# Patient Record
Sex: MALE | Race: WHITE | Employment: OTHER | URBAN - METROPOLITAN AREA
[De-identification: names, ages, dates, MRNs, and addresses within clinical notes are randomized per-mention and may not be internally consistent; named-entity substitution may affect disease eponyms.]

---

## 2017-01-13 ENCOUNTER — TELEPHONE (OUTPATIENT)
Dept: FAMILY MEDICINE | Facility: CLINIC | Age: 72
End: 2017-01-13

## 2017-01-13 ENCOUNTER — CARE COORDINATION (OUTPATIENT)
Dept: CARE COORDINATION | Facility: CLINIC | Age: 72
End: 2017-01-13

## 2017-01-13 DIAGNOSIS — F33.1 MODERATE RECURRENT MAJOR DEPRESSION (H): Primary | ICD-10-CM

## 2017-01-13 RX ORDER — VENLAFAXINE HYDROCHLORIDE 75 MG/1
75 CAPSULE, EXTENDED RELEASE ORAL 2 TIMES DAILY
Qty: 30 CAPSULE | Refills: 3 | Status: SHIPPED | OUTPATIENT
Start: 2017-01-13 | End: 2017-07-28

## 2017-01-13 NOTE — TELEPHONE ENCOUNTER
Telephone call received from Caren, wife of patient.     venlafaxine (EFFEXOR-XR) 75 MG 24 hr capsule  Take 1 capsule (75 mg) by mouth 2 times daily 1 in the am and 1 at bedtime    Insurance will no longer cover medication BID at a 90 day supply.     bartolo Quintero to start a PA for patient to take medication BID with a 90 day supply?      Thank you,  Sandra Guadalupe RN  [facility]

## 2017-01-13 NOTE — TELEPHONE ENCOUNTER
Please start a prior authorization for the following medication:     venlafaxine (EFFEXOR-XR) 75 MG 24 hr capsule   Take 1 capsule (75 mg) by mouth 2 times daily 1 in the am and 1 at bedtime.  180 capsules/ Per MONTH 1 Refill       Thank you!  Sandra Guadalupe RN  [facility]

## 2017-01-30 ENCOUNTER — CARE COORDINATION (OUTPATIENT)
Dept: CARE COORDINATION | Facility: CLINIC | Age: 72
End: 2017-01-30

## 2017-01-31 ENCOUNTER — TRANSFERRED RECORDS (OUTPATIENT)
Dept: HEALTH INFORMATION MANAGEMENT | Facility: CLINIC | Age: 72
End: 2017-01-31

## 2017-02-02 ENCOUNTER — TELEPHONE (OUTPATIENT)
Dept: FAMILY MEDICINE | Facility: CLINIC | Age: 72
End: 2017-02-02

## 2017-02-03 ENCOUNTER — TRANSFERRED RECORDS (OUTPATIENT)
Dept: HEALTH INFORMATION MANAGEMENT | Facility: CLINIC | Age: 72
End: 2017-02-03

## 2017-02-03 ENCOUNTER — HOSPITAL ENCOUNTER (OUTPATIENT)
Dept: NEUROLOGY | Facility: CLINIC | Age: 72
Setting detail: THERAPIES SERIES
Discharge: STILL ADMITTED OR ADMINISTRATIVE | End: 2017-02-03
Attending: PSYCHIATRY & NEUROLOGY

## 2017-02-03 DIAGNOSIS — G20.A1 PARKINSON'S DISEASE (H): ICD-10-CM

## 2017-02-13 ENCOUNTER — COMMUNICATION - HEALTHEAST (OUTPATIENT)
Dept: NEUROLOGY | Facility: CLINIC | Age: 72
End: 2017-02-13

## 2017-02-15 ENCOUNTER — COMMUNICATION - HEALTHEAST (OUTPATIENT)
Dept: NEUROLOGY | Facility: CLINIC | Age: 72
End: 2017-02-15

## 2017-02-15 DIAGNOSIS — F51.01 PRIMARY INSOMNIA: ICD-10-CM

## 2017-02-15 DIAGNOSIS — F03.90 DEMENTIA WITHOUT BEHAVIORAL DISTURBANCE (H): ICD-10-CM

## 2017-02-15 RX ORDER — CLONAZEPAM 0.5 MG/1
0.25 TABLET ORAL
Qty: 15 TABLET | Refills: 0 | Status: SHIPPED | OUTPATIENT
Start: 2017-02-15 | End: 2017-04-17

## 2017-02-15 NOTE — TELEPHONE ENCOUNTER
Routing to Dr. Buchanan -- please review and sign/advise. Thank you    SEJAL SimeonN, RN  [facility]

## 2017-02-15 NOTE — TELEPHONE ENCOUNTER
Clonazepam 0.5mg tabs      Last Written Prescription Date:  7/7/16  Last Fill Quantity: 15,   # refills: 0  Last Office Visit with [facility] or  Health prescribing provider: 12/5/16  Future Office visit:       Routing refill request to provider for review/approval because:  Drug not on the [facility] or  Health refill protocol or controlled substance

## 2017-02-16 ENCOUNTER — CARE COORDINATION (OUTPATIENT)
Dept: CARE COORDINATION | Facility: CLINIC | Age: 72
End: 2017-02-16

## 2017-02-21 ENCOUNTER — OFFICE VISIT (OUTPATIENT)
Dept: UROLOGY | Facility: CLINIC | Age: 72
End: 2017-02-21

## 2017-02-21 ENCOUNTER — RECORDS - HEALTHEAST (OUTPATIENT)
Dept: ADMINISTRATIVE | Facility: OTHER | Age: 72
End: 2017-02-21

## 2017-02-21 VITALS
SYSTOLIC BLOOD PRESSURE: 147 MMHG | WEIGHT: 193 LBS | HEIGHT: 74 IN | HEART RATE: 69 BPM | BODY MASS INDEX: 24.77 KG/M2 | DIASTOLIC BLOOD PRESSURE: 80 MMHG

## 2017-02-21 DIAGNOSIS — N39.41 URGE INCONTINENCE: ICD-10-CM

## 2017-02-21 DIAGNOSIS — N39.41 URGE INCONTINENCE OF URINE: Primary | ICD-10-CM

## 2017-02-21 LAB
ALBUMIN UR-MCNC: 30 MG/DL
APPEARANCE UR: CLEAR
BILIRUB UR QL STRIP: NEGATIVE
COLOR UR AUTO: YELLOW
GLUCOSE UR STRIP-MCNC: NEGATIVE MG/DL
HGB UR QL STRIP: ABNORMAL
KETONES UR STRIP-MCNC: 5 MG/DL
LEUKOCYTE ESTERASE UR QL STRIP: NEGATIVE
MUCOUS THREADS #/AREA URNS LPF: PRESENT /LPF
NITRATE UR QL: NEGATIVE
PH UR STRIP: 5 PH (ref 5–7)
RBC #/AREA URNS AUTO: 120 /HPF (ref 0–2)
SP GR UR STRIP: 1.02 (ref 1–1.03)
SQUAMOUS #/AREA URNS AUTO: <1 /HPF (ref 0–1)
URN SPEC COLLECT METH UR: ABNORMAL
UROBILINOGEN UR STRIP-MCNC: 0 MG/DL (ref 0–2)
WBC #/AREA URNS AUTO: 2 /HPF (ref 0–2)

## 2017-02-21 RX ORDER — AMLODIPINE BESYLATE 2.5 MG/1
TABLET ORAL
COMMUNITY
Start: 2016-03-31 | End: 2017-08-01

## 2017-02-21 RX ORDER — ERYTHROMYCIN 5 MG/G
OINTMENT OPHTHALMIC
Refills: 11 | COMMUNITY
Start: 2017-02-09 | End: 2017-09-13

## 2017-02-21 RX ORDER — DONEPEZIL HYDROCHLORIDE 10 MG/1
TABLET, FILM COATED ORAL
Refills: 3 | COMMUNITY
Start: 2017-02-15 | End: 2018-11-12

## 2017-02-21 RX ORDER — NEOMYCIN SULFATE, POLYMYXIN B SULFATE AND DEXAMETHASONE 3.5; 10000; 1 MG/ML; [USP'U]/ML; MG/ML
SUSPENSION/ DROPS OPHTHALMIC
Refills: 1 | COMMUNITY
Start: 2017-02-09 | End: 2017-09-13

## 2017-02-21 ASSESSMENT — PAIN SCALES - GENERAL: PAINLEVEL: NO PAIN (0)

## 2017-02-21 NOTE — PATIENT INSTRUCTIONS
- Continue Citrocel as you are  - Continue probiotic as you are  - Discuss with the neurologist whether something can be done about excess saliva  - Regarding fluids --> drink plenty of fluids in the daytime, but would limit fluids before bed.  Observe urine color.  Dark colored urine is suggestive of dehydration.   - Recommend returning to Physical Therapy.  Once you lose strength it is very hard to regain it.   - Urine specimen collected today - send for testing to rule out infection  - Return to Clinic 4 months or sooner if problems     Jackie Hunt PA-C

## 2017-02-21 NOTE — MR AVS SNAPSHOT
After Visit Summary   2/21/2017    Sanjay Cano    MRN: [id number]           Patient Information     Date Of Birth          1945        Visit Information        Provider Department      2/21/2017 1:30 PM Zenaida Hunt PA [facility] Urology and [facility] for Prostate and Urologic Cancers        Today's Diagnoses     Urinary incontinence    -  1      Care Instructions    - Continue Citrocel as you are  - Continue probiotic as you are  - Discuss with the neurologist whether something can be done about excess saliva  - Regarding fluids --> drink plenty of fluids in the daytime, but would limit fluids before bed.  Observe urine color.  Dark colored urine is suggestive of dehydration.   - Recommend returning to Physical Therapy.  Once you lose strength it is very hard to regain it.   - Urine specimen collected today - send for testing to rule out infection  - Return to Clinic 4 months or sooner if problems     Jackie Hunt PA-C        Follow-ups after your visit        Your next 10 appointments already scheduled     Feb 21, 2017  1:30 PM CST   (Arrive by 1:15 PM)   Return Visit with ESTEE Avendaño [facility] Urology and [facility] for Prostate and Urologic Cancers ([facility])    [address] [place] [id number] [phone]              Who to contact     Please call your clinic at [phone] to:    Ask questions about your health    Make or cancel appointments    Discuss your medicines    Learn about your test results    Speak to your doctor   If you have compliments or concerns about an experience at your clinic, or if you wish to file a complaint, please contact [facility] Patient Relations at [phone] or email us at [email or web address]         Additional Information About Your Visit        MyChart Information     [employer] is an electronic gateway that provides easy, online access to your medical  "records. With [employer], you can request a clinic appointment, read your test results, renew a prescription or communicate with your care team.     To sign up for [employer] visit the website at [email or web address]   You will be asked to enter the access code listed below, as well as some personal information. Please follow the directions to create your username and password.     Your access code is: [email or web address]  Expires: 2017  6:30 AM     Your access code will  in 90 days. If you need help or a new code, please contact your [facility] or call [phone] for assistance.        Care EveryWhere ID     This is your Care EveryWhere ID. This could be used by other organizations to access your [place] medical records  [id number]        Your Vitals Were     Pulse Height BMI (Body Mass Index)             69 1.88 m (6' 2\") 24.78 kg/m2          Blood Pressure from Last 3 Encounters:   17 147/80   16 100/67   11/15/16 112/63    Weight from Last 3 Encounters:   17 87.5 kg (193 lb)   11/15/16 85.3 kg (188 lb)   10/26/16 85.1 kg (187 lb 11.2 oz)              We Performed the Following     POST-VOID RESIDUAL BLADDER SCAN     Routine UA with microscopic - No culture     Urine Culture Aerobic Bacterial        Primary Care Provider Office Phone # Fax #    Bharath Buchanan [phone]       [facility] [address] [place] [phone]        Thank you!     Thank you for choosing [facility] UROLOGY AND [facility] FOR PROSTATE AND UROLOGIC CANCERS  for your care. Our goal is always to provide you with excellent care. Hearing back from our patients is one way we can continue to improve our services. Please take a few minutes to complete the written survey that you may receive in the mail after your visit with us. Thank you!             Your Updated Medication List - Protect others around you: Learn how to safely use, store and throw " away your medicines at www.disposemymeds.org.          This list is accurate as of: 2/21/17  1:18 PM.  Always use your most recent med list.                   Brand Name Dispense Instructions for use    * amLODIPine 2.5 MG tablet    NORVASC         * amLODIPine 5 MG tablet    NORVASC    90 tablet    Take 1 tablet (5 mg) by mouth daily       * ARICEPT 5 MG tablet   Generic drug:  donepezil     180 tablet    Take 5-7.5 mg by mouth every morning Take 1 to 1.5 tablets by mouth every every morning. Only take 1 tablet every morning when have diarrhea .       * donepezil 5 MG tablet    ARIcept    30 tablet    Takes 1.5 tabs by mouth every night       * donepezil 10 MG tablet    ARICEPT     TK 3/4 T PO BID       * carbidopa-levodopa  MG per tablet    SINEMET    90 tablet    Take 1-2 tablets by mouth 5 times daily Takes 2 tablets at 6 AM, 1.5 tablets at 9 AM, 2 tablets at noon, 1.5 tablets at 3 PM, 1.5 tablets at 6 PM. Can take additional 0.5 tablet in the middle of the night if needed.       * carbidopa-levodopa  MG per CR tablet    SINEMET CR    1 tablet    Take 1 tablet by mouth At Bedtime       cholecalciferol 1000 UNIT tablet    vitamin D    90 tablet    Take 1 tablet (1,000 Units) by mouth daily       CITRUCEL PO      Take by mouth 2 times daily       clonazePAM 0.5 MG tablet    klonoPIN    15 tablet    Take 0.5 tablets (0.25 mg) by mouth nightly as needed At bedtime       erythromycin ophthalmic ointment    ROMYCIN     APPLY 1 APPLICATION IN BOTH EYES NIGHTLY       FLORAJEN BIFIDOBLEND PO      Take 450 mg by mouth daily as needed (Only take when on antibiotics)       fluticasone 110 MCG/ACT Inhaler    FLOVENT HFA    3 Inhaler    Inhale 2 puffs into the lungs 2 times daily 2 puffs am, 2 puffs pm       Ipratropium-Albuterol  MCG/ACT inhaler    COMBIVENT RESPIMAT    1 Inhaler    Inhale 1 puff into the lungs 4 times daily as needed (Patient usually only takes 2-3 times daily (AM, noon, PM), but always at  least BID.) Not to exceed 6 doses per day.       lactobacillus rhamnosus (GG) capsule     60 capsule    Take 1 capsule by mouth 2 times daily       mirtazapine 15 MG tablet    REMERON    90 tablet    Take 1 tablet (15 mg) by mouth At Bedtime       neomycin-polymyxin-dexamethasone [id number] Susp ophthalmic susp    MAXITROL     PLACE 1 DROP INTO THE RIGHT EYE TID       NONFORMULARY      Apply Ponds Cold Cream topically to face daily for dry, pealing skin.       order for DME     1 Device    1 Device. Transport chair       order for DME     1 Device    Equipment being ordered: Blood pressure monitor with cuff       order for DME     2 each    Equipment being ordered: TEDS stockings       QUEtiapine 50 MG tablet    SEROQUEL    90 tablet    Take 1 tablet (50 mg) by mouth At Bedtime       venlafaxine 75 MG 24 hr capsule    EFFEXOR-XR    30 capsule    Take 1 capsule (75 mg) by mouth 2 times daily 1 in the am and 1 at bedtime       ZANTAC 150 MG tablet   Generic drug:  ranitidine     1 tablet    Take 1 tablet (150 mg) by mouth At Bedtime Then decrease to 75 mg after two weeks.       * Notice:  This list has 7 medication(s) that are the same as other medications prescribed for you. Read the directions carefully, and ask your doctor or other care provider to review them with you.

## 2017-02-21 NOTE — LETTER
Sanjay Cano   [address] [facility] [address]        Dear Mr. Cano:    I am writing you concerning your recent test results:    Results for orders placed or performed in visit on 02/21/17   Routine UA with microscopic - No culture   Result Value Ref Range    Color Urine Yellow     Appearance Urine Clear     Glucose Urine Negative NEG mg/dL    Bilirubin Urine Negative NEG    Ketones Urine 5 (A) NEG mg/dL    Specific Gravity Urine 1.018 1.003 - 1.035    Blood Urine Moderate (A) NEG    pH Urine 5.0 5.0 - 7.0 pH    Protein Albumin Urine 30 (A) NEG mg/dL    Urobilinogen mg/dL 0.0 0.0 - 2.0 mg/dL    Nitrite Urine Negative NEG    Leukocyte Esterase Urine Negative NEG    Source Midstream Urine     WBC Urine 2 0 - 2 /HPF    RBC Urine 120 (H) 0 - 2 /HPF    Squamous Epithelial /HPF Urine <1 0 - 1 /HPF    Mucous Urine Present (A) NEG /LPF   Urine Culture Aerobic Bacterial   Result Value Ref Range    Specimen Description Unspecified Urine     Special Requests Specimen received in preservative     Culture Micro       <10,000 colonies/mL mixed urogenital joey Susceptibility testing not routinely   done      Micro Report Status FINAL 02/22/2017      Resulted Orders   Urine Culture Aerobic Bacterial   Result Value Ref Range    Specimen Description Unspecified Urine     Special Requests Specimen received in preservative     Culture Micro       <10,000 colonies/mL mixed urogenital joey Susceptibility testing not routinely   done      Micro Report Status FINAL 02/22/2017    Routine UA with microscopic - No culture   Result Value Ref Range    Color Urine Yellow     Appearance Urine Clear     Glucose Urine Negative NEG mg/dL    Bilirubin Urine Negative NEG    Ketones Urine 5 (A) NEG mg/dL    Specific Gravity Urine 1.018 1.003 - 1.035    Blood Urine Moderate (A) NEG    pH Urine 5.0 5.0 - 7.0 pH    Protein Albumin Urine 30 (A) NEG mg/dL    Urobilinogen mg/dL 0.0 0.0 - 2.0 mg/dL    Nitrite Urine Negative NEG     Leukocyte Esterase Urine Negative NEG    Source Midstream Urine     WBC Urine 2 0 - 2 /HPF    RBC Urine 120 (H) 0 - 2 /HPF    Squamous Epithelial /HPF Urine <1 0 - 1 /HPF    Mucous Urine Present (A) NEG /LPF       Your urine shows some blood but is otherwise normal.  For instance, the urine culture is negative so there is no urinary tract infection.  We should recheck your urine next time we see you to assure that the blood has resolved.  Your previous urine specimens have not shown blood, so we will need to keep an eye on this.      Please let me know if you have any questions.      Sincerely,      Zenaida Hunt PA-C  Physician Assistant Urology        [facility] UROLOGY AND [facility] FOR PROSTATE AND UROLOGIC CANCERS  [address] [address]  Phone: [phone]  Fax: [phone]

## 2017-02-21 NOTE — PROGRESS NOTES
"HPI: Mr. Sanjay Cano is a 71 year old year old male presenting today for evaluation of chief complaint(s): No chief complaint on file.  - history of UTIs    Mr. Sanjay Cano has history of HTN, COPD, Parkinson's x 22 years and major depressive disorder. He was hospitalized from 6/28-7/6/16 for E Coli urosepsis/bacteremia.  He completed a 7 day course of Cipro (after resolution of fever) then discharged to TCU where he developed a recurrent E Coli UTI on 7/27.  This was initially treated with a 3 day course of Bactrim, then a 3 day course of Macrobid once the culture returned showing E Coli resistant to Bactrim.        He was seen by me on 8/9/16 with a UCx growing E Coli.  Patient was given a 10 day course of Cipro then a 30 day course of prophylactic Macrobid which he finished on 9/18.  A CT was also coordinated that showed diffuse bladder wall thickening, bilateral nonobstructing nephrolithiases as well as LLL pulmonary nodule and subpleural opacity in the R lung base.  The pulmonary findings were discussed with his wife and f/u with PCP was recommended.      Since then he has been seen in Urology Clinic on 9/20/16 and 11/15/16 with further concerns of latent UTI based on symptoms of lethargy, dark urine and incontinence.  Most recently in November a straight catheterized urine specimen was sent - UA was WNL and culture showed no growth. Antibiotics were not recommended. It was discussed that patient should get plenty of fluids.  Recommendations were also made for chronic loose stools and symptoms of GERD.  Today he represents with his wife in followup.      ROS:  - Voids to a diaper.  Has \"10 second\" lead time.  Nocturnal enuresis.  Often sleeps through the night.  No dysuria, hematuria.    - Bowels - Using citrocel BID.  Stools are more formed. Fewer accidents.  Also using probiotic once daily  - More hunched over --> all the doctors are recommending return to PT  - Stye in right eye - using drops and " ointment x 2 weeks, almost resolved.   - Poor energy around 8-9pm.  His wife is worried that he might have a UTI  - Patient concerned about excess saline.  Wants a medication to help with this.      REVIEW OF DIAGNOSTICS:  11/15/16 - UA negative.  UCx no growth  09/20/16 - 10-50K mixed UG culture  8/09/16 - E Coli >100K resistant to amp/ampsul, but otherwise susceptible  7/27/16 - ([facility]) UC growing >100,000cfu/mL E Coli susceptible to cephalosporins and quinolones as well as nitrofurantoin.  Resistant to amp/sul and Bactrim.    6/28/16 - ([facility]) WBC 16/hpf, RBC 6/hpf --> UC growing >100,000cfu/mL E Coli susceptible to cephalosporins and quinolones as well as nitrofurantoin.  Resistant to amp/sul and Bactrim. BC grew the same.    7/31/15 - WBC 2-5/hpf, RBC 0-2/hpf --> UC not obtained    Current Outpatient Prescriptions   Medication Sig Dispense Refill     clonazePAM (KLONOPIN) 0.5 MG tablet Take 0.5 tablets (0.25 mg) by mouth nightly as needed At bedtime 15 tablet 0     venlafaxine (EFFEXOR-XR) 75 MG 24 hr capsule Take 1 capsule (75 mg) by mouth 2 times daily 1 in the am and 1 at bedtime 30 capsule 3     donepezil (ARICEPT) 5 MG tablet Takes 1.5 tabs by mouth every night 30 tablet 0     ranitidine (ZANTAC) 150 MG tablet Take 1 tablet (150 mg) by mouth At Bedtime Then decrease to 75 mg after two weeks. 1 tablet 0     carbidopa-levodopa (SINEMET CR)  MG per CR tablet Take 1 tablet by mouth At Bedtime 1 tablet 0     Methylcellulose, Laxative, (CITRUCEL PO) Take by mouth 2 times daily       lactobacillus rhamnosus, GG, (CULTURELL) capsule Take 1 capsule by mouth 2 times daily 60 capsule 11     amLODIPine (NORVASC) 5 MG tablet Take 1 tablet (5 mg) by mouth daily 90 tablet 3     Ipratropium-Albuterol (COMBIVENT RESPIMAT)  MCG/ACT inhaler Inhale 1 puff into the lungs 4 times daily as needed (Patient usually only takes 2-3 times daily (AM, noon, PM), but always at least BID.) Not to exceed 6 doses per day. 1  Inhaler 4     NONFORMULARY Apply Ponds Cold Cream topically to face daily for dry, pealing skin.       order for DME Equipment being ordered: TEDS stockings 2 each 0     fluticasone (FLOVENT HFA) 110 MCG/ACT inhaler Inhale 2 puffs into the lungs 2 times daily 2 puffs am, 2 puffs pm 3 Inhaler 3     QUEtiapine (SEROQUEL) 50 MG tablet Take 1 tablet (50 mg) by mouth At Bedtime 90 tablet 3     mirtazapine (REMERON) 15 MG tablet Take 1 tablet (15 mg) by mouth At Bedtime 90 tablet 3     Probiotic Product (FLORAJEN BIFIDOBLEND PO) Take 450 mg by mouth daily as needed (Only take when on antibiotics)        carbidopa-levodopa (SINEMET)  MG per tablet Take 1-2 tablets by mouth 5 times daily Takes 2 tablets at 6 AM, 1.5 tablets at 9 AM, 2 tablets at noon, 1.5 tablets at 3 PM, 1.5 tablets at 6 PM. Can take additional 0.5 tablet in the middle of the night if needed. 90 tablet      donepezil (ARICEPT) 5 MG tablet Take 5-7.5 mg by mouth every morning Take 1 to 1.5 tablets by mouth every every morning. Only take 1 tablet every morning when have diarrhea . 180 tablet 3     order for DME Equipment being ordered: Blood pressure monitor with cuff 1 Device 0     cholecalciferol (VITAMIN D) 1000 UNIT tablet Take 1 tablet (1,000 Units) by mouth daily 90 tablet 1     ORDER FOR DME 1 Device. Transport chair 1 Device 0       ALLERGIES: Seasonal allergies      REVIEW OF SYSTEMS:  As above in HPI    GENERAL PHYSICAL EXAM:   Vitals: There were no vitals taken for this visit.  There is no height or weight on file to calculate BMI.    GENERAL: Well groomed, well developed, well nourished male in NAD.  Sitting in a wheelchair.  Head hunched over.  Difficult to hold up head.   NEURO: Alert and oriented x 3.  PSYCH: Normal mood and affect, pleasant and agreeable during interview and exam.    PVR Bladderscan 37mL today    RADIOLOGY: The following tests were reviewed:   8/11/16 CT AP with/without contrast:  IMPRESSION:    1.  Diffuse thickening  of the bladder wall, which can be seen in  cystitis.  In addition, the bladder wall is trabeculated, which could  be related to chronic outlet obstruction from prostatic hypertrophy.  2.  Bilateral nonobstructing nephrolithiasis.  3.  Multiple bilateral simple appearing renal cysts of various sizes,  most of which appear benign but some of which are too small to further  characterize on this exam.  4. 8 mm left lower lobe pulmonary nodule, not significantly changed  compared to 6/20/2016 exam, recommend followup in 3 months per  Fleischner Society criteria.  5.  Rounded subpleural opacity in the right lung base is unchanged to  slightly more prominent than on 6/28/2016.  Attention on followup  imaging is recommended.  Differential considerations include scarring,  atelectasis, less likely a pulmonary nodule.  Given changes in  technique, this is similar to chest x-rays from 2/3/2015 and 2/4/2016.     LABS: The last test results for Mr. Sanjay Cano were reviewed:  PSA -   Lab Results   Component Value Date    PSA 1.2 02/25/2010     BMP -   Recent Labs   Lab Test  07/06/16   0722  07/05/16   1926  07/04/16   0726   07/01/16   0755  06/30/16   0816   12/26/12   1203   10/28/11   2023   10/13/11   0642   NA   --    --   139   --   142  139   < >  144   < >  143   < >  139   POTASSIUM  3.7  3.7  3.5   < >  3.3*  3.2*   < >  4.5   < >  3.9   < >  3.8   CHLORIDE   --    --   108   --   109  107   < >  102   < >  108   < >  107   CO2   --    --   25   --   27  24   < >  23   < >  31   < >  26   BUN   --    --   15   --   15  17   < >  31*   < >  21   < >  13   CR   --    --   0.74   --   0.90  0.91   < >  1.20   < >  0.84   < >  0.83   GLC   --    --   123*   --   120*  166*   < >  83   < >  105*   < >  112*   JENNIE   --    --   7.8*   --   7.7*  7.7*   < >  9.8   < >  9.1   < >  8.5   MAG   --    --    --    --    --    --    --   2.2   --   2.1   --   1.9   PHOS   --    --    --    --    --    --    --   3.8   --   3.4    --   2.4*    < > = values in this interval not displayed.       CBC -   Recent Labs   Lab Test  07/04/16   0726  07/01/16   0755  06/30/16   0816   WBC  7.5  4.1  4.6   HGB  14.0  12.3*  13.5   PLT  187  148*  131*       ASSESSMENT:   1) Parkinson  2) urge incontinence  3) incontinence without awareness  4) nocturia enuresis  5) history of hospitalization for UTI    PLAN:   - Continue Citrocel as you are  - Continue probiotic as you are  - Discuss with the neurologist whether something can be done about excess saliva  - Discussed fluids.  Drink plenty of fluids in the daytime, but would limit fluids before bed.  Observe urine color.  Dark colored urine is suggestive of dehydration.   - Recommend returning to PT  - Urine specimen collected today - send for UA/UC to rule out infection  - PVR checked 37mL  - Return to Clinic 4 months or sooner if problems     Jackie Hunt PA-C  Department of Urologic Surgery

## 2017-02-21 NOTE — LETTER
2/21/2017       RE: Sanjay Cano  [address]  [facility] [id number]     Dear Colleague,    Thank you for referring your patient, Sanjay Cano, to the [facility] UROLOGY AND [facility] FOR PROSTATE AND UROLOGIC CANCERS at [facility]. Please see a copy of my visit note below.    HPI: Mr. Sanjay Cano is a 71 year old year old male presenting today for evaluation of chief complaint(s): No chief complaint on file.  - history of UTIs    Mr. Sanjay Cano has history of HTN, COPD, Parkinson's x 22 years and major depressive disorder. He was hospitalized from 6/28-7/6/16 for E Coli urosepsis/bacteremia.  He completed a 7 day course of Cipro (after resolution of fever) then discharged to TCU where he developed a recurrent E Coli UTI on 7/27.  This was initially treated with a 3 day course of Bactrim, then a 3 day course of Macrobid once the culture returned showing E Coli resistant to Bactrim.        He was seen by me on 8/9/16 with a UCx growing E Coli.  Patient was given a 10 day course of Cipro then a 30 day course of prophylactic Macrobid which he finished on 9/18.  A CT was also coordinated that showed diffuse bladder wall thickening, bilateral nonobstructing nephrolithiases as well as LLL pulmonary nodule and subpleural opacity in the R lung base.  The pulmonary findings were discussed with his wife and f/u with PCP was recommended.      Since then he has been seen in Urology Clinic on 9/20/16 and 11/15/16 with further concerns of latent UTI based on symptoms of lethargy, dark urine and incontinence.  Most recently in November a straight catheterized urine specimen was sent - UA was WNL and culture showed no growth. Antibiotics were not recommended. It was discussed that patient should get plenty of fluids.  Recommendations were also made for chronic loose stools and symptoms of GERD.  Today he represents with his wife in followup.      ROS:  - Voids to a diaper.  Has  "\"10 second\" lead time.  Nocturnal enuresis.  Often sleeps through the night.  No dysuria, hematuria.    - Bowels - Using citrocel BID.  Stools are more formed. Fewer accidents.  Also using probiotic once daily  - More hunched over --> all the doctors are recommending return to PT  - Stye in right eye - using drops and ointment x 2 weeks, almost resolved.   - Poor energy around 8-9pm.  His wife is worried that he might have a UTI  - Patient concerned about excess saline.  Wants a medication to help with this.      REVIEW OF DIAGNOSTICS:  11/15/16 - UA negative.  UCx no growth  09/20/16 - 10-50K mixed UG culture  8/09/16 - E Coli >100K resistant to amp/ampsul, but otherwise susceptible  7/27/16 - ([facility]) UC growing >100,000cfu/mL E Coli susceptible to cephalosporins and quinolones as well as nitrofurantoin.  Resistant to amp/sul and Bactrim.    [facility], RBC 6/hpf --> UC growing >100,000cfu/mL E Coli susceptible to cephalosporins and quinolones as well as nitrofurantoin.  Resistant to amp/sul and Bactrim. BC grew the same.    7/31/15 - WBC 2-5/hpf, RBC 0-2/hpf --> UC not obtained    Current Outpatient Prescriptions   Medication Sig Dispense Refill     clonazePAM (KLONOPIN) 0.5 MG tablet Take 0.5 tablets (0.25 mg) by mouth nightly as needed At bedtime 15 tablet 0     venlafaxine (EFFEXOR-XR) 75 MG 24 hr capsule Take 1 capsule (75 mg) by mouth 2 times daily 1 in the am and 1 at bedtime 30 capsule 3     donepezil (ARICEPT) 5 MG tablet Takes 1.5 tabs by mouth every night 30 tablet 0     ranitidine (ZANTAC) 150 MG tablet Take 1 tablet (150 mg) by mouth At Bedtime Then decrease to 75 mg after two weeks. 1 tablet 0     carbidopa-levodopa (SINEMET CR)  MG per CR tablet Take 1 tablet by mouth At Bedtime 1 tablet 0     Methylcellulose, Laxative, (CITRUCEL PO) Take by mouth 2 times daily       lactobacillus rhamnosus, GG, (CULTURELL) capsule Take 1 capsule by mouth 2 times daily 60 capsule 11     " amLODIPine (NORVASC) 5 MG tablet Take 1 tablet (5 mg) by mouth daily 90 tablet 3     Ipratropium-Albuterol (COMBIVENT RESPIMAT)  MCG/ACT inhaler Inhale 1 puff into the lungs 4 times daily as needed (Patient usually only takes 2-3 times daily (AM, noon, PM), but always at least BID.) Not to exceed 6 doses per day. 1 Inhaler 4     NONFORMULARY Apply Ponds Cold Cream topically to face daily for dry, pealing skin.       order for DME Equipment being ordered: TEDS stockings 2 each 0     fluticasone (FLOVENT HFA) 110 MCG/ACT inhaler Inhale 2 puffs into the lungs 2 times daily 2 puffs am, 2 puffs pm 3 Inhaler 3     QUEtiapine (SEROQUEL) 50 MG tablet Take 1 tablet (50 mg) by mouth At Bedtime 90 tablet 3     mirtazapine (REMERON) 15 MG tablet Take 1 tablet (15 mg) by mouth At Bedtime 90 tablet 3     Probiotic Product (FLORAJEN BIFIDOBLEND PO) Take 450 mg by mouth daily as needed (Only take when on antibiotics)        carbidopa-levodopa (SINEMET)  MG per tablet Take 1-2 tablets by mouth 5 times daily Takes 2 tablets at 6 AM, 1.5 tablets at 9 AM, 2 tablets at noon, 1.5 tablets at 3 PM, 1.5 tablets at 6 PM. Can take additional 0.5 tablet in the middle of the night if needed. 90 tablet      donepezil (ARICEPT) 5 MG tablet Take 5-7.5 mg by mouth every morning Take 1 to 1.5 tablets by mouth every every morning. Only take 1 tablet every morning when have diarrhea . 180 tablet 3     order for DME Equipment being ordered: Blood pressure monitor with cuff 1 Device 0     cholecalciferol (VITAMIN D) 1000 UNIT tablet Take 1 tablet (1,000 Units) by mouth daily 90 tablet 1     ORDER FOR DME 1 Device. Transport chair 1 Device 0       ALLERGIES: Seasonal allergies      REVIEW OF SYSTEMS:  As above in HPI    GENERAL PHYSICAL EXAM:   Vitals: There were no vitals taken for this visit.  There is no height or weight on file to calculate BMI.    GENERAL: Well groomed, well developed, well nourished male in NAD.  Sitting in a  wheelchair.  Head hunched over.  Difficult to hold up head.   NEURO: Alert and oriented x 3.  PSYCH: Normal mood and affect, pleasant and agreeable during interview and exam.    PVR Bladderscan 37mL today    RADIOLOGY: The following tests were reviewed:   8/11/16 CT AP with/without contrast:  IMPRESSION:    1.  Diffuse thickening of the bladder wall, which can be seen in  cystitis.  In addition, the bladder wall is trabeculated, which could  be related to chronic outlet obstruction from prostatic hypertrophy.  2.  Bilateral nonobstructing nephrolithiasis.  3.  Multiple bilateral simple appearing renal cysts of various sizes,  most of which appear benign but some of which are too small to further  characterize on this exam.  4. 8 mm left lower lobe pulmonary nodule, not significantly changed  compared to 6/20/2016 exam, recommend followup in 3 months per  Fleischner Society criteria.  5.  Rounded subpleural opacity in the right lung base is unchanged to  slightly more prominent than on 6/28/2016.  Attention on followup  imaging is recommended.  Differential considerations include scarring,  atelectasis, less likely a pulmonary nodule.  Given changes in  technique, this is similar to chest x-rays from 2/3/2015 and 2/4/2016.     LABS: The last test results for Mr. Sanjay Cano were reviewed:  PSA -   Lab Results   Component Value Date    PSA 1.2 02/25/2010     BMP -   Recent Labs   Lab Test  07/06/16   0722  07/05/16   1926  07/04/16   0726   07/01/16   0755  06/30/16   0816   12/26/12   1203   10/28/11   2023   10/13/11   0642   NA   --    --   139   --   142  139   < >  144   < >  143   < >  139   POTASSIUM  3.7  3.7  3.5   < >  3.3*  3.2*   < >  4.5   < >  3.9   < >  3.8   CHLORIDE   --    --   108   --   109  107   < >  102   < >  108   < >  107   CO2   --    --   25   --   27  24   < >  23   < >  31   < >  26   BUN   --    --   15   --   15  17   < >  31*   < >  21   < >  13   CR   --    --   0.74   --   0.90   0.91   < >  1.20   < >  0.84   < >  0.83   GLC   --    --   123*   --   120*  166*   < >  83   < >  105*   < >  112*   JENNIE   --    --   7.8*   --   7.7*  7.7*   < >  9.8   < >  9.1   < >  8.5   MAG   --    --    --    --    --    --    --   2.2   --   2.1   --   1.9   PHOS   --    --    --    --    --    --    --   3.8   --   3.4   --   2.4*    < > = values in this interval not displayed.       CBC -   Recent Labs   Lab Test  07/04/16   0726  07/01/16   0755  06/30/16   0816   WBC  7.5  4.1  4.6   HGB  14.0  12.3*  13.5   PLT  187  148*  131*       ASSESSMENT:   1) Parkinson  2) urge incontinence  3) incontinence without awareness  4) nocturia enuresis  5) history of hospitalization for UTI    PLAN:   - Continue Citrocel as you are  - Continue probiotic as you are  - Discuss with the neurologist whether something can be done about excess saliva  - Discussed fluids.  Drink plenty of fluids in the daytime, but would limit fluids before bed.  Observe urine color.  Dark colored urine is suggestive of dehydration.   - Recommend returning to PT  - Urine specimen collected today - send for UA/UC to rule out infection  - PVR checked 37mL  - Return to Clinic 4 months or sooner if problems     Jackie Hunt PA-C  Department of Urologic Surgery

## 2017-02-21 NOTE — NURSING NOTE
"Chief Complaint   Patient presents with     RECHECK     Follow up- neurogenic bladder       Initial Ht 1.88 m (6' 2\")  Wt 87.5 kg (193 lb)  BMI 24.78 kg/m2 Estimated body mass index is 24.78 kg/(m^2) as calculated from the following:    Height as of this encounter: 1.88 m (6' 2\").    Weight as of this encounter: 87.5 kg (193 lb).  Medication Reconciliation: complete     DELVIS Hawk    "

## 2017-02-22 LAB
BACTERIA SPEC CULT: NORMAL
Lab: NORMAL
MICRO REPORT STATUS: NORMAL
SPECIMEN SOURCE: NORMAL

## 2017-02-23 DIAGNOSIS — R31.29 MICROHEMATURIA: Primary | ICD-10-CM

## 2017-02-28 ENCOUNTER — COMMUNICATION - HEALTHEAST (OUTPATIENT)
Dept: NEUROLOGY | Facility: CLINIC | Age: 72
End: 2017-02-28

## 2017-02-28 DIAGNOSIS — G20.A1 PARKINSON'S DISEASE (H): ICD-10-CM

## 2017-03-01 ENCOUNTER — CARE COORDINATION (OUTPATIENT)
Dept: CARE COORDINATION | Facility: CLINIC | Age: 72
End: 2017-03-01

## 2017-03-01 NOTE — PROGRESS NOTES
No current needs identified. Will close to care coordination at this time.    Yamel Martinez R.N.  Clinic Care Coordinator  [facility]  [phone]

## 2017-03-07 ENCOUNTER — CARE COORDINATION (OUTPATIENT)
Dept: CARE COORDINATION | Facility: CLINIC | Age: 72
End: 2017-03-07

## 2017-03-21 NOTE — TELEPHONE ENCOUNTER
Spoke with wife, pt has c/o being tired and no energy since Friday, no temp. Has ok appetite and per wife not drinking as much fluids as he should, assist to make appt for pt to be seen in clinic, wife has appt on 3/22/17 and she wished appt for  the same day and close to same time as she has    Joana Gandhi RN

## 2017-03-22 ENCOUNTER — OFFICE VISIT (OUTPATIENT)
Dept: FAMILY MEDICINE | Facility: CLINIC | Age: 72
End: 2017-03-22
Payer: COMMERCIAL

## 2017-03-22 VITALS
DIASTOLIC BLOOD PRESSURE: 80 MMHG | WEIGHT: 193 LBS | SYSTOLIC BLOOD PRESSURE: 118 MMHG | BODY MASS INDEX: 24.78 KG/M2 | HEART RATE: 70 BPM

## 2017-03-22 DIAGNOSIS — R35.0 URINARY FREQUENCY: ICD-10-CM

## 2017-03-22 DIAGNOSIS — R53.83 FATIGUE, UNSPECIFIED TYPE: Primary | ICD-10-CM

## 2017-03-22 LAB
ALBUMIN UR-MCNC: NEGATIVE MG/DL
APPEARANCE UR: CLEAR
BASOPHILS # BLD AUTO: 0 10E9/L (ref 0–0.2)
BASOPHILS NFR BLD AUTO: 0.5 %
BILIRUB UR QL STRIP: NEGATIVE
COLOR UR AUTO: YELLOW
DIFFERENTIAL METHOD BLD: NORMAL
EOSINOPHIL # BLD AUTO: 0 10E9/L (ref 0–0.7)
EOSINOPHIL NFR BLD AUTO: 0 %
ERYTHROCYTE [DISTWIDTH] IN BLOOD BY AUTOMATED COUNT: 12.1 % (ref 10–15)
GLUCOSE UR STRIP-MCNC: NEGATIVE MG/DL
HCT VFR BLD AUTO: 43.6 % (ref 40–53)
HGB BLD-MCNC: 15 G/DL (ref 13.3–17.7)
HGB UR QL STRIP: NEGATIVE
KETONES UR STRIP-MCNC: ABNORMAL MG/DL
LEUKOCYTE ESTERASE UR QL STRIP: NEGATIVE
LYMPHOCYTES # BLD AUTO: 0.9 10E9/L (ref 0.8–5.3)
LYMPHOCYTES NFR BLD AUTO: 15.4 %
MCH RBC QN AUTO: 32.2 PG (ref 26.5–33)
MCHC RBC AUTO-ENTMCNC: 34.4 G/DL (ref 31.5–36.5)
MCV RBC AUTO: 94 FL (ref 78–100)
MONOCYTES # BLD AUTO: 0.6 10E9/L (ref 0–1.3)
MONOCYTES NFR BLD AUTO: 10 %
NEUTROPHILS # BLD AUTO: 4.5 10E9/L (ref 1.6–8.3)
NEUTROPHILS NFR BLD AUTO: 74.1 %
NITRATE UR QL: NEGATIVE
PH UR STRIP: 6 PH (ref 5–7)
PLATELET # BLD AUTO: 211 10E9/L (ref 150–450)
RBC # BLD AUTO: 4.66 10E12/L (ref 4.4–5.9)
SP GR UR STRIP: 1.02 (ref 1–1.03)
URN SPEC COLLECT METH UR: ABNORMAL
UROBILINOGEN UR STRIP-ACNC: 0.2 EU/DL (ref 0.2–1)
WBC # BLD AUTO: 6.1 10E9/L (ref 4–11)

## 2017-03-22 PROCEDURE — 36415 COLL VENOUS BLD VENIPUNCTURE: CPT | Performed by: NURSE PRACTITIONER

## 2017-03-22 PROCEDURE — 85025 COMPLETE CBC W/AUTO DIFF WBC: CPT | Performed by: NURSE PRACTITIONER

## 2017-03-22 PROCEDURE — 99214 OFFICE O/P EST MOD 30 MIN: CPT | Performed by: NURSE PRACTITIONER

## 2017-03-22 PROCEDURE — 81003 URINALYSIS AUTO W/O SCOPE: CPT | Performed by: NURSE PRACTITIONER

## 2017-03-22 NOTE — NURSING NOTE
"Chief Complaint   Patient presents with     Fatigue     Tired     Headache       Initial /80 (BP Location: Right arm, Patient Position: Chair, Cuff Size: Adult Regular)  Pulse 70  Wt 193 lb (87.5 kg)  BMI 24.78 kg/m2 Estimated body mass index is 24.78 kg/(m^2) as calculated from the following:    Height as of 2/21/17: 6' 2\" (1.88 m).    Weight as of this encounter: 193 lb (87.5 kg).  Medication Reconciliation: complete       Reuben Sanabria MA      "

## 2017-03-22 NOTE — PROGRESS NOTES
SUBJECTIVE:                                                    Sanjay Cano is a 71 year old male who presents to clinic today for the following health issues:    Concern - Fatigue     Onset: Last week    Description:   Reports exhaustion and wanting to sleep more than usual   Would sleep until 11 in the afternoon    Intensity: moderate    Progression of Symptoms:  worsening    Accompanying Signs & Symptoms:  Just really tired and sleeping early  Appetite has been ok  Headache  Has a congested cough  More forgetful than typical  Urinating more often than usual  Difficult to tell if urine is darker (as it had been previously with UTI) due to medication  No stomach pain  Scalp rash - tried tea tree oil, melaluca  No skin breakdown on buttocks     Previous history of similar problem:   None but has been dehydrated 2 other times and been having the same symptoms and urinate a lot.    Precipitating factors:   Worsened by: None  [facility] reporting Sanjay is drinking less water while there    Alleviating factors:  Improved by: None       Therapies Tried and outcome: None    [facility] day program three times a week    March 29th - evaluation at [place] to determine level of need for therapy for memory    Has never had colonoscopy or FIT test    Vitamin D 40 on 7/2015    Questions/Concerns: Want to have urine and blood test done for dehydration. Has had pneumonia twice in the past.       Problem list and histories reviewed & adjusted, as indicated.  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)     Primary insomnia     Diarrhea     Need for vaccination      Other emphysema (H)     Altered mental status     Pulmonary nodules     Thyroid nodule     Family history of thyroid cancer     H/O recurrent urinary tract infection     Essential hypertension with goal blood pressure less than 140/90     Senile purpura (H)     Nocturnal cough     Past Surgical History:   Procedure Laterality Date     EYE SURGERY       ORTHOPEDIC SURGERY       PHACOEMULSIFICATION CLEAR CORNEA WITH STANDARD INTRAOCULAR LENS IMPLANT  5/21/2014    Procedure: PHACOEMULSIFICATION CLEAR CORNEA WITH STANDARD INTRAOCULAR LENS IMPLANT;  Surgeon: Tomy Hunter MD;  Location: [facility]     PHACOEMULSIFICATION CLEAR CORNEA WITH TORIC INTRAOCULAR LENS IMPLANT  4/30/2014    Procedure: PHACOEMULSIFICATION CLEAR CORNEA WITH TORIC INTRAOCULAR LENS IMPLANT;  Surgeon: Tomy Hunter MD;  Location: [facility]       Social History   Substance Use Topics     Smoking status: Former Smoker     Packs/day: 0.01     Years: 40.00     Types: Cigarettes     Quit date: 7/31/2008     Smokeless tobacco: Never Used      Comment: very passive cigarettes in 6 months     Alcohol use No     Family History   Problem Relation Age of Onset     CEREBROVASCULAR DISEASE Mother      DIABETES Mother      GASTROINTESTINAL DISEASE Mother      Hypertension Mother      Neurologic Disorder Father      CEREBROVASCULAR DISEASE Father      CEREBROVASCULAR DISEASE Maternal Grandfather      CANCER Paternal Grandmother      Other - See Comments Sister      gi - unknown         BP Readings from Last 3 Encounters:   03/22/17 118/80   02/21/17 147/80   12/05/16 100/67    Wt Readings from Last 3 Encounters:   03/22/17 193 lb (87.5 kg)   02/21/17 193 lb (87.5 kg)   11/15/16 188 lb (85.3 kg)           Reviewed and updated as needed this visit by clinical staff       Reviewed and updated as needed this visit by Provider         ROS:  Constitutional, HEENT, cardiovascular, pulmonary, gi and gu systems are negative, except as otherwise noted.    OBJECTIVE:                                                     /80 (BP Location: Right arm, Patient Position: Chair, Cuff Size: Adult Regular)  Pulse 70  Wt 193 lb (87.5 kg)  BMI 24.78 kg/m2  Body mass index is 24.78 kg/(m^2).  GENERAL: healthy, alert and no distress  NECK: no adenopathy, no asymmetry, masses, or scars and thyroid normal to palpation  RESP: lungs clear to auscultation - no rales, rhonchi or wheezes  CV: regular rate and rhythm, normal S1 S2, no S3 or S4, no murmur, click or rub, no peripheral edema and peripheral pulses strong  ABDOMEN: soft, nontender, no hepatosplenomegaly, no masses and bowel sounds normal  MS: wheelchair bound, slight left hand tremor, kyphosis  SKIN: no suspicious lesions or rashes  NEURO: mentation intact, speech slightly slurred and word finding  PSYCH: mentation appears normal, affect normal    Diagnostic Test Results:  Results for orders placed or performed in visit on 03/22/17 (from the past 24 hour(s))   *UA reflex to Microscopic and Culture ([facility] (except [place] and [place])   Result Value Ref Range    Color Urine Yellow     Appearance Urine Clear     Glucose Urine Negative NEG mg/dL    Bilirubin Urine Negative NEG    Ketones Urine Trace (A) NEG mg/dL    Specific Gravity Urine 1.025 1.003 - 1.035    Blood Urine Negative NEG    pH Urine 6.0 5.0 - 7.0 pH    Protein Albumin Urine Negative NEG mg/dL    Urobilinogen Urine 0.2 0.2 - 1.0 EU/dL    Nitrite Urine Negative NEG    Leukocyte Esterase Urine Negative NEG    Source Midstream Urine    CBC with platelets differential   Result Value Ref Range    WBC 6.1 4.0 - 11.0 10e9/L    RBC Count 4.66 4.4 - 5.9 10e12/L    Hemoglobin 15.0 13.3 - 17.7 g/dL    Hematocrit 43.6 40.0 - 53.0 %    MCV 94 78 - 100 fl    MCH 32.2 26.5 - 33.0 pg    MCHC 34.4 31.5 - 36.5 g/dL    RDW 12.1 10.0 - 15.0 %    Platelet Count 211 150 - 450 10e9/L    Diff Method Automated Method     % Neutrophils 74.1 %    % Lymphocytes 15.4 %    %  Monocytes 10.0 %    % Eosinophils 0.0 %    % Basophils 0.5 %    Absolute Neutrophil 4.5 1.6 - 8.3 10e9/L    Absolute Lymphocytes 0.9 0.8 - 5.3 10e9/L    Absolute Monocytes 0.6 0.0 - 1.3 10e9/L    Absolute Eosinophils 0.0 0.0 - 0.7 10e9/L    Absolute Basophils 0.0 0.0 - 0.2 10e9/L      Please note greater than 50% of this 25 minute appointment were spent in counseling with the patient of the issues described above in the history of present illness and in the plan, including possible causes for fatigue, diagnostics and exam for concerns of pneumonia and UTI, colon cancer screening tests and appropriateness.  ASSESSMENT/PLAN:                                                    Hypertension; controlled   Associated with the following complications:    None   Plan:  No changes in the patient's current treatment plan      (R53.83) Fatigue, unspecified type  (primary encounter diagnosis)  Comment:   Plan: CBC with platelets differential        Concern from family and patient about pneumonia and UTI.  No rales, normal VS and no leukocytosis.  UA also normal.    Wife also wondering about need for FIT test.  Will discuss with PCP.    (R35.0) Urinary frequency  Comment:   Plan: *UA reflex to Microscopic and Culture         ([facility] (except         [place] and [place])   Trace ketones, otherwise normal.          LUISA [facility]

## 2017-03-22 NOTE — MR AVS SNAPSHOT
"              After Visit Summary   3/22/2017    Sanjay Cano    MRN: [id number]           Patient Information     Date Of Birth          1945        Visit Information        Provider Department      3/22/2017 10:30 AM Marimar Fox APRN [place]NP [facility]        Today's Diagnoses     Fatigue, unspecified type    -  1    Urinary frequency           Follow-ups after your visit        Who to contact     If you have questions or need follow up information about today's clinic visit or your schedule please contact [facility] [place] at [phone].  Normal or non-critical lab and imaging results will be communicated to you by [employer], letter or phone within 4 business days after the clinic has received the results. If you do not hear from us within 7 days, please contact the clinic through [employer] or phone. If you have a critical or abnormal lab result, we will notify you by phone as soon as possible.  Submit refill requests through [employer] or call your pharmacy and they will forward the refill request to us. Please allow 3 business days for your refill to be completed.          Additional Information About Your Visit        MyChart Information     [employer] lets you send messages to your doctor, view your test results, renew your prescriptions, schedule appointments and more. To sign up, go to [email or web address] . Click on \"Log in\" on the left side of the screen, which will take you to the Welcome page. Then click on \"Sign up Now\" on the right side of the page.     You will be asked to enter the access code listed below, as well as some personal information. Please follow the directions to create your username and password.     Your access code is: [email or web address]  Expires: 2017  7:30 AM     Your access code will  in 90 days. If you need help or a new code, please call your [facility] or [phone].        Care EveryWhere ID     This is your Care EveryWhere ID. " This could be used by other organizations to access your [place] medical records  [id number]        Your Vitals Were     Pulse BMI (Body Mass Index)                70 24.78 kg/m2           Blood Pressure from Last 3 Encounters:   03/22/17 118/80   02/21/17 147/80   12/05/16 100/67    Weight from Last 3 Encounters:   03/22/17 193 lb (87.5 kg)   02/21/17 193 lb (87.5 kg)   11/15/16 188 lb (85.3 kg)              We Performed the Following     *UA reflex to Microscopic and Culture ([facility], [place] and [facility] (except [place] and [place])     CBC with platelets differential        Primary Care Provider Office Phone # Fax #    Bharath Buchanan [phone]       [facility] [address] [facility] [address]  [facility] [address]        Thank you!     Thank you for choosing [facility]  for your care. Our goal is always to provide you with excellent care. Hearing back from our patients is one way we can continue to improve our services. Please take a few minutes to complete the written survey that you may receive in the mail after your visit with us. Thank you!             Your Updated Medication List - Protect others around you: Learn how to safely use, store and throw away your medicines at www.disposemymeds.org.          This list is accurate as of: 3/22/17  1:36 PM.  Always use your most recent med list.                   Brand Name Dispense Instructions for use    * amLODIPine 2.5 MG tablet    NORVASC         * amLODIPine 5 MG tablet    NORVASC    90 tablet    Take 1 tablet (5 mg) by mouth daily       * ARICEPT 5 MG tablet   Generic drug:  donepezil     180 tablet    Take 5-7.5 mg by mouth every morning Take 1 to 1.5 tablets by mouth every every morning. Only take 1 tablet every morning when have diarrhea .       * donepezil 5 MG tablet    ARIcept    30 tablet    Takes 1.5 tabs by mouth every night       * donepezil 10 MG tablet    ARICEPT     TK 3/4 T PO BID        * carbidopa-levodopa  MG per tablet    SINEMET    90 tablet    Take 1-2 tablets by mouth 5 times daily Takes 2 tablets at 6 AM, 1.5 tablets at 9 AM, 2 tablets at noon, 1.5 tablets at 3 PM, 1.5 tablets at 6 PM. Can take additional 0.5 tablet in the middle of the night if needed.       * carbidopa-levodopa  MG per CR tablet    SINEMET CR    1 tablet    Take 1 tablet by mouth At Bedtime       cholecalciferol 1000 UNIT tablet    vitamin D    90 tablet    Take 1 tablet (1,000 Units) by mouth daily       CITRUCEL PO      Take by mouth 2 times daily       clonazePAM 0.5 MG tablet    klonoPIN    15 tablet    Take 0.5 tablets (0.25 mg) by mouth nightly as needed At bedtime       erythromycin ophthalmic ointment    ROMYCIN     APPLY 1 APPLICATION IN BOTH EYES NIGHTLY       FLORAJEN BIFIDOBLEND PO      Take 450 mg by mouth daily as needed (Only take when on antibiotics)       fluticasone 110 MCG/ACT Inhaler    FLOVENT HFA    3 Inhaler    Inhale 2 puffs into the lungs 2 times daily 2 puffs am, 2 puffs pm       Ipratropium-Albuterol  MCG/ACT inhaler    COMBIVENT RESPIMAT    1 Inhaler    Inhale 1 puff into the lungs 4 times daily as needed (Patient usually only takes 2-3 times daily (AM, noon, PM), but always at least BID.) Not to exceed 6 doses per day.       lactobacillus rhamnosus (GG) capsule     60 capsule    Take 1 capsule by mouth 2 times daily       mirtazapine 15 MG tablet    REMERON    90 tablet    Take 1 tablet (15 mg) by mouth At Bedtime       neomycin-polymyxin-dexamethasone 3.5-65629-5.1 Susp ophthalmic susp    MAXITROL     PLACE 1 DROP INTO THE RIGHT EYE TID       NONFORMULARY      Apply Ponds Cold Cream topically to face daily for dry, pealing skin.       order for DME     1 Device    1 Device. Transport chair       order for DME     1 Device    Equipment being ordered: Blood pressure monitor with cuff       order for DME     2 each    Equipment being ordered: TEDS stockings       QUEtiapine  50 MG tablet    SEROQUEL    90 tablet    Take 1 tablet (50 mg) by mouth At Bedtime       venlafaxine 75 MG 24 hr capsule    EFFEXOR-XR    30 capsule    Take 1 capsule (75 mg) by mouth 2 times daily 1 in the am and 1 at bedtime       ZANTAC 150 MG tablet   Generic drug:  ranitidine     1 tablet    Take 1 tablet (150 mg) by mouth At Bedtime Then decrease to 75 mg after two weeks.       * Notice:  This list has 7 medication(s) that are the same as other medications prescribed for you. Read the directions carefully, and ask your doctor or other care provider to review them with you.

## 2017-03-28 ENCOUNTER — COMMUNICATION - HEALTHEAST (OUTPATIENT)
Dept: NEUROLOGY | Facility: CLINIC | Age: 72
End: 2017-03-28

## 2017-03-28 DIAGNOSIS — F03.90 DEMENTIA WITHOUT BEHAVIORAL DISTURBANCE (H): ICD-10-CM

## 2017-04-10 ENCOUNTER — OFFICE VISIT (OUTPATIENT)
Dept: FAMILY MEDICINE | Facility: CLINIC | Age: 72
End: 2017-04-10
Payer: COMMERCIAL

## 2017-04-10 DIAGNOSIS — E04.1 THYROID NODULE: ICD-10-CM

## 2017-04-10 DIAGNOSIS — R91.8 PULMONARY NODULES: Primary | ICD-10-CM

## 2017-04-10 PROCEDURE — 99214 OFFICE O/P EST MOD 30 MIN: CPT | Performed by: FAMILY MEDICINE

## 2017-04-10 NOTE — PROGRESS NOTES
"  SUBJECTIVE:                                                    Sanjay Cano is a 71 year old male whose spouse Danuta, his POA,  presents to clinic today without him to discuss the following health issues:    Follow Up CT Scan:  Patient is not in clinic today, but wife presents to discuss patient. She is following up from a CT scan completed on 8/11/16 showing a pulmonary nodule on his left lower lobe, and he was supposed to follow up in 3 months for a recheck. She does not think that her  was having problems with chest pain or shortness of breath when he had the CT scan, but reports that he was having problems with a UTI which led to the imaging being completed. Wife says that patient has been having a \"throttling\" cough, which he will often have to lean forward to clear his throat. History of pneumonia multiple times. His wife also reports that imaging has shown diverticulitis, and he has been sitting with his stomach extending out, and she is wondering if that might be related to his diverticulitis. He also has a history of a thyroid nodule, and he has been meaning to follow up with a recheck via Thyroid US.    Patient has recently stopped taking his zantac, and his wife says that she hears rumbling in his stomach. No heartburn or reflux problems. She is wondering if she should put him back on the medication or continue off of it.    Patient has stopped taking aspirin as recommended by me to try to prevent internal bleeding. His wife is wondering if he should continue off of aspirin.    Problem list and histories reviewed & adjusted, as indicated.  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)     Primary insomnia     Diarrhea     Other emphysema (H)     Pulmonary nodules     Thyroid nodule     Family history of thyroid cancer     H/O recurrent urinary tract infection     Essential hypertension with goal blood pressure less than 140/90     Senile purpura (H)     Nocturnal cough     Past Surgical History:   Procedure Laterality Date     EYE SURGERY       ORTHOPEDIC SURGERY       PHACOEMULSIFICATION CLEAR CORNEA WITH STANDARD INTRAOCULAR LENS IMPLANT  5/21/2014    Procedure: PHACOEMULSIFICATION CLEAR CORNEA WITH STANDARD INTRAOCULAR LENS IMPLANT;  Surgeon: Tomy Hunter MD;  Location: [facility]     PHACOEMULSIFICATION CLEAR CORNEA WITH TORIC INTRAOCULAR LENS IMPLANT  4/30/2014    Procedure: PHACOEMULSIFICATION CLEAR CORNEA WITH TORIC INTRAOCULAR LENS IMPLANT;  Surgeon: Tomy Hunter MD;  Location: [facility]       Social History   Substance Use Topics     Smoking status: Former Smoker     Packs/day: 0.01     Years: 40.00     Types: Cigarettes     Quit date: 7/31/2008     Smokeless tobacco: Never Used      Comment: very passive cigarettes in 6 months     Alcohol use No     Family History   Problem Relation Age of Onset     CEREBROVASCULAR DISEASE Mother      DIABETES Mother      GASTROINTESTINAL DISEASE Mother      Hypertension Mother      Neurologic Disorder Father      CEREBROVASCULAR DISEASE Father      CEREBROVASCULAR DISEASE Maternal Grandfather      CANCER Paternal Grandmother      Other - See Comments Sister      gi - unknown         Current Outpatient Prescriptions   Medication Sig Dispense Refill     amLODIPine (NORVASC) 2.5 MG tablet        donepezil (ARICEPT) 10 MG tablet TK 3/4 T PO BID  3     erythromycin (ROMYCIN) ophthalmic ointment APPLY 1 APPLICATION IN BOTH EYES NIGHTLY  11     neomycin-polymyxin-dexamethasone (MAXITROL) 3.5-49201-5.1 SUSP ophthalmic susp PLACE 1 DROP INTO THE RIGHT EYE TID  1     clonazePAM (KLONOPIN) 0.5 MG tablet Take  0.5 tablets (0.25 mg) by mouth nightly as needed At bedtime 15 tablet 0     venlafaxine (EFFEXOR-XR) 75 MG 24 hr capsule Take 1 capsule (75 mg) by mouth 2 times daily 1 in the am and 1 at bedtime 30 capsule 3     donepezil (ARICEPT) 5 MG tablet Takes 1.5 tabs by mouth every night 30 tablet 0     ranitidine (ZANTAC) 150 MG tablet Take 1 tablet (150 mg) by mouth At Bedtime Then decrease to 75 mg after two weeks. 1 tablet 0     carbidopa-levodopa (SINEMET CR)  MG per CR tablet Take 1 tablet by mouth At Bedtime 1 tablet 0     Methylcellulose, Laxative, (CITRUCEL PO) Take by mouth 2 times daily       lactobacillus rhamnosus, GG, (CULTURELL) capsule Take 1 capsule by mouth 2 times daily 60 capsule 11     amLODIPine (NORVASC) 5 MG tablet Take 1 tablet (5 mg) by mouth daily 90 tablet 3     Ipratropium-Albuterol (COMBIVENT RESPIMAT)  MCG/ACT inhaler Inhale 1 puff into the lungs 4 times daily as needed (Patient usually only takes 2-3 times daily (AM, noon, PM), but always at least BID.) Not to exceed 6 doses per day. 1 Inhaler 4     NONFORMULARY Apply Ponds Cold Cream topically to face daily for dry, pealing skin.       order for DME Equipment being ordered: TEDS stockings 2 each 0     fluticasone (FLOVENT HFA) 110 MCG/ACT inhaler Inhale 2 puffs into the lungs 2 times daily 2 puffs am, 2 puffs pm 3 Inhaler 3     QUEtiapine (SEROQUEL) 50 MG tablet Take 1 tablet (50 mg) by mouth At Bedtime 90 tablet 3     mirtazapine (REMERON) 15 MG tablet Take 1 tablet (15 mg) by mouth At Bedtime 90 tablet 3     Probiotic Product (FLORAJEN BIFIDOBLEND PO) Take 450 mg by mouth daily as needed (Only take when on antibiotics)        carbidopa-levodopa (SINEMET)  MG per tablet Take 1-2 tablets by mouth 5 times daily Takes 2 tablets at 6 AM, 1.5 tablets at 9 AM, 2 tablets at noon, 1.5 tablets at 3 PM, 1.5 tablets at 6 PM. Can take additional 0.5 tablet in the middle of the night if needed. 90 tablet      donepezil (ARICEPT) 5 MG  tablet Take 5-7.5 mg by mouth every morning Take 1 to 1.5 tablets by mouth every every morning. Only take 1 tablet every morning when have diarrhea . 180 tablet 3     order for DME Equipment being ordered: Blood pressure monitor with cuff 1 Device 0     cholecalciferol (VITAMIN D) 1000 UNIT tablet Take 1 tablet (1,000 Units) by mouth daily 90 tablet 1     ORDER FOR DME 1 Device. Transport chair 1 Device 0     Allergies   Allergen Reactions     Seasonal Allergies      BP Readings from Last 3 Encounters:   03/22/17 118/80   02/21/17 147/80   12/05/16 100/67    Wt Readings from Last 3 Encounters:   03/22/17 193 lb (87.5 kg)   02/21/17 193 lb (87.5 kg)   11/15/16 188 lb (85.3 kg)        Reviewed and updated as needed this visit by clinical staff  Problems  Med Hx       Reviewed and updated as needed this visit by Provider  Problems  Med Hx         ROS:  Positive for coughing, parkinsons, and diverticulitis.    Denies headache, insomnia, chest pain, shortness of breath, heartburn, bladder issues, neck pain, back pain, hip pain, knee pain, ankle pain, or foot pain. Remainder of ROS is negative unless otherwise noted above or in HPI.    This document serves as a record of the services and decisions personally performed and made by Bharath Buchanan MD. It was created on his behalf by Domenic Lentz, a trained medical scribe. The creation of this document is based the provider's statements to the medical scribe.  Domenic Lentz 11:15 AM April 10, 2017    OBJECTIVE:                                                    There were no vitals taken for this visit.  There is no height or weight on file to calculate BMI.    No physical exam, as wife presents to clinic for patient for care management visit.       ASSESSMENT/PLAN:                                                      (R91.8) Pulmonary nodules  (primary encounter diagnosis)  Comment: Order placed for chest CT.  Plan: CT Chest w/o Contrast        Follow  through with chest CT.    (E04.1) Thyroid nodule  Comment: Order placed for thyroid US.  Plan: Follow through with thyroid US.    Patient Instructions   -You may reach radiology at [phone] to schedule [facility] Chest CT and Thyroid US.    The information in this document, created by the medical scribe for me, accurately reflects the services I personally performed and the decisions made by me. I have reviewed and approved this document for accuracy prior to leaving the patient care area.   Bharath Buchanan MD 11:15 AM April 10, 2017  [facility] [place]

## 2017-04-10 NOTE — MR AVS SNAPSHOT
"              After Visit Summary   4/10/2017    Sanjay Cano    MRN: [id number]           Patient Information     Date Of Birth          1945        Visit Information        Provider Department      4/10/2017 11:00 AM Bharath Buchanan MD [facility] [place]        Today's Diagnoses     Pulmonary nodules    -  1    Thyroid nodule          Care Instructions    -You may reach radiology at [phone] to schedule Sanjay's Chest CT and Thyroid US.        Follow-ups after your visit        Future tests that were ordered for you today     Open Future Orders        Priority Expected Expires Ordered    CT Chest w/o Contrast Routine  4/10/2018 4/10/2017            Who to contact     If you have questions or need follow up information about today's clinic visit or your schedule please contact [facility] [place] at [phone].  Normal or non-critical lab and imaging results will be communicated to you by MyChart, letter or phone within 4 business days after the clinic has received the results. If you do not hear from us within 7 days, please contact the clinic through MyChart or phone. If you have a critical or abnormal lab result, we will notify you by phone as soon as possible.  Submit refill requests through [employer] or call your pharmacy and they will forward the refill request to us. Please allow 3 business days for your refill to be completed.          Additional Information About Your Visit        MyChart Information     [employer] lets you send messages to your doctor, view your test results, renew your prescriptions, schedule appointments and more. To sign up, go to [email or web address] . Click on \"Log in\" on the left side of the screen, which will take you to the Welcome page. Then click on \"Sign up Now\" on the right side of the page.     You will be asked to enter the access code listed below, as well as some personal information. Please follow the directions to create your " username and password.     Your access code is: [email or web address]  Expires: 2017  7:30 AM     Your access code will  in 90 days. If you need help or a new code, please call your [facility] or [phone].        Care EveryWhere ID     This is your Care EveryWhere ID. This could be used by other organizations to access your [place] medical records  [id number]         Blood Pressure from Last 3 Encounters:   17 118/80   17 147/80   16 100/67    Weight from Last 3 Encounters:   17 193 lb (87.5 kg)   17 193 lb (87.5 kg)   11/15/16 188 lb (85.3 kg)               Primary Care Provider Office Phone # Fax #    Bharath Buchanan [phone]       [facility] [address] [address]        Thank you!     Thank you for choosing [facility]  for your care. Our goal is always to provide you with excellent care. Hearing back from our patients is one way we can continue to improve our services. Please take a few minutes to complete the written survey that you may receive in the mail after your visit with us. Thank you!             Your Updated Medication List - Protect others around you: Learn how to safely use, store and throw away your medicines at www.disposemymeds.org.          This list is accurate as of: 4/10/17 11:35 AM.  Always use your most recent med list.                   Brand Name Dispense Instructions for use    * amLODIPine 2.5 MG tablet    NORVASC         * amLODIPine 5 MG tablet    NORVASC    90 tablet    Take 1 tablet (5 mg) by mouth daily       * ARICEPT 5 MG tablet   Generic drug:  donepezil     180 tablet    Take 5-7.5 mg by mouth every morning Take 1 to 1.5 tablets by mouth every every morning. Only take 1 tablet every morning when have diarrhea .       * donepezil 5 MG tablet    ARIcept    30 tablet    Takes 1.5 tabs by mouth every night       * donepezil 10 MG tablet    ARICEPT     TK 3/4 T PO  BID       * carbidopa-levodopa  MG per tablet    SINEMET    90 tablet    Take 1-2 tablets by mouth 5 times daily Takes 2 tablets at 6 AM, 1.5 tablets at 9 AM, 2 tablets at noon, 1.5 tablets at 3 PM, 1.5 tablets at 6 PM. Can take additional 0.5 tablet in the middle of the night if needed.       * carbidopa-levodopa  MG per CR tablet    SINEMET CR    1 tablet    Take 1 tablet by mouth At Bedtime       cholecalciferol 1000 UNIT tablet    vitamin D    90 tablet    Take 1 tablet (1,000 Units) by mouth daily       CITRUCEL PO      Take by mouth 2 times daily       clonazePAM 0.5 MG tablet    klonoPIN    15 tablet    Take 0.5 tablets (0.25 mg) by mouth nightly as needed At bedtime       erythromycin ophthalmic ointment    ROMYCIN     APPLY 1 APPLICATION IN BOTH EYES NIGHTLY       FLORAJEN BIFIDOBLEND PO      Take 450 mg by mouth daily as needed (Only take when on antibiotics)       fluticasone 110 MCG/ACT Inhaler    FLOVENT HFA    3 Inhaler    Inhale 2 puffs into the lungs 2 times daily 2 puffs am, 2 puffs pm       Ipratropium-Albuterol  MCG/ACT inhaler    COMBIVENT RESPIMAT    1 Inhaler    Inhale 1 puff into the lungs 4 times daily as needed (Patient usually only takes 2-3 times daily (AM, noon, PM), but always at least BID.) Not to exceed 6 doses per day.       lactobacillus rhamnosus (GG) capsule     60 capsule    Take 1 capsule by mouth 2 times daily       mirtazapine 15 MG tablet    REMERON    90 tablet    Take 1 tablet (15 mg) by mouth At Bedtime       neomycin-polymyxin-dexamethasone 3.5-91367-1.1 Susp ophthalmic susp    MAXITROL     PLACE 1 DROP INTO THE RIGHT EYE TID       NONFORMULARY      Apply Ponds Cold Cream topically to face daily for dry, pealing skin.       order for DME     1 Device    1 Device. Transport chair       order for DME     1 Device    Equipment being ordered: Blood pressure monitor with cuff       order for DME     2 each    Equipment being ordered: TEDS stockings        QUEtiapine 50 MG tablet    SEROQUEL    90 tablet    Take 1 tablet (50 mg) by mouth At Bedtime       venlafaxine 75 MG 24 hr capsule    EFFEXOR-XR    30 capsule    Take 1 capsule (75 mg) by mouth 2 times daily 1 in the am and 1 at bedtime       ZANTAC 150 MG tablet   Generic drug:  ranitidine     1 tablet    Take 1 tablet (150 mg) by mouth At Bedtime Then decrease to 75 mg after two weeks.       * Notice:  This list has 7 medication(s) that are the same as other medications prescribed for you. Read the directions carefully, and ask your doctor or other care provider to review them with you.

## 2017-05-01 ENCOUNTER — MEDICAL CORRESPONDENCE (OUTPATIENT)
Dept: HEALTH INFORMATION MANAGEMENT | Facility: CLINIC | Age: 72
End: 2017-05-01

## 2017-05-27 DIAGNOSIS — F51.01 PRIMARY INSOMNIA: ICD-10-CM

## 2017-05-31 RX ORDER — CLONAZEPAM 0.5 MG/1
TABLET ORAL
Qty: 15 TABLET | Refills: 0 | Status: SHIPPED | OUTPATIENT
Start: 2017-05-31 | End: 2017-07-20

## 2017-06-01 DIAGNOSIS — J43.8 OTHER EMPHYSEMA (H): ICD-10-CM

## 2017-06-02 ENCOUNTER — TRANSFERRED RECORDS (OUTPATIENT)
Dept: HEALTH INFORMATION MANAGEMENT | Facility: CLINIC | Age: 72
End: 2017-06-02

## 2017-06-02 ENCOUNTER — HOSPITAL ENCOUNTER (OUTPATIENT)
Dept: NEUROLOGY | Facility: CLINIC | Age: 72
Setting detail: THERAPIES SERIES
Discharge: STILL ADMITTED OR ADMINISTRATIVE | End: 2017-06-02
Attending: PSYCHIATRY & NEUROLOGY

## 2017-06-02 DIAGNOSIS — G20.A1 PARKINSON DISEASE (H): ICD-10-CM

## 2017-06-02 RX ORDER — IPRATROPIUM BROMIDE AND ALBUTEROL 20; 100 UG/1; UG/1
SPRAY, METERED RESPIRATORY (INHALATION)
Qty: 4 G | Refills: 1 | Status: SHIPPED | OUTPATIENT
Start: 2017-06-02 | End: 2017-09-11

## 2017-06-02 NOTE — TELEPHONE ENCOUNTER
Refill request for Combivent    Prescription approved per [facility] [place] Refill Protocol.      Last Written Prescription Date: 7/5/16  Last Fill Quantity: 1, # refills: 4    Last Office Visit with [facility] [place], [facility] or [facility] prescribing provider:  4/10/17  Future Office Visit:       emphezema  Date of Last Spirometry Test:   No results found for this or any previous visit.     Joana Gandhi RN

## 2017-06-07 ENCOUNTER — OFFICE VISIT (OUTPATIENT)
Dept: FAMILY MEDICINE | Facility: CLINIC | Age: 72
End: 2017-06-07
Payer: COMMERCIAL

## 2017-06-07 VITALS
OXYGEN SATURATION: 96 % | BODY MASS INDEX: 24.78 KG/M2 | SYSTOLIC BLOOD PRESSURE: 118 MMHG | WEIGHT: 193 LBS | HEART RATE: 74 BPM | TEMPERATURE: 98.5 F | DIASTOLIC BLOOD PRESSURE: 72 MMHG

## 2017-06-07 DIAGNOSIS — R82.998 DARK URINE: ICD-10-CM

## 2017-06-07 DIAGNOSIS — G20.A1 PARALYSIS AGITANS (H): ICD-10-CM

## 2017-06-07 DIAGNOSIS — L82.1 SEBORRHEIC KERATOSES: ICD-10-CM

## 2017-06-07 DIAGNOSIS — R05.3 COUGH, PERSISTENT: Primary | ICD-10-CM

## 2017-06-07 LAB
ALBUMIN UR-MCNC: 30 MG/DL
APPEARANCE UR: CLEAR
BILIRUB UR QL STRIP: ABNORMAL
CAOX CRY #/AREA URNS HPF: ABNORMAL /HPF
COLOR UR AUTO: YELLOW
GLUCOSE UR STRIP-MCNC: NEGATIVE MG/DL
HGB UR QL STRIP: NEGATIVE
KETONES UR STRIP-MCNC: ABNORMAL MG/DL
LEUKOCYTE ESTERASE UR QL STRIP: NEGATIVE
NITRATE UR QL: NEGATIVE
PH UR STRIP: 6 PH (ref 5–7)
RBC #/AREA URNS AUTO: ABNORMAL /HPF (ref 0–2)
SP GR UR STRIP: 1.02 (ref 1–1.03)
URN SPEC COLLECT METH UR: ABNORMAL
UROBILINOGEN UR STRIP-ACNC: 1 EU/DL (ref 0.2–1)
WBC #/AREA URNS AUTO: ABNORMAL /HPF (ref 0–2)

## 2017-06-07 PROCEDURE — 99214 OFFICE O/P EST MOD 30 MIN: CPT | Mod: 25 | Performed by: FAMILY MEDICINE

## 2017-06-07 PROCEDURE — 17110 DESTRUCTION B9 LES UP TO 14: CPT | Performed by: FAMILY MEDICINE

## 2017-06-07 PROCEDURE — 81001 URINALYSIS AUTO W/SCOPE: CPT | Performed by: FAMILY MEDICINE

## 2017-06-07 NOTE — MR AVS SNAPSHOT
"              After Visit Summary   6/7/2017    Sanjay Cano    MRN: [id number]           Patient Information     Date Of Birth          1945        Visit Information        Provider Department      6/7/2017 11:00 AM Bharath Buchanan MD [facility]        Today's Diagnoses     Cough, persistent    -  1    Paralysis agitans (H)        Dark urine          Care Instructions    -Try using over the counter robitussin DM to see if that helps with Sanjay's cough.  -I will let you know when I get the results back from lab.  -If Sanjay develops a fever or if his cough worsens or changes, please let me know.          Follow-ups after your visit        Who to contact     If you have questions or need follow up information about today's clinic visit or your schedule please contact [facility] directly at [phone].  Normal or non-critical lab and imaging results will be communicated to you by Blochart, letter or phone within 4 business days after the clinic has received the results. If you do not hear from us within 7 days, please contact the clinic through MyChart or phone. If you have a critical or abnormal lab result, we will notify you by phone as soon as possible.  Submit refill requests through Lendsquare or call your pharmacy and they will forward the refill request to us. Please allow 3 business days for your refill to be completed.          Additional Information About Your Visit        MyChart Information     Lendsquare lets you send messages to your doctor, view your test results, renew your prescriptions, schedule appointments and more. To sign up, go to [email or web address] . Click on \"Log in\" on the left side of the screen, which will take you to the Welcome page. Then click on \"Sign up Now\" on the right side of the page.     You will be asked to enter the access code listed below, as well as some personal information. Please follow the directions to create your username and " password.     Your access code is: [email or web address]  Expires: 2017 11:52 AM     Your access code will  in 90 days. If you need help or a new code, please call your [facility] or [phone].        Care EveryWhere ID     This is your Care EveryWhere ID. This could be used by other organizations to access your [facility] medical records  [id number]        Your Vitals Were     Pulse Temperature Pulse Oximetry BMI (Body Mass Index)          74 98.5  F (36.9  C) (Oral) 96% 24.78 kg/m2         Blood Pressure from Last 3 Encounters:   17 118/72   17 118/80   17 147/80    Weight from Last 3 Encounters:   17 193 lb (87.5 kg)   17 193 lb (87.5 kg)   17 193 lb (87.5 kg)              We Performed the Following     UA reflex to Microscopic        Primary Care Provider Office Phone # Fax #    Bharath Buchanan [phone]       [facility] [address] [address]  [facility] [address]        Thank you!     Thank you for choosing [facility]  for your care. Our goal is always to provide you with excellent care. Hearing back from our patients is one way we can continue to improve our services. Please take a few minutes to complete the written survey that you may receive in the mail after your visit with us. Thank you!             Your Updated Medication List - Protect others around you: Learn how to safely use, store and throw away your medicines at www.disposemymeds.org.          This list is accurate as of: 17 11:52 AM.  Always use your most recent med list.                   Brand Name Dispense Instructions for use    * amLODIPine 2.5 MG tablet    NORVASC         * amLODIPine 5 MG tablet    NORVASC    90 tablet    Take 1 tablet (5 mg) by mouth daily       * ARICEPT 5 MG tablet   Generic drug:  donepezil     180 tablet    Take 5-7.5 mg by mouth every morning Take 1 to 1.5 tablets by mouth every every morning. Only take 1 tablet  every morning when have diarrhea .       * donepezil 5 MG tablet    ARIcept    30 tablet    Takes 1.5 tabs by mouth every night       * donepezil 10 MG tablet    ARICEPT     TK 3/4 T PO BID       * carbidopa-levodopa  MG per tablet    SINEMET    90 tablet    Take 1-2 tablets by mouth 5 times daily Takes 2 tablets at 6 AM, 1.5 tablets at 9 AM, 2 tablets at noon, 1.5 tablets at 3 PM, 1.5 tablets at 6 PM. Can take additional 0.5 tablet in the middle of the night if needed.       * carbidopa-levodopa  MG per CR tablet    SINEMET CR    1 tablet    Take 1 tablet by mouth At Bedtime       cholecalciferol 1000 UNIT tablet    vitamin D    90 tablet    Take 1 tablet (1,000 Units) by mouth daily       CITRUCEL PO      Take by mouth 2 times daily       clonazePAM 0.5 MG tablet    klonoPIN    15 tablet    TAKE 1/2 TABLET BY MOUTH EVERY NIGHT AT BEDTIME AS NEEDED       erythromycin ophthalmic ointment    ROMYCIN     APPLY 1 APPLICATION IN BOTH EYES NIGHTLY       FLORAJEN BIFIDOBLEND PO      Take 450 mg by mouth daily as needed (Only take when on antibiotics)       fluticasone 110 MCG/ACT Inhaler    FLOVENT HFA    3 Inhaler    Inhale 2 puffs into the lungs 2 times daily 2 puffs am, 2 puffs pm       * Ipratropium-Albuterol  MCG/ACT inhaler    COMBIVENT RESPIMAT    1 Inhaler    Inhale 1 puff into the lungs 4 times daily as needed (Patient usually only takes 2-3 times daily (AM, noon, PM), but always at least BID.) Not to exceed 6 doses per day.       * COMBIVENT RESPIMAT  MCG/ACT inhaler   Generic drug:  Ipratropium-Albuterol     4 g    INHALE 1 PUFF BY MOUTH FOUR TIMES DAILY. NOT TO EXCEED 6 DOSES PER DAY       lactobacillus rhamnosus (GG) capsule     60 capsule    Take 1 capsule by mouth 2 times daily       mirtazapine 15 MG tablet    REMERON    90 tablet    TAKE 1 TABLET(15 MG) BY MOUTH AT BEDTIME       neomycin-polymyxin-dexamethasone 3.5-40982-1.1 Susp ophthalmic susp    MAXITROL     PLACE 1 DROP INTO  THE RIGHT EYE TID       NONFORMULARY      Apply Ponds Cold Cream topically to face daily for dry, pealing skin.       order for DME     1 Device    1 Device. Transport chair       order for DME     1 Device    Equipment being ordered: Blood pressure monitor with cuff       order for DME     2 each    Equipment being ordered: TEDS stockings       QUEtiapine 50 MG tablet    SEROQUEL    90 tablet    Take 1 tablet (50 mg) by mouth At Bedtime       venlafaxine 75 MG 24 hr capsule    EFFEXOR-XR    30 capsule    Take 1 capsule (75 mg) by mouth 2 times daily 1 in the am and 1 at bedtime       ZANTAC 150 MG tablet   Generic drug:  ranitidine     1 tablet    Take 1 tablet (150 mg) by mouth At Bedtime Then decrease to 75 mg after two weeks.       * Notice:  This list has 9 medication(s) that are the same as other medications prescribed for you. Read the directions carefully, and ask your doctor or other care provider to review them with you.

## 2017-06-07 NOTE — PROGRESS NOTES
SUBJECTIVE:                                                    Sanjay Cano is a 71 year old male who presents to clinic today for the following health issues:    RESPIRATORY SYMPTOMS      Duration:ongoing     Description  cough, wheezing and chest congestion, happen only at night     Severity: moderate    Accompanying signs and symptoms: fatigue,weak     History (predisposing factors):  none    Precipitating or alleviating factors: None    Therapies tried and outcome:  rest and fluids     Patient has been having problems with a cough, wheezing and chest congestion when he is laid down for bed. His wife reports that he does not have any problems during the day, but as soon as he lays down he develops a very bad cough and will have to prop him up and give him water with some lemon, which seems to help. His wife says that after helping him sit up and giving him something to drink, he will often have no problems for the rest of the night, but some nights it does return. History of emphysema and pulmonary nodules, and his wife reports that he has had borderline pneumonia in the past.    Parkinson's Disease:  Patient has been having problems with balance lately, and has been frequently slumping over in his wheelchair towards his left side. He continues to see neurology for his parkinson's, and his wife would like to discuss restarting the rytary for his parkinson's. His wife says that he has been more tired lately than normal, and doing activities has been difficult for him.    Patient has a mole on his left arm that his wife says will frequently catch on his shirt, and she would like it checked today.     Patient has been having problems with dark urine, and his wife would like to have a urinalysis completed today.    Problem list and histories reviewed & adjusted, as indicated.  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)     Primary insomnia     Diarrhea     Other emphysema (H)     Pulmonary nodules     Thyroid nodule     Family history of thyroid cancer     H/O recurrent urinary tract infection     Essential hypertension with goal blood pressure less than 140/90     Senile purpura (H)     Nocturnal cough     Past Surgical History:   Procedure Laterality Date     EYE SURGERY       ORTHOPEDIC SURGERY       PHACOEMULSIFICATION CLEAR CORNEA WITH STANDARD INTRAOCULAR LENS IMPLANT  5/21/2014    Procedure: PHACOEMULSIFICATION CLEAR CORNEA WITH STANDARD INTRAOCULAR LENS IMPLANT;  Surgeon: Tomy Hunter MD;  Location: [facility]     PHACOEMULSIFICATION CLEAR CORNEA WITH TORIC INTRAOCULAR LENS IMPLANT  4/30/2014    Procedure: PHACOEMULSIFICATION CLEAR CORNEA WITH TORIC INTRAOCULAR LENS IMPLANT;  Surgeon: Tomy Hunter MD;  Location: [facility]       Social History   Substance Use Topics     Smoking status: Former Smoker     Packs/day: 0.01     Years: 40.00     Types: Cigarettes     Quit date: 7/31/2008     Smokeless tobacco: Never Used      Comment: very passive cigarettes in 6 months     Alcohol use No     Family History   Problem Relation Age of Onset     CEREBROVASCULAR DISEASE Mother      DIABETES Mother      GASTROINTESTINAL DISEASE Mother      Hypertension Mother      Neurologic Disorder Father      CEREBROVASCULAR DISEASE Father      CEREBROVASCULAR DISEASE Maternal Grandfather      CANCER Paternal Grandmother      Other - See Comments Sister      gi - unknown         Current Outpatient Prescriptions   Medication Sig Dispense Refill     COMBIVENT RESPIMAT  MCG/ACT inhaler INHALE 1 PUFF BY MOUTH FOUR TIMES DAILY. NOT TO EXCEED 6 DOSES PER DAY 4 g 1      clonazePAM (KLONOPIN) 0.5 MG tablet TAKE 1/2 TABLET BY MOUTH EVERY NIGHT AT BEDTIME AS NEEDED 15 tablet 0     mirtazapine (REMERON) 15 MG tablet TAKE 1 TABLET(15 MG) BY MOUTH AT BEDTIME 90 tablet 1     amLODIPine (NORVASC) 2.5 MG tablet        donepezil (ARICEPT) 10 MG tablet TK 3/4 T PO BID  3     erythromycin (ROMYCIN) ophthalmic ointment APPLY 1 APPLICATION IN BOTH EYES NIGHTLY  11     neomycin-polymyxin-dexamethasone (MAXITROL) 3.5-59584-5.1 SUSP ophthalmic susp PLACE 1 DROP INTO THE RIGHT EYE TID  1     venlafaxine (EFFEXOR-XR) 75 MG 24 hr capsule Take 1 capsule (75 mg) by mouth 2 times daily 1 in the am and 1 at bedtime 30 capsule 3     donepezil (ARICEPT) 5 MG tablet Takes 1.5 tabs by mouth every night 30 tablet 0     ranitidine (ZANTAC) 150 MG tablet Take 1 tablet (150 mg) by mouth At Bedtime Then decrease to 75 mg after two weeks. 1 tablet 0     carbidopa-levodopa (SINEMET CR)  MG per CR tablet Take 1 tablet by mouth At Bedtime 1 tablet 0     Methylcellulose, Laxative, (CITRUCEL PO) Take by mouth 2 times daily       lactobacillus rhamnosus, GG, (CULTURELL) capsule Take 1 capsule by mouth 2 times daily 60 capsule 11     amLODIPine (NORVASC) 5 MG tablet Take 1 tablet (5 mg) by mouth daily 90 tablet 3     Ipratropium-Albuterol (COMBIVENT RESPIMAT)  MCG/ACT inhaler Inhale 1 puff into the lungs 4 times daily as needed (Patient usually only takes 2-3 times daily (AM, noon, PM), but always at least BID.) Not to exceed 6 doses per day. 1 Inhaler 4     NONFORMULARY Apply Ponds Cold Cream topically to face daily for dry, pealing skin.       order for DME Equipment being ordered: TEDS stockings 2 each 0     fluticasone (FLOVENT HFA) 110 MCG/ACT inhaler Inhale 2 puffs into the lungs 2 times daily 2 puffs am, 2 puffs pm 3 Inhaler 3     QUEtiapine (SEROQUEL) 50 MG tablet Take 1 tablet (50 mg) by mouth At Bedtime 90 tablet 3     Probiotic Product (FLORAJEN BIFIDOBLEND PO) Take 450 mg by mouth daily as needed  (Only take when on antibiotics)        carbidopa-levodopa (SINEMET)  MG per tablet Take 1-2 tablets by mouth 5 times daily Takes 2 tablets at 6 AM, 1.5 tablets at 9 AM, 2 tablets at noon, 1.5 tablets at 3 PM, 1.5 tablets at 6 PM. Can take additional 0.5 tablet in the middle of the night if needed. 90 tablet      donepezil (ARICEPT) 5 MG tablet Take 5-7.5 mg by mouth every morning Take 1 to 1.5 tablets by mouth every every morning. Only take 1 tablet every morning when have diarrhea . 180 tablet 3     order for DME Equipment being ordered: Blood pressure monitor with cuff 1 Device 0     cholecalciferol (VITAMIN D) 1000 UNIT tablet Take 1 tablet (1,000 Units) by mouth daily 90 tablet 1     ORDER FOR DME 1 Device. Transport chair 1 Device 0     Allergies   Allergen Reactions     Seasonal Allergies      BP Readings from Last 3 Encounters:   06/07/17 118/72   03/22/17 118/80   02/21/17 147/80    Wt Readings from Last 3 Encounters:   06/07/17 87.5 kg (193 lb)   03/22/17 87.5 kg (193 lb)   02/21/17 87.5 kg (193 lb)        Reviewed and updated as needed this visit by clinical staff       Reviewed and updated as needed this visit by Provider         ROS:  Positive for cough. Positive for dark urine. Positive for parkinson's.    Denies headache, insomnia, chest pain, shortness of breath, heartburn, bowel issues, bladder issues, neck pain, back pain, hip pain, knee pain, ankle pain, or foot pain. Remainder of ROS is negative unless otherwise noted above or in HPI.    This document serves as a record of the services and decisions personally performed and made by Bharath uBchanan MD. It was created on his behalf by Domenic Lentz, a trained medical scribe. The creation of this document is based the provider's statements to the medical scribe.  Domenic Lentz 11:33 AM June 7, 2017    OBJECTIVE:                                                    /72  Pulse 74  Temp 98.5  F (36.9  C) (Oral)  Wt 87.5 kg (193  lb)  SpO2 96%  BMI 24.78 kg/m2  Body mass index is 24.78 kg/(m^2).  GENERAL: diminished and slumped over in wheelchair  RESP: lungs clear to auscultation - no rales, rhonchi or wheezes  CV: regular rate and rhythm, normal S1 S2, no S3 or S4, no murmur, click or rub, no peripheral edema and peripheral pulses strong  SKIN: 8 mm brown gray raised skin lesion on the back of the upper left arm without any sign of redness or infection  NEURO: weakness due to Parkinson's, mentation intact and speech slurred and hard to understand  PSYCH: mentation appears normal, affect normal/bright    Procedure: Cryotherapy    The affected area was frozen with liquid nitrogen for 10 seconds x3 without any complications.  Patient was instructed to wash daily with soap and water, cover with bandaid if blistered or weeping, and contact if any increase in temperature, bowel drainage, or redness.  If wart persists longer than 3 weeks, return for re-treatment.     Diagnostic Test Results:  No results found for this or any previous visit (from the past 24 hour(s)).     ASSESSMENT/PLAN:                                                      (R05) Cough, persistent  (primary encounter diagnosis)  Comment: Advised patient to try using robitussin DM.  Plan: Try robitussin DM as needed for cough. Follow up as needed.    (G20) Paralysis agitans (H)  Comment: Worsened since last visit. Patient continues to see neurology for management.  Plan: Follow up with neurology to discuss medications. Follow up with me as needed.    (R82.99) Dark urine  Comment: Labs completed today.  Plan: UA reflex to Microscopic        Follow up with results from lab.    (L82.1) Seborrheic keratoses  Comment: Cryotherapy completed today.  Plan: Follow up as needed.    Patient Instructions   -Try using over the counter robitussin DM to see if that helps with Sanjay's cough.  -I will let you know when I get the results back from lab.  -If Sanjay develops a fever or if his cough  worsens or changes, please let me know.    The information in this document, created by the medical scribe for me, accurately reflects the services I personally performed and the decisions made by me. I have reviewed and approved this document for accuracy prior to leaving the patient care area.   Bharath Buchanan MD 11:33 AM June 7, 2017  [facility]

## 2017-06-07 NOTE — PATIENT INSTRUCTIONS
-Try using over the counter robitussin DM to see if that helps with Sanjay's cough.  -I will let you know when I get the results back from lab.  -If Sanjay develops a fever or if his cough worsens or changes, please let me know.

## 2017-06-08 ASSESSMENT — PATIENT HEALTH QUESTIONNAIRE - PHQ9: SUM OF ALL RESPONSES TO PHQ QUESTIONS 1-9: 4

## 2017-06-14 ENCOUNTER — TELEPHONE (OUTPATIENT)
Dept: FAMILY MEDICINE | Facility: CLINIC | Age: 72
End: 2017-06-14

## 2017-06-14 NOTE — TELEPHONE ENCOUNTER
Pt's wife returning phone call regarding lab results    Wife can be reached @ [phone] soheila

## 2017-06-14 NOTE — NURSING NOTE
Writer communicated results of UA to patient wife. Patient  Wife instructed on increasing water when urine dark. Patient/wife in agreement with plan and verbalizes understanding.   Thanks!   Natalia Boss RN

## 2017-06-19 DIAGNOSIS — F33.1 MODERATE RECURRENT MAJOR DEPRESSION (H): ICD-10-CM

## 2017-06-19 NOTE — TELEPHONE ENCOUNTER
QUETIAPINE 50MG TABLETS       Last Written Prescription Date: 5/4/16  Last Fill Quantity: 90, # refills: 3  Last Office Visit with G, P or [facility] prescribing provider: 6/7/17       BP Readings from Last 3 Encounters:   06/07/17 118/72   03/22/17 118/80   02/21/17 147/80     Pulse Readings from Last 2 Encounters:   06/07/17 74   03/22/17 70     Lab Results   Component Value Date     07/04/2016     Lab Results   Component Value Date    WBC 6.1 03/22/2017     Lab Results   Component Value Date    RBC 4.66 03/22/2017     Lab Results   Component Value Date    HGB 15.0 03/22/2017     Lab Results   Component Value Date    HCT 43.6 03/22/2017     No components found for: MCT  Lab Results   Component Value Date    MCV 94 03/22/2017     Lab Results   Component Value Date    MCH 32.2 03/22/2017     Lab Results   Component Value Date    MCHC 34.4 03/22/2017     Lab Results   Component Value Date    RDW 12.1 03/22/2017     Lab Results   Component Value Date     03/22/2017     Lab Results   Component Value Date    CHOL 137 04/02/2014     Lab Results   Component Value Date    HDL 43 04/02/2014     Lab Results   Component Value Date    LDL 78 04/02/2014     Lab Results   Component Value Date    TRIG 83 04/02/2014     Lab Results   Component Value Date    CHOLHDLRATIO 3.2 04/02/2014

## 2017-06-20 RX ORDER — QUETIAPINE FUMARATE 50 MG/1
TABLET, FILM COATED ORAL
Qty: 90 TABLET | Refills: 0 | Status: SHIPPED | OUTPATIENT
Start: 2017-06-20 | End: 2017-09-11

## 2017-06-20 NOTE — TELEPHONE ENCOUNTER
Prescription approved per [facility] Refill Protocol.      BP Readings from Last 3 Encounters:   06/07/17 118/72   03/22/17 118/80   02/21/17 147/80     Pulse Readings from Last 2 Encounters:   06/07/17 74   03/22/17 70     Lab Results   Component Value Date     07/04/2016     Lab Results   Component Value Date    WBC 6.1 03/22/2017     Lab Results   Component Value Date    RBC 4.66 03/22/2017     Lab Results   Component Value Date    HGB 15.0 03/22/2017     Lab Results   Component Value Date    HCT 43.6 03/22/2017     No components found for: MCT  Lab Results   Component Value Date    MCV 94 03/22/2017     Lab Results   Component Value Date    MCH 32.2 03/22/2017     Lab Results   Component Value Date    MCHC 34.4 03/22/2017     Lab Results   Component Value Date    RDW 12.1 03/22/2017     Lab Results   Component Value Date     03/22/2017     Lab Results   Component Value Date    CHOL 137 04/02/2014     Lab Results   Component Value Date    HDL 43 04/02/2014     Lab Results   Component Value Date    LDL 78 04/02/2014     Lab Results   Component Value Date    TRIG 83 04/02/2014     Lab Results   Component Value Date    CHOLHDLRATIO 3.2 04/02/2014     Sandra Guadalupe RN  [facility]

## 2017-07-06 ENCOUNTER — TELEPHONE (OUTPATIENT)
Dept: FAMILY MEDICINE | Facility: CLINIC | Age: 72
End: 2017-07-06

## 2017-07-06 NOTE — TELEPHONE ENCOUNTER
Returned call to Caren. Left message for her to call back and speak with a nurse.    SEJAL SimeonN, RN  [facility]

## 2017-07-06 NOTE — TELEPHONE ENCOUNTER
Caren called to asked some questions as Sanjay is having some problems with his ankles swelling at night and she also said that she is giving Sanjay  Robitussin just at night every other day and wondering if that is ok. She can be reached at [phone].

## 2017-07-07 NOTE — TELEPHONE ENCOUNTER
Spouse returned call. Reports intermittent ankle swelling in the evening after patient has been at eldercare program. No pain in extremities, no redness and skin is not warm to touch, per spouse report.     Informed that patient can elevate legs and drink at least 6-8 glasses of water per day. Spouse denies further questions or concerns at this time, closing encounter.       Sandra Guadalupe RN  [facility]

## 2017-07-14 ENCOUNTER — OFFICE VISIT (OUTPATIENT)
Dept: FAMILY MEDICINE | Facility: CLINIC | Age: 72
End: 2017-07-14
Payer: COMMERCIAL

## 2017-07-14 VITALS
DIASTOLIC BLOOD PRESSURE: 88 MMHG | HEART RATE: 69 BPM | TEMPERATURE: 97.8 F | SYSTOLIC BLOOD PRESSURE: 139 MMHG | OXYGEN SATURATION: 95 %

## 2017-07-14 DIAGNOSIS — J18.9 PNEUMONIA OF RIGHT LOWER LOBE DUE TO INFECTIOUS ORGANISM: Primary | ICD-10-CM

## 2017-07-14 DIAGNOSIS — J43.8 OTHER EMPHYSEMA (H): ICD-10-CM

## 2017-07-14 PROCEDURE — 99213 OFFICE O/P EST LOW 20 MIN: CPT | Performed by: FAMILY MEDICINE

## 2017-07-14 RX ORDER — DOXYCYCLINE 100 MG/1
100 CAPSULE ORAL 2 TIMES DAILY
Qty: 28 CAPSULE | Refills: 0 | Status: SHIPPED | OUTPATIENT
Start: 2017-07-14 | End: 2017-08-01

## 2017-07-14 NOTE — MR AVS SNAPSHOT
After Visit Summary   7/14/2017    Sanjay Cano    MRN: [id number]           Patient Information     Date Of Birth          1945        Visit Information        Provider Department      7/14/2017 11:45 AM Bharath Buchanan [place] [facility] [place]'s Diagnoses     Pneumonia of right lower lobe due to infectious organism (H)    -  1    Other emphysema (H)          Care Instructions    -Please start on doxycycline twice daily for 14 days. If Sanjay is not noticing any improvement by Monday, please let me know and we will change antibiotics.  -Let me know if the diarrhea becomes a problem on doxycycline, and we may discuss decreasing the donepezil while on doxycycline.          Follow-ups after your visit        Your next 10 appointments already scheduled     Aug 01, 2017  1:30 PM CDT   (Arrive by 1:15 PM)   Return Visit with ESTEE Avendaño   [facility] ([facility])    [address] [place] [id number] [phone]              Who to contact     If you have questions or need follow up information about today's clinic visit or your schedule please contact [facility] directly at [phone].  Normal or non-critical lab and imaging results will be communicated to you by MyChart, letter or phone within 4 business days after the clinic has received the results. If you do not hear from us within 7 days, please contact the clinic through MyChart or phone. If you have a critical or abnormal lab result, we will notify you by phone as soon as possible.  Submit refill requests through [employer]. or call your pharmacy and they will forward the refill request to us. Please allow 3 business days for your refill to be completed.          Additional Information About Your Visit        [employer] Information     [employer]. lets you send messages to your doctor, view your test  "results, renew your prescriptions, schedule appointments and more. To sign up, go to [email or web address] . Click on \"Log in\" on the left side of the screen, which will take you to the Welcome page. Then click on \"Sign up Now\" on the right side of the page.     You will be asked to enter the access code listed below, as well as some personal information. Please follow the directions to create your username and password.     Your access code is: [email or web address]  Expires: 2017 11:52 AM     Your access code will  in 90 days. If you need help or a new code, please call your [facility] clinic or [phone].        Care EveryWhere ID     This is your Care EveryWhere ID. This could be used by other organizations to access your [facility] medical records  [id number]        Your Vitals Were     Pulse Temperature Pulse Oximetry             69 97.8  F (36.6  C) (Oral) 95%          Blood Pressure from Last 3 Encounters:   17 139/88   17 118/72   17 118/80    Weight from Last 3 Encounters:   17 193 lb (87.5 kg)   17 193 lb (87.5 kg)   17 193 lb (87.5 kg)              Today, you had the following     No orders found for display         Today's Medication Changes          These changes are accurate as of: 17 12:09 PM.  If you have any questions, ask your nurse or doctor.               Start taking these medicines.        Dose/Directions    doxycycline 100 MG capsule   Commonly known as:  VIBRAMYCIN   Used for:  Other emphysema (H)   Started by:  Bharath Buchanan MD        Dose:  100 mg   Take 1 capsule (100 mg) by mouth 2 times daily   Quantity:  28 capsule   Refills:  0            Where to get your medicines      These medications were sent to [facility] [address] [address] AT [facility] [address], [facility] [address]     Phone:  [phone]     doxycycline 100 MG capsule                Primary Care Provider Office " Phone # Fax #    Bharath Buchanan [phone]       [facility] [address]  [facility] [address]        Equal Access to Services     JERARDO LONGORIA : Hadii aad ku hadmarbino Sodaiali, waaxda luqadaha, qaybta kaalmada adeegyada, nataliya hillmatthew bermudezdimitri perezrishi capps. So [facility] [phone].    ATENCIÓN: Si habla español, tiene a herring disposición servicios gratuitos de asistencia lingüística. Freddieame al [phone].    We comply with applicable federal civil rights laws and [place] laws. We do not discriminate on the basis of race, color, national origin, age, disability sex, sexual orientation or gender identity.            Thank you!     Thank you for choosing [facility]  for your care. Our goal is always to provide you with excellent care. Hearing back from our patients is one way we can continue to improve our services. Please take a few minutes to complete the written survey that you may receive in the mail after your visit with us. Thank you!             Your Updated Medication List - Protect others around you: Learn how to safely use, store and throw away your medicines at www.disposemymeds.org.          This list is accurate as of: 7/14/17 12:09 PM.  Always use your most recent med list.                   Brand Name Dispense Instructions for use Diagnosis    * amLODIPine 2.5 MG tablet    NORVASC          * amLODIPine 5 MG tablet    NORVASC    90 tablet    Take 1 tablet (5 mg) by mouth daily    Essential hypertension, benign       * ARICEPT 5 MG tablet   Generic drug:  donepezil     180 tablet    Take 5-7.5 mg by mouth every morning Take 1 to 1.5 tablets by mouth every every morning. Only take 1 tablet every morning when have diarrhea .    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 Can take additional 0.5 tablet in the middle of the night if needed.        * carbidopa-levodopa  MG per CR tablet    SINEMET CR    1 tablet    Take 1 tablet by mouth At Bedtime        cholecalciferol 1000 UNIT tablet    vitamin D    90 tablet    Take 1 tablet (1,000 Units) by mouth daily    At risk for falling       CITRUCEL PO      Take by mouth 2 times daily        clonazePAM 0.5 MG tablet    klonoPIN    15 tablet    TAKE 1/2 TABLET BY MOUTH EVERY NIGHT AT BEDTIME AS NEEDED    Primary insomnia       doxycycline 100 MG capsule    VIBRAMYCIN    28 capsule    Take 1 capsule (100 mg) by mouth 2 times daily    Other emphysema (H)       erythromycin ophthalmic ointment    ROMYCIN     APPLY 1 APPLICATION IN BOTH EYES NIGHTLY        FLORAJEN BIFIDOBLEND PO      Take 450 mg by mouth daily as needed (Only take when on antibiotics)        fluticasone 110 MCG/ACT Inhaler    FLOVENT HFA    3 Inhaler    Inhale 2 puffs into the lungs 2 times daily 2 puffs am, 2 puffs pm    Other emphysema (H)       * Ipratropium-Albuterol  MCG/ACT inhaler    COMBIVENT RESPIMAT    1 Inhaler    Inhale 1 puff into the lungs 4 times daily as needed (Patient usually only takes 2-3 times daily (AM, noon, PM), but always at least BID.) Not to exceed 6 doses per day.    Other emphysema (H)       * COMBIVENT RESPIMAT  MCG/ACT inhaler   Generic drug:  Ipratropium-Albuterol     4 g    INHALE 1 PUFF BY MOUTH FOUR TIMES DAILY. NOT TO EXCEED 6 DOSES PER DAY    Other emphysema (H)       lactobacillus rhamnosus (GG) capsule     60 capsule    Take 1 capsule by mouth 2 times daily    Diarrhea, unspecified type       mirtazapine 15 MG tablet    REMERON    90  tablet    TAKE 1 TABLET(15 MG) BY MOUTH AT BEDTIME    Depression, major, recurrent, moderate (H)       neomycin-polymyxin-dexamethasone 3.5-53369-0.1 Susp ophthalmic susp    MAXITROL     PLACE 1 DROP INTO THE RIGHT EYE TID        NONFORMULARY      Apply Ponds Cold Cream topically to face daily for dry, pealing skin.        order for DME     1 Device    1 Device. Transport chair    Paralysis agitans (H)       order for DME     1 Device    Equipment being ordered: Blood pressure monitor with cuff    Hypertension goal BP (blood pressure) < 140/90       order for DME     2 each    Equipment being ordered: TEDS stockings    Pedal edema       QUEtiapine 50 MG tablet    SEROquel    90 tablet    TAKE 1 TABLET(50 MG) BY MOUTH AT BEDTIME    Moderate recurrent major depression (H)       venlafaxine 75 MG 24 hr capsule    EFFEXOR-XR    30 capsule    Take 1 capsule (75 mg) by mouth 2 times daily 1 in the am and 1 at bedtime    Moderate recurrent major depression (H)       ZANTAC 150 MG tablet   Generic drug:  ranitidine     1 tablet    Take 1 tablet (150 mg) by mouth At Bedtime Then decrease to 75 mg after two weeks.        * Notice:  This list has 9 medication(s) that are the same as other medications prescribed for you. Read the directions carefully, and ask your doctor or other care provider to review them with you.

## 2017-07-14 NOTE — PATIENT INSTRUCTIONS
-Please start on doxycycline twice daily for 14 days. If Sanjay is not noticing any improvement by Monday, please let me know and we will change antibiotics.  -Let me know if the diarrhea becomes a problem on doxycycline, and we may discuss decreasing the donepezil while on doxycycline.

## 2017-07-14 NOTE — PROGRESS NOTES
"  SUBJECTIVE:                                                    Sanjay Cano is a 71 year old male who presents to clinic today for the following health issues:    Acute Illness   Acute illness concerns: cough   Onset: ongoing     Fever: no    Chills/Sweats: maybe     Headache (location?): no    Sinus Pressure:YES unsure     Conjunctivitis:  no    Ear Pain: no    Rhinorrhea: YES    Congestion: YES    Sore Throat: no     Cough: YES-barking    Wheeze: no    Decreased Appetite: YES    Nausea: no    Vomiting: no    Diarrhea:  YES off and on     Dysuria/Freq.: no    Fatigue/Achiness: YES       Therapies Tried and outcome:otc cough meds     Patient has been having problems with a cough for the last several weeks. His wife says that when he goes to bed he has a very severe \"barking\" cough, which can be productive and can be dry. They have tried using over the counter robitussin DM, but wife is worried that the medication is causing his cough to be productive. History of several pneumonias, and patient's wife would like to check that he does not have pneumonia. Patient has a history of parkinson's disease and is bent over and has difficulties communicating his symptoms.    Problem list and histories reviewed & adjusted, as indicated.  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)     Primary insomnia     Diarrhea     Other emphysema (H)     Pulmonary nodules     Thyroid nodule     Family history of thyroid cancer     H/O recurrent urinary tract infection     Essential hypertension with goal blood pressure less than 140/90     Senile purpura (H)     " Nocturnal cough     Past Surgical History:   Procedure Laterality Date     EYE SURGERY       ORTHOPEDIC SURGERY       PHACOEMULSIFICATION CLEAR CORNEA WITH STANDARD INTRAOCULAR LENS IMPLANT  5/21/2014    Procedure: PHACOEMULSIFICATION CLEAR CORNEA WITH STANDARD INTRAOCULAR LENS IMPLANT;  Surgeon: Tomy Hunter MD;  Location: [facility]     PHACOEMULSIFICATION CLEAR CORNEA WITH TORIC INTRAOCULAR LENS IMPLANT  4/30/2014    Procedure: PHACOEMULSIFICATION CLEAR CORNEA WITH TORIC INTRAOCULAR LENS IMPLANT;  Surgeon: Tomy Hunter MD;  Location: [facility]       Social History   Substance Use Topics     Smoking status: Former Smoker     Packs/day: 0.01     Years: 40.00     Types: Cigarettes     Quit date: 7/31/2008     Smokeless tobacco: Never Used      Comment: very passive cigarettes in 6 months     Alcohol use No     Family History   Problem Relation Age of Onset     CEREBROVASCULAR DISEASE Mother      DIABETES Mother      GASTROINTESTINAL DISEASE Mother      Hypertension Mother      Neurologic Disorder Father      CEREBROVASCULAR DISEASE Father      CEREBROVASCULAR DISEASE Maternal Grandfather      CANCER Paternal Grandmother      Other - See Comments Sister      gi - unknown         Current Outpatient Prescriptions   Medication Sig Dispense Refill     aspirin 81 MG tablet Take by mouth twice a week       QUEtiapine (SEROQUEL) 50 MG tablet TAKE 1 TABLET(50 MG) BY MOUTH AT BEDTIME 90 tablet 0     COMBIVENT RESPIMAT  MCG/ACT inhaler INHALE 1 PUFF BY MOUTH FOUR TIMES DAILY. NOT TO EXCEED 6 DOSES PER DAY 4 g 1     clonazePAM (KLONOPIN) 0.5 MG tablet TAKE 1/2 TABLET BY MOUTH EVERY NIGHT AT BEDTIME AS NEEDED 15 tablet 0     mirtazapine (REMERON) 15 MG tablet TAKE 1 TABLET(15 MG) BY MOUTH AT BEDTIME 90 tablet 1     amLODIPine (NORVASC) 2.5 MG tablet        donepezil (ARICEPT) 10 MG tablet TK 3/4 T PO BID  3     erythromycin (ROMYCIN) ophthalmic ointment APPLY 1 APPLICATION IN BOTH EYES NIGHTLY  11      neomycin-polymyxin-dexamethasone (MAXITROL) 3.5-26900-8.1 SUSP ophthalmic susp PLACE 1 DROP INTO THE RIGHT EYE TID  1     venlafaxine (EFFEXOR-XR) 75 MG 24 hr capsule Take 1 capsule (75 mg) by mouth 2 times daily 1 in the am and 1 at bedtime 30 capsule 3     donepezil (ARICEPT) 5 MG tablet Takes 1.5 tabs by mouth every night 30 tablet 0     ranitidine (ZANTAC) 150 MG tablet Take 1 tablet (150 mg) by mouth At Bedtime Then decrease to 75 mg after two weeks. 1 tablet 0     carbidopa-levodopa (SINEMET CR)  MG per CR tablet Take 1 tablet by mouth At Bedtime 1 tablet 0     Methylcellulose, Laxative, (CITRUCEL PO) Take by mouth 2 times daily       lactobacillus rhamnosus, GG, (CULTURELL) capsule Take 1 capsule by mouth 2 times daily 60 capsule 11     amLODIPine (NORVASC) 5 MG tablet Take 1 tablet (5 mg) by mouth daily 90 tablet 3     Ipratropium-Albuterol (COMBIVENT RESPIMAT)  MCG/ACT inhaler Inhale 1 puff into the lungs 4 times daily as needed (Patient usually only takes 2-3 times daily (AM, noon, PM), but always at least BID.) Not to exceed 6 doses per day. 1 Inhaler 4     NONFORMULARY Apply Ponds Cold Cream topically to face daily for dry, pealing skin.       order for DME Equipment being ordered: TEDS stockings 2 each 0     fluticasone (FLOVENT HFA) 110 MCG/ACT inhaler Inhale 2 puffs into the lungs 2 times daily 2 puffs am, 2 puffs pm 3 Inhaler 3     Probiotic Product (FLORAJEN BIFIDOBLEND PO) Take 450 mg by mouth daily as needed (Only take when on antibiotics)        carbidopa-levodopa (SINEMET)  MG per tablet Take 1-2 tablets by mouth 5 times daily Takes 2 tablets at 6 AM, 1.5 tablets at 9 AM, 2 tablets at noon, 1.5 tablets at 3 PM, 1.5 tablets at 6 PM. Can take additional 0.5 tablet in the middle of the night if needed. 90 tablet      donepezil (ARICEPT) 5 MG tablet Take 5-7.5 mg by mouth every morning Take 1 to 1.5 tablets by mouth every every morning. Only take 1 tablet every morning when have  diarrhea . 180 tablet 3     order for DME Equipment being ordered: Blood pressure monitor with cuff 1 Device 0     cholecalciferol (VITAMIN D) 1000 UNIT tablet Take 1 tablet (1,000 Units) by mouth daily 90 tablet 1     ORDER FOR DME 1 Device. Transport chair 1 Device 0     Allergies   Allergen Reactions     Seasonal Allergies      BP Readings from Last 3 Encounters:   07/14/17 139/88   06/07/17 118/72   03/22/17 118/80    Wt Readings from Last 3 Encounters:   06/07/17 87.5 kg (193 lb)   03/22/17 87.5 kg (193 lb)   02/21/17 87.5 kg (193 lb)        Reviewed and updated as needed this visit by clinical staff       Reviewed and updated as needed this visit by Provider         ROS:  Positive for cough.    Denies headache, insomnia, chest pain, shortness of breath, heartburn, bowel issues, bladder issues, neck pain, back pain, hip pain, knee pain, ankle pain, or foot pain. Remainder of ROS is negative unless otherwise noted above or in HPI.    This document serves as a record of the services and decisions personally performed and made by Bharath Buchanan MD. It was created on his behalf by Domenic Lentz, a trained medical scribe. The creation of this document is based the provider's statements to the medical scribe.  Domenic Lentz 11:46 AM July 14, 2017    OBJECTIVE:     /88  Pulse 69  Temp 97.8  F (36.6  C) (Oral)  SpO2 95%  There is no height or weight on file to calculate BMI.  GENERAL: healthy, alert and no distress  RESP: dullness over right base, left lung clear  CV: regular rate and rhythm, normal S1 S2, no S3 or S4, no murmur, click or rub, no peripheral edema and peripheral pulses strong  NEURO: decreased strength and tone, patient is slouched over in wheelchair, mumbled speech  PSYCH: mentation appears normal, affect normal/bright    Diagnostic Test Results:  No results found for this or any previous visit (from the past 24 hour(s)).    ASSESSMENT/PLAN:     (J18.1) Pneumonia of right lower  lobe due to infectious organism (H)  (primary encounter diagnosis)  Comment: Prescription given for doxycycline.  Plan: doxycycline (VIBRAMYCIN) 100 MG capsule         Start on doxycycline twice daily for 14 days. Follow up as needed.    Patient Instructions   -Please start on doxycycline twice daily for 14 days. If Sanjay is not noticing any improvement by Monday, please let me know and we will change antibiotics.  -Let me know if the diarrhea becomes a problem on doxycycline, and we may discuss decreasing the donepezil while on doxycycline.    The information in this document, created by the medical scribe for me, accurately reflects the services I personally performed and the decisions made by me. I have reviewed and approved this document for accuracy prior to leaving the patient care area.   Bharath Buchanan MD 11:46 AM July 14, 2017  [facility]

## 2017-07-17 ENCOUNTER — TELEPHONE (OUTPATIENT)
Dept: FAMILY MEDICINE | Facility: CLINIC | Age: 72
End: 2017-07-17

## 2017-07-17 NOTE — TELEPHONE ENCOUNTER
Spoke with wife pt is doing much better, infrequent cough, reviewed medications she understands, instructed if pt does start to cough more or he has SOB to call clinic or seek medical care    She had an incident this past Saturday, she was wondering if he could go back to  so she could make an appt to be seen, informed her that as long as he wasn't having a temperature and was back to normal that that should be fine, she is to let the  know what is going on with him, that they would need to assure he is getting adequate fluids and that he would be able to rest. She will do so    Lashonda Gandhi RN

## 2017-07-18 ENCOUNTER — PRE VISIT (OUTPATIENT)
Dept: UROLOGY | Facility: CLINIC | Age: 72
End: 2017-07-18

## 2017-07-20 DIAGNOSIS — F51.01 PRIMARY INSOMNIA: ICD-10-CM

## 2017-07-20 DIAGNOSIS — J43.8 OTHER EMPHYSEMA (H): ICD-10-CM

## 2017-07-21 ENCOUNTER — TELEPHONE (OUTPATIENT)
Dept: FAMILY MEDICINE | Facility: CLINIC | Age: 72
End: 2017-07-21

## 2017-07-21 RX ORDER — DEXAMETHASONE 4 MG/1
TABLET ORAL
Qty: 36 G | Refills: 1 | Status: SHIPPED | OUTPATIENT
Start: 2017-07-21 | End: 2019-01-01

## 2017-07-21 RX ORDER — CLONAZEPAM 0.5 MG/1
TABLET ORAL
Qty: 15 TABLET | Refills: 0 | Status: SHIPPED | OUTPATIENT
Start: 2017-07-21 | End: 2017-09-11

## 2017-07-21 NOTE — TELEPHONE ENCOUNTER
Spoke with wife, she gave update, temp last night wnl, has had some soft stools, he has had occasional cough, she was given reassurance. Pt is improving  She was wondering at what point she would need to take him to ED, gave her s/sx SOB, symptoms worsen, lips blue, decreased level of cons.  Pt starts PT/OT next week, we discussed benefit of this, she agreed    Joana Gandhi RN

## 2017-07-28 DIAGNOSIS — F33.1 MODERATE RECURRENT MAJOR DEPRESSION (H): ICD-10-CM

## 2017-07-28 RX ORDER — VENLAFAXINE HYDROCHLORIDE 75 MG/1
CAPSULE, EXTENDED RELEASE ORAL
Qty: 30 CAPSULE | Refills: 0 | Status: SHIPPED | OUTPATIENT
Start: 2017-07-28 | End: 2017-09-13

## 2017-07-28 NOTE — TELEPHONE ENCOUNTER
Venlafaxine 75mg tablets   Last Written Prescription Date: 1/13/17  Last Fill Quantity: 30, # refills: 3  Last Office Visit with [facility], [facility] or [facility] prescribing provider: 7/14/17       BP Readings from Last 3 Encounters:   07/14/17 139/88   06/07/17 118/72   03/22/17 118/80     Pulse: (for Fetzima)  Creatinine   Date Value Ref Range Status   07/04/2016 0.74 0.66 - 1.25 mg/dL Final   ]    Last PHQ-9 score on record=   PHQ-9 SCORE 6/7/2017   Total Score -   Total Score -   Total Score 4     Prescription approved per [facility] Refill Protocol.    Sandra Guadalupe RN  [facility]

## 2017-07-28 NOTE — TELEPHONE ENCOUNTER
Will finish antibiotic today, pt told wife he has a H/A today, he has not had breakfast yet. Per wife he is a bit more coughy today with some phlegm. Instructed to give more fluids, watch how much dairy he has today, hold off on the ensure as these can increase phlegm. Offer more food is small amounts. She doesn't feel he has a temp.   She will call for further advise if needed    Joana Gandhi RN

## 2017-07-28 NOTE — TELEPHONE ENCOUNTER
Caren called again today and asked to speak to someone in regard to Sanjay. She can be reached at [phone].

## 2017-07-30 DIAGNOSIS — F33.1 MODERATE RECURRENT MAJOR DEPRESSION (H): ICD-10-CM

## 2017-07-31 RX ORDER — VENLAFAXINE HYDROCHLORIDE 75 MG/1
CAPSULE, EXTENDED RELEASE ORAL
Qty: 180 CAPSULE | Refills: 0 | Status: SHIPPED | OUTPATIENT
Start: 2017-07-31 | End: 2018-08-07 | Stop reason: ALTCHOICE

## 2017-07-31 NOTE — TELEPHONE ENCOUNTER
Prescription approved per [facility] Refill Protocol.    Sandra Guadalupe RN  [facility]

## 2017-08-01 ENCOUNTER — OFFICE VISIT (OUTPATIENT)
Dept: UROLOGY | Facility: CLINIC | Age: 72
End: 2017-08-01

## 2017-08-01 VITALS
HEIGHT: 74 IN | WEIGHT: 188 LBS | SYSTOLIC BLOOD PRESSURE: 120 MMHG | BODY MASS INDEX: 24.13 KG/M2 | DIASTOLIC BLOOD PRESSURE: 78 MMHG | HEART RATE: 68 BPM

## 2017-08-01 DIAGNOSIS — R35.0 URINARY FREQUENCY: Primary | ICD-10-CM

## 2017-08-01 LAB
ALBUMIN UR-MCNC: NEGATIVE MG/DL
APPEARANCE UR: ABNORMAL
BILIRUB UR QL STRIP: NEGATIVE
COLOR UR AUTO: ABNORMAL
GLUCOSE UR STRIP-MCNC: NEGATIVE MG/DL
HGB UR QL STRIP: NEGATIVE
HYALINE CASTS #/AREA URNS LPF: 2 /LPF (ref 0–2)
KETONES UR STRIP-MCNC: 5 MG/DL
LEUKOCYTE ESTERASE UR QL STRIP: NEGATIVE
MUCOUS THREADS #/AREA URNS LPF: PRESENT /LPF
NITRATE UR QL: NEGATIVE
PH UR STRIP: 5 PH (ref 5–7)
RBC #/AREA URNS AUTO: 4 /HPF (ref 0–2)
SP GR UR STRIP: 1.02 (ref 1–1.03)
SQUAMOUS #/AREA URNS AUTO: 2 /HPF (ref 0–1)
URN SPEC COLLECT METH UR: ABNORMAL
UROBILINOGEN UR STRIP-MCNC: 2 MG/DL (ref 0–2)
WBC #/AREA URNS AUTO: 2 /HPF (ref 0–2)

## 2017-08-01 RX ORDER — MIRABEGRON 25 MG/1
25 TABLET, FILM COATED, EXTENDED RELEASE ORAL DAILY
Status: DISCONTINUED | OUTPATIENT
Start: 2017-08-01 | End: 2017-08-01 | Stop reason: HOSPADM

## 2017-08-01 RX ORDER — DOCUSATE SODIUM 100 MG/1
100 CAPSULE, LIQUID FILLED ORAL
COMMUNITY
Start: 2016-07-07 | End: 2017-09-13

## 2017-08-01 RX ORDER — KETOCONAZOLE 20 MG/ML
SHAMPOO TOPICAL
COMMUNITY
Start: 2016-07-07 | End: 2017-09-13

## 2017-08-01 RX ORDER — POLYETHYLENE GLYCOL 3350 17 G/17G
17 POWDER, FOR SOLUTION ORAL
COMMUNITY
Start: 2016-07-07 | End: 2017-09-13

## 2017-08-01 ASSESSMENT — PAIN SCALES - GENERAL: PAINLEVEL: NO PAIN (0)

## 2017-08-01 NOTE — MR AVS SNAPSHOT
After Visit Summary   8/1/2017    Sanjay Cano    MRN: [id number]           Patient Information     Date Of Birth          1945        Visit Information        Provider Department      8/1/2017 1:30 PM Zenaida Hunt PA [facility] and [facility]        Today's Diagnoses     Urinary frequency    -  1      Care Instructions    - Continue Citrocel but may increase or decrease dose depending on the consistency of your stool  - Probiotics  - Continue nighttime Zantac 75mg if it helps with coughing  - Try Myrbetriq 25mg daily for overactive bladder symptoms.  This should have no side effects and shouldn't interact with the Parkinson medications.  Continue the medication for at least 3 weeks (to give it time to work), but in reality most patients notice improvement within a few days  - Your urine was sent for testing  - Return in 3 months    Jackie Hunt PA-C          Follow-ups after your visit        Who to contact     Please call your clinic at [phone] to:    Ask questions about your health    Make or cancel appointments    Discuss your medicines    Learn about your test results    Speak to your doctor   If you have compliments or concerns about an experience at your clinic, or if you wish to file a complaint, please contact [facility] Patient Relations at [phone] or email us at [email or web address]         Additional Information About Your Visit        MyChart Information     [employer] is an electronic gateway that provides easy, online access to your medical records. With [employer], you can request a clinic appointment, read your test results, renew a prescription or communicate with your care team.     To sign up for [employer] visit the website at [email or web address]   You will be asked to enter the access code listed below, as well as some personal information. Please follow the directions to create your  "username and password.     Your access code is: [email or web address]  Expires: 2017 11:52 AM     Your access code will  in 90 days. If you need help or a new code, please contact your [facility] or call [phone] for assistance.        Care EveryWhere ID     This is your Care EveryWhere ID. This could be used by other organizations to access your [facility] medical records  [id number]        Your Vitals Were     Pulse Height BMI (Body Mass Index)             68 1.88 m (6' 2\") 24.14 kg/m2          Blood Pressure from Last 3 Encounters:   17 120/78   17 139/88   17 118/72    Weight from Last 3 Encounters:   17 85.3 kg (188 lb)   17 87.5 kg (193 lb)   17 87.5 kg (193 lb)              We Performed the Following     Routine UA with microscopic - No culture     Urine Culture Aerobic Bacterial          Today's Medication Changes          These changes are accurate as of: 17  2:00 PM.  If you have any questions, ask your nurse or doctor.               These medicines have changed or have updated prescriptions.        Dose/Directions    amLODIPine 5 MG tablet   Commonly known as:  NORVASC   This may have changed:  Another medication with the same name was removed. Continue taking this medication, and follow the directions you see here.   Used for:  Essential hypertension, benign   Changed by:  Bharath Buchanan MD        Dose:  5 mg   Take 1 tablet (5 mg) by mouth daily   Quantity:  90 tablet   Refills:  3         Stop taking these medicines if you haven't already. Please contact your care team if you have questions.     doxycycline 100 MG capsule   Commonly known as:  VIBRAMYCIN   Stopped by:  Zenaida Hunt PA                    Primary Care Provider Office Phone # Fax #    Bharath Buchanan [phone]       [facility] [address] [facility] [address]  [facility] [id number]        Equal Access to Services     [facility] " GAAR : Hadii aad ku breann Bae, waaxda luqadaha, qaybta kaalmada adeleticia, nataliya silvana lizmatthew crowe esha emil . So [facility] [phone].    ATENCIÓN: Si ysabella jennifer, tiene a herring disposición servicios gratuitos de asistencia lingüística. Llame al [phone].    We comply with applicable federal civil rights laws and [place] laws. We do not discriminate on the basis of race, color, national origin, age, disability sex, sexual orientation or gender identity.            Thank you!     Thank you for choosing [facility] UROLOGY AND [facility] PROSTATE [facility] UROLOGIC CANCERS  for your care. Our goal is always to provide you with excellent care. Hearing back from our patients is one way we can continue to improve our services. Please take a few minutes to complete the written survey that you may receive in the mail after your visit with us. Thank you!             Your Updated Medication List - Protect others around you: Learn how to safely use, store and throw away your medicines at www.disposemymeds.org.          This list is accurate as of: 8/1/17  2:00 PM.  Always use your most recent med list.                   Brand Name Dispense Instructions for use Diagnosis    amLODIPine 5 MG tablet    NORVASC    90 tablet    Take 1 tablet (5 mg) by mouth daily    Essential hypertension, benign       * ARICEPT 5 MG tablet   Generic drug:  donepezil     180 tablet    Take 5-7.5 mg by mouth every morning Take 1 to 1.5 tablets by mouth every every morning. Only take 1 tablet every morning when have diarrhea .    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 Can take additional 0.5 tablet in the middle of the night if needed.        * carbidopa-levodopa  MG per CR tablet    SINEMET CR    1 tablet    Take 1 tablet by mouth At Bedtime        cholecalciferol 1000 UNIT tablet    vitamin D    90 tablet    Take 1 tablet (1,000 Units) by mouth daily    At risk for falling       CITRUCEL PO      Take by mouth 2 times daily        clonazePAM 0.5 MG tablet    klonoPIN    15 tablet    TAKE 1/2 TABLET BY MOUTH EVERY NIGHT AT BEDTIME AS NEEDED    Primary insomnia       docusate sodium 100 MG capsule    COLACE     Take 100 mg by mouth        erythromycin ophthalmic ointment    ROMYCIN     APPLY 1 APPLICATION IN BOTH EYES NIGHTLY        FLORAJEN BIFIDOBLEND PO      Take 450 mg by mouth daily as needed (Only take when on antibiotics)        FLOVENT  MCG/ACT Inhaler   Generic drug:  fluticasone     36 g    INHALE 2 PUFFS INTO THE LUNGS TWICE DAILY    Other emphysema (H)       * Ipratropium-Albuterol  MCG/ACT inhaler    COMBIVENT RESPIMAT    1 Inhaler    Inhale 1 puff into the lungs 4 times daily as needed (Patient usually only takes 2-3 times daily (AM, noon, PM), but always at least BID.) Not to exceed 6 doses per day.    Other emphysema (H)       * COMBIVENT RESPIMAT  MCG/ACT inhaler   Generic drug:  Ipratropium-Albuterol     4 g    INHALE 1 PUFF BY MOUTH FOUR TIMES DAILY. NOT TO EXCEED 6 DOSES PER DAY    Other emphysema (H)       ketoconazole 2 % shampoo    NIZORAL          lactobacillus rhamnosus (GG) capsule     60 capsule    Take 1 capsule by mouth 2 times daily    Diarrhea, unspecified type       mirtazapine 15 MG tablet    REMERON    90 tablet    TAKE 1 TABLET(15 MG) BY MOUTH AT BEDTIME    Depression, major, recurrent, moderate (H)       neomycin-polymyxin-dexamethasone 3.5-55307-7.1 Susp ophthalmic susp    MAXITROL     PLACE 1 DROP INTO THE RIGHT EYE TID         NONFORMULARY      Apply Ponds Cold Cream topically to face daily for dry, pealing skin.        order for DME     1 Device    1 Device. Transport chair    Paralysis agitans (H)       order for DME     1 Device    Equipment being ordered: Blood pressure monitor with cuff    Hypertension goal BP (blood pressure) < 140/90       order for DME     2 each    Equipment being ordered: TEDS stockings    Pedal edema       polyethylene glycol powder    MIRALAX/GLYCOLAX     Take 17 g by mouth        QUEtiapine 50 MG tablet    SEROquel    90 tablet    TAKE 1 TABLET(50 MG) BY MOUTH AT BEDTIME    Moderate recurrent major depression (H)       * venlafaxine 75 MG 24 hr capsule    EFFEXOR-XR    30 capsule    TAKE 1 CAPSULE BY MOUTH EVERY MORNING AND 1 CAPSULE EVERY NIGHT AT BEDTIME    Moderate recurrent major depression (H)       * venlafaxine 75 MG 24 hr capsule    EFFEXOR-XR    180 capsule    TAKE 1 CAPSULE BY MOUTH EVERY MORNING AND 1 CAPSULE EVERY NIGHT AT BEDTIME    Moderate recurrent major depression (H)       ZANTAC 150 MG tablet   Generic drug:  ranitidine     1 tablet    Take 1 tablet (150 mg) by mouth At Bedtime Then decrease to 75 mg after two weeks.        * Notice:  This list has 9 medication(s) that are the same as other medications prescribed for you. Read the directions carefully, and ask your doctor or other care provider to review them with you.

## 2017-08-01 NOTE — PATIENT INSTRUCTIONS
- Continue Citrocel but may increase or decrease dose depending on the consistency of your stool  - Probiotics  - Continue nighttime Zantac 75mg if it helps with coughing  - Try Myrbetriq 25mg daily for overactive bladder symptoms.  This should have no side effects and shouldn't interact with the Parkinson medications.  Continue the medication for at least 3 weeks (to give it time to work), but in reality most patients notice improvement within a few days  - Your urine was sent for testing  - Return in 3 months    Jackie Hunt PA-C

## 2017-08-01 NOTE — PROGRESS NOTES
"HPI: Mr. Sanjay Cano is a 71 year old year old male presenting today for evaluation of chief complaint(s): RECHECK (Incontinence follow up)    Mr. Cano has PMH significant for HTN, COPD, Parkinsons x 23 years and major depressive disorder.  He has history of hospitalization from 6/28/16 - 7/6/16 for E Coli urosepsis/bacteremia with discharge to TCU.  Over the next two months he had two additional UTIs.  His last positive culture was 8/9/16. He was last seen in clinic on 2/21/17.  At that time his chief complaint was urgency, urge incontinence and nocturnal enuresis.  He was voiding to a diaper.  He had low energy and was physically growing weaker.  Citrocel and probiotics were recommended for the bowel.  It was also recommended that he return to PT.  His PVR was 37mL and his UA was negative.     Since then he has had no UTIs although just was hospitalized for pneumonia 7/14 and treated with a 10 day course of doxycycline.  Now back at home.  Feeling better but weakness persists.  No significant coughing.  Does continue with flovent and combivent inhalers for h/o COPD.  No nebulizers.  Physical disability has worsened - ayla over in chair with neck hanging down, and voice is getting softer as a result.  Will be restarting PT/OT. Still tries to void in the bathroom in the daytime - walks on his own with his wife supporting him.  Tells me he has a 15 second lead time and typically cannot get to the toilet in time.  Most of the time voids to a brief.  NO dysuria or hematuria.  Barely any coffee - drinks a little because it helps the function of his Parkinson medications.      In the past had failed oxybutynin and \"lots of other\" medications, per his wife.  Ultimately, they were told to stop trying because the anticholinergic type medications can worsen memory issues and interact with the Parkinson medications.     REVIEW OF DIAGNOSTICS:  06/07/17- UA WBCs 0-2, RBCs 0-2, neg nitrites, protein 30  03/22/17 - UA " negative  02/21/16 - UCx <10K mixed UG   11/15/16 - UA negative.  UCx no growth  09/20/16 - 10-50K mixed UG culture  8/09/16 - E Coli >100K resistant to amp/ampsul, but otherwise susceptible  7/27/16 - ([facility]) UC growing >100,000cfu/mL E Coli susceptible to cephalosporins and quinolones as well as nitrofurantoin.  Resistant to amp/sul and Bactrim.    [facility], RBC 6/hpf --> UC growing >100,000cfu/mL E Coli susceptible to cephalosporins and quinolones as well as nitrofurantoin.  Resistant to amp/sul and Bactrim. BC grew the same.    7/31/15 - WBC 2-5/hpf, RBC 0-2/hpf --> UC not obtained    Current Outpatient Prescriptions   Medication Sig Dispense Refill     docusate sodium (COLACE) 100 MG capsule Take 100 mg by mouth       ketoconazole (NIZORAL) 2 % shampoo        polyethylene glycol (MIRALAX/GLYCOLAX) powder Take 17 g by mouth       venlafaxine (EFFEXOR-XR) 75 MG 24 hr capsule TAKE 1 CAPSULE BY MOUTH EVERY MORNING AND 1 CAPSULE EVERY NIGHT AT BEDTIME 180 capsule 0     venlafaxine (EFFEXOR-XR) 75 MG 24 hr capsule TAKE 1 CAPSULE BY MOUTH EVERY MORNING AND 1 CAPSULE EVERY NIGHT AT BEDTIME 30 capsule 0     FLOVENT  MCG/ACT Inhaler INHALE 2 PUFFS INTO THE LUNGS TWICE DAILY 36 g 1     clonazePAM (KLONOPIN) 0.5 MG tablet TAKE 1/2 TABLET BY MOUTH EVERY NIGHT AT BEDTIME AS NEEDED 15 tablet 0     aspirin 81 MG tablet Take by mouth twice a week       doxycycline (VIBRAMYCIN) 100 MG capsule Take 1 capsule (100 mg) by mouth 2 times daily 28 capsule 0     QUEtiapine (SEROQUEL) 50 MG tablet TAKE 1 TABLET(50 MG) BY MOUTH AT BEDTIME 90 tablet 0     COMBIVENT RESPIMAT  MCG/ACT inhaler INHALE 1 PUFF BY MOUTH FOUR TIMES DAILY. NOT TO EXCEED 6 DOSES PER DAY 4 g 1     mirtazapine (REMERON) 15 MG tablet TAKE 1 TABLET(15 MG) BY MOUTH AT BEDTIME 90 tablet 1     amLODIPine (NORVASC) 2.5 MG tablet        donepezil (ARICEPT) 10 MG tablet TK 3/4 T PO BID  3     erythromycin (ROMYCIN) ophthalmic ointment  APPLY 1 APPLICATION IN BOTH EYES NIGHTLY  11     neomycin-polymyxin-dexamethasone (MAXITROL) 3.5-40208-5.1 SUSP ophthalmic susp PLACE 1 DROP INTO THE RIGHT EYE TID  1     donepezil (ARICEPT) 5 MG tablet Takes 1.5 tabs by mouth every night 30 tablet 0     ranitidine (ZANTAC) 150 MG tablet Take 1 tablet (150 mg) by mouth At Bedtime Then decrease to 75 mg after two weeks. 1 tablet 0     carbidopa-levodopa (SINEMET CR)  MG per CR tablet Take 1 tablet by mouth At Bedtime 1 tablet 0     Methylcellulose, Laxative, (CITRUCEL PO) Take by mouth 2 times daily       lactobacillus rhamnosus, GG, (CULTURELL) capsule Take 1 capsule by mouth 2 times daily 60 capsule 11     amLODIPine (NORVASC) 5 MG tablet Take 1 tablet (5 mg) by mouth daily 90 tablet 3     Ipratropium-Albuterol (COMBIVENT RESPIMAT)  MCG/ACT inhaler Inhale 1 puff into the lungs 4 times daily as needed (Patient usually only takes 2-3 times daily (AM, noon, PM), but always at least BID.) Not to exceed 6 doses per day. 1 Inhaler 4     NONFORMULARY Apply Ponds Cold Cream topically to face daily for dry, pealing skin.       order for DME Equipment being ordered: TEDS stockings 2 each 0     Probiotic Product (FLORAJEN BIFIDOBLEND PO) Take 450 mg by mouth daily as needed (Only take when on antibiotics)        carbidopa-levodopa (SINEMET)  MG per tablet Take 1-2 tablets by mouth 5 times daily Takes 2 tablets at 6 AM, 1.5 tablets at 9 AM, 2 tablets at noon, 1.5 tablets at 3 PM, 1.5 tablets at 6 PM. Can take additional 0.5 tablet in the middle of the night if needed. 90 tablet      donepezil (ARICEPT) 5 MG tablet Take 5-7.5 mg by mouth every morning Take 1 to 1.5 tablets by mouth every every morning. Only take 1 tablet every morning when have diarrhea . 180 tablet 3     order for DME Equipment being ordered: Blood pressure monitor with cuff 1 Device 0     cholecalciferol (VITAMIN D) 1000 UNIT tablet Take 1 tablet (1,000 Units) by mouth daily 90 tablet 1      "ORDER FOR DME 1 Device. Transport chair 1 Device 0       ALLERGIES: Seasonal allergies      REVIEW OF SYSTEMS:  As above in HPI    GENERAL PHYSICAL EXAM:   Vitals: /78  Pulse 68  Ht 1.88 m (6' 2\")  Wt 85.3 kg (188 lb)  BMI 24.14 kg/m2  Body mass index is 24.14 kg/(m^2).    GENERAL: Well groomed, well developed, well nourished male in NAD, hunched in his wheelchair   GI: Soft, NT, ND,   No CVAT bilaterally.  NEURO: Alert and oriented x 3.  PSYCH: Normal mood and affect, pleasant and making jokes, contributing periodically to the conversation. Agreeable during interview and exam.     :      Circumcised   penis, no penile plaques or lesions. Orthotopic location of the urethral meatus.       No  rashes or skin breakdown      Wearing a wet brief.     LABS: The last test results for Mr. Sanjay Cano were reviewed:  PSA -   Lab Results   Component Value Date    PSA 1.2 02/25/2010     BMP -   Recent Labs   Lab Test  07/06/16   0722  07/05/16   1926  07/04/16   0726   07/01/16   0755  06/30/16   0816   12/26/12   1203   10/28/11   2023   10/13/11   0642   NA   --    --   139   --   142  139   < >  144   < >  143   < >  139   POTASSIUM  3.7  3.7  3.5   < >  3.3*  3.2*   < >  4.5   < >  3.9   < >  3.8   CHLORIDE   --    --   108   --   109  107   < >  102   < >  108   < >  107   CO2   --    --   25   --   27  24   < >  23   < >  31   < >  26   BUN   --    --   15   --   15  17   < >  31*   < >  21   < >  13   CR   --    --   0.74   --   0.90  0.91   < >  1.20   < >  0.84   < >  0.83   GLC   --    --   123*   --   120*  166*   < >  83   < >  105*   < >  112*   JENNIE   --    --   7.8*   --   7.7*  7.7*   < >  9.8   < >  9.1   < >  8.5   MAG   --    --    --    --    --    --    --   2.2   --   2.1   --   1.9   PHOS   --    --    --    --    --    --    --   3.8   --   3.4   --   2.4*    < > = values in this interval not displayed.       CBC -   Recent Labs   Lab Test  03/22/17   1115  07/04/16   0726  07/01/16   " 0755   WBC  6.1  7.5  4.1   HGB  15.0  14.0  12.3*   PLT  211  187  148*       ASSESSMENT:   1) Parkinson  2) Urge incontinence/ functional incontinence    PLAN:   - Continue Citrocel but may increase or decrease dose depending on the consistency of your stool  - Probiotics  - Continue nighttime Zantac 75mg if it helps with coughing  - PVR in the past was 37mL and denies obstructive symptoms  - Will completely avoid anticholinergic-type OAB medications.  These would be detrimental  - The urge incontinence really bothers Sanjay.  He would like to try a medication for this. Will try myrbetriq 25mg daily.  Patient is not taking a beta blocker.  Stop the medication if he becomes unable to empty his bladder. Warn patient and his wife that I am not sure how much this will cost.  It is a relatively newish medication although it is the only in the Beta3 agonist class, so tends to be on many formularies.  If he fails this medication or the cost is too high I wouldn't recommend another.  He could consider a chronic Contreras/ SPT, but again, this has risks too including infection and trauma, therefore at this point I would recommend further use of briefs.   - Return in 3 months    Jackie Hunt PA-C  Department of Urologic Surgery

## 2017-08-01 NOTE — NURSING NOTE
"Chief Complaint   Patient presents with     RECHECK     Incontinence follow up       Initial Wt 85.3 kg (188 lb)  BMI 24.14 kg/m2 Estimated body mass index is 24.14 kg/(m^2) as calculated from the following:    Height as of 2/21/17: 1.88 m (6' 2\").    Weight as of this encounter: 85.3 kg (188 lb).  Medication Reconciliation: complete     DELVIS Hawk    "

## 2017-08-01 NOTE — LETTER
Sanjay Cano   [address] [facility] [address]        Dear Mr. Cano:    I am writing you concerning your recent test results:    Results for orders placed or performed in visit on 08/01/17   Routine UA with microscopic - No culture   Result Value Ref Range    Color Urine Shanta     Appearance Urine Slightly Cloudy     Glucose Urine Negative NEG mg/dL    Bilirubin Urine Negative NEG    Ketones Urine 5 (A) NEG mg/dL    Specific Gravity Urine 1.020 1.003 - 1.035    Blood Urine Negative NEG    pH Urine 5.0 5.0 - 7.0 pH    Protein Albumin Urine Negative NEG mg/dL    Urobilinogen mg/dL 2.0 0.0 - 2.0 mg/dL    Nitrite Urine Negative NEG    Leukocyte Esterase Urine Negative NEG    Source Midstream Urine     WBC Urine 2 0 - 2 /HPF    RBC Urine 4 (H) 0 - 2 /HPF    Squamous Epithelial /HPF Urine 2 (H) 0 - 1 /HPF    Mucous Urine Present (A) NEG /LPF    Hyaline Casts 2 0 - 2 /LPF   Urine Culture Aerobic Bacterial   Result Value Ref Range    Specimen Description Midstream Urine     Culture Micro (A)      50,000 to 100,000 colonies/mL Escherichia coli  10,000 to 50,000 colonies/mL Strain 2 Escherichia coli  10,000 to 50,000 colonies/mL mixed urogenital joey Susceptibility testing not   routinely done      Micro Report Status FINAL 08/03/2017     Organism: 50,000 to 100,000 colonies/mL Escherichia coli     Organism:       10,000 to 50,000 colonies/mL Strain 2 Escherichia coli       Susceptibility    10,000 to 50,000 colonies/ml strain 2 escherichia coli (dalton) -  (no method available)     AMPICILLIN >=32 Resistant  ug/mL     CEFAZOLIN Value in next row  ug/mL      <=4 SusceptibleCefazolin DALTON breakpoints are for the treatment of uncomplicated urinary tract infections.  For the treatment of systemic infections, please contact the laboratory for additional testing.     CEFOXITIN Value in next row  ug/mL      <=4 SusceptibleCefazolin DALTON breakpoints are for the treatment of uncomplicated urinary tract infections.   For the treatment of systemic infections, please contact the laboratory for additional testing.     CEFTAZIDIME Value in next row  ug/mL      <=4 SusceptibleCefazolin MORENA breakpoints are for the treatment of uncomplicated urinary tract infections.  For the treatment of systemic infections, please contact the laboratory for additional testing.     CEFTRIAXONE Value in next row  ug/mL      <=4 SusceptibleCefazolin MORENA breakpoints are for the treatment of uncomplicated urinary tract infections.  For the treatment of systemic infections, please contact the laboratory for additional testing.     CIPROFLOXACIN Value in next row  ug/mL      <=4 SusceptibleCefazolin MORENA breakpoints are for the treatment of uncomplicated urinary tract infections.  For the treatment of systemic infections, please contact the laboratory for additional testing.     GENTAMICIN Value in next row  ug/mL      <=4 SusceptibleCefazolin MORENA breakpoints are for the treatment of uncomplicated urinary tract infections.  For the treatment of systemic infections, please contact the laboratory for additional testing.     LEVOFLOXACIN Value in next row  ug/mL      <=4 SusceptibleCefazolin MORENA breakpoints are for the treatment of uncomplicated urinary tract infections.  For the treatment of systemic infections, please contact the laboratory for additional testing.     NITROFURANTOIN Value in next row  ug/mL      <=4 SusceptibleCefazolin MORENA breakpoints are for the treatment of uncomplicated urinary tract infections.  For the treatment of systemic infections, please contact the laboratory for additional testing.     TOBRAMYCIN Value in next row  ug/mL      <=4 SusceptibleCefazolin MORENA breakpoints are for the treatment of uncomplicated urinary tract infections.  For the treatment of systemic infections, please contact the laboratory for additional testing.     Trimethoprim/Sulfa Value in next row  ug/mL      <=4 SusceptibleCefazolin MORENA breakpoints are for the  treatment of uncomplicated urinary tract infections.  For the treatment of systemic infections, please contact the laboratory for additional testing.     AMPICILLIN/SULBACTAM Value in next row  ug/mL      <=4 SusceptibleCefazolin DALTON breakpoints are for the treatment of uncomplicated urinary tract infections.  For the treatment of systemic infections, please contact the laboratory for additional testing.     Piperacillin/Tazo Value in next row  ug/mL      <=4 SusceptibleCefazolin DALTON breakpoints are for the treatment of uncomplicated urinary tract infections.  For the treatment of systemic infections, please contact the laboratory for additional testing.     CEFEPIME Value in next row  ug/mL      <=4 SusceptibleCefazolin DALTON breakpoints are for the treatment of uncomplicated urinary tract infections.  For the treatment of systemic infections, please contact the laboratory for additional testing.    50,000 to 100,000 colonies/ml escherichia coli (dalton) -  (no method available)     AMPICILLIN Value in next row  ug/mL      <=4 SusceptibleCefazolin DALTON breakpoints are for the treatment of uncomplicated urinary tract infections.  For the treatment of systemic infections, please contact the laboratory for additional testing.     CEFAZOLIN Value in next row  ug/mL      <=4 SusceptibleCefazolin DALTON breakpoints are for the treatment of uncomplicated urinary tract infections.  For the treatment of systemic infections, please contact the laboratory for additional testing.     CEFOXITIN Value in next row  ug/mL      <=4 SusceptibleCefazolin DALTON breakpoints are for the treatment of uncomplicated urinary tract infections.  For the treatment of systemic infections, please contact the laboratory for additional testing.     CEFTAZIDIME Value in next row  ug/mL      <=4 SusceptibleCefazolin DALTON breakpoints are for the treatment of uncomplicated urinary tract infections.  For the treatment of systemic infections, please contact the  laboratory for additional testing.     CEFTRIAXONE Value in next row  ug/mL      <=4 SusceptibleCefazolin MORENA breakpoints are for the treatment of uncomplicated urinary tract infections.  For the treatment of systemic infections, please contact the laboratory for additional testing.     CIPROFLOXACIN Value in next row  ug/mL      <=4 SusceptibleCefazolin MORENA breakpoints are for the treatment of uncomplicated urinary tract infections.  For the treatment of systemic infections, please contact the laboratory for additional testing.     GENTAMICIN Value in next row  ug/mL      <=4 SusceptibleCefazolin MORENA breakpoints are for the treatment of uncomplicated urinary tract infections.  For the treatment of systemic infections, please contact the laboratory for additional testing.     LEVOFLOXACIN Value in next row  ug/mL      <=4 SusceptibleCefazolin MORENA breakpoints are for the treatment of uncomplicated urinary tract infections.  For the treatment of systemic infections, please contact the laboratory for additional testing.     NITROFURANTOIN Value in next row  ug/mL      <=4 SusceptibleCefazolin OMRENA breakpoints are for the treatment of uncomplicated urinary tract infections.  For the treatment of systemic infections, please contact the laboratory for additional testing.     TOBRAMYCIN Value in next row  ug/mL      <=4 SusceptibleCefazolin MORENA breakpoints are for the treatment of uncomplicated urinary tract infections.  For the treatment of systemic infections, please contact the laboratory for additional testing.     Trimethoprim/Sulfa Value in next row  ug/mL      <=4 SusceptibleCefazolin MORENA breakpoints are for the treatment of uncomplicated urinary tract infections.  For the treatment of systemic infections, please contact the laboratory for additional testing.     AMPICILLIN/SULBACTAM Value in next row  ug/mL      <=4 SusceptibleCefazolin MORENA breakpoints are for the treatment of uncomplicated urinary tract infections.   For the treatment of systemic infections, please contact the laboratory for additional testing.     Piperacillin/Tazo Value in next row  ug/mL      <=4 SusceptibleCefazolin MORENA breakpoints are for the treatment of uncomplicated urinary tract infections.  For the treatment of systemic infections, please contact the laboratory for additional testing.     CEFEPIME Value in next row  ug/mL      <=4 SusceptibleCefazolin MORENA breakpoints are for the treatment of uncomplicated urinary tract infections.  For the treatment of systemic infections, please contact the laboratory for additional testing.         I wanted you to have this urine culture result for your records.  We will treat this with Macrobid (nitrofurantoin) to see if Mr. Cano's energy level improves.      Please let me know if you have any questions.      Sincerely,      Zenaida Hunt PA-C  Physician Assistant Urology        [facility] AND [facility]  [address] [place] [id number]  Phone: [phone]  Fax: [phone]

## 2017-08-03 LAB
BACTERIA SPEC CULT: ABNORMAL
MICRO REPORT STATUS: ABNORMAL
MICROORGANISM SPEC CULT: ABNORMAL
MICROORGANISM SPEC CULT: ABNORMAL
SPECIMEN SOURCE: ABNORMAL

## 2017-08-04 ENCOUNTER — CARE COORDINATION (OUTPATIENT)
Dept: UROLOGY | Facility: CLINIC | Age: 72
End: 2017-08-04

## 2017-08-04 DIAGNOSIS — N39.0 URINARY TRACT INFECTION: Primary | ICD-10-CM

## 2017-08-04 RX ORDER — NITROFURANTOIN 25; 75 MG/1; MG/1
100 CAPSULE ORAL 2 TIMES DAILY
Qty: 14 CAPSULE | Refills: 0 | Status: SHIPPED | OUTPATIENT
Start: 2017-08-04 | End: 2017-08-11

## 2017-08-04 NOTE — PROGRESS NOTES
Kalen Mejia:  His creatinine has been normal (1.2mg/dL with GFR 60).   Please give him macrobid 100mg twice daily x 7 days for his urine culture.     Called and spoke to patient's wife. Explained everything to her. She states understanding.  Script will be called into pharmacy. We will recheck urine one week after finishing antibiotic.

## 2017-08-07 ENCOUNTER — TELEPHONE (OUTPATIENT)
Dept: FAMILY MEDICINE | Facility: CLINIC | Age: 72
End: 2017-08-07

## 2017-08-09 ENCOUNTER — TELEPHONE (OUTPATIENT)
Dept: NURSING | Facility: CLINIC | Age: 72
End: 2017-08-09

## 2017-08-09 ENCOUNTER — NURSE TRIAGE (OUTPATIENT)
Dept: NURSING | Facility: CLINIC | Age: 72
End: 2017-08-09

## 2017-08-09 ENCOUNTER — APPOINTMENT (OUTPATIENT)
Dept: GENERAL RADIOLOGY | Facility: CLINIC | Age: 72
End: 2017-08-09
Attending: FAMILY MEDICINE
Payer: MEDICARE

## 2017-08-09 ENCOUNTER — HOSPITAL ENCOUNTER (EMERGENCY)
Facility: CLINIC | Age: 72
Discharge: HOME | End: 2017-08-09
Attending: FAMILY MEDICINE | Admitting: FAMILY MEDICINE
Payer: MEDICARE

## 2017-08-09 VITALS
RESPIRATION RATE: 16 BRPM | TEMPERATURE: 97.2 F | SYSTOLIC BLOOD PRESSURE: 180 MMHG | OXYGEN SATURATION: 97 % | DIASTOLIC BLOOD PRESSURE: 112 MMHG | HEART RATE: 77 BPM

## 2017-08-09 DIAGNOSIS — M62.81 GENERALIZED MUSCLE WEAKNESS: ICD-10-CM

## 2017-08-09 DIAGNOSIS — J18.9 PNEUMONIA OF LEFT LOWER LOBE DUE TO INFECTIOUS ORGANISM: ICD-10-CM

## 2017-08-09 DIAGNOSIS — I44.0 FIRST DEGREE ATRIOVENTRICULAR BLOCK: ICD-10-CM

## 2017-08-09 DIAGNOSIS — G20.A1 PARALYSIS AGITANS (H): Primary | ICD-10-CM

## 2017-08-09 DIAGNOSIS — G20.A1 PARKINSON'S DISEASE (H): ICD-10-CM

## 2017-08-09 LAB
ALBUMIN SERPL-MCNC: 3.5 G/DL (ref 3.4–5)
ALBUMIN UR-MCNC: 10 MG/DL
ALP SERPL-CCNC: 123 U/L (ref 40–150)
ALT SERPL W P-5'-P-CCNC: 8 U/L (ref 0–70)
ANION GAP SERPL CALCULATED.3IONS-SCNC: 9 MMOL/L (ref 3–14)
APPEARANCE UR: CLEAR
AST SERPL W P-5'-P-CCNC: 12 U/L (ref 0–45)
BASOPHILS # BLD AUTO: 0 10E9/L (ref 0–0.2)
BASOPHILS NFR BLD AUTO: 0.5 %
BILIRUB SERPL-MCNC: 0.4 MG/DL (ref 0.2–1.3)
BILIRUB UR QL STRIP: NEGATIVE
BUN SERPL-MCNC: 19 MG/DL (ref 7–30)
CALCIUM SERPL-MCNC: 8.5 MG/DL (ref 8.5–10.1)
CHLORIDE SERPL-SCNC: 108 MMOL/L (ref 94–109)
CO2 SERPL-SCNC: 26 MMOL/L (ref 20–32)
COLOR UR AUTO: ABNORMAL
CREAT SERPL-MCNC: 0.9 MG/DL (ref 0.66–1.25)
DIFFERENTIAL METHOD BLD: NORMAL
EOSINOPHIL # BLD AUTO: 0 10E9/L (ref 0–0.7)
EOSINOPHIL NFR BLD AUTO: 0 %
ERYTHROCYTE [DISTWIDTH] IN BLOOD BY AUTOMATED COUNT: 11.8 % (ref 10–15)
GFR SERPL CREATININE-BSD FRML MDRD: 83 ML/MIN/1.7M2
GLUCOSE SERPL-MCNC: 91 MG/DL (ref 70–99)
GLUCOSE UR STRIP-MCNC: NEGATIVE MG/DL
HCT VFR BLD AUTO: 41.8 % (ref 40–53)
HGB BLD-MCNC: 14.4 G/DL (ref 13.3–17.7)
HGB UR QL STRIP: NEGATIVE
IMM GRANULOCYTES # BLD: 0 10E9/L (ref 0–0.4)
IMM GRANULOCYTES NFR BLD: 0.2 %
KETONES UR STRIP-MCNC: 10 MG/DL
LACTATE BLD-SCNC: 1.3 MMOL/L (ref 0.7–2.1)
LEUKOCYTE ESTERASE UR QL STRIP: NEGATIVE
LYMPHOCYTES # BLD AUTO: 0.8 10E9/L (ref 0.8–5.3)
LYMPHOCYTES NFR BLD AUTO: 13.7 %
MCH RBC QN AUTO: 32.2 PG (ref 26.5–33)
MCHC RBC AUTO-ENTMCNC: 34.4 G/DL (ref 31.5–36.5)
MCV RBC AUTO: 94 FL (ref 78–100)
MONOCYTES # BLD AUTO: 0.4 10E9/L (ref 0–1.3)
MONOCYTES NFR BLD AUTO: 7.7 %
MUCOUS THREADS #/AREA URNS LPF: PRESENT /LPF
NEUTROPHILS # BLD AUTO: 4.4 10E9/L (ref 1.6–8.3)
NEUTROPHILS NFR BLD AUTO: 77.9 %
NITRATE UR QL: NEGATIVE
NRBC # BLD AUTO: 0 10*3/UL
NRBC BLD AUTO-RTO: 0 /100
PH UR STRIP: 5.5 PH (ref 5–7)
PLATELET # BLD AUTO: 195 10E9/L (ref 150–450)
POTASSIUM SERPL-SCNC: 3.9 MMOL/L (ref 3.4–5.3)
PROT SERPL-MCNC: 6.5 G/DL (ref 6.8–8.8)
RBC # BLD AUTO: 4.47 10E12/L (ref 4.4–5.9)
RBC #/AREA URNS AUTO: 0 /HPF (ref 0–2)
SODIUM SERPL-SCNC: 143 MMOL/L (ref 133–144)
SP GR UR STRIP: 1.02 (ref 1–1.03)
TROPONIN I SERPL-MCNC: NORMAL UG/L (ref 0–0.04)
URN SPEC COLLECT METH UR: ABNORMAL
UROBILINOGEN UR STRIP-MCNC: NORMAL MG/DL (ref 0–2)
WBC # BLD AUTO: 5.7 10E9/L (ref 4–11)
WBC #/AREA URNS AUTO: 3 /HPF (ref 0–2)

## 2017-08-09 PROCEDURE — 84484 ASSAY OF TROPONIN QUANT: CPT | Performed by: FAMILY MEDICINE

## 2017-08-09 PROCEDURE — 93005 ELECTROCARDIOGRAM TRACING: CPT | Performed by: FAMILY MEDICINE

## 2017-08-09 PROCEDURE — 25000128 H RX IP 250 OP 636: Performed by: FAMILY MEDICINE

## 2017-08-09 PROCEDURE — 83605 ASSAY OF LACTIC ACID: CPT | Performed by: FAMILY MEDICINE

## 2017-08-09 PROCEDURE — 93010 ELECTROCARDIOGRAM REPORT: CPT | Mod: Z6 | Performed by: FAMILY MEDICINE

## 2017-08-09 PROCEDURE — 87040 BLOOD CULTURE FOR BACTERIA: CPT | Performed by: FAMILY MEDICINE

## 2017-08-09 PROCEDURE — 80053 COMPREHEN METABOLIC PANEL: CPT | Performed by: FAMILY MEDICINE

## 2017-08-09 PROCEDURE — 85025 COMPLETE CBC W/AUTO DIFF WBC: CPT | Performed by: FAMILY MEDICINE

## 2017-08-09 PROCEDURE — 71010 XR CHEST PORT 1 VW: CPT

## 2017-08-09 PROCEDURE — 96361 HYDRATE IV INFUSION ADD-ON: CPT | Performed by: FAMILY MEDICINE

## 2017-08-09 PROCEDURE — 99285 EMERGENCY DEPT VISIT HI MDM: CPT | Mod: 25 | Performed by: FAMILY MEDICINE

## 2017-08-09 PROCEDURE — 96360 HYDRATION IV INFUSION INIT: CPT | Performed by: FAMILY MEDICINE

## 2017-08-09 PROCEDURE — 99284 EMERGENCY DEPT VISIT MOD MDM: CPT | Mod: 25 | Performed by: FAMILY MEDICINE

## 2017-08-09 PROCEDURE — 81001 URINALYSIS AUTO W/SCOPE: CPT | Performed by: FAMILY MEDICINE

## 2017-08-09 PROCEDURE — 25000132 ZZH RX MED GY IP 250 OP 250 PS 637: Performed by: FAMILY MEDICINE

## 2017-08-09 RX ORDER — LEVOFLOXACIN 500 MG/1
500 TABLET, FILM COATED ORAL DAILY
Qty: 7 TABLET | Refills: 0 | Status: SHIPPED | OUTPATIENT
Start: 2017-08-09 | End: 2017-08-16

## 2017-08-09 RX ORDER — SODIUM CHLORIDE 9 MG/ML
1000 INJECTION, SOLUTION INTRAVENOUS CONTINUOUS
Status: DISCONTINUED | OUTPATIENT
Start: 2017-08-09 | End: 2017-08-09 | Stop reason: HOSPADM

## 2017-08-09 RX ORDER — CARBIDOPA AND LEVODOPA 25; 100 MG/1; MG/1
1.5 TABLET ORAL ONCE
Status: COMPLETED | OUTPATIENT
Start: 2017-08-09 | End: 2017-08-09

## 2017-08-09 RX ADMIN — SODIUM CHLORIDE 1000 ML: 9 INJECTION, SOLUTION INTRAVENOUS at 15:20

## 2017-08-09 RX ADMIN — CARBIDOPA AND LEVODOPA 1.5 TABLET: 25; 100 TABLET ORAL at 14:47

## 2017-08-09 RX ADMIN — SODIUM CHLORIDE 1000 ML: 9 INJECTION, SOLUTION INTRAVENOUS at 13:59

## 2017-08-09 ASSESSMENT — ENCOUNTER SYMPTOMS
ABDOMINAL PAIN: 0
NAUSEA: 1
FEVER: 0
LIGHT-HEADEDNESS: 1
APPETITE CHANGE: 1
FATIGUE: 1
PALPITATIONS: 0
WEAKNESS: 1
COUGH: 1

## 2017-08-09 NOTE — TELEPHONE ENCOUNTER
"Dr. Buchanan    Spoke with wife, pt c/o upset stomach, dizzy. He has a cough and has had for a few days, today has some wheezing, last night she stated he sounded like a \"bubbling pot\", denies temp. He is lethargic, weaker, he wasn't wanting to get up today, eat or take fluids, he is currently being treated for a UTI, started on macrobid 2xday,   advised she get him to the ED for evaluation,   also advised he may benefit from Skilled Home care, when he is at the ED she will ask for a referral for SN, also recommended PT/OT/ST and HHA as well as MSW for long term planning    Advised her to have him take his medications, with some food if able    Joana Gandhi RN   [facility]        "

## 2017-08-09 NOTE — TELEPHONE ENCOUNTER
Clinic Action Needed:  Yes, callback  [facility]NA Triage Call  Presenting Problem:    Sanjay is starting to wheeze today and advised to be seen within 4 hours and Danuta  Is requesting to speak with MD Buchanan.  No fever.  Slight nausea.  Please phone Danuta  At [phone].      Guideline Used:Cough  Patient Recommendations/Teaching: Seen within 4 hours  Routed to:  RN Pool  Please be sure to close this encounter once this patient's issue/question has been addressed.    Yolanda Lynn RN/[facility]

## 2017-08-09 NOTE — DISCHARGE INSTRUCTIONS
Thank you for choosing [facility].     Please closely monitor for further symptoms. Return to the Emergency Department if you develop any new or worsening signs or symptoms.    If you received any opiate pain medications or sedatives during your visit, please do not drive for at least 8 hours.     Labs, cultures or final xray interpretations may still need to be reviewed.  We will call you if your plan of care needs to be changed.    Please follow up with your primary care physician or clinic.  Home care nursing and physical therapy referrals are made.  Start your Levaquin.  Follow-up with your primary care physician.      Pneumonia (Adult)  Pneumonia is an infection deep within the lungs. It is in the small air sacs (alveoli). Pneumonia may be caused by a virus or bacteria. Pneumonia caused by bacteria is usually treated with an antibiotic. Severe cases may need to be treated in the hospital. Milder cases can be treated at home. Symptoms usually start to get better during the first 2 days of treatment.    Home care  Follow these guidelines when caring for yourself at home:    Rest at home for the first 2 to 3 days, or until you feel stronger. Don t let yourself get overly tired when you go back to your activities.    Stay away from cigarette smoke - yours or other people s.    You may use acetaminophen or ibuprofen to control fever or pain, unless another medicine was prescribed. If you have chronic liver or kidney disease, talk with your healthcare provider before using these medicines. Also talk with your provider if you ve had a stomach ulcer or gastrointestinal bleeding. Don t give aspirin to anyone younger than 18 years of age who is ill with a fever. It may cause severe liver damage.    Your appetite may be poor, so a light diet is fine.    Drink 6 to 8 glasses of fluids every day to make sure you are getting enough fluids. Beverages can include water, sport drinks, sodas without  caffeine, juices, tea, or soup. Fluids will help loosen secretions in the lung. This will make it easier for you to cough up the phlegm (sputum). If you also have heart or kidney disease, check with your healthcare provider before you drink extra fluids.    Take antibiotic medicine prescribed until it is all gone, even if you are feeling better after a few days.  Follow-up care  Follow up with your healthcare provider in the next 2 to 3 days, or as advised. This is to be sure the medicine is helping you get better.  If you are 65 or older, you should get a pneumococcal vaccine and a yearly flu (influenza) shot. You should also get these vaccines if you have chronic lung disease like asthma, emphysema, or COPD. Recently, a second type of pneumonia vaccine has become available for everyone over 65 years old. This is in addition to the previous vaccine. Ask your provider about this.  When to seek medical advice  Call your healthcare provider right away if any of these occur:    You don t get better within the first 48 hours of treatment    Shortness of breath gets worse    Rapid breathing (more than 25 breaths per minute)    Coughing up blood    Chest pain gets worse with breathing    Fever of 100.4 F (38 C) or higher that doesn t get better with fever medicine    Weakness, dizziness, or fainting that gets worse    Thirst or dry mouth that gets worse    Sinus pain, headache, or a stiff neck    Chest pain not caused by coughing  Date Last Reviewed: 1/1/2017 2000-2017 [employer]. [address] [place] [address], [place] [address]. All rights reserved. This information is not intended as a substitute for professional medical care. Always follow your healthcare professional's instructions.

## 2017-08-09 NOTE — ED PROVIDER NOTES
"  History     Chief Complaint   Patient presents with     Fatigue     weakness the last few days     Dizziness     the last 2 weeks     HPI  Sanjay Cano is a 71 year old male who has a history of Parkinsonism for over 20 years, and recurrent UTI, including UTIs with sepsis, presenting with diffuse weakness, cough, nausea, fatigue and lightheadedness. Patient's wife states in mid-July he developed a cough. He was subsequently diagnosed with \"bacterial pneumonia.\" He was treated with 2 weeks of doxycycline. Cough and symptoms improved, and he appeared back to baseline at the end of July. He went to a routine urologist appointment at the start of August, and was noted at that appointment to have a recurrence of his UTI. He was started on Macrobid on 8/4/2017. Since that time, he continues to complain of the aforementioned symptoms as well as a nonproductive cough. She states his cough is much worse at night, and \"sounds like a teapot about to boil over.\"  Last night and the night before were quite difficult as he appeared to be having some trouble breathing.  He is not short of breath at this time.  Mr. Cano himself denies pain. He has not had a fever. He does feel extremely weak. He has been taking his Parkinson's medications regularly on schedule. He is very nauseated and his appetite is markedly decreased. He has no abdominal pain, chest pain or palpitations. He has no other focal complaints.  Upon review of the chart, urine culture from several days ago did show 2 different strains of E. coli, both susceptible to Macrobid.    This part of the medical record was transcribed by Jaye Caldwell Medical Scribe, from a dictation done by Luis Clark MD.      Past Medical History:   Diagnosis Date     At risk for falling 9/3/2012     COPD (chronic obstructive pulmonary disease) (H)      Family history of thyroid cancer 7/13/2016     Malignant neoplasm (H)     skin - nose     Moderate recurrent major depression " (H) 5/17/2012     Paralysis agitans (H)     Parkinson's Disease     Parkinson disease (H)      Rosacea        Past Surgical History:   Procedure Laterality Date     EYE SURGERY       ORTHOPEDIC SURGERY       PHACOEMULSIFICATION CLEAR CORNEA WITH STANDARD INTRAOCULAR LENS IMPLANT  5/21/2014    Procedure: PHACOEMULSIFICATION CLEAR CORNEA WITH STANDARD INTRAOCULAR LENS IMPLANT;  Surgeon: Tomy Hunter MD;  Location: [facility]     PHACOEMULSIFICATION CLEAR CORNEA WITH TORIC INTRAOCULAR LENS IMPLANT  4/30/2014    Procedure: PHACOEMULSIFICATION CLEAR CORNEA WITH TORIC INTRAOCULAR LENS IMPLANT;  Surgeon: Tomy Hunter MD;  Location: [facility]       Family History   Problem Relation Age of Onset     CEREBROVASCULAR DISEASE Mother      DIABETES Mother      GASTROINTESTINAL DISEASE Mother      Hypertension Mother      Neurologic Disorder Father      CEREBROVASCULAR DISEASE Father      CEREBROVASCULAR DISEASE Maternal Grandfather      CANCER Paternal Grandmother      Other - See Comments Sister      gi - unknown       Social History   Substance Use Topics     Smoking status: Former Smoker     Packs/day: 0.01     Years: 40.00     Types: Cigarettes     Quit date: 7/31/2008     Smokeless tobacco: Never Used      Comment: very passive cigarettes in 6 months     Alcohol use No     Current Facility-Administered Medications   Medication     0.9% sodium chloride infusion     Current Outpatient Prescriptions   Medication     levofloxacin (LEVAQUIN) 500 MG tablet     nitroFURantoin, macrocrystal-monohydrate, (MACROBID) 100 MG capsule     docusate sodium (COLACE) 100 MG capsule     ketoconazole (NIZORAL) 2 % shampoo     polyethylene glycol (MIRALAX/GLYCOLAX) powder     venlafaxine (EFFEXOR-XR) 75 MG 24 hr capsule     venlafaxine (EFFEXOR-XR) 75 MG 24 hr capsule     FLOVENT  MCG/ACT Inhaler     clonazePAM (KLONOPIN) 0.5 MG tablet     aspirin 81 MG tablet     QUEtiapine (SEROQUEL) 50 MG tablet     COMBIVENT RESPIMAT  MCG/ACT  inhaler     mirtazapine (REMERON) 15 MG tablet     donepezil (ARICEPT) 10 MG tablet     erythromycin (ROMYCIN) ophthalmic ointment     neomycin-polymyxin-dexamethasone (MAXITROL) 3.5-48821-3.1 SUSP ophthalmic susp     donepezil (ARICEPT) 5 MG tablet     ranitidine (ZANTAC) 150 MG tablet     carbidopa-levodopa (SINEMET CR)  MG per CR tablet     Methylcellulose, Laxative, (CITRUCEL PO)     lactobacillus rhamnosus, GG, (CULTURELL) capsule     amLODIPine (NORVASC) 5 MG tablet     Ipratropium-Albuterol (COMBIVENT RESPIMAT)  MCG/ACT inhaler     NONFORMULARY     order for DME     Probiotic Product (FLORAJEN BIFIDOBLEND PO)     carbidopa-levodopa (SINEMET)  MG per tablet     donepezil (ARICEPT) 5 MG tablet     order for DME     cholecalciferol (VITAMIN D) 1000 UNIT tablet     ORDER FOR DME        Allergies   Allergen Reactions     Seasonal Allergies       I have reviewed the Medications, Allergies, Past Medical and Surgical History, and Social History in the Epic system.    Review of Systems   Constitutional: Positive for appetite change (decreased) and fatigue. Negative for fever.   Respiratory: Positive for cough (nonproductive).    Cardiovascular: Negative for chest pain and palpitations.   Gastrointestinal: Positive for nausea. Negative for abdominal pain.   Neurological: Positive for weakness (generalized) and light-headedness.   All other systems reviewed and are negative.      Physical Exam   BP: 118/79  Pulse: 69  Temp: 97.2  F (36.2  C)  Resp: 15  SpO2: 95 %  Physical Exam   Constitutional: He is oriented to person, place, and time. He appears well-developed and well-nourished.   HENT:   Head: Normocephalic and atraumatic.   Mouth/Throat: Oropharynx is clear and moist.   Eyes: EOM are normal. Pupils are equal, round, and reactive to light.   Neck: Normal range of motion. Neck supple. No JVD present. No tracheal deviation present. No thyromegaly present.   Cardiovascular: Normal rate, regular  rhythm, normal heart sounds and intact distal pulses.  Exam reveals no gallop and no friction rub.    No murmur heard.  Pulmonary/Chest: Effort normal. He has rales (rhonchi at lung base on the left). He exhibits no tenderness.   Abdominal: Soft. Bowel sounds are normal. He exhibits no distension and no mass. There is no tenderness.   Musculoskeletal: He exhibits no edema or tenderness.   Neurological: He is alert and oriented to person, place, and time. No cranial nerve deficit or sensory deficit. He exhibits abnormal muscle tone (rigidity and cogwheeling are noted throughout consistent with long-standing severe parkinsonism). Gait abnormal. Coordination normal. He displays Babinski's sign on the right side. He displays Babinski's sign on the left side.   Skin: Skin is warm and dry. No rash noted.   Psychiatric: He has a normal mood and affect. His behavior is normal.   Nursing note and vitals reviewed.      ED Course     ED Course     Procedures              EKG Interpretation:      Interpreted by Luis Clark  Time reviewed: 1348  Symptoms at time of EKG: generalized weakness   Rhythm: normal sinus   Rate: Normal  Axis: Normal  Ectopy: none  Conduction: nonspecific interventricular conduction block and 1st degree AV block  ST Segments/ T Waves: No ST-T wave changes and No acute ischemic changes  Q Waves: none  Comparison to prior: Unchanged from 6/2016    Clinical Impression: Normal sinus rhythm with first-degree AV block and nonspecific interventricular conduction delay, otherwise unremarkable EKG                  Critical Care time:  none             Labs Ordered and Resulted from Time of ED Arrival Up to the Time of Departure from the ED   COMPREHENSIVE METABOLIC PANEL - Abnormal; Notable for the following:        Result Value    Protein Total 6.5 (*)     All other components within normal limits   ROUTINE UA WITH MICROSCOPIC REFLEX TO CULTURE - Abnormal; Notable for the following:     Ketones Urine 10 (*)      Protein Albumin Urine 10 (*)     WBC Urine 3 (*)     Mucous Urine Present (*)     All other components within normal limits   CBC WITH PLATELETS DIFFERENTIAL   LACTIC ACID WHOLE BLOOD   TROPONIN I   BLOOD CULTURE            Assessments & Plan (with Medical Decision Making)   A with history of severe parkinsonism, recent pneumonia, currently being treated for recurrent E. coli UTI.  He is presenting with several days of worsening generalized weakness, nausea, persistent cough causing particular difficulty at night.  Differential diagnosis includes sepsis, pneumonia, pneumothorax, atypical presentation of coronary syndrome, pyelonephritis or other complex drug resistant UTI, dehydration.  In the emergency department his vital signs are normal, he is alert and oriented ×3.  His mental status is at baseline, his neck is supple.  He has rhonchi heard at the base of the left lung but is in no respiratory distress with normal oxygen saturation on room air.  There are no signs of volume overload on exam, in fact he may appear slightly dehydrated.  His neurologic findings consistent with chronic severe parkinsonism.  His labs include a normal CBC, negative troponin, normal electrolytes, normal lactate.  His urinalysis is unremarkable.  His chest x-ray shows some persistent atelectasis or consolidation at the left base.  This could be consistent with persistent pneumonia given his ongoing cough and difficulty sleeping.  He is not febrile however or hypoxic.  He was offered admission to the observation unit for initiation of treatment for possible persistent pneumonia and dehydration.  He discussed with his wife and they respectfully decline.  They would like a referral for home nursing and for a home physical therapy evaluation.  Referral was made and  stated that should not be difficult.  I will start the patient on Levaquin given his comorbidities including parkinsonism, COPD, and possible aspiration risk,  and he will continue his Combivent inhaler.  They will have a low threshold to return if they believe his condition is deteriorating in anyway.    I have reviewed the nursing notes.    I have reviewed the findings, diagnosis, plan and need for follow up with the patient.    New Prescriptions    LEVOFLOXACIN (LEVAQUIN) 500 MG TABLET    Take 1 tablet (500 mg) by mouth daily for 7 days       Final diagnoses:   Pneumonia of left lower lobe due to infectious organism (H)   Generalized muscle weakness   Parkinson's disease (H)       8/9/2017   [facility], [place], EMERGENCY DEPARTMENT     Luis Clark MD  08/09/17 [id number]

## 2017-08-09 NOTE — TELEPHONE ENCOUNTER
Sanjay is being treated for an UTI and was dignosed with pneumonia by MD Buchanan.  Sanjay is taking Macrobid for UTI and today cough is getting worse.   

## 2017-08-09 NOTE — ED AVS SNAPSHOT
[facility], Emergency Department    [address]    [place] [address]    Phone:  [phone]    Fax:  [phone]                                       Sanjay Cano   MRN: [id number]    Department:  [facility], Emergency Department   Date of Visit:  8/9/2017           Patient Information     Date Of Birth          1945        Your diagnoses for this visit were:     Pneumonia of left lower lobe due to infectious organism (H)     Generalized muscle weakness     Parkinson's disease (H)     Paralysis agitans (H)        You were seen by Luis Clark MD.        Discharge Instructions       Thank you for choosing [facility].     Please closely monitor for further symptoms. Return to the Emergency Department if you develop any new or worsening signs or symptoms.    If you received any opiate pain medications or sedatives during your visit, please do not drive for at least 8 hours.     Labs, cultures or final xray interpretations may still need to be reviewed.  We will call you if your plan of care needs to be changed.    Please follow up with your primary care physician or clinic.  Home care nursing and physical therapy referrals are made.  Start your Levaquin.  Follow-up with your primary care physician.      Pneumonia (Adult)  Pneumonia is an infection deep within the lungs. It is in the small air sacs (alveoli). Pneumonia may be caused by a virus or bacteria. Pneumonia caused by bacteria is usually treated with an antibiotic. Severe cases may need to be treated in the hospital. Milder cases can be treated at home. Symptoms usually start to get better during the first 2 days of treatment.    Home care  Follow these guidelines when caring for yourself at home:    Rest at home for the first 2 to 3 days, or until you feel stronger. Don t let yourself get overly tired when you go back to your activities.    Stay away from cigarette smoke - yours or other  people s.    You may use acetaminophen or ibuprofen to control fever or pain, unless another medicine was prescribed. If you have chronic liver or kidney disease, talk with your healthcare provider before using these medicines. Also talk with your provider if you ve had a stomach ulcer or gastrointestinal bleeding. Don t give aspirin to anyone younger than 18 years of age who is ill with a fever. It may cause severe liver damage.    Your appetite may be poor, so a light diet is fine.    Drink 6 to 8 glasses of fluids every day to make sure you are getting enough fluids. Beverages can include water, sport drinks, sodas without caffeine, juices, tea, or soup. Fluids will help loosen secretions in the lung. This will make it easier for you to cough up the phlegm (sputum). If you also have heart or kidney disease, check with your healthcare provider before you drink extra fluids.    Take antibiotic medicine prescribed until it is all gone, even if you are feeling better after a few days.  Follow-up care  Follow up with your healthcare provider in the next 2 to 3 days, or as advised. This is to be sure the medicine is helping you get better.  If you are 65 or older, you should get a pneumococcal vaccine and a yearly flu (influenza) shot. You should also get these vaccines if you have chronic lung disease like asthma, emphysema, or COPD. Recently, a second type of pneumonia vaccine has become available for everyone over 65 years old. This is in addition to the previous vaccine. Ask your provider about this.  When to seek medical advice  Call your healthcare provider right away if any of these occur:    You don t get better within the first 48 hours of treatment    Shortness of breath gets worse    Rapid breathing (more than 25 breaths per minute)    Coughing up blood    Chest pain gets worse with breathing    Fever of 100.4 F (38 C) or higher that doesn t get better with fever medicine    Weakness, dizziness, or fainting  that gets worse    Thirst or dry mouth that gets worse    Sinus pain, headache, or a stiff neck    Chest pain not caused by coughing  Date Last Reviewed: 1/1/2017 2000-2017 [employer]. [address] [place] [address], [place] [address]. All rights reserved. This information is not intended as a substitute for professional medical care. Always follow your healthcare professional's instructions.          24 Hour Appointment Hotline       To make an appointment at any [facility], call [phone] ([phone]). If you don't have a family doctor or clinic, we will help you find one. [place] clinics are conveniently located to serve the needs of you and your family.          ED Discharge Orders     Home Care PT Referral for Hospital Discharge       [facility]  [phone]  Fax [phone]    OT to eval and treat  PT to eval and treat    Your provider has ordered home care - physical therapy. If you have not been contacted within 2 days of your discharge please call the department phone number listed on the top of this document.            Home care nursing referral       [facility]  [phone]  Fax [phone]    RN skilled nursing visit. RN to assess vital signs and weight, respiratory and cardiac status, pain level and activity tolerance, hydration, nutrition and bowel status and home safety.  RN to teach medication management.    Your provider has ordered home care nursing services. If you have not been contacted within 2 days of your discharge please call the inpatient department phone number at [phone] .            MD face to face encounter       Documentation of Face to Face and Certification for Home Health Services    I certify that patient: Sanjay Cano is under my care and that I, or a nurse practitioner or physician's assistant working with me, had a face-to-face encounter that meets the physician face-to-face encounter requirements with this patient on:  8/9/2017.    This encounter with the patient was in whole, or in part, for the following medical condition, which is the primary reason for home health care: parkinsons.    I certify that, based on my findings, the following services are medically necessary home health services: Nursing and Physical Therapy.    My clinical findings support the need for the above services because: Nurse is needed: To provide caregiver training to assist with medical cares and Physical Therapy Services are needed to assess and treat the following functional impairments immobility.    Further, I certify that my clinical findings support that this patient is homebound (i.e. absences from home require considerable and taxing effort and are for medical reasons or Yazidi services or infrequently or of short duration when for other reasons) because: Requires assistance of another person or specialized equipment to access medical services because patient: Requires supervision of another for safe transfer...    Based on the above findings. I certify that this patient is confined to the home and needs intermittent skilled nursing care, physical therapy and/or speech therapy.  The patient is under my care, and I have initiated the establishment of the plan of care.  This patient will be followed by a physician who will periodically review the plan of care.  Physician/Provider to provide follow up care: Bharath Buchanan    Attending hospital physician (the Medicare certified [place] provider): Luis Clark MD  Physician Signature: See electronic signature associated with these discharge orders.  Date: 8/9/2017                     Review of your medicines      START taking        Dose / Directions Last dose taken    levofloxacin 500 MG tablet   Commonly known as:  LEVAQUIN   Dose:  500 mg   Quantity:  7 tablet        Take 1 tablet (500 mg) by mouth daily for 7 days   Refills:  0          Our records show that you are taking the medicines  listed below. If these are incorrect, please call your family doctor or clinic.        Dose / Directions Last dose taken    amLODIPine 5 MG tablet   Commonly known as:  NORVASC   Dose:  5 mg   Quantity:  90 tablet        Take 1 tablet (5 mg) by mouth daily   Refills:  3        * ARICEPT 5 MG tablet   Dose:  5-7.5 mg   Quantity:  180 tablet   Generic drug:  donepezil        Take 5-7.5 mg by mouth every morning Take 1 to 1.5 tablets by mouth every every morning. Only take 1 tablet every morning when have diarrhea .   Refills:  3        * donepezil 5 MG tablet   Commonly known as:  ARIcept   Quantity:  30 tablet        Takes 1.5 tabs by mouth every night   Refills:  0        * donepezil 10 MG tablet   Commonly known as:  ARICEPT        TK 3/4 T PO BID   Refills:  3        aspirin 81 MG tablet        Take by mouth twice a week   Refills:  0        * carbidopa-levodopa  MG per tablet   Commonly known as:  SINEMET   Dose:  1-2 tablet   Quantity:  90 tablet        Take 1-2 tablets by mouth 5 times daily Takes 2 tablets at 6 AM, 1.5 tablets at 9 AM, 2 tablets at noon, 1.5 tablets at 3 PM, 1.5 tablets at 6 PM. Can take additional 0.5 tablet in the middle of the night if needed.   Refills:  0        * carbidopa-levodopa  MG per CR tablet   Commonly known as:  SINEMET CR   Dose:  1 tablet   Quantity:  1 tablet        Take 1 tablet by mouth At Bedtime   Refills:  0        cholecalciferol 1000 UNIT tablet   Commonly known as:  vitamin D   Dose:  1000 Units   Quantity:  90 tablet        Take 1 tablet (1,000 Units) by mouth daily   Refills:  1        CITRUCEL PO        Take by mouth 2 times daily   Refills:  0        clonazePAM 0.5 MG tablet   Commonly known as:  klonoPIN   Quantity:  15 tablet        TAKE 1/2 TABLET BY MOUTH EVERY NIGHT AT BEDTIME AS NEEDED   Refills:  0        docusate sodium 100 MG capsule   Commonly known as:  COLACE   Dose:  100 mg        Take 100 mg by mouth   Refills:  0        erythromycin  ophthalmic ointment   Commonly known as:  ROMYCIN        APPLY 1 APPLICATION IN BOTH EYES NIGHTLY   Refills:  11        FLORAJEN BIFIDOBLEND PO   Dose:  450 mg        Take 450 mg by mouth daily as needed (Only take when on antibiotics)   Refills:  0        FLOVENT  MCG/ACT Inhaler   Quantity:  36 g   Generic drug:  fluticasone        INHALE 2 PUFFS INTO THE LUNGS TWICE DAILY   Refills:  1        * Ipratropium-Albuterol  MCG/ACT inhaler   Commonly known as:  COMBIVENT RESPIMAT   Dose:  1 puff   Quantity:  1 Inhaler        Inhale 1 puff into the lungs 4 times daily as needed (Patient usually only takes 2-3 times daily (AM, noon, PM), but always at least BID.) Not to exceed 6 doses per day.   Refills:  4        * COMBIVENT RESPIMAT  MCG/ACT inhaler   Quantity:  4 g   Generic drug:  Ipratropium-Albuterol        INHALE 1 PUFF BY MOUTH FOUR TIMES DAILY. NOT TO EXCEED 6 DOSES PER DAY   Refills:  1        ketoconazole 2 % shampoo   Commonly known as:  NIZORAL        Refills:  0        lactobacillus rhamnosus (GG) capsule   Dose:  1 capsule   Quantity:  60 capsule        Take 1 capsule by mouth 2 times daily   Refills:  11        mirtazapine 15 MG tablet   Commonly known as:  REMERON   Quantity:  90 tablet        TAKE 1 TABLET(15 MG) BY MOUTH AT BEDTIME   Refills:  1        neomycin-polymyxin-dexamethasone 3.5-74529-6.1 Susp ophthalmic susp   Commonly known as:  MAXITROL        PLACE 1 DROP INTO THE RIGHT EYE TID   Refills:  1        nitroFURantoin (macrocrystal-monohydrate) 100 MG capsule   Commonly known as:  MACROBID   Dose:  100 mg   Quantity:  14 capsule        Take 1 capsule (100 mg) by mouth 2 times daily for 7 days   Refills:  0        NONFORMULARY        Apply Ponds Cold Cream topically to face daily for dry, pealing skin.   Refills:  0        order for DME   Dose:  1 Device   Quantity:  1 Device        1 Device. Transport chair   Refills:  0        order for DME   Quantity:  1 Device         Equipment being ordered: Blood pressure monitor with cuff   Refills:  0        order for DME   Quantity:  2 each        Equipment being ordered: TEDS stockings   Refills:  0        polyethylene glycol powder   Commonly known as:  MIRALAX/GLYCOLAX   Dose:  17 g        Take 17 g by mouth   Refills:  0        QUEtiapine 50 MG tablet   Commonly known as:  SEROquel   Quantity:  90 tablet        TAKE 1 TABLET(50 MG) BY MOUTH AT BEDTIME   Refills:  0        * venlafaxine 75 MG 24 hr capsule   Commonly known as:  EFFEXOR-XR   Quantity:  30 capsule        TAKE 1 CAPSULE BY MOUTH EVERY MORNING AND 1 CAPSULE EVERY NIGHT AT BEDTIME   Refills:  0        * venlafaxine 75 MG 24 hr capsule   Commonly known as:  EFFEXOR-XR   Quantity:  180 capsule        TAKE 1 CAPSULE BY MOUTH EVERY MORNING AND 1 CAPSULE EVERY NIGHT AT BEDTIME   Refills:  0        ZANTAC 150 MG tablet   Dose:  150 mg   Quantity:  1 tablet   Generic drug:  ranitidine        Take 1 tablet (150 mg) by mouth At Bedtime Then decrease to 75 mg after two weeks.   Refills:  0        * Notice:  This list has 9 medication(s) that are the same as other medications prescribed for you. Read the directions carefully, and ask your doctor or other care provider to review them with you.            Prescriptions were sent or printed at these locations (1 Prescription)                   Other Prescriptions                Printed at Department/Unit printer (1 of 1)         levofloxacin (LEVAQUIN) 500 MG tablet                Procedures and tests performed during your visit     Blood culture ONE site    CBC with platelets differential    Chest  XR, 1 view portable    Comprehensive metabolic panel    EKG 12 lead    Lactic acid    Troponin I    UA with Microscopic reflex to Culture      Orders Needing Specimen Collection     None      Pending Results     Date and Time Order Name Status Description    8/9/2017 1345 Blood culture ONE site In process             Pending Culture Results      "Date and Time Order Name Status Description    2017 1345 Blood culture ONE site In process             Pending Results Instructions     If you had any lab results that were not finalized at the time of your Discharge, you can call the ED Lab Result RN at [phone]. You will be contacted by this team for any positive Lab results or changes in treatment. The nurses are available 7 days a week from 10A to 6:30P.  You can leave a message 24 hours per day and they will return your call.        Thank you for choosing [facility]       Thank you for choosing [facility] for your care. Our goal is always to provide you with excellent care. Hearing back from our patients is one way we can continue to improve our services. Please take a few minutes to complete the written survey that you may receive in the mail after you visit with us. Thank you!        [employer] Information     [employer] lets you send messages to your doctor, view your test results, renew your prescriptions, schedule appointments and more. To sign up, go to [email or web address] . Click on \"Log in\" on the left side of the screen, which will take you to the Welcome page. Then click on \"Sign up Now\" on the right side of the page.     You will be asked to enter the access code listed below, as well as some personal information. Please follow the directions to create your username and password.     Your access code is: [email or web address]  Expires: 2017 11:52 AM     Your access code will  in 90 days. If you need help or a new code, please call your [facility] clinic or [phone].        Care EveryWhere ID     This is your Care EveryWhere ID. This could be used by other organizations to access your [facility] medical records  [id number]        Equal Access to Services     JERARDO LONGORIA : Dariusz Bae, jennifer pino, nataliya alexander. So [facility] [phone].    ATENCIÓN: Si richard rossi " a herring disposición servicios gratuitos de asistencia lingüística. Llkaren al [phone].    We comply with applicable federal civil rights laws and [place] laws. We do not discriminate on the basis of race, color, national origin, age, disability sex, sexual orientation or gender identity.            After Visit Summary       This is your record. Keep this with you and show to your community pharmacist(s) and doctor(s) at your next visit.

## 2017-08-09 NOTE — TELEPHONE ENCOUNTER
Reason for Disposition    Wheezing is present    Additional Information    Negative: Chest pain  (Exception: MILD central chest pain, present only when coughing)    Negative: Difficulty breathing    Negative: Patient sounds very sick or weak to the triager    Negative: [1] Coughed up blood AND [2] > 1 tablespoon (15 ml) (Exception: blood-tinged sputum)    Negative: Fever > 103 F (39.4 C)    Negative: [1] Fever > 101 F (38.3 C) AND [2] age > 60    Negative: [1] Fever > 101 F (38.3 C) AND [2] bedridden (e.g., nursing home patient, CVA, chronic illness, recovering from surgery)    Negative: [1] Fever > 100.5 F (38.1 C) AND [2] diabetes mellitus or weak immune system (e.g., HIV positive, cancer chemo, splenectomy, organ transplant, chronic steroids)    Negative: [1] Ankle swelling AND [2] swelling is increasing    Protocols used: COUGH - ACUTE NON-PRODUCTIVE-ADULT-  Sanjay is starting to wheeze today and advised to be seen within 4 hours and Danuta  Is requesting to speak with MD Buchanan.  No fever.  Slight nausea.  Please phone Danuta  At [phone].

## 2017-08-09 NOTE — PROGRESS NOTES
Care Coordinator- Discharge Planning Note     Admission Date/Time:  8/9/2017  Attending MD:  Luis Clark MD     Data  Chart reviewed, discussed with interdisciplinary team.   Patient was admitted for:   1. Paralysis agitans (H)    2. Pneumonia of left lower lobe due to infectious organism (H)    3. Generalized muscle weakness    4. Parkinson's disease (H)         RNCC Intervention: patient in need of home care, sent referral to [facility] for RN, PT, OT     Plan  Anticipated Discharge Date:  08/09/17    Anticipated Discharge Plan:  Home with home care    CTS Handoff completed: yes  Carol Arias, RN, BSN, PHN, RNCCPH: [phone]  Pager: [phone]  Covering for : Chanel Arboleda, Medicine RNCC

## 2017-08-09 NOTE — ED AVS SNAPSHOT
[facility], [place], Emergency Department    [address]    [place] [address]    Phone:  [phone]    Fax:  [phone]                                       Sanjay Cano   MRN: [id number]    Department:  [facility], Emergency Department   Date of Visit:  8/9/2017           After Visit Summary Signature Page     I have received my discharge instructions, and my questions have been answered. I have discussed any challenges I see with this plan with the nurse or doctor.    ..........................................................................................................................................  Patient/Patient Representative Signature      ..........................................................................................................................................  Patient Representative Print Name and Relationship to Patient    ..................................................               ................................................  Date                                            Time    ..........................................................................................................................................  Reviewed by Signature/Title    ...................................................              ..............................................  Date                                                            Time

## 2017-08-09 NOTE — TELEPHONE ENCOUNTER
Wife calling to give updates on him as she had talked to the clinic two days ago. He continues to have a percolator like cough occasionally at night. No fever. He has baeen eating and taking fluids. She feels that he is weaker and wants to rest more than usual. Home care advice discussed.    Nadege Thomson RN     [facility] Nurse Advisor

## 2017-08-10 ENCOUNTER — TELEPHONE (OUTPATIENT)
Dept: FAMILY MEDICINE | Facility: CLINIC | Age: 72
End: 2017-08-10

## 2017-08-10 NOTE — TELEPHONE ENCOUNTER
Reason for Call:  Other call back    Detailed comments: Caren is calling for Sanjay and said that he ended up in the ER yesterday and she had some questions. First of all there was a medication list that had been sent to the ER and there were some changes that needed to made on the list. Also had some questions regarding the medication that had been prescribed by the ER as currently Sanjay is still taking the macrobid 100mg and he has 3 more of those to finish up and then he was prescribed another antibiotic yesterday at the ER, so wondering if he was to finish up the one before he starts the other or what they should do. He also finished his doxycycline last Friday.    Phone Number Patient can be reached at: Home number on file [phone] (home)    Best Time: anytime    Can we leave a detailed message on this number? Not Applicable    Call taken on 8/10/2017 at 10:18 AM by Evelyn Olguin

## 2017-08-10 NOTE — TELEPHONE ENCOUNTER
Return call to number listed below. Telephone continued to ring, unable to leave a message.    Sandra Guadalupe RN  [facility]

## 2017-08-10 NOTE — TELEPHONE ENCOUNTER
Left a message with request for return call    Sandra Guadalupe RN  [facility]

## 2017-08-10 NOTE — TELEPHONE ENCOUNTER
Reason for Call: Request for an order or referral:    Order or referral being requested: Yamel from home care is asking for skilled nursing visits to eval and treat. Sanjay was seen at the ER and they forgot to sign for this order.    Date needed: as soon as possible    Has the patient been seen by the PCP for this problem? Not Applicable    Additional comments:     Phone number Patient can be reached at:  Yamel can be reached at [phone]    Best Time:  anytime    Can we leave a detailed message on this number?  YES    Call taken on 8/10/2017 at 4:04 PM by Evelyn Olguin

## 2017-08-11 NOTE — TELEPHONE ENCOUNTER
Sorry to hear Sanjay was having trouble again- glad that he got help through ER. Encourage fluids. Agree with taking doxy instead of levaquin; OK to start right away. Ok to take final dose of macrobid as well. Please notify, thanks Bharath

## 2017-08-11 NOTE — TELEPHONE ENCOUNTER
Dr. Buchanan    Spoke with wife, pt is wondering about changing the antibiotic from the levaquin as she was told before he shouldn't be taking this med,   the pharmacist yesterday called the ED and the med was changed to doxycycline 100mg 2xday for 2 weeks, so he will be on this med for a total a month with a break in the med 7/28/17 til today . Caren was concerned  Caren was wondering if it is ok to start with the doxycycline again  Or would you change this medication,   he has 1 dose of macrobid dose left for his UTI.  He will be getting home care eval and TX this week end    Advise    Joana Gandhi, GERARD   [facility] [place]

## 2017-08-11 NOTE — TELEPHONE ENCOUNTER
Inocencia called back regarding home care orders. She can be reached at [phone], which she said is a secure line and a message should be able to be left on it if she is on the phone or away from her desk.

## 2017-08-11 NOTE — TELEPHONE ENCOUNTER
Wife called back and states pt was given doxycycline capsules last time he had doxycycline and this time was given tablets. Drug information states not to switch forms without consulting a provider. Drug is the same. Suggested she speak with pharmacist about this and call back with any further questions.    Chichi Tilley RN  [facility]

## 2017-08-11 NOTE — TELEPHONE ENCOUNTER
Wife was given this message.      Chichi Tilley RN  [facility] [place] [place]

## 2017-08-11 NOTE — TELEPHONE ENCOUNTER
Verbal ok for skilled home care, SN, PT to eval and tx, also mentione it would benefit pt for OT/ST/HHA and MSW for long tern planning  Per RN protocol    Joana Gandhi RN   [facility]

## 2017-08-14 ENCOUNTER — TELEPHONE (OUTPATIENT)
Dept: FAMILY MEDICINE | Facility: CLINIC | Age: 72
End: 2017-08-14

## 2017-08-14 ENCOUNTER — TELEPHONE (OUTPATIENT)
Dept: UROLOGY | Facility: CLINIC | Age: 72
End: 2017-08-14

## 2017-08-14 NOTE — TELEPHONE ENCOUNTER
Verbal ok for skilled homecare given per RN protocol    Joana Gandhi, RN   [facility]

## 2017-08-14 NOTE — TELEPHONE ENCOUNTER
Reason for call:  Order   Order or referral being requested: Orders  Reason for request: After assessment from home visit  Date needed: as soon as possible  Has the patient been seen by the PCP for this problem? YES    Additional comments: Nurse called requesting orders for PT/OT/ST and skilled home care visits for 1 time a week for 1 week, 2 times a week for 2 weeks, 1 time a week for 3 weeks and 3 times as needed.      Phone number to reach patient:  Other phone number:  Beatrice (Home Health Nurse) [phone]    Best Time:  n/a    Can we leave a detailed message on this number?  YES

## 2017-08-14 NOTE — TELEPHONE ENCOUNTER
Patient called again regarding making an appointment for louis per ED and recurrent UTI's  Appointment made for tomorrow there was an opening. Haritha Issa LPN Staff Nurse

## 2017-08-15 DIAGNOSIS — N39.0 URINARY TRACT INFECTION: Primary | ICD-10-CM

## 2017-08-15 LAB
BACTERIA SPEC CULT: NO GROWTH
Lab: NORMAL
MICRO REPORT STATUS: NORMAL
SPECIMEN SOURCE: NORMAL

## 2017-08-16 ENCOUNTER — TELEPHONE (OUTPATIENT)
Dept: UROLOGY | Facility: CLINIC | Age: 72
End: 2017-08-16

## 2017-08-16 ENCOUNTER — PRE VISIT (OUTPATIENT)
Dept: UROLOGY | Facility: CLINIC | Age: 72
End: 2017-08-16

## 2017-08-16 DIAGNOSIS — R53.83 OTHER FATIGUE: Primary | ICD-10-CM

## 2017-08-16 LAB
ALBUMIN UR-MCNC: NEGATIVE MG/DL
APPEARANCE UR: CLEAR
BILIRUB UR QL STRIP: NEGATIVE
COLOR UR AUTO: YELLOW
GLUCOSE UR STRIP-MCNC: NEGATIVE MG/DL
HGB UR QL STRIP: NEGATIVE
INTERPRETATION ECG - MUSE: NORMAL
KETONES UR STRIP-MCNC: 5 MG/DL
LEUKOCYTE ESTERASE UR QL STRIP: NEGATIVE
MUCOUS THREADS #/AREA URNS LPF: PRESENT /LPF
NITRATE UR QL: NEGATIVE
PH UR STRIP: 5 PH (ref 5–7)
RBC #/AREA URNS AUTO: 1 /HPF (ref 0–2)
SOURCE: ABNORMAL
SP GR UR STRIP: 1.02 (ref 1–1.03)
SQUAMOUS #/AREA URNS AUTO: 2 /HPF (ref 0–1)
UROBILINOGEN UR STRIP-MCNC: 2 MG/DL (ref 0–2)
WBC #/AREA URNS AUTO: 2 /HPF (ref 0–2)

## 2017-08-16 NOTE — TELEPHONE ENCOUNTER
He will have uauc on Friday if positive on Monday should come in to his clinic visit otherwise cancel. Haritha Issa LPN Staff Nurse

## 2017-08-16 NOTE — TELEPHONE ENCOUNTER
----- Message from ESTEE Avendaño sent at 8/16/2017  7:26 AM CDT -----  Thanks, Michelle.   He is certainly welcome to make an appointment.  I just saw him in Clinic 2 weeks ago (as you probably know).  Jackie  ----- Message -----     From: Michelle Issa LPN     Sent: 8/14/2017  11:30 AM       To: ESTEE Avendaño    Patient called again about coming in to see you soon. michelle

## 2017-08-17 ENCOUNTER — DOCUMENTATION ONLY (OUTPATIENT)
Dept: CARE COORDINATION | Facility: CLINIC | Age: 72
End: 2017-08-17

## 2017-08-17 LAB
BACTERIA SPEC CULT: NORMAL
Lab: NORMAL
SPECIMEN SOURCE: NORMAL

## 2017-08-17 NOTE — PROGRESS NOTES
[facility] now requests orders and shares plan of care/discharge summaries for some patients through [facility].  Please REPLY TO THIS MESSAGE in order to give authorization for orders when needed.  This is considered a verbal order, you will still receive a faxed copy of orders for signature.  Thank you for your assistance in improving collaboration for our patients.    ORDER PT 2w2, 1w1 for LE stretching and strengthening, transfer training, gait training, and fall prevention.     Nery Marino, PT  [email or web address]  [phone]

## 2017-08-17 NOTE — PROGRESS NOTES
[facility] now requests orders and shares plan of care/discharge summaries for some patients through [employer].  Please REPLY TO THIS MESSAGE in order to give authorization for orders when needed.  This is considered a verbal order, you will still receive a faxed copy of orders for signature.  Thank you for your assistance in improving collaboration for our patients.    ORDER ok for OT to cont for positioning tech, DME needs, feeding and cg ed 1wk3.    Dalila HAMILTON/L  [phone]  [email or web address]

## 2017-08-18 ENCOUNTER — CARE COORDINATION (OUTPATIENT)
Dept: UROLOGY | Facility: CLINIC | Age: 72
End: 2017-08-18

## 2017-08-18 NOTE — PROGRESS NOTES
Can you let him know that his urine culture was negative.  Therefore any symptoms he is having at this point cannot be attributed to a urinary tract infection.     Patient called and informed.

## 2017-08-18 NOTE — PROGRESS NOTES
Per Dr. Buchanan    Verbal ok for home care orders OT given    Thank you    Joana Gandhi RN   [facility]

## 2017-08-18 NOTE — PROGRESS NOTES
Per Dr. Buchanan,  Verbal order for home care PT given    Thank christos Gandhi RN   [facility]

## 2017-08-28 ENCOUNTER — DOCUMENTATION ONLY (OUTPATIENT)
Dept: CARE COORDINATION | Facility: CLINIC | Age: 72
End: 2017-08-28

## 2017-08-28 NOTE — PROGRESS NOTES
[facility] now requests orders and shares plan of care/discharge summaries for some patients through [employer].  Please REPLY TO THIS MESSAGE in order to give authorization for orders when needed.  This is considered a verbal order, you will still receive a faxed copy of orders for signature.  Thank you for your assistance in improving collaboration for our patients.    ORDER  Requesting to extend end date of original order to 9/16/17 due to difficulty reaching spouse/patient to set up eval, many calls/messages left but no response.  ST to eval and treat for swallowing issues and communication.    Thank you,    Cristal Fuentes MA, CCC-SLP  Speech-Language Pathologist  [facility]  [email or web address]  [phone]

## 2017-08-29 ENCOUNTER — TELEPHONE (OUTPATIENT)
Dept: FAMILY MEDICINE | Facility: CLINIC | Age: 72
End: 2017-08-29

## 2017-08-29 NOTE — TELEPHONE ENCOUNTER
Spoke with Dasia with [facility] Fax [phone]  Needs order to state short term care or respite. Wants him to start today.    Pended a letter, please review and sign if ok.    Chichi Tilley RN  [facility]

## 2017-08-29 NOTE — LETTER
[address] [address]  [phone]          2017    Sanjay Cano                                                                                                                     [address] [facility] [address]          To whom this may concern:    Please admit Sanjay Cano  [birth year] into short term care or respite. Please contact our office with any questions        Sincerely,         Long Stanley MD

## 2017-08-29 NOTE — TELEPHONE ENCOUNTER
Received call from Cristal stating she needs a form completed for admission. Form completed and sent via email per her request at [email or web address] and '[email or web address]'.    Chichi Tilley RN  [facility] [place] [facility]

## 2017-08-29 NOTE — TELEPHONE ENCOUNTER
Reason for Call: Request for an order or referral:    Order or referral being requested: Since Caren has not weight bearing due to her knee they would like to get an order for Sanjay to be admitted to respite short term care and would like to get him in today due to the circumstances. I will send some medical records to them that they need.    Date needed: as soon as possible    Has the patient been seen by the PCP for this problem? Not Applicable    Additional comments: Dasia is the person to contact    Phone number Patient can be reached at:  [phone]    Best Time:  ASAP-they do need this today    Can we leave a detailed message on this number?  Not Applicable    Call taken on 8/29/2017 at 1:11 PM by Evelyn Olguin

## 2017-09-08 ENCOUNTER — COMMUNICATION - HEALTHEAST (OUTPATIENT)
Dept: NEUROLOGY | Facility: CLINIC | Age: 72
End: 2017-09-08

## 2017-09-08 DIAGNOSIS — Z53.9 DIAGNOSIS NOT YET DEFINED: Primary | ICD-10-CM

## 2017-09-08 PROCEDURE — G0180 MD CERTIFICATION HHA PATIENT: HCPCS | Performed by: FAMILY MEDICINE

## 2017-09-11 DIAGNOSIS — J43.8 OTHER EMPHYSEMA (H): ICD-10-CM

## 2017-09-11 DIAGNOSIS — F33.1 MODERATE RECURRENT MAJOR DEPRESSION (H): ICD-10-CM

## 2017-09-11 DIAGNOSIS — F51.01 PRIMARY INSOMNIA: ICD-10-CM

## 2017-09-11 NOTE — TELEPHONE ENCOUNTER
clonazepam      Last Written Prescription Date:  7/21/17  Last Fill Quantity: 15,   # refills: 0  Last Office Visit with [facility] or [facility] prescribing provider: 7/14/17  Future Office visit:       Routing refill request to provider for review/approval because:  Drug not on the [facility], [facility] or [facility] refill protocol or controlled substance      cobivent       Last Written Prescription Date: 6/2/17  Last Fill Quantity: 4g, # refills: 1    Last Office Visit with [facility] or [facility] prescribing provider:  7/14/17   Future Office Visit:       Date of Last Asthma Action Plan Letter:   There are no preventive care reminders to display for this patient.   Asthma Control Test: No flowsheet data found.    Date of Last Spirometry Test:   No results found for this or any previous visit.          Quetiapine     Last Written Prescription Date: 6/20/17  Last Fill Quantity: 90, # refills: 0  Last Office Visit with [facility] or [facility] prescribing provider: 7/14/17       BP Readings from Last 3 Encounters:   08/09/17 (!) 180/112   08/01/17 120/78   07/14/17 139/88     Pulse Readings from Last 2 Encounters:   08/09/17 77   08/01/17 68     Lab Results   Component Value Date    GLC 91 08/09/2017     Lab Results   Component Value Date    WBC 5.7 08/09/2017     Lab Results   Component Value Date    RBC 4.47 08/09/2017     Lab Results   Component Value Date    HGB 14.4 08/09/2017     Lab Results   Component Value Date    HCT 41.8 08/09/2017     No components found for: MCT  Lab Results   Component Value Date    MCV 94 08/09/2017     Lab Results   Component Value Date    MCH 32.2 08/09/2017     Lab Results   Component Value Date    MCHC 34.4 08/09/2017     Lab Results   Component Value Date    RDW 11.8 08/09/2017     Lab Results   Component Value Date     08/09/2017     Lab Results   Component Value Date    CHOL 137 04/02/2014     Lab Results   Component Value Date    HDL 43 04/02/2014     Lab Results   Component Value Date    LDL  78 04/02/2014     Lab Results   Component Value Date    TRIG 83 04/02/2014     Lab Results   Component Value Date    CHOLHDLRATIO 3.2 04/02/2014

## 2017-09-12 RX ORDER — IPRATROPIUM BROMIDE AND ALBUTEROL 20; 100 UG/1; UG/1
SPRAY, METERED RESPIRATORY (INHALATION)
Qty: 4 G | Refills: 4 | Status: SHIPPED | OUTPATIENT
Start: 2017-09-12 | End: 2019-01-01

## 2017-09-12 NOTE — TELEPHONE ENCOUNTER
Routing refill request to provider for review/approval because:  Drug not on the [facility] refill protocol   BP not in range     check last refill of clonazepam 7/21/17 for 15 tabs    Joana Gandhi RN   [facility]

## 2017-09-13 ENCOUNTER — CARE COORDINATION (OUTPATIENT)
Dept: CARE COORDINATION | Facility: CLINIC | Age: 72
End: 2017-09-13

## 2017-09-13 DIAGNOSIS — F51.01 PRIMARY INSOMNIA: ICD-10-CM

## 2017-09-13 DIAGNOSIS — I10 ESSENTIAL HYPERTENSION, BENIGN: ICD-10-CM

## 2017-09-13 DIAGNOSIS — Z91.81 AT RISK FOR FALLING: ICD-10-CM

## 2017-09-13 DIAGNOSIS — R19.7 DIARRHEA, UNSPECIFIED TYPE: ICD-10-CM

## 2017-09-13 DIAGNOSIS — J43.8 OTHER EMPHYSEMA (H): ICD-10-CM

## 2017-09-13 RX ORDER — CLONAZEPAM 0.5 MG/1
TABLET ORAL
Qty: 15 TABLET | Refills: 2 | Status: SHIPPED | OUTPATIENT
Start: 2017-09-13 | End: 2017-09-14 | Stop reason: DRUGHIGH

## 2017-09-13 RX ORDER — QUETIAPINE FUMARATE 50 MG/1
TABLET, FILM COATED ORAL
Qty: 90 TABLET | Refills: 2 | Status: SHIPPED | OUTPATIENT
Start: 2017-09-13 | End: 2018-08-07

## 2017-09-14 RX ORDER — AMLODIPINE BESYLATE 5 MG/1
5 TABLET ORAL EVERY MORNING
Qty: 90 TABLET | Refills: 3 | Status: ON HOLD | COMMUNITY
Start: 2017-09-14 | End: 2018-01-27

## 2017-09-14 RX ORDER — DONEPEZIL HYDROCHLORIDE 10 MG/1
5 TABLET, FILM COATED ORAL AT BEDTIME
Qty: 30 TABLET | Refills: 1 | COMMUNITY
Start: 2017-09-13 | End: 2018-01-26

## 2017-09-14 RX ORDER — LACTOBACILLUS RHAMNOSUS GG 10B CELL
1 CAPSULE ORAL EVERY MORNING
Qty: 60 CAPSULE | Refills: 11 | Status: ON HOLD | COMMUNITY
Start: 2017-09-14 | End: 2019-01-01

## 2017-09-14 RX ORDER — LISINOPRIL 5 MG/1
5 TABLET ORAL DAILY
Qty: 30 TABLET | Refills: 1 | COMMUNITY
Start: 2017-09-14 | End: 2019-01-01 | Stop reason: DRUGHIGH

## 2017-09-14 RX ORDER — DONEPEZIL HYDROCHLORIDE 5 MG/1
2.5 TABLET, FILM COATED ORAL AT BEDTIME
Qty: 30 TABLET | Refills: 1 | COMMUNITY
Start: 2017-09-13 | End: 2018-01-26

## 2017-09-14 RX ORDER — CLONAZEPAM 0.5 MG/1
TABLET, ORALLY DISINTEGRATING ORAL
Qty: 180 TABLET | Refills: 0 | COMMUNITY
Start: 2017-09-14 | End: 2018-08-15

## 2017-09-14 NOTE — PROGRESS NOTES
Clinic Care Coordination Contact  Care Team Conversations    Spoke with wife. Med list reconciled per her previous home schedule that she was giving to patient. Did find email cont act information for TCU staff Cristal. Sent email to her asking for her to have charge nurse call or email me. Wife with concerns about medication management and worried that there will be errors, and also she had asked house MD to call her but she has never connected with that person. Will help sort things out when I have more information.  Notified wife that I have a call out to charge nurse.     Yamel Martinez R.N.  Clinic Care Coordinator  [facility]  [phone]

## 2017-09-15 NOTE — PROGRESS NOTES
Clinic Care Coordination Contact  Care Team Conversations    Attempted to reach Fanny but phone is busy. Sent email back to Claire asking for fax number for nursing station so I can fax reconciled med list

## 2017-09-15 NOTE — PROGRESS NOTES
Zachariah Montesinos,  Do you have any documentation so that we can add you to Mr Cano s face sheet?   I have included on this email the  Gabriele Newman who is working with Mr Cano, the transitions coordinator Guillermina who assists with discharge planning and Fanny the nurse manager who oversees the unit that Sanjay is on.  We had a preliminary conference the other day but I asked Gabriele to set one up so that his wife can be involved by phone to help and determine the plan for him going forward.   Gabriele s number is [phone]  Guillermina is [phone]  Fanny is [phone].      Claire Huffman  Phone: [phone]  Fax: [phone]    From: Rekha Martinez [[email or web address]]   Sent: Thursday, September 14, 2017 1:33 PM  To: Claire Huffman <[email or web address]>  Subject: PHI (protected health information)    Zachariah Bee:    I am trying to reach the SW or RN working with Sanjay Cano at your TCU. He was very recently admitted for a respite stay. I have only limited contact information for you in his chart and I m not sure if you are the appropriate person to speak with. I spoke with wife for over an hour yesterday. We updated his med list per her request to reflect what he was receiving from her previous to admission to your respite care. Can you either email me or call me with contact information for staff? Ideally I would like to start with a charge nurse for the floor.     Thanks for the help!    Yamel Martinez, RN  Clinic Care Coordinator  [facility]  [phone]

## 2017-09-15 NOTE — PROGRESS NOTES
Called and spoke with HOWIE Suazo. Faxed reconciled med list to [phone]. Reviewed wife's concerns for nursing staff with HOWIE. Will follow up with wife next week    Yamel Martinez R.N.  Clinic Care Coordinator  [facility]  [phone]

## 2017-09-21 ENCOUNTER — CARE COORDINATION (OUTPATIENT)
Dept: CARE COORDINATION | Facility: CLINIC | Age: 72
End: 2017-09-21

## 2017-09-21 ENCOUNTER — COMMUNICATION - HEALTHEAST (OUTPATIENT)
Dept: NEUROLOGY | Facility: CLINIC | Age: 72
End: 2017-09-21

## 2017-09-21 DIAGNOSIS — F03.90 DEMENTIA WITHOUT BEHAVIORAL DISTURBANCE (H): ICD-10-CM

## 2017-09-21 DIAGNOSIS — G20.A1 PARKINSON'S DISEASE (H): ICD-10-CM

## 2017-09-21 NOTE — PROGRESS NOTES
Clinic Care Coordination Contact  Care Team Conversations    Spoke with TCU nursing supervisor, Fanny. They are having care conference this afternoon. Wife will attend by phone. Advised that I will contact wife to let her know I have been in touch with TCU staff and will invite patient to call me after the conference to discuss it since I am unable to attend due to patient needs.

## 2017-09-22 NOTE — PROGRESS NOTES
Clinic Care Coordination Contact  Care Team Conversations    Spoke with patient's wife. Invited her to call me after the care conference to discuss it and any questions she might have. She agrees to call when the conference is over.     Yamel Martinez R.N.  Clinic Care Coordinator  [facility]  [phone]

## 2017-09-22 NOTE — PROGRESS NOTES
Clinic Care Coordination Contact  Care Team Conversations    Received voice mail from patient. She thought care conference went well. Verbalizes reassurance that  is being well taken care of. Pleased that Fanny, RN case manager, agrees to call her once per week with updates for . She will continue to keep me posted with any concerns that she has. Will discuss with PCP    Yamel Martinez R.N.  Clinic Care Coordinator  [facility]  [phone]

## 2017-10-02 ENCOUNTER — TELEPHONE (OUTPATIENT)
Dept: FAMILY MEDICINE | Facility: CLINIC | Age: 72
End: 2017-10-02

## 2017-10-02 NOTE — TELEPHONE ENCOUNTER
Reason for Call: Request for an order or referral:    Order or referral being requested: Caren called to ask if Sanjay would still be able to get PT and OT when [facility] and how would they go about getting an ok for that. He had that when he was at home thru home care.    Date needed: as soon as possible    Has the patient been seen by the PCP for this problem? YES    Additional comments:     Phone number Patient can be reached at:  Caren asked if she could get a call at [phone]    Best Time:  anytime    Can we leave a detailed message on this number?  YES    Call taken on 10/2/2017 at 2:02 PM by Evelyn Olguin

## 2017-10-02 NOTE — TELEPHONE ENCOUNTER
Spoke with patient's wife, she reports that she would like her spouse to have PT/OT while he is in elder care ([facility]) they have a PT/OT department at the facility. Patient's wife was provided the telephone number for physical therapy department at [facility] [phone] to inform them that she would like patient to continue PT/OT. Asked patient's wife to call back with any further questions/concern.     Sandra Guadalupe RN  [facility]

## 2017-10-13 ENCOUNTER — TRANSFERRED RECORDS (OUTPATIENT)
Dept: HEALTH INFORMATION MANAGEMENT | Facility: CLINIC | Age: 72
End: 2017-10-13

## 2017-10-18 ENCOUNTER — NURSE TRIAGE (OUTPATIENT)
Dept: NURSING | Facility: CLINIC | Age: 72
End: 2017-10-18

## 2017-10-18 NOTE — TELEPHONE ENCOUNTER
Wife is returning the clinic's call from yesterday. Nothing found in patient's chart to clarify why the clinic called. She will call back at 8 a.m. When they turn their phones on.  Kacey Evans RN-[facility]

## 2017-10-18 NOTE — TELEPHONE ENCOUNTER
Spoke with Caren, wife. She has a couple of concerns. States pt has not started PT/OT at [facility]. She has a call out to the chief at [facility] about this. Called [facility] and spoke with the therapy department. Pt has been receiving PT and OT for the past week. They will contact Caren to discuss this. Also asked about ST orders and they will look into this as well.    Caren also has a concern about a possible immunization pt has received. [facility] pt was in to see Dr. Buchanan in July or August and remembers him receiving a vaccine of some sort. She remembers signing a consent for this and him having a bandaid on his left arm. Reviewed Epic thoroughly and do not see mention of a vaccine in Dr. Buchanan's note and no visit with Dr. Buchanan in August. She is concerned he received a flu shot and then received another dose in October and wants to know how this would affect him. Advised that there is no documentation of this, so should not be concerned at this time. Closing encounter. No further action needed.    Chichi Tilley RN  [facility]

## 2017-10-26 ENCOUNTER — TELEPHONE (OUTPATIENT)
Dept: FAMILY MEDICINE | Facility: CLINIC | Age: 72
End: 2017-10-26

## 2017-10-26 NOTE — TELEPHONE ENCOUNTER
Yes, that is correct that they do not cover all the strains. Pneumonia can be caused by many different viruses. Please have them come in if he is starting to get sick.

## 2017-10-26 NOTE — TELEPHONE ENCOUNTER
How should I advise patient on why he may have had two bouts of pneumonia this year?    Would it be reasonable to assume it is because 23 and 13 do not cover all strains? Patient wife is very concerned.  Please advise.    Thanks! Natalia Boss RN

## 2017-10-26 NOTE — TELEPHONE ENCOUNTER
Reason for call:  Other   Patient called regarding (reason for call): call back  Additional comments: Pt's wife stated that she has been working with Joana to determine when/if the patient needs another pneumonia vaccine. She has not heard yet whether he does or does, but she stated that her  has had 2 bouts of bacterial pneumonia this year and she would like to know what she is supposed to be doing to help.      Phone number to reach patient:  Home number on file [phone] (home)    Best Time:  Any    Can we leave a detailed message on this number?  YES

## 2017-10-26 NOTE — TELEPHONE ENCOUNTER
"Dr. Buchanan,   Patient received Pneumococcal vaccination on 1/6/2016. Wife states patient has had two bouts of bacterial pneumonia this past year and would like to know if patient should be getting another pneumonia vaccine.     Per CDC: \"If the patient already received one or more doses of PPSV23, the dose of PCV13 should be given at least 1 year after they received the most recent dose of PPSV23.\"    Please advise.    Thanks! Natalia Boss RN      "

## 2017-10-26 NOTE — TELEPHONE ENCOUNTER
Writer called Caren and updated her to all information regarding Pneumo 13 and 23. Patient wife instructed that patient is up to date and does no need any other vaccinations. Patient wife also instructed that the vaccinations only cover about 90 strains of pneumonia so it is possible patient  could become sick. Patient's wife instructed to come into clinic if patient is becoming ill. Patient in agreement with plan and verbalizes understanding.   Thanks!   Natalia Boss RN

## 2017-11-27 ENCOUNTER — TELEPHONE (OUTPATIENT)
Dept: FAMILY MEDICINE | Facility: CLINIC | Age: 72
End: 2017-11-27

## 2017-11-27 NOTE — TELEPHONE ENCOUNTER
Dr. Buchanan,     Spoke with patient's wife, she reports there is a resident living on the same floor as the patient in the long term care facility who was diagnosed with influenza. Wife states the LTC facility staff is requesting all patient's living on the same floor as the resident with influenza to take Tamiflu BID for 10 days.     The patient is not having any flu-like symptoms and received high dose influenza vaccine on 10/6/17.      Thank you,   SEJAL SmithN RN  [facility]

## 2017-11-27 NOTE — TELEPHONE ENCOUNTER
Caren states pt's home care wants to give the pt [place] flu because a patient in his residence has the flu. Caren wants to know if it's okay for the pt to take.    She can be reached @ [phone] soheila

## 2017-11-28 NOTE — TELEPHONE ENCOUNTER
Caren called this morning to see if Dr. Buchanan had ok'd the Tamiflu for the patient to take, as the nursing home has already called her this morning to tell her they wanted to start giving it to him now. She said she thinks she can hold them off for a little bit, but they do want to start the course today.

## 2017-11-28 NOTE — TELEPHONE ENCOUNTER
Route to provider pool    See messages regarding flu and tamiflu request from facility    Advise    Joana Gandhi RN   [facility]

## 2017-11-28 NOTE — TELEPHONE ENCOUNTER
Please call his nursing home, the preventive dose is 75 mg once daily not BID so find out what is going on

## 2017-12-14 ENCOUNTER — COMMUNICATION - HEALTHEAST (OUTPATIENT)
Dept: NEUROLOGY | Facility: CLINIC | Age: 72
End: 2017-12-14

## 2017-12-14 DIAGNOSIS — F32.A DEPRESSION: ICD-10-CM

## 2017-12-18 ENCOUNTER — COMMUNICATION - HEALTHEAST (OUTPATIENT)
Dept: NEUROLOGY | Facility: CLINIC | Age: 72
End: 2017-12-18

## 2017-12-18 DIAGNOSIS — F32.4 MAJOR DEPRESSIVE DISORDER WITH SINGLE EPISODE, IN PARTIAL REMISSION (H): ICD-10-CM

## 2018-01-01 ENCOUNTER — TELEPHONE (OUTPATIENT)
Dept: FAMILY MEDICINE | Facility: CLINIC | Age: 73
End: 2018-01-01

## 2018-01-01 ENCOUNTER — ANCILLARY PROCEDURE (OUTPATIENT)
Dept: ULTRASOUND IMAGING | Facility: CLINIC | Age: 73
End: 2018-01-01
Attending: FAMILY MEDICINE
Payer: COMMERCIAL

## 2018-01-01 ENCOUNTER — MEDICAL CORRESPONDENCE (OUTPATIENT)
Dept: HEALTH INFORMATION MANAGEMENT | Facility: CLINIC | Age: 73
End: 2018-01-01

## 2018-01-01 DIAGNOSIS — K59.00 CONSTIPATION, UNSPECIFIED CONSTIPATION TYPE: Primary | ICD-10-CM

## 2018-01-01 DIAGNOSIS — R45.86 LABILE MOOD: ICD-10-CM

## 2018-01-01 DIAGNOSIS — H53.121 TRANSIENT VISUAL LOSS OF RIGHT EYE: Primary | ICD-10-CM

## 2018-01-01 DIAGNOSIS — L21.9 SEBORRHEIC DERMATITIS: ICD-10-CM

## 2018-01-01 DIAGNOSIS — G45.3 AMAUROSIS FUGAX, BOTH EYES: ICD-10-CM

## 2018-01-01 DIAGNOSIS — Z87.440 H/O RECURRENT URINARY TRACT INFECTION: ICD-10-CM

## 2018-01-01 DIAGNOSIS — H53.121 TRANSIENT VISUAL LOSS OF RIGHT EYE: ICD-10-CM

## 2018-01-01 LAB
ALBUMIN UR-MCNC: NEGATIVE MG/DL
AMORPH CRY #/AREA URNS HPF: ABNORMAL /HPF
APPEARANCE UR: CLEAR
BACTERIA #/AREA URNS HPF: ABNORMAL /HPF
BILIRUB UR QL STRIP: NEGATIVE
COLOR UR AUTO: YELLOW
CRP SERPL-MCNC: <2.9 MG/L (ref 0–8)
ERYTHROCYTE [SEDIMENTATION RATE] IN BLOOD BY WESTERGREN METHOD: 6 MM/H (ref 0–20)
GLUCOSE UR STRIP-MCNC: NEGATIVE MG/DL
HGB UR QL STRIP: NEGATIVE
KETONES UR STRIP-MCNC: NEGATIVE MG/DL
LEUKOCYTE ESTERASE UR QL STRIP: NEGATIVE
NITRATE UR QL: NEGATIVE
NON-SQ EPI CELLS #/AREA URNS LPF: ABNORMAL /LPF
PH UR STRIP: 7.5 PH (ref 5–7)
RBC #/AREA URNS AUTO: ABNORMAL /HPF
SOURCE: ABNORMAL
SP GR UR STRIP: 1.01 (ref 1–1.03)
UROBILINOGEN UR STRIP-ACNC: 0.2 EU/DL (ref 0.2–1)
WBC #/AREA URNS AUTO: ABNORMAL /HPF

## 2018-01-01 PROCEDURE — 81001 URINALYSIS AUTO W/SCOPE: CPT | Performed by: INTERNAL MEDICINE

## 2018-01-01 RX ORDER — HYDROCORTISONE VALERATE CREAM 2 MG/G
CREAM TOPICAL
Qty: 45 G | Refills: 0
Start: 2018-01-01 | End: 2019-01-01

## 2018-01-01 RX ORDER — AMOXICILLIN 250 MG
1 CAPSULE ORAL 2 TIMES DAILY
Qty: 100 TABLET | Refills: 11 | Status: SHIPPED | OUTPATIENT
Start: 2018-01-01 | End: 2019-01-01 | Stop reason: ALTCHOICE

## 2018-01-01 RX ORDER — HYDROCORTISONE VALERATE CREAM 2 MG/G
CREAM TOPICAL
Qty: 45 G | Refills: 0
Start: 2018-01-01 | End: 2018-01-01

## 2018-01-03 ENCOUNTER — TELEPHONE (OUTPATIENT)
Dept: FAMILY MEDICINE | Facility: CLINIC | Age: 73
End: 2018-01-03

## 2018-01-03 NOTE — TELEPHONE ENCOUNTER
Dr Buchanan,    Per wife, pt goes to day program 5 days a week with the [facility] where he resides.  . He gets much more stimulation at the day care program run by director Shannan and has been going there 5 days a week for 5 years. The other activities offered by the [facility] he doesn't participate in but just lays his head on the table. They are just doing reading and tv watching there.     I have drafted a letter. Dr Buchanan , please sign if appropriate or edit as you see fit.    Wife just wants the letter sent to her home. She does not need a call back unless Dr Buchanan cannot write the letter or has other questions for her    Bernadette Sanchez, RN, BSN

## 2018-01-03 NOTE — TELEPHONE ENCOUNTER
Pt's wife Caren is requesting a letter that states Dr. Buchanan recommends pt go to a day program for his parkinson's for socialization and interaction.      Caren asks that this letter be ssent to their homer address.    Caren can be reached @ [phone] soheila

## 2018-01-03 NOTE — LETTER
[address] [address]  Phone: [phone]  Fax: [phone]    January 3, 2018        Re:Sanjay Cano  [address] [facility] [address]          To whom it may concern:    RE: Sanjay Cano      As Mr Cano's personal physician I  recommend that Mr Cano participate in the day program run by director Campbell at the [facility] where he resides. This program offers him important mental stimulation and is crucial for his optimal level of functioning.             Bharath Buchanan MD

## 2018-01-04 ENCOUNTER — COMMUNICATION - HEALTHEAST (OUTPATIENT)
Dept: NEUROLOGY | Facility: CLINIC | Age: 73
End: 2018-01-04

## 2018-01-04 DIAGNOSIS — F32.4 MAJOR DEPRESSIVE DISORDER WITH SINGLE EPISODE, IN PARTIAL REMISSION (H): ICD-10-CM

## 2018-01-04 NOTE — TELEPHONE ENCOUNTER
Dr. Buchanan approved letter. Letter mailed to patient home address as requested by patient 's wife on 1/4/2018. Per note below wife does not want to be notified unless questions or letter NOT approved.     Thanks! Natalia Boss RN

## 2018-01-26 ENCOUNTER — APPOINTMENT (OUTPATIENT)
Dept: CT IMAGING | Facility: CLINIC | Age: 73
End: 2018-01-26
Attending: FAMILY MEDICINE
Payer: MEDICARE

## 2018-01-26 ENCOUNTER — APPOINTMENT (OUTPATIENT)
Dept: GENERAL RADIOLOGY | Facility: CLINIC | Age: 73
End: 2018-01-26
Attending: FAMILY MEDICINE
Payer: MEDICARE

## 2018-01-26 ENCOUNTER — HOSPITAL ENCOUNTER (OUTPATIENT)
Facility: CLINIC | Age: 73
Setting detail: OBSERVATION
Discharge: NURSING, REHAB OR LONG-TERM CARE | End: 2018-01-27
Attending: FAMILY MEDICINE | Admitting: FAMILY MEDICINE
Payer: MEDICARE

## 2018-01-26 DIAGNOSIS — F32.A DEPRESSION, UNSPECIFIED DEPRESSION TYPE: ICD-10-CM

## 2018-01-26 DIAGNOSIS — R55 SYNCOPE AND COLLAPSE: ICD-10-CM

## 2018-01-26 DIAGNOSIS — I10 ESSENTIAL HYPERTENSION, BENIGN: ICD-10-CM

## 2018-01-26 DIAGNOSIS — N39.0 URINARY TRACT INFECTION WITHOUT HEMATURIA, SITE UNSPECIFIED: ICD-10-CM

## 2018-01-26 DIAGNOSIS — R39.9 UTI SYMPTOMS: ICD-10-CM

## 2018-01-26 DIAGNOSIS — Z87.440 HISTORY OF URINARY TRACT INFECTION: ICD-10-CM

## 2018-01-26 DIAGNOSIS — G20.A1 PARALYSIS AGITANS (H): ICD-10-CM

## 2018-01-26 DIAGNOSIS — I10 ESSENTIAL HYPERTENSION, MALIGNANT: ICD-10-CM

## 2018-01-26 DIAGNOSIS — J44.9 CHRONIC OBSTRUCTIVE PULMONARY DISEASE, UNSPECIFIED COPD TYPE (H): ICD-10-CM

## 2018-01-26 DIAGNOSIS — F33.1 DEPRESSION, MAJOR, RECURRENT, MODERATE (H): ICD-10-CM

## 2018-01-26 DIAGNOSIS — R82.90 NONSPECIFIC FINDING ON EXAMINATION OF URINE: ICD-10-CM

## 2018-01-26 LAB
ALBUMIN SERPL-MCNC: 3.3 G/DL (ref 3.4–5)
ALBUMIN UR-MCNC: 10 MG/DL
ALP SERPL-CCNC: 96 U/L (ref 40–150)
ALT SERPL W P-5'-P-CCNC: 8 U/L (ref 0–70)
ANION GAP SERPL CALCULATED.3IONS-SCNC: 6 MMOL/L (ref 3–14)
APPEARANCE UR: CLEAR
APTT PPP: 33 SEC (ref 22–37)
AST SERPL W P-5'-P-CCNC: 8 U/L (ref 0–45)
BACTERIA #/AREA URNS HPF: ABNORMAL /HPF
BASOPHILS # BLD AUTO: 0 10E9/L (ref 0–0.2)
BASOPHILS NFR BLD AUTO: 0.1 %
BILIRUB SERPL-MCNC: 0.5 MG/DL (ref 0.2–1.3)
BILIRUB UR QL STRIP: NEGATIVE
BUN SERPL-MCNC: 27 MG/DL (ref 7–30)
CALCIUM SERPL-MCNC: 8.3 MG/DL (ref 8.5–10.1)
CHLORIDE SERPL-SCNC: 108 MMOL/L (ref 94–109)
CO2 SERPL-SCNC: 29 MMOL/L (ref 20–32)
COLOR UR AUTO: YELLOW
CREAT SERPL-MCNC: 0.98 MG/DL (ref 0.66–1.25)
DIFFERENTIAL METHOD BLD: ABNORMAL
EOSINOPHIL # BLD AUTO: 0.1 10E9/L (ref 0–0.7)
EOSINOPHIL NFR BLD AUTO: 1.4 %
ERYTHROCYTE [DISTWIDTH] IN BLOOD BY AUTOMATED COUNT: 12.4 % (ref 10–15)
ERYTHROCYTE [DISTWIDTH] IN BLOOD BY AUTOMATED COUNT: 12.6 % (ref 10–15)
GFR SERPL CREATININE-BSD FRML MDRD: 75 ML/MIN/1.7M2
GLUCOSE SERPL-MCNC: 80 MG/DL (ref 70–99)
GLUCOSE UR STRIP-MCNC: NEGATIVE MG/DL
HCT VFR BLD AUTO: 36.3 % (ref 40–53)
HCT VFR BLD AUTO: 37.2 % (ref 40–53)
HGB BLD-MCNC: 11.6 G/DL (ref 13.3–17.7)
HGB BLD-MCNC: 11.9 G/DL (ref 13.3–17.7)
HGB UR QL STRIP: NEGATIVE
IMM GRANULOCYTES # BLD: 0 10E9/L (ref 0–0.4)
IMM GRANULOCYTES NFR BLD: 0.1 %
INR PPP: 1.16 (ref 0.86–1.14)
KETONES UR STRIP-MCNC: 5 MG/DL
LEUKOCYTE ESTERASE UR QL STRIP: ABNORMAL
LYMPHOCYTES # BLD AUTO: 0.7 10E9/L (ref 0.8–5.3)
LYMPHOCYTES NFR BLD AUTO: 9.6 %
MAGNESIUM SERPL-MCNC: 2 MG/DL (ref 1.6–2.3)
MCH RBC QN AUTO: 31.2 PG (ref 26.5–33)
MCH RBC QN AUTO: 31.3 PG (ref 26.5–33)
MCHC RBC AUTO-ENTMCNC: 32 G/DL (ref 31.5–36.5)
MCHC RBC AUTO-ENTMCNC: 32 G/DL (ref 31.5–36.5)
MCV RBC AUTO: 98 FL (ref 78–100)
MCV RBC AUTO: 98 FL (ref 78–100)
MONOCYTES # BLD AUTO: 0.6 10E9/L (ref 0–1.3)
MONOCYTES NFR BLD AUTO: 7.4 %
MUCOUS THREADS #/AREA URNS LPF: PRESENT /LPF
NEUTROPHILS # BLD AUTO: 6.3 10E9/L (ref 1.6–8.3)
NEUTROPHILS NFR BLD AUTO: 81.4 %
NITRATE UR QL: NEGATIVE
NRBC # BLD AUTO: 0 10*3/UL
NRBC BLD AUTO-RTO: 0 /100
PH UR STRIP: 6.5 PH (ref 5–7)
PLATELET # BLD AUTO: 222 10E9/L (ref 150–450)
PLATELET # BLD AUTO: 222 10E9/L (ref 150–450)
POTASSIUM SERPL-SCNC: 4 MMOL/L (ref 3.4–5.3)
PROT SERPL-MCNC: 6 G/DL (ref 6.8–8.8)
RBC # BLD AUTO: 3.72 10E12/L (ref 4.4–5.9)
RBC # BLD AUTO: 3.8 10E12/L (ref 4.4–5.9)
RBC #/AREA URNS AUTO: 2 /HPF (ref 0–2)
SODIUM SERPL-SCNC: 142 MMOL/L (ref 133–144)
SOURCE: ABNORMAL
SP GR UR STRIP: 1.02 (ref 1–1.03)
SQUAMOUS #/AREA URNS AUTO: 1 /HPF (ref 0–1)
TROPONIN I SERPL-MCNC: <0.015 UG/L (ref 0–0.04)
UROBILINOGEN UR STRIP-MCNC: 2 MG/DL (ref 0–2)
WBC # BLD AUTO: 6.5 10E9/L (ref 4–11)
WBC # BLD AUTO: 7.7 10E9/L (ref 4–11)
WBC #/AREA URNS AUTO: 13 /HPF (ref 0–2)

## 2018-01-26 PROCEDURE — 99220 ZZC INITIAL OBSERVATION CARE,LEVL III: CPT | Mod: 25 | Performed by: PHYSICIAN ASSISTANT

## 2018-01-26 PROCEDURE — 87186 SC STD MICRODIL/AGAR DIL: CPT | Performed by: FAMILY MEDICINE

## 2018-01-26 PROCEDURE — 25000132 ZZH RX MED GY IP 250 OP 250 PS 637: Mod: GY | Performed by: PHYSICIAN ASSISTANT

## 2018-01-26 PROCEDURE — 87086 URINE CULTURE/COLONY COUNT: CPT | Performed by: FAMILY MEDICINE

## 2018-01-26 PROCEDURE — 93308 TTE F-UP OR LMTD: CPT | Performed by: FAMILY MEDICINE

## 2018-01-26 PROCEDURE — 81001 URINALYSIS AUTO W/SCOPE: CPT | Performed by: FAMILY MEDICINE

## 2018-01-26 PROCEDURE — 83735 ASSAY OF MAGNESIUM: CPT | Performed by: FAMILY MEDICINE

## 2018-01-26 PROCEDURE — 99285 EMERGENCY DEPT VISIT HI MDM: CPT | Mod: 25 | Performed by: FAMILY MEDICINE

## 2018-01-26 PROCEDURE — 93010 ELECTROCARDIOGRAM REPORT: CPT | Mod: Z6 | Performed by: PHYSICIAN ASSISTANT

## 2018-01-26 PROCEDURE — 84484 ASSAY OF TROPONIN QUANT: CPT | Performed by: FAMILY MEDICINE

## 2018-01-26 PROCEDURE — 93308 TTE F-UP OR LMTD: CPT | Mod: 26 | Performed by: FAMILY MEDICINE

## 2018-01-26 PROCEDURE — 84484 ASSAY OF TROPONIN QUANT: CPT | Performed by: PHYSICIAN ASSISTANT

## 2018-01-26 PROCEDURE — 96361 HYDRATE IV INFUSION ADD-ON: CPT

## 2018-01-26 PROCEDURE — 96374 THER/PROPH/DIAG INJ IV PUSH: CPT

## 2018-01-26 PROCEDURE — 36415 COLL VENOUS BLD VENIPUNCTURE: CPT | Performed by: PHYSICIAN ASSISTANT

## 2018-01-26 PROCEDURE — 85027 COMPLETE CBC AUTOMATED: CPT | Mod: 91 | Performed by: PHYSICIAN ASSISTANT

## 2018-01-26 PROCEDURE — A9270 NON-COVERED ITEM OR SERVICE: HCPCS | Mod: GY | Performed by: PHYSICIAN ASSISTANT

## 2018-01-26 PROCEDURE — 87088 URINE BACTERIA CULTURE: CPT | Performed by: FAMILY MEDICINE

## 2018-01-26 PROCEDURE — 25000132 ZZH RX MED GY IP 250 OP 250 PS 637: Performed by: FAMILY MEDICINE

## 2018-01-26 PROCEDURE — 85730 THROMBOPLASTIN TIME PARTIAL: CPT | Performed by: FAMILY MEDICINE

## 2018-01-26 PROCEDURE — 71045 X-RAY EXAM CHEST 1 VIEW: CPT

## 2018-01-26 PROCEDURE — 85610 PROTHROMBIN TIME: CPT | Performed by: FAMILY MEDICINE

## 2018-01-26 PROCEDURE — 85025 COMPLETE CBC W/AUTO DIFF WBC: CPT | Performed by: FAMILY MEDICINE

## 2018-01-26 PROCEDURE — 96361 HYDRATE IV INFUSION ADD-ON: CPT | Performed by: FAMILY MEDICINE

## 2018-01-26 PROCEDURE — 80053 COMPREHEN METABOLIC PANEL: CPT | Performed by: FAMILY MEDICINE

## 2018-01-26 PROCEDURE — 25000128 H RX IP 250 OP 636: Performed by: PHYSICIAN ASSISTANT

## 2018-01-26 PROCEDURE — 70450 CT HEAD/BRAIN W/O DYE: CPT

## 2018-01-26 PROCEDURE — 25000128 H RX IP 250 OP 636: Performed by: FAMILY MEDICINE

## 2018-01-26 PROCEDURE — G0378 HOSPITAL OBSERVATION PER HR: HCPCS

## 2018-01-26 PROCEDURE — 96360 HYDRATION IV INFUSION INIT: CPT | Performed by: FAMILY MEDICINE

## 2018-01-26 PROCEDURE — A9270 NON-COVERED ITEM OR SERVICE: HCPCS | Performed by: FAMILY MEDICINE

## 2018-01-26 PROCEDURE — 27210995 ZZH RX 272: Performed by: PHYSICIAN ASSISTANT

## 2018-01-26 PROCEDURE — 93005 ELECTROCARDIOGRAM TRACING: CPT | Performed by: FAMILY MEDICINE

## 2018-01-26 RX ORDER — AMLODIPINE BESYLATE 2.5 MG/1
5 TABLET ORAL EVERY MORNING
Status: DISCONTINUED | OUTPATIENT
Start: 2018-01-27 | End: 2018-01-27 | Stop reason: HOSPADM

## 2018-01-26 RX ORDER — CARBIDOPA AND LEVODOPA 50; 200 MG/1; MG/1
1 TABLET, EXTENDED RELEASE ORAL AT BEDTIME
Status: DISCONTINUED | OUTPATIENT
Start: 2018-01-26 | End: 2018-01-27 | Stop reason: HOSPADM

## 2018-01-26 RX ORDER — LIDOCAINE 40 MG/G
CREAM TOPICAL
Status: DISCONTINUED | OUTPATIENT
Start: 2018-01-26 | End: 2018-01-27 | Stop reason: HOSPADM

## 2018-01-26 RX ORDER — LISINOPRIL 5 MG/1
5 TABLET ORAL DAILY
Status: DISCONTINUED | OUTPATIENT
Start: 2018-01-27 | End: 2018-01-27 | Stop reason: HOSPADM

## 2018-01-26 RX ORDER — ONDANSETRON 4 MG/1
4 TABLET, ORALLY DISINTEGRATING ORAL EVERY 6 HOURS PRN
Status: DISCONTINUED | OUTPATIENT
Start: 2018-01-26 | End: 2018-01-27 | Stop reason: HOSPADM

## 2018-01-26 RX ORDER — MIRTAZAPINE 30 MG/1
30 TABLET, FILM COATED ORAL AT BEDTIME
Status: DISCONTINUED | OUTPATIENT
Start: 2018-01-26 | End: 2018-01-27 | Stop reason: HOSPADM

## 2018-01-26 RX ORDER — LACTOBACILLUS RHAMNOSUS GG 10B CELL
1 CAPSULE ORAL EVERY MORNING
Status: DISCONTINUED | OUTPATIENT
Start: 2018-01-27 | End: 2018-01-27 | Stop reason: HOSPADM

## 2018-01-26 RX ORDER — CARBIDOPA AND LEVODOPA 25; 100 MG/1; MG/1
2 TABLET ORAL 2 TIMES DAILY
Status: DISCONTINUED | OUTPATIENT
Start: 2018-01-27 | End: 2018-01-27 | Stop reason: HOSPADM

## 2018-01-26 RX ORDER — ONDANSETRON 2 MG/ML
4 INJECTION INTRAMUSCULAR; INTRAVENOUS EVERY 6 HOURS PRN
Status: DISCONTINUED | OUTPATIENT
Start: 2018-01-26 | End: 2018-01-27 | Stop reason: HOSPADM

## 2018-01-26 RX ORDER — NITROGLYCERIN 0.4 MG/1
0.4 TABLET SUBLINGUAL EVERY 5 MIN PRN
Status: DISCONTINUED | OUTPATIENT
Start: 2018-01-26 | End: 2018-01-27 | Stop reason: HOSPADM

## 2018-01-26 RX ORDER — ACETAMINOPHEN 650 MG/1
650 SUPPOSITORY RECTAL EVERY 4 HOURS PRN
Status: DISCONTINUED | OUTPATIENT
Start: 2018-01-26 | End: 2018-01-27 | Stop reason: HOSPADM

## 2018-01-26 RX ORDER — CLONAZEPAM 0.25 MG/1
0.25 TABLET, ORALLY DISINTEGRATING ORAL ONCE
Status: DISCONTINUED | OUTPATIENT
Start: 2018-01-26 | End: 2018-01-27 | Stop reason: HOSPADM

## 2018-01-26 RX ORDER — CARBIDOPA AND LEVODOPA 25; 100 MG/1; MG/1
1-2 TABLET ORAL
Status: DISCONTINUED | OUTPATIENT
Start: 2018-01-26 | End: 2018-01-26

## 2018-01-26 RX ORDER — ACETAMINOPHEN 325 MG/1
650 TABLET ORAL EVERY 4 HOURS PRN
Status: DISCONTINUED | OUTPATIENT
Start: 2018-01-26 | End: 2018-01-27 | Stop reason: HOSPADM

## 2018-01-26 RX ORDER — CARBIDOPA AND LEVODOPA 25; 100 MG/1; MG/1
2 TABLET ORAL ONCE
Status: COMPLETED | OUTPATIENT
Start: 2018-01-26 | End: 2018-01-26

## 2018-01-26 RX ORDER — QUETIAPINE FUMARATE 25 MG/1
50 TABLET, FILM COATED ORAL AT BEDTIME
Status: DISCONTINUED | OUTPATIENT
Start: 2018-01-26 | End: 2018-01-27 | Stop reason: HOSPADM

## 2018-01-26 RX ORDER — SODIUM CHLORIDE 9 MG/ML
1000 INJECTION, SOLUTION INTRAVENOUS CONTINUOUS
Status: DISCONTINUED | OUTPATIENT
Start: 2018-01-26 | End: 2018-01-27 | Stop reason: HOSPADM

## 2018-01-26 RX ORDER — CARBIDOPA AND LEVODOPA 25; 100 MG/1; MG/1
1.5 TABLET ORAL ONCE
Status: DISCONTINUED | OUTPATIENT
Start: 2018-01-26 | End: 2018-01-26

## 2018-01-26 RX ORDER — VENLAFAXINE HYDROCHLORIDE 75 MG/1
75 CAPSULE, EXTENDED RELEASE ORAL 2 TIMES DAILY
Status: DISCONTINUED | OUTPATIENT
Start: 2018-01-26 | End: 2018-01-27 | Stop reason: HOSPADM

## 2018-01-26 RX ORDER — VENLAFAXINE HYDROCHLORIDE 75 MG/1
75 TABLET, EXTENDED RELEASE ORAL 2 TIMES DAILY
Status: DISCONTINUED | OUTPATIENT
Start: 2018-01-26 | End: 2018-01-26

## 2018-01-26 RX ORDER — IPRATROPIUM BROMIDE AND ALBUTEROL SULFATE 2.5; .5 MG/3ML; MG/3ML
3 SOLUTION RESPIRATORY (INHALATION)
Status: DISCONTINUED | OUTPATIENT
Start: 2018-01-26 | End: 2018-01-27

## 2018-01-26 RX ORDER — CARBIDOPA AND LEVODOPA 25; 100 MG/1; MG/1
1 TABLET ORAL 3 TIMES DAILY
Status: DISCONTINUED | OUTPATIENT
Start: 2018-01-26 | End: 2018-01-27 | Stop reason: HOSPADM

## 2018-01-26 RX ADMIN — DONEPEZIL HYDROCHLORIDE 7.5 MG: 5 TABLET, FILM COATED ORAL at 20:59

## 2018-01-26 RX ADMIN — VENLAFAXINE HYDROCHLORIDE 75 MG: 75 CAPSULE, EXTENDED RELEASE ORAL at 20:59

## 2018-01-26 RX ADMIN — CEFTRIAXONE SODIUM 1 G: 10 INJECTION, POWDER, FOR SOLUTION INTRAVENOUS at 21:00

## 2018-01-26 RX ADMIN — CARBIDOPA AND LEVODOPA 2 TABLET: 25; 100 TABLET ORAL at 12:36

## 2018-01-26 RX ADMIN — RANITIDINE 75 MG: 75 TABLET, FILM COATED ORAL at 22:09

## 2018-01-26 RX ADMIN — CARBIDOPA AND LEVODOPA 3 HALF-TAB: 25; 100 TABLET ORAL at 15:50

## 2018-01-26 RX ADMIN — CARBIDOPA AND LEVODOPA 1 TABLET: 25; 100 TABLET ORAL at 20:55

## 2018-01-26 RX ADMIN — MIRTAZAPINE 30 MG: 30 TABLET, FILM COATED ORAL at 22:09

## 2018-01-26 RX ADMIN — QUETIAPINE FUMARATE 50 MG: 25 TABLET ORAL at 22:09

## 2018-01-26 RX ADMIN — CARBIDOPA AND LEVODOPA 1 TABLET: 50; 200 TABLET, EXTENDED RELEASE ORAL at 22:09

## 2018-01-26 RX ADMIN — SODIUM CHLORIDE 1000 ML: 9 INJECTION, SOLUTION INTRAVENOUS at 13:22

## 2018-01-26 RX ADMIN — CARBIDOPA AND LEVODOPA 1 HALF-TAB: 25; 100 TABLET ORAL at 20:56

## 2018-01-26 ASSESSMENT — ENCOUNTER SYMPTOMS
APPETITE CHANGE: 1
DECREASED CONCENTRATION: 1
ABDOMINAL PAIN: 0
ARTHRALGIAS: 0
EYE REDNESS: 0
FATIGUE: 1
COLOR CHANGE: 0
CONFUSION: 1
TROUBLE SWALLOWING: 0
FEVER: 0
DYSPHORIC MOOD: 1
SHORTNESS OF BREATH: 0
ACTIVITY CHANGE: 1
NECK STIFFNESS: 0
NUMBNESS: 0
DIFFICULTY URINATING: 0
WEAKNESS: 1
COUGH: 0
SEIZURES: 0
SPEECH DIFFICULTY: 0
NAUSEA: 0
HEADACHES: 0
LIGHT-HEADEDNESS: 1
VOMITING: 0
CHOKING: 0
BRUISES/BLEEDS EASILY: 0

## 2018-01-26 NOTE — ED NOTES
Pt arrives to ED from nursing home with complaints of a syncope episode. Pt was standing with nursing home staff before going to the toilet and lost consciousness for about 5 seconds. Pt reports feeling dizzy when episode occurred. Pt did not fall. Pt reports no appetite the last few days. Pt has hx parkinson's.

## 2018-01-26 NOTE — IP AVS SNAPSHOT
MRN:[id number]                      After Visit Summary   1/26/2018    Sanjay Cano    MRN: [id number]           Thank you!     Thank you for choosing [facility] for your care. Our goal is always to provide you with excellent care. Hearing back from our patients is one way we can continue to improve our services. Please take a few minutes to complete the written survey that you may receive in the mail after you visit with us. Thank you!        Patient Information     Date Of Birth          1945        Designated Caregiver       Most Recent Value    Caregiver    Will someone help with your care after discharge? yes    Name of designated caregiver Danuta    Phone number of caregiver [phone]    Caregiver address  [address] [address]      About your hospital stay     You were admitted on:  January 26, 2018 You last received care in the:  Unit [facility]D Observation [facility]    You were discharged on:  January 27, 2018        Reason for your hospital stay       You were admitted with after a syncopal episode. We think this is due to your Parkinsons Disease. You also have a possible UTI and we will discharge you on antibiotics.    We will discharge you with a heart monitor as well.    You were found to have a UTI, we will discharge with an antibiotic. Please follow-up with your primary care doctor for your final urine culture results.                  Who to Call     For medical emergencies, please call 911.  For non-urgent questions about your medical care, please call your primary care provider or clinic, [phone]          Attending Provider     Provider Specialty    Bharath Mullins MD Emergency Medicine       Primary Care Provider Office Phone # Fax #    Bharath Buchanan [phone]       When to contact your care team       Return to the ED with fever, uncontrolled nausea, vomiting, unrelieved pain, bleeding not relieved with pressure, dizziness,  chest pain, shortness of breath, loss of consciousness, and any new or concerning symptoms.                  After Care Instructions     Activity       Your activity upon discharge: activity as tolerated, resume prior to admission activity            Daily weights       Call Provider for weight gain of more than 2 pounds per day or 5 pounds per week.            Diet       Follow this diet upon discharge:  Regular Diet Adult  Be sure to drink plenty of non-caffeinated beverages.  Notify your primary provider if you have a poor appetite, are not eating well, and/or have been losing weight            Fall precautions           General info for SNF       Length of Stay Estimate: Long Term Care  Condition at Discharge: Stable  Level of care:skilled   Rehabilitation Potential: Poor  Admission H&P remains valid and up-to-date: Yes  Recent Chemotherapy: N/A  Use Nursing Home Standing Orders: Yes            Glucose monitor nursing POCT       Before meals and at bedtime            Intake and output       Every shift                  Follow-up Appointments     Adult [facility] Follow-up and recommended labs and tests       Follow up with primary care provider, Bharath Buchanan, within 2 days for hospital follow- up.  The following labs/tests are recommended: CBC. CMP.      Appointments on [place] and/or [facility] (with [facility] or [facility] provider or service). Call [phone] if you haven't heard regarding these appointments within 7 days of discharge.                  Additional Services     Nutrition Services Adult IP Consult       Reason:  Malnutrition            Physical Therapy Adult Consult       Evaluate and treat as clinically indicated.    Reason:  PD                  Pending Results     Date and Time Order Name Status Description    1/27/2018 1457 Zio Patch Holter In process     1/27/2018 0501 EKG 12-lead, tracing only Preliminary     1/26/2018 1545 Urine Culture Aerobic Bacterial Preliminary     1/26/2018 1207  "EKG 12-lead, tracing only Preliminary             Statement of Approval     Ordered          18 1556  I have reviewed and agree with all the recommendations and orders detailed in this document.  EFFECTIVE NOW     Approved and electronically signed by:  Adilia Bonilla APRN CNP             Admission Information     Date & Time Provider Department Dept. Phone    2018 Bharath Mullins MD Unit 6D [facility] [place] [phone]      Your Vitals Were     Blood Pressure Pulse Temperature Respirations Height Weight    157/90 (BP Location: Left arm) 67 98  F (36.7  C) (Oral) 18 1.88 m (6' 2\") 53.1 kg (117 lb)    Pulse Oximetry BMI (Body Mass Index)                97% 15.02 kg/m2          [facility] Information     [employer]ite lets you send messages to your doctor, view your test results, renew your prescriptions, schedule appointments and more. To sign up, go to [email or web address] . Click on \"Log in\" on the left side of the screen, which will take you to the Welcome page. Then click on \"Sign up Now\" on the right side of the page.     You will be asked to enter the access code listed below, as well as some personal information. Please follow the directions to create your username and password.     Your access code is: [email or web address]  Expires: 2018  3:55 PM     Your access code will  in 90 days. If you need help or a new code, please call your [facility] clinic or [phone].        Care EveryWhere ID     This is your Care EveryWhere ID. This could be used by other organizations to access your [facility] medical records  [id number]        Equal Access to Services     [facility] AH: Hadii hanh Bae, waaxda lulenoreadaha, qaybta kaalmanataliya umaña. So [facility] [phone].    ATENCIÓN: Si habla español, tiene a herring disposición servicios gratuitos de asistencia lingüística. Llame al [phone].    We comply with applicable federal civil rights " laws and [place] laws. We do not discriminate on the basis of race, color, national origin, age, disability, sex, sexual orientation, or gender identity.               Review of your medicines      START taking        Dose / Directions    cephALEXin 500 MG capsule   Commonly known as:  KEFLEX   Indication:  Urinary Tract Infection        Dose:  500 mg   Take 1 capsule (500 mg) by mouth every 12 hours for 7 days   Refills:  0         CONTINUE these medicines which may have CHANGED, or have new prescriptions. If we are uncertain of the size of tablets/capsules you have at home, strength may be listed as something that might have changed.        Dose / Directions    amLODIPine 5 MG tablet   Commonly known as:  NORVASC   This may have changed:  when to take this   Used for:  Essential hypertension, benign        Dose:  5 mg   Take 1 tablet (5 mg) by mouth At Bedtime   Quantity:  90 tablet   Refills:  3       mirtazapine 15 MG tablet   Commonly known as:  REMERON   This may have changed:  See the new instructions.   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)   Generic drug:  donepezil        Take 2.5 mg twice a day (take with the 5 mg dose for a total dose of 7.5mg twice daily)   Quantity:  180 tablet   Refills:  3       * donepezil 10 MG tablet   Commonly known as:  ARICEPT        Take 5mg  by mouth twice a day (take with the 2.5 mg dose for a total dose of 7.5mg twice daily)   Refills:  3       aspirin 81 MG tablet        Take by mouth twice a week   Refills:  0       * carbidopa-levodopa  MG per tablet   Commonly known as:  SINEMET        Dose:  1-2 tablet   Take 1-2 tablets by mouth 5 times daily Takes 2 tablets at 6 AM, 1.5  tablets at 9 AM, 2 tablets at noon, 1.5 tablets at 3 PM, 1.5 tablets at 6 PM. Can take additional 0.5 tablet in the middle of the night if needed.   Quantity:  90 tablet   Refills:  0       * carbidopa-levodopa  MG per CR tablet   Commonly known as:  SINEMET CR        Dose:  1 tablet   Take 1 tablet by mouth At Bedtime   Quantity:  1 tablet   Refills:  0       cholecalciferol 1000 UNIT tablet   Commonly known as:  vitamin D3   Used for:  At risk for falling        Dose:  1000 Units   Take 1 tablet (1,000 Units) by mouth every morning   Quantity:  90 tablet   Refills:  1       clonazePAM 0.5 MG ODT tab   Commonly known as:  klonoPIN        Take 0.25 mg by mouth every other night   Quantity:  180 tablet   Refills:  0       * COMBIVENT RESPIMAT  MCG/ACT inhaler   Used for:  Other emphysema (H)   Generic drug:  Ipratropium-Albuterol        INHALE 1 PUFF BY MOUTH FOUR TIMES DAILY. NOT TO EXCEED 6 DOSES PER DAY   Quantity:  4 g   Refills:  4       * COMBIVENT RESPIMAT  MCG/ACT inhaler   Used for:  Other emphysema (H)   Generic drug:  Ipratropium-Albuterol        Dose:  1 puff   Inhale 1 puff into the lungs 4 times daily May take 1 to 2 additional doses as needed during the day   Quantity:  1 Inhaler   Refills:  4       FLOVENT  MCG/ACT Inhaler   Used for:  Other emphysema (H)   Generic drug:  fluticasone        INHALE 2 PUFFS INTO THE LUNGS TWICE DAILY   Quantity:  36 g   Refills:  1       lactobacillus rhamnosus (GG) capsule   Used for:  Diarrhea, unspecified type        Dose:  1 capsule   Take 1 capsule by mouth every morning   Quantity:  60 capsule   Refills:  11       lisinopril 5 MG tablet   Commonly known as:  PRINIVIL/ZESTRIL        Dose:  5 mg   Take 1 tablet (5 mg) by mouth daily   Quantity:  30 tablet   Refills:  1       methylcellulose 500 MG Tabs tablet   Commonly known as:  CITRUCEL        Dose:  500 mg   Take 1 tablet (500 mg) by mouth daily as needed   Quantity:  14 each   Refills:   0       QUEtiapine 50 MG tablet   Commonly known as:  SEROquel   Used for:  Moderate recurrent major depression (H)        TAKE 1 TABLET(50 MG) BY MOUTH AT BEDTIME   Quantity:  90 tablet   Refills:  2       ranitidine 75 MG tablet   Commonly known as:  ZANTAC        Dose:  75 mg   Take 1 tablet (75 mg) by mouth At Bedtime   Quantity:  30 tablet   Refills:  0       venlafaxine 75 MG 24 hr capsule   Commonly known as:  EFFEXOR-XR   Used for:  Moderate recurrent major depression (H)        TAKE 1 CAPSULE BY MOUTH EVERY MORNING AND 1 CAPSULE EVERY NIGHT AT BEDTIME   Quantity:  180 capsule   Refills:  0       * Notice:  This list has 6 medication(s) that are the same as other medications prescribed for you. Read the directions carefully, and ask your doctor or other care provider to review them with you.         Where to get your medicines      Some of these will need a paper prescription and others can be bought over the counter. Ask your nurse if you have questions.     You don't need a prescription for these medications     amLODIPine 5 MG tablet    cephALEXin 500 MG capsule                Protect others around you: Learn how to safely use, store and throw away your medicines at www.disposemymeds.org.        ANTIBIOTIC INSTRUCTION     You've Been Prescribed an Antibiotic - Now What?  Your healthcare team thinks that you or your loved one might have an infection. Some infections can be treated with antibiotics, which are powerful, life-saving drugs. Like all medications, antibiotics have side effects and should only be used when necessary. There are some important things you should know about your antibiotic treatment.      Your healthcare team may run tests before you start taking an antibiotic.    Your team may take samples (e.g., from your blood, urine or other areas) to run tests to look for bacteria. These test can be important to determine if you need an antibiotic at all and, if you do, which antibiotic will  work best.      Within a few days, your healthcare team might change or even stop your antibiotic.    Your team may start you on an antibiotic while they are working to find out what is making you sick.    Your team might change your antibiotic because test results show that a different antibiotic would be better to treat your infection.    In some cases, once your team has more information, they learn that you do not need an antibiotic at all. They may find out that you don't have an infection, or that the antibiotic you're taking won't work against your infection. For example, an infection caused by a virus can't be treated with antibiotics. Staying on an antibiotic when you don't need it is more likely to be harmful than helpful.      You may experience side effects from your antibiotic.    Like all medications, antibiotics have side effects. Some of these can be serious.    Let you healthcare team know if you have any known allergies when you are admitted to the hospital.    One significant side effect of nearly all antibiotics is the risk of severe and sometimes deadly diarrhea caused by Clostridium difficile (C. Difficile). This occurs when a person takes antibiotics because some good germs are destroyed. Antibiotic use allows C. diificile to take over, putting patients at high risk for this serious infection.    As a patient or caregiver, it is important to understand your or your loved one's antibiotic treatment. It is especially important for caregivers to speak up when patients can't speak for themselves. Here are some important questions to ask your healthcare team.    What infection is this antibiotic treating and how do you know I have that infection?    What side effects might occur from this antibiotic?    How long will I need to take this antibiotic?    Is it safe to take this antibiotic with other medications or supplements (e.g., vitamins) that I am taking?     Are there any special directions I  need to know about taking this antibiotic? For example, should I take it with food?    How will I be monitored to know whether my infection is responding to the antibiotic?    What tests may help to make sure the right antibiotic is prescribed for me?      Information provided by:  www.cdc.gov/getsmart  U.S. Department of Health and Human Services  Centers for disease Control and Prevention  National Center for Emerging and Zoonotic Infectious Diseases  Division of Healthcare Quality Promotion             Medication List: This is a list of all your medications and when to take them. Check marks below indicate your daily home schedule. Keep this list as a reference.      Medications           Morning Afternoon Evening Bedtime As Needed    amLODIPine 5 MG tablet   Commonly known as:  NORVASC   Take 1 tablet (5 mg) by mouth At Bedtime   Last time this was given:  5 mg on 1/27/2018  8:43 AM                                * ARICEPT 5 MG tablet   Take 2.5 mg twice a day (take with the 5 mg dose for a total dose of 7.5mg twice daily)   Last time this was given:  1/27/2018  9:20 AM   Generic drug:  donepezil                                * donepezil 10 MG tablet   Commonly known as:  ARICEPT   Take 5mg  by mouth twice a day (take with the 2.5 mg dose for a total dose of 7.5mg twice daily)   Last time this was given:  1/27/2018  9:20 AM                                aspirin 81 MG tablet   Take by mouth twice a week                                * carbidopa-levodopa  MG per tablet   Commonly known as:  SINEMET   Take 1-2 tablets by mouth 5 times daily Takes 2 tablets at 6 AM, 1.5 tablets at 9 AM, 2 tablets at noon, 1.5 tablets at 3 PM, 1.5 tablets at 6 PM. Can take additional 0.5 tablet in the middle of the night if needed.   Last time this was given:  1 half-tab, 1 tablet on 1/27/2018  6:09 PM                                * carbidopa-levodopa  MG per CR tablet   Commonly known as:  SINEMET CR   Take 1 tablet  by mouth At Bedtime   Last time this was given:  1 tablet on 1/26/2018 10:09 PM                                cephALEXin 500 MG capsule   Commonly known as:  KEFLEX   Take 1 capsule (500 mg) by mouth every 12 hours for 7 days                                cholecalciferol 1000 UNIT tablet   Commonly known as:  vitamin D3   Take 1 tablet (1,000 Units) by mouth every morning                                clonazePAM 0.5 MG ODT tab   Commonly known as:  klonoPIN   Take 0.25 mg by mouth every other night                                * COMBIVENT RESPIMAT  MCG/ACT inhaler   INHALE 1 PUFF BY MOUTH FOUR TIMES DAILY. NOT TO EXCEED 6 DOSES PER DAY   Generic drug:  Ipratropium-Albuterol                                * COMBIVENT RESPIMAT  MCG/ACT inhaler   Inhale 1 puff into the lungs 4 times daily May take 1 to 2 additional doses as needed during the day   Generic drug:  Ipratropium-Albuterol                                FLOVENT  MCG/ACT Inhaler   INHALE 2 PUFFS INTO THE LUNGS TWICE DAILY   Generic drug:  fluticasone                                lactobacillus rhamnosus (GG) capsule   Take 1 capsule by mouth every morning   Last time this was given:  1 capsule on 1/27/2018  8:45 AM                                lisinopril 5 MG tablet   Commonly known as:  PRINIVIL/ZESTRIL   Take 1 tablet (5 mg) by mouth daily   Last time this was given:  5 mg on 1/27/2018  8:46 AM                                methylcellulose 500 MG Tabs tablet   Commonly known as:  CITRUCEL   Take 1 tablet (500 mg) by mouth daily as needed                                mirtazapine 15 MG tablet   Commonly known as:  REMERON   TAKE 2 TABLETs (30 MG) BY MOUTH AT BEDTIME   Last time this was given:  30 mg on 1/26/2018 10:09 PM                                QUEtiapine 50 MG tablet   Commonly known as:  SEROquel   TAKE 1 TABLET(50 MG) BY MOUTH AT BEDTIME   Last time this was given:  50 mg on 1/26/2018 10:09 PM                                 ranitidine 75 MG tablet   Commonly known as:  ZANTAC   Take 1 tablet (75 mg) by mouth At Bedtime   Last time this was given:  75 mg on 1/26/2018 10:09 PM                                venlafaxine 75 MG 24 hr capsule   Commonly known as:  EFFEXOR-XR   TAKE 1 CAPSULE BY MOUTH EVERY MORNING AND 1 CAPSULE EVERY NIGHT AT BEDTIME   Last time this was given:  75 mg on 1/27/2018  8:47 AM                                * Notice:  This list has 6 medication(s) that are the same as other medications prescribed for you. Read the directions carefully, and ask your doctor or other care provider to review them with you.

## 2018-01-26 NOTE — PHARMACY-ADMISSION MEDICATION HISTORY
Admission medication history interview status for the 2018 admission is complete. See Epic admission navigator for allergy information, pharmacy, prior to admission medications and immunization status.     Medication history interview sources:  Wife/medication list     Changes made to PTA medication list (reason)  Added: None  Deleted: None  Changed:   1. Donepezil 5-7.5 mg   2.Donepezil 10 m.5 tablet at bedtime   3. Donepezil 10 mg : 3/4 tablets by mouth twice   4. Updated the dosing of mirtazapine from 15 mg to 30 mg   5. Clarified the dosing for Combivent     Additional medication history information (including reliability of information, actions taken by pharmacist): Wife provided the medication history information and she was a good historian.   Patient had his flu shot for this year.   Patient had his last dose of Sinemet CR @ 1600 and might need his next dose @ 1830 per the conversation with the wife.   Wife reported that the patient lost about 31 pounds in the last 30 days, and his appetite decreased.     Prior to Admission medications    Medication Sig Last Dose Taking? Auth Provider   clonazePAM (KLONOPIN) 0.5 MG ODT tab Take 0.25 mg by mouth every other night 2018 at PM Yes Bharath Buchanan MD   ranitidine (ZANTAC) 75 MG tablet Take 1 tablet (75 mg) by mouth At Bedtime 2018 at PM Yes Bharath Buchanan MD   methylcellulose (CITRUCEL) 500 MG TABS tablet Take 1 tablet (500 mg) by mouth daily as needed 2018 at AM Yes Bharath Buchanan MD   lactobacillus rhamnosus, GG, (CULTURELL) capsule Take 1 capsule by mouth every morning 2018 at AM Yes Bharath Buchanan MD   amLODIPine (NORVASC) 5 MG tablet Take 1 tablet (5 mg) by mouth every morning 2018 at AM Yes Bharath Buchanan MD   Ipratropium-Albuterol (COMBIVENT RESPIMAT)  MCG/ACT inhaler Inhale 1 puff into the lungs 4 times daily May take 1 to 2 additional doses as needed during the day 2018 at PM  Yes Bharath Buchanan MD   lisinopril (PRINIVIL/ZESTRIL) 5 MG tablet Take 1 tablet (5 mg) by mouth daily 1/26/2018 at AM Yes Bharath Buchanan MD   cholecalciferol (VITAMIN D) 1000 UNIT tablet Take 1 tablet (1,000 Units) by mouth every morning 1/26/2018 at AM Yes Bharath Buchanan MD   QUEtiapine (SEROQUEL) 50 MG tablet TAKE 1 TABLET(50 MG) BY MOUTH AT BEDTIME 1/26/2018 at PM Yes Bharath Buchanan MD   COMBIVENT RESPIMAT  MCG/ACT inhaler INHALE 1 PUFF BY MOUTH FOUR TIMES DAILY. NOT TO EXCEED 6 DOSES PER DAY 1/25/2018 at Unknown time Yes Bharath Buchanan MD   venlafaxine (EFFEXOR-XR) 75 MG 24 hr capsule TAKE 1 CAPSULE BY MOUTH EVERY MORNING AND 1 CAPSULE EVERY NIGHT AT BEDTIME 1/26/2018 at AM Yes Bharath Buchanan MD   FLOVENT  MCG/ACT Inhaler INHALE 2 PUFFS INTO THE LUNGS TWICE DAILY 1/26/2018 at AM Yes Bharath Buchanan MD   aspirin 81 MG tablet Take by mouth twice a week Past Week at Unknown time Yes Reported, Patient   mirtazapine (REMERON) 15 MG tablet TAKE 1 TABLET(15 MG) BY MOUTH AT BEDTIME  Patient taking differently: TAKE 2 TABLETs (30 MG) BY MOUTH AT BEDTIME 1/25/2018 at PM Yes Bharath Buchanan MD   donepezil (ARICEPT) 10 MG tablet Take 5mg  by mouth twice a day (take with the 2.5 mg dose for a total dose of 7.5mg twice daily) 1/26/2018 at AM Yes Reported, Patient   carbidopa-levodopa (SINEMET CR)  MG per CR tablet Take 1 tablet by mouth At Bedtime 1/25/2018 at PM Yes Bharath Buchanan MD   carbidopa-levodopa (SINEMET)  MG per tablet Take 1-2 tablets by mouth 5 times daily Takes 2 tablets at 6 AM, 1.5 tablets at 9 AM, 2 tablets at noon, 1.5 tablets at 3 PM, 1.5 tablets at 6 PM. Can take additional 0.5 tablet in the middle of the night if needed. 1/26/2018 at 1600 Yes Bharath Buchanan MD   donepezil (ARICEPT) 5 MG tablet Take 2.5 mg twice a day (take with the 5 mg dose for a total dose of 7.5mg twice daily) 1/26/2018 at AM Yes Bharath Buchanan  MD [place]       Medication history completed by: ASHER Miles4

## 2018-01-26 NOTE — ED NOTES
Bed: ED11  Expected date: 1/26/18  Expected time:   Means of arrival: Ambulance  [place]  [facility] [place] [facility]  72 [place] M  Syncope  [place]ellow

## 2018-01-26 NOTE — ED PROVIDER NOTES
History     Chief Complaint   Patient presents with     Loss of Consciousness     HPI  Sanjay Cano is a 72 year old male with a history of COPD, Parkinson's disease, and falls who presents to the Emergency Department today from his nursing home for evaluation following a syncopal episode. History was somewhat difficult to obtain from the patient as he was somewhat somnolent upon evaluation. It is reported by EMS that the patient was being helped to the bathroom when he started feeling lightheaded and had a syncopal episode that lasted about 5 seconds. The nurses at the nursing home state that he did not fall to the ground as they were able to grab him so he did not hit his head. The patient was able to report that he has also been having a decreased appetite lately. He reports that he has never had anything like this before. He denies diarrhea, cough.   No reports of seizure activity.  Patient normally is incontinent of urine.  Wife is here now gives some history concerned of possible early bladder infection.  He has had one before in the past.  Wife is also concerned worries that that he has been losing weight as it is well.    I have reviewed the Medications, Allergies, Past Medical and Surgical History, and Social History in the [employer] system.    Past Medical History:   Diagnosis Date     At risk for falling 9/3/2012     COPD (chronic obstructive pulmonary disease) (H)      Family history of thyroid cancer 7/13/2016     Malignant neoplasm (H)     skin - nose     Moderate recurrent major depression (H) 5/17/2012     Paralysis agitans (H)     Parkinson's Disease     Parkinson disease (H)      Rosacea        Past Surgical History:   Procedure Laterality Date     EYE SURGERY       ORTHOPEDIC SURGERY       PHACOEMULSIFICATION CLEAR CORNEA WITH STANDARD INTRAOCULAR LENS IMPLANT  5/21/2014    Procedure: PHACOEMULSIFICATION CLEAR CORNEA WITH STANDARD INTRAOCULAR LENS IMPLANT;  Surgeon: Tomy Hunter MD;  Location:   [facility]     PHACOEMULSIFICATION CLEAR CORNEA WITH TORIC INTRAOCULAR LENS IMPLANT  4/30/2014    Procedure: PHACOEMULSIFICATION CLEAR CORNEA WITH TORIC INTRAOCULAR LENS IMPLANT;  Surgeon: Tomy Hunter MD;  Location: [facility]       Family History   Problem Relation Age of Onset     CEREBROVASCULAR DISEASE Mother      DIABETES Mother      GASTROINTESTINAL DISEASE Mother      Hypertension Mother      Neurologic Disorder Father      CEREBROVASCULAR DISEASE Father      CEREBROVASCULAR DISEASE Maternal Grandfather      CANCER Paternal Grandmother      Other - See Comments Sister      gi - unknown       Social History   Substance Use Topics     Smoking status: Former Smoker     Packs/day: 0.01     Years: 40.00     Types: Cigarettes     Quit date: 7/31/2008     Smokeless tobacco: Never Used      Comment: very passive cigarettes in 6 months     Alcohol use No       Current Facility-Administered Medications   Medication     lidocaine 1 % 1 mL     lidocaine (LMX4) kit     sodium chloride (PF) 0.9% PF flush 3 mL     sodium chloride (PF) 0.9% PF flush 3 mL     0.9% sodium chloride infusion     [START ON 1/27/2018] amLODIPine (NORVASC) tablet 5 mg     carbidopa-levodopa (SINEMET CR)  MG per CR tablet 1 tablet     donepezil (ARIcept) 7.5 mg     [START ON 1/27/2018] lactobacillus rhamnosus (GG) (CULTURELL) capsule 1 capsule     [START ON 1/27/2018] lisinopril (PRINIVIL/ZESTRIL) tablet 5 mg     mirtazapine (REMERON) tablet 30 mg     QUEtiapine (SEROquel) tablet 50 mg     ranitidine (ZANTAC) tablet 75 mg     lidocaine 1 % 1 mL     lidocaine (LMX4) kit     sodium chloride (PF) 0.9% PF flush 3 mL     sodium chloride (PF) 0.9% PF flush 3 mL     nitroGLYcerin (NITROSTAT) sublingual tablet 0.4 mg     ondansetron (ZOFRAN-ODT) ODT tab 4 mg    Or     ondansetron (ZOFRAN) injection 4 mg     acetaminophen (TYLENOL) tablet 650 mg     acetaminophen (TYLENOL) Suppository 650 mg     cefTRIAXone (ROCEPHIN) 1 g in 10 mL SWFI Premix Syringe      "venlafaxine (EFFEXOR-XR) 24 hr capsule 75 mg     [START ON 1/27/2018] carbidopa-levodopa (SINEMET)  MG per tablet 2 tablet     carbidopa-levodopa half-tab 12.5-50 mg    And     carbidopa-levodopa (SINEMET)  MG per tablet 1 tablet        Allergies   Allergen Reactions     Seasonal Allergies       Review of Systems   Constitutional: Positive for activity change, appetite change and fatigue. Negative for fever.   HENT: Negative for congestion and trouble swallowing.    Eyes: Negative for redness and visual disturbance.   Respiratory: Negative for cough, choking and shortness of breath.    Cardiovascular: Negative for chest pain and leg swelling.   Gastrointestinal: Negative for abdominal pain, nausea and vomiting.   Genitourinary: Negative for difficulty urinating.        He normally is incontinent of urine.   Musculoskeletal: Negative for arthralgias and neck stiffness.   Skin: Negative for color change.   Neurological: Positive for syncope, weakness and light-headedness. Negative for seizures, speech difficulty, numbness and headaches.   Hematological: Does not bruise/bleed easily.   Psychiatric/Behavioral: Positive for confusion, decreased concentration and dysphoric mood.        Patient some mild confusion with some mild dementia per wife   All other systems reviewed and are negative.      Physical Exam   BP: 142/77  Pulse: 56  Heart Rate: 53  Temp: 97.3  F (36.3  C)  Resp: 18  Height: 188 cm (6' 2\")  Weight: 53.1 kg (117 lb)  SpO2: 96 %      Physical Exam   Constitutional: He appears well-developed and well-nourished. He appears distressed.   Patient sleeping easily arousable flat affect noted speech somewhat soft and flat consistent with his Parkinson's.   HENT:   Head: Normocephalic and atraumatic.   Eyes: EOM are normal. Pupils are equal, round, and reactive to light. No scleral icterus.   Neck: Normal range of motion. Neck supple. No JVD present.   Cardiovascular: Normal rate and regular rhythm.  "   Pulmonary/Chest: No stridor. No respiratory distress. He has no wheezes.   Abdominal: He exhibits no distension. There is no tenderness. There is no rebound.   Musculoskeletal: He exhibits no edema, tenderness or deformity.   Neurological: He is alert. He has normal reflexes. No cranial nerve deficit. Coordination normal.   And oriented to person and place at this point.  Patient displays no focal findings of an acute neurological event with Parkinson features noted.   Skin: Skin is warm and dry. No rash noted. He is not diaphoretic. No erythema. No pallor.   Psychiatric:   Flat affect noted on examination   Nursing note and vitals reviewed.      ED Course   12:00 PM  The patient was seen and examined by Dr. Mullins in Room 11.    ED Course   Patient evaluated in the ER.  IV established.  EKG was done.  First-degree block noted.  Portable chest x-ray reveals no acute airspace disease.  Bedside ultrasound reveals no sign of dehydration.  CT was done also without any acute intracranial pathology.    Laboratory evaluation ER.  Troponin was negative.  Chemistries reveal glucose of 80.  Other labs stable.  White count 7.7 hemoglobin 11.6.    Discussed situation with wife was present also.  Will admit to ED observation overnight for syncopal workup.  Continue IV fluids patient also urinalysis we did do a straight cath revealing 13 white cells.  Patient had a UTI in the past.  Would recommend starting anabolic therapy also.  Will reassess patient in the morning and also consider formal echo.  Wife agrees with plan  did talk to him also and agrees.    Procedures  Results for orders placed during the hospital encounter of 01/26/18   POC US ECHO LIMITED    Impression Limited Bedside Cardiac Ultrasound, performed and interpreted by me.   Indication: syncope.  Parasternal long axis, parasternal short axis and apical 4 chamber views were acquired.   Image quality was satisfactory.    Findings:    Global left  ventricular function appears intact.  Chambers do not appear dilated.  There is no evidence of free fluid within the pericardium.    IMPRESSION: Grossly normal left ventricular function and chamber size.  No pericardial effusion. IVC without collapse..               EKG Interpretation:      Interpreted by Bharath Mullins  Time reviewed: 1213  Symptoms at time of EKG: syncope   Rhythm: normal sinus   Rate: normal  Axis: normal  Ectopy: none  Conduction: First-degree heart block  ST Segments/ T Waves: No ST-T wave changes  Q Waves: none  Comparison to prior: No old EKG available    Clinical Impression: normal EKG          Critical Care time:  none             Labs Ordered and Resulted from Time of ED Arrival Up to the Time of Departure from the ED   CBC WITH PLATELETS DIFFERENTIAL - Abnormal; Notable for the following:        Result Value    RBC Count 3.72 (*)     Hemoglobin 11.6 (*)     Hematocrit 36.3 (*)     Absolute Lymphocytes 0.7 (*)     All other components within normal limits   INR - Abnormal; Notable for the following:     INR 1.16 (*)     All other components within normal limits   COMPREHENSIVE METABOLIC PANEL - Abnormal; Notable for the following:     Calcium 8.3 (*)     Albumin 3.3 (*)     Protein Total 6.0 (*)     All other components within normal limits   ROUTINE UA WITH MICROSCOPIC REFLEX TO CULTURE - Abnormal; Notable for the following:     Ketones Urine 5 (*)     Protein Albumin Urine 10 (*)     Leukocyte Esterase Urine Moderate (*)     WBC Urine 13 (*)     Bacteria Urine Few (*)     Mucous Urine Present (*)     All other components within normal limits   PARTIAL THROMBOPLASTIN TIME   MAGNESIUM   TROPONIN I   PULSE OXIMETRY NURSING   CARDIAC CONTINUOUS MONITORING   PERIPHERAL IV CATHETER   STRAIGHT CATH FOR URINE   URINE CULTURE AEROBIC BACTERIAL     Results for orders placed or performed during the hospital encounter of 01/26/18   XR Chest Port 1 View    Narrative    XR CHEST PORT 1 VW 1/26/2018  12:31 PM    History: syncope;     Comparison: 8/9/2017, CT from 4/18/2017    Findings: AP view of the chest. A repeat view of the apices was also  obtained. The cardiomediastinal silhouette is within normal limits.  Pulmonary vasculature is normal. No focal airspace opacities. No  significant pleural effusion or pneumothorax.      Impression    Impression: No acute airspace disease.    I have personally reviewed the examination and initial interpretation  and I agree with the findings.    ANT FIELDS MD   CT Head w/o Contrast    Narrative    TEMPORARY 1/26/2018 1:53 PM    Provided History: Syncope in Parkinson's    Comparison: Head CT 6/20/2016, brain MR 10/19/2011, head CT10/12/2011    Technique: Using multidetector thin collimation helical acquisition  technique, axial, coronal and sagittal CT images from the skull base  to the vertex were obtained without intravenous contrast.     Findings:    No intracranial hemorrhage, mass effect, or midline shift. The  ventricles are proportionate to the cerebral sulci. The gray to white  matter differentiation of the cerebral hemispheres is preserved. The  basal cisterns are patent. Mild generalized cerebral volume loss.    The visualized paranasal sinuses are clear. The mastoid air cells are  clear. Bilateral pseudophakia. Right scleral buckle.         Impression    Impression:  No acute intracranial pathology.    I have personally reviewed the examination and initial interpretation  and I agree with the findings.    CHAD GODDARD MD   POC US ECHO LIMITED    Impression    Limited Bedside Cardiac Ultrasound, performed and interpreted by me.   Indication: syncope.  Parasternal long axis, parasternal short axis and apical 4 chamber views were acquired.   Image quality was satisfactory.    Findings:    Global left ventricular function appears intact.  Chambers do not appear dilated.  There is no evidence of free fluid within the pericardium.    IMPRESSION: Grossly normal  left ventricular function and chamber size.  No pericardial effusion. IVC without collapse..     CBC with platelets differential   Result Value Ref Range    WBC 7.7 4.0 - 11.0 10e9/L    RBC Count 3.72 (L) 4.4 - 5.9 10e12/L    Hemoglobin 11.6 (L) 13.3 - 17.7 g/dL    Hematocrit 36.3 (L) 40.0 - 53.0 %    MCV 98 78 - 100 fl    MCH 31.2 26.5 - 33.0 pg    MCHC 32.0 31.5 - 36.5 g/dL    RDW 12.4 10.0 - 15.0 %    Platelet Count 222 150 - 450 10e9/L    Diff Method Automated Method     % Neutrophils 81.4 %    % Lymphocytes 9.6 %    % Monocytes 7.4 %    % Eosinophils 1.4 %    % Basophils 0.1 %    % Immature Granulocytes 0.1 %    Nucleated RBCs 0 0 /100    Absolute Neutrophil 6.3 1.6 - 8.3 10e9/L    Absolute Lymphocytes 0.7 (L) 0.8 - 5.3 10e9/L    Absolute Monocytes 0.6 0.0 - 1.3 10e9/L    Absolute Eosinophils 0.1 0.0 - 0.7 10e9/L    Absolute Basophils 0.0 0.0 - 0.2 10e9/L    Abs Immature Granulocytes 0.0 0 - 0.4 10e9/L    Absolute Nucleated RBC 0.0    Partial thromboplastin time   Result Value Ref Range    PTT 33 22 - 37 sec   INR   Result Value Ref Range    INR 1.16 (H) 0.86 - 1.14   Comprehensive metabolic panel   Result Value Ref Range    Sodium 142 133 - 144 mmol/L    Potassium 4.0 3.4 - 5.3 mmol/L    Chloride 108 94 - 109 mmol/L    Carbon Dioxide 29 20 - 32 mmol/L    Anion Gap 6 3 - 14 mmol/L    Glucose 80 70 - 99 mg/dL    Urea Nitrogen 27 7 - 30 mg/dL    Creatinine 0.98 0.66 - 1.25 mg/dL    GFR Estimate 75 >60 mL/min/1.7m2    GFR Estimate If Black >90 >60 mL/min/1.7m2    Calcium 8.3 (L) 8.5 - 10.1 mg/dL    Bilirubin Total 0.5 0.2 - 1.3 mg/dL    Albumin 3.3 (L) 3.4 - 5.0 g/dL    Protein Total 6.0 (L) 6.8 - 8.8 g/dL    Alkaline Phosphatase 96 40 - 150 U/L    ALT 8 0 - 70 U/L    AST 8 0 - 45 U/L   Magnesium   Result Value Ref Range    Magnesium 2.0 1.6 - 2.3 mg/dL   Troponin I   Result Value Ref Range    Troponin I ES <0.015 0.000 - 0.045 ug/L   UA with Microscopic reflex to Culture   Result Value Ref Range    Color Urine  Yellow     Appearance Urine Clear     Glucose Urine Negative NEG^Negative mg/dL    Bilirubin Urine Negative NEG^Negative    Ketones Urine 5 (A) NEG^Negative mg/dL    Specific Gravity Urine 1.017 1.003 - 1.035    Blood Urine Negative NEG^Negative    pH Urine 6.5 5.0 - 7.0 pH    Protein Albumin Urine 10 (A) NEG^Negative mg/dL    Urobilinogen mg/dL 2.0 0.0 - 2.0 mg/dL    Nitrite Urine Negative NEG^Negative    Leukocyte Esterase Urine Moderate (A) NEG^Negative    Source Catheterized Urine     WBC Urine 13 (H) 0 - 2 /HPF    RBC Urine 2 0 - 2 /HPF    Bacteria Urine Few (A) NEG^Negative /HPF    Squamous Epithelial /HPF Urine 1 0 - 1 /HPF    Mucous Urine Present (A) NEG^Negative /LPF   EKG 12-lead, tracing only   Result Value Ref Range    Interpretation ECG Click View Image link to view waveform and result             Assessments & Plan (with Medical Decision Making)  72-year-old male with Parkinson's for over 20 years presents with syncopal episode at nursing home.  Patient was up attempting to urinate and then passed out without trauma without seizure activity.  Loss of consciousness 5 seconds.  No postictal state.  Patient evaluated here in the ER.  Vital signs stable IV fluids given.  EKG and chest x-ray no significant findings.  Labs otherwise stable troponin negative.  INR 1.16.  Head CT was negative.  Bedside echo without any wall motion abnormalities.  Patient did not appear to show signs of dehydration by ultrasound also.  Patient straight cath done for urine as he is normally incontinent.  White count was 13.  Patient in the ER will be admitted to ED observation overnight for syncopal workup.  Patient's wife agrees with plan.  Will initiate antibiotics also.           I have reviewed the nursing notes.    I have reviewed the findings, diagnosis, plan and need for follow up with the patient.    Current Discharge Medication List          Final diagnoses:   Syncope and collapse   Paralysis agitans (H)   UTI symptoms    I, Anson Pinzon, am serving as a trained medical scribe to document services personally performed by Bharath Mullins MD, based on the provider's statements to me.   I, Bharath Mullins MD, was physically present and have reviewed and verified the accuracy of this note documented by Anson Pinzon.     1/26/2018   [facility] EMERGENCY DEPARTMENT      This note was created at least in part by the use of dragon voice dictation system. Inadvertent typographical errors may still exist.  Bharath Mullins MD.         Bharath Mullins MD  01/26/18 1957

## 2018-01-26 NOTE — IP AVS SNAPSHOT
Unit 6D Observation [address] [address]    Phone:  [phone]    Fax:  [phone]                                       After Visit Summary   1/26/2018    Sanjay Cano    MRN: [id number]           After Visit Summary Signature Page     I have received my discharge instructions, and my questions have been answered. I have discussed any challenges I see with this plan with the nurse or doctor.    ..........................................................................................................................................  Patient/Patient Representative Signature      ..........................................................................................................................................  Patient Representative Print Name and Relationship to Patient    ..................................................               ................................................  Date                                            Time    ..........................................................................................................................................  Reviewed by Signature/Title    ...................................................              ..............................................  Date                                                            Time

## 2018-01-26 NOTE — PROGRESS NOTES
Emergency Social Work Services Note    Date of  Intervention: 01/26/18  Last Emergency Department Visit:  Today; 8/9/17  Care Plan:  No restrictions  Collaborated with:  Dr. Mullins; pt's wife Caren; Caren's friend; pt (briefly)    Data:  Dr. Mullins asks SW to see pt's wife Caren.  She has questions re: observation status and receiving a bill as well as wife reports care concerns at pt's nursing facility.    Intervention:  Chart reviewed.  SW met with pt's wife Caren and Caren's friend.  Pt getting bladder scanned at the moment.  Caren states that she is concerned about pt being transferred to the floor being under 'Observation' status.  She states she has talked with friends from their [employer] and they have warned her that people can get up to a $10,000 bill when under Obs status.  SW explained observation status and that we are uncertain as to how much of a bill pt would be charged, as wife was hoping to know exact coverage.  Caren states that they already have over $60,000 in bills (charges from the SNF as he is there 'private pay' in long-term care) so pt applied for Medical Assistance.  She states this was turned in on 12/15/17 so pt is MA pending.  Caren voices that she feels more comfortable having pt go upstairs under Obs status knowing that if she recieves bills from this stay, she can hold them and refer them to MA once pt has been approved.    Caren states that pt had been at home under her care going to the [facility] day program for many years.  Things were going well until Caren broke her leg and had to be non-weightbearing for 8 weeks.  She states she just started bearing weight on her leg a week and a half ago.  During this time, Caren was unable to care for pt so she placed him in [facility] long-term care.  Because she was completely housebound while non-wt bearing, she was unable to visit pt so spoke with SNF staff over the phone every day to check in.  Just  lately she has began to visit him and found that he has been sitting slumped over in his w/c for the majority of the day.  Pt requires complete hands-on care for any and all activity, including taking a drink of water.  She feels he has not been getting good care because the SNF staff just cannot provide that level of near 1 to 1 care.  Caren states she is aware that all SNF's would struggle providing this care so she has decided for him to remain at [facility].  She states she has voiced her concerns multiple times to the  and DON and they have reassured her they are working on establishing a better care plan for pt.  She states pt is very depressed and has lost a lot of weight.  Caren reports much sadness at not being able to care for pt the way that he needs at home.  Supportive counseling provided.  Caren reports that pt sees Dr. Zach Valverde at [facility].  HOWIE suggests that Caren call the  at the [facility] to discuss Parkinson's specific resources and group homes as that may be a better alternative to SNF care.  HOWIE gave her the contact info: [phone].  Caren plans on pt returning to [facility] once he is medically stable.    HOWIE called and spoke with [place] in Admissions at [facility].  She confirms that pt is from the long-term care, 2nd floor.  If pt returns on the weekend, please call the 2nd floor nursing staff at [phone] and fax number is [phone].  Dr. Mullins anticipates pt will be ready to d/c on Saturday.    Assessment:  Support; community resources; d/c planning back to SNF once medically stable.    Plan:    Anticipated Disposition:  Facility:  [facility]    Barriers to d/c plan:  Medical stability.    Follow Up:  HOWIE will continue to follow.    JATIN Buckner  Social Work Services  Emergency Department   [phone] phone  [phone] pager  On-call pager, [phone], [phone] to  midnight

## 2018-01-26 NOTE — IP AVS SNAPSHOT
Sanjay Cano #[id number] (CSN: [id number])  (72 year old M)  (Adm: 18)     [id number]               UNIT 6D OBSERVATION [facility] BANK: [phone]            Patient Demographics     Patient Name Sex          Age SSN Address Phone    Sanjay Cano Male 1945 (72 year old) [id number]  [facility] [address] [phone] (Home)      Emergency Contact(s)     Name Relation Home Work Mobile    Caren Cano Spouse [phone]      Fredy Cano Son [phone]        Admission Information     Attending Provider Admitting Provider Admission Type Admission Date/Time    Bharath Mullins MD Meier, Kevin Scott, MD Emergency 18  1154    Discharge Date Hospital Service Auth/Cert Status Service Area     Emergency Medicine [facility]    Unit Room/Bed Admission Status       UU U6D OBSERVATION [id number]- Admission (Confirmed)       Admission     Complaint    Syncope, Syncope      Hospital Account     Name Acct ID Class Status Primary Coverage    Sanjay Cano [id number] Observation Open MEDICARE - MEDICARE FOR HB SUPPLEMENT            Guarantor Account (for Hospital Account #[id number])     Name Relation to Pt Service Area Active? Acct Type    Sanjay Cano Self FCS Yes Personal/Family    Address Phone          [address] [place] [address] [phone](H)              Coverage Information (for Hospital Account #[id number])     1. MEDICARE/MEDICARE FOR HB SUPPLEMENT     F/O Payor/Plan Precert #    MEDICARE/MEDICARE FOR HB SUPPLEMENT     Subscriber Subscriber #    Sanjay Cano [id number]    Address Phone    ATTN CLAIMS  [address]  [place] [address] [phone]          2. MEDICA/MEDICA PRIME SOLUTION     F/O Payor/Plan Precert #    MEDICA/MEDICA PRIME SOLUTION     Subscriber Subscriber #    Sanjay Cano [id number]    Address Phone    [address] [address]  [place] [address] [phone]           "                                            INTERAGENCY TRANSFER FORM - PHYSICIAN ORDERS   1/26/2018                       UNIT 6D OBSERVATION [facility]: [phone]            Attending Provider: Bharath Mullisn MD     Allergies:  Seasonal Allergies    Infection:  None   Service:  EMERGENCY ME    Ht:  1.88 m (6' 2\")   Wt:  53.1 kg (117 lb)   Admission Wt:  53.1 kg (117 lb)    BMI:  15.02 kg/m 2   BSA:  1.67 m 2            ED Clinical Impression     Diagnosis Description Comment Added By Time Added    Syncope and collapse [R55] Syncope and collapse [R55]  Bharath Mullins MD 1/26/2018  4:12 PM    Paralysis agitans (H) [G20] Paralysis agitans (H) [G20]  Bharath Mullins MD 1/26/2018  7:56 PM    UTI symptoms [R39.9] UTI symptoms [R39.9]  Bharath Mullins MD 1/26/2018  7:57 PM      Hospital Problems as of 1/27/2018              Priority Class Noted POA    Syncope Medium  1/26/2018 Yes      Non-Hospital Problems as of 1/27/2018              Priority Class Noted    Paralysis agitans (H) Medium  Unknown    Rosacea Medium  Unknown    Moderate recurrent major depression (H) Medium  5/17/2012    Health Care Home Medium  6/12/2012    Advanced directives, counseling/discussion Medium  8/29/2012    At risk for falling Medium  9/3/2012    CARDIOVASCULAR SCREENING; LDL GOAL LESS THAN 130 Medium  12/21/2012    Urinary frequency Medium  5/28/2014    Constipation Medium  5/29/2014    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) Medium  11/4/2014    Primary insomnia Medium  11/20/2015    Diarrhea Medium  12/4/2015    Other emphysema (H) Medium  3/2/2016    Pulmonary nodules Medium  7/12/2016    Thyroid nodule Medium  7/12/2016    Family history of thyroid cancer Medium  7/13/2016    H/O recurrent urinary tract infection Medium  8/26/2016    Essential hypertension with goal blood pressure less than 140/90 Medium  8/31/2016    Senile purpura (H) Medium  10/26/2016    Nocturnal cough Medium  " 12/5/2016      Code Status History     Date Active Date Inactive Code Status Order ID Comments User Context    7/5/2016 10:39 PM 1/26/2018  6:37 PM Full Code [id number]  Eder Solares MD Outpatient    6/28/2016  4:54 PM 7/5/2016 10:39 PM Full Code [id number]  Wanda Arredondo MD Inpatient    6/10/2012  5:59 PM 6/11/2012  5:06 PM Full Code [id number]  Rekha Stanley NP Inpatient    11/7/2011 10:17 AM 6/10/2012  5:59 PM Full Code [id number]  Taina Ojeda Outpatient    10/28/2011 12:37 PM 11/7/2011 10:17 AM Full Code [id number]  Nicholas Emily Outpatient      Current Code Status     Date Active Code Status Order ID Comments User Context       1/26/2018  6:37 PM Full Code [id number]  Myranda Chambers PA-C Inpatient       Summary of Visit     Reason for your hospital stay       You were admitted with after a syncopal episode. We think this is due to your Parkinsons Disease. You also have a possible UTI and we will discharge you on antibiotics.    We will discharge you with a heart monitor as well.    You were found to have a UTI, we will discharge with an antibiotic. Please follow-up with your primary care doctor for your final urine culture results.                Medication Review      START taking        Dose / Directions Comments    cephALEXin 500 MG capsule   Commonly known as:  KEFLEX   Indication:  Urinary Tract Infection        Dose:  500 mg   Take 1 capsule (500 mg) by mouth every 12 hours for 7 days   Refills:  0          CONTINUE these medications which may have CHANGED, or have new prescriptions. If we are uncertain of the size of tablets/capsules you have at home, strength may be listed as something that might have changed.        Dose / Directions Comments    amLODIPine 5 MG tablet   Commonly known as:  NORVASC   This may have changed:  when to take this   Used for:  Essential hypertension, benign        Dose:  5 mg   Take 1 tablet (5 mg) by mouth At Bedtime   Quantity:  90  tablet   Refills:  3        mirtazapine 15 MG tablet   Commonly known as:  REMERON   This may have changed:  See the new instructions.   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)   Generic drug:  donepezil        Take 2.5 mg twice a day (take with the 5 mg dose for a total dose of 7.5mg twice daily)   Quantity:  180 tablet   Refills:  3        * donepezil 10 MG tablet   Commonly known as:  ARICEPT        Take 5mg  by mouth twice a day (take with the 2.5 mg dose for a total dose of 7.5mg twice daily)   Refills:  3        aspirin 81 MG tablet        Take by mouth twice a week   Refills:  0        * carbidopa-levodopa  MG per tablet   Commonly known as:  SINEMET        Dose:  1-2 tablet   Take 1-2 tablets by mouth 5 times daily Takes 2 tablets at 6 AM, 1.5 tablets at 9 AM, 2 tablets at noon, 1.5 tablets at 3 PM, 1.5 tablets at 6 PM. Can take additional 0.5 tablet in the middle of the night if needed.   Quantity:  90 tablet   Refills:  0        * carbidopa-levodopa  MG per CR tablet   Commonly known as:  SINEMET CR        Dose:  1 tablet   Take 1 tablet by mouth At Bedtime   Quantity:  1 tablet   Refills:  0    Patient reported medication       cholecalciferol 1000 UNIT tablet   Commonly known as:  vitamin D3   Used for:  At risk for falling        Dose:  1000 Units   Take 1 tablet (1,000 Units) by mouth every morning   Quantity:  90 tablet   Refills:  1        clonazePAM 0.5 MG ODT tab   Commonly known as:  klonoPIN        Take 0.25 mg by mouth every other night   Quantity:  180 tablet   Refills:  0        * COMBIVENT RESPIMAT  MCG/ACT inhaler   Used for:  Other emphysema (H)   Generic drug:   Ipratropium-Albuterol        INHALE 1 PUFF BY MOUTH FOUR TIMES DAILY. NOT TO EXCEED 6 DOSES PER DAY   Quantity:  4 g   Refills:  4        * COMBIVENT RESPIMAT  MCG/ACT inhaler   Used for:  Other emphysema (H)   Generic drug:  Ipratropium-Albuterol        Dose:  1 puff   Inhale 1 puff into the lungs 4 times daily May take 1 to 2 additional doses as needed during the day   Quantity:  1 Inhaler   Refills:  4        FLOVENT  MCG/ACT Inhaler   Used for:  Other emphysema (H)   Generic drug:  fluticasone        INHALE 2 PUFFS INTO THE LUNGS TWICE DAILY   Quantity:  36 g   Refills:  1        lactobacillus rhamnosus (GG) capsule   Used for:  Diarrhea, unspecified type        Dose:  1 capsule   Take 1 capsule by mouth every morning   Quantity:  60 capsule   Refills:  11        lisinopril 5 MG tablet   Commonly known as:  PRINIVIL/ZESTRIL        Dose:  5 mg   Take 1 tablet (5 mg) by mouth daily   Quantity:  30 tablet   Refills:  1        methylcellulose 500 MG Tabs tablet   Commonly known as:  CITRUCEL        Dose:  500 mg   Take 1 tablet (500 mg) by mouth daily as needed   Quantity:  14 each   Refills:  0        QUEtiapine 50 MG tablet   Commonly known as:  SEROquel   Used for:  Moderate recurrent major depression (H)        TAKE 1 TABLET(50 MG) BY MOUTH AT BEDTIME   Quantity:  90 tablet   Refills:  2        ranitidine 75 MG tablet   Commonly known as:  ZANTAC        Dose:  75 mg   Take 1 tablet (75 mg) by mouth At Bedtime   Quantity:  30 tablet   Refills:  0        venlafaxine 75 MG 24 hr capsule   Commonly known as:  EFFEXOR-XR   Used for:  Moderate recurrent major depression (H)        TAKE 1 CAPSULE BY MOUTH EVERY MORNING AND 1 CAPSULE EVERY NIGHT AT BEDTIME   Quantity:  180 capsule   Refills:  0        * Notice:  This list has 6 medication(s) that are the same as other medications prescribed for you. Read the directions carefully, and ask your doctor or other care provider to review them with you.             After Care     Activity       Your activity upon discharge: activity as tolerated, resume prior to admission activity       Daily weights       Call Provider for weight gain of more than 2 pounds per day or 5 pounds per week.       Diet       Follow this diet upon discharge:  Regular Diet Adult  Be sure to drink plenty of non-caffeinated beverages.  Notify your primary provider if you have a poor appetite, are not eating well, and/or have been losing weight       Fall precautions           General info for SNF       Length of Stay Estimate: Long Term Care  Condition at Discharge: Stable  Level of care:skilled   Rehabilitation Potential: Poor  Admission H&P remains valid and up-to-date: Yes  Recent Chemotherapy: N/A  Use Nursing Home Standing Orders: Yes       Glucose monitor nursing POCT       Before meals and at bedtime       Intake and output       Every shift             Referrals     Nutrition Services Adult IP Consult       Reason:  Malnutrition       Physical Therapy Adult Consult       Evaluate and treat as clinically indicated.    Reason:  PD             Follow-Up Appointment Instructions     Adult [facility] Follow-up and recommended labs and tests       Follow up with primary care provider, Bharath Buchanan, within 2 days for hospital follow- up.  The following labs/tests are recommended: CBC. CMP.      Appointments on [facility] and/or [facility] (with [facility] or [facility] provider or service). Call [phone] if you haven't heard regarding these appointments within 7 days of discharge.             Statement of Approval     Ordered          01/27/18 7216  I have reviewed and agree with all the recommendations and orders detailed in this document.  EFFECTIVE NOW     Approved and electronically signed by:  Adilia Bonilla APRN CNP                                                 INTERAGENCY TRANSFER FORM - NURSING   1/26/2018                       UNIT 6D OBSERVATION [facility] BANK: [phone]        "     Attending Provider: Bharath Mullins MD     Allergies:  Seasonal Allergies    Infection:  None   Service:  EMERGENCY ME    Ht:  1.88 m (6' 2\")   Wt:  53.1 kg (117 lb)   Admission Wt:  53.1 kg (117 lb)    BMI:  15.02 kg/m 2   BSA:  1.67 m 2            Advance Directives        Scanned docmt in ACP Activity?           No scanned doc        Immunizations     Name Date      Influenza (High Dose) 3 valent vaccine 10/06/17     Influenza (High Dose) 3 valent vaccine 12/05/16     Influenza (High Dose) 3 valent vaccine 09/28/15     Influenza (High Dose) 3 valent vaccine 12/13/13     Influenza (IIV3) PF 08/29/12     Influenza (IIV3) PF 10/28/11     Influenza (IIV3) PF 10/13/10     Influenza (IIV3) PF 10/22/09     Influenza (IIV3) PF 11/24/08     Influenza (IIV3) PF 11/15/07     Mantoux Tuberculin Skin Test 02/06/15     Pneumo Conj 13-V (2010&after) 01/06/16     Pneumococcal 23 valent 08/31/11     Pneumococcal 23 valent 11/15/07     TDAP Vaccine (Adacel) 01/31/13       ASSESSMENT     Discharge Profile Flowsheet     DISCHARGE NEEDS ASSESSMENT     PAS Number  -- (na) 09/22/17 1318    Concerns To Be Addressed  -- (na) 09/14/17 1323   Senior Linkage Line Referral Placed  -- (na) 09/22/17 1318    Concerns Comments  -- (na) 09/14/17 1323   F/U Appointment Brochure Provided  -- (na) 09/22/17 1318    Equipment Currently Used at Home  bath bench (transport chair as w/c does not fit in home) 09/14/17 0940   Referrals Placed  -- (na) 09/22/17 1318    Equipment Used at Home  bath bench;wheelchair 03/22/16 0807   SKIN      GASTROINTESTINAL (ADULT,PEDIATRIC,OB)     Inspection of bony prominences  Full 01/27/18 0424    GI WDL  WD 01/27/18 0424   Full except areas not inspected   Coccyx;Sacrum 01/26/18 2132    Last Bowel Movement  01/26/18 01/26/18 2132   Inspection under devices  Full 01/27/18 0424    COMMUNICATION ASSESSMENT     Skin WDL  WDL 01/27/18 0424    Patient's communication style  spoken language (English or Bilingual) " "01/26/18 1800   SAFETY      FINAL RESOURCES     Safety WDL  WDL 01/27/18 0424    Resources List  Transitional Care 09/22/17 1318   All Alarms  alarm(s) activated and audible 01/27/18 0424                 Assessment WDL (Within Defined Limits) Definitions           Safety WDL     Effective: 09/28/15    Row Information: <b>WDL Definition:</b> Bed in low position, wheels locked; call light in reach; upper side rails up x 2; ID band on<br> <font color=\"gray\"><i>Item=AS safety wdl>>List=AS safety wdl>>Version=F14</i></font>      Skin WDL     Effective: 09/28/15    Row Information: <b>WDL Definition:</b> Warm; dry; intact; elastic; without discoloration; pressure points without redness<br> <font color=\"gray\"><i>Item=AS skin wdl>>List=AS skin wdl>>Version=F14</i></font>      Vitals     Vital Signs Flowsheet     VITAL SIGNS     CLINICALLY ALIGNED PAIN ASSESSMENT (CAPA) ([facility], [facility], [place] AND [facility] ADULTS ONLY)      Temp  98  F (36.7  C) 01/27/18 1502   Comfort  negligible pain 01/26/18 1656    Temp src  Oral 01/27/18 1502   HEIGHT AND WEIGHT      Resp  18 01/27/18 1502   Height  1.88 m (6' 2\") 01/26/18 1154    Pulse  67 01/26/18 1656   Height Method  Stated 01/26/18 1154    Heart Rate  65 01/27/18 1502   Weight  53.1 kg (117 lb) 01/26/18 1154    Pulse/Heart Rate Source  Monitor 01/27/18 1502   BSA (Calculated - sq m)  1.66 01/26/18 1154    BP  157/90 01/27/18 1502   BMI (Calculated)  15.05 01/26/18 1154    BP Location  Left arm 01/27/18 1502   EKG MONITORING      LYING ORTHOSTATIC BP     Cardiac Regularity  Regular 01/26/18 1253    Lying Orthostatic BP  163/88 01/27/18 1155   Cardiac Rhythm  SB 01/26/18 1253    Lying Orthostatic Pulse  57 bpm 01/27/18 1155   GENI COMA SCALE      SITTING ORTHOSTATIC BP     Best Eye Response  4-->(E4) spontaneous 01/27/18 0327    Sitting Orthostatic BP  168/100 01/27/18 1155   Best Motor Response  6-->(M6) obeys commands 01/27/18 0327    Sitting Orthostatic Pulse  65 bpm 01/27/18 1155   Best " Verbal Response  4-->(V4) confused 01/27/18 0327    OXYGEN THERAPY     [place] Coma Scale Score  14 01/27/18 0327    SpO2  97 % 01/27/18 1502   POSITIONING      O2 Device  None (Room air) 01/27/18 1502   Body Position  independently positioning 01/27/18 1532    PAIN/COMFORT     DAILY CARE      Patient Currently in Pain  denies 01/27/18 0718   Activity Management  activity adjusted per tolerance 01/27/18 0424    Preferred Pain Scale  CAPA (Clinically Aligned Pain Assessment) ([facility], [facility] and [facility] Adults Only) 01/27/18 0326   Activity Assistance Provided  assistance, 1 person 01/27/18 1532            Patient Lines/Drains/Airways Status    Active LINES/DRAINS/AIRWAYS     None            Patient Lines/Drains/Airways Status    Active PICC/CVC     None            Intake/Output Detail Report     Date Intake   Output Net    Shift I.V. IV Piggyback Total Urine Total       [place] 01/26/18 0700 - 01/26/18 1459 -- -- -- -- -- 0    Noc 01/26/18 1500 - 01/26/18 2359 -- -- -- -- -- 0    Day 01/27/18 0000 - 01/27/18 0659 -- -- -- -- -- 0    [place] 01/27/18 0700 - 01/27/18 1459 -- -- -- -- -- 0    Noc 01/27/18 1500 - 01/27/18 2359 2902 -- 2902       Last Void/BM       Most Recent Value    Urine Occurrence 1 at 01/27/2018 1130    Stool Occurrence       Case Management/Discharge Planning     Case Management/Discharge Planning Flowsheet     REFERRAL INFORMATION     Equipment Currently Used at Home  bath bench (transport chair as w/c does not fit in home) 09/14/17 0940    Arrived From  assisted living facility 10/28/11 2224   Resources List  Transitional Care 09/22/17 1318    LIVING ENVIRONMENT     PAS Number  -- (na) 09/22/17 1318    Lives With  other (see comments) (nursing home) 01/26/18 1807   Senior Linkage Line Referral Placed  -- (na) 09/22/17 1318    Living Arrangements  house 07/02/16 1527   F/U Appointment Brochure Provided  -- (na) 09/22/17 1318    COPING/STRESS     Referrals Placed  -- (na) 09/22/17 1318    Major  Change/Loss/Stressor  family/significant other illness 01/26/18 1808   ABUSE RISK SCREEN      ASSESSMENT/CONCERNS TO BE ADDRESSED     QUESTION TO PATIENT:  Has a member of your family or a partner(now or in the past) intimidated, hurt, manipulated, or controlled you in any way?  no 01/26/18 1800    Concerns To Be Addressed  -- (na) 09/14/17 1323   QUESTION TO PATIENT: Do you feel safe going back to the place where you are living?  yes 01/26/18 1800    Concerns Comments  -- (na) 09/14/17 1323   OBSERVATION: Is there reason to believe there has been maltreatment of a vulnerable adult (ie. Physical/Sexual/Emotional abuse, self neglect, lack of adequate food, shelter, medical care, or financial exploitation)?  no 01/26/18 1800    DISCHARGE PLANNING     OTHER      Skilled Nursing Facility  -- ([facility] 11/07/11 1046   Are you depressed or being treated for depression?  Yes 01/26/18 1800    Equipment Used at Home  bath bench;wheelchair 03/22/16 0807   HOMICIDE RISK      FINAL RESOURCES     Feels Like Hurting Others  no 01/26/18 1157                  UNIT 6D OBSERVATION [facility] BANK: [phone]            Medication Administration Report for JeromeSanjay as of 01/27/18 1904   Legend:    Given Hold Not Given Due Canceled Entry Other Actions    Time Time (Time) Time  Time-Action       Inactive    Active    Linked        Medications 01/21/18 01/22/18 01/23/18 01/24/18 01/25/18 01/26/18 01/27/18    0.9% sodium chloride infusion  Rate: 125 mL/hr Dose: 1000 mL  Freq: CONTINUOUS Route: IV  Last Dose: Stopped (01/27/18 1815)  Start: 01/26/18 1208   Admin Instructions: Administer after the bolus.          2100 (1,000 mL)-Rate/Dose Verify       2112 (1,000 mL)-Rate/Dose Verify       2200 (1,000 mL)-Rate/Dose Verify        0303 (1,000 mL)-New Bag       0624 (1,000 mL)-Rate/Dose Verify       0831 (1,000 mL)-Rate/Dose Verify       1300 (1,000 mL)-Rate/Dose Verify       1400 (1,000 mL)-Rate/Dose Verify       1507 (1,000  mL)-Rate/Dose Verify       1600 (1,000 mL)-Rate/Dose Verify       1700 (1,000 mL)-Rate/Dose Verify       1808 (1,000 mL)-Rate/Dose Verify       1815-Stopped           acetaminophen (TYLENOL) Suppository 650 mg  Dose: 650 mg  Freq: EVERY 4 HOURS PRN Route: RE  PRN Reason: mild pain  Start: 01/26/18 1833   Admin Instructions: Alternate ibuprofen (if ordered) with acetaminophen.  Maximum acetaminophen dose from all sources = 75 mg/kg/day not to exceed 4 grams/day.               acetaminophen (TYLENOL) tablet 650 mg  Dose: 650 mg  Freq: EVERY 4 HOURS PRN Route: PO  PRN Reason: mild pain  Start: 01/26/18 1833   Admin Instructions: Alternate ibuprofen (if ordered) with acetaminophen.  Maximum acetaminophen dose from all sources = 75 mg/kg/day not to exceed 4 grams/day.               amLODIPine (NORVASC) tablet 5 mg  Dose: 5 mg  Freq: EVERY MORNING Route: PO  Start: 01/27/18 0800          0843 (5 mg)-Given           carbidopa-levodopa (SINEMET CR)  MG per CR tablet 1 tablet  Dose: 1 tablet  Freq: AT BEDTIME Route: PO  Start: 01/26/18 2200   Admin Instructions: DO NOT CRUSH.          2209 (1 tablet)-Given        [ ] 2200           carbidopa-levodopa (SINEMET)  MG per tablet 2 tablet  Dose: 2 tablet  Freq: 2 TIMES DAILY Route: PO  Start: 01/27/18 0600          0619 (2 tablet)-Given       1208 (2 tablet)-Given           carbidopa-levodopa half-tab 12.5-50 mg  Dose: 1 half-tab  Freq: 3 TIMES DAILY Route: PO  Start: 01/26/18 1900 2056 (1 half-tab)-Given        0913 (1 half-tab)-Given       1509 (1 half-tab)-Given       1809 (1 half-tab)-Given          And  carbidopa-levodopa (SINEMET)  MG per tablet 1 tablet  Dose: 1 tablet  Freq: 3 TIMES DAILY Route: PO  Start: 01/26/18 1900         2055 (1 tablet)-Given        0902 (1 tablet)-Given       1508 (1 tablet)-Given       1809 (1 tablet)-Given           cephalexin (KEFLEX) capsule 500 mg  Dose: 500 mg  Freq: EVERY 12 HOURS SCHEDULED Route: PO  Indications  "of Use: URINARY TRACT INFECTION  Start: 01/27/18 2000          [ ] 2000           clonazePAM (klonoPIN) ODT tab 0.25 mg  Dose: 0.25 mg  Freq: ONCE Route: PO  Start: 01/26/18 2230          (0000)-Not Given           donepezil (ARIcept) 7.5 mg  Dose: 7.5 mg  Freq: 2 TIMES DAILY Route: PO  Last Dose: Stopped (01/27/18 0922)  Start: 01/26/18 2000 2059 (7.5 mg)-New Bag       2208-Stopped        0920 (7.5 mg)-New Bag [C]              0922-Stopped       [ ] 2000           ipratropium - albuterol 0.5 mg/2.5 mg/3 mL (DUONEB) neb solution 3 mL  Dose: 3 mL  Freq: EVERY 4 HOURS PRN Route: NEBULIZATION  PRN Reason: wheezing  Start: 01/27/18 1000              lactobacillus rhamnosus (GG) (CULTURELL) capsule 1 capsule  Dose: 1 capsule  Freq: EVERY MORNING Route: PO  Start: 01/27/18 0800   Admin Instructions: Administer at least 2 hours before or after oral antibiotics. Capsules may be opened.           0845 (1 capsule)-Given           lidocaine (LMX4) kit  Freq: EVERY 1 HOUR PRN Route: Top  PRN Reason: pain  PRN Comment: with VAD insertion or accessing implanted port.  Start: 01/26/18 1833   Admin Instructions: Do NOT give if patient has a history of allergy to any local anesthetic or any \"nora\" product.  Apply 30 minutes prior to VAD insertion or port access. MAX Dose: 2.5 g (  of 5 g tube).               lidocaine (LMX4) kit  Freq: EVERY 1 HOUR PRN Route: Top  PRN Reason: pain  PRN Comment: with VAD insertion or accessing implanted port.  Start: 01/26/18 1206   Admin Instructions: Do NOT give if patient has a history of allergy to any local anesthetic or any \"nora\" product.   Apply 30 minutes prior to VAD insertion or port access.  MAX Dose:  2.5 g (  of 5 g tube)               lidocaine 1 % 1 mL  Dose: 1 mL  Freq: EVERY 1 HOUR PRN Route: OTHER  PRN Comment: mild pain with VAD insertion or accessing implanted port  Start: 01/26/18 1833   Admin Instructions: Do NOT give if patient has a history of allergy to any local " "anesthetic or any \"nora\" product. MAX dose 1 mL subcutaneous OR intradermal in divided doses.               lidocaine 1 % 1 mL  Dose: 1 mL  Freq: EVERY 1 HOUR PRN Route: OTHER  PRN Comment: mild pain with VAD insertion or accessing implanted port  Start: 01/26/18 1206   Admin Instructions: Do NOT give if patient has a history of allergy to any local anesthetic or any \"nora\" product. MAX dose 1 mL subcutaneous OR intradermal in divided doses.               lisinopril (PRINIVIL/ZESTRIL) tablet 5 mg  Dose: 5 mg  Freq: DAILY Route: PO  Start: 01/27/18 0800          0846 (5 mg)-Given           mirtazapine (REMERON) tablet 30 mg  Dose: 30 mg  Freq: AT BEDTIME Route: PO  Start: 01/26/18 2200         2209 (30 mg)-Given        [ ] 2200           nitroGLYcerin (NITROSTAT) sublingual tablet 0.4 mg  Dose: 0.4 mg  Freq: EVERY 5 MIN PRN Route: SL  PRN Reason: chest pain  Start: 01/26/18 1833   Admin Instructions: Maximum 3 doses in 15 minutes.  Notify provider if no relief after 3 doses.    Do NOT give nitroglycerin SL IF the patient has taken avanafil (STENDRA), sildenafil (VIAGRA) or (REVATIO) within the last 8 hours, vardenafil (LEVITRA) or (STAXYN) within the last 18 hours, tadalafil (CIALIS) or (ADCIRCA) within the last 36 hours. Inform provider if patient has taken one of these medications.  If patient is still having acute angina requiring treatment, an alternative treatment option may be used such as: IV beta-blocker [2.5 mg - 5 mg metoprolol (LOPRESSOR)] if ordered by a provider.               ondansetron (ZOFRAN-ODT) ODT tab 4 mg  Dose: 4 mg  Freq: EVERY 6 HOURS PRN Route: PO  PRN Reasons: nausea,vomiting  Start: 01/26/18 1833   Admin Instructions: This is Step 1 of nausea and vomiting management.  If nausea not resolved in 15 minutes, go to Step 2 prochlorperazine (COMPAZINE). Do not push through foil backing. Peel back foil and gently remove. Place on tongue immediately. Administration with liquid unnecessary  With " dry hands, peel back foil backing and gently remove tablet; do not push oral disintegrating tablet through foil backing; administer immediately on tongue and oral disintegrating tablet dissolves in seconds; then swallow with saliva; liquid not required.              Or  ondansetron (ZOFRAN) injection 4 mg  Dose: 4 mg  Freq: EVERY 6 HOURS PRN Route: IV  PRN Reasons: nausea,vomiting  Start: 01/26/18 1833   Admin Instructions: This is Step 1 of nausea and vomiting management.  If nausea not resolved in 15 minutes, go to Step 2 prochlorperazine (COMPAZINE).  Irritant. For ordered doses up to 4 mg, give IV Push undiluted over 2-5 minutes.               QUEtiapine (SEROquel) tablet 50 mg  Dose: 50 mg  Freq: AT BEDTIME Route: PO  Start: 01/26/18 2200         2209 (50 mg)-Given        [ ] 2200           ranitidine (ZANTAC) tablet 75 mg  Dose: 75 mg  Freq: AT BEDTIME Route: PO  Start: 01/26/18 2200         2209 (75 mg)-Given        [ ] 2200           sodium chloride (PF) 0.9% PF flush 3 mL  Dose: 3 mL  Freq: EVERY 8 HOURS Route: IK  Start: 01/26/18 1845   Admin Instructions: And Q1H PRN, to lock peripheral IV dormant line.          (2112)-Not Given        (0245)-Not Given       (1045)-Not Given       (1809)-Not Given           sodium chloride (PF) 0.9% PF flush 3 mL  Dose: 3 mL  Freq: EVERY 1 HOUR PRN Route: IK  PRN Reason: line flush  Start: 01/26/18 1833   Admin Instructions: for peripheral IV flush post IV meds               sodium chloride (PF) 0.9% PF flush 3 mL  Dose: 3 mL  Freq: EVERY 8 HOURS Route: IK  Start: 01/26/18 1208   Admin Instructions: And Q1H PRN, to lock peripheral IV dormant line.          1323 (3 mL)-Given       (2112)-Not Given        (0408)-Not Given       (1212)-Not Given       [ ] 2008           sodium chloride (PF) 0.9% PF flush 3 mL  Dose: 3 mL  Freq: EVERY 1 HOUR PRN Route: IK  PRN Reason: line flush  PRN Comment: for peripheral IV flush post IV meds  Start: 01/26/18 1206              venlafaxine  (EFFEXOR-XR) 24 hr capsule 75 mg  Dose: 75 mg  Freq: 2 TIMES DAILY Route: PO  Start: 01/26/18 2000   Admin Instructions: DO NOT CRUSH.          2059 (75 mg)-Given        0847 (75 mg)-Given       [ ] 2000          Completed Medications  Medications 01/21/18 01/22/18 01/23/18 01/24/18 01/25/18 01/26/18 01/27/18         Dose: 1,000 mL  Freq: ONCE Route: IV  Last Dose: Stopped (01/26/18 1450)  Start: 01/26/18 1208   End: 01/26/18 1450         1322 (1,000 mL)-New Bag       1450-Stopped              Dose: 2 tablet  Freq: ONCE Route: PO  Start: 01/26/18 1215   End: 01/26/18 1236         1236 (2 tablet)-Given              Dose: 3 half-tab  Freq: ONCE Route: PO  Start: 01/26/18 1522   End: 01/26/18 1550         1550 (3 half-tab)-Given           Discontinued Medications  Medications 01/21/18 01/22/18 01/23/18 01/24/18 01/25/18 01/26/18 01/27/18         Dose: 1-2 tablet  Freq: 5 TIMES DAILY Route: PO  Start: 01/26/18 1845   End: 01/26/18 1858                1858-Med Discontinued          Dose: 2 tablet  Freq: ONCE Route: PO  Start: 01/26/18 1521   End: 01/26/18 1521                1521-Med Discontinued          Dose: 1 g  Freq: EVERY 24 HOURS Route: IV  Indications of Use: URINARY TRACT INFECTION  Start: 01/26/18 1900   End: 01/27/18 1502   Admin Instructions: Give IVP over 3 minutes          2100 (1 g)-Given        1502-Med Discontinued         Dose: 2.5 mg  Freq: 2 TIMES DAILY Route: PO  Start: 01/26/18 2000   End: 01/26/18 1847         1847-Med Discontinued          Dose: 3 mL  Freq: 4 TIMES DAILY Route: NEBULIZATION  Start: 01/26/18 2115   End: 01/27/18 0955                 (0759)-Not Given       0955-Med Discontinued         Dose: 75 mg  Freq: 2 TIMES DAILY Route: PO  Start: 01/26/18 2000   End: 01/26/18 1854   Admin Instructions: DO NOT CRUSH.          1854-Med Discontinued     Medications 01/21/18 01/22/18 01/23/18 01/24/18 01/25/18 01/26/18 01/27/18               INTERAGENCY TRANSFER FORM - NOTES (H&P, Discharge  Summary, Consults, Procedures, Therapies)   1/26/2018                       UNIT 6D OBSERVATION [facility] BANK: [phone]               History & Physicals      H&P by Jared Alvarado PA at 1/26/2018  9:08 PM     Author:  Jared Alvarado PA Service:  Emergency Medicine Author Type:  Physician Assistant - C    Filed:  1/27/2018  5:07 AM Date of Service:  1/26/2018  9:08 PM Creation Time:  1/26/2018  9:07 PM    Status:  Cosign Needed :  Jared Alvarado PA (Physician Assistant - C)    Cosign Required:  Yes             [facility] ED Observation Admission Note    Chief Complaint   Patient presents with     Loss of Consciousness       Assessment/Plan:  1.[SB1.1] Syncope: At patients NH was being helped to the bathroom when he started feeling lightheaded and had a syncopal episode that lasted about 5 seconds. The nurses at the nursing home state that he did not fall to the ground as they were able to grab him so he did not hit his head. Patients wife denies being told that the patient has had any fever, URI symptoms, GI symptoms, seizure activity. Patients wife is concerned about patients steady weight loss since Sept 2017 of about 30 lbs and thinks this is secondary to change of environment from home to NH and poor appetite. She reports that his current mental status is his baseline. In ED, HR 50's-60's, 's-150's/60's-100's, RR 10-18, SaO2 96-99% on RA, Temp 97.3. Labs show normal CMP, troponin I. CBC with H/H 11.6/36.3 otherwise normal. INR 1.16. CXR negative. CT Head negative. Bedside Echo normal. In the ED the patient was given 1L NS bolus and 2 of his Sinemet doses. He is being admitted to the Observation Unit for syncope work up.    - Serial troponins q4h x 2 more  - Check TSH in AM  - Continuous telemetry  - Resting Echo in the morning  - Keep Mag > 2, K > 4  - Orthostatic vitals now and qshift  - Ambulate with assistance  - IVF with NS at 125ml/hr  - Consider discharge with  "Awais[SB1.2]    2.[SB1.1] Acute UTI: UA with 5 ketones, 10 protein, moderate LE, 13 WBC, 2 RBC, few bacteria. UCx was sent and pending.   - Continue with Rocephin  - Follow UCx    Chronic Medical Problems:  ## PD: - Continue PTA sinemet and donepezil      ## Depression: - Continue venlafaxine, seroquel, remeron    ##  COPD: - Duoneb q4h while awake as he did not bring inhalers    ## HTN: - Continue with Lisinopril and Amlodipine[SB1.2]      HPI:[SB1.1]  (unable to get reliable history from patient. Called patients wife to get history)    Sanjay Cano is a 72 year old male with a history of COPD, Parkinson's disease, and falls who presented to the ED today from his nursing home for evaluation following a syncopal episode. Per ED report: \"It is reported by EMS that the patient was being helped to the bathroom when he started feeling lightheaded and had a syncopal episode that lasted about 5 seconds. The nurses at the nursing home state that he did not fall to the ground as they were able to grab him so he did not hit his head. The patient was able to report that he has also been having a decreased appetite lately. He reports that he has never had anything like this before.\" The patient reports that has has been feeling lightheaded with standing recently. The patient's wife echoes the same account of EMS as she was not present. She denies being told that the patient has had any fever, URI symptoms, GI symptoms, seizure activity. Patients wife is concerned about patients steady weight loss since Sept 2017 of about 30 lbs and thinks this is secondary to change of environment from home to NH and poor appetite. She reports that his current mental status is his baseline. He especially does not do well with new environmental changes and also late at night. He take Klonipin 0.25mg every other night but she is not sure if he took it yesterday however that may help him settle.     In the ED, HR 50's-60's, BP " 110's-150's/60's-100's, RR 10-18, SaO2 96-99% on RA, Temp 97.3. Labs show normal CMP, troponin I. CBC with H/H 11.6/36.3 otherwise normal. INR 1.16. UA with 5 ketones, 10 protein, moderate LE, 13 WBC, 2 RBC, few bacteria. UCx was sent and pending. CXR negative. CT Head negative. Bedside Echo normal. In the ED the patient was given 1L NS bolus and 2 of his Sinemet doses. He is being admitted to the Observation Unit for syncope work up.[SB1.2]      On admission to the observation unit the patient was stable[SB1.1].[SB1.2]     History:    Past Medical History:   Diagnosis Date     At risk for falling 9/3/2012     COPD (chronic obstructive pulmonary disease) (H)      Family history of thyroid cancer 7/13/2016     Malignant neoplasm (H)     skin - nose     Moderate recurrent major depression (H) 5/17/2012     Paralysis agitans (H)     Parkinson's Disease     Parkinson disease (H)      Rosacea        Past Surgical History:   Procedure Laterality Date     EYE SURGERY       ORTHOPEDIC SURGERY       PHACOEMULSIFICATION CLEAR CORNEA WITH STANDARD INTRAOCULAR LENS IMPLANT  5/21/2014    Procedure: PHACOEMULSIFICATION CLEAR CORNEA WITH STANDARD INTRAOCULAR LENS IMPLANT;  Surgeon: Tomy Hunter MD;  Location: [facility]     PHACOEMULSIFICATION CLEAR CORNEA WITH TORIC INTRAOCULAR LENS IMPLANT  4/30/2014    Procedure: PHACOEMULSIFICATION CLEAR CORNEA WITH TORIC INTRAOCULAR LENS IMPLANT;  Surgeon: Tomy Hunter MD;  Location: [facility]       Family History   Problem Relation Age of Onset     CEREBROVASCULAR DISEASE Mother      DIABETES Mother      GASTROINTESTINAL DISEASE Mother      Hypertension Mother      Neurologic Disorder Father      CEREBROVASCULAR DISEASE Father      CEREBROVASCULAR DISEASE Maternal Grandfather      CANCER Paternal Grandmother      Other - See Comments Sister      gi - unknown       Social History     Social History     Marital status:      Spouse name: N/A     Number of children: N/A     Years of  education: N/A     Occupational History     Not on file.     Social History Main Topics     Smoking status: Former Smoker     Packs/day: 0.01     Years: 40.00     Types: Cigarettes     Quit date: 7/31/2008     Smokeless tobacco: Never Used      Comment: very passive cigarettes in 6 months     Alcohol use No     Drug use: No     Sexual activity: Yes     Partners: Female     Other Topics Concern     Parent/Sibling W/ Cabg, Mi Or Angioplasty Before 65f 55m? No     Social History Narrative         No current facility-administered medications on file prior to encounter.   Current Outpatient Prescriptions on File Prior to Encounter:  clonazePAM (KLONOPIN) 0.5 MG ODT tab Take 0.25 mg by mouth every other night   ranitidine (ZANTAC) 75 MG tablet Take 1 tablet (75 mg) by mouth At Bedtime   methylcellulose (CITRUCEL) 500 MG TABS tablet Take 1 tablet (500 mg) by mouth daily as needed   lactobacillus rhamnosus, GG, (CULTURELL) capsule Take 1 capsule by mouth every morning   amLODIPine (NORVASC) 5 MG tablet Take 1 tablet (5 mg) by mouth every morning   Ipratropium-Albuterol (COMBIVENT RESPIMAT)  MCG/ACT inhaler Inhale 1 puff into the lungs 4 times daily May take 1 to 2 additional doses as needed during the day   lisinopril (PRINIVIL/ZESTRIL) 5 MG tablet Take 1 tablet (5 mg) by mouth daily   cholecalciferol (VITAMIN D) 1000 UNIT tablet Take 1 tablet (1,000 Units) by mouth every morning   QUEtiapine (SEROQUEL) 50 MG tablet TAKE 1 TABLET(50 MG) BY MOUTH AT BEDTIME   COMBIVENT RESPIMAT  MCG/ACT inhaler INHALE 1 PUFF BY MOUTH FOUR TIMES DAILY. NOT TO EXCEED 6 DOSES PER DAY   venlafaxine (EFFEXOR-XR) 75 MG 24 hr capsule TAKE 1 CAPSULE BY MOUTH EVERY MORNING AND 1 CAPSULE EVERY NIGHT AT BEDTIME   FLOVENT  MCG/ACT Inhaler INHALE 2 PUFFS INTO THE LUNGS TWICE DAILY   aspirin 81 MG tablet Take by mouth twice a week   mirtazapine (REMERON) 15 MG tablet TAKE 1 TABLET(15 MG) BY MOUTH AT BEDTIME (Patient taking differently:  TAKE 2 TABLETs (30 MG) BY MOUTH AT BEDTIME)   donepezil (ARICEPT) 10 MG tablet Take 5mg  by mouth twice a day (take with the 2.5 mg dose for a total dose of 7.5mg twice daily)   carbidopa-levodopa (SINEMET CR)  MG per CR tablet Take 1 tablet by mouth At Bedtime   carbidopa-levodopa (SINEMET)  MG per tablet Take 1-2 tablets by mouth 5 times daily Takes 2 tablets at 6 AM, 1.5 tablets at 9 AM, 2 tablets at noon, 1.5 tablets at 3 PM, 1.5 tablets at 6 PM. Can take additional 0.5 tablet in the middle of the night if needed.   donepezil (ARICEPT) 5 MG tablet Take 2.5 mg twice a day (take with the 5 mg dose for a total dose of 7.5mg twice daily)       Data:    Results for orders placed or performed during the hospital encounter of 01/26/18   XR Chest Port 1 View    Narrative    XR CHEST PORT 1 VW 1/26/2018 12:31 PM    History: syncope;     Comparison: 8/9/2017, CT from 4/18/2017    Findings: AP view of the chest. A repeat view of the apices was also  obtained. The cardiomediastinal silhouette is within normal limits.  Pulmonary vasculature is normal. No focal airspace opacities. No  significant pleural effusion or pneumothorax.      Impression    Impression: No acute airspace disease.    I have personally reviewed the examination and initial interpretation  and I agree with the findings.    ANT FIELDS MD   CT Head w/o Contrast    Narrative    TEMPORARY 1/26/2018 1:53 PM    Provided History: Syncope in Parkinson's    Comparison: Head CT 6/20/2016, brain MR 10/19/2011, head CT10/12/2011    Technique: Using multidetector thin collimation helical acquisition  technique, axial, coronal and sagittal CT images from the skull base  to the vertex were obtained without intravenous contrast.     Findings:    No intracranial hemorrhage, mass effect, or midline shift. The  ventricles are proportionate to the cerebral sulci. The gray to white  matter differentiation of the cerebral hemispheres is preserved. The  basal  cisterns are patent. Mild generalized cerebral volume loss.    The visualized paranasal sinuses are clear. The mastoid air cells are  clear. Bilateral pseudophakia. Right scleral buckle.         Impression    Impression:  No acute intracranial pathology.    I have personally reviewed the examination and initial interpretation  and I agree with the findings.    CHAD GODDARD MD   POC US ECHO LIMITED    Impression    Limited Bedside Cardiac Ultrasound, performed and interpreted by me.   Indication: syncope.  Parasternal long axis, parasternal short axis and apical 4 chamber views were acquired.   Image quality was satisfactory.    Findings:    Global left ventricular function appears intact.  Chambers do not appear dilated.  There is no evidence of free fluid within the pericardium.    IMPRESSION: Grossly normal left ventricular function and chamber size.  No pericardial effusion. IVC without collapse..     CBC with platelets differential   Result Value Ref Range    WBC 7.7 4.0 - 11.0 10e9/L    RBC Count 3.72 (L) 4.4 - 5.9 10e12/L    Hemoglobin 11.6 (L) 13.3 - 17.7 g/dL    Hematocrit 36.3 (L) 40.0 - 53.0 %    MCV 98 78 - 100 fl    MCH 31.2 26.5 - 33.0 pg    MCHC 32.0 31.5 - 36.5 g/dL    RDW 12.4 10.0 - 15.0 %    Platelet Count 222 150 - 450 10e9/L    Diff Method Automated Method     % Neutrophils 81.4 %    % Lymphocytes 9.6 %    % Monocytes 7.4 %    % Eosinophils 1.4 %    % Basophils 0.1 %    % Immature Granulocytes 0.1 %    Nucleated RBCs 0 0 /100    Absolute Neutrophil 6.3 1.6 - 8.3 10e9/L    Absolute Lymphocytes 0.7 (L) 0.8 - 5.3 10e9/L    Absolute Monocytes 0.6 0.0 - 1.3 10e9/L    Absolute Eosinophils 0.1 0.0 - 0.7 10e9/L    Absolute Basophils 0.0 0.0 - 0.2 10e9/L    Abs Immature Granulocytes 0.0 0 - 0.4 10e9/L    Absolute Nucleated RBC 0.0    Partial thromboplastin time   Result Value Ref Range    PTT 33 22 - 37 sec   INR   Result Value Ref Range    INR 1.16 (H) 0.86 - 1.14   Comprehensive metabolic panel    Result Value Ref Range    Sodium 142 133 - 144 mmol/L    Potassium 4.0 3.4 - 5.3 mmol/L    Chloride 108 94 - 109 mmol/L    Carbon Dioxide 29 20 - 32 mmol/L    Anion Gap 6 3 - 14 mmol/L    Glucose 80 70 - 99 mg/dL    Urea Nitrogen 27 7 - 30 mg/dL    Creatinine 0.98 0.66 - 1.25 mg/dL    GFR Estimate 75 >60 mL/min/1.7m2    GFR Estimate If Black >90 >60 mL/min/1.7m2    Calcium 8.3 (L) 8.5 - 10.1 mg/dL    Bilirubin Total 0.5 0.2 - 1.3 mg/dL    Albumin 3.3 (L) 3.4 - 5.0 g/dL    Protein Total 6.0 (L) 6.8 - 8.8 g/dL    Alkaline Phosphatase 96 40 - 150 U/L    ALT 8 0 - 70 U/L    AST 8 0 - 45 U/L   Magnesium   Result Value Ref Range    Magnesium 2.0 1.6 - 2.3 mg/dL   Troponin I   Result Value Ref Range    Troponin I ES <0.015 0.000 - 0.045 ug/L   UA with Microscopic reflex to Culture   Result Value Ref Range    Color Urine Yellow     Appearance Urine Clear     Glucose Urine Negative NEG^Negative mg/dL    Bilirubin Urine Negative NEG^Negative    Ketones Urine 5 (A) NEG^Negative mg/dL    Specific Gravity Urine 1.017 1.003 - 1.035    Blood Urine Negative NEG^Negative    pH Urine 6.5 5.0 - 7.0 pH    Protein Albumin Urine 10 (A) NEG^Negative mg/dL    Urobilinogen mg/dL 2.0 0.0 - 2.0 mg/dL    Nitrite Urine Negative NEG^Negative    Leukocyte Esterase Urine Moderate (A) NEG^Negative    Source Catheterized Urine     WBC Urine 13 (H) 0 - 2 /HPF    RBC Urine 2 0 - 2 /HPF    Bacteria Urine Few (A) NEG^Negative /HPF    Squamous Epithelial /HPF Urine 1 0 - 1 /HPF    Mucous Urine Present (A) NEG^Negative /LPF   CBC with platelets   Result Value Ref Range    WBC 6.5 4.0 - 11.0 10e9/L    RBC Count 3.80 (L) 4.4 - 5.9 10e12/L    Hemoglobin 11.9 (L) 13.3 - 17.7 g/dL    Hematocrit 37.2 (L) 40.0 - 53.0 %    MCV 98 78 - 100 fl    MCH 31.3 26.5 - 33.0 pg    MCHC 32.0 31.5 - 36.5 g/dL    RDW 12.6 10.0 - 15.0 %    Platelet Count 222 150 - 450 10e9/L   Troponin I - Now then in 4 hours x 2   Result Value Ref Range    Troponin I ES <0.015 0.000 - 0.045  ug/L   EKG 12-lead, tracing only   Result Value Ref Range    Interpretation ECG Click View Image link to view waveform and result              EKG Interpretation:      EKG Number:[SB1.1] 1[SB1.2]  Interpreted by Jared Alvarado PA-C   Symptoms at time of EKG:[SB1.1] syncope   Rhythm: normal sinus   Rate: normal  Axis: normal  Ectopy: none  Conduction: First-degree heart block  ST Segments/ T Waves: No ST-T wave changes  Q Waves: none  Comparison to prior: No old EKG available     Clinical Impression: normal EKG[SB1.2]    ROS:    Review Of Systems  Skin:[SB1.1] negative[SB1.2]  Eyes:[SB1.1] negative[SB1.2]  Ears/Nose/Throat:[SB1.1] negative[SB1.2]  Respiratory:[SB1.1] No shortness of breath, dyspnea on exertion, cough, or hemoptysis[SB1.2]  Cardiovascular:[SB1.1] positive for syncope or near-syncope, negative for, palpitations, tachycardia, chest pain and lower extremity edema[SB1.2]  Gastrointestinal:[SB1.1] negative for, poor appetite, dysphagia and nausea[SB1.2]  Genitourinary:[SB1.1] negative[SB1.2]  Musculoskeletal:[SB1.1] negative[SB1.2]  Neurologic:[SB1.1] negative[SB1.2]  Psychiatric:[SB1.1] negative[SB1.2]  Hematologic/Lymphatic/Immunologic:[SB1.1] negative[SB1.2]  Endocrine:[SB1.1] negative[SB1.2]  PCP:[SB1.1] Dr. Buchanan[SB1.2]    10 point ROS negative other than the symptoms noted above.      Exam:  Vitals:  B/P: 142/79, T: 97.3, P: 67, R: 16    Constitutional:[SB1.1] healthy, alert, no distress and uncooperative[SB1.2]  Head:[SB1.1] Normocephalic. No masses, lesions, tenderness or abnormalities[SB1.2]  Neck:[SB1.1] Neck supple. No adenopathy. Thyroid symmetric, normal size,, Carotids without bruits.[SB1.2]  ENT:[SB1.1] ENT exam normal, no neck nodes or sinus tenderness[SB1.2]  Cardiovascular:[SB1.1] negative, PMI normal. No lifts, heaves, or thrills. RRR. No murmurs, clicks gallops or rub[SB1.2]  Respiratory:[SB1.1] negative, Percussion normal. Good diaphragmatic excursion. Lungs  clear[SB1.2]  Gastrointestinal:[SB1.1] Abdomen soft, non-tender. BS normal. No masses, organomegaly[SB1.2]  :[SB1.1] Deferred[SB1.2]  Musculoskeletal:[SB1.1] extremities normal- no gross deformities noted, gait normal and normal muscle tone[SB1.2]  Skin:[SB1.1] no suspicious lesions or rashes[SB1.2]  Neurologic:[SB1.1] Gait normal. Reflexes normal and symmetric. Sensation grossly WNL.[SB1.2]  Psychiatric:[SB1.1] mentation appears normal and affect normal/bright[SB1.2]  Hematologic/Lymphatic/Immunologic:[SB1.1] normal ant/post cervical, axillary, supraclavicular and inguinal nodes[SB1.2]    Consults:[SB1.1] SW, PT[SB1.2]  FEN:[SB1.1] regular diet[SB1.2]  DVT prophylaxis:[SB1.1] SCD[SB1.2]  GI prophylaxis:[SB1.1] zantac[SB1.2]  BM prophylaxis:[SB1.1] pericolace[SB1.2]  Code Status:[SB1.1] full[SB1.2]  Disposition:[SB1.1] NH after syncope work up completed, stable labs and vitals[SB1.2]    Signed:  Jared Alvarado PA-C   January 26, 2018 at 9:07 PM[SB1.1]       Revision History        User Key Date/Time User Provider Type Action    > SB1.2 1/27/2018  5:07 AM Jared Alvarado PA Physician Assistant - C Sign     SB1.1 1/26/2018  9:07 PM Jared Alvarado PA Physician Assistant - C                      Discharge Summaries      Discharge Summaries by Adilia Bonilla APRN CNP at 1/27/2018  3:56 PM     Author:  Adilia Bonilla APRN CNP Service:  Emergency Medicine Author Type:  Nurse Practitioner    Filed:  1/27/2018  6:54 PM Date of Service:  1/27/2018  3:56 PM Creation Time:  1/27/2018  3:56 PM    Status:  Cosign Needed :  Adilia Bonilla APRN CNP (Nurse Practitioner)    Cosign Required:  Yes             ED Observation Discharge Summary    Sanjay Cano   MRN# [id number]  Age: 72 year old   YOB: 1945            Date of Admission:[BH1.1] 0[BH1.2]1/26/2018    Date of Discharge:[BH1.1] 0[BH1.2]1/27/2018[BH1.3]  Admitting Physician:[BH1.1] [BH1.2] Bharath Mullins,  "MD  Discharge Physician:[BH1.1] Dr. Eduardo MD[BH1.3]  NP/PA: Adilia Bonilla CNP      DISCHARGE DIAGNOSIS:[BH1.1]     Syncope    * No resolved hospital problems. *[BH1.4]      INTERVAL HISTORY:[BH1.1] VSS, afebrile.[BH1.3] Back to baseline. No further syncope. Discharge recommendations reviewed with the patient and his wife. Ready to discharge back to facility.[BH1.2]     PHYSICAL EXAM:[BH1.1]   Blood pressure 157/90, pulse 67, temperature 98  F (36.7  C), temperature source Oral, resp. rate 18, height 1.88 m (6' 2\"), weight 53.1 kg (117 lb), SpO2 97 %.[BH1.5]     GENERAL APPEARENCE: PA/O x4. NAD.  SKIN: Clean, dry, and intact   HEENT/NECK: NCAT w/out masses, lesions, or abnormalities. Sclera anicteric, PERRLA, EOMI.  Oral mucosa pink and moist without erythema, exudate, lesions, ulcerations, or thrush. Teeth and gums normal. Neck supple, no masses. No jugular venous distention.   CARDIOVASCULAR: S1, S2 RRR. No murmurs, rubs, or gallops.   RESPIRATORY: Respiratory effort WNL. CTA  bilaterally without crackles/rales/wheeze   GI: Active BS in all 4 quadrants. Abdomen soft and non-tender. No masses or hepatosplenomegaly.  : Deferred  MUSCULOSKELETAL:[BH1.1] Moves all extremities.[BH1.2]   PV: 2+ bilateral radial and pedal pulses. No edema noted.   NEURO: CN II-XII grossly intact. Speech normal. Appropriate throughout interview.   HEME/LYMPH: No visible bleeding.   PSYCHIATRIC:[BH1.1] Affect is flat[BH1.2]  VASCULAR ACCESS: CDI without erythema or discharge. Non-tender.    PROCEDURES AND IMAGING:[BH1.1]   Results for orders placed or performed during the hospital encounter of 01/26/18 (from the past 24 hour(s))   Cardiology General Adult IP Consult: Patient to be seen: Routine within 24 hrs; Call back #: [id number]; Syncope; Consultant may enter orders: Yes    Narrative    Joyce Bhakta MD     1/27/2018  2:49 PM       Cardiology Inpatient Consultation  January 27, 2018    Reason for Consult:  A cardiology consult " was requested by ED observation service to   provide clinical guidance regarding syncope.    HPI:   Sanjay Cano is a 72 year old male without prior cardiac or   seizure history, admitted overnight to ED observation service   with syncope.  Pertinent past history of COPD, parkinsons   disease, and resides in a nursing home.    Patient was being helped to the bathroom by nursing staff, felt   lightheaded as if he was going to fall, though nursing staff held   on to him.  Never hit head or had any trauma.  Lost consciousness   reportedly for 5 seconds.  This has not happened before.    Reportedly has been feeling lightheaded recently.      His wife has been quite upset with the care at the nursing home,   and she believes it is the reason for his decline and failure to   thrive over the last few months.  Not eating, not walking around,   stays slumped in a wheelchair with no physical activity.  He does   have an appointment with his neurologist in a few weeks but he   was seen last fall and they reportedly were pleased with how   patient was doing.    At the time of interview, the patient denies chest pain, dyspnea   at rest or with exertion, orthopnea, PND, palpitations,   lightheadedness, or syncope. The patient did state he was   concerned about the parkinson's progressing however.    Vitals notable for HR relative bradycardia (EKG with 1st degree   AV block).  BP hypertensive.  Normal labs, negative troponin.  UA   positive for UTI. Negative imaging (CXR, CT head, bedside echo).        Review of Systems:    Only notable for weight loss, since fall 2017, thought to be due   to poor PO intake from nursing home food   No convulsions, no post ictal confusion.    PMH:  Past Medical History:   Diagnosis Date     At risk for falling 9/3/2012     COPD (chronic obstructive pulmonary disease) (H)      Family history of thyroid cancer 7/13/2016     Malignant neoplasm (H)     skin - nose     Moderate recurrent major  depression (H) 5/17/2012     Paralysis agitans (H)     Parkinson's Disease     Parkinson disease (H)      Rosacea      Active Problems:  Patient Active Problem List    Diagnosis Date Noted     Syncope 01/26/2018     Priority: Medium     Nocturnal cough 12/05/2016     Priority: Medium     Senile purpura (H) 10/26/2016     Priority: Medium     Essential hypertension with goal blood pressure less than   140/90 08/31/2016     Priority: Medium     H/O recurrent urinary tract infection 08/26/2016     Priority: Medium     Family history of thyroid cancer 07/13/2016     Priority: Medium     Medullary ca grandparent, 1st cousin different thyroid ca       Pulmonary nodules 07/12/2016     Priority: Medium     Incidental, CT (ruled out pulmonary emboli)  NO change 4/18/17,   followup 12 months.    2 8 mm left lung nodules upper and lower    >6-8             Initial FU CT at 6-12 mo, then                       Initial FU CT at 3-6 mo.                        18-24 mo if no   change                                 Then at 9-12 & 24 mo.                                                                                              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    Baylee Matta  8/29/2012        Health Care Home 06/12/2012     Priority: Medium     Please Call PCP with any Admission or Emergency ROOM Visit. Dr Bharath Dean [phone]    EMERGENCY CARE PLAN  Presenting Problem Signs and Symptoms Treatment Plan    Questions or conerns during clinic hours    I will call the   clinic directly [phone]     Questions or conerns outside clinic hours    I will call the 24   hour nurse line at [phone]    Patient needs to schedule an appointment    I will call the 24   hour scheduling team at [phone] or clinic directly    Same day treatment     I will call the clinic first, nurse line   if after hours, urgent care and express care if needed                          DX V65.8 REPLACED WITH [id number] [facility] (04/08/2013)       Moderate recurrent major depression (H) 05/17/2012     Priority: Medium     Rosacea      Priority: Medium     Paralysis agitans (H)      Priority: Medium     Parkinson's Disease       Social History:  Social History   Substance Use Topics     Smoking status: Former Smoker     Packs/day: 0.01     Years: 40.00     Types: Cigarettes     Quit date: 7/31/2008     Smokeless tobacco: Never Used      Comment: very passive cigarettes in 6 months     Alcohol use No     Family History:  Family History   Problem Relation Age of Onset     CEREBROVASCULAR DISEASE Mother      DIABETES Mother      GASTROINTESTINAL DISEASE Mother      Hypertension Mother      Neurologic Disorder Father      CEREBROVASCULAR DISEASE Father      CEREBROVASCULAR DISEASE Maternal Grandfather      CANCER Paternal Grandmother      Other - See Comments Sister      gi - unknown       Medications:    sodium chloride (PF)  3 mL Intracatheter Q8H     amLODIPine  5 mg Oral QAM     carbidopa-levodopa  1 tablet Oral At Bedtime     zz extemporaneous template  7.5 mg Oral BID     lactobacillus rhamnosus (GG)  1 capsule Oral QAM     lisinopril  5 mg Oral Daily     mirtazapine  30 mg Oral At Bedtime     QUEtiapine   50 mg Oral At Bedtime     ranitidine  75 mg Oral At Bedtime     sodium chloride (PF)  3 mL Intracatheter Q8H     cefTRIAXone  1 g Intravenous Q24H     venlafaxine  75 mg Oral BID     carbidopa-levodopa  2 tablet Oral BID     carbidopa-levodopa  1 half-tab Oral TID    And     carbidopa-levodopa  1 tablet Oral TID     ipratropium - albuterol 0.5 mg/2.5 mg/3 mL  3 mL Nebulization   4x daily     clonazePAM  0.25 mg Oral Once         NaCl 1,000 mL (01/27/18 0624)       Physical Exam:  Temp:  [97.3  F (36.3  C)-98.4  F (36.9  C)] 98.4  F (36.9  C)  Pulse:  [56-67] 67  Heart Rate:  [53-68] 57  Resp:  [10-18] 18  BP: (117-169)/() 169/93  SpO2:  [95 %-99 %] 96 %     GEN: pleasant, no acute distress  HEENT: no icterus  CV: RRR, normal s1/s2, no murmurs/rubs/s3/s4, no heave. JVP   normal.   CHEST: clear to ausculation bilaterally, no rales or wheezing  ABD: soft, non-tender, normal active bowel sounds  EXTR: pulses 2+. No clubbing, cyanosis or edema.   NEURO: alert oriented, speech fluent/appropriate    Diagnostics:  All labs and imaging were reviewed    BMP  Recent Labs  Lab 01/26/18  1250      POTASSIUM 4.0   CHLORIDE 108   JENNIE 8.3*   CO2 29   BUN 27   CR 0.98   GLC 80     LFTs  Recent Labs  Lab 01/26/18  1250   ALKPHOS 96   AST 8   ALT 8   BILITOTAL 0.5   PROTTOTAL 6.0*   ALBUMIN 3.3*      CBC  Recent Labs  Lab 01/27/18  0655 01/26/18  1941 01/26/18  1250   WBC 5.5 6.5 7.7   RBC 3.70* 3.80* 3.72*   HGB 11.4* 11.9* 11.6*   HCT 35.9* 37.2* 36.3*   MCV 97 98 98   MCH 30.8 31.3 31.2   MCHC 31.8 32.0 32.0   RDW 12.4 12.6 12.4    222 222     INR  Recent Labs  Lab 01/26/18  1250   INR 1.16*      EKG results:  bradycardia with 1st degree AV block  Echo:   Global and regional left ventricular function is normal with an   EF of 60-65%.  Global right ventricular function is normal.  No significant valvular dysfunction present.  No pericardial effusion is present.    Assessment and Recommendation:  Sanjay Cano is  a 72 year old male without prior cardiac or   seizure history, admitted overnight to ED observation service   with syncope.  Pertinent past history of COPD, parkinsons   disease, and resides in a nursing home.  Cardiology consulted for   guidance.    Most likely orthostatic based on history (getting up to use   bathroom).  Unsure if was about to urinate or not, as bearing   down while standing can worsen venous preload return and cause   transient low output state.  He does not meet any risk factors   for serious outcomes (normal EF, hgb 11.4, no dyspnea, BP greater   than 90 mmHg, EKG ok).  EKG did show bradycardia with 1st degree   AV block, which is interesting.  It could be possible patient has   chronotropic incompetence and inability to get heart rate up with   activity, which potentially could be an indication for a   pacemaker.  Patient and family were indifferent on doing that or   not, which I cannot fault them for due to his other medical   issues.    Recommend  Can do ziopatch at discharge if family and patient agreeable.  Can try switching amlodipine to bedtime.    I have discussed the above with Dr. Soler.    Thank you for consulting the cardiovascular services at the   [facility]. Please do not hesitate to   call us with any questions.     Joyce Bhakta MD PGY4  Cardiology Fellow  [phone]     CBC with platelets   Result Value Ref Range    WBC 6.5 4.0 - 11.0 10e9/L    RBC Count 3.80 (L) 4.4 - 5.9 10e12/L    Hemoglobin 11.9 (L) 13.3 - 17.7 g/dL    Hematocrit 37.2 (L) 40.0 - 53.0 %    MCV 98 78 - 100 fl    MCH 31.3 26.5 - 33.0 pg    MCHC 32.0 31.5 - 36.5 g/dL    RDW 12.6 10.0 - 15.0 %    Platelet Count 222 150 - 450 10e9/L   Troponin I - Now then in 4 hours x 2   Result Value Ref Range    Troponin I ES <0.015 0.000 - 0.045 ug/L   Troponin I - Now then in 4 hours x 2   Result Value Ref Range    Troponin I ES <0.015 0.000 - 0.045 ug/L   CBC with platelets differential    Result Value Ref Range    WBC 5.5 4.0 - 11.0 10e9/L    RBC Count 3.70 (L) 4.4 - 5.9 10e12/L    Hemoglobin 11.4 (L) 13.3 - 17.7 g/dL    Hematocrit 35.9 (L) 40.0 - 53.0 %    MCV 97 78 - 100 fl    MCH 30.8 26.5 - 33.0 pg    MCHC 31.8 31.5 - 36.5 g/dL    RDW 12.4 10.0 - 15.0 %    Platelet Count 197 150 - 450 10e9/L    Diff Method Automated Method     % Neutrophils 65.7 %    % Lymphocytes 22.1 %    % Monocytes 7.6 %    % Eosinophils 4.2 %    % Basophils 0.2 %    % Immature Granulocytes 0.2 %    Nucleated RBCs 0 0 /100    Absolute Neutrophil 3.6 1.6 - 8.3 10e9/L    Absolute Lymphocytes 1.2 0.8 - 5.3 10e9/L    Absolute Monocytes 0.4 0.0 - 1.3 10e9/L    Absolute Eosinophils 0.2 0.0 - 0.7 10e9/L    Absolute Basophils 0.0 0.0 - 0.2 10e9/L    Abs Immature Granulocytes 0.0 0 - 0.4 10e9/L    Absolute Nucleated RBC 0.0    EKG 12-lead, tracing only   Result Value Ref Range    Interpretation ECG Click View Image link to view waveform and result    Basic metabolic panel   Result Value Ref Range    Sodium 143 133 - 144 mmol/L    Potassium 3.7 3.4 - 5.3 mmol/L    Chloride 109 94 - 109 mmol/L    Carbon Dioxide 26 20 - 32 mmol/L    Anion Gap 7 3 - 14 mmol/L    Glucose 85 70 - 99 mg/dL    Urea Nitrogen 18 7 - 30 mg/dL    Creatinine 0.83 0.66 - 1.25 mg/dL    GFR Estimate >90 >60 mL/min/1.7m2    GFR Estimate If Black >90 >60 mL/min/1.7m2    Calcium 8.0 (L) 8.5 - 10.1 mg/dL   Magnesium   Result Value Ref Range    Magnesium 1.9 1.6 - 2.3 mg/dL   TSH with free T4 reflex   Result Value Ref Range    TSH 1.17 0.40 - 4.00 mU/L   Echocardiogram Complete    Narrative    [id number]  ECH19  [id number]  [id number]^KVNG^RAHEEM^ROMERO           [facility],[place]  Echocardiography Laboratory  [address] [place] [address]     Name: YARI TORRES  MRN: [id number]  : 1945  Study Date: 2018 12:26 PM  Age: 72 yrs  Gender: Male  Patient Location: [facility]  Reason For Study: Syncope  Ordering Physician: KVNG  RAHEEM  Performed By: Burke Acuña RDCS     BSA: 1.7 m2  Height: 74 in  Weight: 117 lb  HR: 55  BP: 168/100 mmHg  _____________________________________________________________________________  __        Procedure  Complete Portable Echo Adult. Echocardiogram with two-dimensional, color and  spectral Doppler performed.  _____________________________________________________________________________  __        Interpretation Summary  Global and regional left ventricular function is normal with an EF of 60-65%.  Global right ventricular function is normal.  No significant valvular dysfunction present.  No pericardial effusion is present.     _____________________________________________________________________________  __        Left Ventricle  Left ventricular size is normal. Left ventricular wall thickness is normal.  Global and regional left ventricular function is normal with an EF of 60-65%.  Normal left ventricular filling for age. No regional wall motion abnormalities  are seen.     Right Ventricle  The right ventricle is normal size. Global right ventricular function is  normal.     Atria  Both atria appear normal.     Mitral Valve  Mitral leaflet thickness is increased .        Aortic Valve  Mild aortic valve sclerosis is present.     Tricuspid Valve  The tricuspid valve is normal. The peak velocity of the tricuspid regurgitant  jet is not obtainable. Pulmonary artery systolic pressure cannot be assessed.     Pulmonic Valve  The pulmonic valve is normal.     Vessels  The aorta root is normal. The inferior vena cava cannot be assessed.     Pericardium  No pericardial effusion is present.        Compared to Previous Study  Previous study not available for comparison.  _____________________________________________________________________________  __  MMode/2D Measurements & Calculations     IVSd: 1.1 cm  LVIDd: 3.7 cm  LVIDs: 2.6 cm  LVPWd: 1.2 cm  FS: 29.6 %  EDV(Teich): 58.5 ml  ESV(Teich): 24.8 ml  LV mass(C)d:  133.4 grams  LV mass(C)dI: 77.1 grams/m2  LVOT diam: 2.3 cm  LVOT area: 4.2 cm2  LA Volume (BP): 55.2 ml  LA Volume Index (BP): 31.9 ml/m2  RWT: 0.64           Doppler Measurements & Calculations  MV E max autumn: 57.0 cm/sec  MV A max autumn: 66.0 cm/sec  MV E/A: 0.86  MV dec time: 0.30 sec  Ao V2 max: 111.0 cm/sec  Ao max P.0 mmHg  BULMARO(V,D): 3.7 cm2  LV V1 max P.0 mmHg  LV V1 max: 99.3 cm/sec  E/E' av.4  Lateral E/e': 6.4  Medial E/e': 10.4     _____________________________________________________________________________  __           Report approved by: Adrian Concepcion 2018 02:24 PM[BH1.5]        DISCHARGE MEDICATIONS:[BH1.1]   Current Discharge Medication List      START taking these medications    Details   cephalexin (KEFLEX) 500 MG capsule Take 1 capsule (500 mg) by mouth every 12 hours for 7 days  Refills: 0    Associated Diagnoses: UTI symptoms         CONTINUE these medications which have CHANGED    Details   amLODIPine (NORVASC) 5 MG tablet Take 1 tablet (5 mg) by mouth At Bedtime  Qty: 90 tablet, Refills: 3    Associated Diagnoses: Essential hypertension, benign         CONTINUE these medications which have NOT CHANGED    Details   clonazePAM (KLONOPIN) 0.5 MG ODT tab Take 0.25 mg by mouth every other night  Qty: 180 tablet, Refills: 0      ranitidine (ZANTAC) 75 MG tablet Take 1 tablet (75 mg) by mouth At Bedtime  Qty: 30 tablet      methylcellulose (CITRUCEL) 500 MG TABS tablet Take 1 tablet (500 mg) by mouth daily as needed  Qty: 14 each, Refills: 0      lactobacillus rhamnosus, GG, (CULTURELL) capsule Take 1 capsule by mouth every morning  Qty: 60 capsule, Refills: 11    Associated Diagnoses: Diarrhea, unspecified type      !! Ipratropium-Albuterol (COMBIVENT RESPIMAT)  MCG/ACT inhaler Inhale 1 puff into the lungs 4 times daily May take 1 to 2 additional doses as needed during the day  Qty: 1 Inhaler, Refills: 4    Associated Diagnoses: Other emphysema (H)      lisinopril  (PRINIVIL/ZESTRIL) 5 MG tablet Take 1 tablet (5 mg) by mouth daily  Qty: 30 tablet, Refills: 1      cholecalciferol (VITAMIN D) 1000 UNIT tablet Take 1 tablet (1,000 Units) by mouth every morning  Qty: 90 tablet, Refills: 1    Associated Diagnoses: At risk for falling      QUEtiapine (SEROQUEL) 50 MG tablet TAKE 1 TABLET(50 MG) BY MOUTH AT BEDTIME  Qty: 90 tablet, Refills: 2    Associated Diagnoses: Moderate recurrent major depression (H)      !! COMBIVENT RESPIMAT  MCG/ACT inhaler INHALE 1 PUFF BY MOUTH FOUR TIMES DAILY. NOT TO EXCEED 6 DOSES PER DAY  Qty: 4 g, Refills: 4    Associated Diagnoses: Other emphysema (H)      venlafaxine (EFFEXOR-XR) 75 MG 24 hr capsule TAKE 1 CAPSULE BY MOUTH EVERY MORNING AND 1 CAPSULE EVERY NIGHT AT BEDTIME  Qty: 180 capsule, Refills: 0    Associated Diagnoses: Moderate recurrent major depression (H)      FLOVENT  MCG/ACT Inhaler INHALE 2 PUFFS INTO THE LUNGS TWICE DAILY  Qty: 36 g, Refills: 1    Associated Diagnoses: Other emphysema (H)      aspirin 81 MG tablet Take by mouth twice a week      mirtazapine (REMERON) 15 MG tablet TAKE 1 TABLET(15 MG) BY MOUTH AT BEDTIME  Qty: 90 tablet, Refills: 1    Associated Diagnoses: Depression, major, recurrent, moderate (H)      !! donepezil (ARICEPT) 10 MG tablet Take 5mg  by mouth twice a day (take with the 2.5 mg dose for a total dose of 7.5mg twice daily)  Refills: 3      carbidopa-levodopa (SINEMET CR)  MG per CR tablet Take 1 tablet by mouth At Bedtime  Qty: 1 tablet, Refills: 0    Comments: Patient reported medication      carbidopa-levodopa (SINEMET)  MG per tablet Take 1-2 tablets by mouth 5 times daily Takes 2 tablets at 6 AM, 1.5 tablets at 9 AM, 2 tablets at noon, 1.5 tablets at 3 PM, 1.5 tablets at 6 PM. Can take additional 0.5 tablet in the middle of the night if needed.  Qty: 90 tablet      !! donepezil (ARICEPT) 5 MG tablet Take 2.5 mg twice a day (take with the 5 mg dose for a total dose of 7.5mg twice  "daily)  Qty: 180 tablet, Refills: 3    Associated Diagnoses: 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)       !! - Potential duplicate medications found. Please discuss with provider.[BH1.5]            CONSULTATIONS:   Consultation during this admission received from:[BH1.1]  Cardiology  SW[BH1.2]   BRIEF HISTORY OF PRESENT ILLNESS:   (Adopted from admission H&P).[BH1.1]    \"Sanjay Cano is a 72 year old male with a history of COPD, Parkinson's disease, and falls who presented to the ED today from his nursing home for evaluation following a syncopal episode. Per ED report: \"It is reported by EMS that the patient was being helped to the bathroom when he started feeling lightheaded and had a syncopal episode that lasted about 5 seconds. The nurses at the nursing home state that he did not fall to the ground as they were able to grab him so he did not hit his head. The patient was able to report that he has also been having a decreased appetite lately. He reports that he has never had anything like this before.\" The patient reports that has has been feeling lightheaded with standing recently. The patient's wife echoes the same account of EMS as she was not present. She denies being told that the patient has had any fever, URI symptoms, GI symptoms, seizure activity. Patients wife is concerned about patients steady weight loss since Sept 2017 of about 30 lbs and thinks this is secondary to change of environment from home to NH and poor appetite. She reports that his current mental status is his baseline. He especially does not do well with new environmental changes and also late at night. He take Klonipin 0.25mg every other night but she is not sure if he took it yesterday however that may help him settle.      In the ED, HR 50's-60's, 's-150's/60's-100's, RR 10-18, SaO2 96-99% on RA, Temp 97.3. Labs show normal CMP, troponin I. CBC with H/H 11.6/36.3 otherwise normal. INR 1.16. UA " "with 5 ketones, 10 protein, moderate LE, 13 WBC, 2 RBC, few bacteria. UCx was sent and pending. CXR negative. CT Head negative. Bedside Echo normal. In the ED the patient was given 1L NS bolus and 2 of his Sinemet doses. He is being admitted to the Observation Unit for syncope work up.       On admission to the observation unit the patient was stable.\"[BH1.2]    ED OBSERVATION COURSE:[BH1.1]  Sanjay Cano is a 72 year old male with a history of COPD, Parkinson's disease, and falls who presented to the ED today from his nursing home for evaluation following a syncopal episode.     1. Syncope: At [facility] was being helped to the bathroom when he started feeling lightheaded and had a syncopal episode that lasted about 5 seconds. The nurses at the nursing home state that he did not fall to the ground as they were able to grab him so he did not hit his head. Patients wife denies being told that the patient has had any fever, URI symptoms, GI symptoms, seizure activity. Patients wife is concerned about patients steady weight loss since Sept 2017 of about 30 lbs and thinks this is secondary to change of environment from home to NH and poor appetite. She reports that his current mental status is his baseline. In ED, HR 50's-60's, 's-150's/60's-100's, RR 10-18, SaO2 96-99% on RA, Temp 97.3. Labs show normal CMP, troponin I. CBC with H/H 11.6/36.3 otherwise normal. INR 1.16. CXR negative. CT Head negative. Bedside Echo normal. In the ED the patient was given 1L NS bolus and 2 of his Sinemet doses. He was admitted to the Observation Unit for syncope work up.  Serial troponins negative x 3.  TSH, Resting Echo w/o WMA, Cardiology consulted and cleared for discharge with a  Ziopatch. Also recommended trying Norvasc at bed-time.       2. UTI: UA with 5 ketones, 10 protein, moderate LE, 13 WBC, 2 RBC, few bacteria. UCx shows >100,000 colonies/mL Gram positive cocci. Will discharge with Keflex 500 mg's BID x 7 days. " Instructed to follow-up with PCP for final UCx results.         3. Depression: Per wife, patient with debilitating depression since transition to NH from home. Followed closely by Neurology out-patient. Recently increased venlafaxine. Discussed Psychiatry consult while here. Discussed with Psychiatrist on call and unlikely to add much given recent increased in venlafaxine and is established with close out-patient follow-up.   -Continue venlafaxine, seroquel, remeron  -Recommend close out-patient follow-up     Chronic Medical Problems:  ## PD:  Continue PTA sinemet and donepezil          ##  COPD:  Duoneb q4h while awake as he did not bring inhalers      ## HTN: Continue with Lisinopril and Amlodipine[BH1.2]      DISCHARGE DISPOSITION:[BH1.1]   Discharged to NH[BH1.3]. The patient was discharged in a stable condition.     DISCHARGE INSTRUCTIONS AND FOLLOW-UP:[BH1.1]    Discharge Procedure Orders  Adult [facility] Follow-up and recommended labs and tests   Order Comments: Follow up with primary care provider, Bharath Buchanan, within 2 days for hospital follow- up.  The following labs/tests are recommended: CBC. CMP.      Appointments on [facility] and/or [facility] (with [facility] or [facility] provider or service). Call [phone] if you haven't heard regarding these appointments within 7 days of discharge.     Activity   Order Comments: Your activity upon discharge: activity as tolerated, resume prior to admission activity   Order Specific Question Answer Comments   Is discharge order? Yes      When to contact your care team   Order Comments: Return to the ED with fever, uncontrolled nausea, vomiting, unrelieved pain, bleeding not relieved with pressure, dizziness, chest pain, shortness of breath, loss of consciousness, and any new or concerning symptoms.     General info for SNF   Order Comments: Length of Stay Estimate: Long Term Care  Condition at Discharge: Stable  Level of care:skilled   Rehabilitation Potential:  Poor  Admission H&P remains valid and up-to-date: Yes  Recent Chemotherapy: N/A  Use Nursing Home Standing Orders: Yes     Glucose monitor nursing POCT   Order Comments: Before meals and at bedtime     Intake and output   Order Comments: Every shift     Daily weights   Order Comments: Call Provider for weight gain of more than 2 pounds per day or 5 pounds per week.     Reason for your hospital stay   Order Comments: You were admitted with after a syncopal episode. We think this is due to your Parkinsons Disease. You also have a possible UTI and we will discharge you on antibiotics.    We will discharge you with a heart monitor as well.    You were found to have a UTI, we will discharge with an antibiotic. Please follow-up with your primary care doctor for your final urine culture results.     Nutrition Services Adult IP Consult   Order Comments: Reason:  Malnutrition     Physical Therapy Adult Consult   Order Comments: Evaluate and treat as clinically indicated.    Reason:  PD     Fall precautions     Diet   Order Comments: Follow this diet upon discharge:  Regular Diet Adult  Be sure to drink plenty of non-caffeinated beverages.  Notify your primary provider if you have a poor appetite, are not eating well, and/or have been losing weight   Order Specific Question Answer Comments   Is discharge order? Yes[BH1.6]         Attestation:   I have reviewed today's vital signs, notes, medications, labs and imaging.      LUISA Martínez, CNP  Emergency Department Observation Unit[BH1.1]         Revision History        User Key Date/Time User Provider Type Action    > BH1.6 1/27/2018  6:54 PM Adilia Bonilla APRN CNP Nurse Practitioner Sign     BH1.5 1/27/2018  5:10 PM Adilia Bonilla APRN CNP Nurse Practitioner Sign     BH1.4 1/27/2018  5:07 PM Adilia Bonilla APRN CNP Nurse Practitioner      BH1.2 1/27/2018  4:59 PM Adilia Bonilla APRN CNP Nurse Practitioner      BH1.3 1/27/2018  4:57 PM Chad  Adilia Rusty, APRN CNP Nurse Practitioner      BH1.1 1/27/2018  3:56 PM Adilia Bonilla APRN CNP Nurse Practitioner                      Consult Notes      Consults by Joyce Bhakta MD at 1/27/2018  8:02 AM     Author:  Joyce Bhakta MD Service:  Cardiology Author Type:  Resident    Filed:  1/27/2018  2:49 PM Date of Service:  1/27/2018  8:02 AM Creation Time:  1/27/2018  8:01 AM    Status:  Cosign Needed :  Joyce Bhakta MD (Resident)    Cosign Required:  Yes         Consult Orders:    1. Cardiology General Adult IP Consult: Patient to be seen: Routine within 24 hrs; Call back #: [id number]; Syncope; Consultant may enter orders: Yes [[id number]] ordered by Myranda Chambers PA-C at 01/26/18 1847                     Cardiology Inpatient Consultation  January 27, 2018    Reason for Consult:  A cardiology consult was requested by ED observation service to provide clinical guidance regarding syncope.    HPI:   Sanjay Cano is a 72 year old male without prior cardiac or seizure history, admitted overnight to ED observation service with syncope.  Pertinent past history of COPD, parkinsons disease, and resides in a nursing home.    Patient was being helped to the bathroom by nursing staff, felt lightheaded as if he was going to fall, though nursing staff held on to him.  Never hit head or had any trauma.  Lost consciousness reportedly for 5 seconds.  This has not happened before.  Reportedly has been feeling lightheaded recently.[VM1.1]      His wife has been quite upset with the care at the nursing home, and she believes it is the reason for his decline and failure to thrive over the last few months.  Not eating, not walking around, stays slumped in a wheelchair with no physical activity.  He does have an appointment with his neurologist in a few weeks but he was seen last fall and they reportedly were pleased with how patient was doing.[VM1.2]    At the time of interview, the patient  denies chest pain, dyspnea at rest or with exertion, orthopnea, PND, palpitations, lightheadedness, or syncope.[VM1.1] The patient did state he was concerned about the parkinson's progressing however.[VM1.2]    Vitals notable for HR relative bradycardia (EKG with 1st degree AV block).  BP hypertensive.  Normal labs, negative troponin.  UA positive for UTI. Negative imaging (CXR, CT head, bedside echo).      Review of Systems:    Only notable for weight loss, since fall 2017, thought to be due to poor PO intake from nursing home food   No convulsions, no post ictal confusion.    PMH:[VM1.1]  Past Medical History:   Diagnosis Date     At risk for falling 9/3/2012     COPD (chronic obstructive pulmonary disease) (H)      Family history of thyroid cancer 7/13/2016     Malignant neoplasm (H)     skin - nose     Moderate recurrent major depression (H) 5/17/2012     Paralysis agitans (H)     Parkinson's Disease     Parkinson disease (H)      Rosacea[VM1.3]      Active Problems:[VM1.1]  Patient Active Problem List    Diagnosis Date Noted     Syncope 01/26/2018     Priority: Medium     Nocturnal cough 12/05/2016     Priority: Medium     Senile purpura (H) 10/26/2016     Priority: Medium     Essential hypertension with goal blood pressure less than 140/90 08/31/2016     Priority: Medium     H/O recurrent urinary tract infection 08/26/2016     Priority: Medium     Family history of thyroid cancer 07/13/2016     Priority: Medium     Medullary ca grandparent, 1st cousin different thyroid ca       Pulmonary nodules 07/12/2016     Priority: Medium     Incidental, CT (ruled out pulmonary emboli)  NO change 4/18/17, followup 12 months.    2 8 mm left lung nodules upper and lower    >6-8             Initial FU CT at 6-12 mo, then                     Initial FU CT at 3-6 mo.                        18-24 mo if no change                                 Then at 9-12 & 24  mo.                                                                                            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    Baylee Matta 8/29/2012        Health Care Home 06/12/2012     Priority: Medium     Please Call PCP with any Admission or Emergency ROOM Visit. Dr Bharath Dean [phone]    EMERGENCY CARE PLAN  Presenting Problem Signs and Symptoms Treatment Plan    Questions or conerns during clinic hours    I will call the clinic directly [phone]     Questions or conerns outside clinic hours    I will call the 24 hour nurse line at [phone]    Patient needs to schedule an appointment    I will call the 24 hour scheduling team at [phone] or clinic directly    Same day treatment     I will call the clinic first, nurse line if after hours, urgent care and express care if needed                          DX V65.8 REPLACED WITH [phone] (04/08/2013)       Moderate recurrent major depression (H) 05/17/2012     Priority: Medium     Rosacea      Priority: Medium     Paralysis agitans (H)      Priority: Medium     Parkinson's Disease[VM1.3]       Social History:[VM1.1]  Social History   Substance Use Topics     Smoking status: Former  Smoker     Packs/day: 0.01     Years: 40.00     Types: Cigarettes     Quit date: 7/31/2008     Smokeless tobacco: Never Used      Comment: very passive cigarettes in 6 months     Alcohol use No[VM1.3]     Family History:[VM1.1]  Family History   Problem Relation Age of Onset     CEREBROVASCULAR DISEASE Mother      DIABETES Mother      GASTROINTESTINAL DISEASE Mother      Hypertension Mother      Neurologic Disorder Father      CEREBROVASCULAR DISEASE Father      CEREBROVASCULAR DISEASE Maternal Grandfather      CANCER Paternal Grandmother      Other - See Comments Sister      gi - unknown[VM1.3]       Medications:[VM1.1]    sodium chloride (PF)  3 mL Intracatheter Q8H     amLODIPine  5 mg Oral QAM     carbidopa-levodopa  1 tablet Oral At Bedtime     zz extemporaneous template  7.5 mg Oral BID     lactobacillus rhamnosus (GG)  1 capsule Oral QAM     lisinopril  5 mg Oral Daily     mirtazapine  30 mg Oral At Bedtime     QUEtiapine  50 mg Oral At Bedtime     ranitidine  75 mg Oral At Bedtime     sodium chloride (PF)  3 mL Intracatheter Q8H     cefTRIAXone  1 g Intravenous Q24H     venlafaxine  75 mg Oral BID     carbidopa-levodopa  2 tablet Oral BID     carbidopa-levodopa  1 half-tab Oral TID    And     carbidopa-levodopa  1 tablet Oral TID     ipratropium - albuterol 0.5 mg/2.5 mg/3 mL  3 mL Nebulization 4x daily     clonazePAM  0.25 mg Oral Once         NaCl 1,000 mL (01/27/18 0624)[VM1.3]       Physical Exam:[VM1.1]  Temp:  [97.3  F (36.3  C)-98.4  F (36.9  C)] 98.4  F (36.9  C)  Pulse:  [56-67] 67  Heart Rate:  [53-68] 57  Resp:  [10-18] 18  BP: (117-169)/() 169/93  SpO2:  [95 %-99 %] 96 %[VM1.3]     GEN: pleasant, no acute distress  HEENT: no icterus  CV: RRR, normal s1/s2, no murmurs/rubs/s3/s4, no heave. JVP[VM1.1] normal[VM1.2].   CHEST: clear to ausculation bilaterally, no rales or wheezing  ABD: soft, non-tender, normal active bowel sounds  EXTR: pulses[VM1.1] 2+[VM1.2]. No clubbing, cyanosis or edema.    NEURO: alert oriented, speech fluent/appropriate    Diagnostics:  All labs and imaging were reviewed    BMP[VM1.1]  Recent Labs  Lab 01/26/18  1250      POTASSIUM 4.0   CHLORIDE 108   JENNIE 8.3*   CO2 29   BUN 27   CR 0.98   GLC 80[VM1.3]     LFTs[VM1.1]  Recent Labs  Lab 01/26/18  1250   ALKPHOS 96   AST 8   ALT 8   BILITOTAL 0.5   PROTTOTAL 6.0*   ALBUMIN 3.3*[VM1.3]      CBC[VM1.1]  Recent Labs  Lab 01/27/18  0655 01/26/18  1941 01/26/18  1250   WBC 5.5 6.5 7.7   RBC 3.70* 3.80* 3.72*   HGB 11.4* 11.9* 11.6*   HCT 35.9* 37.2* 36.3*   MCV 97 98 98   MCH 30.8 31.3 31.2   MCHC 31.8 32.0 32.0   RDW 12.4 12.6 12.4    222 222[VM1.3]     INR[VM1.1]  Recent Labs  Lab 01/26/18  1250   INR 1.16*[VM1.3]      EKG results:  bradycardia with 1st degree AV block  Echo:[VM1.1]   Global and regional left ventricular function is normal with an EF of 60-65%.  Global right ventricular function is normal.  No significant valvular dysfunction present.  No pericardial effusion is present.[VM1.4]    Assessment and Recommendation:  Sanjay Cano is a 72 year old male without prior cardiac or seizure history, admitted overnight to ED observation service with syncope.  Pertinent past history of COPD, parkinsons disease, and resides in a nursing home.  Cardiology consulted for guidance.    Most likely orthostatic based on history (getting up to use bathroom).  Unsure if was about to urinate or not, as bearing down while standing can worsen venous preload return and cause transient low output state.  He does not meet any risk factors for serious outcomes (normal EF, hgb 11.4, no dyspnea, BP greater than 90 mmHg, EKG ok).  EKG did show bradycardia with 1st degree AV block, which is interesting.  It could be possible patient has chronotropic incompetence[VM1.1] and inability to get heart rate up with activity, which potentially could be an indication for a pacemaker.  Patient and family were indifferent on doing that or not,  which I cannot fault them for due to his other medical issues.[VM1.2]    Recommend  Can do ziopatch at discharge[VM1.1] if family and patient agreeable.[VM1.2]  Can try switching amlodipine to bedtime.[VM1.4]    I have discussed the above with [VM1.1] Ryder[VM1.2].    Thank you for consulting the cardiovascular services at the [facility]. Please do not hesitate to call us with any questions.     Joyce Bhakta MD PGY4  Cardiology Fellow  [phone][VM1.1]       Revision History        User Key Date/Time User Provider Type Action    > VM1.4 1/27/2018  2:49 PM Joyce Bhakta MD Resident Sign     VM1.2 1/27/2018  1:17 PM Joyce Bhakta MD Resident      VM1.3 1/27/2018  8:02 AM Joyce Bhakta MD Resident      VM1.1 1/27/2018  8:01 AM Joyce Bhakta MD Resident                      Progress Notes - Physician (Notes for yesterday and today)      ED Provider Notes by Bharath Mullins MD at 1/26/2018 11:42 AM     Author:  Bharath Mullins MD Service:  Emergency Medicine Author Type:  Physician    Filed:  1/26/2018  7:57 PM Date of Service:  1/26/2018 11:42 AM Creation Time:  1/26/2018 12:02 PM    Status:  Signed :  Bharath Mullins MD (Physician)           History[CK1.1]     Chief Complaint   Patient presents with     Loss of Consciousness[KM1.1]     HPI  Sanjay Cano is a 72 year old male[CK1.1] with a history of COPD, Parkinson's disease, and falls who presents to the Emergency Department today from his nursing home for evaluation following a syncopal episode. History was somewhat difficult to obtain from the patient as he was somewhat somnolent upon evaluation. It is reported by EMS that the patient was being helped to the bathroom when he started feeling lightheaded and had a syncopal episode that lasted about 5 seconds. The nurses at the nursing home state that he did not fall to the ground as they were able to grab him so he did not hit his head.  The patient was able to report that he has also been having a decreased appetite lately. He reports that he has never had anything like this before. He denies diarrhea, cough.[CK1.2]   No reports of seizure activity.  Patient normally is incontinent of urine.  Wife is here now gives some history concerned of possible early bladder infection.  He has had one before in the past.  Wife is also concerned worries that that he has been losing weight as it is well.[KM1.2]    I have reviewed the Medications, Allergies, Past Medical and Surgical History, and Social History in the Epic system.[CK1.1]    Past Medical History:   Diagnosis Date     At risk for falling 9/3/2012     COPD (chronic obstructive pulmonary disease) (H)      Family history of thyroid cancer 7/13/2016     Malignant neoplasm (H)     skin - nose     Moderate recurrent major depression (H) 5/17/2012     Paralysis agitans (H)     Parkinson's Disease     Parkinson disease (H)      Rosacea        Past Surgical History:   Procedure Laterality Date     EYE SURGERY       ORTHOPEDIC SURGERY       PHACOEMULSIFICATION CLEAR CORNEA WITH STANDARD INTRAOCULAR LENS IMPLANT  5/21/2014    Procedure: PHACOEMULSIFICATION CLEAR CORNEA WITH STANDARD INTRAOCULAR LENS IMPLANT;  Surgeon: Tomy Hunter MD;  Location: [facility]     PHACOEMULSIFICATION CLEAR CORNEA WITH TORIC INTRAOCULAR LENS IMPLANT  4/30/2014    Procedure: PHACOEMULSIFICATION CLEAR CORNEA WITH TORIC INTRAOCULAR LENS IMPLANT;  Surgeon: Tomy Hunter MD;  Location: [facility]       Family History   Problem Relation Age of Onset     CEREBROVASCULAR DISEASE Mother      DIABETES Mother      GASTROINTESTINAL DISEASE Mother      Hypertension Mother      Neurologic Disorder Father      CEREBROVASCULAR DISEASE Father      CEREBROVASCULAR DISEASE Maternal Grandfather      CANCER Paternal Grandmother      Other - See Comments Sister      gi - unknown       Social History   Substance Use Topics     Smoking status: Former Smoker      Packs/day: 0.01     Years: 40.00     Types: Cigarettes     Quit date: 7/31/2008     Smokeless tobacco: Never Used      Comment: very passive cigarettes in 6 months     Alcohol use No       Current Facility-Administered Medications   Medication     lidocaine 1 % 1 mL     lidocaine (LMX4) kit     sodium chloride (PF) 0.9% PF flush 3 mL     sodium chloride (PF) 0.9% PF flush 3 mL     0.9% sodium chloride infusion     [START ON 1/27/2018] amLODIPine (NORVASC) tablet 5 mg     carbidopa-levodopa (SINEMET CR)  MG per CR tablet 1 tablet     donepezil (ARIcept) 7.5 mg     [START ON 1/27/2018] lactobacillus rhamnosus (GG) (CULTURELL) capsule 1 capsule     [START ON 1/27/2018] lisinopril (PRINIVIL/ZESTRIL) tablet 5 mg     mirtazapine (REMERON) tablet 30 mg     QUEtiapine (SEROquel) tablet 50 mg     ranitidine (ZANTAC) tablet 75 mg     lidocaine 1 % 1 mL     lidocaine (LMX4) kit     sodium chloride (PF) 0.9% PF flush 3 mL     sodium chloride (PF) 0.9% PF flush 3 mL     nitroGLYcerin (NITROSTAT) sublingual tablet 0.4 mg     ondansetron (ZOFRAN-ODT) ODT tab 4 mg    Or     ondansetron (ZOFRAN) injection 4 mg     acetaminophen (TYLENOL) tablet 650 mg     acetaminophen (TYLENOL) Suppository 650 mg     cefTRIAXone (ROCEPHIN) 1 g in 10 mL SWFI Premix Syringe     venlafaxine (EFFEXOR-XR) 24 hr capsule 75 mg     [START ON 1/27/2018] carbidopa-levodopa (SINEMET)  MG per tablet 2 tablet     carbidopa-levodopa half-tab 12.5-50 mg    And     carbidopa-levodopa (SINEMET)  MG per tablet 1 tablet        Allergies   Allergen Reactions     Seasonal Allergies[KM1.1]       Review of Systems   Constitutional: Positive for[CK1.2] activity change[KM1.2],[CK1.2] appetite change[KM1.2] and[CK1.2] fatigue[KM1.2]. Negative for fever.   HENT: Negative for congestion and[CK1.2] trouble swallowing[KM1.2].    Eyes: Negative for redness and[CK1.2] visual disturbance[KM1.2].   Respiratory: Negative for[CK1.2] cough[KM1.2],[CK1.2]  "choking[KM1.2] and shortness of breath.    Cardiovascular: Negative for chest pain and[CK1.2] leg swelling[KM1.2].   Gastrointestinal: Negative for abdominal pain,[CK1.2] nausea[KM1.2] and[CK1.2] vomiting[KM1.2].   Genitourinary: Negative for difficulty urinating.[CK1.2]        He normally is incontinent of urine.[KM1.2]   Musculoskeletal: Negative for arthralgias and neck stiffness.   Skin: Negative for color change.   Neurological: Positive for syncope,[CK1.2] weakness[KM1.2] and light-headedness. Negative for[CK1.2] seizures[KM1.2],[CK1.2] speech difficulty[KM1.2],[CK1.2] numbness[KM1.2] and headaches.   Hematological:[CK1.2] Does not bruise/bleed easily[KM1.2].   Psychiatric/Behavioral: Positive for[CK1.2] confusion[KM1.2],[CK1.2] decreased concentration[KM1.2] and[CK1.2] dysphoric mood[KM1.2].[CK1.2]        Patient some mild confusion with some mild dementia per wife   All other systems reviewed and are negative[KM1.2].[CK1.2]      Physical Exam[CK1.1]   BP: 142/77  Pulse: 56  Heart Rate: 53  Temp: 97.3  F (36.3  C)  Resp: 18  Height: 188 cm (6' 2\")  Weight: 53.1 kg (117 lb)  SpO2: 96 %[KM1.1]      Physical Exam   Constitutional: He appears[CK1.2] well-developed[KM1.2] and[CK1.2] well-nourished[KM1.2]. He appears[CK1.2] distressed[KM1.2].[CK1.2]   Patient sleeping easily arousable flat affect noted speech somewhat soft and flat consistent with his Parkinson's.[KM1.2]   HENT:   Head:[CK1.2] Normocephalic[KM1.2] and[CK1.2] atraumatic[KM1.2].   Eyes:[CK1.2] EOM[KM1.2] are normal.[CK1.2] Pupils are equal, round, and reactive to light[KM1.2].[CK1.2] No scleral icterus[KM1.2].   Neck:[CK1.2] Normal range of motion[KM1.2].[CK1.2] Neck supple[KM1.2].[CK1.2] No JVD[KM1.2] present.   Cardiovascular:[CK1.2] Normal rate[KM1.2] and[CK1.2] regular rhythm[KM1.2].    Pulmonary/Chest: No[CK1.2] stridor[KM1.2]. No[CK1.2] respiratory distress[KM1.2]. He has[CK1.2] no wheezes[KM1.2].   Abdominal: He exhibits[CK1.2] no " distension[KM1.2]. There is[CK1.2] no tenderness[KM1.2]. There is[CK1.2] no rebound[KM1.2].   Musculoskeletal: He exhibits no[CK1.2] edema[KM1.2],[CK1.2] tenderness[KM1.2] or[CK1.2] deformity[KM1.2].   Neurological: He is[CK1.2] alert[KM1.2]. He has[CK1.2] normal reflexes[KM1.2]. No[CK1.2] cranial nerve deficit[KM1.2].[CK1.2] Coordination[KM1.2] normal.[CK1.2]   And oriented to person and place at this point.  Patient displays no focal findings of an acute neurological event with Parkinson features noted.[KM1.2]   Skin: Skin is[CK1.2] warm[KM1.2] and[CK1.2] dry[KM1.2].[CK1.2] No rash[KM1.2] noted. He is[CK1.2] not diaphoretic[KM1.2]. No[CK1.2] erythema[KM1.2]. No[CK1.2] pallor[KM1.2].   Psychiatric:[CK1.2]   Flat affect noted on examination   Nursing note[KM1.2] and[CK1.2] vitals[KM1.2] reviewed.[CK1.2]      ED Course[CK1.1]   12:00 PM  The patient was seen and examined by Dr. Mullins in Room 11.[CK1.2]    ED Course[KM1.1]   Patient evaluated in the ER.  IV established.  EKG was done.  First-degree block noted.  Portable chest x-ray reveals no acute airspace disease.  Bedside ultrasound reveals no sign of dehydration.  CT was done also without any acute intracranial pathology.    Laboratory evaluation ER.  Troponin was negative.  Chemistries reveal glucose of 80.  Other labs stable.  White count 7.7 hemoglobin 11.6.    Discussed situation with wife was present also.  Will admit to ED observation overnight for syncopal workup.  Continue IV fluids patient also urinalysis we did do a straight cath revealing 13 white cells.  Patient had a UTI in the past.  Would recommend starting anabolic therapy also.  Will reassess patient in the morning and also consider formal echo.  Wife agrees with plan  did talk to him also and agrees.[KM1.2]    Procedures[CK1.1]  Results for orders placed during the hospital encounter of 01/26/18   POC US ECHO LIMITED    Impression Limited Bedside Cardiac Ultrasound, performed and  interpreted by me.   Indication: syncope.  Parasternal long axis, parasternal short axis and apical 4 chamber views were acquired.   Image quality was satisfactory.    Findings:    Global left ventricular function appears intact.  Chambers do not appear dilated.  There is no evidence of free fluid within the pericardium.    IMPRESSION: Grossly normal left ventricular function and chamber size.  No pericardial effusion. IVC without collapse..[KM1.1]               EKG Interpretation:      Interpreted by Bharath Mullins  Time reviewed: 1213  Symptoms at time of EKG: syncope   Rhythm: normal sinus   Rate: normal  Axis: normal  Ectopy: none  Conduction: First-degree heart block  ST Segments/ T Waves: No ST-T wave changes  Q Waves: none  Comparison to prior: No old EKG available    Clinical Impression: normal EKG[KM1.2]          Critical Care time:[CK1.1]  none[KM1.2]             Labs Ordered and Resulted from Time of ED Arrival Up to the Time of Departure from the ED   CBC WITH PLATELETS DIFFERENTIAL - Abnormal; Notable for the following:        Result Value    RBC Count 3.72 (*)     Hemoglobin 11.6 (*)     Hematocrit 36.3 (*)     Absolute Lymphocytes 0.7 (*)     All other components within normal limits   INR - Abnormal; Notable for the following:     INR 1.16 (*)     All other components within normal limits   COMPREHENSIVE METABOLIC PANEL - Abnormal; Notable for the following:     Calcium 8.3 (*)     Albumin 3.3 (*)     Protein Total 6.0 (*)     All other components within normal limits   ROUTINE UA WITH MICROSCOPIC REFLEX TO CULTURE - Abnormal; Notable for the following:     Ketones Urine 5 (*)     Protein Albumin Urine 10 (*)     Leukocyte Esterase Urine Moderate (*)     WBC Urine 13 (*)     Bacteria Urine Few (*)     Mucous Urine Present (*)     All other components within normal limits   PARTIAL THROMBOPLASTIN TIME   MAGNESIUM   TROPONIN I   PULSE OXIMETRY NURSING   CARDIAC CONTINUOUS MONITORING   PERIPHERAL IV  CATHETER   STRAIGHT CATH FOR URINE   URINE CULTURE AEROBIC BACTERIAL     Results for orders placed or performed during the hospital encounter of 01/26/18   XR Chest Port 1 View    Narrative    XR CHEST PORT 1 VW 1/26/2018 12:31 PM    History: syncope;     Comparison: 8/9/2017, CT from 4/18/2017    Findings: AP view of the chest. A repeat view of the apices was also  obtained. The cardiomediastinal silhouette is within normal limits.  Pulmonary vasculature is normal. No focal airspace opacities. No  significant pleural effusion or pneumothorax.      Impression    Impression: No acute airspace disease.    I have personally reviewed the examination and initial interpretation  and I agree with the findings.    ANT FIELDS MD   CT Head w/o Contrast    Narrative    TEMPORARY 1/26/2018 1:53 PM    Provided History: Syncope in Parkinson's    Comparison: Head CT 6/20/2016, brain MR 10/19/2011, head CT10/12/2011    Technique: Using multidetector thin collimation helical acquisition  technique, axial, coronal and sagittal CT images from the skull base  to the vertex were obtained without intravenous contrast.     Findings:    No intracranial hemorrhage, mass effect, or midline shift. The  ventricles are proportionate to the cerebral sulci. The gray to white  matter differentiation of the cerebral hemispheres is preserved. The  basal cisterns are patent. Mild generalized cerebral volume loss.    The visualized paranasal sinuses are clear. The mastoid air cells are  clear. Bilateral pseudophakia. Right scleral buckle.         Impression    Impression:  No acute intracranial pathology.    I have personally reviewed the examination and initial interpretation  and I agree with the findings.    CHDA GODDARD MD   POC US ECHO LIMITED    Impression    Limited Bedside Cardiac Ultrasound, performed and interpreted by me.   Indication: syncope.  Parasternal long axis, parasternal short axis and apical 4 chamber views were acquired.    Image quality was satisfactory.    Findings:    Global left ventricular function appears intact.  Chambers do not appear dilated.  There is no evidence of free fluid within the pericardium.    IMPRESSION: Grossly normal left ventricular function and chamber size.  No pericardial effusion. IVC without collapse..     CBC with platelets differential   Result Value Ref Range    WBC 7.7 4.0 - 11.0 10e9/L    RBC Count 3.72 (L) 4.4 - 5.9 10e12/L    Hemoglobin 11.6 (L) 13.3 - 17.7 g/dL    Hematocrit 36.3 (L) 40.0 - 53.0 %    MCV 98 78 - 100 fl    MCH 31.2 26.5 - 33.0 pg    MCHC 32.0 31.5 - 36.5 g/dL    RDW 12.4 10.0 - 15.0 %    Platelet Count 222 150 - 450 10e9/L    Diff Method Automated Method     % Neutrophils 81.4 %    % Lymphocytes 9.6 %    % Monocytes 7.4 %    % Eosinophils 1.4 %    % Basophils 0.1 %    % Immature Granulocytes 0.1 %    Nucleated RBCs 0 0 /100    Absolute Neutrophil 6.3 1.6 - 8.3 10e9/L    Absolute Lymphocytes 0.7 (L) 0.8 - 5.3 10e9/L    Absolute Monocytes 0.6 0.0 - 1.3 10e9/L    Absolute Eosinophils 0.1 0.0 - 0.7 10e9/L    Absolute Basophils 0.0 0.0 - 0.2 10e9/L    Abs Immature Granulocytes 0.0 0 - 0.4 10e9/L    Absolute Nucleated RBC 0.0    Partial thromboplastin time   Result Value Ref Range    PTT 33 22 - 37 sec   INR   Result Value Ref Range    INR 1.16 (H) 0.86 - 1.14   Comprehensive metabolic panel   Result Value Ref Range    Sodium 142 133 - 144 mmol/L    Potassium 4.0 3.4 - 5.3 mmol/L    Chloride 108 94 - 109 mmol/L    Carbon Dioxide 29 20 - 32 mmol/L    Anion Gap 6 3 - 14 mmol/L    Glucose 80 70 - 99 mg/dL    Urea Nitrogen 27 7 - 30 mg/dL    Creatinine 0.98 0.66 - 1.25 mg/dL    GFR Estimate 75 >60 mL/min/1.7m2    GFR Estimate If Black >90 >60 mL/min/1.7m2    Calcium 8.3 (L) 8.5 - 10.1 mg/dL    Bilirubin Total 0.5 0.2 - 1.3 mg/dL    Albumin 3.3 (L) 3.4 - 5.0 g/dL    Protein Total 6.0 (L) 6.8 - 8.8 g/dL    Alkaline Phosphatase 96 40 - 150 U/L    ALT 8 0 - 70 U/L    AST 8 0 - 45 U/L   Magnesium    Result Value Ref Range    Magnesium 2.0 1.6 - 2.3 mg/dL   Troponin I   Result Value Ref Range    Troponin I ES <0.015 0.000 - 0.045 ug/L   UA with Microscopic reflex to Culture   Result Value Ref Range    Color Urine Yellow     Appearance Urine Clear     Glucose Urine Negative NEG^Negative mg/dL    Bilirubin Urine Negative NEG^Negative    Ketones Urine 5 (A) NEG^Negative mg/dL    Specific Gravity Urine 1.017 1.003 - 1.035    Blood Urine Negative NEG^Negative    pH Urine 6.5 5.0 - 7.0 pH    Protein Albumin Urine 10 (A) NEG^Negative mg/dL    Urobilinogen mg/dL 2.0 0.0 - 2.0 mg/dL    Nitrite Urine Negative NEG^Negative    Leukocyte Esterase Urine Moderate (A) NEG^Negative    Source Catheterized Urine     WBC Urine 13 (H) 0 - 2 /HPF    RBC Urine 2 0 - 2 /HPF    Bacteria Urine Few (A) NEG^Negative /HPF    Squamous Epithelial /HPF Urine 1 0 - 1 /HPF    Mucous Urine Present (A) NEG^Negative /LPF   EKG 12-lead, tracing only   Result Value Ref Range    Interpretation ECG Click View Image link to view waveform and result[KM1.1]             Assessments & Plan (with Medical Decision Making)[CK1.1]  72-year-old male with Parkinson's for over 20 years presents with syncopal episode at nursing home.  Patient was up attempting to urinate and then passed out without trauma without seizure activity.  Loss of consciousness 5 seconds.  No postictal state.  Patient evaluated here in the ER.  Vital signs stable IV fluids given.  EKG and chest x-ray no significant findings.  Labs otherwise stable troponin negative.  INR 1.16.  Head CT was negative.  Bedside echo without any wall motion abnormalities.  Patient did not appear to show signs of dehydration by ultrasound also.  Patient straight cath done for urine as he is normally incontinent.  White count was 13.  Patient in the ER will be admitted to ED observation overnight for syncopal workup.  Patient's wife agrees with plan.  Will initiate antibiotics also.[KM1.2]           I have  reviewed the nursing notes.    I have reviewed the findings, diagnosis, plan and need for follow up with the patient.[CK1.1]    Current Discharge Medication List          Final diagnoses:   Syncope and collapse   Paralysis agitans (H)   UTI symptoms[KM1.1]   I, Anson Pinzon, am serving as a trained medical scribe to document services personally performed by Bharath Mullins MD, based on the provider's statements to me.   IBharath MD, was physically present and have reviewed and verified the accuracy of this note documented by Anson Pinzon.[CK1.2]     1/26/2018   [facility], [place], EMERGENCY DEPARTMENT[CK1.1]      This note was created at least in part by the use of dragon voice dictation system. Inadvertent typographical errors may still exist.  Bharath Mullins MD.[KM1.2]         Bharath Mullins MD  01/26/18 1957  [KM1.1]     Revision History        User Key Date/Time User Provider Type Action    > KM1.1 1/26/2018  7:57 PM Bharath Mullins MD Physician Sign     KM1.2 1/26/2018  7:47 PM Bharath Mullins MD Physician      CK1.2 1/26/2018 12:23 PM Anson Pinzon Scribe Share     CK1.1 1/26/2018 12:09 PM Anson Pinzonibrené Morrison                  Procedure Notes     No notes of this type exist for this encounter.      Progress Notes - Therapies (Notes from 01/24/18 through 01/27/18)     No notes of this type exist for this encounter.                                          INTERAGENCY TRANSFER FORM - LAB / IMAGING / EKG / EMG RESULTS   1/26/2018                       UNIT 6D OBSERVATION [facility]: [phone]            Unresulted Labs     None         Lab Results - 3 Days      Urine Culture Aerobic Bacterial [[id number]] (Abnormal)  Resulted: 01/27/18 1455, Result status: Preliminary result    Ordering provider: Bharath Mullins MD  01/26/18 1545 Resulting lab: [facility]    Specimen Information    Type Source Collected On   Catheterized  Urine  01/26/18 1545          Components       Value Reference Range Flag Lab   Specimen Description Catheterized Urine      Culture Micro --  A 225   Result:         >100,000 colonies/mL  Gram positive cocci     Culture Micro Culture in progress   225   Result:              TSH with free T4 reflex [[id number]]  Resulted: 01/27/18 1426, Result status: Final result    Ordering provider: Jared Alvarado PA  01/27/18 0758 Resulting lab: [facility]    Specimen Information    Type Source Collected On     01/27/18 0758          Components       Value Reference Range Flag Lab   TSH 1.17 0.40 - 4.00 mU/L  51            Basic metabolic panel [[id number]] (Abnormal)  Resulted: 01/27/18 0851, Result status: Final result    Ordering provider: Jared Alvarado PA  01/27/18 0509 Resulting lab: [facility]    Specimen Information    Type Source Collected On   Blood  01/27/18 0758          Components       Value Reference Range Flag Lab   Sodium 143 133 - 144 mmol/L  51   Potassium 3.7 3.4 - 5.3 mmol/L  51   Chloride 109 94 - 109 mmol/L  51   Carbon Dioxide 26 20 - 32 mmol/L  51   Anion Gap 7 3 - 14 mmol/L  51   Glucose 85 70 - 99 mg/dL  51   Urea Nitrogen 18 7 - 30 mg/dL  51   Creatinine 0.83 0.66 - 1.25 mg/dL  51   GFR Estimate >90 >60 mL/min/1.7m2  51   Comment:  Non  GFR Calc   GFR Estimate If Black >90 >60 mL/min/1.7m2  51   Comment:  African American GFR Calc   Calcium 8.0 8.5 - 10.1 mg/dL L 51            Magnesium [[id number]]  Resulted: 01/27/18 0851, Result status: Final result    Ordering provider: Jared Alvarado PA  01/27/18 3468 Resulting lab: [facility]    Specimen Information    Type Source Collected On   Blood  01/27/18 0758          Components       Value Reference Range Flag Lab   Magnesium 1.9 1.6 - 2.3 mg/dL  51            CBC with platelets differential [[id number]] (Abnormal)   Resulted: 01/27/18 0721, Result status: Final result    Ordering provider: Myranda Chambers PA-C  01/27/18 0001 Resulting lab: [facility]    Specimen Information    Type Source Collected On   Blood  01/27/18 0655          Components       Value Reference Range Flag Lab   WBC 5.5 4.0 - 11.0 10e9/L  51   RBC Count 3.70 4.4 - 5.9 10e12/L L 51   Hemoglobin 11.4 13.3 - 17.7 g/dL L 51   Hematocrit 35.9 40.0 - 53.0 % L 51   MCV 97 78 - 100 fl  51   MCH 30.8 26.5 - 33.0 pg  51   MCHC 31.8 31.5 - 36.5 g/dL  51   RDW 12.4 10.0 - 15.0 %  51   Platelet Count 197 150 - 450 10e9/L  51   Diff Method Automated Method   51   % Neutrophils 65.7 %  51   % Lymphocytes 22.1 %  51   % Monocytes 7.6 %  51   % Eosinophils 4.2 %  51   % Basophils 0.2 %  51   % Immature Granulocytes 0.2 %  51   Nucleated RBCs 0 0 /100  51   Absolute Neutrophil 3.6 1.6 - 8.3 10e9/L  51   Absolute Lymphocytes 1.2 0.8 - 5.3 10e9/L  51   Absolute Monocytes 0.4 0.0 - 1.3 10e9/L  51   Absolute Eosinophils 0.2 0.0 - 0.7 10e9/L  51   Absolute Basophils 0.0 0.0 - 0.2 10e9/L  51   Abs Immature Granulocytes 0.0 0 - 0.4 10e9/L  51   Absolute Nucleated RBC 0.0   51            Troponin I - Now then in 4 hours x 2 [[id number]]  Resulted: 01/26/18 2318, Result status: Final result    Ordering provider: Myranda Chambers PA-C  01/26/18 1837 Resulting lab: [facility]    Specimen Information    Type Source Collected On   Blood  01/26/18 4261          Components       Value Reference Range Flag Lab   Troponin I ES <0.015 0.000 - 0.045 ug/L  51   Comment:         The 99th percentile for upper reference range is 0.045 ug/L.  Troponin values   in the range of 0.045 - 0.120 ug/L may be associated with risks of adverse   clinical events.              Troponin I - Now then in 4 hours x 2 [[id number]]  Resulted: 01/26/18 2053, Result status: Final result    Ordering provider: Myranda Chambers,  LATHA  01/26/18 1837 Resulting lab: [facility]    Specimen Information    Type Source Collected On   Blood  01/26/18 1941          Components       Value Reference Range Flag Lab   Troponin I ES <0.015 0.000 - 0.045 ug/L  51   Comment:         The 99th percentile for upper reference range is 0.045 ug/L.  Troponin values   in the range of 0.045 - 0.120 ug/L may be associated with risks of adverse   clinical events.              CBC with platelets [[id number]] (Abnormal)  Resulted: 01/26/18 2028, Result status: Final result    Ordering provider: Myranda Chambers PA-C  01/26/18 1837 Resulting lab: [facility]    Specimen Information    Type Source Collected On   Blood  01/26/18 1941          Components       Value Reference Range Flag Lab   WBC 6.5 4.0 - 11.0 10e9/L  51   RBC Count 3.80 4.4 - 5.9 10e12/L L 51   Hemoglobin 11.9 13.3 - 17.7 g/dL L 51   Hematocrit 37.2 40.0 - 53.0 % L 51   MCV 98 78 - 100 fl  51   MCH 31.3 26.5 - 33.0 pg  51   MCHC 32.0 31.5 - 36.5 g/dL  51   RDW 12.6 10.0 - 15.0 %  51   Platelet Count 222 150 - 450 10e9/L  51            UA with Microscopic reflex to Culture [[id number]] (Abnormal)  Resulted: 01/26/18 1620, Result status: Final result    Ordering provider: Bharath Mullins MD  01/26/18 1207 Resulting lab: [facility]    Specimen Information    Type Source Collected On   Catheterized Urine Urine catheter 01/26/18 1545          Components       Value Reference Range Flag Lab   Color Urine Yellow   51   Appearance Urine Clear   51   Glucose Urine Negative NEG^Negative mg/dL  51   Bilirubin Urine Negative NEG^Negative  51   Ketones Urine 5 NEG^Negative mg/dL A 51   Specific Gravity Urine 1.017 1.003 - 1.035  51   Blood Urine Negative NEG^Negative  51   pH Urine 6.5 5.0 - 7.0 pH  51   Protein Albumin Urine 10 NEG^Negative mg/dL A 51   Urobilinogen mg/dL 2.0 0.0 - 2.0 mg/dL  51   Nitrite  Urine Negative NEG^Negative  51   Leukocyte Esterase Urine Moderate NEG^Negative A 51   Source Catheterized Urine   51   WBC Urine 13 0 - 2 /HPF H 51   RBC Urine 2 0 - 2 /HPF  51   Bacteria Urine Few NEG^Negative /HPF A 51   Squamous Epithelial /HPF Urine 1 0 - 1 /HPF  51   Mucous Urine Present NEG^Negative /LPF A 51            Partial thromboplastin time [[id number]]  Resulted: 01/26/18 1337, Result status: Final result    Ordering provider: Bharath Mullins MD  01/26/18 1207 Resulting lab: [facility]    Specimen Information    Type Source Collected On   Blood  01/26/18 1250          Components       Value Reference Range Flag Lab   PTT 33 22 - 37 sec  51            INR [[id number]] (Abnormal)  Resulted: 01/26/18 1337, Result status: Final result    Ordering provider: Bharath Mullins MD  01/26/18 1207 Resulting lab: [facility]    Specimen Information    Type Source Collected On   Blood  01/26/18 1250          Components       Value Reference Range Flag Lab   INR 1.16 0.86 - 1.14 H 51            Comprehensive metabolic panel [[id number]] (Abnormal)  Resulted: 01/26/18 1330, Result status: Final result    Ordering provider: Bharath Mullins MD  01/26/18 1207 Resulting lab: [facility]    Specimen Information    Type Source Collected On   Blood  01/26/18 1250          Components       Value Reference Range Flag Lab   Sodium 142 133 - 144 mmol/L  51   Potassium 4.0 3.4 - 5.3 mmol/L  51   Chloride 108 94 - 109 mmol/L  51   Carbon Dioxide 29 20 - 32 mmol/L  51   Anion Gap 6 3 - 14 mmol/L  51   Glucose 80 70 - 99 mg/dL  51   Urea Nitrogen 27 7 - 30 mg/dL  51   Creatinine 0.98 0.66 - 1.25 mg/dL  51   GFR Estimate 75 >60 mL/min/1.7m2  51   Comment:  Non  GFR Calc   GFR Estimate If Black >90 >60 mL/min/1.7m2  51   Comment:  African American GFR Calc   Calcium 8.3 8.5 - 10.1 mg/dL L 51   Bilirubin Total 0.5  0.2 - 1.3 mg/dL  51   Albumin 3.3 3.4 - 5.0 g/dL L 51   Protein Total 6.0 6.8 - 8.8 g/dL L 51   Alkaline Phosphatase 96 40 - 150 U/L  51   ALT 8 0 - 70 U/L  51   AST 8 0 - 45 U/L  51            Magnesium [[id number]]  Resulted: 01/26/18 1330, Result status: Final result    Ordering provider: Bharath Mullins MD  01/26/18 1205 Resulting lab: [facility]    Specimen Information    Type Source Collected On   Blood  01/26/18 1250          Components       Value Reference Range Flag Lab   Magnesium 2.0 1.6 - 2.3 mg/dL  51            Troponin I [[id number]]  Resulted: 01/26/18 1330, Result status: Final result    Ordering provider: Bharath Mullins MD  01/26/18 1205 Resulting lab: [facility]    Specimen Information    Type Source Collected On   Blood  01/26/18 1250          Components       Value Reference Range Flag Lab   Troponin I ES <0.015 0.000 - 0.045 ug/L  51   Comment:         The 99th percentile for upper reference range is 0.045 ug/L.  Troponin values   in the range of 0.045 - 0.120 ug/L may be associated with risks of adverse   clinical events.              CBC with platelets differential [[id number]] (Abnormal)  Resulted: 01/26/18 1305, Result status: Final result    Ordering provider: Bharath Mullins MD  01/26/18 1209 Resulting lab: [facility]    Specimen Information    Type Source Collected On   Blood  01/26/18 1250          Components       Value Reference Range Flag Lab   WBC 7.7 4.0 - 11.0 10e9/L  51   RBC Count 3.72 4.4 - 5.9 10e12/L L 51   Hemoglobin 11.6 13.3 - 17.7 g/dL L 51   Hematocrit 36.3 40.0 - 53.0 % L 51   MCV 98 78 - 100 fl  51   MCH 31.2 26.5 - 33.0 pg  51   MCHC 32.0 31.5 - 36.5 g/dL  51   RDW 12.4 10.0 - 15.0 %  51   Platelet Count 222 150 - 450 10e9/L  51   Diff Method Automated Method   51   % Neutrophils 81.4 %  51   % Lymphocytes 9.6 %  51   % Monocytes 7.4 %  51   % Eosinophils 1.4  %  51   % Basophils 0.1 %  51   % Immature Granulocytes 0.1 %  51   Nucleated RBCs 0 0 /100  51   Absolute Neutrophil 6.3 1.6 - 8.3 10e9/L  51   Absolute Lymphocytes 0.7 0.8 - 5.3 10e9/L L 51   Absolute Monocytes 0.6 0.0 - 1.3 10e9/L  51   Absolute Eosinophils 0.1 0.0 - 0.7 10e9/L  51   Absolute Basophils 0.0 0.0 - 0.2 10e9/L  51   Abs Immature Granulocytes 0.0 0 - 0.4 10e9/L  51   Absolute Nucleated RBC 0.0   51            Testing Performed By     Lab - Abbreviation Name Director Address Valid Date Range    51 - Unknown [facility] [place] Unknown [address] [facility] [address] 12/31/14 1010 - Present    225 - Unknown [facility] Unknown [address] [facility] [address] 12/19/14 0954 - Present               Imaging Results - 3 Days      CT Head w/o Contrast [[id number]]  Resulted: 01/26/18 1433, Result status: Final result    Ordering provider: Bharath Mullins MD  01/26/18 1207 Resulted by: Luis Aguillon MD Ames, Jeffrey Charles, MD    Performed: 01/26/18 1343 - 01/26/18 1413 Resulting lab: RADIOLOGY RESULTS    Narrative:       TEMPORARY 1/26/2018 1:53 PM    Provided History: Syncope in Parkinson's    Comparison: Head CT 6/20/2016, brain MR 10/19/2011, head CT10/12/2011    Technique: Using multidetector thin collimation helical acquisition  technique, axial, coronal and sagittal CT images from the skull base  to the vertex were obtained without intravenous contrast.     Findings:    No intracranial hemorrhage, mass effect, or midline shift. The  ventricles are proportionate to the cerebral sulci. The gray to white  matter differentiation of the cerebral hemispheres is preserved. The  basal cisterns are patent. Mild generalized cerebral volume loss.    The visualized paranasal sinuses are clear. The mastoid air cells are  clear. Bilateral pseudophakia. Right scleral buckle.         Impression:       Impression:  No acute intracranial  pathology.    I have personally reviewed the examination and initial interpretation  and I agree with the findings.    CHAD GODDARD MD      XR Chest Port 1 View [[id number]]  Resulted: 01/26/18 1331, Result status: Final result    Ordering provider: Bharath Mullins MD  01/26/18 1207 Resulted by: Ant Fields MD Wickre, Mark, MD    Performed: 01/26/18 1216 - 01/26/18 1231 Resulting lab: RADIOLOGY RESULTS    Narrative:       XR CHEST PORT 1 VW 1/26/2018 12:31 PM    History: syncope;     Comparison: 8/9/2017, CT from 4/18/2017    Findings: AP view of the chest. A repeat view of the apices was also  obtained. The cardiomediastinal silhouette is within normal limits.  Pulmonary vasculature is normal. No focal airspace opacities. No  significant pleural effusion or pneumothorax.      Impression:       Impression: No acute airspace disease.    I have personally reviewed the examination and initial interpretation  and I agree with the findings.    ANT FIELDS MD      POC US ECHO LIMITED [[id number]]  Resulted: 01/26/18 1238, Result status: Final result    Ordering provider: Bharath Mullins MD  01/26/18 1238 Performed: 01/26/18 1238 - 01/26/18 1238    Resulting lab: RADIANT POCUS      Impression:       Limited Bedside Cardiac Ultrasound, performed and interpreted by me.   Indication: syncope.  Parasternal long axis, parasternal short axis and apical 4 chamber views were acquired.   Image quality was satisfactory.    Findings:    Global left ventricular function appears intact.  Chambers do not appear dilated.  There is no evidence of free fluid within the pericardium.    IMPRESSION: Grossly normal left ventricular function and chamber size.  No pericardial effusion. IVC without collapse..        Testing Performed By     Lab - Abbreviation Name Director Address Valid Date Range    104 - Rad Rslts RADIOLOGY RESULTS Unknown Unknown 02/16/05 1553 - Present    1735 - RADIANT POCUS RADIANT POCUS Unknown  Unknown 05/12/15 0832 - Present               ECG/EMG Results      Echocardiogram Complete [[id number]]  Resulted: 18 1226, Result status: Edited Result - FINAL    Ordering provider: Raheem Calderon APRN CNP  18 1155 Resulted by: Jacque Grande MD    Performed: 18 1249 - 18 1250 Resulting lab: RADIOLOGY RESULTS    Narrative:       [id number]  [id number]  [id number]  [id number]^[id number]^RAHEEM^ROMERO           [facility],[place]  Echocardiography Laboratory  [address] [place] [id number]     Name: YARI TORRES  MRN: [id number]  : 1945  Study Date: 2018 12:26 PM  Age: 72 yrs  Gender: Male  Patient Location: [facility]  Reason For Study: Syncope  Ordering Physician: RAHEEM CALDERON  Performed By: Burke Acuña RDCS     BSA: 1.7 m2  Height: 74 in  Weight: 117 lb  HR: 55  BP: 168/100 mmHg  _____________________________________________________________________________  __        Procedure  Complete Portable Echo Adult. Echocardiogram with two-dimensional, color and  spectral Doppler performed.  _____________________________________________________________________________  __        Interpretation Summary  Global and regional left ventricular function is normal with an EF of 60-65%.  Global right ventricular function is normal.  No significant valvular dysfunction present.  No pericardial effusion is present.     _____________________________________________________________________________  __        Left Ventricle  Left ventricular size is normal. Left ventricular wall thickness is normal.  Global and regional left ventricular function is normal with an EF of 60-65%.  Normal left ventricular filling for age. No regional wall motion abnormalities  are seen.     Right Ventricle  The right ventricle is normal size. Global right ventricular function is  normal.     Atria  Both atria appear normal.     Mitral Valve  Mitral leaflet thickness is increased .         Aortic Valve  Mild aortic valve sclerosis is present.     Tricuspid Valve  The tricuspid valve is normal. The peak velocity of the tricuspid regurgitant  jet is not obtainable. Pulmonary artery systolic pressure cannot be assessed.     Pulmonic Valve  The pulmonic valve is normal.     Vessels  The aorta root is normal. The inferior vena cava cannot be assessed.     Pericardium  No pericardial effusion is present.        Compared to Previous Study  Previous study not available for comparison.  _____________________________________________________________________________  __  MMode/2D Measurements & Calculations     IVSd: 1.1 cm  LVIDd: 3.7 cm  LVIDs: 2.6 cm  LVPWd: 1.2 cm  FS: 29.6 %  EDV(Teich): 58.5 ml  ESV(Teich): 24.8 ml  LV mass(C)d: 133.4 grams  LV mass(C)dI: 77.1 grams/m2  LVOT diam: 2.3 cm  LVOT area: 4.2 cm2  LA Volume (BP): 55.2 ml  LA Volume Index (BP): 31.9 ml/m2  RWT: 0.64           Doppler Measurements & Calculations  MV E max autumn: 57.0 cm/sec  MV A max autumn: 66.0 cm/sec  MV E/A: 0.86  MV dec time: 0.30 sec  Ao V2 max: 111.0 cm/sec  Ao max P.0 mmHg  BULMARO(V,D): 3.7 cm2  LV V1 max P.0 mmHg  LV V1 max: 99.3 cm/sec  E/E' av.4  Lateral E/e': 6.4  Medial E/e': 10.4     _____________________________________________________________________________  __           Report approved by: Adrian Concepcion 2018 02:24 PM       1    Type Source Collected On     18 1226          View Image (below)              Encounter-Level Documents:     There are no encounter-level documents.      Order-Level Documents:     There are no order-level documents.

## 2018-01-27 ENCOUNTER — APPOINTMENT (OUTPATIENT)
Dept: CARDIOLOGY | Facility: CLINIC | Age: 73
End: 2018-01-27
Attending: NURSE PRACTITIONER
Payer: MEDICARE

## 2018-01-27 VITALS
BODY MASS INDEX: 15.02 KG/M2 | DIASTOLIC BLOOD PRESSURE: 90 MMHG | RESPIRATION RATE: 18 BRPM | HEART RATE: 67 BPM | WEIGHT: 117 LBS | TEMPERATURE: 98 F | SYSTOLIC BLOOD PRESSURE: 157 MMHG | HEIGHT: 74 IN | OXYGEN SATURATION: 97 %

## 2018-01-27 LAB
ANION GAP SERPL CALCULATED.3IONS-SCNC: 7 MMOL/L (ref 3–14)
BASOPHILS # BLD AUTO: 0 10E9/L (ref 0–0.2)
BASOPHILS NFR BLD AUTO: 0.2 %
BUN SERPL-MCNC: 18 MG/DL (ref 7–30)
CALCIUM SERPL-MCNC: 8 MG/DL (ref 8.5–10.1)
CHLORIDE SERPL-SCNC: 109 MMOL/L (ref 94–109)
CO2 SERPL-SCNC: 26 MMOL/L (ref 20–32)
CREAT SERPL-MCNC: 0.83 MG/DL (ref 0.66–1.25)
DIFFERENTIAL METHOD BLD: ABNORMAL
EOSINOPHIL # BLD AUTO: 0.2 10E9/L (ref 0–0.7)
EOSINOPHIL NFR BLD AUTO: 4.2 %
ERYTHROCYTE [DISTWIDTH] IN BLOOD BY AUTOMATED COUNT: 12.4 % (ref 10–15)
GFR SERPL CREATININE-BSD FRML MDRD: >90 ML/MIN/1.7M2
GLUCOSE SERPL-MCNC: 85 MG/DL (ref 70–99)
HCT VFR BLD AUTO: 35.9 % (ref 40–53)
HGB BLD-MCNC: 11.4 G/DL (ref 13.3–17.7)
IMM GRANULOCYTES # BLD: 0 10E9/L (ref 0–0.4)
IMM GRANULOCYTES NFR BLD: 0.2 %
INTERPRETATION ECG - MUSE: NORMAL
LYMPHOCYTES # BLD AUTO: 1.2 10E9/L (ref 0.8–5.3)
LYMPHOCYTES NFR BLD AUTO: 22.1 %
MAGNESIUM SERPL-MCNC: 1.9 MG/DL (ref 1.6–2.3)
MCH RBC QN AUTO: 30.8 PG (ref 26.5–33)
MCHC RBC AUTO-ENTMCNC: 31.8 G/DL (ref 31.5–36.5)
MCV RBC AUTO: 97 FL (ref 78–100)
MONOCYTES # BLD AUTO: 0.4 10E9/L (ref 0–1.3)
MONOCYTES NFR BLD AUTO: 7.6 %
NEUTROPHILS # BLD AUTO: 3.6 10E9/L (ref 1.6–8.3)
NEUTROPHILS NFR BLD AUTO: 65.7 %
NRBC # BLD AUTO: 0 10*3/UL
NRBC BLD AUTO-RTO: 0 /100
PLATELET # BLD AUTO: 197 10E9/L (ref 150–450)
POTASSIUM SERPL-SCNC: 3.7 MMOL/L (ref 3.4–5.3)
RBC # BLD AUTO: 3.7 10E12/L (ref 4.4–5.9)
SODIUM SERPL-SCNC: 143 MMOL/L (ref 133–144)
TSH SERPL DL<=0.005 MIU/L-ACNC: 1.17 MU/L (ref 0.4–4)
WBC # BLD AUTO: 5.5 10E9/L (ref 4–11)

## 2018-01-27 PROCEDURE — 0296T ZIO PATCH HOLTER: CPT | Performed by: NURSE PRACTITIONER

## 2018-01-27 PROCEDURE — 40000893 ZZH STATISTIC PT IP EVAL DEFER: Performed by: PHYSICAL THERAPIST

## 2018-01-27 PROCEDURE — 99217 ZZC OBSERVATION CARE DISCHARGE: CPT | Mod: Z6 | Performed by: FAMILY MEDICINE

## 2018-01-27 PROCEDURE — 93306 TTE W/DOPPLER COMPLETE: CPT | Mod: 26 | Performed by: INTERNAL MEDICINE

## 2018-01-27 PROCEDURE — 25000132 ZZH RX MED GY IP 250 OP 250 PS 637: Mod: GY | Performed by: PHYSICIAN ASSISTANT

## 2018-01-27 PROCEDURE — A9270 NON-COVERED ITEM OR SERVICE: HCPCS | Mod: GY | Performed by: PHYSICIAN ASSISTANT

## 2018-01-27 PROCEDURE — 40000274 ZZH STATISTIC RCP CONSULT EA 30 MIN

## 2018-01-27 PROCEDURE — 85025 COMPLETE CBC W/AUTO DIFF WBC: CPT | Performed by: PHYSICIAN ASSISTANT

## 2018-01-27 PROCEDURE — 93306 TTE W/DOPPLER COMPLETE: CPT

## 2018-01-27 PROCEDURE — 99203 OFFICE O/P NEW LOW 30 MIN: CPT | Mod: 25 | Performed by: INTERNAL MEDICINE

## 2018-01-27 PROCEDURE — 25000128 H RX IP 250 OP 636: Performed by: FAMILY MEDICINE

## 2018-01-27 PROCEDURE — 36415 COLL VENOUS BLD VENIPUNCTURE: CPT | Performed by: PHYSICIAN ASSISTANT

## 2018-01-27 PROCEDURE — 80048 BASIC METABOLIC PNL TOTAL CA: CPT | Performed by: PHYSICIAN ASSISTANT

## 2018-01-27 PROCEDURE — 84443 ASSAY THYROID STIM HORMONE: CPT | Performed by: PHYSICIAN ASSISTANT

## 2018-01-27 PROCEDURE — 96361 HYDRATE IV INFUSION ADD-ON: CPT

## 2018-01-27 PROCEDURE — G0378 HOSPITAL OBSERVATION PER HR: HCPCS

## 2018-01-27 PROCEDURE — 93010 ELECTROCARDIOGRAM REPORT: CPT | Performed by: INTERNAL MEDICINE

## 2018-01-27 PROCEDURE — 93005 ELECTROCARDIOGRAM TRACING: CPT

## 2018-01-27 PROCEDURE — 0298T ZZC EXT ECG > 48HR TO 21 DAY REVIEW AND INTERPRETATN: CPT | Performed by: INTERNAL MEDICINE

## 2018-01-27 PROCEDURE — 83735 ASSAY OF MAGNESIUM: CPT | Performed by: PHYSICIAN ASSISTANT

## 2018-01-27 RX ORDER — AMLODIPINE BESYLATE 5 MG/1
5 TABLET ORAL EVERY EVENING
Qty: 90 TABLET | Refills: 3 | COMMUNITY
Start: 2018-01-27 | End: 2018-01-27

## 2018-01-27 RX ORDER — MIRTAZAPINE 15 MG/1
TABLET, FILM COATED ORAL
Qty: 90 TABLET | Refills: 1 | COMMUNITY
Start: 2018-01-27 | End: 2018-06-06

## 2018-01-27 RX ORDER — AMLODIPINE BESYLATE 5 MG/1
5 TABLET ORAL AT BEDTIME
Qty: 90 TABLET | Refills: 3 | Status: ON HOLD | DISCHARGE
Start: 2018-01-27 | End: 2019-01-01

## 2018-01-27 RX ORDER — IPRATROPIUM BROMIDE AND ALBUTEROL SULFATE 2.5; .5 MG/3ML; MG/3ML
3 SOLUTION RESPIRATORY (INHALATION) EVERY 4 HOURS PRN
Status: DISCONTINUED | OUTPATIENT
Start: 2018-01-27 | End: 2018-01-27 | Stop reason: HOSPADM

## 2018-01-27 RX ORDER — CEPHALEXIN 500 MG/1
500 CAPSULE ORAL EVERY 12 HOURS
Refills: 0 | DISCHARGE
Start: 2018-01-27 | End: 2018-02-03

## 2018-01-27 RX ADMIN — CARBIDOPA AND LEVODOPA 1 HALF-TAB: 25; 100 TABLET ORAL at 09:13

## 2018-01-27 RX ADMIN — CARBIDOPA AND LEVODOPA 1 HALF-TAB: 25; 100 TABLET ORAL at 15:09

## 2018-01-27 RX ADMIN — CARBIDOPA AND LEVODOPA 2 TABLET: 25; 100 TABLET ORAL at 12:08

## 2018-01-27 RX ADMIN — CARBIDOPA AND LEVODOPA 1 TABLET: 25; 100 TABLET ORAL at 15:08

## 2018-01-27 RX ADMIN — Medication 1 CAPSULE: at 08:45

## 2018-01-27 RX ADMIN — SODIUM CHLORIDE 1000 ML: 9 INJECTION, SOLUTION INTRAVENOUS at 03:03

## 2018-01-27 RX ADMIN — CARBIDOPA AND LEVODOPA 1 TABLET: 25; 100 TABLET ORAL at 18:09

## 2018-01-27 RX ADMIN — CARBIDOPA AND LEVODOPA 1 TABLET: 25; 100 TABLET ORAL at 09:02

## 2018-01-27 RX ADMIN — AMLODIPINE BESYLATE 5 MG: 2.5 TABLET ORAL at 08:43

## 2018-01-27 RX ADMIN — CARBIDOPA AND LEVODOPA 1 HALF-TAB: 25; 100 TABLET ORAL at 18:09

## 2018-01-27 RX ADMIN — LISINOPRIL 5 MG: 5 TABLET ORAL at 08:46

## 2018-01-27 RX ADMIN — CARBIDOPA AND LEVODOPA 2 TABLET: 25; 100 TABLET ORAL at 06:19

## 2018-01-27 RX ADMIN — VENLAFAXINE HYDROCHLORIDE 75 MG: 75 CAPSULE, EXTENDED RELEASE ORAL at 08:47

## 2018-01-27 RX ADMIN — DONEPEZIL HYDROCHLORIDE 7.5 MG: 5 TABLET, FILM COATED ORAL at 09:20

## 2018-01-27 ASSESSMENT — ACTIVITIES OF DAILY LIVING (ADL)
RETIRED_EATING: 0-->INDEPENDENT
TRANSFERRING: 0-->INDEPENDENT
FALL_HISTORY_WITHIN_LAST_SIX_MONTHS: NO
COGNITION: 0 - NO COGNITION ISSUES REPORTED
AMBULATION: 0-->INDEPENDENT
SWALLOWING: 0-->SWALLOWS FOODS/LIQUIDS WITHOUT DIFFICULTY
RETIRED_COMMUNICATION: 0-->UNDERSTANDS/COMMUNICATES WITHOUT DIFFICULTY
DRESS: 0-->INDEPENDENT
BATHING: 0-->INDEPENDENT
TOILETING: 0-->INDEPENDENT

## 2018-01-27 NOTE — PROGRESS NOTES
"SPIRITUAL HEALTH SERVICES  SPIRITUAL ASSESSMENT Progress Note  [facility] 6D   ON-CALL VISIT    REFERRAL SOURCE: Hospital  Request     Sanjay was wanting to rest and so I visited with his wife Caren. Caren was Sanjay's primary caregiver until August when she broke her leg trying to move him. Sanjay went to live at [facility] after this because she was not longer efrain to car for him.  She describes the \"hellishness\" of the last several months as she has tried to recover while being worried about her . She says she never imagined they would end up living apart from each other.  She has a son and DIL who live nearby but they both work are unable to help care for Sanjay. Offered active listening and grief support around the losses in her life.    PLAN: No follow up needed at this time as Sanjay is anticipated to discharge back to [facility] this evening.    Chichi Younger  Oncology   Pager [phone]    "

## 2018-01-27 NOTE — PLAN OF CARE
Problem: Patient Care Overview  Goal: Plan of Care/Patient Progress Review  Outpatient/Observation goals to be met before discharge home:    Observation goals PRIOR TO DISCHARGE     Comments: List all  goals to be met before discharge:   - Diagnostic tests and consults completed and resulted tests pending  - No further episodes of syncope and any new arrhythmia addressed with controlled heart rates no syncope, no ectopy, hr 60s  - Vital signs normal or at patient baseline and orthostatic vitals are normal and patient not lightheaded with standing VSS  - Tolerating oral intake to maintain hydration tolerating small amount of po  - Safe disposition plan has been identified no  - Nurse to notify provider when observation goals have been met and patient is ready for discharge.

## 2018-01-27 NOTE — PLAN OF CARE
Problem: Patient Care Overview  Goal: Plan of Care/Patient Progress Review  PT 6D: Physical therapy orders received. Per chart review and discussion with SW, pt currently residing in long term care with plans to return to facility once medically stable. Pt has safe discharge plan in place with access to continued therapies at his facility if needed. No acute PT needs are identified as pt is observation status, PT will sign off and orders will be completed.

## 2018-01-27 NOTE — PLAN OF CARE
Problem: Patient Care Overview  Goal: Plan of Care/Patient Progress Review  Observation goals PRIOR TO DISCHARGE     Comments: List all  goals to be met before discharge:   - Diagnostic tests and consults completed and resulted tests pending  - No further episodes of syncope and any new arrhythmia addressed with controlled heart rates: Pt lying down and sleeping all shift but denies pain and dizziness when asked. However monitor tech noted first degree AV block. EKG was to be ordered per provider.  - Vital signs normal or at patient baseline and orthostatic vitals are normal and patient not lightheaded with standing: VSS  - Tolerating oral intake to maintain hydration :Drank water this morning and tolerated it.  - Safe disposition plan has been identified : No  - Nurse to notify provider when observation goals have been met and patient is ready for discharge.      Incontinent of bladder x 2. Able to roll in bed with 1 assist.Perineal care done. No BM.

## 2018-01-27 NOTE — H&P
[facility] ED Observation Admission Note    Chief Complaint   Patient presents with     Loss of Consciousness       Assessment/Plan:  1. Syncope: At patients NH was being helped to the bathroom when he started feeling lightheaded and had a syncopal episode that lasted about 5 seconds. The nurses at the nursing home state that he did not fall to the ground as they were able to grab him so he did not hit his head. Patients wife denies being told that the patient has had any fever, URI symptoms, GI symptoms, seizure activity. Patients wife is concerned about patients steady weight loss since Sept 2017 of about 30 lbs and thinks this is secondary to change of environment from home to NH and poor appetite. She reports that his current mental status is his baseline. In ED, HR 50's-60's, 's-150's/60's-100's, RR 10-18, SaO2 96-99% on RA, Temp 97.3. Labs show normal CMP, troponin I. CBC with H/H 11.6/36.3 otherwise normal. INR 1.16. CXR negative. CT Head negative. Bedside Echo normal. In the ED the patient was given 1L NS bolus and 2 of his Sinemet doses. He is being admitted to the Observation Unit for syncope work up.    - Serial troponins q4h x 2 more  - Check TSH in AM  - Continuous telemetry  - Resting Echo in the morning  - Keep Mag > 2, K > 4  - Orthostatic vitals now and qshift  - Ambulate with assistance  - IVF with NS at 125ml/hr  - Consider discharge with Ziopatch    2. Acute UTI: UA with 5 ketones, 10 protein, moderate LE, 13 WBC, 2 RBC, few bacteria. UCx was sent and pending.   - Continue with Rocephin  - Follow UCx    Chronic Medical Problems:  ## PD: - Continue PTA sinemet and donepezil      ## Depression: - Continue venlafaxine, seroquel, remeron    ##  COPD: - Duoneb q4h while awake as he did not bring inhalers    ## HTN: - Continue with Lisinopril and Amlodipine      HPI:  (unable to get reliable history from patient. Called patients wife to get history)    Sanjay Cano is a 72 year old male with a  "history of COPD, Parkinson's disease, and falls who presented to the ED today from his nursing home for evaluation following a syncopal episode. Per ED report: \"It is reported by EMS that the patient was being helped to the bathroom when he started feeling lightheaded and had a syncopal episode that lasted about 5 seconds. The nurses at the nursing home state that he did not fall to the ground as they were able to grab him so he did not hit his head. The patient was able to report that he has also been having a decreased appetite lately. He reports that he has never had anything like this before.\" The patient reports that has has been feeling lightheaded with standing recently. The patient's wife echoes the same account of EMS as she was not present. She denies being told that the patient has had any fever, URI symptoms, GI symptoms, seizure activity. Patients wife is concerned about patients steady weight loss since Sept 2017 of about 30 lbs and thinks this is secondary to change of environment from home to NH and poor appetite. She reports that his current mental status is his baseline. He especially does not do well with new environmental changes and also late at night. He take Klonipin 0.25mg every other night but she is not sure if he took it yesterday however that may help him settle.     In the ED, HR 50's-60's, 's-150's/60's-100's, RR 10-18, SaO2 96-99% on RA, Temp 97.3. Labs show normal CMP, troponin I. CBC with H/H 11.6/36.3 otherwise normal. INR 1.16. UA with 5 ketones, 10 protein, moderate LE, 13 WBC, 2 RBC, few bacteria. UCx was sent and pending. CXR negative. CT Head negative. Bedside Echo normal. In the ED the patient was given 1L NS bolus and 2 of his Sinemet doses. He is being admitted to the Observation Unit for syncope work up.      On admission to the observation unit the patient was stable.     History:    Past Medical History:   Diagnosis Date     At risk for falling 9/3/2012     COPD " (chronic obstructive pulmonary disease) (H)      Family history of thyroid cancer 7/13/2016     Malignant neoplasm (H)     skin - nose     Moderate recurrent major depression (H) 5/17/2012     Paralysis agitans (H)     Parkinson's Disease     Parkinson disease (H)      Rosacea        Past Surgical History:   Procedure Laterality Date     EYE SURGERY       ORTHOPEDIC SURGERY       PHACOEMULSIFICATION CLEAR CORNEA WITH STANDARD INTRAOCULAR LENS IMPLANT  5/21/2014    Procedure: PHACOEMULSIFICATION CLEAR CORNEA WITH STANDARD INTRAOCULAR LENS IMPLANT;  Surgeon: Tomy Hunter MD;  Location:  [facility]     PHACOEMULSIFICATION CLEAR CORNEA WITH TORIC INTRAOCULAR LENS IMPLANT  4/30/2014    Procedure: PHACOEMULSIFICATION CLEAR CORNEA WITH TORIC INTRAOCULAR LENS IMPLANT;  Surgeon: Tomy Hunter MD;  Location: [facility]       Family History   Problem Relation Age of Onset     CEREBROVASCULAR DISEASE Mother      DIABETES Mother      GASTROINTESTINAL DISEASE Mother      Hypertension Mother      Neurologic Disorder Father      CEREBROVASCULAR DISEASE Father      CEREBROVASCULAR DISEASE Maternal Grandfather      CANCER Paternal Grandmother      Other - See Comments Sister      gi - unknown       Social History     Social History     Marital status:      Spouse name: N/A     Number of children: N/A     Years of education: N/A     Occupational History     Not on file.     Social History Main Topics     Smoking status: Former Smoker     Packs/day: 0.01     Years: 40.00     Types: Cigarettes     Quit date: 7/31/2008     Smokeless tobacco: Never Used      Comment: very passive cigarettes in 6 months     Alcohol use No     Drug use: No     Sexual activity: Yes     Partners: Female     Other Topics Concern     Parent/Sibling W/ Cabg, Mi Or Angioplasty Before 65f 55m? No     Social History Narrative         No current facility-administered medications on file prior to encounter.   Current Outpatient Prescriptions on File Prior to  Encounter:  clonazePAM (KLONOPIN) 0.5 MG ODT tab Take 0.25 mg by mouth every other night   ranitidine (ZANTAC) 75 MG tablet Take 1 tablet (75 mg) by mouth At Bedtime   methylcellulose (CITRUCEL) 500 MG TABS tablet Take 1 tablet (500 mg) by mouth daily as needed   lactobacillus rhamnosus, GG, (CULTURELL) capsule Take 1 capsule by mouth every morning   amLODIPine (NORVASC) 5 MG tablet Take 1 tablet (5 mg) by mouth every morning   Ipratropium-Albuterol (COMBIVENT RESPIMAT)  MCG/ACT inhaler Inhale 1 puff into the lungs 4 times daily May take 1 to 2 additional doses as needed during the day   lisinopril (PRINIVIL/ZESTRIL) 5 MG tablet Take 1 tablet (5 mg) by mouth daily   cholecalciferol (VITAMIN D) 1000 UNIT tablet Take 1 tablet (1,000 Units) by mouth every morning   QUEtiapine (SEROQUEL) 50 MG tablet TAKE 1 TABLET(50 MG) BY MOUTH AT BEDTIME   COMBIVENT RESPIMAT  MCG/ACT inhaler INHALE 1 PUFF BY MOUTH FOUR TIMES DAILY. NOT TO EXCEED 6 DOSES PER DAY   venlafaxine (EFFEXOR-XR) 75 MG 24 hr capsule TAKE 1 CAPSULE BY MOUTH EVERY MORNING AND 1 CAPSULE EVERY NIGHT AT BEDTIME   FLOVENT  MCG/ACT Inhaler INHALE 2 PUFFS INTO THE LUNGS TWICE DAILY   aspirin 81 MG tablet Take by mouth twice a week   mirtazapine (REMERON) 15 MG tablet TAKE 1 TABLET(15 MG) BY MOUTH AT BEDTIME (Patient taking differently: TAKE 2 TABLETs (30 MG) BY MOUTH AT BEDTIME)   donepezil (ARICEPT) 10 MG tablet Take 5mg  by mouth twice a day (take with the 2.5 mg dose for a total dose of 7.5mg twice daily)   carbidopa-levodopa (SINEMET CR)  MG per CR tablet Take 1 tablet by mouth At Bedtime   carbidopa-levodopa (SINEMET)  MG per tablet Take 1-2 tablets by mouth 5 times daily Takes 2 tablets at 6 AM, 1.5 tablets at 9 AM, 2 tablets at noon, 1.5 tablets at 3 PM, 1.5 tablets at 6 PM. Can take additional 0.5 tablet in the middle of the night if needed.   donepezil (ARICEPT) 5 MG tablet Take 2.5 mg twice a day (take with the 5 mg dose for a  total dose of 7.5mg twice daily)       Data:    Results for orders placed or performed during the hospital encounter of 01/26/18   XR Chest Port 1 View    Narrative    XR CHEST PORT 1 VW 1/26/2018 12:31 PM    History: syncope;     Comparison: 8/9/2017, CT from 4/18/2017    Findings: AP view of the chest. A repeat view of the apices was also  obtained. The cardiomediastinal silhouette is within normal limits.  Pulmonary vasculature is normal. No focal airspace opacities. No  significant pleural effusion or pneumothorax.      Impression    Impression: No acute airspace disease.    I have personally reviewed the examination and initial interpretation  and I agree with the findings.    ANT FIELDS MD   CT Head w/o Contrast    Narrative    TEMPORARY 1/26/2018 1:53 PM    Provided History: Syncope in Parkinson's    Comparison: Head CT 6/20/2016, brain MR 10/19/2011, head CT10/12/2011    Technique: Using multidetector thin collimation helical acquisition  technique, axial, coronal and sagittal CT images from the skull base  to the vertex were obtained without intravenous contrast.     Findings:    No intracranial hemorrhage, mass effect, or midline shift. The  ventricles are proportionate to the cerebral sulci. The gray to white  matter differentiation of the cerebral hemispheres is preserved. The  basal cisterns are patent. Mild generalized cerebral volume loss.    The visualized paranasal sinuses are clear. The mastoid air cells are  clear. Bilateral pseudophakia. Right scleral buckle.         Impression    Impression:  No acute intracranial pathology.    I have personally reviewed the examination and initial interpretation  and I agree with the findings.    CHAD GODDARD MD   POC US ECHO LIMITED    Impression    Limited Bedside Cardiac Ultrasound, performed and interpreted by me.   Indication: syncope.  Parasternal long axis, parasternal short axis and apical 4 chamber views were acquired.   Image quality was  satisfactory.    Findings:    Global left ventricular function appears intact.  Chambers do not appear dilated.  There is no evidence of free fluid within the pericardium.    IMPRESSION: Grossly normal left ventricular function and chamber size.  No pericardial effusion. IVC without collapse..     CBC with platelets differential   Result Value Ref Range    WBC 7.7 4.0 - 11.0 10e9/L    RBC Count 3.72 (L) 4.4 - 5.9 10e12/L    Hemoglobin 11.6 (L) 13.3 - 17.7 g/dL    Hematocrit 36.3 (L) 40.0 - 53.0 %    MCV 98 78 - 100 fl    MCH 31.2 26.5 - 33.0 pg    MCHC 32.0 31.5 - 36.5 g/dL    RDW 12.4 10.0 - 15.0 %    Platelet Count 222 150 - 450 10e9/L    Diff Method Automated Method     % Neutrophils 81.4 %    % Lymphocytes 9.6 %    % Monocytes 7.4 %    % Eosinophils 1.4 %    % Basophils 0.1 %    % Immature Granulocytes 0.1 %    Nucleated RBCs 0 0 /100    Absolute Neutrophil 6.3 1.6 - 8.3 10e9/L    Absolute Lymphocytes 0.7 (L) 0.8 - 5.3 10e9/L    Absolute Monocytes 0.6 0.0 - 1.3 10e9/L    Absolute Eosinophils 0.1 0.0 - 0.7 10e9/L    Absolute Basophils 0.0 0.0 - 0.2 10e9/L    Abs Immature Granulocytes 0.0 0 - 0.4 10e9/L    Absolute Nucleated RBC 0.0    Partial thromboplastin time   Result Value Ref Range    PTT 33 22 - 37 sec   INR   Result Value Ref Range    INR 1.16 (H) 0.86 - 1.14   Comprehensive metabolic panel   Result Value Ref Range    Sodium 142 133 - 144 mmol/L    Potassium 4.0 3.4 - 5.3 mmol/L    Chloride 108 94 - 109 mmol/L    Carbon Dioxide 29 20 - 32 mmol/L    Anion Gap 6 3 - 14 mmol/L    Glucose 80 70 - 99 mg/dL    Urea Nitrogen 27 7 - 30 mg/dL    Creatinine 0.98 0.66 - 1.25 mg/dL    GFR Estimate 75 >60 mL/min/1.7m2    GFR Estimate If Black >90 >60 mL/min/1.7m2    Calcium 8.3 (L) 8.5 - 10.1 mg/dL    Bilirubin Total 0.5 0.2 - 1.3 mg/dL    Albumin 3.3 (L) 3.4 - 5.0 g/dL    Protein Total 6.0 (L) 6.8 - 8.8 g/dL    Alkaline Phosphatase 96 40 - 150 U/L    ALT 8 0 - 70 U/L    AST 8 0 - 45 U/L   Magnesium   Result Value Ref  Range    Magnesium 2.0 1.6 - 2.3 mg/dL   Troponin I   Result Value Ref Range    Troponin I ES <0.015 0.000 - 0.045 ug/L   UA with Microscopic reflex to Culture   Result Value Ref Range    Color Urine Yellow     Appearance Urine Clear     Glucose Urine Negative NEG^Negative mg/dL    Bilirubin Urine Negative NEG^Negative    Ketones Urine 5 (A) NEG^Negative mg/dL    Specific Gravity Urine 1.017 1.003 - 1.035    Blood Urine Negative NEG^Negative    pH Urine 6.5 5.0 - 7.0 pH    Protein Albumin Urine 10 (A) NEG^Negative mg/dL    Urobilinogen mg/dL 2.0 0.0 - 2.0 mg/dL    Nitrite Urine Negative NEG^Negative    Leukocyte Esterase Urine Moderate (A) NEG^Negative    Source Catheterized Urine     WBC Urine 13 (H) 0 - 2 /HPF    RBC Urine 2 0 - 2 /HPF    Bacteria Urine Few (A) NEG^Negative /HPF    Squamous Epithelial /HPF Urine 1 0 - 1 /HPF    Mucous Urine Present (A) NEG^Negative /LPF   CBC with platelets   Result Value Ref Range    WBC 6.5 4.0 - 11.0 10e9/L    RBC Count 3.80 (L) 4.4 - 5.9 10e12/L    Hemoglobin 11.9 (L) 13.3 - 17.7 g/dL    Hematocrit 37.2 (L) 40.0 - 53.0 %    MCV 98 78 - 100 fl    MCH 31.3 26.5 - 33.0 pg    MCHC 32.0 31.5 - 36.5 g/dL    RDW 12.6 10.0 - 15.0 %    Platelet Count 222 150 - 450 10e9/L   Troponin I - Now then in 4 hours x 2   Result Value Ref Range    Troponin I ES <0.015 0.000 - 0.045 ug/L   EKG 12-lead, tracing only   Result Value Ref Range    Interpretation ECG Click View Image link to view waveform and result              EKG Interpretation:      EKG Number: 1  Interpreted by Jared Alvarado PA-C   Symptoms at time of EKG: syncope   Rhythm: normal sinus   Rate: normal  Axis: normal  Ectopy: none  Conduction: First-degree heart block  ST Segments/ T Waves: No ST-T wave changes  Q Waves: none  Comparison to prior: No old EKG available     Clinical Impression: normal EKG    ROS:    Review Of Systems  Skin: negative  Eyes: negative  Ears/Nose/Throat: negative  Respiratory: No shortness of  breath, dyspnea on exertion, cough, or hemoptysis  Cardiovascular: positive for syncope or near-syncope, negative for, palpitations, tachycardia, chest pain and lower extremity edema  Gastrointestinal: negative for, poor appetite, dysphagia and nausea  Genitourinary: negative  Musculoskeletal: negative  Neurologic: negative  Psychiatric: negative  Hematologic/Lymphatic/Immunologic: negative  Endocrine: negative  PCP: Dr. Buchanan    10 point ROS negative other than the symptoms noted above.      Exam:  Vitals:  B/P: 142/79, T: 97.3, P: 67, R: 16    Constitutional: healthy, alert, no distress and uncooperative  Head: Normocephalic. No masses, lesions, tenderness or abnormalities  Neck: Neck supple. No adenopathy. Thyroid symmetric, normal size,, Carotids without bruits.  ENT: ENT exam normal, no neck nodes or sinus tenderness  Cardiovascular: negative, PMI normal. No lifts, heaves, or thrills. RRR. No murmurs, clicks gallops or rub  Respiratory: negative, Percussion normal. Good diaphragmatic excursion. Lungs clear  Gastrointestinal: Abdomen soft, non-tender. BS normal. No masses, organomegaly  : Deferred  Musculoskeletal: extremities normal- no gross deformities noted, gait normal and normal muscle tone  Skin: no suspicious lesions or rashes  Neurologic: Gait normal. Reflexes normal and symmetric. Sensation grossly WNL.  Psychiatric: mentation appears normal and affect normal/bright  Hematologic/Lymphatic/Immunologic: normal ant/post cervical, axillary, supraclavicular and inguinal nodes    Consults: SW, PT  FEN: regular diet  DVT prophylaxis: SCD  GI prophylaxis: zantac  BM prophylaxis: pericolace  Code Status: full  Disposition: NH after syncope work up completed, stable labs and vitals    Signed:  Jared Alvarado PA-C   January 26, 2018 at 9:07 PM

## 2018-01-27 NOTE — PROGRESS NOTES
General acute hospital  Daily Progress Note          Assessment & Plan:   Sanjay Cano is a 72 year old male with a history of COPD, Parkinson's disease, and falls who presented to the ED today from his nursing home for evaluation following a syncopal episode.    1. Syncope: At patients NH was being helped to the bathroom when he started feeling lightheaded and had a syncopal episode that lasted about 5 seconds. The nurses at the nursing home state that he did not fall to the ground as they were able to grab him so he did not hit his head. Patients wife denies being told that the patient has had any fever, URI symptoms, GI symptoms, seizure activity. Patients wife is concerned about patients steady weight loss since Sept 2017 of about 30 lbs and thinks this is secondary to change of environment from home to NH and poor appetite. She reports that his current mental status is his baseline. In ED, HR 50's-60's, 's-150's/60's-100's, RR 10-18, SaO2 96-99% on RA, Temp 97.3. Labs show normal CMP, troponin I. CBC with H/H 11.6/36.3 otherwise normal. INR 1.16. CXR negative. CT Head negative. Bedside Echo normal. In the ED the patient was given 1L NS bolus and 2 of his Sinemet doses. He is being admitted to the Observation Unit for syncope work up.    - Serial troponins negative x 3.   - Check TSH   - Continuous telemetry  - Resting Echo this am  - Keep Mag > 2, K > 4  - Ambulate with assistance  - IVF with NS at 125ml/hr  -Cardiology consult   - Consider discharge with Ziopatch at discharge      2. UTI: UA with 5 ketones, 10 protein, moderate LE, 13 WBC, 2 RBC, few bacteria. UCx was sent and pending.   - Continue with Rocephin  - Follow UCx      3. Depression: Per wife, patient with debilitating depression since transition to NH from home. Followed closely by Neurology out-patient. Recently increased venlafaxine. Discussed Psychiatry consult while here. Discussed with Psychiatrist on call  "and unlikely to add much given recent increased in venlafaxine and is established with close out-patient follow-up.   -Continue venlafaxine, seroquel, remeron    Chronic Medical Problems:  ## PD:  Continue PTA sinemet and donepezil        ##  COPD:  Duoneb q4h while awake as he did not bring inhalers     ## HTN: Continue with Lisinopril and Amlodipine    FEN: Regular diet  Lines: PIV  Prophylaxis: Ambulation           Consults:   Cardiology         Discharge Planning:   Back to NH pending ECHO results         Interval History:   Denies chest pain, lightheadedness.     ROS:   Constitutional: No fevers/chills. Tolerating diet.   Cardiovascular: No chest pain or palpitations.   Respiratory: No cough or SOB.   GI: No abdominal pain. No N/V.      : No urinary complaints.            Physical Exam:   BP (!) 169/93 (BP Location: Left arm)  Pulse 67  Temp 98.4  F (36.9  C) (Oral)  Resp 18  Ht 1.88 m (6' 2\")  Wt 53.1 kg (117 lb)  SpO2 96%  BMI 15.02 kg/m2     GENERAL: Drowsy, but oriented. NAD.   HEENT: Anicteric sclera. Mucous membranes moist.   CV: RRR. S1, S2. No murmurs appreciated.   RESPIRATORY: Effort normal. Lungs CTAB with no wheezing, rales, rhonchi.   GI: Abdomen soft and non distended with normoactive bowel sounds present in all quadrants. No tenderness, rebound, guarding.   NEUROLOGICAL: No focal deficits. Moves all extremities.    EXTREMITIES: No peripheral edema. Intact bilateral pedal pulses.   SKIN: No jaundice. No rashes.     Medication list reviewed.   Today's labs and imaging were reviewed.     LUISA Martínez, CNP  Emergency Department Observation Unit      "

## 2018-01-27 NOTE — CONSULTS
Cardiology Inpatient Consultation  January 27, 2018    Reason for Consult:  A cardiology consult was requested by ED observation service to provide clinical guidance regarding syncope.    HPI:   Sanjay Cano is a 72 year old male without prior cardiac or seizure history, admitted overnight to ED observation service with syncope.  Pertinent past history of COPD, parkinsons disease, and resides in a nursing home.    Patient was being helped to the bathroom by nursing staff, felt lightheaded as if he was going to fall, though nursing staff held on to him.  Never hit head or had any trauma.  Lost consciousness reportedly for 5 seconds.  This has not happened before.  Reportedly has been feeling lightheaded recently.      His wife has been quite upset with the care at the nursing home, and she believes it is the reason for his decline and failure to thrive over the last few months.  Not eating, not walking around, stays slumped in a wheelchair with no physical activity.  He does have an appointment with his neurologist in a few weeks but he was seen last fall and they reportedly were pleased with how patient was doing.    At the time of interview, the patient denies chest pain, dyspnea at rest or with exertion, orthopnea, PND, palpitations, lightheadedness, or syncope. The patient did state he was concerned about the parkinson's progressing however.    Vitals notable for HR relative bradycardia (EKG with 1st degree AV block).  BP hypertensive.  Normal labs, negative troponin.  UA positive for UTI. Negative imaging (CXR, CT head, bedside echo).      Review of Systems:    Only notable for weight loss, since fall 2017, thought to be due to poor PO intake from nursing home food   No convulsions, no post ictal confusion.    PMH:  Past Medical History:   Diagnosis Date     At risk for falling 9/3/2012     COPD (chronic obstructive pulmonary disease) (H)      Family history of thyroid cancer 7/13/2016     Malignant  neoplasm (H)     skin - nose     Moderate recurrent major depression (H) 5/17/2012     Paralysis agitans (H)     Parkinson's Disease     Parkinson disease (H)      Rosacea      Active Problems:  Patient Active Problem List    Diagnosis Date Noted     Syncope 01/26/2018     Priority: Medium     Nocturnal cough 12/05/2016     Priority: Medium     Senile purpura (H) 10/26/2016     Priority: Medium     Essential hypertension with goal blood pressure less than 140/90 08/31/2016     Priority: Medium     H/O recurrent urinary tract infection 08/26/2016     Priority: Medium     Family history of thyroid cancer 07/13/2016     Priority: Medium     Medullary ca grandparent, 1st cousin different thyroid ca       Pulmonary nodules 07/12/2016     Priority: Medium     Incidental, CT (ruled out pulmonary emboli)  NO change 4/18/17, followup 12 months.    2 8 mm left lung nodules upper and lower    >6-8             Initial FU CT at 6-12 mo, then                     Initial FU CT at 3-6 mo.                        18-24 mo if no change                                 Then at 9-12 & 24 mo.                                                                                            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    Bayleeliang Matta 8/29/2012        Health Care Home 06/12/2012     Priority: Medium     Please Call PCP with any Admission or Emergency ROOM Visit. Dr Bharath Dean [phone]    EMERGENCY CARE PLAN  Presenting Problem Signs and Symptoms Treatment Plan    Questions or conerns during clinic hours    I will call the clinic directly [phone]     Questions or conerns outside clinic hours    I will call the 24 hour nurse line at [phone]    Patient needs to schedule an appointment    I will call the 24 hour scheduling team at [phone] or clinic directly    Same day treatment     I will call the clinic first, nurse line if after hours, urgent care and express care if needed                          DX V65.8 REPLACED WITH [phone] [facility] (04/08/2013)       Moderate recurrent major depression (H) 05/17/2012     Priority: Medium     Rosacea      Priority: Medium     Paralysis agitans (H)      Priority: Medium     Parkinson's Disease       Social History:  Social History   Substance Use Topics     Smoking status: Former Smoker     Packs/day: 0.01     Years: 40.00     Types: Cigarettes     Quit date: 7/31/2008     Smokeless tobacco: Never Used      Comment: very passive cigarettes in 6 months     Alcohol use No     Family History:  Family History   Problem Relation Age of Onset     CEREBROVASCULAR DISEASE Mother      DIABETES Mother      GASTROINTESTINAL DISEASE Mother      Hypertension Mother      Neurologic Disorder Father      CEREBROVASCULAR DISEASE Father      CEREBROVASCULAR DISEASE Maternal Grandfather      CANCER Paternal Grandmother      Other - See Comments Sister      gi - unknown       Medications:    sodium chloride (PF)  3 mL Intracatheter Q8H     amLODIPine  5 mg Oral QAM     carbidopa-levodopa  1 tablet Oral At Bedtime     zz extemporaneous template  7.5 mg Oral BID     lactobacillus rhamnosus (GG)  1 capsule Oral QAM     lisinopril  5 mg Oral Daily     mirtazapine  30 mg  Oral At Bedtime     QUEtiapine  50 mg Oral At Bedtime     ranitidine  75 mg Oral At Bedtime     sodium chloride (PF)  3 mL Intracatheter Q8H     cefTRIAXone  1 g Intravenous Q24H     venlafaxine  75 mg Oral BID     carbidopa-levodopa  2 tablet Oral BID     carbidopa-levodopa  1 half-tab Oral TID    And     carbidopa-levodopa  1 tablet Oral TID     ipratropium - albuterol 0.5 mg/2.5 mg/3 mL  3 mL Nebulization 4x daily     clonazePAM  0.25 mg Oral Once         NaCl 1,000 mL (01/27/18 0624)       Physical Exam:  Temp:  [97.3  F (36.3  C)-98.4  F (36.9  C)] 98.4  F (36.9  C)  Pulse:  [56-67] 67  Heart Rate:  [53-68] 57  Resp:  [10-18] 18  BP: (117-169)/() 169/93  SpO2:  [95 %-99 %] 96 %     GEN: pleasant, no acute distress  HEENT: no icterus  CV: RRR, normal s1/s2, no murmurs/rubs/s3/s4, no heave. JVP normal.   CHEST: clear to ausculation bilaterally, no rales or wheezing  ABD: soft, non-tender, normal active bowel sounds  EXTR: pulses 2+. No clubbing, cyanosis or edema.   NEURO: alert oriented, speech fluent/appropriate    Diagnostics:  All labs and imaging were reviewed    BMP  Recent Labs  Lab 01/26/18  1250      POTASSIUM 4.0   CHLORIDE 108   JENNIE 8.3*   CO2 29   BUN 27   CR 0.98   GLC 80     LFTs  Recent Labs  Lab 01/26/18  1250   ALKPHOS 96   AST 8   ALT 8   BILITOTAL 0.5   PROTTOTAL 6.0*   ALBUMIN 3.3*      CBC  Recent Labs  Lab 01/27/18  0655 01/26/18  1941 01/26/18  1250   WBC 5.5 6.5 7.7   RBC 3.70* 3.80* 3.72*   HGB 11.4* 11.9* 11.6*   HCT 35.9* 37.2* 36.3*   MCV 97 98 98   MCH 30.8 31.3 31.2   MCHC 31.8 32.0 32.0   RDW 12.4 12.6 12.4    222 222     INR  Recent Labs  Lab 01/26/18  1250   INR 1.16*      EKG results:  bradycardia with 1st degree AV block  Echo:   Global and regional left ventricular function is normal with an EF of 60-65%.  Global right ventricular function is normal.  No significant valvular dysfunction present.  No pericardial effusion is present.    Assessment and  Recommendation:  Sanjay Cano is a 72 year old male without prior cardiac or seizure history, admitted overnight to ED observation service with syncope.  Pertinent past history of COPD, parkinsons disease, and resides in a nursing home.  Cardiology consulted for guidance.    Most likely orthostatic based on history (getting up to use bathroom).  Unsure if was about to urinate or not, as bearing down while standing can worsen venous preload return and cause transient low output state.  He does not meet any risk factors for serious outcomes (normal EF, hgb 11.4, no dyspnea, BP greater than 90 mmHg, EKG ok).  EKG did show bradycardia with 1st degree AV block, which is interesting.  It could be possible patient has chronotropic incompetence and inability to get heart rate up with activity, which potentially could be an indication for a pacemaker.  Patient and family were indifferent on doing that or not, which I cannot fault them for due to his other medical issues.    Recommend  Can do ziopatch at discharge if family and patient agreeable.  Can try switching amlodipine to bedtime.    I have discussed the above with Dr. Soler.    Thank you for consulting the cardiovascular services at the [facility]. Please do not hesitate to call us with any questions.     Joyce Bhakta MD PGY4  Cardiology Fellow  [phone]    I interviewed and examined the patient with the house staff.  I agree with the assessment and plan as documented.      Lokesh Soler MD, PhD  Professor of Medicine  Division of Cardiology

## 2018-01-27 NOTE — DISCHARGE SUMMARY
"ED Observation Discharge Summary    Sanjay Cano   MRN# [id number]  Age: 72 year old   YOB: 1945            Date of Admission: 01/26/2018    Date of Discharge: 01/27/2018  Admitting Physician: Dr. Bharath Mullins MD  Discharge Physician: Dr. Eduardo MD  NP/PA: Adilia Bonilla CNP      DISCHARGE DIAGNOSIS:     Syncope    * No resolved hospital problems. *      INTERVAL HISTORY: VSS, afebrile. Back to baseline. No further syncope. Discharge recommendations reviewed with the patient and his wife. Ready to discharge back to facility.     PHYSICAL EXAM:   Blood pressure 157/90, pulse 67, temperature 98  F (36.7  C), temperature source Oral, resp. rate 18, height 1.88 m (6' 2\"), weight 53.1 kg (117 lb), SpO2 97 %.     GENERAL APPEARENCE: PA/O x4. NAD.  SKIN: Clean, dry, and intact   HEENT/NECK: NCAT w/out masses, lesions, or abnormalities. Sclera anicteric, PERRLA, EOMI.  Oral mucosa pink and moist without erythema, exudate, lesions, ulcerations, or thrush. Teeth and gums normal. Neck supple, no masses. No jugular venous distention.   CARDIOVASCULAR: S1, S2 RRR. No murmurs, rubs, or gallops.   RESPIRATORY: Respiratory effort WNL. CTA  bilaterally without crackles/rales/wheeze   GI: Active BS in all 4 quadrants. Abdomen soft and non-tender. No masses or hepatosplenomegaly.  : Deferred  MUSCULOSKELETAL: Moves all extremities.   PV: 2+ bilateral radial and pedal pulses. No edema noted.   NEURO: CN II-XII grossly intact. Speech normal. Appropriate throughout interview.   HEME/LYMPH: No visible bleeding.   PSYCHIATRIC: Affect is flat  VASCULAR ACCESS: CDI without erythema or discharge. Non-tender.    PROCEDURES AND IMAGING:   Results for orders placed or performed during the hospital encounter of 01/26/18 (from the past 24 hour(s))   Cardiology General Adult IP Consult: Patient to be seen: Routine within 24 hrs; Call back #: [id number]; Syncope; Consultant may enter orders: Yes    Narrative    Yony, " Joyce ANDRADE MD     1/27/2018  2:49 PM       Cardiology Inpatient Consultation  January 27, 2018    Reason for Consult:  A cardiology consult was requested by ED observation service to   provide clinical guidance regarding syncope.    HPI:   Sanjay Cano is a 72 year old male without prior cardiac or   seizure history, admitted overnight to ED observation service   with syncope.  Pertinent past history of COPD, parkinsons   disease, and resides in a nursing home.    Patient was being helped to the bathroom by nursing staff, felt   lightheaded as if he was going to fall, though nursing staff held   on to him.  Never hit head or had any trauma.  Lost consciousness   reportedly for 5 seconds.  This has not happened before.    Reportedly has been feeling lightheaded recently.      His wife has been quite upset with the care at the nursing home,   and she believes it is the reason for his decline and failure to   thrive over the last few months.  Not eating, not walking around,   stays slumped in a wheelchair with no physical activity.  He does   have an appointment with his neurologist in a few weeks but he   was seen last fall and they reportedly were pleased with how   patient was doing.    At the time of interview, the patient denies chest pain, dyspnea   at rest or with exertion, orthopnea, PND, palpitations,   lightheadedness, or syncope. The patient did state he was   concerned about the parkinson's progressing however.    Vitals notable for HR relative bradycardia (EKG with 1st degree   AV block).  BP hypertensive.  Normal labs, negative troponin.  UA   positive for UTI. Negative imaging (CXR, CT head, bedside echo).        Review of Systems:    Only notable for weight loss, since fall 2017, thought to be due   to poor PO intake from nursing home food   No convulsions, no post ictal confusion.    PMH:  Past Medical History:   Diagnosis Date     At risk for falling 9/3/2012     COPD (chronic obstructive pulmonary  disease) (H)      Family history of thyroid cancer 7/13/2016     Malignant neoplasm (H)     skin - nose     Moderate recurrent major depression (H) 5/17/2012     Paralysis agitans (H)     Parkinson's Disease     Parkinson disease (H)      Rosacea      Active Problems:  Patient Active Problem List    Diagnosis Date Noted     Syncope 01/26/2018     Priority: Medium     Nocturnal cough 12/05/2016     Priority: Medium     Senile purpura (H) 10/26/2016     Priority: Medium     Essential hypertension with goal blood pressure less than   140/90 08/31/2016     Priority: Medium     H/O recurrent urinary tract infection 08/26/2016     Priority: Medium     Family history of thyroid cancer 07/13/2016     Priority: Medium     Medullary ca grandparent, 1st cousin different thyroid ca       Pulmonary nodules 07/12/2016     Priority: Medium     Incidental, CT (ruled out pulmonary emboli)  NO change 4/18/17,   followup 12 months.    2 8 mm left lung nodules upper and lower    >6-8             Initial FU CT at 6-12 mo, then                       Initial FU CT at 3-6 mo.                        18-24 mo if no   change                                 Then at 9-12 & 24 mo.                                                                                              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    Baylee Sigrid 8/29/2012        Health Care Home 06/12/2012     Priority: Medium     Please Call PCP with any Admission or Emergency ROOM Visit. Dr Bharath Dean [phone]    EMERGENCY CARE PLAN  Presenting Problem Signs and Symptoms Treatment Plan    Questions or conerns during clinic hours    I will call the   clinic directly [phone]     Questions or conerns outside clinic hours    I will call the 24   hour nurse line at [phone]    Patient needs to schedule an appointment    I will call the 24   hour scheduling team at [phone] or clinic directly    Same day treatment     I will call the clinic first, nurse line   if after hours, urgent care and express care if needed                          DX V65.8 REPLACED WITH [id number] [facility] HOME (04/08/2013)       Moderate recurrent major depression (H) 05/17/2012     Priority: Medium     Rosacea      Priority: Medium     Paralysis agitans (H)      Priority: Medium     Parkinson's Disease       Social History:  Social History   Substance Use Topics     Smoking status: Former Smoker     Packs/day: 0.01     Years: 40.00     Types: Cigarettes     Quit date: 7/31/2008     Smokeless tobacco: Never Used      Comment: very passive cigarettes in 6 months     Alcohol use No     Family History:  Family History   Problem Relation Age of Onset     CEREBROVASCULAR DISEASE Mother      DIABETES Mother      GASTROINTESTINAL DISEASE Mother      Hypertension Mother      Neurologic Disorder Father      CEREBROVASCULAR DISEASE Father      CEREBROVASCULAR DISEASE Maternal Grandfather      CANCER Paternal Grandmother      Other - See Comments Sister      gi - unknown       Medications:    sodium chloride (PF)  3 mL Intracatheter Q8H     amLODIPine  5 mg Oral QAM     carbidopa-levodopa  1 tablet Oral At Bedtime     zz extemporaneous template  7.5 mg Oral BID      lactobacillus rhamnosus (GG)  1 capsule Oral QAM     lisinopril  5 mg Oral Daily     mirtazapine  30 mg Oral At Bedtime     QUEtiapine  50 mg Oral At Bedtime     ranitidine  75 mg Oral At Bedtime     sodium chloride (PF)  3 mL Intracatheter Q8H     cefTRIAXone  1 g Intravenous Q24H     venlafaxine  75 mg Oral BID     carbidopa-levodopa  2 tablet Oral BID     carbidopa-levodopa  1 half-tab Oral TID    And     carbidopa-levodopa  1 tablet Oral TID     ipratropium - albuterol 0.5 mg/2.5 mg/3 mL  3 mL Nebulization   4x daily     clonazePAM  0.25 mg Oral Once         NaCl 1,000 mL (01/27/18 0624)       Physical Exam:  Temp:  [97.3  F (36.3  C)-98.4  F (36.9  C)] 98.4  F (36.9  C)  Pulse:  [56-67] 67  Heart Rate:  [53-68] 57  Resp:  [10-18] 18  BP: (117-169)/() 169/93  SpO2:  [95 %-99 %] 96 %     GEN: pleasant, no acute distress  HEENT: no icterus  CV: RRR, normal s1/s2, no murmurs/rubs/s3/s4, no heave. JVP   normal.   CHEST: clear to ausculation bilaterally, no rales or wheezing  ABD: soft, non-tender, normal active bowel sounds  EXTR: pulses 2+. No clubbing, cyanosis or edema.   NEURO: alert oriented, speech fluent/appropriate    Diagnostics:  All labs and imaging were reviewed    BMP  Recent Labs  Lab 01/26/18  1250      POTASSIUM 4.0   CHLORIDE 108   JENNIE 8.3*   CO2 29   BUN 27   CR 0.98   GLC 80     LFTs  Recent Labs  Lab 01/26/18  1250   ALKPHOS 96   AST 8   ALT 8   BILITOTAL 0.5   PROTTOTAL 6.0*   ALBUMIN 3.3*      CBC  Recent Labs  Lab 01/27/18  0655 01/26/18  1941 01/26/18  1250   WBC 5.5 6.5 7.7   RBC 3.70* 3.80* 3.72*   HGB 11.4* 11.9* 11.6*   HCT 35.9* 37.2* 36.3*   MCV 97 98 98   MCH 30.8 31.3 31.2   MCHC 31.8 32.0 32.0   RDW 12.4 12.6 12.4    222 222     INR  Recent Labs  Lab 01/26/18  1250   INR 1.16*      EKG results:  bradycardia with 1st degree AV block  Echo:   Global and regional left ventricular function is normal with an   EF of 60-65%.  Global right ventricular function is  normal.  No significant valvular dysfunction present.  No pericardial effusion is present.    Assessment and Recommendation:  Sanjay Cano is a 72 year old male without prior cardiac or   seizure history, admitted overnight to ED observation service   with syncope.  Pertinent past history of COPD, parkinsons   disease, and resides in a nursing home.  Cardiology consulted for   guidance.    Most likely orthostatic based on history (getting up to use   bathroom).  Unsure if was about to urinate or not, as bearing   down while standing can worsen venous preload return and cause   transient low output state.  He does not meet any risk factors   for serious outcomes (normal EF, hgb 11.4, no dyspnea, BP greater   than 90 mmHg, EKG ok).  EKG did show bradycardia with 1st degree   AV block, which is interesting.  It could be possible patient has   chronotropic incompetence and inability to get heart rate up with   activity, which potentially could be an indication for a   pacemaker.  Patient and family were indifferent on doing that or   not, which I cannot fault them for due to his other medical   issues.    Recommend  Can do ziopatch at discharge if family and patient agreeable.  Can try switching amlodipine to bedtime.    I have discussed the above with Dr. Soler.    Thank you for consulting the cardiovascular services at the   [facility]. Please do not hesitate to   call us with any questions.     Joyce Bhakta MD PGY4  Cardiology Fellow  [phone]     CBC with platelets   Result Value Ref Range    WBC 6.5 4.0 - 11.0 10e9/L    RBC Count 3.80 (L) 4.4 - 5.9 10e12/L    Hemoglobin 11.9 (L) 13.3 - 17.7 g/dL    Hematocrit 37.2 (L) 40.0 - 53.0 %    MCV 98 78 - 100 fl    MCH 31.3 26.5 - 33.0 pg    MCHC 32.0 31.5 - 36.5 g/dL    RDW 12.6 10.0 - 15.0 %    Platelet Count 222 150 - 450 10e9/L   Troponin I - Now then in 4 hours x 2   Result Value Ref Range    Troponin I ES <0.015 0.000 - 0.045  ug/L   Troponin I - Now then in 4 hours x 2   Result Value Ref Range    Troponin I ES <0.015 0.000 - 0.045 ug/L   CBC with platelets differential   Result Value Ref Range    WBC 5.5 4.0 - 11.0 10e9/L    RBC Count 3.70 (L) 4.4 - 5.9 10e12/L    Hemoglobin 11.4 (L) 13.3 - 17.7 g/dL    Hematocrit 35.9 (L) 40.0 - 53.0 %    MCV 97 78 - 100 fl    MCH 30.8 26.5 - 33.0 pg    MCHC 31.8 31.5 - 36.5 g/dL    RDW 12.4 10.0 - 15.0 %    Platelet Count 197 150 - 450 10e9/L    Diff Method Automated Method     % Neutrophils 65.7 %    % Lymphocytes 22.1 %    % Monocytes 7.6 %    % Eosinophils 4.2 %    % Basophils 0.2 %    % Immature Granulocytes 0.2 %    Nucleated RBCs 0 0 /100    Absolute Neutrophil 3.6 1.6 - 8.3 10e9/L    Absolute Lymphocytes 1.2 0.8 - 5.3 10e9/L    Absolute Monocytes 0.4 0.0 - 1.3 10e9/L    Absolute Eosinophils 0.2 0.0 - 0.7 10e9/L    Absolute Basophils 0.0 0.0 - 0.2 10e9/L    Abs Immature Granulocytes 0.0 0 - 0.4 10e9/L    Absolute Nucleated RBC 0.0    EKG 12-lead, tracing only   Result Value Ref Range    Interpretation ECG Click View Image link to view waveform and result    Basic metabolic panel   Result Value Ref Range    Sodium 143 133 - 144 mmol/L    Potassium 3.7 3.4 - 5.3 mmol/L    Chloride 109 94 - 109 mmol/L    Carbon Dioxide 26 20 - 32 mmol/L    Anion Gap 7 3 - 14 mmol/L    Glucose 85 70 - 99 mg/dL    Urea Nitrogen 18 7 - 30 mg/dL    Creatinine 0.83 0.66 - 1.25 mg/dL    GFR Estimate >90 >60 mL/min/1.7m2    GFR Estimate If Black >90 >60 mL/min/1.7m2    Calcium 8.0 (L) 8.5 - 10.1 mg/dL   Magnesium   Result Value Ref Range    Magnesium 1.9 1.6 - 2.3 mg/dL   TSH with free T4 reflex   Result Value Ref Range    TSH 1.17 0.40 - 4.00 mU/L   Echocardiogram Complete    Narrative    [id number]  [id number]  [id number]  [id number]^[id number]^RAHEEM^ROMERO           [facility]  Echocardiography Laboratory  [address] [place] [id number]     Name: YARI TORRES  MRN: [id number]  :  1945  Study Date: 01/27/2018 12:26 PM  Age: 72 yrs  Gender: Male  Patient Location: [facility]  Reason For Study: Syncope  Ordering Physician: RAHEEM CALDERON  Performed By: Burke Acuña RDCS     BSA: 1.7 m2  Height: 74 in  Weight: 117 lb  HR: 55  BP: 168/100 mmHg  _____________________________________________________________________________  __        Procedure  Complete Portable Echo Adult. Echocardiogram with two-dimensional, color and  spectral Doppler performed.  _____________________________________________________________________________  __        Interpretation Summary  Global and regional left ventricular function is normal with an EF of 60-65%.  Global right ventricular function is normal.  No significant valvular dysfunction present.  No pericardial effusion is present.     _____________________________________________________________________________  __        Left Ventricle  Left ventricular size is normal. Left ventricular wall thickness is normal.  Global and regional left ventricular function is normal with an EF of 60-65%.  Normal left ventricular filling for age. No regional wall motion abnormalities  are seen.     Right Ventricle  The right ventricle is normal size. Global right ventricular function is  normal.     Atria  Both atria appear normal.     Mitral Valve  Mitral leaflet thickness is increased .        Aortic Valve  Mild aortic valve sclerosis is present.     Tricuspid Valve  The tricuspid valve is normal. The peak velocity of the tricuspid regurgitant  jet is not obtainable. Pulmonary artery systolic pressure cannot be assessed.     Pulmonic Valve  The pulmonic valve is normal.     Vessels  The aorta root is normal. The inferior vena cava cannot be assessed.     Pericardium  No pericardial effusion is present.        Compared to Previous Study  Previous study not available for comparison.  _____________________________________________________________________________  __  MMode/2D  Measurements & Calculations     IVSd: 1.1 cm  LVIDd: 3.7 cm  LVIDs: 2.6 cm  LVPWd: 1.2 cm  FS: 29.6 %  EDV(Teich): 58.5 ml  ESV(Teich): 24.8 ml  LV mass(C)d: 133.4 grams  LV mass(C)dI: 77.1 grams/m2  LVOT diam: 2.3 cm  LVOT area: 4.2 cm2  LA Volume (BP): 55.2 ml  LA Volume Index (BP): 31.9 ml/m2  RWT: 0.64           Doppler Measurements & Calculations  MV E max autumn: 57.0 cm/sec  MV A max autumn: 66.0 cm/sec  MV E/A: 0.86  MV dec time: 0.30 sec  Ao V2 max: 111.0 cm/sec  Ao max P.0 mmHg  BULMARO(V,D): 3.7 cm2  LV V1 max P.0 mmHg  LV V1 max: 99.3 cm/sec  E/E' av.4  Lateral E/e': 6.4  Medial E/e': 10.4     _____________________________________________________________________________  __           Report approved by: Adrian Concepcion 2018 02:24 PM        DISCHARGE MEDICATIONS:   Current Discharge Medication List      START taking these medications    Details   cephalexin (KEFLEX) 500 MG capsule Take 1 capsule (500 mg) by mouth every 12 hours for 7 days  Refills: 0    Associated Diagnoses: UTI symptoms         CONTINUE these medications which have CHANGED    Details   amLODIPine (NORVASC) 5 MG tablet Take 1 tablet (5 mg) by mouth At Bedtime  Qty: 90 tablet, Refills: 3    Associated Diagnoses: Essential hypertension, benign         CONTINUE these medications which have NOT CHANGED    Details   clonazePAM (KLONOPIN) 0.5 MG ODT tab Take 0.25 mg by mouth every other night  Qty: 180 tablet, Refills: 0      ranitidine (ZANTAC) 75 MG tablet Take 1 tablet (75 mg) by mouth At Bedtime  Qty: 30 tablet      methylcellulose (CITRUCEL) 500 MG TABS tablet Take 1 tablet (500 mg) by mouth daily as needed  Qty: 14 each, Refills: 0      lactobacillus rhamnosus, GG, (CULTURELL) capsule Take 1 capsule by mouth every morning  Qty: 60 capsule, Refills: 11    Associated Diagnoses: Diarrhea, unspecified type      !! Ipratropium-Albuterol (COMBIVENT RESPIMAT)  MCG/ACT inhaler Inhale 1 puff into the lungs 4 times daily May take 1  to 2 additional doses as needed during the day  Qty: 1 Inhaler, Refills: 4    Associated Diagnoses: Other emphysema (H)      lisinopril (PRINIVIL/ZESTRIL) 5 MG tablet Take 1 tablet (5 mg) by mouth daily  Qty: 30 tablet, Refills: 1      cholecalciferol (VITAMIN D) 1000 UNIT tablet Take 1 tablet (1,000 Units) by mouth every morning  Qty: 90 tablet, Refills: 1    Associated Diagnoses: At risk for falling      QUEtiapine (SEROQUEL) 50 MG tablet TAKE 1 TABLET(50 MG) BY MOUTH AT BEDTIME  Qty: 90 tablet, Refills: 2    Associated Diagnoses: Moderate recurrent major depression (H)      !! COMBIVENT RESPIMAT  MCG/ACT inhaler INHALE 1 PUFF BY MOUTH FOUR TIMES DAILY. NOT TO EXCEED 6 DOSES PER DAY  Qty: 4 g, Refills: 4    Associated Diagnoses: Other emphysema (H)      venlafaxine (EFFEXOR-XR) 75 MG 24 hr capsule TAKE 1 CAPSULE BY MOUTH EVERY MORNING AND 1 CAPSULE EVERY NIGHT AT BEDTIME  Qty: 180 capsule, Refills: 0    Associated Diagnoses: Moderate recurrent major depression (H)      FLOVENT  MCG/ACT Inhaler INHALE 2 PUFFS INTO THE LUNGS TWICE DAILY  Qty: 36 g, Refills: 1    Associated Diagnoses: Other emphysema (H)      aspirin 81 MG tablet Take by mouth twice a week      mirtazapine (REMERON) 15 MG tablet TAKE 1 TABLET(15 MG) BY MOUTH AT BEDTIME  Qty: 90 tablet, Refills: 1    Associated Diagnoses: Depression, major, recurrent, moderate (H)      !! donepezil (ARICEPT) 10 MG tablet Take 5mg  by mouth twice a day (take with the 2.5 mg dose for a total dose of 7.5mg twice daily)  Refills: 3      carbidopa-levodopa (SINEMET CR)  MG per CR tablet Take 1 tablet by mouth At Bedtime  Qty: 1 tablet, Refills: 0    Comments: Patient reported medication      carbidopa-levodopa (SINEMET)  MG per tablet Take 1-2 tablets by mouth 5 times daily Takes 2 tablets at 6 AM, 1.5 tablets at 9 AM, 2 tablets at noon, 1.5 tablets at 3 PM, 1.5 tablets at 6 PM. Can take additional 0.5 tablet in the middle of the night if  "needed.  Qty: 90 tablet      !! 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)       !! - Potential duplicate medications found. Please discuss with provider.            CONSULTATIONS:   Consultation during this admission received from:  Cardiology  SW   BRIEF HISTORY OF PRESENT ILLNESS:   (Adopted from admission H&P).    \"Sanjay Cano is a 72 year old male with a history of COPD, Parkinson's disease, and falls who presented to the ED today from his nursing home for evaluation following a syncopal episode. Per ED report: \"It is reported by EMS that the patient was being helped to the bathroom when he started feeling lightheaded and had a syncopal episode that lasted about 5 seconds. The nurses at the nursing home state that he did not fall to the ground as they were able to grab him so he did not hit his head. The patient was able to report that he has also been having a decreased appetite lately. He reports that he has never had anything like this before.\" The patient reports that has has been feeling lightheaded with standing recently. The patient's wife echoes the same account of EMS as she was not present. She denies being told that the patient has had any fever, URI symptoms, GI symptoms, seizure activity. Patients wife is concerned about patients steady weight loss since Sept 2017 of about 30 lbs and thinks this is secondary to change of environment from home to NH and poor appetite. She reports that his current mental status is his baseline. He especially does not do well with new environmental changes and also late at night. He take Klonipin 0.25mg every other night but she is not sure if he took it yesterday however that may help him settle.      In the ED, HR 50's-60's, 's-150's/60's-100's, RR 10-18, " "SaO2 96-99% on RA, Temp 97.3. Labs show normal CMP, troponin I. CBC with H/H 11.6/36.3 otherwise normal. INR 1.16. UA with 5 ketones, 10 protein, moderate LE, 13 WBC, 2 RBC, few bacteria. UCx was sent and pending. CXR negative. CT Head negative. Bedside Echo normal. In the ED the patient was given 1L NS bolus and 2 of his Sinemet doses. He is being admitted to the Observation Unit for syncope work up.       On admission to the observation unit the patient was stable.\"    ED OBSERVATION COURSE:  Sanjay Cano is a 72 year old male with a history of COPD, Parkinson's disease, and falls who presented to the ED today from his nursing home for evaluation following a syncopal episode.     1. Syncope: At [facility] was being helped to the bathroom when he started feeling lightheaded and had a syncopal episode that lasted about 5 seconds. The nurses at the nursing home state that he did not fall to the ground as they were able to grab him so he did not hit his head. Patients wife denies being told that the patient has had any fever, URI symptoms, GI symptoms, seizure activity. Patients wife is concerned about patients steady weight loss since Sept 2017 of about 30 lbs and thinks this is secondary to change of environment from home to NH and poor appetite. She reports that his current mental status is his baseline. In ED, HR 50's-60's, 's-150's/60's-100's, RR 10-18, SaO2 96-99% on RA, Temp 97.3. Labs show normal CMP, troponin I. CBC with H/H 11.6/36.3 otherwise normal. INR 1.16. CXR negative. CT Head negative. Bedside Echo normal. In the ED the patient was given 1L NS bolus and 2 of his Sinemet doses. He was admitted to the Observation Unit for syncope work up.  Serial troponins negative x 3.  TSH, Resting Echo w/o WMA, Cardiology consulted and cleared for discharge with a  Ziopatch. Also recommended trying Norvasc at bed-time.       2. UTI: UA with 5 ketones, 10 protein, moderate LE, 13 WBC, 2 RBC, few bacteria. UCx " shows >100,000 colonies/mL Gram positive cocci. Will discharge with Keflex 500 mg's BID x 7 days. Instructed to follow-up with PCP for final UCx results.         3. Depression: Per wife, patient with debilitating depression since transition to NH from home. Followed closely by Neurology out-patient. Recently increased venlafaxine. Discussed Psychiatry consult while here. Discussed with Psychiatrist on call and unlikely to add much given recent increased in venlafaxine and is established with close out-patient follow-up.   -Continue venlafaxine, seroquel, remeron  -Recommend close out-patient follow-up     Chronic Medical Problems:  ## PD:  Continue PTA sinemet and donepezil          ##  COPD:  Duoneb q4h while awake as he did not bring inhalers      ## HTN: Continue with Lisinopril and Amlodipine      DISCHARGE DISPOSITION:   Discharged to NH. The patient was discharged in a stable condition.     DISCHARGE INSTRUCTIONS AND FOLLOW-UP:    Discharge Procedure Orders  Adult [facility] Follow-up and recommended labs and tests   Order Comments: Follow up with primary care provider, Bharath Buchanan, within 2 days for hospital follow- up.  The following labs/tests are recommended: CBC. CMP.      Appointments on [place] and/or [facility] (with [facility] or [facility] provider or service). Call [phone] if you haven't heard regarding these appointments within 7 days of discharge.     Activity   Order Comments: Your activity upon discharge: activity as tolerated, resume prior to admission activity   Order Specific Question Answer Comments   Is discharge order? Yes      When to contact your care team   Order Comments: Return to the ED with fever, uncontrolled nausea, vomiting, unrelieved pain, bleeding not relieved with pressure, dizziness, chest pain, shortness of breath, loss of consciousness, and any new or concerning symptoms.     General info for SNF   Order Comments: Length of Stay Estimate: Long Term Care  Condition  at Discharge: Stable  Level of care:skilled   Rehabilitation Potential: Poor  Admission H&P remains valid and up-to-date: Yes  Recent Chemotherapy: N/A  Use Nursing Home Standing Orders: Yes     Glucose monitor nursing POCT   Order Comments: Before meals and at bedtime     Intake and output   Order Comments: Every shift     Daily weights   Order Comments: Call Provider for weight gain of more than 2 pounds per day or 5 pounds per week.     Reason for your hospital stay   Order Comments: You were admitted with after a syncopal episode. We think this is due to your Parkinsons Disease. You also have a possible UTI and we will discharge you on antibiotics.    We will discharge you with a heart monitor as well.    You were found to have a UTI, we will discharge with an antibiotic. Please follow-up with your primary care doctor for your final urine culture results.     Nutrition Services Adult IP Consult   Order Comments: Reason:  Malnutrition     Physical Therapy Adult Consult   Order Comments: Evaluate and treat as clinically indicated.    Reason:  PD     Fall precautions     Diet   Order Comments: Follow this diet upon discharge:  Regular Diet Adult  Be sure to drink plenty of non-caffeinated beverages.  Notify your primary provider if you have a poor appetite, are not eating well, and/or have been losing weight   Order Specific Question Answer Comments   Is discharge order? Yes         Attestation:   I have reviewed today's vital signs, notes, medications, labs and imaging.      LUISA Martínez, CNP  Emergency Department Observation Unit

## 2018-01-27 NOTE — PROGRESS NOTES
"S:patient admit from ED for obs for syncopal episode.  Patient with parkinsons at NHwith syncopal episode. Patient doing well. Eval in ER by myself. No new complaints  O:/79  Pulse 67  Temp 97.3  F (36.3  C) (Oral)  Resp 16  Ht 1.88 m (6' 2\")  Wt 53.1 kg (117 lb)  SpO2 96%  BMI 15.02 kg/m2  Patient stable somewhat flat affect but no new changes noted on exam.  A:Syncope     parkinsons     UTI  P:admit ED obs for syncope and tx of uti.    "

## 2018-01-27 NOTE — PLAN OF CARE
Problem: Patient Care Overview  Goal: Plan of Care/Patient Progress Review  Observation goals PRIOR TO DISCHARGE     Comments: List all  goals to be met before discharge:   - Diagnostic tests and consults completed and resulted tests pending  - No further episodes of syncope and any new arrhythmia addressed with controlled heart rates: No .  - Vital signs normal or at patient baseline and orthostatic vitals are normal and patient not lightheaded with standing :VSS  - Tolerating oral intake to maintain hydration: Pt sleeping.  - Safe disposition plan has been identified no  - Nurse to notify provider when observation goals have been met and patient is ready for discharge.

## 2018-01-27 NOTE — PROVIDER NOTIFICATION
Monitor tech called and stated that patient was having first degree AV block. Provider stated that she would order EKG.

## 2018-01-27 NOTE — PLAN OF CARE
Problem: Patient Care Overview  Goal: Plan of Care/Patient Progress Review  Outpatient/Observation goals to be met before discharge home:    List all  goals to be met before discharge:   - Diagnostic tests and consults completed and resulted - NO  - No further episodes of syncope and any new arrhythmia addressed with controlled heart rates - no further episodes reported  - Vital signs normal or at patient baseline and orthostatic vitals are normal and patient not lightheaded with standing - patient unable to stand  - Tolerating oral intake to maintain hydration - YES  - Safe disposition plan has been identified - Pending  - Nurse to notify provider when observation goals have been met and patient is ready for discharge

## 2018-01-27 NOTE — PROGRESS NOTES
Social Work Services Discharge Note      Patient Name:  Sanjay Cano     Anticipated Discharge Date:  1/27/2018    Discharge Disposition:   LTC:  Return to [facility]   [phone] f: [phone]    Following MD:  per facility     Pre-Admission Screening (PAS) online form has been completed.  The Level of Care (LOC) is:  Determined  Confirmation Code is:  N/a-pt returning  Patient/caregiver informed of referral to [facility] Line for Pre-Admission Screening for skilled nursing facility (SNF) placement and to expect a phone call post discharge from SNF.     Additional Services/Equipment Arranged:  HE w/c transportation arranged for 7:15p--transport is aware pt could possibly be ready earlier and will put on wait list     Patient / Family response to discharge plan:  Pt's wife has been updated by RN and agreeable to plan. Writer attempted to meet w/ her and she was meeting w/ Katia.      Persons notified of above discharge plan:  Pt, pt's wife, LTC facility, Care team    Staff Discharge Instructions:  Please fax discharge orders and signed hard scripts for any controlled substances.  Please print a packet and send with patient.   RN please complete RN report to above number.     CTS Handoff completed:  NO    Medicare Notice of Rights provided to the patient/family:  NO

## 2018-01-27 NOTE — PROGRESS NOTES
Patient wife Danuta very upset re: medications and delivery. Verified orders, doses and times are correct, despite how many tabs/half-tab patient takes at home. Pharm tech called for STAT request for missing medications. Assured wife that medications would be given as soon as they arrive.

## 2018-01-28 NOTE — PROGRESS NOTES
Discharge instruction reviewed with patient and wife/POA.  Patient and wife/POA verbalized understanding. all questions answered. PIV removed, w/c transport here for patient with Family to [facility]. Patient discharged with all belongings. Packet faxed to facility.  Report given to GERARD Cordero at [facility] [phone].

## 2018-01-28 NOTE — PLAN OF CARE
Problem: Patient Care Overview  Goal: Plan of Care/Patient Progress Review  Outpatient/Observation goals to be met before discharge home:     List all  goals to be met before discharge:   - Diagnostic tests and consults completed and resulted - YES, pending ziopatch placement prior to discharge  - No further episodes of syncope and any new arrhythmia addressed with controlled heart rates - NO  - Vital signs normal or at patient baseline and orthostatic vitals are normal and patient not lightheaded with standing - patient unable to stand, ortho BP done with lying and sitting only  - Tolerating oral intake to maintain hydration - YES  - Safe disposition plan has been identified - YES    - Nurse to notify provider when observation goals have been met and patient is ready for discharge

## 2018-01-29 LAB
BACTERIA SPEC CULT: ABNORMAL
BACTERIA SPEC CULT: ABNORMAL
SPECIMEN SOURCE: ABNORMAL

## 2018-01-30 LAB — INTERPRETATION ECG - MUSE: NORMAL

## 2018-01-30 NOTE — PROGRESS NOTES
SUBJECTIVE:   Sanjay Cano is a 72 year old male who presents to clinic today for the following health issues:    Patient is accompanied by his wife, who helped relay his history.    Hospital Follow-up Visit:    Hospital/Nursing Home/IP Rehab Facility: [facility]  Date of Admission: 1/26/18  Date of Discharge: 1/27/18  Reason(s) for Admission: Syncope and collapsed. Paralysis agitans            Problems taking medications regularly:  None       Medication changes since discharge: None       Problems adhering to non-medication therapy:  None    Summary of hospitalization:  [facility] discharge summary reviewed  Diagnostic Tests/Treatments reviewed.  Follow up needed: none  Other Healthcare Providers Involved in Patient s Care:         None  Update since discharge: improved.     Post Discharge Medication Reconciliation: discharge medications reconciled and changed, per note/orders (see AVS).  Plan of care communicated with patient and family     Coding guidelines for this visit:  Type of Medical   Decision Making Face-to-Face Visit       within 7 Days of discharge Face-to-Face Visit        within 14 days of discharge   Moderate Complexity [id number]   High Complexity [id number]          Patient was in the hospital on 1/26/18 for syncope related to dehydration and malnutrition. He has been living in a nursing home for several months, and his wife reports that he has been steadily losing weight while there. He was taken to the bathroom on 1/26/18 where he passed out on the toilet, and was taken to the emergency room. Patient had been taking amlodipine 10 mg daily for blood pressure, and it was recommended by the doctors that they reduce the dosage to 5 mg, as the medication could have contributed to the dehydration. He was also diagnosed with a possible UTI, and was started on cephalexin 500 mg, which he has been taking as prescribed. His blood pressure was also found to be high in the  "morning, so he was started on lisinopril daily. He says that his kidneys will \"not be good for long\", though currently his kidneys are in good health. Patient is confused today and believes that his son recently , though his wife explains that he has had a nightmare in which this occurred. He says he has been depressed because he is mourning his son. History of parkinson's and paralysis agitans. His nursing home has been pushing fluids to ensure that he is well hydrated. Wife is concerned because whenever she visits him in the nursing home he is completely slouched over, even though he is capable of holding himself up and eating. Patient was seen by cardiology while in the hospital, and it was recommended that he get a pacemaker, though his wife is not ready to consider getting one at this time. His wife is very frustrated that he has lost 30-40 pounds while in the nursing home, and she is worried that he is losing too much weight too fast. The nursing home has been telling his wife every day she calls that he is doing well and eating well because they \"don't want to make her feel bad\". Patient was placed in the nursing home because his wife tore her ACL and suffered a tibial plateau fracture while trying to transfer him into a lift chair late 2017, and she was not able to care for him physically with the injury. His wife has been told that because of her injury, she has been told that she will likely not be able to care for him alone and that he cannot come back home where she is the primary caretaker. He is scheduled to be seen by his parkinson's doctor on 2/15/18. His wife is also worried about his slouched position, as she is worried he could develop pneumonia, lose functioning of his vocal cords, and reports he eats in that position. Patient will often complain he cannot breathe, but if his wife is able to get him upright his breathing improves.    Problem list and histories reviewed & adjusted, as " indicated.  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)     Primary insomnia     Diarrhea     Other emphysema (H)     Pulmonary nodules     Thyroid nodule     Family history of thyroid cancer     H/O recurrent urinary tract infection     Essential hypertension with goal blood pressure less than 140/90     Senile purpura (H)     Nocturnal cough     Syncope     Past Surgical History:   Procedure Laterality Date     EYE SURGERY       ORTHOPEDIC SURGERY       PHACOEMULSIFICATION CLEAR CORNEA WITH STANDARD INTRAOCULAR LENS IMPLANT  5/21/2014    Procedure: PHACOEMULSIFICATION CLEAR CORNEA WITH STANDARD INTRAOCULAR LENS IMPLANT;  Surgeon: Tomy Hunter MD;  Location: [facility]     PHACOEMULSIFICATION CLEAR CORNEA WITH TORIC INTRAOCULAR LENS IMPLANT  4/30/2014    Procedure: PHACOEMULSIFICATION CLEAR CORNEA WITH TORIC INTRAOCULAR LENS IMPLANT;  Surgeon: Tomy Hunter MD;  Location: [facility]       Social History   Substance Use Topics     Smoking status: Former Smoker     Packs/day: 0.01     Years: 40.00     Types: Cigarettes     Quit date: 7/31/2008     Smokeless tobacco: Never Used      Comment: very passive cigarettes in 6 months     Alcohol use No     Family History   Problem Relation Age of Onset     CEREBROVASCULAR DISEASE Mother      DIABETES Mother      GASTROINTESTINAL DISEASE Mother      Hypertension Mother      Neurologic Disorder Father      CEREBROVASCULAR DISEASE Father      CEREBROVASCULAR DISEASE Maternal Grandfather      CANCER Paternal Grandmother      Other - See Comments Sister      gi - unknown         Current Outpatient Prescriptions    Medication Sig Dispense Refill     cephalexin (KEFLEX) 500 MG capsule Take 1 capsule (500 mg) by mouth every 12 hours for 7 days  0     amLODIPine (NORVASC) 5 MG tablet Take 1 tablet (5 mg) by mouth At Bedtime 90 tablet 3     mirtazapine (REMERON) 15 MG tablet TAKE 2 TABLETs (30 MG) BY MOUTH AT BEDTIME 90 tablet 1     clonazePAM (KLONOPIN) 0.5 MG ODT tab Take 0.25 mg by mouth every other night 180 tablet 0     ranitidine (ZANTAC) 75 MG tablet Take 1 tablet (75 mg) by mouth At Bedtime 30 tablet      methylcellulose (CITRUCEL) 500 MG TABS tablet Take 1 tablet (500 mg) by mouth daily as needed 14 each 0     lactobacillus rhamnosus, GG, (CULTURELL) capsule Take 1 capsule by mouth every morning 60 capsule 11     Ipratropium-Albuterol (COMBIVENT RESPIMAT)  MCG/ACT inhaler Inhale 1 puff into the lungs 4 times daily May take 1 to 2 additional doses as needed during the day 1 Inhaler 4     lisinopril (PRINIVIL/ZESTRIL) 5 MG tablet Take 1 tablet (5 mg) by mouth daily 30 tablet 1     cholecalciferol (VITAMIN D) 1000 UNIT tablet Take 1 tablet (1,000 Units) by mouth every morning 90 tablet 1     QUEtiapine (SEROQUEL) 50 MG tablet TAKE 1 TABLET(50 MG) BY MOUTH AT BEDTIME 90 tablet 2     COMBIVENT RESPIMAT  MCG/ACT inhaler INHALE 1 PUFF BY MOUTH FOUR TIMES DAILY. NOT TO EXCEED 6 DOSES PER DAY 4 g 4     venlafaxine (EFFEXOR-XR) 75 MG 24 hr capsule TAKE 1 CAPSULE BY MOUTH EVERY MORNING AND 1 CAPSULE EVERY NIGHT AT BEDTIME 180 capsule 0     FLOVENT  MCG/ACT Inhaler INHALE 2 PUFFS INTO THE LUNGS TWICE DAILY 36 g 1     aspirin 81 MG tablet Take by mouth twice a week       donepezil (ARICEPT) 10 MG tablet Take 5mg  by mouth twice a day (take with the 2.5 mg dose for a total dose of 7.5mg twice daily)  3     carbidopa-levodopa (SINEMET CR)  MG per CR tablet Take 1 tablet by mouth At Bedtime 1 tablet 0     carbidopa-levodopa (SINEMET)  MG per tablet Take 1-2 tablets by mouth 5 times daily Takes 2 tablets  at 6 AM, 1.5 tablets at 9 AM, 2 tablets at noon, 1.5 tablets at 3 PM, 1.5 tablets at 6 PM. Can take additional 0.5 tablet in the middle of the night if needed. 90 tablet      donepezil (ARICEPT) 5 MG tablet Take 2.5 mg twice a day (take with the 5 mg dose for a total dose of 7.5mg twice daily) 180 tablet 3     Allergies   Allergen Reactions     Seasonal Allergies      BP Readings from Last 3 Encounters:   01/31/18 102/64   01/27/18 157/90   08/09/17 (!) 180/112    Wt Readings from Last 3 Encounters:   01/26/18 53.1 kg (117 lb)   08/01/17 85.3 kg (188 lb)   06/07/17 87.5 kg (193 lb)        Reviewed and updated as needed this visit by clinical staff       Reviewed and updated as needed this visit by Provider         ROS:  Positive for weakness, poor posture, confusion, and unintended weight loss.    Denies headache, insomnia, chest pain, shortness of breath, cough, heartburn, bowel issues, bladder issues, neck pain, back pain, hip pain, knee pain, ankle pain, or foot pain. Remainder of ROS is negative unless otherwise noted above or in HPI.    This document serves as a record of the services and decisions personally performed and made by Bharath Buchanan MD. It was created on his behalf by Domenic Lentz, a trained medical scribe. The creation of this document is based the provider's statements to the medical scribe.  Domenic Lentz 2:42 PM January 31, 2018    OBJECTIVE:     /64  Pulse 72  Temp 98.1  F (36.7  C)  Resp 24  SpO2 98%  There is no height or weight on file to calculate BMI.  GENERAL: weak, slouched over, mumbled speech, in wheelchair  RESP: lungs clear to auscultation - no rales, rhonchi or wheezes  CV: regular rate and rhythm, normal S1 S2, no S3 or S4, no murmur, click or rub, no peripheral edema and peripheral pulses strong  MS: weak, patient is hunched over and has difficulties lifting bottle of water to mouth to drink  NEURO: weak due to parkinsonism, mumbled speech difficult to  understand, confused  PSYCH: affect flat    Diagnostic Test Results:  No results found for this or any previous visit (from the past 24 hour(s)).    ASSESSMENT/PLAN:     (R40.4) Sedated due to multiple medications  (primary encounter diagnosis)  Comment: Patient is on numerous medications for blood pressure, parkinson's, depression, and other diseases. Advised patient and wife to follow up with doctor at nursing home and neurology to discuss medications.  Plan: Follow up with doctor at nursing home and neurology.    (R29.3) Poor posture  Comment: Worsening. Patient is slouched forward in wheelchair. Discussed adjusting wheelchair to allow for better posture. Advised patient to follow up with physiatry.  Plan: Follow up with physiatry to discuss adjusting wheelchair to allow for better posture.    (G20) Parkinson's disease (H)  Comment: Worsening. Controlled on current medications. Patient has been seeing neurology for management.  Plan: Continue on current medications. Follow up with neurology.    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 Controlled on current medications. Patient has been seeing neurology for management.  Plan: Continue on current medications. Follow up with neurology.    Patient Instructions   -I will call Dr. Hoskins to discuss the possibility of getting a physiatrist to come see Sanjay.  -When you meet with your Parkinson's doctor, please discuss Sanjay's worsening posture, and see if they have any ideas.  -I will let you know when I get the results back from lab.     45 minutes were spent with the patient with more than 50% of time spent in counseling and coordination of care    The information in this document, created by the medical scribe for me, accurately reflects the services I personally performed and the decisions made by me. I have reviewed and approved this document for accuracy prior to leaving the patient care area.    Bharath Buchanan MD 2:42 PM January 31, 2018  [facility] [place]

## 2018-01-31 ENCOUNTER — OFFICE VISIT (OUTPATIENT)
Dept: FAMILY MEDICINE | Facility: CLINIC | Age: 73
End: 2018-01-31
Payer: COMMERCIAL

## 2018-01-31 VITALS
HEART RATE: 72 BPM | OXYGEN SATURATION: 98 % | SYSTOLIC BLOOD PRESSURE: 102 MMHG | DIASTOLIC BLOOD PRESSURE: 64 MMHG | TEMPERATURE: 98.1 F | RESPIRATION RATE: 24 BRPM

## 2018-01-31 DIAGNOSIS — F02.80 DEMENTIA DUE TO PARKINSON'S DISEASE WITHOUT BEHAVIORAL DISTURBANCE (H): ICD-10-CM

## 2018-01-31 DIAGNOSIS — R29.3 POOR POSTURE: ICD-10-CM

## 2018-01-31 DIAGNOSIS — R41.89 SEDATED DUE TO MULTIPLE MEDICATIONS: Primary | ICD-10-CM

## 2018-01-31 DIAGNOSIS — G20.A1 DEMENTIA DUE TO PARKINSON'S DISEASE WITHOUT BEHAVIORAL DISTURBANCE (H): ICD-10-CM

## 2018-01-31 DIAGNOSIS — G20.A1 PARKINSON'S DISEASE (H): ICD-10-CM

## 2018-01-31 LAB
BASOPHILS # BLD AUTO: 0 10E9/L (ref 0–0.2)
BASOPHILS NFR BLD AUTO: 0.3 %
DIFFERENTIAL METHOD BLD: ABNORMAL
EOSINOPHIL # BLD AUTO: 0.2 10E9/L (ref 0–0.7)
EOSINOPHIL NFR BLD AUTO: 2.5 %
ERYTHROCYTE [DISTWIDTH] IN BLOOD BY AUTOMATED COUNT: 12.6 % (ref 10–15)
HCT VFR BLD AUTO: 36.5 % (ref 40–53)
HGB BLD-MCNC: 11.9 G/DL (ref 13.3–17.7)
LYMPHOCYTES # BLD AUTO: 1.1 10E9/L (ref 0.8–5.3)
LYMPHOCYTES NFR BLD AUTO: 16.3 %
MCH RBC QN AUTO: 31.7 PG (ref 26.5–33)
MCHC RBC AUTO-ENTMCNC: 32.6 G/DL (ref 31.5–36.5)
MCV RBC AUTO: 97 FL (ref 78–100)
MONOCYTES # BLD AUTO: 0.6 10E9/L (ref 0–1.3)
MONOCYTES NFR BLD AUTO: 8.5 %
NEUTROPHILS # BLD AUTO: 5 10E9/L (ref 1.6–8.3)
NEUTROPHILS NFR BLD AUTO: 72.4 %
PLATELET # BLD AUTO: 257 10E9/L (ref 150–450)
RBC # BLD AUTO: 3.75 10E12/L (ref 4.4–5.9)
WBC # BLD AUTO: 6.9 10E9/L (ref 4–11)

## 2018-01-31 PROCEDURE — 85025 COMPLETE CBC W/AUTO DIFF WBC: CPT | Performed by: FAMILY MEDICINE

## 2018-01-31 PROCEDURE — 99215 OFFICE O/P EST HI 40 MIN: CPT | Performed by: FAMILY MEDICINE

## 2018-01-31 PROCEDURE — 36415 COLL VENOUS BLD VENIPUNCTURE: CPT | Performed by: FAMILY MEDICINE

## 2018-01-31 PROCEDURE — 80053 COMPREHEN METABOLIC PANEL: CPT | Performed by: FAMILY MEDICINE

## 2018-01-31 NOTE — LETTER
My Depression Action Plan  Name: Sanjay Cano   Date of Birth 1945  Date: 1/30/2018    My doctor: Bharath Buchanan   My clinic: [address] [address] [phone]          GREEN    ZONE   Good Control    What it looks like:     Things are going generally well. You have normal up s and down s. You may even feel depressed from time to time, but bad moods usually last less than a day.   What you need to do:  1. Continue to care for yourself (see self care plan)  2. Check your depression survival kit and update it as needed  3. Follow your physician s recommendations including any medication.  4. Do not stop taking medication unless you consult with your physician first.           YELLOW         ZONE Getting Worse    What it looks like:     Depression is starting to interfere with your life.     It may be hard to get out of bed; you may be starting to isolate yourself from others.    Symptoms of depression are starting to last most all day and this has happened for several days.     You may have suicidal thoughts but they are not constant.   What you need to do:     1. Call your care team, your response to treatment will improve if you keep your care team informed of your progress. Yellow periods are signs an adjustment may need to be made.     2. Continue your self-care, even if you have to fake it!    3. Talk to someone in your support network    4. Open up your depression survival kit           RED    ZONE Medical Alert - Get Help    What it looks like:     Depression is seriously interfering with your life.     You may experience these or other symptoms: You can t get out of bed most days, can t work or engage in other necessary activities, you have trouble taking care of basic hygiene, or basic responsibilities, thoughts of suicide or death that will not go away, self-injurious behavior.     What you need to do:  1. Call  your care team and request a same-day appointment. If they are not available (weekends or after hours) call your local crisis line, emergency room or 911.      Electronically signed by: Tamia Israel, January 30, 2018    Depression Self Care Plan / Survival Kit    Self-Care for Depression  Here s the deal. Your body and mind are really not as separate as most people think.  What you do and think affects how you feel and how you feel influences what you do and think. This means if you do things that people who feel good do, it will help you feel better.  Sometimes this is all it takes.  There is also a place for medication and therapy depending on how severe your depression is, so be sure to consult with your medical provider and/ or Behavioral Health Consultant if your symptoms are worsening or not improving.     In order to better manage my stress, I will:    Exercise  Get some form of exercise, every day. This will help reduce pain and release endorphins, the  feel good  chemicals in your brain. This is almost as good as taking antidepressants!  This is not the same as joining a gym and then never going! (they count on that by the way ) It can be as simple as just going for a walk or doing some gardening, anything that will get you moving.      Hygiene   Maintain good hygiene (Get out of bed in the morning, Make your bed, Brush your teeth, Take a shower, and Get dressed like you were going to work, even if you are unemployed).  If your clothes don't fit try to get ones that do.    Diet  I will strive to eat foods that are good for me, drink plenty of water, and avoid excessive sugar, caffeine, alcohol, and other mood-altering substances.  Some foods that are helpful in depression are: complex carbohydrates, B vitamins, flaxseed, fish or fish oil, fresh fruits and vegetables.    Psychotherapy  I agree to participate in Individual Therapy (if recommended).    Medication  If prescribed medications, I agree to take  them.  Missing doses can result in serious side effects.  I understand that drinking alcohol, or other illicit drug use, may cause potential side effects.  I will not stop my medication abruptly without first discussing it with my provider.    Staying Connected With Others  I will stay in touch with my friends, family members, and my primary care provider/team.    Use your imagination  Be creative.  We all have a creative side; it doesn t matter if it s oil painting, sand castles, or mud pies! This will also kick up the endorphins.    Witness Beauty  (AKA stop and smell the roses) Take a look outside, even in mid-winter. Notice colors, textures. Watch the squirrels and birds.     Service to others  Be of service to others.  There is always someone else in need.  By helping others we can  get out of ourselves  and remember the really important things.  This also provides opportunities for practicing all the other parts of the program.    Humor  Laugh and be silly!  Adjust your TV habits for less news and crime-drama and more comedy.    Control your stress  Try breathing deep, massage therapy, biofeedback, and meditation. Find time to relax each day.     My support system    Clinic Contact:  Phone number:    Contact 1:  Phone number:    Contact 2:  Phone number:    [place]/:  Phone number:    Therapist:  Phone number:    Local crisis center:    Phone number:    Other community support:  Phone number:

## 2018-01-31 NOTE — MR AVS SNAPSHOT
"              After Visit Summary   1/31/2018    Sanjay Cano    MRN: [id number]           Patient Information     Date Of Birth          1945        Visit Information        Provider Department      1/31/2018 3:00 PM Bharath Buchanan MD [facility]        Today's Diagnoses     Moderate recurrent major depression (H)    -  1      Care Instructions    -I will call Dr. Hoskins to discuss the possibility of getting a physiatrist to come see Sanjay.  -When you meet with your Parkinson's doctor, please discuss Sanjay's worsening posture, and see if they have any ideas.  -I will let you know when I get the results back from lab.          Follow-ups after your visit        Who to contact     If you have questions or need follow up information about today's clinic visit or your schedule please contact [facility] directly at [phone].  Normal or non-critical lab and imaging results will be communicated to you by [employer], letter or phone within 4 business days after the clinic has received the results. If you do not hear from us within 7 days, please contact the clinic through MyChart or phone. If you have a critical or abnormal lab result, we will notify you by phone as soon as possible.  Submit refill requests through [employer] or call your pharmacy and they will forward the refill request to us. Please allow 3 business days for your refill to be completed.          Additional Information About Your Visit        [employer] Information     [employer] lets you send messages to your doctor, view your test results, renew your prescriptions, schedule appointments and more. To sign up, go to [email or web address] . Click on \"Log in\" on the left side of the screen, which will take you to the Welcome page. Then click on \"Sign up Now\" on the right side of the page.     You will be asked to enter the access code listed below, as well as some personal information. Please follow the directions " to create your username and password.     Your access code is: [email or web address]  Expires: 2018  3:55 PM     Your access code will  in 90 days. If you need help or a new code, please call your [facility] clinic or [phone].        Care EveryWhere ID     This is your Care EveryWhere ID. This could be used by other organizations to access your [facility] medical records  [id number]        Your Vitals Were     Pulse Temperature Respirations Pulse Oximetry          72 98.1  F (36.7  C) 24 98%         Blood Pressure from Last 3 Encounters:   18 102/64   18 157/90   17 (!) 180/112    Weight from Last 3 Encounters:   18 117 lb (53.1 kg)   17 188 lb (85.3 kg)   17 193 lb (87.5 kg)              We Performed the Following     CBC with platelets and differential     Comprehensive metabolic panel (BMP + Alb, Alk Phos, ALT, AST, Total. Bili, TP)     DEPRESSION ACTION PLAN (DAP)        Primary Care Provider Office Phone # Fax #    Bharath Buchanan [phone]       [address] [address]        Equal Access to Services     JERARDO LONGORIA : Hadii hanh ku hadasho Soomaali, waaxda luqadaha, qaybta kaalmada adeegyada, nataliya idimone capps. So [facility] [phone].    ATENCIÓN: Si habla español, tiene a herring disposición servicios gratuitos de asistencia lingüística. Yanique al [phone].    We comply with applicable federal civil rights laws and [place] laws. We do not discriminate on the basis of race, color, national origin, age, disability, sex, sexual orientation, or gender identity.            Thank you!     Thank you for choosing [facility]  for your care. Our goal is always to provide you with excellent care. Hearing back from our patients is one way we can continue to improve our services. Please take a few minutes to complete the written survey that you may receive in the mail after your visit with us.  Thank you!             Your Updated Medication List - Protect others around you: Learn how to safely use, store and throw away your medicines at www.disposemymeds.org.          This list is accurate as of 1/31/18  3:50 PM.  Always use your most recent med list.                   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 Can take additional 0.5 tablet in the middle of the night if needed.        * carbidopa-levodopa  MG per CR tablet    SINEMET CR    1 tablet    Take 1 tablet by mouth At Bedtime        cephALEXin 500 MG capsule    KEFLEX     Take 1 capsule (500 mg) by mouth every 12 hours for 7 days        cholecalciferol 1000 UNIT tablet    vitamin D3    90 tablet    Take 1 tablet (1,000 Units) by mouth every morning    At risk for falling       clonazePAM 0.5 MG ODT tab    klonoPIN    180 tablet    Take 0.25 mg by mouth every other night        * COMBIVENT RESPIMAT  MCG/ACT inhaler   Generic drug:  Ipratropium-Albuterol     4 g    INHALE 1 PUFF BY MOUTH FOUR TIMES DAILY. NOT TO EXCEED 6 DOSES PER DAY    Other emphysema (H)       * COMBIVENT RESPIMAT  MCG/ACT inhaler   Generic drug:  Ipratropium-Albuterol     1  Inhaler    Inhale 1 puff into the lungs 4 times daily May take 1 to 2 additional doses as needed during the day    Other emphysema (H)       FLOVENT  MCG/ACT Inhaler   Generic drug:  fluticasone     36 g    INHALE 2 PUFFS INTO THE LUNGS TWICE DAILY    Other emphysema (H)       lactobacillus rhamnosus (GG) capsule     60 capsule    Take 1 capsule by mouth every morning    Diarrhea, unspecified type       lisinopril 5 MG tablet    PRINIVIL/ZESTRIL    30 tablet    Take 1 tablet (5 mg) by mouth daily        methylcellulose 500 MG Tabs tablet    CITRUCEL    14 each    Take 1 tablet (500 mg) by mouth daily as needed        mirtazapine 15 MG tablet    REMERON    90 tablet    TAKE 2 TABLETs (30 MG) BY MOUTH AT BEDTIME    Depression, major, recurrent, moderate (H)       QUEtiapine 50 MG tablet    SEROquel    90 tablet    TAKE 1 TABLET(50 MG) BY MOUTH AT BEDTIME    Moderate recurrent major depression (H)       ranitidine 75 MG tablet    ZANTAC    30 tablet    Take 1 tablet (75 mg) by mouth At Bedtime        venlafaxine 75 MG 24 hr capsule    EFFEXOR-XR    180 capsule    TAKE 1 CAPSULE BY MOUTH EVERY MORNING AND 1 CAPSULE EVERY NIGHT AT BEDTIME    Moderate recurrent major depression (H)       * Notice:  This list has 6 medication(s) that are the same as other medications prescribed for you. Read the directions carefully, and ask your doctor or other care provider to review them with you.

## 2018-01-31 NOTE — PATIENT INSTRUCTIONS
-I will call Dr. Hoskins to discuss the possibility of getting a physiatrist to come see Sanjay.  -When you meet with your Parkinson's doctor, please discuss Sanjay's worsening posture, and see if they have any ideas.  -I will let you know when I get the results back from lab.

## 2018-02-01 LAB
ALBUMIN SERPL-MCNC: 3.4 G/DL (ref 3.4–5)
ALP SERPL-CCNC: 95 U/L (ref 40–150)
ALT SERPL W P-5'-P-CCNC: <6 U/L (ref 0–70)
ANION GAP SERPL CALCULATED.3IONS-SCNC: 7 MMOL/L (ref 3–14)
AST SERPL W P-5'-P-CCNC: 10 U/L (ref 0–45)
BILIRUB SERPL-MCNC: 0.3 MG/DL (ref 0.2–1.3)
BUN SERPL-MCNC: 23 MG/DL (ref 7–30)
CALCIUM SERPL-MCNC: 8.6 MG/DL (ref 8.5–10.1)
CHLORIDE SERPL-SCNC: 111 MMOL/L (ref 94–109)
CO2 SERPL-SCNC: 27 MMOL/L (ref 20–32)
CREAT SERPL-MCNC: 0.93 MG/DL (ref 0.66–1.25)
GFR SERPL CREATININE-BSD FRML MDRD: 80 ML/MIN/1.7M2
GLUCOSE SERPL-MCNC: 91 MG/DL (ref 70–99)
POTASSIUM SERPL-SCNC: 4.5 MMOL/L (ref 3.4–5.3)
PROT SERPL-MCNC: 6 G/DL (ref 6.8–8.8)
SODIUM SERPL-SCNC: 145 MMOL/L (ref 133–144)

## 2018-02-02 ENCOUNTER — TELEPHONE (OUTPATIENT)
Dept: FAMILY MEDICINE | Facility: CLINIC | Age: 73
End: 2018-02-02

## 2018-02-02 NOTE — TELEPHONE ENCOUNTER
[facility] #[phone]    Called [facility] Rangel [phone]  1. OT to reassess  2. Wt is ~160, down #20. Will recheck.  3. Will continue as primary, I'll help support spouse.    Bharath Buchanan MD

## 2018-02-02 NOTE — TELEPHONE ENCOUNTER
Please contact spouse Danuta for the phone # for Dr Hernandez or the nursing home. Thanks Bharath

## 2018-02-09 ENCOUNTER — TELEPHONE (OUTPATIENT)
Dept: FAMILY MEDICINE | Facility: CLINIC | Age: 73
End: 2018-02-09

## 2018-02-09 NOTE — TELEPHONE ENCOUNTER
Reason for call:  Other   Patient called regarding (reason for call): Letter re-sent from 1/3/18  Additional comments: Pt's wife would like another signed copy of the letter that Dr. Buchanan wrote for the patient on 1/3/18 mailed to her at home. She stated that she had to give the original one to a medical professional and did not receive it back, and needs another copy for the senior facility he resides in. It does need to be signed by Dr. Buchanan as well. She would also like to know if there is anything they need to do in regard to the blood work the he had done on 1/31/18, or if everything looked good there.     Phone number to reach patient:  Home number on file [phone] (home)    Best Time:  Any    Can we leave a detailed message on this number?  YES

## 2018-02-09 NOTE — TELEPHONE ENCOUNTER
Copy of letter mailed to patient's home address, informed patient that this has been done.     SEJAL SmithN RN  [facility]

## 2018-02-21 ENCOUNTER — TELEPHONE (OUTPATIENT)
Dept: FAMILY MEDICINE | Facility: CLINIC | Age: 73
End: 2018-02-21

## 2018-02-21 DIAGNOSIS — H61.23 BILATERAL IMPACTED CERUMEN: Primary | ICD-10-CM

## 2018-02-21 NOTE — TELEPHONE ENCOUNTER
Reason for Call:  Other call back    Detailed comments: Sanjay's wife called wondering if she should take Sanjay to a Nose, ear and throat doctor as she noticed a lot of wax build up in his ear and that his hearing has gotten worse. She knows he has not had his ears checked in a long time. She has appointment's most of the day today but would still like a call back regarding this question.    Phone Number Patient can be reached at: Other phone number:  [phone]*    Best Time: Anytime    Can we leave a detailed message on this number? YES    Call taken on 2/21/2018 at 9:33 AM by Lucy Mckeon

## 2018-02-21 NOTE — TELEPHONE ENCOUNTER
Dr. Buchanan,     Spoke with patient's wife, she reports patient has had increased wax build up in his ears, she is requesting a referral to ENT clinic either in or near the [facility].     Please review/sign or advise.     SEJAL SmithN RN  [facility]

## 2018-02-21 NOTE — TELEPHONE ENCOUNTER
Left a message with return call request.     Sandra Guadalupe, BSN RN  [facility]

## 2018-02-22 NOTE — TELEPHONE ENCOUNTER
Attempted to reach patient's spouse, line was busy.     Sandra Guadalupe, BSN RN  [facility]

## 2018-03-02 ENCOUNTER — TELEPHONE (OUTPATIENT)
Dept: FAMILY MEDICINE | Facility: CLINIC | Age: 73
End: 2018-03-02

## 2018-03-02 DIAGNOSIS — R55 SYNCOPE AND COLLAPSE: Primary | ICD-10-CM

## 2018-03-02 DIAGNOSIS — R49.8 WEAKNESS OF VOICE: ICD-10-CM

## 2018-03-02 DIAGNOSIS — R49.0 DYSPHONIA: ICD-10-CM

## 2018-03-02 DIAGNOSIS — H61.23 BILATERAL IMPACTED CERUMEN: ICD-10-CM

## 2018-03-02 NOTE — TELEPHONE ENCOUNTER
Dr. Buchanan    1. Pt was in ED jan 26/27 and had the ziopatch applied and wife said it was flashing and had come loose, she was told that they would contact pt PCP if it needs to be redone    2. ENT referral for assess of wax build up/decreased hearing  She would like a location near the clinic    3. Also would like a ST referral as he is needing eval he is getting so his speech is weaker  This would be through the care center, order/referral would need to be faxed to the facility. Elder care    Referrals cued    Advise    Joana Gandhi, GERARD   [facility]

## 2018-03-02 NOTE — TELEPHONE ENCOUNTER
Reason for call:  Other   Patient called regarding (reason for call): call back  Additional comments: Patients wife called because she wanted to get a referral to a ear/throat/nose doctor because she feels he has a lot of wax buildup in his ears and also because she wanted to get another ziopatch because she felt like the last time he did one that it was not accurate. She would like a call back because she has questions about both of those things and want to discuss them with Dr. Buchanan.    Phone number to reach patient:  Home number on file [phone] (home)    Best Time:  Any    Can we leave a detailed message on this number?  YES

## 2018-03-02 NOTE — TELEPHONE ENCOUNTER
Writer LVM for patient/pt wife to call clinic back to get referral information.    Thanks! Natalia Boss RN

## 2018-03-05 DIAGNOSIS — G20.A1 PARKINSON'S DISEASE (H): ICD-10-CM

## 2018-03-05 DIAGNOSIS — R49.0 DYSPHONIA: Primary | ICD-10-CM

## 2018-03-07 NOTE — TELEPHONE ENCOUNTER
Referral for ST was faxed to facility    Joana Gandhi RN   [facility]

## 2018-03-07 NOTE — TELEPHONE ENCOUNTER
Dr. Buchanan    ST referral is cued for facility, please sign and print for faxing    Spoke with wife referral info given for ENT, cardio    Pt is currently getting PT/OT at [facility], new referral for the facility for ST to be faxed    Fax number  [facility]     Joana Gandhi RN   [facility]

## 2018-03-07 NOTE — TELEPHONE ENCOUNTER
Left message for wife to call clinic back, also spoke with son he will also reach out to her to call us    Need to find out if ST has been started at the facility, may need to send another referral order, fax to the facility [facility] as they do ST in house    Also give wife the ENT referral info.    Joana Gandhi RN   [facility]

## 2018-03-08 ENCOUNTER — TELEPHONE (OUTPATIENT)
Dept: FAMILY MEDICINE | Facility: CLINIC | Age: 73
End: 2018-03-08

## 2018-03-08 NOTE — TELEPHONE ENCOUNTER
Reason for call:  Other   Patient called regarding (reason for call): call back  Additional comments: Pt's wife spoke with Joana yesterday and was given numbers for referrals that Dr. Buchanan had placed for the patient. She said that she has a few more questions for Joana (or Dr. Buchanan), like how we knew she had to contact Dr. Mccormack's office, because when she spoke with customer service on the phone she was told that she would have to sign a release to have the information from the patch sent to Dr. Buchanan, which she hadn't done yet, so she wasn't sure how we were aware of who she needed to contact. Also, she is very concerned about the information from the ziopatch being inaccurate and having a conclusion drawn from incomplete data. She stated that the [facility] has misdiagnosed her  in the past and that she doesn't want to be pushed into something (a pacemaker) that he doesn't actually need.     Phone number to reach patient:  Home number on file [phone] (home)    Best Time:  Any    Can we leave a detailed message on this number?  YES

## 2018-03-08 NOTE — TELEPHONE ENCOUNTER
Dr. Buchanan,     Patient's wife called this morning because she would like your thoughts on the results of the ziopatch.     She is also wondering if the ziopatch needs to be repeated because it was not working (per wife report the nursing home staff told her that it was flashing orange) 7 out of the 14 days the patient was wearing it.      Thank you,   Sandra Guadalupe RN

## 2018-03-09 ENCOUNTER — CARE COORDINATION (OUTPATIENT)
Dept: CARDIOLOGY | Facility: CLINIC | Age: 73
End: 2018-03-09

## 2018-03-09 NOTE — PROGRESS NOTES
Wife called as she was told to call cardiology Dr. Haines to see if he suggests pt to wear another Ziopatch monitor.  Pt saw Dr. Soler in the hospital.  Informed wife pt has no f/u w/ cardiology, however Ziopatch looked ok so if he is feeling well now he shouldn't need another monitor at this point.

## 2018-03-09 NOTE — TELEPHONE ENCOUNTER
Good news, no significant arrhythmias.  Not sure about whether the Zio patch to be repeated; recommend contacting cardiologist office and asking through the nurse.  Please contact patient's spouse.  Thanks Bharath

## 2018-03-09 NOTE — TELEPHONE ENCOUNTER
Called patient's wife, informed her of provider message.     Sandra Guadalupe, BSN RN  [facility]

## 2018-03-21 ENCOUNTER — TELEPHONE (OUTPATIENT)
Dept: FAMILY MEDICINE | Facility: CLINIC | Age: 73
End: 2018-03-21

## 2018-03-21 DIAGNOSIS — R05.8 RECURRENT PRODUCTIVE COUGH: Primary | ICD-10-CM

## 2018-03-21 RX ORDER — DOXYCYCLINE 100 MG/1
100 CAPSULE ORAL 2 TIMES DAILY
Qty: 20 CAPSULE | Refills: 0 | Status: SHIPPED | OUTPATIENT
Start: 2018-03-21 | End: 2018-05-14

## 2018-03-21 NOTE — TELEPHONE ENCOUNTER
Reason for Call:  Other call back    Detailed comments: Patient's wife Caren called stating that she would like a call back as she has some questions concerning Sanjay's health. He has a congested cough and his home care place is not giving him anything for it. She does not want him get phenomena. He does not have a fever and the home care place says they won't worry to much unless he has or get a fever. She wondering if that is what she should go by. Also does she need to take him to ENT to check ear blockage or is this something the provider here can do.     Phone Number Patient can be reached at: Home number on file [phone] (home)    Best Time: Anytime but before noon is best.     Can we leave a detailed message on this number? YES    Call taken on 3/21/2018 at 8:29 AM by Lucy Mckeon

## 2018-03-21 NOTE — TELEPHONE ENCOUNTER
Dr. Buchanan,    Patient's wife is wondering if Sanjay should start taking any medications for a current congested cough that started Saturday.     He is currently taking generic guaifenesin at the [facility], has no fever, LS clear per nursing staff, coughing up clear/white mucous.     Wife states that she does not want medications that make him drowsy.     Patient has office visit scheduled with you for 3/26/18.    Please advise.    Writer recommended to patient to continue giving patient guaifenesin, watch for yellow/green sputum, have LS checked and continue to check temperature. Also recommended to provide as much fluids as possible for patient.    Lita Mckeon RN  [facility]

## 2018-03-22 NOTE — TELEPHONE ENCOUNTER
Called patients wife, notified of provider order sent over for doxycycline. Patient's wife reports that she is going to see her  today at [facility] and then call the clinic back later this afternoon with an update on how patient is doing.     JARRED Smith RN  [facility]

## 2018-03-22 NOTE — TELEPHONE ENCOUNTER
Dr. Buchanan,     Received call from [facility], nurse manager stated they won't be able to administer antibiotic because patient does not meet the criteria from their medical director (fever plus respiratory rate of more than 25 breaths per minute and/or productive cough).     Patient has appointment with you on 3/26/18.     Manoj, nurse manager [phone]    Sandra Guadalupe, BSN RN  [facility]

## 2018-03-22 NOTE — TELEPHONE ENCOUNTER
Ashtyn called back from Elder Care and had a question regarding a medication for Sanjay. She can be reached at [phone].

## 2018-03-23 ENCOUNTER — TELEPHONE (OUTPATIENT)
Dept: FAMILY MEDICINE | Facility: CLINIC | Age: 73
End: 2018-03-23

## 2018-03-23 NOTE — TELEPHONE ENCOUNTER
"Spoke to wife Caren.    Pt is at [facility] on [address], [facility] since August 2017 because wife broke her leg and she couldn't care for him at home any longer.   He had been going to day program there for 4 years before this.   He has been in TCU since August then Oct 6 to LTC facility.  Started losing weight, they do not have enough for 1:1 care  In January he ended up in ER because he was passing out from \"malnutrition, dehydration\"    Dr Hoskins, at [facility],retiring and new replacement coming.    Wife worried about his weight loss - she asked Dr Hoskins if he would need to be hospitalized but Dr Hoskins said they would tube feed him and it would not be good.     Neurologist saw pt in October and stated he was \"consumed with depression and traumatized\" because of placement in [facility].     Nurse called to tell wife that pt cannot have 2 primary care providers  [employer] said they would pay for only one    Today she was told that she must pick one doctor, either in house  or Dr Buchanan. They will no longer pay for both.     Sanjay has had congested cough for 2 weeks and he was to be seen Feb 15 by NP. NP never saw him on that date and she told wife she was never called about that visit.     SW from [facility] just called her and said that the [employer] system through [place] (wouild be separate from the supplemental policy [employer]).    Is Dr Buchanan willing to follow Sanjay - wife thinks so but wondering what she does in the middle of the night . She does not feel safe with the in house medical service. Is it safe to stay with Dr Buchanan? Can she get help in the middle of the night if something goes wrong.      DR Buchanan, I will come and talk to you about this situation    (wife may benefit from a caregiver support group, perhaps through [facility] [place] - a care coordinator referral could be placed and SW would help wife find some support for her " situation- wife would need to be asked if she is open to this - I did not discuss it with her)    Bernadette Sanchez, RN, BSN

## 2018-03-23 NOTE — TELEPHONE ENCOUNTER
Huddled with Dr Buchanan,    He can continue as pt's PCP but after hours calls would go to on call MD.   He does advise wife meet with the new provider taking over Dr Hoskins's position at the facility. If wife has good rapport with the new provider it may be easier for pt, staff and wife to get cares from the on site provider.     In addition he does advise to offer to wife suggestion of support group for caregivers of Parkinson individuals. Per HOWIE Lugo please see [place] Parkinson website  .[email or web address]://[email or web address]-support    If she is not open to group she could consider private counselor to help her through this difficult time with 's increasing needs.   A referral can also be put into care coordinator to help wife cope with situation.    Wife should be called on Monday    Bernadette Sanchez, RN, BSN

## 2018-03-23 NOTE — TELEPHONE ENCOUNTER
Reason for call:  Other   Patient called regarding (reason for call): call back  Additional comments: The patients wife called and she is having a lot of concerns about the nursing home medical staff that her  has been seeing. She is extremely upset and does not know what to do about the situation and would like some guidance either from a nurse or Dr. Buchanan about what she should do. Sanjay has an appointment with Dr. Buchanan on Monday 3/26 but she would like to talk to someone before then. She would like a call back before 1pm if possible to discuss the concerns she has.     Phone number to reach patient:  Home number on file [phone] (home)    Best Time: Before 1pm    Can we leave a detailed message on this number?  YES

## 2018-03-26 ENCOUNTER — OFFICE VISIT (OUTPATIENT)
Dept: FAMILY MEDICINE | Facility: CLINIC | Age: 73
End: 2018-03-26
Payer: COMMERCIAL

## 2018-03-26 VITALS
TEMPERATURE: 97.6 F | OXYGEN SATURATION: 97 % | SYSTOLIC BLOOD PRESSURE: 108 MMHG | HEART RATE: 70 BPM | DIASTOLIC BLOOD PRESSURE: 70 MMHG

## 2018-03-26 DIAGNOSIS — G20.A1 PARKINSON'S DISEASE (H): Primary | ICD-10-CM

## 2018-03-26 DIAGNOSIS — R05.8 RECURRENT PRODUCTIVE COUGH: ICD-10-CM

## 2018-03-26 DIAGNOSIS — R91.8 PULMONARY NODULES: ICD-10-CM

## 2018-03-26 DIAGNOSIS — J43.8 OTHER EMPHYSEMA (H): ICD-10-CM

## 2018-03-26 DIAGNOSIS — H61.21 IMPACTED CERUMEN OF RIGHT EAR: ICD-10-CM

## 2018-03-26 PROCEDURE — 69209 REMOVE IMPACTED EAR WAX UNI: CPT | Performed by: FAMILY MEDICINE

## 2018-03-26 PROCEDURE — 99215 OFFICE O/P EST HI 40 MIN: CPT | Mod: 25 | Performed by: FAMILY MEDICINE

## 2018-03-26 NOTE — MR AVS SNAPSHOT
"              After Visit Summary   3/26/2018    Sanjay Cano    MRN: [id number]           Patient Information     Date Of Birth          1945        Visit Information        Provider Department      3/26/2018 1:00 PM Bharath Buchanan MD [facility]        Today's Diagnoses     Parkinson's disease (H)    -  1    Other emphysema (H)        Recurrent productive cough        Pulmonary nodules          Care Instructions    -You may reach radiology at [phone] to schedule a CT of Betzaida chest in 1 month.  -Please follow up with physical therapy 2-3 times a week to help build up Shazias strength.          Follow-ups after your visit        Future tests that were ordered for you today     Open Future Orders        Priority Expected Expires Ordered    CT Chest w/o Contrast Routine  3/26/2019 3/26/2018            Who to contact     If you have questions or need follow up information about today's clinic visit or your schedule please contact [facility] directly at [phone].  Normal or non-critical lab and imaging results will be communicated to you by e-contratoshart, letter or phone within 4 business days after the clinic has received the results. If you do not hear from us within 7 days, please contact the clinic through LEHRt or phone. If you have a critical or abnormal lab result, we will notify you by phone as soon as possible.  Submit refill requests through [employer] or call your pharmacy and they will forward the refill request to us. Please allow 3 business days for your refill to be completed.          Additional Information About Your Visit        e-contratoshart Information     [employer] lets you send messages to your doctor, view your test results, renew your prescriptions, schedule appointments and more. To sign up, go to [email or web address] . Click on \"Log in\" on the left side of the screen, which will take you to the Welcome page. Then click on \"Sign up Now\" on the right " side of the page.     You will be asked to enter the access code listed below, as well as some personal information. Please follow the directions to create your username and password.     Your access code is: [email or web address]  Expires: 2018  4:55 PM     Your access code will  in 90 days. If you need help or a new code, please call your [place] clinic or [phone].        Care EveryWhere ID     This is your Care EveryWhere ID. This could be used by other organizations to access your [place] medical records  [id number]        Your Vitals Were     Pulse Temperature Pulse Oximetry             70 97.6  F (36.4  C) (Oral) 97%          Blood Pressure from Last 3 Encounters:   18 108/70   18 102/64   18 157/90    Weight from Last 3 Encounters:   18 117 lb (53.1 kg)   17 188 lb (85.3 kg)   17 193 lb (87.5 kg)               Primary Care Provider Office Phone # Fax #    Bhraath Buchanan [phone]       [address] [facility] [address]  [facility] [address]        Equal Access to Services     JERARDO LONGORIA : Hadii aad ku hadasho Soomaali, waaxda luqadaha, qaybta kaalmada adeegyada, nataliya pricein haywilln sebastien stein . So [facility] [phone].    ATENCIÓN: Si habla español, tiene a herring disposición servicios gratuitos de asistencia lingüística. Llame al [phone].    We comply with applicable federal civil rights laws and [place] laws. We do not discriminate on the basis of race, color, national origin, age, disability, sex, sexual orientation, or gender identity.            Thank you!     Thank you for choosing [facility]  for your care. Our goal is always to provide you with excellent care. Hearing back from our patients is one way we can continue to improve our services. Please take a few minutes to complete the written survey that you may receive in the mail after your visit with us. Thank you!             Your Updated Medication List -  Protect others around you: Learn how to safely use, store and throw away your medicines at www.disposemymeds.org.          This list is accurate as of 3/26/18  1:34 PM.  Always use your most recent med list.                   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 Can take additional 0.5 tablet in the middle of the night if needed.        * carbidopa-levodopa  MG per CR tablet    SINEMET CR    1 tablet    Take 1 tablet by mouth At Bedtime        cholecalciferol 1000 UNIT tablet    vitamin D3    90 tablet    Take 1 tablet (1,000 Units) by mouth every morning    At risk for falling       clonazePAM 0.5 MG ODT tab    klonoPIN    180 tablet    Take 0.25 mg by mouth every other night        * COMBIVENT RESPIMAT  MCG/ACT inhaler   Generic drug:  Ipratropium-Albuterol     4 g    INHALE 1 PUFF BY MOUTH FOUR TIMES DAILY. NOT TO EXCEED 6 DOSES PER DAY    Other emphysema (H)       * COMBIVENT RESPIMAT  MCG/ACT inhaler   Generic drug:  Ipratropium-Albuterol     1 Inhaler    Inhale 1 puff into the lungs 4 times daily May take 1 to 2 additional doses as needed during the day    Other emphysema (H)       doxycycline 100 MG  capsule    VIBRAMYCIN    20 capsule    Take 1 capsule (100 mg) by mouth 2 times daily    Recurrent productive cough       FLOVENT  MCG/ACT Inhaler   Generic drug:  fluticasone     36 g    INHALE 2 PUFFS INTO THE LUNGS TWICE DAILY    Other emphysema (H)       lactobacillus rhamnosus (GG) capsule     60 capsule    Take 1 capsule by mouth every morning    Diarrhea, unspecified type       lisinopril 5 MG tablet    PRINIVIL/ZESTRIL    30 tablet    Take 1 tablet (5 mg) by mouth daily        methylcellulose 500 MG Tabs tablet    CITRUCEL    14 each    Take 1 tablet (500 mg) by mouth daily as needed        mirtazapine 15 MG tablet    REMERON    90 tablet    TAKE 2 TABLETs (30 MG) BY MOUTH AT BEDTIME    Depression, major, recurrent, moderate (H)       QUEtiapine 50 MG tablet    SEROquel    90 tablet    TAKE 1 TABLET(50 MG) BY MOUTH AT BEDTIME    Moderate recurrent major depression (H)       ranitidine 75 MG tablet    ZANTAC    30 tablet    Take 1 tablet (75 mg) by mouth At Bedtime        venlafaxine 75 MG 24 hr capsule    EFFEXOR-XR    180 capsule    TAKE 1 CAPSULE BY MOUTH EVERY MORNING AND 1 CAPSULE EVERY NIGHT AT BEDTIME    Moderate recurrent major depression (H)       * Notice:  This list has 6 medication(s) that are the same as other medications prescribed for you. Read the directions carefully, and ask your doctor or other care provider to review them with you.

## 2018-03-26 NOTE — PROGRESS NOTES
SUBJECTIVE:   Sanjay Cano is a 72 year old male who presents to clinic today for the following health issues:    Patient is accompanied by his wife, Caren Cano.    Acute Illness   Acute illness concerns: Cough  Onset: a couple of weeks     Fever: no     Chills/Sweats: not sure     Headache (location?): no    Sinus Pressure:no    Conjunctivitis:  no    Ear Pain: no    Rhinorrhea: YES- all the time     Congestion: YES    Sore Throat: no     Cough: YES, cough up white      Wheeze: YES- for a while with a high pitch squeal.      Decreased Appetite: no    Nausea: no     Vomiting: no     Diarrhea:  no     Dysuria/Freq.: no     Fatigue/Achiness: YES- feel tired a lot and sometimes can not catch breath     Sick/Strep Exposure: YES     Therapies Tried and outcome: Robitussin     Patient has been having problems with a cough and congestion for several weeks. His cough is productive with white sputum, and he has a high pitched wheeze with his breathing. He has been using robitussin DM to relief, but his wife is not sure whether the nursing home has been giving it to him routinely. No fever. His nursing staff has also told his wife that he has a plug of wax in his ears and would like to have it checked. Wife wonders if the blockage has affected his speech, as he speaks in a quiet, mumbled tone, and wife has been considering starting him on speech therapy.    Weakness:  Patient has been getting progressively weaker due to his ongoing problems with Parkinson's disease. His wife reports that he has not walked in 7 months, and he has not been able to stand up. His neurologist has been recommending physical therapy to help him build strength, but due to his weakness he has not been able to follow through. He has been taking donepezil 7.5 mg twice daily, and his wife is worried that his nursing home has not been giving him the correct dosage. Neurology has also suggested that due to his chronic illness he has developed  severe depression.    Problem list and histories reviewed & adjusted, as indicated.  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)     Primary insomnia     Diarrhea     Other emphysema (H)     Pulmonary nodules     Thyroid nodule     Family history of thyroid cancer     H/O recurrent urinary tract infection     Essential hypertension with goal blood pressure less than 140/90     Senile purpura (H)     Nocturnal cough     Syncope     Parkinson's disease (H)     Recurrent productive cough     Past Surgical History:   Procedure Laterality Date     EYE SURGERY       ORTHOPEDIC SURGERY       PHACOEMULSIFICATION CLEAR CORNEA WITH STANDARD INTRAOCULAR LENS IMPLANT  5/21/2014    Procedure: PHACOEMULSIFICATION CLEAR CORNEA WITH STANDARD INTRAOCULAR LENS IMPLANT;  Surgeon: Tomy Hunter MD;  Location: [facility]     PHACOEMULSIFICATION CLEAR CORNEA WITH TORIC INTRAOCULAR LENS IMPLANT  4/30/2014    Procedure: PHACOEMULSIFICATION CLEAR CORNEA WITH TORIC INTRAOCULAR LENS IMPLANT;  Surgeon: Tomy Hunter MD;  Location: [facility]       Social History   Substance Use Topics     Smoking status: Former Smoker     Packs/day: 0.01     Years: 40.00     Types: Cigarettes     Quit date: 7/31/2008     Smokeless tobacco: Never Used      Comment: very passive cigarettes in 6 months     Alcohol use No     Family History   Problem Relation Age of Onset     CEREBROVASCULAR DISEASE Mother      DIABETES Mother      GASTROINTESTINAL DISEASE Mother      Hypertension Mother      Neurologic Disorder Father      CEREBROVASCULAR DISEASE Father      CEREBROVASCULAR DISEASE Maternal Grandfather      CANCER Paternal  Grandmother      Other - See Comments Sister      gi - unknown         Current Outpatient Prescriptions   Medication Sig Dispense Refill     amLODIPine (NORVASC) 5 MG tablet Take 1 tablet (5 mg) by mouth At Bedtime 90 tablet 3     mirtazapine (REMERON) 15 MG tablet TAKE 2 TABLETs (30 MG) BY MOUTH AT BEDTIME 90 tablet 1     clonazePAM (KLONOPIN) 0.5 MG ODT tab Take 0.25 mg by mouth every other night 180 tablet 0     ranitidine (ZANTAC) 75 MG tablet Take 1 tablet (75 mg) by mouth At Bedtime 30 tablet      methylcellulose (CITRUCEL) 500 MG TABS tablet Take 1 tablet (500 mg) by mouth daily as needed 14 each 0     lactobacillus rhamnosus, GG, (CULTURELL) capsule Take 1 capsule by mouth every morning 60 capsule 11     Ipratropium-Albuterol (COMBIVENT RESPIMAT)  MCG/ACT inhaler Inhale 1 puff into the lungs 4 times daily May take 1 to 2 additional doses as needed during the day 1 Inhaler 4     lisinopril (PRINIVIL/ZESTRIL) 5 MG tablet Take 1 tablet (5 mg) by mouth daily 30 tablet 1     cholecalciferol (VITAMIN D) 1000 UNIT tablet Take 1 tablet (1,000 Units) by mouth every morning 90 tablet 1     QUEtiapine (SEROQUEL) 50 MG tablet TAKE 1 TABLET(50 MG) BY MOUTH AT BEDTIME 90 tablet 2     COMBIVENT RESPIMAT  MCG/ACT inhaler INHALE 1 PUFF BY MOUTH FOUR TIMES DAILY. NOT TO EXCEED 6 DOSES PER DAY 4 g 4     venlafaxine (EFFEXOR-XR) 75 MG 24 hr capsule TAKE 1 CAPSULE BY MOUTH EVERY MORNING AND 1 CAPSULE EVERY NIGHT AT BEDTIME 180 capsule 0     FLOVENT  MCG/ACT Inhaler INHALE 2 PUFFS INTO THE LUNGS TWICE DAILY 36 g 1     aspirin 81 MG tablet Take by mouth twice a week       donepezil (ARICEPT) 10 MG tablet Take 5mg  by mouth twice a day (take with the 2.5 mg dose for a total dose of 7.5mg twice daily)  3     carbidopa-levodopa (SINEMET CR)  MG per CR tablet Take 1 tablet by mouth At Bedtime 1 tablet 0     carbidopa-levodopa (SINEMET)  MG per tablet Take 1-2 tablets by mouth 5 times daily Takes 2  tablets at 6 AM, 1.5 tablets at 9 AM, 2 tablets at noon, 1.5 tablets at 3 PM, 1.5 tablets at 6 PM. Can take additional 0.5 tablet in the middle of the night if needed. 90 tablet      donepezil (ARICEPT) 5 MG tablet Take 2.5 mg twice a day (take with the 5 mg dose for a total dose of 7.5mg twice daily) 180 tablet 3     doxycycline (VIBRAMYCIN) 100 MG capsule Take 1 capsule (100 mg) by mouth 2 times daily (Patient not taking: Reported on 3/26/2018) 20 capsule 0     Allergies   Allergen Reactions     Seasonal Allergies      BP Readings from Last 3 Encounters:   03/26/18 108/70   01/31/18 102/64   01/27/18 157/90    Wt Readings from Last 3 Encounters:   01/26/18 53.1 kg (117 lb)   08/01/17 85.3 kg (188 lb)   06/07/17 87.5 kg (193 lb)        Reviewed and updated as needed this visit by clinical staff       Reviewed and updated as needed this visit by Provider         ROS:  Positive for weakness, cough, congestion, and ear wax    Denies headache, insomnia, chest pain, shortness of breath, heartburn, bowel issues, bladder issues, neck pain, back pain, hip pain, knee pain, ankle pain, or foot pain. Remainder of ROS is negative unless otherwise noted above or in HPI.    This document serves as a record of the services and decisions personally performed and made by Bharath Buchanan MD. It was created on his behalf by Domenic Lentz, a trained medical scribe. The creation of this document is based on the provider's statements to the medical scribe.  Domenic eLntz 1:12 PM March 26, 2018    OBJECTIVE:     /70  Pulse 70  Temp 97.6  F (36.4  C) (Oral)  SpO2 97%  There is no height or weight on file to calculate BMI.  GENERAL: weak and diminished, slumped over in wheelchair  HENT: cerumen in right ear canal, left ear canal and TM's normal  RESP: lungs clear to auscultation - no rales, rhonchi or wheezes  CV: regular rate and rhythm, normal S1 S2, no S3 or S4, no murmur, click or rub, no peripheral edema and  peripheral pulses strong  NEURO: weak, slumped over in wheelchair, mumbled speech  PSYCH: affect flat    Diagnostic Test Results:  No results found for this or any previous visit (from the past 24 hour(s)).    ASSESSMENT/PLAN:     (G20) Parkinson's disease (H)  (primary encounter diagnosis)  Comment: Unchanged since last visit. Patient has been seeing neurology, who has been encouraging him to do physical therapy exercises.  Plan: Start physical therapy. Follow up as needed.    (J43.8) Other emphysema (H)  Comment: CT of chest ordered today.  Plan: Follow through with CT of chest.    (R05) Recurrent productive cough  Comment: CT of chest ordered today.  Plan: Follow through with CT of chest.    (R91.8) Pulmonary nodules  Comment: CT of chest ordered today.  Plan: CT Chest w/o Contrast        Follow through with CT of chest.    (H61.21) Impacted cerumen of right ear  Comment: Ear wash completed today.  Plan: REMOVE IMPACTED CERUMEN        Follow through with ear wash.    Patient Instructions   -You may reach radiology at [phone] to schedule a CT of Sanjay's chest in 1 month.  -Please follow up with physical therapy 2-3 times a week to help build up Sanjay's strength.     45 minutes were spent with the patient with more than 50% of time spent in counseling and coordination of care  The information in this document, created by the medical scribe for me, accurately reflects the services I personally performed and the decisions made by me. I have reviewed and approved this document for accuracy prior to leaving the patient care area.   Bharath Buchanan MD 1:12 PM March 26, 2018  [facility]

## 2018-03-26 NOTE — PATIENT INSTRUCTIONS
-You may reach radiology at [phone] to schedule a CT of Sanjay's chest in 1 month.  -Please follow up with physical therapy 2-3 times a week to help build up Sanjay's strength.

## 2018-03-26 NOTE — TELEPHONE ENCOUNTER
Spoke with pt. Wife    See will look consider checking out the new provider that will be starting at the home, but then will ask Dr. Jackson opinion on the provider    Also gave her the numbers for [email or web address], [employer], [phone] and  so she could consider the support they would offer  Spoke with her about the care coordinator option with clinic  She will discuss this with Dr. Buchanan at the pts appointment today    Joana Gandhi RN   [facility]

## 2018-03-26 NOTE — NURSING NOTE
"Chief Complaint   Patient presents with     Cough       Initial /70  Pulse 70  Temp 97.6  F (36.4  C) (Oral)  SpO2 97% Estimated body mass index is 15.02 kg/(m^2) as calculated from the following:    Height as of 1/26/18: 6' 2\" (1.88 m).    Weight as of 1/26/18: 117 lb (53.1 kg).  Medication Reconciliation: complete     Reuben Sanabria MA      "

## 2018-03-27 ASSESSMENT — PATIENT HEALTH QUESTIONNAIRE - PHQ9: SUM OF ALL RESPONSES TO PHQ QUESTIONS 1-9: 4

## 2018-03-30 ENCOUNTER — NURSE TRIAGE (OUTPATIENT)
Dept: NURSING | Facility: CLINIC | Age: 73
End: 2018-03-30

## 2018-03-30 NOTE — TELEPHONE ENCOUNTER
"  Reason for Disposition    [1] MILD difficulty breathing (e.g., minimal/no SOB at rest, SOB with walking, pulse <100) AND [2] NEW-onset or WORSE than normal     Wife calling\" My  is a nursing home, was seen on Monday 26th, see epic. But today in the nursing home we were at [employer], he had shortness of breath. Then he was eating and it happened again. He was speaking in short phrases and trying to catch his breath. The NP was here, she listened to his heart and said it sounds\"fine\". I am just worried. He is sometimes confused, he has Parkinson's and I am leaving at 6:30pm. The [place] MD doesn't see him, because his primary is Dr. Buchanan.\"  Denies other sx. Triaged and went through sx with patient's via wife. He is saying\" I feel a little better now.\" I advised if this continues to go to ER. Also advised to speak with nurses at the nursing home, express her concerns and call back (FNA) if needed or sx worsen.    Additional Information    Negative: [1] Breathing stopped AND [2] hasn't returned    Negative: Choking on something    Negative: Severe difficulty breathing (e.g., struggling for each breath, speaks in single words)    Negative: Bluish lips, tongue, or face now    Negative: Difficult to awaken or acting confused  (e.g., disoriented, slurred speech)    Negative: Passed out (i.e., lost consciousness, collapsed and was not responding)    Negative: Wheezing started suddenly after medicine, an allergic food or bee sting    Negative: Stridor    Negative: Slow, shallow and weak breathing    Negative: Sounds like a life-threatening emergency to the triager    Negative: Chest pain    Negative: [1] Wheezing (high pitched whistling sound) AND [2] previous asthma attacks or use of asthma medicines    Negative: [1] Difficulty breathing AND [2] only present when coughing    Negative: [1] Difficulty breathing AND [2] only from stuffy or runny nose    Negative: [1] MODERATE difficulty breathing (e.g., speaks in phrases, " "SOB even at rest, pulse 100-120) AND [2] NEW-onset or WORSE than normal    Negative: Wheezing can be heard across the room    Negative: Drooling or spitting out saliva (because can't swallow)    Negative: History of prior \"blood clot\" in leg or lungs (i.e., deep vein thrombosis, pulmonary embolism)    Negative: History of inherited increased risk of blood clots (e.g., Factor 5 Leiden, Anti-thrombin 3, Protein C or Protein S deficiency, Prothrombin mutation)    Negative: Recent illness requiring prolonged bedrest (i.e., immobilization)    Negative: Hip or leg fracture in past 2 months (e.g., had cast on leg or ankle)    Negative: Major surgery in the past month    Negative: Recent long-distance travel with prolonged time in car, bus, plane, or train (i.e., within past 2 weeks; 6 or  more hours duration)    Negative: Extra heart beats OR irregular heart beating   (i.e., \"palpitations\")    Negative: Fever > 103 F (39.4 C)    Negative: [1] Fever > 101 F (38.3 C) AND [2] age > 60    Negative: [1] Fever > 101 F (38.3 C) AND [2] bedridden (e.g., nursing home patient, CVA, chronic illness, recovering from surgery)    Negative: [1] Fever > 100.5 F (38.1 C) AND [2] diabetes mellitus or weak immune system (e.g., HIV positive, cancer chemo, splenectomy, organ transplant, chronic steroids)    Negative: [1] Periods where breathing stops and then resumes normally AND [2] bedridden (e.g., nursing home patient, CVA)    Negative: Pregnant or postpartum (< 1 month since delivery)    Negative: Patient sounds very sick or weak to the triager    Protocols used: BREATHING DIFFICULTY-ADULT-AH    "

## 2018-04-05 ENCOUNTER — COMMUNICATION - HEALTHEAST (OUTPATIENT)
Dept: NEUROLOGY | Facility: CLINIC | Age: 73
End: 2018-04-05

## 2018-04-13 ENCOUNTER — HOSPITAL ENCOUNTER (OUTPATIENT)
Dept: NEUROLOGY | Facility: CLINIC | Age: 73
Setting detail: THERAPIES SERIES
Discharge: STILL ADMITTED OR ADMINISTRATIVE | End: 2018-04-13
Attending: SOCIAL WORKER

## 2018-04-13 ENCOUNTER — TRANSFERRED RECORDS (OUTPATIENT)
Dept: HEALTH INFORMATION MANAGEMENT | Facility: CLINIC | Age: 73
End: 2018-04-13

## 2018-04-13 ENCOUNTER — HOSPITAL ENCOUNTER (OUTPATIENT)
Dept: NEUROLOGY | Facility: CLINIC | Age: 73
Setting detail: THERAPIES SERIES
Discharge: STILL ADMITTED OR ADMINISTRATIVE | End: 2018-04-13
Attending: PSYCHIATRY & NEUROLOGY

## 2018-04-13 DIAGNOSIS — G20.A1 PARKINSON'S DISEASE (H): ICD-10-CM

## 2018-04-13 DIAGNOSIS — G20.A1 PARKINSON DISEASE (H): ICD-10-CM

## 2018-04-16 ENCOUNTER — AMBULATORY - HEALTHEAST (OUTPATIENT)
Dept: NEUROLOGY | Facility: CLINIC | Age: 73
End: 2018-04-16

## 2018-04-30 ENCOUNTER — TRANSFERRED RECORDS (OUTPATIENT)
Dept: HEALTH INFORMATION MANAGEMENT | Facility: CLINIC | Age: 73
End: 2018-04-30

## 2018-04-30 ENCOUNTER — AMBULATORY - HEALTHEAST (OUTPATIENT)
Dept: NEUROLOGY | Facility: CLINIC | Age: 73
End: 2018-04-30

## 2018-05-09 ENCOUNTER — RADIANT APPOINTMENT (OUTPATIENT)
Dept: CT IMAGING | Facility: CLINIC | Age: 73
End: 2018-05-09
Attending: FAMILY MEDICINE
Payer: COMMERCIAL

## 2018-05-09 DIAGNOSIS — R91.8 PULMONARY NODULES: ICD-10-CM

## 2018-05-09 LAB — RADIOLOGIST FLAGS: NORMAL

## 2018-05-14 ENCOUNTER — TELEPHONE (OUTPATIENT)
Dept: FAMILY MEDICINE | Facility: CLINIC | Age: 73
End: 2018-05-14

## 2018-05-14 DIAGNOSIS — R91.8 LUNG INFILTRATE ON CT: Primary | ICD-10-CM

## 2018-05-14 DIAGNOSIS — R91.8 PULMONARY NODULES: ICD-10-CM

## 2018-05-14 PROBLEM — R55 SYNCOPE: Status: RESOLVED | Noted: 2018-01-26 | Resolved: 2018-05-14

## 2018-05-14 RX ORDER — DOXYCYCLINE 100 MG/1
100 CAPSULE ORAL EVERY 12 HOURS
Qty: 20 CAPSULE | Refills: 0 | Status: SHIPPED | OUTPATIENT
Start: 2018-05-14 | End: 2018-05-14

## 2018-05-14 RX ORDER — DOXYCYCLINE 100 MG/1
100 CAPSULE ORAL EVERY 12 HOURS
Qty: 20 CAPSULE | Refills: 0 | Status: ON HOLD | OUTPATIENT
Start: 2018-05-14 | End: 2018-08-21

## 2018-05-14 NOTE — TELEPHONE ENCOUNTER
Call from spouse, [facility] needs a written order to dispense the medication to the pt    Letter was faxed to facility     Med is to be give at 8a and 8p    Joana Gandhi RN   [facility]

## 2018-05-14 NOTE — LETTER
[place] [id number] [place] [id number]  [phone]          May 14, 2018    Sanjay Cano                                                                                                                     [address] [facility] [address]            To whom it may concern      This is a medication order for Sanjay  doxycycline (VIBRAMYCIN) 100 MG capsule 20 capsule 0 5/14/2018  No      Sig: Take 1 capsule (100 mg) by mouth every 12 hours Give at 8 am and 8 pm   Please administer this medication as directed to pt Sanjay Cano      Sincerely,             Bharath Buchanan MD

## 2018-05-14 NOTE — TELEPHONE ENCOUNTER
Dr. Chanel Fabian called back, she stated you had called her and left a message    She should be home the rest of the day if you wanted to call her back when you can    Joana Gandhi RN   [facility]

## 2018-05-15 ENCOUNTER — TELEPHONE (OUTPATIENT)
Dept: FAMILY MEDICINE | Facility: CLINIC | Age: 73
End: 2018-05-15

## 2018-05-15 NOTE — TELEPHONE ENCOUNTER
Called nurse, he requested to know when CT of chest was done, informed him it was completed on 5/9/18    Sandra Guadalupe, BSN RN  [facility]

## 2018-05-15 NOTE — TELEPHONE ENCOUNTER
Reason for call:  Other   Patient called regarding (reason for call): call back  Additional comments: Manoj from [facility] called regarding an antibiotic that Dr. Buchanan prescribed for a patient. He needs the date that the patient was diagnosed. He would like a call back to discuss this.    Phone number to reach patient:  Other phone number: [phone]    Best Time:  Any    Can we leave a detailed message on this number?  YES

## 2018-05-17 ENCOUNTER — HISTORICAL (OUTPATIENT)
Dept: ADMINISTRATIVE | Facility: HOSPITAL | Age: 73
End: 2018-05-17

## 2018-05-17 LAB
ABS NEUT (OLG): 4.4 X10(3)/MCL (ref 2.1–9.2)
ALBUMIN SERPL-MCNC: 3.1 GM/DL (ref 3.4–5)
ALBUMIN/GLOB SERPL: 0.9 RATIO (ref 1.1–2)
ALP SERPL-CCNC: 204 UNIT/L (ref 50–136)
ALT SERPL-CCNC: 25 UNIT/L (ref 12–78)
APTT PPP: 32.9 SECOND(S) (ref 24.8–36.9)
AST SERPL-CCNC: 22 UNIT/L (ref 15–37)
BASOPHILS # BLD AUTO: 0.2 X10(3)/MCL (ref 0–0.2)
BASOPHILS NFR BLD AUTO: 2 %
BILIRUB SERPL-MCNC: 0.4 MG/DL (ref 0.2–1)
BILIRUBIN DIRECT+TOT PNL SERPL-MCNC: 0.1 MG/DL (ref 0–0.5)
BILIRUBIN DIRECT+TOT PNL SERPL-MCNC: 0.3 MG/DL (ref 0–0.8)
BUN SERPL-MCNC: 9 MG/DL (ref 7–18)
CALCIUM SERPL-MCNC: 8.8 MG/DL (ref 8.5–10.1)
CHLORIDE SERPL-SCNC: 105 MMOL/L (ref 98–107)
CO2 SERPL-SCNC: 23 MMOL/L (ref 21–32)
CREAT SERPL-MCNC: 0.83 MG/DL (ref 0.7–1.3)
EOSINOPHIL # BLD AUTO: 0.2 X10(3)/MCL (ref 0–0.9)
EOSINOPHIL NFR BLD AUTO: 2 %
ERYTHROCYTE [DISTWIDTH] IN BLOOD BY AUTOMATED COUNT: 13.9 % (ref 11.5–17)
GLOBULIN SER-MCNC: 3.4 GM/DL (ref 2.4–3.5)
GLUCOSE SERPL-MCNC: 257 MG/DL (ref 74–106)
HCT VFR BLD AUTO: 42.3 % (ref 42–52)
HGB BLD-MCNC: 13.9 GM/DL (ref 14–18)
INR PPP: 1.01 (ref 0–1.27)
LYMPHOCYTES # BLD AUTO: 2.2 X10(3)/MCL (ref 0.6–4.6)
LYMPHOCYTES NFR BLD AUTO: 28 %
MCH RBC QN AUTO: 26.3 PG (ref 27–31)
MCHC RBC AUTO-ENTMCNC: 32.9 GM/DL (ref 33–36)
MCV RBC AUTO: 80.1 FL (ref 80–94)
MONOCYTES # BLD AUTO: 0.7 X10(3)/MCL (ref 0.1–1.3)
MONOCYTES NFR BLD AUTO: 10 %
NEUTROPHILS # BLD AUTO: 4.4 X10(3)/MCL (ref 2.1–9.2)
NEUTROPHILS NFR BLD AUTO: 57 %
PLATELET # BLD AUTO: 256 X10(3)/MCL (ref 130–400)
PMV BLD AUTO: 10.7 FL (ref 9.4–12.4)
POTASSIUM SERPL-SCNC: 3.5 MMOL/L (ref 3.5–5.1)
PROT SERPL-MCNC: 6.5 GM/DL (ref 6.4–8.2)
PROTHROMBIN TIME: 13.6 SECOND(S) (ref 12.2–14.7)
RBC # BLD AUTO: 5.28 X10(6)/MCL (ref 4.7–6.1)
SODIUM SERPL-SCNC: 137 MMOL/L (ref 136–145)
WBC # SPEC AUTO: 7.7 X10(3)/MCL (ref 4.5–11.5)

## 2018-05-23 ENCOUNTER — TRANSFERRED RECORDS (OUTPATIENT)
Dept: HEALTH INFORMATION MANAGEMENT | Facility: CLINIC | Age: 73
End: 2018-05-23

## 2018-05-23 ENCOUNTER — TELEPHONE (OUTPATIENT)
Dept: FAMILY MEDICINE | Facility: CLINIC | Age: 73
End: 2018-05-23

## 2018-05-23 NOTE — TELEPHONE ENCOUNTER
Dr. Buchanan    See pt message    Advise    Joana Gandhi, RN   [facility]

## 2018-05-23 NOTE — TELEPHONE ENCOUNTER
Reason for call:  Other   Patient called regarding (reason for call): call back  Additional comments: The patients wife called and stated that he had recently had a CT scan of his lungs. She had thought Dr. Buchanan had told her that he should come back to be seen after he had the scan but could not remember the time frame that he was supposed to come back in. She would like a call back to discuss when he should come back to see Dr. Buchanan.    Phone number to reach patient:  Home number on file [phone] (home)    Best Time: Before 1:00 pm    Can we leave a detailed message on this number?  YES

## 2018-05-23 NOTE — TELEPHONE ENCOUNTER
Spoke with pt wife detailed message given    Joana Gandhi RN   [facility]

## 2018-05-29 ENCOUNTER — AMBULATORY - HEALTHEAST (OUTPATIENT)
Dept: NEUROLOGY | Facility: CLINIC | Age: 73
End: 2018-05-29

## 2018-05-29 ENCOUNTER — TRANSFERRED RECORDS (OUTPATIENT)
Dept: HEALTH INFORMATION MANAGEMENT | Facility: CLINIC | Age: 73
End: 2018-05-29

## 2018-06-05 ENCOUNTER — COMMUNICATION - HEALTHEAST (OUTPATIENT)
Dept: NEUROLOGY | Facility: CLINIC | Age: 73
End: 2018-06-05

## 2018-06-05 DIAGNOSIS — F32.4 MAJOR DEPRESSIVE DISORDER WITH SINGLE EPISODE, IN PARTIAL REMISSION (H): ICD-10-CM

## 2018-06-05 NOTE — PROGRESS NOTES
SUBJECTIVE:   Sanjay Cano is a 72 year old male who presents to clinic today for the following health issues:      Thickened toenails - His toe nail on his left foot is very thick and his wife is unable to cut them. One was cracked and grew back thicker. His wife just noticed discoloration of his right big toenail recently but does not know the cause.     Fingernail - 2 weeks ago on right finger he got a cut on pinky finger by his cuticle. They sprayed it with an antibiotic and have had it bandaged this whole time. It was bleeding through and they were changing dressing every day. Wife is wondering if it is healing correctly. She thinks it does not look like it has been changed. They told him not to worry because he does not have red lines radiating up his arm.     Physical deconditioning - While getting weighed today, wife states they never have him  the nursing home. His wife says he is not meeting physical activity goals and unable to do PT. The nursing home has him use the hand rail and step bike once in a while. He needs three therapists to help him walk down the dave. Uses a walker that is as tall as him that does not need 3 people to help him. He has a lift that takes him from the bed to bathroom.     Fatigue - Patient is fatigued. Wife wonders if this is due to increase of mirtazapine from 22.5mg to 30mg in January. He is not able to eat on his own anymore but he has been gaining weight as his wife monitors his eating.    Additional notes:  He still has a cough  Wife would like blood test to rule out diabetes      Problem list and histories reviewed & adjusted, as indicated.  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)     Primary insomnia     Diarrhea     Other emphysema (H)     Pulmonary nodules     Thyroid nodule     Family history of thyroid cancer     H/O recurrent urinary tract infection     Essential hypertension with goal blood pressure less than 140/90     Senile purpura (H)     Nocturnal cough     Parkinson's disease (H)     Recurrent productive cough     Past Surgical History:   Procedure Laterality Date     EYE SURGERY       ORTHOPEDIC SURGERY       PHACOEMULSIFICATION CLEAR CORNEA WITH STANDARD INTRAOCULAR LENS IMPLANT  5/21/2014    Procedure: PHACOEMULSIFICATION CLEAR CORNEA WITH STANDARD INTRAOCULAR LENS IMPLANT;  Surgeon: Tomy Hunter MD;  Location: [facility]     PHACOEMULSIFICATION CLEAR CORNEA WITH TORIC INTRAOCULAR LENS IMPLANT  4/30/2014    Procedure: PHACOEMULSIFICATION CLEAR CORNEA WITH TORIC INTRAOCULAR LENS IMPLANT;  Surgeon: Tomy Hunter MD;  Location: [facility]       Social History   Substance Use Topics     Smoking status: Former Smoker     Packs/day: 0.01     Years: 40.00     Types: Cigarettes     Quit date: 7/31/2008     Smokeless tobacco: Never Used      Comment: very passive cigarettes in 6 months     Alcohol use No     Family History   Problem Relation Age of Onset     CEREBROVASCULAR DISEASE Mother      DIABETES Mother      GASTROINTESTINAL DISEASE Mother      Hypertension Mother      Neurologic Disorder Father      CEREBROVASCULAR DISEASE Father      CEREBROVASCULAR DISEASE Maternal Grandfather      CANCER Paternal Grandmother      Other - See Comments Sister      gi - unknown         Current Outpatient Prescriptions   Medication Sig Dispense Refill     amLODIPine (NORVASC) 5 MG tablet Take 1 tablet (5 mg) by mouth At Bedtime 90 tablet 3     aspirin 81 MG tablet Take by mouth twice a week       carbidopa-levodopa (SINEMET CR)  MG per CR tablet Take 1 tablet by mouth At Bedtime 1 tablet 0     carbidopa-levodopa (SINEMET)   MG per tablet Take 1-2 tablets by mouth 5 times daily Takes 2 tablets at 6 AM, 1.5 tablets at 9 AM, 2 tablets at noon, 1.5 tablets at 3 PM, 1.5 tablets at 6 PM. Can take additional 0.5 tablet in the middle of the night if needed. 90 tablet      cholecalciferol (VITAMIN D) 1000 UNIT tablet Take 1 tablet (1,000 Units) by mouth every morning 90 tablet 1     clonazePAM (KLONOPIN) 0.5 MG ODT tab Take 0.25 mg by mouth every other night 180 tablet 0     COMBIVENT RESPIMAT  MCG/ACT inhaler INHALE 1 PUFF BY MOUTH FOUR TIMES DAILY. NOT TO EXCEED 6 DOSES PER DAY 4 g 4     donepezil (ARICEPT) 10 MG tablet Take 5mg  by mouth twice a day (take with the 2.5 mg dose for a total dose of 7.5mg twice daily)  3     donepezil (ARICEPT) 5 MG tablet Take 2.5 mg twice a day (take with the 5 mg dose for a total dose of 7.5mg twice daily) 180 tablet 3     doxycycline (VIBRAMYCIN) 100 MG capsule Take 1 capsule (100 mg) by mouth every 12 hours Give at 8 am and 8 pm 20 capsule 0     FLOVENT  MCG/ACT Inhaler INHALE 2 PUFFS INTO THE LUNGS TWICE DAILY 36 g 1     Ipratropium-Albuterol (COMBIVENT RESPIMAT)  MCG/ACT inhaler Inhale 1 puff into the lungs 4 times daily May take 1 to 2 additional doses as needed during the day 1 Inhaler 4     lactobacillus rhamnosus, GG, (CULTURELL) capsule Take 1 capsule by mouth every morning 60 capsule 11     lisinopril (PRINIVIL/ZESTRIL) 5 MG tablet Take 1 tablet (5 mg) by mouth daily 30 tablet 1     methylcellulose (CITRUCEL) 500 MG TABS tablet Take 1 tablet (500 mg) by mouth daily as needed 14 each 0     mirtazapine (REMERON) 15 MG tablet TAKE 2 TABLETs (30 MG) BY MOUTH AT BEDTIME 90 tablet 1     QUEtiapine (SEROQUEL) 50 MG tablet TAKE 1 TABLET(50 MG) BY MOUTH AT BEDTIME 90 tablet 2     ranitidine (ZANTAC) 75 MG tablet Take 1 tablet (75 mg) by mouth At Bedtime 30 tablet      venlafaxine (EFFEXOR-XR) 75 MG 24 hr capsule TAKE 1 CAPSULE BY MOUTH EVERY MORNING AND 1 CAPSULE EVERY NIGHT AT  BEDTIME 180 capsule 0     Allergies   Allergen Reactions     Seasonal Allergies      BP Readings from Last 3 Encounters:   06/06/18 100/64   03/26/18 108/70   01/31/18 102/64    Wt Readings from Last 3 Encounters:   06/06/18 78.3 kg (172 lb 11.2 oz)   01/26/18 53.1 kg (117 lb)   08/01/17 85.3 kg (188 lb)            Labs reviewed in EPIC    Reviewed and updated as needed this visit by clinical staff  Tobacco  Allergies  Meds       Reviewed and updated as needed this visit by Provider         ROS:  Positive for thickened toenails, fatigue, weakness, and finger laceration.     Denies headache, insomnia, chest pain, shortness of breath, cough, heartburn, bowel issues, bladder issues, neck pain, back pain, hip pain, knee pain, ankle pain, or foot pain. Remainder of ROS is negative unless otherwise noted above or in HPI.    This document serves as a record of the services and decisions personally performed and made by Bharath Buchanan MD. It was created on his/her behalf by Vishnu Brody, trained medical scribe. The creation of this document is based the provider's statements to the medical scribes.    Bryan Brody 2:54 PM, June 6, 2018  OBJECTIVE:   /64  Pulse 66  Temp 97.4  F (36.3  C) (Oral)  Wt 78.3 kg (172 lb 11.2 oz)  SpO2 97%  BMI 22.17 kg/m2  Body mass index is 22.17 kg/(m^2).  GENERAL: healthy, alert and no distress  SKIN: Dressing on right 5th finger from PIP joint to tip. Cuticle is pushed back and there is possible crush to the nail bed but without signs of infection. Hypertrophic big toenails bilaterally  NEURO: Normal strength and tone, mentation intact and speech normal  PSYCH: mentation appears normal, affect normal/bright      ASSESSMENT/PLAN:   (S69.91XA) Injury of nail bed of finger, right, initial encounter  (primary encounter diagnosis)  Comment: Doubt infection.   Plan: Good wound care, note sent to nursing home    (Q84.5) Enlarged and hypertrophic nails  Comment: Unable to cut in clinic  with equipment here  Plan: Foot care at nursing home    (R91.8) Pulmonary nodules  Comment: Stable last month  Plan: Recheck for growth in 12 months     (F33.1) Depression, major, recurrent, moderate (H)  Comment: Stable but sleepy  Plan: mirtazapine (REMERON) 15 MG tablet        Reduce dose, follow up in 3 months      Patient Instructions   - Check if there is a toenail service that is offered through his residence.    - Reduce mirtazapine to 22.5mg  - Follow up in 11 months for recheck of pulmonary nodules      The information in this document, created by the medical scribe, Vishnu Brody, for me, accurately reflects the services I personally performed and the decisions made by me. I have reviewed and approved this document for accuracy prior to leaving the patient care area.    Bharath Buchanan MD  [facility]

## 2018-06-06 ENCOUNTER — OFFICE VISIT (OUTPATIENT)
Dept: FAMILY MEDICINE | Facility: CLINIC | Age: 73
End: 2018-06-06
Payer: COMMERCIAL

## 2018-06-06 VITALS
DIASTOLIC BLOOD PRESSURE: 64 MMHG | HEART RATE: 66 BPM | SYSTOLIC BLOOD PRESSURE: 100 MMHG | TEMPERATURE: 97.4 F | BODY MASS INDEX: 22.17 KG/M2 | WEIGHT: 172.7 LBS | OXYGEN SATURATION: 97 %

## 2018-06-06 DIAGNOSIS — S69.91XA INJURY OF NAIL BED OF FINGER, RIGHT, INITIAL ENCOUNTER: Primary | ICD-10-CM

## 2018-06-06 DIAGNOSIS — F33.1 DEPRESSION, MAJOR, RECURRENT, MODERATE (H): ICD-10-CM

## 2018-06-06 DIAGNOSIS — R91.8 PULMONARY NODULES: ICD-10-CM

## 2018-06-06 DIAGNOSIS — Q84.5 ENLARGED AND HYPERTROPHIC NAILS: ICD-10-CM

## 2018-06-06 PROCEDURE — 99214 OFFICE O/P EST MOD 30 MIN: CPT | Performed by: FAMILY MEDICINE

## 2018-06-06 RX ORDER — MIRTAZAPINE 15 MG/1
TABLET, FILM COATED ORAL
Qty: 135 TABLET | Refills: 1 | Status: ON HOLD | OUTPATIENT
Start: 2018-06-06 | End: 2018-08-21

## 2018-06-06 NOTE — PATIENT INSTRUCTIONS
- Check if there is a toenail service that is offered through his residence.    - Reduce mirtazapine to 22.5mg  - Follow up in 11 months for recheck of pulmonary nodules

## 2018-06-06 NOTE — MR AVS SNAPSHOT
"              After Visit Summary   6/6/2018    Sanjay Cano    MRN: [id number]           Patient Information     Date Of Birth          1945        Visit Information        Provider Department      6/6/2018 2:30 PM Bharath Buchanan MD [facility]        Today's Diagnoses     Injury of nail bed of finger, right, initial encounter    -  1    Enlarged and hypertrophic nails        Pulmonary nodules        Depression, major, recurrent, moderate (H)          Care Instructions    - Check if there is a toenail service that is offered through his residence.    - Reduce mirtazapine to 22.5mg  - Follow up in 11 months for recheck of pulmonary nodules          Follow-ups after your visit        Who to contact     If you have questions or need follow up information about today's clinic visit or your schedule please contact [facility] directly at [phone].  Normal or non-critical lab and imaging results will be communicated to you by yoonehart, letter or phone within 4 business days after the clinic has received the results. If you do not hear from us within 7 days, please contact the clinic through yoonehart or phone. If you have a critical or abnormal lab result, we will notify you by phone as soon as possible.  Submit refill requests through [employer] or call your pharmacy and they will forward the refill request to us. Please allow 3 business days for your refill to be completed.          Additional Information About Your Visit        MyChart Information     [employer] lets you send messages to your doctor, view your test results, renew your prescriptions, schedule appointments and more. To sign up, go to [email or web address] . Click on \"Log in\" on the left side of the screen, which will take you to the Welcome page. Then click on \"Sign up Now\" on the right side of the page.     You will be asked to enter the access code listed below, as well as some personal information. Please " follow the directions to create your username and password.     Your access code is: [email or web address]  Expires: 2018  3:30 PM     Your access code will  in 90 days. If you need help or a new code, please call your [facility] clinic or [phone].        Care EveryWhere ID     This is your Care EveryWhere ID. This could be used by other organizations to access your [facility] medical records  [id number]        Your Vitals Were     Pulse Temperature Pulse Oximetry BMI (Body Mass Index)          66 97.4  F (36.3  C) (Oral) 97% 22.17 kg/m2         Blood Pressure from Last 3 Encounters:   18 100/64   18 108/70   18 102/64    Weight from Last 3 Encounters:   18 172 lb 11.2 oz (78.3 kg)   18 117 lb (53.1 kg)   17 188 lb (85.3 kg)              We Performed the Following     COPD ACTION PLAN          Today's Medication Changes          These changes are accurate as of 18  3:30 PM.  If you have any questions, ask your nurse or doctor.               These medicines have changed or have updated prescriptions.        Dose/Directions    mirtazapine 15 MG tablet   Commonly known as:  REMERON   This may have changed:  additional instructions   Used for:  Depression, major, recurrent, moderate (H)   Changed by:  Bharath Buchanan MD        TAKE 1.5 TABLETs (30 MG) BY MOUTH AT BEDTIME   Quantity:  135 tablet   Refills:  1            Where to get your medicines      These medications were sent to [address], [facility] [address]     Phone:  [phone]     mirtazapine 15 MG tablet                Primary Care Provider Office Phone # Fax #    Bharath Buchanan [phone]       [address] [address]        Equal Access to Services     JERARDO LONGORIA AH: Dariusz Bae, jennifer pino, nataliya alexander  sebastien schillingrikrishi beltran'aamatthew ah. So [facility] [phone].    ATENCIÓN: Si michael rodriguez, tiene a herring disposición servicios gratuitos de asistencia lingüística. Yan[place] umana [phone].    We comply with applicable federal civil rights laws and [place] laws. We do not discriminate on the basis of race, color, national origin, age, disability, sex, sexual orientation, or gender identity.            Thank you!     Thank you for choosing [facility]  for your care. Our goal is always to provide you with excellent care. Hearing back from our patients is one way we can continue to improve our services. Please take a few minutes to complete the written survey that you may receive in the mail after your visit with us. Thank you!             Your Updated Medication List - Protect others around you: Learn how to safely use, store and throw away your medicines at www.disposemymeds.org.          This list is accurate as of 6/6/18  3:30 PM.  Always use your most recent med list.                   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 Can take additional 0.5 tablet in the middle of the night if needed.        * carbidopa-levodopa  MG  per CR tablet    SINEMET CR    1 tablet    Take 1 tablet by mouth At Bedtime        cholecalciferol 1000 UNIT tablet    vitamin D3    90 tablet    Take 1 tablet (1,000 Units) by mouth every morning    At risk for falling       clonazePAM 0.5 MG ODT tab    klonoPIN    180 tablet    Take 0.25 mg by mouth every other night        * COMBIVENT RESPIMAT  MCG/ACT inhaler   Generic drug:  Ipratropium-Albuterol     4 g    INHALE 1 PUFF BY MOUTH FOUR TIMES DAILY. NOT TO EXCEED 6 DOSES PER DAY    Other emphysema (H)       * COMBIVENT RESPIMAT  MCG/ACT inhaler   Generic drug:  Ipratropium-Albuterol     1 Inhaler    Inhale 1 puff into the lungs 4 times daily May take 1 to 2 additional doses as needed during the day    Other emphysema (H)       doxycycline 100 MG capsule    VIBRAMYCIN    20 capsule    Take 1 capsule (100 mg) by mouth every 12 hours Give at 8 am and 8 pm    Lung infiltrate on CT       FLOVENT  MCG/ACT Inhaler   Generic drug:  fluticasone     36 g    INHALE 2 PUFFS INTO THE LUNGS TWICE DAILY    Other emphysema (H)       lactobacillus rhamnosus (GG) capsule     60 capsule    Take 1 capsule by mouth every morning    Diarrhea, unspecified type       lisinopril 5 MG tablet    PRINIVIL/ZESTRIL    30 tablet    Take 1 tablet (5 mg) by mouth daily        methylcellulose 500 MG Tabs tablet    CITRUCEL    14 each    Take 1 tablet (500 mg) by mouth daily as needed        mirtazapine 15 MG tablet    REMERON    135 tablet    TAKE 1.5 TABLETs (30 MG) BY MOUTH AT BEDTIME    Depression, major, recurrent, moderate (H)       QUEtiapine 50 MG tablet    SEROquel    90 tablet    TAKE 1 TABLET(50 MG) BY MOUTH AT BEDTIME    Moderate recurrent major depression (H)       ranitidine 75 MG tablet    ZANTAC    30 tablet    Take 1 tablet (75 mg) by mouth At Bedtime        venlafaxine 75 MG 24 hr capsule    EFFEXOR-XR    180 capsule    TAKE 1 CAPSULE BY MOUTH EVERY MORNING AND 1 CAPSULE EVERY NIGHT AT BEDTIME    Moderate  recurrent major depression (H)       * Notice:  This list has 6 medication(s) that are the same as other medications prescribed for you. Read the directions carefully, and ask your doctor or other care provider to review them with you.

## 2018-06-06 NOTE — LETTER
My COPD Action Plan     Name: Sanjay Cano    YOB: 1945   Date: 6/6/2018    My doctor: Bharath Buchanan MD   My clinic: [address] [address] [phone]  My Controller Medicine: Albuterol/Ipratropium (Duoneb, Combivent)   Dose: 1 PUFF BY MOUTH FOUR TIMES DAILY     My Rescue Medicine: { :105879}   Dose: ***     My Flare Up Medicine: { :622852}   Dose: ***     My COPD Severity: { :818956}      Use of Oxygen: { :614783}     Make sure you've had your pneumonia   vaccines.          GREEN ZONE       Doing well today      Usual level of activity and exercise    Usual amount of cough and mucus    No shortness of breath    Usual level of health (thinking clearly, sleeping well, feel like eating) Actions:      Take daily medicines    Use oxygen as prescribed    Follow regular exercise and diet plan    Avoid cigarette smoke and other irritants that harm the lungs           YELLOW ZONE          Having a bad day or flare up      Short of breath more than usual    A lot more sputum (mucus) than usual    Sputum looks yellow, green, tan, brown or bloody    More coughing or wheezing    Fever or chills    Less energy; trouble completing activities    Trouble thinking or focusing    Using quick relief inhaler or nebulizer more often    Poor sleep; symptoms wake me up    Do not feel like eating Actions:      Get plenty of rest    Take daily medicines    Use quick relief inhaler every *** hours    If you use oxygen, call you doctor to see if you should adjust your oxygen    Do breathing exercises or other things to help you relax    Let a loved one, friend or neighbor know you are feeling worse    Call your care team if you have 2 or more symptoms.  Start taking steroids or antibiotics if directed by your care team           RED ZONE       Need medical care now      Severe shortness of breath (feel you can't breathe)    Fever, chills    Not  enough breath to do any activity    Trouble coughing up mucus, walking or talking    Blood in mucus    Frequent coughing   Rescue medicines are not working    Not able to sleep because of breathing    Feel confused or drowsy    Chest pain    Actions:      Call your health care team.  If you cannot reach your care team, call 911 or go to the emergency room.        Annual Reminders:  Meet with Care Team, Flu Shot every Fall  Pharmacy: [facility], [address]

## 2018-06-09 ENCOUNTER — NURSE TRIAGE (OUTPATIENT)
Dept: NURSING | Facility: CLINIC | Age: 73
End: 2018-06-09

## 2018-06-14 ENCOUNTER — TRANSFERRED RECORDS (OUTPATIENT)
Dept: HEALTH INFORMATION MANAGEMENT | Facility: CLINIC | Age: 73
End: 2018-06-14

## 2018-06-27 ENCOUNTER — TELEPHONE (OUTPATIENT)
Dept: FAMILY MEDICINE | Facility: CLINIC | Age: 73
End: 2018-06-27

## 2018-06-27 NOTE — TELEPHONE ENCOUNTER
Dr. Buchanan,    Wife called and she is requesting orders or a note for wound care of the patient's finger to be written and faxed to the facility that Sanjay is at. Fax number: [phone], Attn: Daniel.     Unsure of what site looks like. Wife has not seen patient since the 17th.    Letter cued if recommended.     Please advise.    Per wife, she does not have a copy of the note she got from [facility] 06/06/18. She gave it to a nurse, Adia, at the facility, but she recalls the note stating that the finger should be washed with soap and water, and then sprayed with bacitracin. Staff should put date that dressing was changed.     Today when wife called and asked about it. They stated that they have no orders, and they are monitoring the site and changing the dressing as needed. On the paper that they got it stated to check it as needed. Unsure of what site looks like at this point, however has been 3 weeks since office visit. Writer suggested to check in with Adia who she have the note to until she hears back from us.    Lita Mckeon RN  [facility]

## 2018-06-27 NOTE — LETTER
[address] [address]  [phone]      2018    RE: Sanjay Cano  : 1945      To Whom It May Concern:    Sanjay Cano was seen by myself in the clinic on 18 for an injury to the nail bed of a finger on his right hand. I am recommending wound care as follows:    Wash finger daily with soap and water, then spray with topical triple antibiotic ointment or bacitracin spray. After washing, dry finger and wrap with gauze dressing. Once wound is healing and fully scabbed, a band aid is sufficient, and daily washing can be stopped. Discontinue wound care when site is completely healed.    Please contact the clinic with any further questions at [phone].      Sincerely,          Dr. Bharath Buchanan

## 2018-06-27 NOTE — TELEPHONE ENCOUNTER
Reason for call:  Other   Patient called regarding (reason for call): Note from Dr. Buchanan on wound care    Additional comments: The patients wife called to get a note sent to the patients nursing home on how to take care of the wound he has on his finger. She stated that she had originally given a note to the nursing home that Dr. Buchanan had written after his visit with him on 6/6 but they have not been taking care of the wound as it should be. She would like another note to be written by Dr. Buchanan so she can give it to the nursing home again so that she can make sure it is being taken care of as the note states. She would like a call back to discuss what the note should say.    Phone number to reach patient:  Home number on file [phone] (home)    Best Time: Any    Can we leave a detailed message on this number?  YES

## 2018-06-29 NOTE — TELEPHONE ENCOUNTER
Letter faxed to [phone] Attn: Daniel.    Notified spouse, copy of letter mailed to spouse at request.    Lita Mckeon RN  [facility]

## 2018-07-11 ENCOUNTER — TELEPHONE (OUTPATIENT)
Dept: FAMILY MEDICINE | Facility: CLINIC | Age: 73
End: 2018-07-11

## 2018-07-11 NOTE — TELEPHONE ENCOUNTER
Spoke with Adia, detailed message given    Joana Gandhi, GERARD   [facility]

## 2018-07-11 NOTE — TELEPHONE ENCOUNTER
Reason for call:  Other   Patient called regarding (reason for call): BOAZ  Additional comments: Daniel from [facility] called and stated that the patient had a fall today. He was found on a mat they have on the floor for patients who are prone to falls and he seemed ok but may have hurt his lower back a bit. He just wanted to make Dr. Buchanan aware of the fall.    Phone number to reach patient:  Other phone number: [phone]    Best Time: Any    Can we leave a detailed message on this number?  Not Applicable

## 2018-07-12 ENCOUNTER — TELEPHONE (OUTPATIENT)
Dept: FAMILY MEDICINE | Facility: CLINIC | Age: 73
End: 2018-07-12

## 2018-07-12 DIAGNOSIS — F33.1 DEPRESSION, MAJOR, RECURRENT, MODERATE (H): ICD-10-CM

## 2018-07-12 RX ORDER — MIRTAZAPINE 15 MG/1
TABLET, FILM COATED ORAL
Qty: 135 TABLET | Refills: 1 | Status: CANCELLED | OUTPATIENT
Start: 2018-07-12

## 2018-07-12 NOTE — LETTER
[facility]  [address] [place] [id number] [phone]          July 16, 2018    Sanjay Cano                                                                                                                     [address] [facility] [address]            To whom it may concern,    Regarding Sanjay Cano and his mirtazapine 22.5 mg I request that it be administered at 9pm every evening        Sincerely,             Bharath Buchanan MD

## 2018-07-12 NOTE — TELEPHONE ENCOUNTER
Reason for call:  Other   Patient called regarding (reason for call): call back  Additional comments: The patients wife called regarding some medication questions. She would like a call back to discuss these questions.    Phone number to reach patient:  Home number on file [phone] (home)    Best Time:  Before 1:45 as she has an appointment at 2pm    Can we leave a detailed message on this number?  YES

## 2018-07-12 NOTE — TELEPHONE ENCOUNTER
Patient's wife called this afternoon. Patient is in a LTC facility and his wife has concerns about the time the mirtazapine is being given. It is prescribed for the patient to take at bedtime but per patient's wife, the facility has been administering the medication any time between 6:00 pm to 10:00 pm.     Patient's wife is wondering if the mirtazapine prescription can be changed to state it should be given at a specific time (instead of bedtime). She has concerns that the medication is wearing off by morning because patient has been found on the floor in the morning in his room.    She says she will call to check the time that patient usually goes to bed and then will call back.     Patient taking mirtazapine 22.5 mg per June 6th visit note.    JARRED Smith RN  [facility]

## 2018-07-13 NOTE — TELEPHONE ENCOUNTER
Dr. Buchanan--    Patient's wife called to see what time you recommend the mirtazapine 22.5 mg be administered. She reports he usually goes to bed between 8:00 and 9:00 pm.     Patient's wife is wondering if you recommend that the mirtazapine prescription be changed to state it should be given at a specific time.       Sandra Guadalupe RN  [facility]

## 2018-07-18 ENCOUNTER — AMBULATORY - HEALTHEAST (OUTPATIENT)
Dept: NEUROLOGY | Facility: CLINIC | Age: 73
End: 2018-07-18

## 2018-07-19 ENCOUNTER — TELEPHONE (OUTPATIENT)
Dept: FAMILY MEDICINE | Facility: CLINIC | Age: 73
End: 2018-07-19

## 2018-07-19 NOTE — TELEPHONE ENCOUNTER
Reason for call:  Other   Patient called regarding (reason for call): call back  Additional comments: Pt's wife would like Dr. Buchanan or a nurse to review his medication lists. He is at his adult day care from 9am-3pm, and the employees there have been telling her that after lunch time, he is extremely tired. She would like to know if any of the medications he takes could be causing that, or if it's possible that he could be diabetic now, since he has such limited movement and if that could be the cause of the lethargy.     Phone number to reach patient:  Home number on file [phone] (home)    Best Time:  Please call tomorrow, 7/20, before 2    Can we leave a detailed message on this number?  YES

## 2018-07-20 ENCOUNTER — TRANSFERRED RECORDS (OUTPATIENT)
Dept: HEALTH INFORMATION MANAGEMENT | Facility: CLINIC | Age: 73
End: 2018-07-20

## 2018-07-23 NOTE — TELEPHONE ENCOUNTER
Spoke with wife    She gave pt day schedule pt starts his day at 6a and she stated he is bored and he doesn't have enough to do at the facility    She is wanting to bring him home and take care of him with assist    Recommend she make appointment to discuss with ty Gandhi RN   [facility]

## 2018-07-24 ENCOUNTER — TRANSFERRED RECORDS (OUTPATIENT)
Dept: HEALTH INFORMATION MANAGEMENT | Facility: CLINIC | Age: 73
End: 2018-07-24

## 2018-07-26 ENCOUNTER — NURSE TRIAGE (OUTPATIENT)
Dept: NURSING | Facility: CLINIC | Age: 73
End: 2018-07-26

## 2018-07-26 ENCOUNTER — TELEPHONE (OUTPATIENT)
Dept: FAMILY MEDICINE | Facility: CLINIC | Age: 73
End: 2018-07-26

## 2018-07-26 ENCOUNTER — TRANSFERRED RECORDS (OUTPATIENT)
Dept: HEALTH INFORMATION MANAGEMENT | Facility: CLINIC | Age: 73
End: 2018-07-26

## 2018-07-26 NOTE — TELEPHONE ENCOUNTER
TRIAGE: keep eye out for results!!    Spoke with GERARD Sanchez on patient facility floor.   Verbal ok given for UA/UC to get collected at patient facility. Results to be faxed to clinic with Attention: to medical staff    Patient sleeping at this time. Patient rescheduled with Dr. Buchanan tomorrow at 1:15pm for evaluation.     Thanks! Natalia Boss RN

## 2018-07-26 NOTE — TELEPHONE ENCOUNTER
The wife of the patient called again and would like a call back whenever someone is available to discuss the situation and she is leaving for the nursing home at 2:00 pm, so ideally she would like a call back before then
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
## 2018-07-27 ENCOUNTER — TELEPHONE (OUTPATIENT)
Dept: FAMILY MEDICINE | Facility: CLINIC | Age: 73
End: 2018-07-27

## 2018-07-27 ENCOUNTER — HOSPITAL ENCOUNTER (OUTPATIENT)
Dept: GENERAL RADIOLOGY | Facility: CLINIC | Age: 73
Discharge: HOME | End: 2018-07-27
Attending: FAMILY MEDICINE | Admitting: FAMILY MEDICINE
Payer: MEDICARE

## 2018-07-27 ENCOUNTER — HOSPITAL ENCOUNTER (EMERGENCY)
Facility: CLINIC | Age: 73
Discharge: HOME | End: 2018-07-27
Attending: EMERGENCY MEDICINE | Admitting: EMERGENCY MEDICINE
Payer: MEDICARE

## 2018-07-27 ENCOUNTER — OFFICE VISIT (OUTPATIENT)
Dept: FAMILY MEDICINE | Facility: CLINIC | Age: 73
End: 2018-07-27
Payer: COMMERCIAL

## 2018-07-27 VITALS
OXYGEN SATURATION: 95 % | TEMPERATURE: 97.9 F | RESPIRATION RATE: 12 BRPM | SYSTOLIC BLOOD PRESSURE: 110 MMHG | DIASTOLIC BLOOD PRESSURE: 78 MMHG

## 2018-07-27 VITALS
TEMPERATURE: 98.3 F | OXYGEN SATURATION: 97 % | RESPIRATION RATE: 18 BRPM | SYSTOLIC BLOOD PRESSURE: 121 MMHG | HEART RATE: 67 BPM | DIASTOLIC BLOOD PRESSURE: 83 MMHG

## 2018-07-27 DIAGNOSIS — S62.323A CLOSED DISPLACED FRACTURE OF SHAFT OF THIRD METACARPAL BONE OF LEFT HAND, INITIAL ENCOUNTER: Primary | ICD-10-CM

## 2018-07-27 DIAGNOSIS — Z87.440 H/O RECURRENT URINARY TRACT INFECTION: ICD-10-CM

## 2018-07-27 DIAGNOSIS — R30.0 DYSURIA: ICD-10-CM

## 2018-07-27 DIAGNOSIS — S62.323A CLOSED DISPLACED FRACTURE OF SHAFT OF THIRD METACARPAL BONE OF LEFT HAND, INITIAL ENCOUNTER: ICD-10-CM

## 2018-07-27 DIAGNOSIS — M79.89 SWELLING OF LEFT HAND: ICD-10-CM

## 2018-07-27 LAB — RADIOLOGIST FLAGS: ABNORMAL

## 2018-07-27 PROCEDURE — 99214 OFFICE O/P EST MOD 30 MIN: CPT | Performed by: FAMILY MEDICINE

## 2018-07-27 PROCEDURE — A9270 NON-COVERED ITEM OR SERVICE: HCPCS | Mod: GY | Performed by: EMERGENCY MEDICINE

## 2018-07-27 PROCEDURE — 26600 TREAT METACARPAL FRACTURE: CPT | Performed by: EMERGENCY MEDICINE

## 2018-07-27 PROCEDURE — 99284 EMERGENCY DEPT VISIT MOD MDM: CPT | Mod: 25 | Performed by: EMERGENCY MEDICINE

## 2018-07-27 PROCEDURE — 25000132 ZZH RX MED GY IP 250 OP 250 PS 637: Mod: GY | Performed by: EMERGENCY MEDICINE

## 2018-07-27 PROCEDURE — 26600 TREAT METACARPAL FRACTURE: CPT | Mod: 54 | Performed by: EMERGENCY MEDICINE

## 2018-07-27 PROCEDURE — 73130 X-RAY EXAM OF HAND: CPT | Mod: LT

## 2018-07-27 RX ORDER — CARBIDOPA AND LEVODOPA 25; 100 MG/1; MG/1
1 TABLET ORAL 3 TIMES DAILY
Status: DISCONTINUED | OUTPATIENT
Start: 2018-07-27 | End: 2018-07-27

## 2018-07-27 RX ORDER — CARBIDOPA AND LEVODOPA 25; 100 MG/1; MG/1
1 TABLET ORAL ONCE
Status: COMPLETED | OUTPATIENT
Start: 2018-07-27 | End: 2018-07-27

## 2018-07-27 RX ADMIN — CARBIDOPA AND LEVODOPA 1 TABLET: 25; 100 TABLET ORAL at 15:12

## 2018-07-27 RX ADMIN — CARBIDOPA AND LEVODOPA 1 HALF-TAB: 25; 100 TABLET ORAL at 15:12

## 2018-07-27 ASSESSMENT — ENCOUNTER SYMPTOMS
FEVER: 0
SHORTNESS OF BREATH: 0
NUMBNESS: 0
ABDOMINAL PAIN: 0
WEAKNESS: 0
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
## 2018-07-27 NOTE — ED PROVIDER NOTES
History     Chief Complaint   Patient presents with     Hand Injury     has come from clinic where he had xray showing  left hand fracture, here for splinti ng. pt also has current uti and has been delusional.     DANIEL Cano is a left-handed 72 year old male with a history of Parkinson's disease (currently resides in a Nursing Home), COPD, and a recently diagnosed UTI (currently on Bactrim) who presents to the ED from the clinic after an x-ray of the patient's left hand revealed a displaced angulated spiral fracture of the third metacarpal. Per nursing home staff, the patient was particularly delirious and combative on 7/25/18, 2 days ago, and as he typically experienced these symptoms with a UTI, a UA was done. This was positive, and the patient was placed onto Bactrim for treatment of such. However, the patient's wife visted him the following day, 7/26, and noted his left hand to be particularly swollen and tender, so the patient was then seen today through his primary clinic where an x-ray revealed the aforementioned fracture - he was referred here for splint application. Currently, the patient is at his baseline mentation and not agitated. No distal CMS changes.       PAST MEDICAL HISTORY:   Past Medical History:   Diagnosis Date     At risk for falling 9/3/2012     COPD (chronic obstructive pulmonary disease) (H)      Family history of thyroid cancer 7/13/2016     Malignant neoplasm (H)     skin - nose     Moderate recurrent major depression (H) 5/17/2012     Paralysis agitans (H)     Parkinson's Disease     Parkinson disease (H)      Rosacea        PAST SURGICAL HISTORY:   Past Surgical History:   Procedure Laterality Date     EYE SURGERY       ORTHOPEDIC SURGERY       PHACOEMULSIFICATION CLEAR CORNEA WITH STANDARD INTRAOCULAR LENS IMPLANT  5/21/2014    Procedure: PHACOEMULSIFICATION CLEAR CORNEA WITH STANDARD INTRAOCULAR LENS IMPLANT;  Surgeon: Tomy Hunter MD;  Location: [facility]      PHACOEMULSIFICATION CLEAR CORNEA WITH TORIC INTRAOCULAR LENS IMPLANT  4/30/2014    Procedure: PHACOEMULSIFICATION CLEAR CORNEA WITH TORIC INTRAOCULAR LENS IMPLANT;  Surgeon: Tomy Hunter MD;  Location: [facility]       FAMILY HISTORY:   Family History   Problem Relation Age of Onset     Cerebrovascular Disease Mother      Diabetes Mother      GASTROINTESTINAL DISEASE Mother      Hypertension Mother      Neurologic Disorder Father      Cerebrovascular Disease Father      Cerebrovascular Disease Maternal Grandfather      Cancer Paternal Grandmother      Other - See Comments Sister      gi - unknown       SOCIAL HISTORY:   Social History   Substance Use Topics     Smoking status: Former Smoker     Packs/day: 0.01     Years: 40.00     Types: Cigarettes     Quit date: 7/31/2008     Smokeless tobacco: Never Used      Comment: very passive cigarettes in 6 months     Alcohol use No       Patient's Medications   New Prescriptions    No medications on file   Previous Medications    AMLODIPINE (NORVASC) 5 MG TABLET    Take 1 tablet (5 mg) by mouth At Bedtime    ASPIRIN 81 MG TABLET    Take by mouth twice a week    CARBIDOPA-LEVODOPA (SINEMET CR)  MG PER CR TABLET    Take 1 tablet by mouth At Bedtime    CARBIDOPA-LEVODOPA (SINEMET)  MG PER TABLET    Take 1-2 tablets by mouth 5 times daily Takes 2 tablets at 6 AM, 1.5 tablets at 9 AM, 2 tablets at noon, 1.5 tablets at 3 PM, 1.5 tablets at 6 PM. Can take additional 0.5 tablet in the middle of the night if needed.    CHOLECALCIFEROL (VITAMIN D) 1000 UNIT TABLET    Take 1 tablet (1,000 Units) by mouth every morning    CLONAZEPAM (KLONOPIN) 0.5 MG ODT TAB    Take 0.25 mg by mouth every other night    COMBIVENT RESPIMAT  MCG/ACT INHALER    INHALE 1 PUFF BY MOUTH FOUR TIMES DAILY. NOT TO EXCEED 6 DOSES PER DAY    DONEPEZIL (ARICEPT) 10 MG TABLET    Take 5mg  by mouth twice a day (take with the 2.5 mg dose for a total dose of 7.5mg twice daily)    DONEPEZIL (ARICEPT) 5  MG TABLET    Take 2.5 mg twice a day (take with the 5 mg dose for a total dose of 7.5mg twice daily)    DOXYCYCLINE (VIBRAMYCIN) 100 MG CAPSULE    Take 1 capsule (100 mg) by mouth every 12 hours Give at 8 am and 8 pm    FLOVENT  MCG/ACT INHALER    INHALE 2 PUFFS INTO THE LUNGS TWICE DAILY    IPRATROPIUM-ALBUTEROL (COMBIVENT RESPIMAT)  MCG/ACT INHALER    Inhale 1 puff into the lungs 4 times daily May take 1 to 2 additional doses as needed during the day    LACTOBACILLUS RHAMNOSUS, GG, (CULTURELL) CAPSULE    Take 1 capsule by mouth every morning    LISINOPRIL (PRINIVIL/ZESTRIL) 5 MG TABLET    Take 1 tablet (5 mg) by mouth daily    METHYLCELLULOSE (CITRUCEL) 500 MG TABS TABLET    Take 1 tablet (500 mg) by mouth daily as needed    MIRTAZAPINE (REMERON) 15 MG TABLET    TAKE 1.5 TABLETs (30 MG) BY MOUTH AT BEDTIME    QUETIAPINE (SEROQUEL) 50 MG TABLET    TAKE 1 TABLET(50 MG) BY MOUTH AT BEDTIME    RANITIDINE (ZANTAC) 75 MG TABLET    Take 1 tablet (75 mg) by mouth At Bedtime    VENLAFAXINE (EFFEXOR-XR) 75 MG 24 HR CAPSULE    TAKE 1 CAPSULE BY MOUTH EVERY MORNING AND 1 CAPSULE EVERY NIGHT AT BEDTIME   Modified Medications    No medications on file   Discontinued Medications    No medications on file          Allergies   Allergen Reactions     Levaquin [Levofloxacin]      Fracture prevention     Seasonal Allergies              I have reviewed the Medications, Allergies, Past Medical and Surgical History, and Social History in the Epic system.    Review of Systems   Constitutional: Negative for fever.   Respiratory: Negative for shortness of breath.    Cardiovascular: Negative for chest pain.   Gastrointestinal: Negative for abdominal pain.   Musculoskeletal:        Pain/swelling in left hand   Neurological: Negative for weakness and numbness.   All other systems reviewed and are negative.      Physical Exam          Physical Exam   Constitutional: He is oriented to person, place, and time. No distress.  "  Pulmonary/Chest: No respiratory distress.   Musculoskeletal:        Left hand: He exhibits decreased range of motion and tenderness.        Hands:  Swelling and tenderness dorsum of left hand   Neurological: He is alert and oriented to person, place, and time.   Nursing note and vitals reviewed.      ED Course     ED Course     Orthopedic injury tx  Date/Time: 7/27/2018 1:48 PM  Performed by: ZANDRA ENGEL  Authorized by: ZANDRA ENGEL   Consent: Verbal consent obtained.  Consent given by: patient  Patient identity confirmed: verbally with patient  Time out: Immediately prior to procedure a \"time out\" was called to verify the correct patient, procedure, equipment, support staff and site/side marked as required.  Injury location: hand  Location details: left hand  Injury type: fracture  Fracture type: third metacarpal  Pre-procedure neurovascular assessment: neurovascularly intact  Pre-procedure distal perfusion: normal  Pre-procedure neurological function: normal  Pre-procedure range of motion: reduced    Anesthesia:  Local anesthesia used: no    Sedation:  Patient sedated: no  Manipulation performed: no  Immobilization: splint  Splint type: volar short arm  Post-procedure neurovascular assessment: post-procedure neurovascularly intact  Post-procedure distal perfusion: normal  Post-procedure neurological function: normal  Post-procedure range of motion: unchanged  Patient tolerance: Patient tolerated the procedure well with no immediate complications                     Labs Ordered and Resulted from Time of ED Arrival Up to the Time of Departure from the ED - No data to display         Assessments & Plan (with Medical Decision Making)   72-year-old left-hand-dominant male with a metacarpal fracture.  He has Parkinson's and is currently at a care facility.  It was found by family members due to swelling, he went to clinic and the physician obtained an x-ray and sent the patient here for " splinting.  He was placed in a short arm volar splint and will be discharged back to his nursing home with orthopedics follow-up.    I have reviewed the nursing notes.    I have reviewed the findings, diagnosis, plan and need for follow up with the patient.  This part of the document was transcribed by Ben Espinosa, Medical Scribe.     New Prescriptions    No medications on file       Final diagnoses:   Closed displaced fracture of shaft of third metacarpal bone of left hand, initial encounter   I, Ben Espinosa, am serving as a trained medical scribe to document services personally performed by Jin Marin MD, based on the provider's statements to me.      I, Jin Marin MD, was physically present and have reviewed and verified the accuracy of this note documented by Ben Espinosa.       7/27/2018   [facility], [place], EMERGENCY DEPARTMENT     Jin Marin MD  07/27/18 0825

## 2018-07-27 NOTE — TELEPHONE ENCOUNTER
Reason for call:  Other   Patient called regarding (reason for call): call back  Additional comments: The patient had an appointment with Dr. Buchanan today and he had told the patients wife that he possibly wanted him to see an orthopedic specialist on Monday 7/30. She called and wanted to know if Dr. Buchanan had gotten a chance to get it set up because she wanted to make sure that she had transportation to the appointment for the patient. She would like a call back as soon as possible so she can get something set up.    Phone number to reach patient:  Home number on file [phone] (home)    Best Time: Any    Can we leave a detailed message on this number?  YES

## 2018-07-27 NOTE — TELEPHONE ENCOUNTER
Dr. Buchanan,    Received call from ED Bita with [facility] radiology to notify provider of x-ray results for hand that showed displaced fracture.    Lita Mckeon RN  [facility]

## 2018-07-27 NOTE — TELEPHONE ENCOUNTER
Spoke with  staff who stated that the patient's wife had called the clinic wondering how much tylenol the patient should take. Huddled with provider Dr. Buchanan, who stated that patient can take 500 mg QID PRN.  Called and spoke with nurse Ashtyn at [facility]. Verbal TO given to nurse.    Lita Mckeon RN  [facility]

## 2018-07-27 NOTE — ED AVS SNAPSHOT
[facility], Emergency Department    [address]    [facility] [address]    Phone:  [phone]    Fax:  [phone]                                       Sanjay Cano   MRN: [id number]    Department:  [facility], Emergency Department   Date of Visit:  7/27/2018           Patient Information     Date Of Birth          1945        Your diagnoses for this visit were:     Closed displaced fracture of shaft of third metacarpal bone of left hand, initial encounter        You were seen by Fuad Jara MD and Jin Marin MD.        Discharge Instructions       Keep the splint clean and dry  Follow-up with orthopedics    Your next 10 appointments already scheduled     Jul 30, 2018  2:45 PM CDT   Office Visit with Bharath Buchanan MD   [facility] ([facility])    [address] [place] [id number] [phone]           Bring a current list of meds and any records pertaining to this visit. For Physicals, please bring immunization records and any forms needing to be filled out. Please arrive 10 minutes early to complete paperwork.              24 Hour Appointment Hotline       To make an appointment at any [facility], call [phone] ([phone]). If you don't have a family doctor or clinic, we will help you find one. [facility] clinics are conveniently located to serve the needs of you and your family.             Review of your medicines      Our records show that you are taking the medicines listed below. If these are incorrect, please call your family doctor or clinic.        Dose / Directions Last dose taken    amLODIPine 5 MG tablet   Commonly known as:  NORVASC   Dose:  5 mg   Quantity:  90 tablet        Take 1 tablet (5 mg) by mouth At Bedtime   Refills:  3        * ARICEPT 5 MG tablet   Quantity:  180 tablet   Generic drug:  donepezil        Take 2.5 mg twice a day (take with the 5 mg dose for a  total dose of 7.5mg twice daily)   Refills:  3        * donepezil 10 MG tablet   Commonly known as:  ARICEPT        Take 5mg  by mouth twice a day (take with the 2.5 mg dose for a total dose of 7.5mg twice daily)   Refills:  3        aspirin 81 MG tablet        Take by mouth twice a week   Refills:  0        * carbidopa-levodopa  MG per tablet   Commonly known as:  SINEMET   Dose:  1-2 tablet   Quantity:  90 tablet        Take 1-2 tablets by mouth 5 times daily Takes 2 tablets at 6 AM, 1.5 tablets at 9 AM, 2 tablets at noon, 1.5 tablets at 3 PM, 1.5 tablets at 6 PM. Can take additional 0.5 tablet in the middle of the night if needed.   Refills:  0        * carbidopa-levodopa  MG per CR tablet   Commonly known as:  SINEMET CR   Dose:  1 tablet   Quantity:  1 tablet        Take 1 tablet by mouth At Bedtime   Refills:  0        cholecalciferol 1000 UNIT tablet   Commonly known as:  vitamin D3   Dose:  1000 Units   Quantity:  90 tablet        Take 1 tablet (1,000 Units) by mouth every morning   Refills:  1        clonazePAM 0.5 MG ODT tab   Commonly known as:  klonoPIN   Quantity:  180 tablet        Take 0.25 mg by mouth every other night   Refills:  0        * COMBIVENT RESPIMAT  MCG/ACT inhaler   Quantity:  4 g   Generic drug:  Ipratropium-Albuterol        INHALE 1 PUFF BY MOUTH FOUR TIMES DAILY. NOT TO EXCEED 6 DOSES PER DAY   Refills:  4        * COMBIVENT RESPIMAT  MCG/ACT inhaler   Dose:  1 puff   Quantity:  1 Inhaler   Generic drug:  Ipratropium-Albuterol        Inhale 1 puff into the lungs 4 times daily May take 1 to 2 additional doses as needed during the day   Refills:  4        doxycycline 100 MG capsule   Commonly known as:  VIBRAMYCIN   Dose:  100 mg   Quantity:  20 capsule        Take 1 capsule (100 mg) by mouth every 12 hours Give at 8 am and 8 pm   Refills:  0        FLOVENT  MCG/ACT Inhaler   Quantity:  36 g   Generic drug:  fluticasone        INHALE 2 PUFFS INTO THE LUNGS  TWICE DAILY   Refills:  1        lactobacillus rhamnosus (GG) capsule   Dose:  1 capsule   Quantity:  60 capsule        Take 1 capsule by mouth every morning   Refills:  11        lisinopril 5 MG tablet   Commonly known as:  PRINIVIL/ZESTRIL   Dose:  5 mg   Quantity:  30 tablet        Take 1 tablet (5 mg) by mouth daily   Refills:  1        methylcellulose 500 MG Tabs tablet   Commonly known as:  CITRUCEL   Dose:  500 mg   Quantity:  14 each        Take 1 tablet (500 mg) by mouth daily as needed   Refills:  0        mirtazapine 15 MG tablet   Commonly known as:  REMERON   Quantity:  135 tablet        TAKE 1.5 TABLETs (30 MG) BY MOUTH AT BEDTIME   Refills:  1        QUEtiapine 50 MG tablet   Commonly known as:  SEROquel   Quantity:  90 tablet        TAKE 1 TABLET(50 MG) BY MOUTH AT BEDTIME   Refills:  2        ranitidine 75 MG tablet   Commonly known as:  ZANTAC   Dose:  75 mg   Quantity:  30 tablet        Take 1 tablet (75 mg) by mouth At Bedtime   Refills:  0        venlafaxine 75 MG 24 hr capsule   Commonly known as:  EFFEXOR-XR   Quantity:  180 capsule        TAKE 1 CAPSULE BY MOUTH EVERY MORNING AND 1 CAPSULE EVERY NIGHT AT BEDTIME   Refills:  0        * Notice:  This list has 6 medication(s) that are the same as other medications prescribed for you. Read the directions carefully, and ask your doctor or other care provider to review them with you.            Procedures and tests performed during your visit     Orthopedic injury treatment      Orders Needing Specimen Collection     None      Pending Results     Date and Time Order Name Status Description    7/27/2018 1132 XR Hand Left G/E 3 Views Preliminary             Pending Culture Results     No orders found from 7/25/2018 to 7/28/2018.            Pending Results Instructions     If you had any lab results that were not finalized at the time of your Discharge, you can call the ED Lab Result RN at [phone]. You will be contacted by this team for any  positive Lab results or changes in treatment. The nurses are available 7 days a week from 10A to 6:30P.  You can leave a message 24 hours per day and they will return your call.        Thank you for choosing [facility]       Thank you for choosing [facility] for your care. Our goal is always to provide you with excellent care. Hearing back from our patients is one way we can continue to improve our services. Please take a few minutes to complete the written survey that you may receive in the mail after you visit with us. Thank you!        Care EveryWhere ID     This is your Care EveryWhere ID. This could be used by other organizations to access your [facility] medical records  [id number]        Equal Access to Services     JERARDO LONGORIA : Dariusz Bae, jennifer pino, juan daniel boudreaux, nataliya stein . So [facility] [phone].    ATENCIÓN: Si habla español, tiene a herring disposición servicios gratuitos de asistencia lingüística. Llame al [phone].    We comply with applicable federal civil rights laws and [place] laws. We do not discriminate on the basis of race, color, national origin, age, disability, sex, sexual orientation, or gender identity.            After Visit Summary       This is your record. Keep this with you and show to your community pharmacist(s) and doctor(s) at your next visit.

## 2018-07-27 NOTE — ED AVS SNAPSHOT
[facility], [place], Emergency Department    [address]    [place] [address]    Phone:  [phone]    Fax:  [phone]                                       Sanjay Cano   MRN: [id number]    Department:  [facility], Emergency Department   Date of Visit:  7/27/2018           After Visit Summary Signature Page     I have received my discharge instructions, and my questions have been answered. I have discussed any challenges I see with this plan with the nurse or doctor.    ..........................................................................................................................................  Patient/Patient Representative Signature      ..........................................................................................................................................  Patient Representative Print Name and Relationship to Patient    ..................................................               ................................................  Date                                            Time    ..........................................................................................................................................  Reviewed by Signature/Title    ...................................................              ..............................................  Date                                                            Time

## 2018-07-27 NOTE — TELEPHONE ENCOUNTER
Please call ortho for appointment on behalf of patient- ASAP for early next week. Please notify, thanks Bharath

## 2018-07-27 NOTE — TELEPHONE ENCOUNTER
Wife has concerns that patient could have a UTI and might need antibiotics. Advised wife to have staff at nursing home call us if patient needs to be triaged for his symptoms. FNA will be happy to help them decide what to do. She will speak with staff at LTC facility.  Solange Anders RN  [facility]

## 2018-07-27 NOTE — TELEPHONE ENCOUNTER
Dr. Buchanan    See pt message    Advise    Joana Gandhi, RN   [facility]

## 2018-07-27 NOTE — MR AVS SNAPSHOT
After Visit Summary   7/27/2018    Sanjay Cano    MRN: [id number]           Patient Information     Date Of Birth          1945        Visit Information        Provider Department      7/27/2018 10:45 AM Bharath Buchanan MD [facility]        Today's Diagnoses     Closed displaced fracture of shaft of third metacarpal bone of left hand, initial encounter    -  1    Dysuria        H/O recurrent urinary tract infection          Care Instructions    Transferred to emergency room, discussed with spouse.  Emergency room called for handoff.          Follow-ups after your visit        Your next 10 appointments already scheduled     Jul 30, 2018  2:45 PM CDT   Office Visit with Bharath Buchanan MD   [facility] ([facility])    [address] [place] [id number] [phone]           Bring a current list of meds and any records pertaining to this visit. For Physicals, please bring immunization records and any forms needing to be filled out. Please arrive 10 minutes early to complete paperwork.              Future tests that were ordered for you today     Open Future Orders        Priority Expected Expires Ordered    XR Hand Left G/E 3 Views Routine 7/27/2018 7/27/2019 7/27/2018            Who to contact     If you have questions or need follow up information about today's clinic visit or your schedule please contact [facility] directly at [phone].  Normal or non-critical lab and imaging results will be communicated to you by MyChart, letter or phone within 4 business days after the clinic has received the results. If you do not hear from us within 7 days, please contact the clinic through MyChart or phone. If you have a critical or abnormal lab result, we will notify you by phone as soon as possible.  Submit refill requests through [employer] or call your pharmacy and they will forward the refill  request to us. Please allow 3 business days for your refill to be completed.          Additional Information About Your Visit        Care EveryWhere ID     This is your Care EveryWhere ID. This could be used by other organizations to access your [place] medical records  [id number]        Your Vitals Were     Temperature Respirations Pulse Oximetry             97.9  F (36.6  C) (Oral) 12 95%          Blood Pressure from Last 3 Encounters:   07/27/18 121/83   07/27/18 110/78   06/06/18 100/64    Weight from Last 3 Encounters:   06/06/18 172 lb 11.2 oz (78.3 kg)   01/26/18 117 lb (53.1 kg)   08/01/17 188 lb (85.3 kg)               Primary Care Provider Office Phone # Fax #    Bharath Buchanan [phone]       [address] [facility] [address]  [facility] [place] [facility] [address]        Equal Access to Services     FIOR [facility] : Hadii aad ku hadasho Soomaali, waaxda luqadaha, qaybta kaalmada adeegyada, waxay deein haywilln sebastien stein . So [facility] [phone].    ATENCIÓN: Si habla español, tiene a herring disposición servicios gratuitos de asistencia lingüística. Yanique al [phone].    We comply with applicable federal civil rights laws and [place] laws. We do not discriminate on the basis of race, color, national origin, age, disability, sex, sexual orientation, or gender identity.            Thank you!     Thank you for choosing [facility]  for your care. Our goal is always to provide you with excellent care. Hearing back from our patients is one way we can continue to improve our services. Please take a few minutes to complete the written survey that you may receive in the mail after your visit with us. Thank you!             Your Updated Medication List - Protect others around you: Learn how to safely use, store and throw away your medicines at www.disposemymeds.org.          This list is accurate as of 7/27/18 11:59 PM.  Always use your most recent med list.                   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 Can take additional 0.5 tablet in the middle of the night if needed.        * carbidopa-levodopa  MG per CR tablet    SINEMET CR    1 tablet    Take 1 tablet by mouth At Bedtime        cholecalciferol 1000 UNIT tablet    vitamin D3    90 tablet    Take 1 tablet (1,000 Units) by mouth every morning    At risk for falling       clonazePAM 0.5 MG ODT tab    klonoPIN    180 tablet    Take 0.25 mg by mouth every other night        * COMBIVENT RESPIMAT  MCG/ACT inhaler   Generic drug:  Ipratropium-Albuterol     4 g    INHALE 1 PUFF BY MOUTH FOUR TIMES DAILY. NOT TO EXCEED 6 DOSES PER DAY    Other emphysema (H)       * COMBIVENT RESPIMAT  MCG/ACT inhaler   Generic drug:  Ipratropium-Albuterol     1 Inhaler    Inhale 1 puff into the lungs 4 times daily May take 1 to 2 additional doses as needed during the day    Other emphysema (H)       doxycycline 100 MG capsule    VIBRAMYCIN    20 capsule    Take 1 capsule (100 mg) by mouth every 12 hours Give at 8 am and 8 pm    Lung infiltrate on CT       FLOVENT  MCG/ACT Inhaler   Generic drug:  fluticasone     36 g    INHALE 2 PUFFS INTO THE  LUNGS TWICE DAILY    Other emphysema (H)       lactobacillus rhamnosus (GG) capsule     60 capsule    Take 1 capsule by mouth every morning    Diarrhea, unspecified type       lisinopril 5 MG tablet    PRINIVIL/ZESTRIL    30 tablet    Take 1 tablet (5 mg) by mouth daily        methylcellulose 500 MG Tabs tablet    CITRUCEL    14 each    Take 1 tablet (500 mg) by mouth daily as needed        mirtazapine 15 MG tablet    REMERON    135 tablet    TAKE 1.5 TABLETs (30 MG) BY MOUTH AT BEDTIME    Depression, major, recurrent, moderate (H)       QUEtiapine 50 MG tablet    SEROquel    90 tablet    TAKE 1 TABLET(50 MG) BY MOUTH AT BEDTIME    Moderate recurrent major depression (H)       ranitidine 75 MG tablet    ZANTAC    30 tablet    Take 1 tablet (75 mg) by mouth At Bedtime        venlafaxine 75 MG 24 hr capsule    EFFEXOR-XR    180 capsule    TAKE 1 CAPSULE BY MOUTH EVERY MORNING AND 1 CAPSULE EVERY NIGHT AT BEDTIME    Moderate recurrent major depression (H)       * Notice:  This list has 6 medication(s) that are the same as other medications prescribed for you. Read the directions carefully, and ask your doctor or other care provider to review them with you.

## 2018-07-27 NOTE — TELEPHONE ENCOUNTER
Additional Information    Negative: [1] Caller is not with the adult (patient) AND [2] reporting urgent symptoms    Negative: Lab result questions    Negative: Medication questions    Negative: Caller cannot be reached by phone    Negative: Caller has already spoken to PCP or another triager    Negative: RN needs further essential information from caller in order to complete triage    Negative: Requesting regular office appointment    Negative: [1] Caller requesting NON-URGENT health information AND [2] PCP's office is the best resource    [1] Caller is not with the adult (patient) AND [2] probable NON-URGENT symptoms    Negative: Health Information question, no triage required and triager able to answer question    Negative: General information question, no triage required and triager able to answer question    Negative: Question about upcoming scheduled test, no triage required and triager able to answer question    Protocols used: INFORMATION ONLY [facility]

## 2018-07-30 ENCOUNTER — TELEPHONE (OUTPATIENT)
Dept: FAMILY MEDICINE | Facility: CLINIC | Age: 73
End: 2018-07-30

## 2018-07-30 NOTE — TELEPHONE ENCOUNTER
Please call ortho for appointment on behalf of patient- ASAP for early next week. Please notify, thanks Bharath     Your provider has referred you to: [facility]: [facility] [place] [phone]

## 2018-07-30 NOTE — TELEPHONE ENCOUNTER
Called patient's spouse, Caren, to notify of provider message to schedule early this week. Provided clinic number for scheduling and recommended to notify us with any concerns. Caren verbalized understanding.    Lita Mckeon RN  [facility]

## 2018-07-30 NOTE — TELEPHONE ENCOUNTER
Reason for call:  Other   Patient called regarding (reason for call): call back  Additional comments: Pt's wife is wondering if Dr. Buchanan had called the ortho doctor, because she needs to know when/where she should be calling to scheduling an appointment for her .    Phone number to reach patient:  Home number on file [phone] (home)    Best Time:  Any    Can we leave a detailed message on this number?  YES

## 2018-07-31 ENCOUNTER — TELEPHONE (OUTPATIENT)
Dept: FAMILY MEDICINE | Facility: CLINIC | Age: 73
End: 2018-07-31

## 2018-07-31 ENCOUNTER — THERAPY VISIT (OUTPATIENT)
Dept: OCCUPATIONAL THERAPY | Facility: CLINIC | Age: 73
End: 2018-07-31
Payer: COMMERCIAL

## 2018-07-31 ENCOUNTER — RADIANT APPOINTMENT (OUTPATIENT)
Dept: GENERAL RADIOLOGY | Facility: CLINIC | Age: 73
End: 2018-07-31
Attending: ORTHOPAEDIC SURGERY
Payer: COMMERCIAL

## 2018-07-31 ENCOUNTER — OFFICE VISIT (OUTPATIENT)
Dept: ORTHOPEDICS | Facility: CLINIC | Age: 73
End: 2018-07-31
Payer: COMMERCIAL

## 2018-07-31 ENCOUNTER — MEDICAL CORRESPONDENCE (OUTPATIENT)
Dept: HEALTH INFORMATION MANAGEMENT | Facility: CLINIC | Age: 73
End: 2018-07-31

## 2018-07-31 DIAGNOSIS — N30.00 ACUTE CYSTITIS WITHOUT HEMATURIA: Primary | ICD-10-CM

## 2018-07-31 DIAGNOSIS — S62.309A CLOSED FRACTURE OF METACARPAL BONE: Primary | ICD-10-CM

## 2018-07-31 DIAGNOSIS — S62.323A CLOSED DISPLACED FRACTURE OF SHAFT OF THIRD METACARPAL BONE OF LEFT HAND, INITIAL ENCOUNTER: Primary | ICD-10-CM

## 2018-07-31 DIAGNOSIS — S62.309A CLOSED FRACTURE OF METACARPAL BONE: ICD-10-CM

## 2018-07-31 PROCEDURE — 97760 ORTHOTIC MGMT&TRAING 1ST ENC: CPT | Mod: GO | Performed by: OCCUPATIONAL THERAPIST

## 2018-07-31 NOTE — PROGRESS NOTES
Hand Therapy Initial Evaluation    Current Date:  7/31/2018    Diagnosis: L long finger metacarpal shaft fracture  DOI: 07/25/18   Procedure:  none  Post:  6d    Precautions: NA    Subjective:  Sanjay Cano is a 72 year old right hand dominant male.    Patient reports symptoms of pain, stiffness/loss of motion and edema of the left hand which occurred due to being transferred in nursing facility. Since onset symptoms are Gradually getting better.  Special tests:  x-ray.  Previous treatment: none.    General health as reported by patient is poor.  Pertinent medical history includes:Depression, Incontinence, Parkingson's disease (diagnosed 1994)  Medical allergies:see medications.  Surgical history: see medical chart.  Medication history: see medical chart.    Occupational Profile Information:  Current occupation is retired  Currently retired  Job Tasks: NA  Prior functional level:  supervised setting  Barriers include:transportation, requires assistance with dressing, personal hygiene, transfers, mobility  Mobility: Uses wheelchair  Transportation: medical transportation  Leisure activities/hobbies: not able    Objective:  Pain Level Report: unable to determine  ROM:  contraindicated    Strength:  contraindicated    Edema:  MODERATE of affected part    Scar:  none    Sensation:  Unable to determine     Assessment/Plan:  Patient's limitations or Problem List includes:  Pain and Increased edema of the left long finger which interferes with the patient's ability to perform Self Care Tasks (eating) as compared to previous level of function.    Rehab Potential:  Poor - Return to restricted activity    Patient will benefit from skilled Occupational Therapy to increase ROM and stability of hand and decrease pain and edema to return to previous activity level and resume normal daily tasks and to reach their rehab potential.    Barriers to Learning:  Hearing  Cognition    Communication Issues:  Patient is unable to clearly  communicate and follow directions.  They will require assistance to perform a home exercise program.  Family member present for session to facilitate follow through of home program.    Chart Review: Simple history review with patient    Identified Performance Deficits: bathing/showering, toileting, dressing, feeding, functional mobility, personal device care, hygiene and grooming, communication management, driving and community mobility, health management and maintenance, home establishment and management, meal preparation and cleanup and shopping    Assessment of Occupational Performance:  5 or more Performance Deficits    Clinical Decision Making (Complexity): Low complexity    Treatment Explanation:  The following has been discussed with the patient:  RX ordered/plan of care  Anticipated outcomes  Possible risks and side effects    Treatment Plan:    Frequency:  1 x visit  Duration:  NA; 1 x visit    Orthotic Fabrication:  Static orthosis  Discharge Plan:  Achieve all LTG.  Independent in home treatment program.  Reach maximal therapeutic benefit.    Home Exercise Program:  Hand based fracture brace

## 2018-07-31 NOTE — MR AVS SNAPSHOT
After Visit Summary   7/31/2018    Sanjay Cano    MRN: [id number]           Patient Information     Date Of Birth          1945        Visit Information        Provider Department      7/31/2018 4:00 PM Flora Mack OT [facility] Hand Therapy        Today's Diagnoses     Closed fracture of metacarpal bone    -  1       Follow-ups after your visit        Who to contact     If you have questions or need follow up information about today's clinic visit or your schedule please contact [facility]ER[facility] directly at [phone].  Normal or non-critical lab and imaging results will be communicated to you by MyChart, letter or phone within 4 business days after the clinic has received the results. If you do not hear from us within 7 days, please contact the clinic through MyChart or phone. If you have a critical or abnormal lab result, we will notify you by phone as soon as possible.  Submit refill requests through [employer] or call your pharmacy and they will forward the refill request to us. Please allow 3 business days for your refill to be completed.          Additional Information About Your Visit        Care EveryWhere ID     This is your Care EveryWhere ID. This could be used by other organizations to access your [place] medical records  [id number]         Blood Pressure from Last 3 Encounters:   07/27/18 121/83   07/27/18 110/78   06/06/18 100/64    Weight from Last 3 Encounters:   06/06/18 78.3 kg (172 lb 11.2 oz)   01/26/18 53.1 kg (117 lb)   08/01/17 85.3 kg (188 lb)              We Performed the Following     ORTHOTIC MGMT AND TRAINING, EACH 15 MIN        Primary Care Provider Office Phone # Fax #    Bharath Buchanan [phone]       [address] [address]        Equal Access to Services     JERARDO LONGORIA : Dariusz Bae, jennifer pino, juan daniel boudreaux, nataliya capps. So [facility]  [phone].    ATENCIÓN: Si michael rodriguez, tiene a herring disposición servicios gratuitos de asistencia lingüística. Yanique umana [phone].    We comply with applicable federal civil rights laws and [place] laws. We do not discriminate on the basis of race, color, national origin, age, disability, sex, sexual orientation, or gender identity.            Thank you!     Thank you for choosing [facility]  for your care. Our goal is always to provide you with excellent care. Hearing back from our patients is one way we can continue to improve our services. Please take a few minutes to complete the written survey that you may receive in the mail after your visit with us. Thank you!             Your Updated Medication List - Protect others around you: Learn how to safely use, store and throw away your medicines at www.disposemymeds.org.          This list is accurate as of 7/31/18  7:36 PM.  Always use your most recent med list.                   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 Can take additional 0.5 tablet in the middle of the night if needed.        * carbidopa-levodopa  MG per CR tablet     SINEMET CR    1 tablet    Take 1 tablet by mouth At Bedtime        cholecalciferol 1000 UNIT tablet    vitamin D3    90 tablet    Take 1 tablet (1,000 Units) by mouth every morning    At risk for falling       clonazePAM 0.5 MG ODT tab    klonoPIN    180 tablet    Take 0.25 mg by mouth every other night        * COMBIVENT RESPIMAT  MCG/ACT inhaler   Generic drug:  Ipratropium-Albuterol     4 g    INHALE 1 PUFF BY MOUTH FOUR TIMES DAILY. NOT TO EXCEED 6 DOSES PER DAY    Other emphysema (H)       * COMBIVENT RESPIMAT  MCG/ACT inhaler   Generic drug:  Ipratropium-Albuterol     1 Inhaler    Inhale 1 puff into the lungs 4 times daily May take 1 to 2 additional doses as needed during the day    Other emphysema (H)       doxycycline 100 MG capsule    VIBRAMYCIN    20 capsule    Take 1 capsule (100 mg) by mouth every 12 hours Give at 8 am and 8 pm    Lung infiltrate on CT       FLOVENT  MCG/ACT Inhaler   Generic drug:  fluticasone     36 g    INHALE 2 PUFFS INTO THE LUNGS TWICE DAILY    Other emphysema (H)       lactobacillus rhamnosus (GG) capsule     60 capsule    Take 1 capsule by mouth every morning    Diarrhea, unspecified type       lisinopril 5 MG tablet    PRINIVIL/ZESTRIL    30 tablet    Take 1 tablet (5 mg) by mouth daily        methylcellulose 500 MG Tabs tablet    CITRUCEL    14 each    Take 1 tablet (500 mg) by mouth daily as needed        mirtazapine 15 MG tablet    REMERON    135 tablet    TAKE 1.5 TABLETs (30 MG) BY MOUTH AT BEDTIME    Depression, major, recurrent, moderate (H)       QUEtiapine 50 MG tablet    SEROquel    90 tablet    TAKE 1 TABLET(50 MG) BY MOUTH AT BEDTIME    Moderate recurrent major depression (H)       ranitidine 75 MG tablet    ZANTAC    30 tablet    Take 1 tablet (75 mg) by mouth At Bedtime        venlafaxine 75 MG 24 hr capsule    EFFEXOR-XR    180 capsule    TAKE 1 CAPSULE BY MOUTH EVERY MORNING AND 1 CAPSULE EVERY NIGHT AT BEDTIME    Moderate recurrent major  depression (H)       * Notice:  This list has 6 medication(s) that are the same as other medications prescribed for you. Read the directions carefully, and ask your doctor or other care provider to review them with you.

## 2018-07-31 NOTE — TELEPHONE ENCOUNTER
Dr. Buchanan    Spoke with Frank at [facility]/  aerococcus urinae, and enterococ    Susceptibilities resistant to tetracycline listed as the only one resistant     Requested they fax the report to us    Joana Gandhi RN   [facility]

## 2018-07-31 NOTE — TELEPHONE ENCOUNTER
Reason for call:  Other   Patient called regarding (reason for call): call back  Additional comments: Frank, an RN at [facility], called to give results on a urine culture susceptibility that was done there. He would like a nurse to call him back and discuss the results.     Phone number to reach patient:  Other phone number:  [phone]    Best Time:  Any    Can we leave a detailed message on this number?  Not Applicable

## 2018-07-31 NOTE — NURSING NOTE
Reason For Visit:   Chief Complaint   Patient presents with     Consult     Left hand fx middle finger       Primary MD: Bharath Buchanan      Age: 72 year old    ?  No      There were no vitals taken for this visit.- Unable to obtain      Pain Assessment  Patient Currently in Pain: Denies               QuickDASH Assessment  QuickDASH Main 7/31/2018   1.Open a tight or new jar. Unable   2. Do heavy household chores (e.g., wash walls, floors) Unable   3. Carry a shopping bag or briefcase. Unable   4. Wash your back. Unable   5. Use a knife to cut food. Unable   6. Recreational activities in which you take some force or impact through your arm, shoulder or hand (e.g., golf, hammering, tennis, etc.). Unable   7. During the past week, to what extent has your arm, shoulder or hand problem interfered with your normal social activities with family, friends, neighbours or groups? Extremely   8. During the past week, were you limited in your work or other regular daily activities as a result of your arm, shoulder or hand problem? Unable   9. Arm, shoulder or hand pain. None   10.Tingling (pins and needles) in your arm,shoulder or hand. None   11. During the past week, how much difficulty have you had sleeping because of the pain in your arm, shoulder or hand? ([place] number) No difficulty   Quickdash Ability Score 72.72          Current Outpatient Prescriptions   Medication Sig Dispense Refill     amLODIPine (NORVASC) 5 MG tablet Take 1 tablet (5 mg) by mouth At Bedtime 90 tablet 3     aspirin 81 MG tablet Take by mouth twice a week       carbidopa-levodopa (SINEMET CR)  MG per CR tablet Take 1 tablet by mouth At Bedtime 1 tablet 0     carbidopa-levodopa (SINEMET)  MG per tablet Take 1-2 tablets by mouth 5 times daily Takes 2 tablets at 6 AM, 1.5 tablets at 9 AM, 2 tablets at noon, 1.5 tablets at 3 PM, 1.5 tablets at 6 PM. Can take additional 0.5 tablet in the middle of the night if needed. 90  tablet      cholecalciferol (VITAMIN D) 1000 UNIT tablet Take 1 tablet (1,000 Units) by mouth every morning 90 tablet 1     clonazePAM (KLONOPIN) 0.5 MG ODT tab Take 0.25 mg by mouth every other night 180 tablet 0     COMBIVENT RESPIMAT  MCG/ACT inhaler INHALE 1 PUFF BY MOUTH FOUR TIMES DAILY. NOT TO EXCEED 6 DOSES PER DAY 4 g 4     donepezil (ARICEPT) 10 MG tablet Take 5mg  by mouth twice a day (take with the 2.5 mg dose for a total dose of 7.5mg twice daily)  3     donepezil (ARICEPT) 5 MG tablet Take 2.5 mg twice a day (take with the 5 mg dose for a total dose of 7.5mg twice daily) 180 tablet 3     doxycycline (VIBRAMYCIN) 100 MG capsule Take 1 capsule (100 mg) by mouth every 12 hours Give at 8 am and 8 pm 20 capsule 0     FLOVENT  MCG/ACT Inhaler INHALE 2 PUFFS INTO THE LUNGS TWICE DAILY 36 g 1     Ipratropium-Albuterol (COMBIVENT RESPIMAT)  MCG/ACT inhaler Inhale 1 puff into the lungs 4 times daily May take 1 to 2 additional doses as needed during the day 1 Inhaler 4     lactobacillus rhamnosus, GG, (CULTURELL) capsule Take 1 capsule by mouth every morning 60 capsule 11     lisinopril (PRINIVIL/ZESTRIL) 5 MG tablet Take 1 tablet (5 mg) by mouth daily 30 tablet 1     methylcellulose (CITRUCEL) 500 MG TABS tablet Take 1 tablet (500 mg) by mouth daily as needed 14 each 0     mirtazapine (REMERON) 15 MG tablet TAKE 1.5 TABLETs (30 MG) BY MOUTH AT BEDTIME 135 tablet 1     QUEtiapine (SEROQUEL) 50 MG tablet TAKE 1 TABLET(50 MG) BY MOUTH AT BEDTIME 90 tablet 2     ranitidine (ZANTAC) 75 MG tablet Take 1 tablet (75 mg) by mouth At Bedtime 30 tablet      Sulfamethoxazole-Trimethoprim (BACTRIM PO)        venlafaxine (EFFEXOR-XR) 75 MG 24 hr capsule TAKE 1 CAPSULE BY MOUTH EVERY MORNING AND 1 CAPSULE EVERY NIGHT AT BEDTIME 180 capsule 0       Allergies   Allergen Reactions     Levaquin [Levofloxacin]      Fracture prevention     Seasonal Allergies        Haylie Bey LPN

## 2018-07-31 NOTE — PROGRESS NOTES
Chief complaint: Left hand pain    History of present illness: Sanjay Cano is a 72-year-old left-hand dominant male with past medical history including Parkinson's disease, dementia, COPD, GERD, depression and frequent UTIs presenting for evaluation of left hand pain ×5 days.  Patient is accompanied by his wife who assists in providing history given his aforementioned comorbidities.  The wife reports that she was with him at his care facility on the evening of July 25 when she had concerned that he was developing a urinary tract infection.  She reports that on July 26 she represented to the nursing home after being contacted by staff due to concern for delirious behavior.  He was diagnosed with the urinary tract infection at that time.  Upon her evaluation of the patient later that day there was concern for swelling and ecchymosis of his left hand.  He was referred to emergency department where x-rays were obtained and revealed a third metacarpal fracture.  There was an unknown time or incident that may have led to this fracture.  As previously mentioned, the patient was delirious from and ultimately diagnosed urinary tract infection which did require some restraint by the providers at the care facility.  Additionally he was known to be hitting out only other providers there but also things such as the wall or his bed.  Denies evidence of pain ecchymosis or swelling to other areas of the body.  Of note the patient is predominantly wheelchair bound although he does work with physical therapy on attempts to stand and walk.  The patient is highly dependent on his care facility and his wife.    Past Medical History:  Past Medical History:   Diagnosis Date     At risk for falling 9/3/2012     COPD (chronic obstructive pulmonary disease) (H)      Family history of thyroid cancer 7/13/2016     Malignant neoplasm (H)     skin - nose     Moderate recurrent major depression (H) 5/17/2012     Paralysis agitans (H)      Parkinson's Disease     Parkinson disease (H)      Rosacea      Past Surgical History:  Past Surgical History:   Procedure Laterality Date     EYE SURGERY       ORTHOPEDIC SURGERY       PHACOEMULSIFICATION CLEAR CORNEA WITH STANDARD INTRAOCULAR LENS IMPLANT  5/21/2014    Procedure: PHACOEMULSIFICATION CLEAR CORNEA WITH STANDARD INTRAOCULAR LENS IMPLANT;  Surgeon: Tomy Hunter MD;  Location: [facility]     PHACOEMULSIFICATION CLEAR CORNEA WITH TORIC INTRAOCULAR LENS IMPLANT  4/30/2014    Procedure: PHACOEMULSIFICATION CLEAR CORNEA WITH TORIC INTRAOCULAR LENS IMPLANT;  Surgeon: Tomy Hunter MD;  Location: [facility]      MEDICATIONS:    Current Outpatient Prescriptions:      amLODIPine (NORVASC) 5 MG tablet, Take 1 tablet (5 mg) by mouth At Bedtime, Disp: 90 tablet, Rfl: 3     aspirin 81 MG tablet, Take by mouth twice a week, Disp: , Rfl:      carbidopa-levodopa (SINEMET CR)  MG per CR tablet, Take 1 tablet by mouth At Bedtime, Disp: 1 tablet, Rfl: 0     carbidopa-levodopa (SINEMET)  MG per tablet, Take 1-2 tablets by mouth 5 times daily Takes 2 tablets at 6 AM, 1.5 tablets at 9 AM, 2 tablets at noon, 1.5 tablets at 3 PM, 1.5 tablets at 6 PM. Can take additional 0.5 tablet in the middle of the night if needed., Disp: 90 tablet, Rfl:      cholecalciferol (VITAMIN D) 1000 UNIT tablet, Take 1 tablet (1,000 Units) by mouth every morning, Disp: 90 tablet, Rfl: 1     clonazePAM (KLONOPIN) 0.5 MG ODT tab, Take 0.25 mg by mouth every other night, Disp: 180 tablet, Rfl: 0     COMBIVENT RESPIMAT  MCG/ACT inhaler, INHALE 1 PUFF BY MOUTH FOUR TIMES DAILY. NOT TO EXCEED 6 DOSES PER DAY, Disp: 4 g, Rfl: 4     donepezil (ARICEPT) 10 MG tablet, Take 5mg  by mouth twice a day (take with the 2.5 mg dose for a total dose of 7.5mg twice daily), Disp: , Rfl: 3     donepezil (ARICEPT) 5 MG tablet, Take 2.5 mg twice a day (take with the 5 mg dose for a total dose of 7.5mg twice daily), Disp: 180 tablet, Rfl: 3     doxycycline  (VIBRAMYCIN) 100 MG capsule, Take 1 capsule (100 mg) by mouth every 12 hours Give at 8 am and 8 pm, Disp: 20 capsule, Rfl: 0     FLOVENT  MCG/ACT Inhaler, INHALE 2 PUFFS INTO THE LUNGS TWICE DAILY, Disp: 36 g, Rfl: 1     Ipratropium-Albuterol (COMBIVENT RESPIMAT)  MCG/ACT inhaler, Inhale 1 puff into the lungs 4 times daily May take 1 to 2 additional doses as needed during the day, Disp: 1 Inhaler, Rfl: 4     lactobacillus rhamnosus, GG, (CULTURELL) capsule, Take 1 capsule by mouth every morning, Disp: 60 capsule, Rfl: 11     lisinopril (PRINIVIL/ZESTRIL) 5 MG tablet, Take 1 tablet (5 mg) by mouth daily, Disp: 30 tablet, Rfl: 1     methylcellulose (CITRUCEL) 500 MG TABS tablet, Take 1 tablet (500 mg) by mouth daily as needed, Disp: 14 each, Rfl: 0     mirtazapine (REMERON) 15 MG tablet, TAKE 1.5 TABLETs (30 MG) BY MOUTH AT BEDTIME, Disp: 135 tablet, Rfl: 1     QUEtiapine (SEROQUEL) 50 MG tablet, TAKE 1 TABLET(50 MG) BY MOUTH AT BEDTIME, Disp: 90 tablet, Rfl: 2     ranitidine (ZANTAC) 75 MG tablet, Take 1 tablet (75 mg) by mouth At Bedtime, Disp: 30 tablet, Rfl:      venlafaxine (EFFEXOR-XR) 75 MG 24 hr capsule, TAKE 1 CAPSULE BY MOUTH EVERY MORNING AND 1 CAPSULE EVERY NIGHT AT BEDTIME, Disp: 180 capsule, Rfl: 0    Allergies:  1.  Levaquin  2.  Seasonal allergies    Family history: Patient's wife denies family history of problems of bleeding, clotting or anesthesia.    Social history: The patient lives in the [facility] at a care facility.  He is .  He is retired.  Denies current smoking alcohol or illicit drug use.    Review of systems: 10 point review of systems is performed and negative other than that noted in the HPI.    Physical exam:  QuickDASH Score: 72.72  General: No acute distress, pleasant, cooperative with exam.  HEENT: Normocephalic atraumatic.  Cardio: Extremities warm and well-perfused.  Respiratory: Nonlabored breathing on room air.  Neuro: Patient moves all extremities  spontaneously.  He is tremulous in the clinic room today.  Psych: Flat affect.  Skin: No rashes or wounds noted on exposed skin.  Musculoskeletal: Focused examination of the left upper extremity reveals radial pulse 2+.  Sensation intact light touch in the median, radial and ulnar nerve distributions.  Patient does have significant ecchymosis to the dorsum and volar aspects of his hand.  He is associated swelling.  This is moderately tender to palpation.  He does have palpable crepitus and step-off along his third metacarpal.  He does have a maintained cascade when making a fist.  He is able to make a complete fist as well as extend his fingers.  He does have obvious shortening of the third metacarpal as compared to the other surrounding digits.  Wrist range of motion without discomfort.    Imaging: X-rays of the left hand obtained on July 27 as well as today are both reviewed and reveal evidence of a midshaft spiral third metacarpal fracture with 6 mm of shortening.  Overall well-maintained alignment on the lateral in terms of angulation.  No other acute bony abnormalities.    Assessment and plan: Sanjay Cano is a 72-year-old left-hand dominant male with past medical history including Parkinson's disease, dementia, COPD, GERD, depression and history of frequent UTIs presenting for evaluation of left hand pain after an unknown incident found to have a left third metacarpal spiral fracture with 6 mm of shortening.  The long discussion with the patient and his wife today regarding the treatment options.  Given the patient's functional demands overall alignment as well as medical comorbidities, feel that it is reasonable to treat this nonoperatively.  We will refer him to hand therapy today for a custom fracture brace to be made.  Should be worn at all times except for daily skin checks and hygiene.  He should be nonweightbearing with the left upper extremity but should be allowed to use this for activities of daily  living such as brushing his teeth feeding himself or picking something up that is less than 5 pounds.  We did discuss with the wife the recommendation to avoid any pushing, pulling, or grabbing onto something with the left hand such as when he is being transferred with a [place].  These restrictions were provided to the care facility.  All questions were answered today.    The patient was seen and discussed with Dr. Mercedes who is in agreement the above assessment and plan.    Sandra Morgan MD  Orthopedic Surgery Resident, PGY-4    I have independently seen and evaluated the patient and agree with the findings and plan of care as documented by the resident and edited by me.

## 2018-07-31 NOTE — MR AVS SNAPSHOT
After Visit Summary   7/31/2018    Sanjay Cano    MRN: [id number]           Patient Information     Date Of Birth          1945        Visit Information        Provider Department      7/31/2018 2:30 PM Marietta Mercedes MD [facility]         Follow-ups after your visit        Follow-up notes from your care team     Return in about 5 weeks (around 9/4/2018).      Your next 10 appointments already scheduled     Jul 31, 2018  4:00 PM CDT   (Arrive by 3:45 PM)   [facility] Hand with Flora Mack OT   [facility] ([facility])    [address]  [address] [facility] [address] [phone]              Who to contact     Please call your clinic at [phone] to:    Ask questions about your health    Make or cancel appointments    Discuss your medicines    Learn about your test results    Speak to your doctor            Additional Information About Your Visit        Care EveryWhere ID     This is your Care EveryWhere ID. This could be used by other organizations to access your [place] medical records  [id number]         Blood Pressure from Last 3 Encounters:   07/27/18 121/83   07/27/18 110/78   06/06/18 100/64    Weight from Last 3 Encounters:   06/06/18 78.3 kg (172 lb 11.2 oz)   01/26/18 53.1 kg (117 lb)   08/01/17 85.3 kg (188 lb)              Today, you had the following     No orders found for display       Primary Care Provider Office Phone # Fax #    Bharath Buchanan [phone]       [address] [facility] [address]  [facility] [address]        Equal Access to Services     [facility]: Hadii aad ara hadasho Sobonnie, waaxda luqadaha, qaybta kaalmada nataliya boudreaux . So [facility] [phone].    ATENCIÓN: Si habla español, tiene a herring disposición servicios gratuitos de asistencia lingüística. Llame al [phone].    We comply with applicable federal civil rights laws and  [place] laws. We do not discriminate on the basis of race, color, national origin, age, disability, sex, sexual orientation, or gender identity.            Thank you!     Thank you for choosing [facility]  for your care. Our goal is always to provide you with excellent care. Hearing back from our patients is one way we can continue to improve our services. Please take a few minutes to complete the written survey that you may receive in the mail after your visit with us. Thank you!             Your Updated Medication List - Protect others around you: Learn how to safely use, store and throw away your medicines at www.disposemymeds.org.          This list is accurate as of 7/31/18  3:40 PM.  Always use your most recent med list.                   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 Can take additional 0.5 tablet in the middle of the night if needed.        * carbidopa-levodopa  MG per CR tablet    SINEMET CR    1 tablet    Take 1 tablet by mouth At Bedtime        cholecalciferol 1000 UNIT tablet    vitamin D3    90 tablet    Take 1 tablet (1,000 Units) by mouth every morning    At risk  for falling       clonazePAM 0.5 MG ODT tab    klonoPIN    180 tablet    Take 0.25 mg by mouth every other night        * COMBIVENT RESPIMAT  MCG/ACT inhaler   Generic drug:  Ipratropium-Albuterol     4 g    INHALE 1 PUFF BY MOUTH FOUR TIMES DAILY. NOT TO EXCEED 6 DOSES PER DAY    Other emphysema (H)       * COMBIVENT RESPIMAT  MCG/ACT inhaler   Generic drug:  Ipratropium-Albuterol     1 Inhaler    Inhale 1 puff into the lungs 4 times daily May take 1 to 2 additional doses as needed during the day    Other emphysema (H)       doxycycline 100 MG capsule    VIBRAMYCIN    20 capsule    Take 1 capsule (100 mg) by mouth every 12 hours Give at 8 am and 8 pm    Lung infiltrate on CT       FLOVENT  MCG/ACT Inhaler   Generic drug:  fluticasone     36 g    INHALE 2 PUFFS INTO THE LUNGS TWICE DAILY    Other emphysema (H)       lactobacillus rhamnosus (GG) capsule     60 capsule    Take 1 capsule by mouth every morning    Diarrhea, unspecified type       lisinopril 5 MG tablet    PRINIVIL/ZESTRIL    30 tablet    Take 1 tablet (5 mg) by mouth daily        methylcellulose 500 MG Tabs tablet    CITRUCEL    14 each    Take 1 tablet (500 mg) by mouth daily as needed        mirtazapine 15 MG tablet    REMERON    135 tablet    TAKE 1.5 TABLETs (30 MG) BY MOUTH AT BEDTIME    Depression, major, recurrent, moderate (H)       QUEtiapine 50 MG tablet    SEROquel    90 tablet    TAKE 1 TABLET(50 MG) BY MOUTH AT BEDTIME    Moderate recurrent major depression (H)       ranitidine 75 MG tablet    ZANTAC    30 tablet    Take 1 tablet (75 mg) by mouth At Bedtime        venlafaxine 75 MG 24 hr capsule    EFFEXOR-XR    180 capsule    TAKE 1 CAPSULE BY MOUTH EVERY MORNING AND 1 CAPSULE EVERY NIGHT AT BEDTIME    Moderate recurrent major depression (H)       * Notice:  This list has 6 medication(s) that are the same as other medications prescribed for you. Read the directions carefully, and ask your doctor or other care provider to  review them with you.

## 2018-08-01 ENCOUNTER — TELEPHONE (OUTPATIENT)
Dept: FAMILY MEDICINE | Facility: CLINIC | Age: 73
End: 2018-08-01

## 2018-08-01 ENCOUNTER — TELEPHONE (OUTPATIENT)
Dept: ORTHOPEDICS | Facility: CLINIC | Age: 73
End: 2018-08-01

## 2018-08-01 RX ORDER — AMOXICILLIN AND CLAVULANATE POTASSIUM 500; 125 MG/1; MG/1
1 TABLET, FILM COATED ORAL 2 TIMES DAILY
Qty: 14 TABLET | Refills: 0 | Status: SHIPPED | OUTPATIENT
Start: 2018-08-01 | End: 2018-08-01 | Stop reason: ALTCHOICE

## 2018-08-01 NOTE — TELEPHONE ENCOUNTER
Pt's wife wants to know how severe pt's UTI was and if it could have been prevented. She can be reached @ [phone] soheila

## 2018-08-01 NOTE — TELEPHONE ENCOUNTER
Dr. Buchanan,    Called [facility]. Spoke with Haja. Notified of abx sent (Augmentin).    Per Jayne, pt has been on Septra 800-160 mg BID. Started on 07/26/18 for 7 day course. Ordered by NP at their facility for results of UA. He is requesting clarification if he should do the Augmentin in addition.     Haaj will fax UC results to our clinic.    Lita Mckeon RN  [facility]

## 2018-08-01 NOTE — TELEPHONE ENCOUNTER
Called Haja nurse, at [facility] to notify. He verbalized understanding.    Lita Mckeon RN  [facility]

## 2018-08-01 NOTE — TELEPHONE ENCOUNTER
[facility] Call Center    Phone Message    May a detailed message be left on voicemail: yes    Reason for Call: Other: Peri calling from [facility] about the weight bearing status. Is it just the hand/wrist or the whole upper extremity?      Action Taken: Message routed to:  [facility] ([facility] Orthopedics

## 2018-08-01 NOTE — TELEPHONE ENCOUNTER
Dr. Buchanan    Pt has been getting UTI about every 6 months for the past year  Wife is wondering if he should be on a med to prevent them    She had spoken with the [facility] and they are working on putting together a conference with the facility, wife and the rep. She has a lot of concerns with the care the pt is getting at the facility    Home care advise, assure pt is drinking adequate hydration    Advise    Joana Gandhi RN   [facility]

## 2018-08-01 NOTE — TELEPHONE ENCOUNTER
Spoke with pt wife detailed message given, she understood and agreed    Joana Gandhi RN   [facility]

## 2018-08-02 ENCOUNTER — TELEPHONE (OUTPATIENT)
Dept: FAMILY MEDICINE | Facility: CLINIC | Age: 73
End: 2018-08-02

## 2018-08-02 NOTE — TELEPHONE ENCOUNTER
Reason for call:  Other   Patient called regarding (reason for call): call back  Additional comments: The patients wife called and stated that he would be finishing the round of antibiotics that Dr. Buchanan had prescribed him for his UTI tomorrow. She was unsure if there needed to be any follow up after he is done taking them. She would like a call back to discuss if he should be brought in or what the next step should be.    Phone number to reach patient:  Home number on file [phone] (home)    Best Time: Any    Can we leave a detailed message on this number?  YES

## 2018-08-02 NOTE — TELEPHONE ENCOUNTER
Called spouse, Caren. LVM to call clinic.     Per LOV with ortho-- return in 5 weeks. Follow up with Dr. Buchanan as needed.    Lita Mckeon RN  [facility]

## 2018-08-06 ENCOUNTER — TELEPHONE (OUTPATIENT)
Dept: FAMILY MEDICINE | Facility: CLINIC | Age: 73
End: 2018-08-06

## 2018-08-06 ENCOUNTER — TRANSFERRED RECORDS (OUTPATIENT)
Dept: HEALTH INFORMATION MANAGEMENT | Facility: CLINIC | Age: 73
End: 2018-08-06

## 2018-08-06 DIAGNOSIS — R05.8 RECURRENT COUGH: Primary | ICD-10-CM

## 2018-08-06 PROBLEM — S62.309A CLOSED FRACTURE OF METACARPAL BONE: Status: RESOLVED | Noted: 2018-07-31 | Resolved: 2018-08-06

## 2018-08-06 NOTE — TELEPHONE ENCOUNTER
Reason for call:  Symptom   Symptom or request: Pt's wife states that he has a congested cough and a sore right side. Has a history of pneumonia. She is concerned and is wondering if he needs to be seen.     Duration (how long have symptoms been present): a couple of days  Have you been treated for this before? No    Additional comments:     Phone number to reach patient:  [phone]    Best Time:  any    Can we leave a detailed message on this number?  YES

## 2018-08-06 NOTE — TELEPHONE ENCOUNTER
Dr. Buchanan    Wife pt has a cough she noticed it today is worse, he had a bit yesterday, when she set him up he,  he complained of the right side along his rib cage hurting, wife doesn't see any bruising    Sat 96, pulse 67 reg., 97.1 tempanic    Spoke with Sayda  direct line ok to leave VM    Should we do a xray, they can do it in the facility with an order    Order cued, if you agree  will need to be faxed to facility and call Sayda to let him know order was placed    Joana Gandhi RN   [facility]

## 2018-08-07 ENCOUNTER — TELEPHONE (OUTPATIENT)
Dept: FAMILY MEDICINE | Facility: CLINIC | Age: 73
End: 2018-08-07

## 2018-08-07 DIAGNOSIS — F33.1 MODERATE RECURRENT MAJOR DEPRESSION (H): Primary | ICD-10-CM

## 2018-08-07 DIAGNOSIS — F33.1 MODERATE RECURRENT MAJOR DEPRESSION (H): ICD-10-CM

## 2018-08-07 RX ORDER — QUETIAPINE FUMARATE 50 MG/1
25 TABLET, FILM COATED ORAL 2 TIMES DAILY
Qty: 180 TABLET | Refills: 0 | Status: SHIPPED | OUTPATIENT
Start: 2018-08-07 | End: 2018-11-27

## 2018-08-07 NOTE — TELEPHONE ENCOUNTER
Agree important to get patient back into day program and need to help him be less sleepy. Suggest reducing seroquel from 50 mg  to 25 mg at bedtime, and give 25 mg 2pm dose at 3pm instead. Changes may reduce his sleepiness. If not better within a week, may need to change antidepressant from seroquel to a less sedating alternative.. Please notify spouse and nursing home. Thanks Bharath

## 2018-08-07 NOTE — TELEPHONE ENCOUNTER
Reason for call:  Other   Patient called regarding (reason for call): call back  Additional comments: Alisa with [facility] called regarding an order that was sent there for a patient. She did not understand if Dr. Buchanan wanted him to have another chest x-ray or what he wanted to have done. She would like a call back to discuss this.    Phone number to reach patient:  Other phone number: [phone]    Best Time: Any    Can we leave a detailed message on this number?  YES

## 2018-08-07 NOTE — TELEPHONE ENCOUNTER
Order faxed to facility,  left for Koala that it was faxed    Joana Gandhi RN   [facility]

## 2018-08-07 NOTE — TELEPHONE ENCOUNTER
Phone call from nursing home  pts wife states pt was c/o right sided rib pain  Pt denies any, his VS are stable with pulse 67 and O2 sats at 96% per nursing  One cough heard by nursing.   Will do cxr as pt has h/o pneumonia per nursing.     Oxana Glynn MD

## 2018-08-07 NOTE — TELEPHONE ENCOUNTER
Spoke with Adia at the [facility], they had gotten an order from the O[facility] MD for the chest xray, the wife was insistent it be done yesterday.   Not knowing they had done it last night, Dr. Jackson order was sent this AM, another xray is not needed  They will fax result to us    Joana Gandhi RN   [facility]

## 2018-08-07 NOTE — TELEPHONE ENCOUNTER
Dr. Buchanan,    Recommended to spouse to schedule OV with you for discussion of pt's disposition and depression, but spouse declined to schedule. She states that she has to take metro mobility because she is not able to drive and she has already cancelled a lot of appointments for herself that she needs to make up.    Spoke with pt's spouse, Caren. She stated that pt is taking Seroquel-- pt takes 50 mg at bedtime and 12.5 mg at 2:00 pm. He is being taken out of the day program (day program is 9-3). They bring him up at 1:30. By 2:00 pm he is tired and slumped over, so they take him out to rest. She was wondering if medication is contributing. Discussed that his medications most likely are or could be contributing. She does not want him to be leaving the day program.     Per spouse, Seroquel dose was increased because of pt's depression. She states that he is very depressed lately. Per spouse, pt told wife yesterday that he was so depressed about being there. Pt stated to wife that he is going to dye there at the facility under his circumstances. Writer recommended several times that spouse schedule appt for the patient with Dr. Buchanan to discuss his depression and medications. Spouse declined to schedule. Spouse states that he then seems to have more trouble when going to see a doctor and she doesn't think that he really understands.    Spouse discussed with writer pt's recent UTI and hand fracture. Stated that a nurse at the facility had mentioned the pt receiving prophylactic treatment to prevent UTI, but after discussion with PCP, this not recommended. She stated that she does not trust the providers at the pt's facility. Per spouse, sometimes pt is very coherent. Other times he has trouble with conversations.    Spouse stated that she preferred the PT/rehab at [facility] in 2016-- they had him walking/moving and exercising. Now he is getting therapy where he remains in the wheelchair. Per spouse, she had to call  [facility] just to get therapy for pt.  Pt's wife stated that her expectation is that pt is getting therapy to prepare to come home. She is considering trying to transition him to her home so that she can care for this. She stated that some of her family disagree with this. Spouse states that pt recently has two unexplained injuries, she has involved [facility] for this. Spouse asking about x-rays for pt's ribs-- see TE yesterday 08/06/18, order was faxed to facility.    Lita Mckeon RN  [facility]

## 2018-08-07 NOTE — TELEPHONE ENCOUNTER
Reason for call:  Other   Patient called regarding (reason for call): call back  Additional comments: Pt stated that she needed to speak with Joana to update her about how Sanjay is doing since the doctor increased the dose of one of his medications a couple of weeks ago. She stated that it has been making him very sleepy after lunch, so they pulled him from the day program and had him brought to bed on the 2nd floor, which is he very upset about.     Phone number to reach patient:  Home number on file [phone] (home)    Best Time:  Any    Can we leave a detailed message on this number?  YES

## 2018-08-13 ENCOUNTER — TELEPHONE (OUTPATIENT)
Dept: FAMILY MEDICINE | Facility: CLINIC | Age: 73
End: 2018-08-13

## 2018-08-13 NOTE — TELEPHONE ENCOUNTER
Reason for call:  Other   Patient called regarding (reason for call): call back and prescription  Additional comments: Pt's wife states that Sanjay is currently taking two 325 mg tabs of Ibuprofen (or Tylenol, she isn't sure) 3 times a day for pain. She stated that she thought it was prescribed because of the hand fracture, but since they didn't find anything on the x-ray and he says he isn't in pain anymore, whether he should still be taking it. When she questioned the people at the nursing home about it, they said they have to give it to him because it was prescribed by the provider and that he doesn't have any pain because of the medication. She wants to make sure it's safe for him to continue taking that much of it for an extended period of time. She thought that either Dr. Buchanan, the ED provider, or Dr. Mercedes prescribed it, but it was not listed on the patient's current or inactive medication list (Ibuprofen or Tylenol).     Phone number to reach patient:  Home number on file [phone] (home)    Best Time:  Any    Can we leave a detailed message on this number?  YES

## 2018-08-13 NOTE — TELEPHONE ENCOUNTER
Called and spoke with nurse at [facility]. After beginning conversation, he stated that he was going to transfer me to the manager. Transferred to Sayda, nurse manager. Left voice message requesting call back to clinic.    Lita Mckeon RN  [facility]

## 2018-08-13 NOTE — TELEPHONE ENCOUNTER
Dr. Buchanan,    Spoke with Sayda, nurse manager at [facility]. TO given for new Seroquel dosing. He is requesting a written order to be faxed to their facility.     New order cued. Please print and sign and we will fax to [phone].    Lita Mckeon RN  [facility]

## 2018-08-13 NOTE — TELEPHONE ENCOUNTER
Called patient's spouse, Caren. Spouse stated that she spoke to the nurse who told her that if they didn't give the patient the tylenol then he would be in pain. Unsure if their order, is PRN or not. Called facility to speak to nurse for pt. Was transferred by nurse to nurse manager, Sayda. LVM to call clinic.    Lita Mckeon RN  [facility]

## 2018-08-13 NOTE — TELEPHONE ENCOUNTER
Spoke with nurse manager, Sayda. He stated that they have a current order to give tylenol daily. It is not a PRN order. Writer gave TO from Dr. Buchanan-- see TE 07/27/18 for PRN tylenol and verbal to discontinue the daily tylenol order.    Lita Mckeon RN  [facility]

## 2018-08-14 ENCOUNTER — TELEPHONE (OUTPATIENT)
Dept: FAMILY MEDICINE | Facility: CLINIC | Age: 73
End: 2018-08-14

## 2018-08-14 RX ORDER — QUETIAPINE FUMARATE 25 MG/1
TABLET, FILM COATED ORAL
Qty: 60 TABLET | Refills: 11 | Status: ON HOLD | OUTPATIENT
Start: 2018-08-14 | End: 2018-08-21

## 2018-08-14 NOTE — TELEPHONE ENCOUNTER
"Reason for call:  Other   Patient called regarding (reason for call): call back and prescription  Additional comments: Pt's wife stated that Dr. Buchanan had changed the prescription for Seroquel for Sanjay because he was getting very tired in the afternoons, after lunch time, and was being brought upstairs instead of being taken to his day program. Danuta thought that he would be able to resume going to the day program, but when she talked to the director she said that she was told that \"Frankly, Danuta, we have too hard of a time getting him to and from the bathroom, so we're going to continue bringing him upstairs. She now isn't certain if the reason they took him out of the day program was because he actually was tired after lunch or if they just didn't want to have to take him to the bathroom because it was too difficult. She wants to know if the medication change is actually necessary if that's the case, however she did state that he has been complaining of being tired and wanting to lie down for a few hours in the afternoon. If Dr. Buchanan wants to try it out for a week to see if it makes a difference then she will do that too.     Phone number to reach patient:  Home number on file [phone] (home)    Best Time:  Before 1230, she will be going to the nursing home then    Can we leave a detailed message on this number?  YES    "

## 2018-08-14 NOTE — TELEPHONE ENCOUNTER
Spoke with pt wife, will keep the change for now to see if it makes a difference    Joana Gandhi RN   [facility]

## 2018-08-14 NOTE — TELEPHONE ENCOUNTER
seroquel dosing order faxed to [facility]    Joana Gandhi RN   [facility]

## 2018-08-15 ENCOUNTER — TELEPHONE (OUTPATIENT)
Dept: FAMILY MEDICINE | Facility: CLINIC | Age: 73
End: 2018-08-15

## 2018-08-15 DIAGNOSIS — F51.01 PRIMARY INSOMNIA: Primary | ICD-10-CM

## 2018-08-15 RX ORDER — CLONAZEPAM 0.5 MG/1
TABLET, ORALLY DISINTEGRATING ORAL
Qty: 45 TABLET | Refills: 0 | Status: SHIPPED | OUTPATIENT
Start: 2018-08-15 | End: 2018-09-12

## 2018-08-15 NOTE — TELEPHONE ENCOUNTER
Dr. Buchanan,    Fax received from patient's nursing home, [facility] requesting a new prescription for clonazepam (Klonopin) 0.5 mg tablet, take 1/2 tab every other day for insomnia.    Prescription cued.     Please review/sign or advise.    Lita Mckeon RN  [facility]

## 2018-08-16 ENCOUNTER — THERAPY VISIT (OUTPATIENT)
Dept: OCCUPATIONAL THERAPY | Facility: CLINIC | Age: 73
End: 2018-08-16
Payer: COMMERCIAL

## 2018-08-16 ENCOUNTER — NURSE TRIAGE (OUTPATIENT)
Dept: NURSING | Facility: CLINIC | Age: 73
End: 2018-08-16

## 2018-08-16 DIAGNOSIS — S62.309A CLOSED FRACTURE OF METACARPAL BONE: Primary | ICD-10-CM

## 2018-08-16 NOTE — TELEPHONE ENCOUNTER
Spouse calling with questions regarding cast to left hand.  Patient is currently residing at [facility].  Patient has appointment this date at 4PM to have cast evaluated as patient has been trying to remove it and it is irritating skin.  The nurse manager at [facility] suggested patient having an x-ray.  Spouse asking how to obtain x-ray.  FNA informed provider at today's appointment will determine.  Spouse also asking who she should contact after hours/on weekends for any concerns regarding cast/left hand.  FNA advised spouse to ask provider at today's appointment.

## 2018-08-17 ENCOUNTER — TELEPHONE (OUTPATIENT)
Dept: ORTHOPEDICS | Facility: CLINIC | Age: 73
End: 2018-08-17

## 2018-08-17 PROBLEM — S62.309A CLOSED FRACTURE OF METACARPAL BONE: Status: ACTIVE | Noted: 2018-08-17

## 2018-08-17 NOTE — TELEPHONE ENCOUNTER
[facility]    Phone Message    May a detailed message be left on voicemail: yes    Reason for Call: Other: Manoj from [facility] called stating he doesn't know how to transfer pt as there are strict orders from Dr. Mercedes. He would like copy of most recent visit report sent to [phone]. Please call back.     Action Taken: Message routed to:  [facility] Ortho

## 2018-08-20 ENCOUNTER — APPOINTMENT (OUTPATIENT)
Dept: GENERAL RADIOLOGY | Facility: CLINIC | Age: 73
End: 2018-08-20
Attending: EMERGENCY MEDICINE
Payer: MEDICARE

## 2018-08-20 ENCOUNTER — NURSE TRIAGE (OUTPATIENT)
Dept: NURSING | Facility: CLINIC | Age: 73
End: 2018-08-20

## 2018-08-20 ENCOUNTER — TELEPHONE (OUTPATIENT)
Dept: FAMILY MEDICINE | Facility: CLINIC | Age: 73
End: 2018-08-20

## 2018-08-20 ENCOUNTER — HOSPITAL ENCOUNTER (OUTPATIENT)
Facility: CLINIC | Age: 73
Setting detail: OBSERVATION
Discharge: NURSING, REHAB OR LONG-TERM CARE | End: 2018-08-21
Attending: EMERGENCY MEDICINE | Admitting: EMERGENCY MEDICINE
Payer: MEDICARE

## 2018-08-20 DIAGNOSIS — J44.1 CHRONIC OBSTRUCTIVE PULMONARY DISEASE WITH ACUTE EXACERBATION (H): ICD-10-CM

## 2018-08-20 DIAGNOSIS — R91.8 LUNG INFILTRATE ON CT: ICD-10-CM

## 2018-08-20 DIAGNOSIS — I10 ESSENTIAL HYPERTENSION WITH GOAL BLOOD PRESSURE LESS THAN 140/90: ICD-10-CM

## 2018-08-20 DIAGNOSIS — J18.9 PNEUMONIA: ICD-10-CM

## 2018-08-20 DIAGNOSIS — Z87.891 PERSONAL HISTORY OF TOBACCO USE, PRESENTING HAZARDS TO HEALTH: ICD-10-CM

## 2018-08-20 DIAGNOSIS — R05.8 RECURRENT COUGH: Primary | ICD-10-CM

## 2018-08-20 DIAGNOSIS — J18.9 PNEUMONIA DUE TO INFECTIOUS ORGANISM, UNSPECIFIED LATERALITY, UNSPECIFIED PART OF LUNG: ICD-10-CM

## 2018-08-20 DIAGNOSIS — J44.1 COPD EXACERBATION (H): Primary | ICD-10-CM

## 2018-08-20 DIAGNOSIS — F33.1 DEPRESSION, MAJOR, RECURRENT, MODERATE (H): ICD-10-CM

## 2018-08-20 LAB
ANION GAP SERPL CALCULATED.3IONS-SCNC: 8 MMOL/L (ref 3–14)
BASOPHILS # BLD AUTO: 0 10E9/L (ref 0–0.2)
BASOPHILS NFR BLD AUTO: 0.1 %
BUN SERPL-MCNC: 32 MG/DL (ref 7–30)
CALCIUM SERPL-MCNC: 8.2 MG/DL (ref 8.5–10.1)
CHLORIDE SERPL-SCNC: 108 MMOL/L (ref 94–109)
CO2 SERPL-SCNC: 27 MMOL/L (ref 20–32)
CREAT SERPL-MCNC: 1.14 MG/DL (ref 0.66–1.25)
DIFFERENTIAL METHOD BLD: ABNORMAL
EOSINOPHIL # BLD AUTO: 0.2 10E9/L (ref 0–0.7)
EOSINOPHIL NFR BLD AUTO: 3.4 %
ERYTHROCYTE [DISTWIDTH] IN BLOOD BY AUTOMATED COUNT: 12.5 % (ref 10–15)
GFR SERPL CREATININE-BSD FRML MDRD: 63 ML/MIN/1.7M2
GLUCOSE SERPL-MCNC: 103 MG/DL (ref 70–99)
HCT VFR BLD AUTO: 34.8 % (ref 40–53)
HGB BLD-MCNC: 11.8 G/DL (ref 13.3–17.7)
IMM GRANULOCYTES # BLD: 0 10E9/L (ref 0–0.4)
IMM GRANULOCYTES NFR BLD: 0.1 %
LYMPHOCYTES # BLD AUTO: 0.7 10E9/L (ref 0.8–5.3)
LYMPHOCYTES NFR BLD AUTO: 9.7 %
MCH RBC QN AUTO: 31.9 PG (ref 26.5–33)
MCHC RBC AUTO-ENTMCNC: 33.9 G/DL (ref 31.5–36.5)
MCV RBC AUTO: 94 FL (ref 78–100)
MONOCYTES # BLD AUTO: 0.8 10E9/L (ref 0–1.3)
MONOCYTES NFR BLD AUTO: 11 %
NEUTROPHILS # BLD AUTO: 5.3 10E9/L (ref 1.6–8.3)
NEUTROPHILS NFR BLD AUTO: 75.7 %
NRBC # BLD AUTO: 0 10*3/UL
NRBC BLD AUTO-RTO: 0 /100
NT-PROBNP SERPL-MCNC: 368 PG/ML (ref 0–900)
PLATELET # BLD AUTO: 182 10E9/L (ref 150–450)
POTASSIUM SERPL-SCNC: 3.9 MMOL/L (ref 3.4–5.3)
PROCALCITONIN SERPL-MCNC: <0.05 NG/ML
RBC # BLD AUTO: 3.7 10E12/L (ref 4.4–5.9)
SODIUM SERPL-SCNC: 142 MMOL/L (ref 133–144)
TROPONIN I SERPL-MCNC: <0.015 UG/L (ref 0–0.04)
WBC # BLD AUTO: 7 10E9/L (ref 4–11)

## 2018-08-20 PROCEDURE — 25000125 ZZHC RX 250: Performed by: EMERGENCY MEDICINE

## 2018-08-20 PROCEDURE — 84145 PROCALCITONIN (PCT): CPT | Performed by: EMERGENCY MEDICINE

## 2018-08-20 PROCEDURE — 71046 X-RAY EXAM CHEST 2 VIEWS: CPT

## 2018-08-20 PROCEDURE — 96375 TX/PRO/DX INJ NEW DRUG ADDON: CPT | Performed by: EMERGENCY MEDICINE

## 2018-08-20 PROCEDURE — 96365 THER/PROPH/DIAG IV INF INIT: CPT | Performed by: EMERGENCY MEDICINE

## 2018-08-20 PROCEDURE — 94640 AIRWAY INHALATION TREATMENT: CPT | Performed by: EMERGENCY MEDICINE

## 2018-08-20 PROCEDURE — 25000132 ZZH RX MED GY IP 250 OP 250 PS 637: Mod: GY | Performed by: EMERGENCY MEDICINE

## 2018-08-20 PROCEDURE — 99220 ZZC INITIAL OBSERVATION CARE,LEVL III: CPT | Mod: 25 | Performed by: PHYSICIAN ASSISTANT

## 2018-08-20 PROCEDURE — 93005 ELECTROCARDIOGRAM TRACING: CPT | Performed by: EMERGENCY MEDICINE

## 2018-08-20 PROCEDURE — 80048 BASIC METABOLIC PNL TOTAL CA: CPT | Performed by: EMERGENCY MEDICINE

## 2018-08-20 PROCEDURE — 25000128 H RX IP 250 OP 636: Performed by: EMERGENCY MEDICINE

## 2018-08-20 PROCEDURE — A9270 NON-COVERED ITEM OR SERVICE: HCPCS | Mod: GY | Performed by: EMERGENCY MEDICINE

## 2018-08-20 PROCEDURE — 85025 COMPLETE CBC W/AUTO DIFF WBC: CPT | Performed by: EMERGENCY MEDICINE

## 2018-08-20 PROCEDURE — 83880 ASSAY OF NATRIURETIC PEPTIDE: CPT | Performed by: EMERGENCY MEDICINE

## 2018-08-20 PROCEDURE — 99285 EMERGENCY DEPT VISIT HI MDM: CPT | Mod: 25 | Performed by: EMERGENCY MEDICINE

## 2018-08-20 PROCEDURE — 93010 ELECTROCARDIOGRAM REPORT: CPT | Mod: Z6 | Performed by: EMERGENCY MEDICINE

## 2018-08-20 PROCEDURE — 84484 ASSAY OF TROPONIN QUANT: CPT | Performed by: EMERGENCY MEDICINE

## 2018-08-20 RX ORDER — METHYLPREDNISOLONE SODIUM SUCCINATE 125 MG/2ML
125 INJECTION, POWDER, LYOPHILIZED, FOR SOLUTION INTRAMUSCULAR; INTRAVENOUS ONCE
Status: COMPLETED | OUTPATIENT
Start: 2018-08-20 | End: 2018-08-20

## 2018-08-20 RX ORDER — VENLAFAXINE HYDROCHLORIDE 37.5 MG/1
37.5 CAPSULE, EXTENDED RELEASE ORAL EVERY MORNING
Status: ON HOLD | COMMUNITY
End: 2019-01-01

## 2018-08-20 RX ORDER — AMOXICILLIN 250 MG
1 CAPSULE ORAL DAILY
COMMUNITY
End: 2018-01-01

## 2018-08-20 RX ORDER — IPRATROPIUM BROMIDE AND ALBUTEROL SULFATE 2.5; .5 MG/3ML; MG/3ML
3 SOLUTION RESPIRATORY (INHALATION) ONCE
Status: COMPLETED | OUTPATIENT
Start: 2018-08-20 | End: 2018-08-20

## 2018-08-20 RX ORDER — VENLAFAXINE HYDROCHLORIDE 75 MG/1
75 CAPSULE, EXTENDED RELEASE ORAL AT BEDTIME
Status: ON HOLD | COMMUNITY
End: 2019-01-01

## 2018-08-20 RX ORDER — PIPERACILLIN SODIUM, TAZOBACTAM SODIUM 4; .5 G/20ML; G/20ML
4.5 INJECTION, POWDER, LYOPHILIZED, FOR SOLUTION INTRAVENOUS ONCE
Status: COMPLETED | OUTPATIENT
Start: 2018-08-20 | End: 2018-08-20

## 2018-08-20 RX ORDER — CARBIDOPA AND LEVODOPA 50; 200 MG/1; MG/1
1 TABLET, EXTENDED RELEASE ORAL ONCE
Status: COMPLETED | OUTPATIENT
Start: 2018-08-20 | End: 2018-08-20

## 2018-08-20 RX ADMIN — CARBIDOPA AND LEVODOPA 1 TABLET: 50; 200 TABLET, EXTENDED RELEASE ORAL at 21:47

## 2018-08-20 RX ADMIN — METHYLPREDNISOLONE SODIUM SUCCINATE 125 MG: 125 INJECTION, POWDER, LYOPHILIZED, FOR SOLUTION INTRAMUSCULAR; INTRAVENOUS at 20:52

## 2018-08-20 RX ADMIN — PIPERACILLIN SODIUM,TAZOBACTAM SODIUM 4.5 G: 4; .5 INJECTION, POWDER, FOR SOLUTION INTRAVENOUS at 21:10

## 2018-08-20 RX ADMIN — IPRATROPIUM BROMIDE AND ALBUTEROL SULFATE 3 ML: .5; 3 SOLUTION RESPIRATORY (INHALATION) at 19:46

## 2018-08-20 ASSESSMENT — ENCOUNTER SYMPTOMS
ARTHRALGIAS: 0
EYE REDNESS: 0
NECK STIFFNESS: 0
FEVER: 1
ABDOMINAL PAIN: 0
SHORTNESS OF BREATH: 1
DIFFICULTY URINATING: 0
CHEST TIGHTNESS: 1
HEADACHES: 0
COUGH: 1
WHEEZING: 1
CONFUSION: 0
COLOR CHANGE: 0
VOMITING: 0

## 2018-08-20 NOTE — TELEPHONE ENCOUNTER
Dr. Buchanan,    Please see phone message below.     Cued x-ray if recommended.     Please review/sign or advise.    Lita Mckeon RN  [facility]

## 2018-08-20 NOTE — TELEPHONE ENCOUNTER
Reason for call:  Other   Patient called regarding (reason for call): Patient update  Additional comments: Manoj with [facility] called to give Dr. Buchanan an update on the patient. He stated the patient has been coughing and he has a temperature of 101 and has also been wheezing. He stated that if Dr. Buchanan wanted anything done he can give them a call.    Phone number to reach patient:  Other phone number: [phone]    Best Time: Any    Can we leave a detailed message on this number?  YES

## 2018-08-20 NOTE — IP AVS SNAPSHOT
Sanjay Cano #[id number] (CSN:[id number])  (72 year old M)  (Adm: 18)     [id number]               UNIT [address]D OBSERVATION [facility] BANK: [phone]            Patient Demographics     Patient Name Sex          Age SSN Address Phone    Sanjay Cano Male 1945 (72 year old) [id number]  [facility] [address] [phone] (Home)      Emergency Contact(s)     Name Relation Home Work Mobile    Caren Cano Spouse [phone]      Fredy Cano Son [phone]        Admission Information     Attending Provider Admitting Provider Admission Type Admission Date/Time    Adam Clay MD Klos, Steven E, MD Emergency 18  1902    Discharge Date Hospital Service Auth/Cert Status Service Area     Emergency Medicine [facility]    Unit Room/Bed Admission Status       UU U6D OBSERVATION 0565/6510-01 Admission (Confirmed)       Admission     Complaint    Pneumonia      Hospital Account     Name Acct ID Class Status Primary Coverage    Sanjay Cano [id number] Observation Open MEDICARE - MEDICARE FOR HB SUPPLEMENT            Guarantor Account (for Hospital Account #[id number])     Name Relation to Pt Service Area Active? Acct Type    Sanjay Cano Self FCS Yes Personal/Family    Address Phone          [address] [place] [address] [phone](H)              Coverage Information (for Hospital Account #[id number])     1. MEDICARE/MEDICARE FOR HB SUPPLEMENT     F/O Payor/Plan Precert #    MEDICARE/MEDICARE FOR HB SUPPLEMENT     Subscriber Subscriber #    Sanjay Cano [id number]    Address Phone    ATTN CLAIMS  [address]  [place] [address] [phone]          2. MEDICA/MEDICA PRIME SOLUTION     F/O Payor/Plan Precert #    MEDICA/MEDICA PRIME SOLUTION     Subscriber Subscriber #    Sanjay Cano [id number]    Address Phone    [address] [address]  [place] [address] [phone]          3. MEDICA/MEDICA  "CHOICE CARE MSC PLUS     F/O Payor/Plan Precert #    MEDICA/MEDICA CHOICE CARE MSC PLUS     Subscriber Subscriber #    Sanjay Cano [id number]    Address Phone    [address] [address]  [place] [address] [phone]                                                      INTERAGENCY TRANSFER FORM - PHYSICIAN ORDERS   8/20/2018                       UNIT 6D OBSERVATION [facility]: [phone]            Attending Provider: Adam Clay MD     Allergies:  Erythromycin, Levaquin [Levofloxacin], Seasonal Allergies    Infection:  None   Service:  EMERGENCY ME    Ht:  1.88 m (6' 2\")   Wt:  78 kg (172 lb)   Admission Wt:  78 kg (172 lb)    BMI:  22.08 kg/m 2   BSA:  2.02 m 2            ED Clinical Impression     Diagnosis Description Comment Added By Time Added    Pneumonia [J18.9] Pneumonia [J18.9]  Jose Carlos Tilley MD 8/20/2018  8:58 PM      Hospital Problems as of 8/21/2018              Priority Class Noted POA    Pneumonia Medium  8/21/2018 Yes      Non-Hospital Problems as of 8/21/2018              Priority Class Noted    Rosacea Medium  Unknown    Moderate recurrent major depression (H) Medium  5/17/2012    Health Care Home Medium  6/12/2012    Advanced directives, counseling/discussion Medium  8/29/2012    At risk for falling Medium  9/3/2012    CARDIOVASCULAR SCREENING; LDL GOAL LESS THAN 130 Medium  12/21/2012    Urinary frequency Medium  5/28/2014    Constipation Medium  5/29/2014    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) Medium  11/4/2014    Primary insomnia Medium  11/20/2015    Other emphysema (H) Medium  3/2/2016    Pulmonary nodules Medium  7/12/2016    Thyroid nodule Medium  7/12/2016    Family history of thyroid cancer Medium  7/13/2016    H/O recurrent urinary tract infection Medium  8/26/2016    Essential hypertension with goal blood pressure less than 140/90 Medium  8/31/2016    Senile purpura (H) Medium  10/26/2016    Nocturnal cough Medium  12/5/2016    " Parkinson's disease (H) Medium  3/5/2018    Recurrent productive cough Medium  3/21/2018    Closed displaced fracture of shaft of third metacarpal bone of left hand, initial encounter Medium  7/27/2018    Closed fracture of metacarpal bone Medium  8/17/2018      Code Status History     Date Active Date Inactive Code Status Order ID Comments User Context    8/21/2018  1:25 PM  Full Code [id number]  Chanel Calderon APRN CNP Outpatient    8/21/2018  1:09 AM 8/21/2018  1:25 PM Full Code [id number]  Myranda Chambers PA-C ED    1/26/2018  6:37 PM 1/27/2018  9:38 PM Full Code [id number]  Myranda Chambers PA-C Inpatient    7/5/2016 10:39 PM 1/26/2018  6:37 PM Full Code [id number]  Eder Solares MD Outpatient    6/28/2016  4:54 PM 7/5/2016 10:39 PM Full Code [id number]  Wanda Arredondo MD Inpatient    6/10/2012  5:59 PM 6/11/2012  5:06 PM Full Code [id number]  Rekha Stanley NP Inpatient    11/7/2011 10:17 AM 6/10/2012  5:59 PM Full Code [id number]  Taina Ojeda Outpatient    10/28/2011 12:37 PM 11/7/2011 10:17 AM Full Code [id number]  Emily Peterson Outpatient      Current Code Status     Date Active Code Status Order ID Comments User Context       Prior      Summary of Visit     Reason for your hospital stay       Pneumonia/COPD exacerbation                Medication Review      START taking        Dose / Directions Comments    guaiFENesin 20 mg/mL Soln solution   Commonly known as:  ROBITUSSIN   Used for:  Pneumonia due to infectious organism, unspecified laterality, unspecified part of lung        Dose:  10 mL   Take 10 mLs by mouth every 4 hours as needed for cough   Quantity:  1200 mL   Refills:  0        predniSONE 20 MG tablet   Commonly known as:  DELTASONE   Used for:  Chronic obstructive pulmonary disease with acute exacerbation (H)        Dose:  40 mg   Start taking on:  8/22/2018   Take 2 tablets (40 mg) by mouth daily for 5 days   Refills:  0          CONTINUE these  medications which have NOT CHANGED        Dose / Directions Comments    amLODIPine 5 MG tablet   Commonly known as:  NORVASC   Used for:  Essential hypertension, benign        Dose:  5 mg   Take 1 tablet (5 mg) by mouth At Bedtime   Quantity:  90 tablet   Refills:  3        * ARICEPT 5 MG tablet   Used for:  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)   Generic drug:  donepezil        Take 2.5 mg twice a day (take with the 5 mg dose for a total dose of 7.5mg twice daily)   Quantity:  180 tablet   Refills:  3        * donepezil 10 MG tablet   Commonly known as:  ARICEPT        Take 5mg  by mouth twice a day (take with the 2.5 mg dose for a total dose of 7.5mg twice daily)   Refills:  3        aspirin 81 MG tablet        Take by mouth twice a week   Refills:  0        BACTRIM PO        Refills:  0        * carbidopa-levodopa  MG per tablet   Commonly known as:  SINEMET        Dose:  1-2 tablet   Take 1-2 tablets by mouth 5 times daily Takes 2 tablets at 6 AM, 1.5 tablets at 9 AM, 2 tablets at noon, 1.5 tablets at 3 PM, 1.5 tablets at 6 PM. Can take additional 0.5 tablet in the middle of the night if needed.   Quantity:  90 tablet   Refills:  0        * carbidopa-levodopa  MG per CR tablet   Commonly known as:  SINEMET CR        Dose:  1 tablet   Take 1 tablet by mouth At Bedtime   Quantity:  1 tablet   Refills:  0    Patient reported medication       cholecalciferol 1000 UNIT tablet   Commonly known as:  vitamin D3   Used for:  At risk for falling        Dose:  1000 Units   Take 1 tablet (1,000 Units) by mouth every morning   Quantity:  90 tablet   Refills:  1        clonazePAM 0.5 MG ODT tab   Commonly known as:  klonoPIN   Used for:  Primary insomnia        Take 1/2 tablet (0.25 mg) by mouth every other night.   Quantity:  45 tablet   Refills:  0        * COMBIVENT RESPIMAT  MCG/ACT inhaler   Used for:  Other emphysema (H)   Generic drug:  Ipratropium-Albuterol         INHALE 1 PUFF BY MOUTH FOUR TIMES DAILY. NOT TO EXCEED 6 DOSES PER DAY   Quantity:  4 g   Refills:  4        * COMBIVENT RESPIMAT  MCG/ACT inhaler   Used for:  Other emphysema (H)   Generic drug:  Ipratropium-Albuterol        Dose:  1 puff   Inhale 1 puff into the lungs 4 times daily May take 1 to 2 additional doses as needed during the day   Quantity:  1 Inhaler   Refills:  4        doxycycline 100 MG capsule   Commonly known as:  VIBRAMYCIN   Used for:  Pneumonia due to infectious organism, unspecified laterality, unspecified part of lung        Dose:  100 mg   Take 1 capsule (100 mg) by mouth every 12 hours for 10 days Give at 8 am and 8 pm   Quantity:  20 capsule   Refills:  0        FLOVENT  MCG/ACT Inhaler   Used for:  Other emphysema (H)   Generic drug:  fluticasone        INHALE 2 PUFFS INTO THE LUNGS TWICE DAILY   Quantity:  36 g   Refills:  1        lactobacillus rhamnosus (GG) capsule   Used for:  Diarrhea, unspecified type        Dose:  1 capsule   Take 1 capsule by mouth every morning   Quantity:  60 capsule   Refills:  11        lisinopril 5 MG tablet   Commonly known as:  PRINIVIL/ZESTRIL        Dose:  5 mg   Take 1 tablet (5 mg) by mouth daily   Quantity:  30 tablet   Refills:  1        methylcellulose 500 MG Tabs tablet   Commonly known as:  CITRUCEL        Dose:  500 mg   Take 1 tablet (500 mg) by mouth daily as needed   Quantity:  14 each   Refills:  0        mirtazapine 15 MG tablet   Commonly known as:  REMERON   Used for:  Depression, major, recurrent, moderate (H)        TAKE 1.5 TABLETs (30 MG) BY MOUTH AT BEDTIME   Quantity:  135 tablet   Refills:  1        * QUEtiapine 50 MG tablet   Commonly known as:  SEROquel   Used for:  Moderate recurrent major depression (H)        Dose:  25 mg   Take 0.5 tablets (25 mg) by mouth 2 times daily Give after day program ~ 3 pm and at bedtime. Discontinue other seroquel orders   Quantity:  180 tablet   Refills:  0        * QUEtiapine 25 MG  tablet   Commonly known as:  SEROQUEL   Used for:  Moderate recurrent major depression (H)        Take 25 mg BID; once at 3:00 pm and once at 9:00 pm.   Quantity:  60 tablet   Refills:  11        ranitidine 75 MG tablet   Commonly known as:  ZANTAC        Dose:  75 mg   Take 1 tablet (75 mg) by mouth At Bedtime   Quantity:  30 tablet   Refills:  0        senna-docusate 8.6-50 MG per tablet   Commonly known as:  SENOKOT-S;PERICOLACE        Dose:  1 tablet   Take 1 tablet by mouth daily   Refills:  0        * venlafaxine 75 MG 24 hr capsule   Commonly known as:  EFFEXOR-XR        Dose:  75 mg   Take 75 mg by mouth daily   Refills:  0        * venlafaxine 37.5 MG 24 hr capsule   Commonly known as:  EFFEXOR-XR        Dose:  37.5 mg   Take 37.5 mg by mouth daily   Refills:  0        * Notice:  This list has 10 medication(s) that are the same as other medications prescribed for you. Read the directions carefully, and ask your doctor or other care provider to review them with you.            After Care     Activity - Up with nursing assistance           Advance Diet as Tolerated       Follow this diet upon discharge: Orders Placed This Encounter      Regular Diet Adult       Fall precautions           General info for SNF       Length of Stay Estimate: Long Term Care  Condition at Discharge: Stable  Level of care:skilled   Rehabilitation Potential: Fair  Admission H&P remains valid and up-to-date: Yes  Recent Chemotherapy: N/A  Use Nursing Home Standing Orders: Yes       Mantoux instructions       Give two-step Mantoux (PPD) Per Facility Policy Yes               Further instructions from your care team       Have patient use incentive spirometer every 2-4 hours while awake for the next week    Follow-Up Appointment Instructions     Follow Up and recommended labs and tests       Follow up with Nursing home physician.  No follow up labs or test are needed.             Your next 10 appointments already scheduled     Sep 04,  "2018 12:50 PM CDT   (Arrive by 12:35 PM)   RETURN HAND with Marietta Mercedes MD   [facility] ([facility])    [address]  4th [address]  [facility] [address] [phone]              Statement of Approval     Ordered          08/21/18 1325  I have reviewed and agree with all the recommendations and orders detailed in this document.  EFFECTIVE NOW     Approved and electronically signed by:  Chanel Calderon APRN CNP                                                 INTERAGENCY TRANSFER FORM - NURSING   8/20/2018                       UNIT 6D OBSERVATION [facility] BANK: [phone]            Attending Provider: Adam Clay MD     Allergies:  Erythromycin, Levaquin [Levofloxacin], Seasonal Allergies    Infection:  None   Service:  EMERGENCY ME    Ht:  1.88 m (6' 2\")   Wt:  78 kg (172 lb)   Admission Wt:  78 kg (172 lb)    BMI:  22.08 kg/m 2   BSA:  2.02 m 2            Advance Directives        Scanned docmt in ACP Activity?           No scanned doc        Immunizations     Name Date      Influenza (High Dose) 3 valent vaccine 10/06/17     Influenza (High Dose) 3 valent vaccine 12/05/16     Influenza (High Dose) 3 valent vaccine 09/28/15     Influenza (High Dose) 3 valent vaccine 12/13/13     Influenza (IIV3) PF 08/29/12     Influenza (IIV3) PF 10/28/11     Influenza (IIV3) PF 10/13/10     Influenza (IIV3) PF 10/22/09     Influenza (IIV3) PF 11/24/08     Influenza (IIV3) PF 11/15/07     Mantoux Tuberculin Skin Test 02/06/15     Pneumo Conj 13-V (2010&after) 01/06/16     Pneumococcal 23 valent 08/31/11     Pneumococcal 23 valent 11/15/07     TDAP Vaccine (Adacel) 01/31/13       ASSESSMENT     Discharge Profile Flowsheet     DISCHARGE NEEDS ASSESSMENT     Resources List  Transitional Care 09/22/17 1318    Concerns To Be Addressed  -- (na) 09/14/17 1323   PAS Number  -- (na) 09/22/17 1318    Concerns Comments  -- (na) 09/14/17 1323   SKIN      Equipment " "Currently Used at Home  bath bench (transport chair as w/c does not fit in home) 09/14/17 0940   Inspection of bony prominences  Full 08/21/18 0820    Equipment Used at Home  bath bench;wheelchair 03/22/16 0807   Inspection under devices  Full 08/21/18 0820    GASTROINTESTINAL (ADULT,PEDIATRIC,OB)     Skin WDL  ex 08/21/18 0820    GI WDL  WDL 08/21/18 0814   Skin Integrity  scab(s);scar(s) 08/21/18 0820    COMMUNICATION ASSESSMENT     SAFETY      Patient's communication style  spoken language (English or Bilingual) 08/20/18 1847   Safety WDL  ex 08/21/18 0821    FINAL RESOURCES     All Alarms  alarm(s) activated and audible 08/21/18 0821                 Assessment WDL (Within Defined Limits) Definitions           Safety WDL     Effective: 09/28/15    Row Information: <b>WDL Definition:</b> Bed in low position, wheels locked; call light in reach; upper side rails up x 2; ID band on<br> <font color=\"gray\"><i>Item=AS safety wdl>>List=AS safety wdl>>Version=F14</i></font>      Skin WDL     Effective: 09/28/15    Row Information: <b>WDL Definition:</b> Warm; dry; intact; elastic; without discoloration; pressure points without redness<br> <font color=\"gray\"><i>Item=AS skin wdl>>List=AS skin wdl>>Version=F14</i></font>      Vitals     Vital Signs Flowsheet     VITAL SIGNS     Assessment Indicator  PRN assessment 08/21/18 0803    Temp  99  F (37.2  C) 08/21/18 0759   HEIGHT AND WEIGHT      Temp src  Oral 08/21/18 0759   Height  1.88 m (6' 2\") 08/20/18 1852    Resp  20 08/21/18 0759   Height Method  Stated 08/20/18 1852    Pulse  69 08/20/18 1852   Weight  78 kg (172 lb) 08/20/18 1852    Heart Rate  70 08/21/18 1050   BSA (Calculated - sq m)  2.02 08/20/18 1852    Pulse/Heart Rate Source  Monitor 08/20/18 1852   BMI (Calculated)  22.13 08/20/18 1852    BP  153/79 08/21/18 1050   GENI COMA SCALE      BP Location  Left arm 08/21/18 1050   Best Eye Response  4-->(E4) spontaneous 08/21/18 0629    OXYGEN THERAPY     Best Motor " Response  6-->(M6) obeys commands 08/21/18 0629    SpO2  94 % 08/21/18 1050   Best Verbal Response  5-->(V5) oriented 08/21/18 0629    O2 Device  None (Room air) 08/21/18 1050   [place] Coma Scale Score  15 08/21/18 0629    PAIN/COMFORT     POSITIONING      Patient Currently in Pain  other (see comments) 08/21/18 0803   Body Position  other (see comments) (Transferred to chair) 08/21/18 1151    Preferred Pain Scale  Adult Non-Verbal ([facility] Only) 08/21/18 0803   Head of Bed (HOB)  HOB at 60 degrees 08/21/18 0909    CLINICALLY ALIGNED PAIN ASSESSMENT (CAPA) ([facility], [facility] ADULTS ONLY)     DAILY CARE      Comfort  negligible pain 08/21/18 0339   Activity Management  up in chair 08/21/18 1151    PAIN ASSESSMENT/NONVERBAL ICU (ADULT)     Activity Assistance Provided  assistance, 2 people 08/21/18 1151    Presence of Pain  No nonverbal indicators of pain present 08/21/18 0803                 Patient Lines/Drains/Airways Status    Active LINES/DRAINS/AIRWAYS     Name: Placement date: Placement time: Site: Days: Last dressing change:    Peripheral IV 08/20/18 Right Lower forearm 08/20/18   2000   Lower forearm   less than 1             Patient Lines/Drains/Airways Status    Active PICC/CVC     None            Intake/Output Detail Report     None      Last Void/BM       Most Recent Value    Urine Occurrence 1 at 08/21/2018 0900    Stool Occurrence       Case Management/Discharge Planning     Case Management/Discharge Planning Flowsheet     REFERRAL INFORMATION     Equipment Used at Home  bath bench;wheelchair 03/22/16 0807    Arrived From  assisted living facility 10/28/11 2224   FINAL RESOURCES      LIVING ENVIRONMENT     Equipment Currently Used at Home  bath bench (transport chair as w/c does not fit in home) 09/14/17 0940    Lives With  other (see comments) (nursing home) 01/26/18 1807   Resources List  Transitional Care 09/22/17 1318    Living Arrangements  house 07/02/16 1527   PAS Number  -- (na) 09/22/17  1318    COPING/STRESS     ABUSE RISK SCREEN      Major Change/Loss/Stressor  family/significant other illness 01/26/18 1808   QUESTION TO PATIENT:  Has a member of your family or a partner(now or in the past) intimidated, hurt, manipulated, or controlled you in any way?  patient declined to answer or is unable to answer 08/20/18 1855    ASSESSMENT/CONCERNS TO BE ADDRESSED     QUESTION TO PATIENT: Do you feel safe going back to the place where you are living?  patient declined to answer or is unable to answer 08/20/18 1855    Concerns To Be Addressed  -- (na) 09/14/17 1323   OBSERVATION: Is there reason to believe there has been maltreatment of a vulnerable adult (ie. Physical/Sexual/Emotional abuse, self neglect, lack of adequate food, shelter, medical care, or financial exploitation)?  no 08/20/18 1855    Concerns Comments  -- (na) 09/14/17 1323   OTHER      DISCHARGE PLANNING     Are you depressed or being treated for depression?  No (unable to answer) 08/21/18 0941    Skilled Nursing Facility  -- ([facility]) 11/07/11 1046                       UNIT 6D OBSERVATION [facility] BANK: [phone]            Medication Administration Report for Sanjay Cano as of 08/21/18 1401   Legend:    Given Hold Not Given Due Canceled Entry Other Actions    Time Time (Time) Time  Time-Action       Inactive    Active    Linked        Medications 08/15/18 08/16/18 08/17/18 08/18/18 08/19/18 08/20/18 08/21/18    acetaminophen (TYLENOL) tablet 650 mg  Dose: 650 mg  Freq: EVERY 4 HOURS PRN Route: PO  PRN Reason: mild pain  Start: 08/21/18 0109   Admin Instructions: Alternate ibuprofen (if ordered) with acetaminophen.  Maximum acetaminophen dose from all sources = 75 mg/kg/day not to exceed 4 grams/day.    Admin. Amount: 2 tablet (2 × 325 mg tablet)  Dispense Loc: [facility] ADS 6DO               albuterol neb solution 2.5 mg  Dose: 3 mL  Freq: EVERY 2 HOURS PRN Route: NEBULIZATION  PRN Reason: shortness of breath / dyspnea  Start:  08/21/18 0016   Admin. Amount: 2.5 mg = 3 mL Conc: 2.5 mg/3 mL  Dispense Loc: [facility] ADS 6DO  Volume: 3 mL               amLODIPine (NORVASC) tablet 5 mg  Dose: 5 mg  Freq: AT BEDTIME Route: PO  Start: 08/21/18 0016   Admin. Amount: 2 tablet (2 × 2.5 mg tablet)  Last Admin: 08/21/18 0130  Dispense Loc: [facility] ADS 6DO           0130 (5 mg)-Given       [ ] 2200           carbidopa-levodopa (SINEMET)  MG per tablet 2 tablet  Dose: 2 tablet  Freq: 2 TIMES DAILY Route: PO  Start: 08/21/18 0600   Admin. Amount: 2 tablet  Last Admin: 08/21/18 1205  Dispense Loc: [facility]           0713 (2 tablet)-Given       1205 (2 tablet)-Given           carbidopa-levodopa half-tab 12.5-50 mg  Dose: 3 half-tab  Freq: 3 TIMES DAILY Route: PO  Start: 08/21/18 0900   Admin. Amount: 3 half-tab  Last Admin: 08/21/18 0910  Dispense Loc: [facility]           0910 (3 half-tab)-Given       [ ] 1500       [ ] 1800           donepezil (ARICEPT) 2.5 mg, donepezil (ARIcept) 5 mg  Dose: 7.5 mg  Freq: 2 TIMES DAILY Route: PO  Start: 08/21/18 0100   Admin. Amount: 1 half-tab (1 × 2.5 mg half-tab) + 1 tablet (1 × 5 mg tablet)  Last Admin: 08/21/18 0800  Dispense Loc: [facility]   Mixture Administration Information:   Medication Type Amount   donepezil 2.5 mg TABS Medications 2.5 mg   donepezil 5 MG TABS Medications 5 mg                   0130 (7.5 mg)-Given       0800 (7.5 mg)-Given       [ ] 2000           doxycycline (VIBRAMYCIN) capsule 100 mg  Dose: 100 mg  Freq: EVERY 12 HOURS SCHEDULED Route: PO  Indications of Use: COMMUNITY ACQUIRED PNEUMONIA  Start: 08/21/18 1015   Admin Instructions: Administer at least 2 hours before or after aluminum, calcium, iron, zinc or magnesium containing products.    Admin. Amount: 1 capsule (1 × 100 mg capsule)  Last Admin: 08/21/18 1050  Dispense Loc: [facility] ADS 6DO           1050 (100 mg)-Given       [ ] 2000           guaiFENesin (ROBITUSSIN) 20 mg/mL solution 10  mL  Dose: 10 mL  Freq: EVERY 4 HOURS PRN Route: PO  PRN Reason: cough  Start: 08/21/18 1115   Admin. Amount: 10 mL  Dispense Loc: [facility]  Volume: 10 mL               ipratropium - albuterol 0.5 mg/2.5 mg/3 mL (DUONEB) neb solution 3 mL  Dose: 3 mL  Freq: EVERY 4 HOURS Route: NEBULIZATION  Start: 08/21/18 0100   Admin. Amount: 3 mL  Last Admin: 08/21/18 0131  Dispense Loc: [facility] ADS 6DO  Volume: 3 mL           0131 (3 mL)-Given       (0855)-Not Given       [ ] 1200       [ ] 1600       [ ] 2000           lisinopril (PRINIVIL/ZESTRIL) tablet 5 mg  Dose: 5 mg  Freq: DAILY Route: PO  Start: 08/21/18 0800   Admin. Amount: 1 tablet (1 × 5 mg tablet)  Last Admin: 08/21/18 0800  Dispense Loc: [facility]           0800 (5 mg)-Given           melatonin tablet 1 mg  Dose: 1 mg  Freq: AT BEDTIME PRN Route: PO  PRN Reason: sleep  Start: 08/21/18 0109   Admin Instructions: Do not give unless at least 6 hours of uninterrupted sleep is expected.    Admin. Amount: 1 tablet (1 × 1 mg tablet)  Dispense Loc: [facility]               naloxone (NARCAN) injection 0.1-0.4 mg  Dose: 0.1-0.4 mg  Freq: EVERY 2 MIN PRN Route: IV  PRN Reason: opioid reversal  Start: 08/21/18 0016   Admin Instructions: For respiratory rate LESS than or EQUAL to 8.  Partial reversal dose:  0.1 mg titrated q 2 minutes for Analgesia Side Effects Monitoring Sedation Level of 3 (frequently drowsy, arousable, drifts to sleep during conversation).Full reversal dose:  0.4 mg bolus for Analgesia Side Effects Monitoring Sedation Level of 4 (somnolent, minimal or no response to stimulation).  For ordered doses up to 2mg give IVP. Give each 0.4mg over 15 seconds in emergency situations. For non-emergent situations further dilute in 9mL of NS to facilitate titration of response.    Admin. Amount: 0.1-0.4 mg = 0.25-1 mL Conc: 0.4 mg/mL  Dispense Loc: [facility] ADS 6DO  Volume: 1 mL               ondansetron (ZOFRAN-ODT) ODT tab 4 mg  Dose:  4 mg  Freq: EVERY 6 HOURS PRN Route: PO  PRN Reasons: nausea,vomiting  Start: 08/21/18 0109   Admin Instructions: This is Step 1 of nausea and vomiting management.  If nausea not resolved in 15 minutes, go to Step 2 prochlorperazine (COMPAZINE). Do not push through foil backing. Peel back foil and gently remove. Place on tongue immediately. Administration with liquid unnecessary  With dry hands, peel back foil backing and gently remove tablet; do not push oral disintegrating tablet through foil backing; administer immediately on tongue and oral disintegrating tablet dissolves in seconds; then swallow with saliva; liquid not required.    Admin. Amount: 1 tablet (1 × 4 mg tablet)  Dispense Loc: [facility]              Or  ondansetron (ZOFRAN) injection 4 mg  Dose: 4 mg  Freq: EVERY 6 HOURS PRN Route: IV  PRN Reasons: nausea,vomiting  Start: 08/21/18 0109   Admin Instructions: This is Step 1 of nausea and vomiting management.  If nausea not resolved in 15 minutes, go to Step 2 prochlorperazine (COMPAZINE).  Irritant. For ordered doses up to 4 mg, give IV Push undiluted over 2-5 minutes.    Admin. Amount: 4 mg = 2 mL Conc: 4 mg/2 mL  Dispense Loc: [facility]S 6DO  Infused Over: 2-5 Minutes  Volume: 2 mL               predniSONE (DELTASONE) tablet 40 mg  Dose: 40 mg  Freq: DAILY Route: PO  Start: 08/21/18 0800   Admin. Amount: 2 tablet (2 × 20 mg tablet)  Last Admin: 08/21/18 0800  Dispense Loc: [facility] 6DO           0800 (40 mg)-Given           QUEtiapine (SEROquel) tablet 25 mg  Dose: 25 mg  Freq: 2 TIMES DAILY Route: PO  Start: 08/21/18 0100   Admin. Amount: 1 tablet (1 × 25 mg tablet)  Last Admin: 08/21/18 0800  Dispense Loc: [facility]           0130 (25 mg)-Given       0800 (25 mg)-Given       [ ] 2000           ranitidine (ZANTAC) tablet 75 mg  Dose: 75 mg  Freq: AT BEDTIME Route: PO  Start: 08/21/18 0100   Admin. Amount: 1 tablet (1 × 75 mg tablet)  Last Admin: 08/21/18 0130  Dispense Loc:   [facility] ADS 6DO           0130 (75 mg)-Given       [ ]            venlafaxine (EFFEXOR-XR) 24 hr capsule 37.5 mg  Dose: 37.5 mg  Freq: DAILY Route: PO  Start: 18 0800   Admin Instructions: DO NOT CRUSH.    Admin. Amount: 1 capsule (1 × 37.5 mg capsule)  Last Admin: 18 08  Dispense Loc: [facility]           0800 (37.5 mg)-Given           venlafaxine (EFFEXOR-XR) 24 hr capsule 75 mg  Dose: 75 mg  Freq: AT BEDTIME Route: PO  Start: 18 0038   Admin Instructions: DO NOT CRUSH.    Admin. Amount: 1 capsule (1 × 75 mg capsule)  Last Admin: 18  Dispense Loc: [facility]           0130 (75 mg)-Given       [ ]           Future Medications  Medications 08/15/18 08/16/18 08/17/18 08/18/18 08/19/18 08/20/18 08/21/18       carbidopa-levodopa (SINEMET CR)  MG per CR tablet 1 tablet  Dose: 1 tablet  Freq: AT BEDTIME Route: PO  Start: 18   Admin Instructions: DO NOT CRUSH.    Admin. Amount: 1 tablet  Dispense Loc: [facility]           [ ]            mirtazapine (REMERON) half-tab 22.5 mg  Dose: 22.5 mg  Freq: AT BEDTIME Route: PO  Start: 18   Admin. Amount: 1 half-tab (1 × 22.5 mg half-tab)  Dispense Loc: [facility]           [ ] 220          Completed Medications  Medications 08/15/18 08/16/18 08/17/18 08/18/18 08/19/18 08/20/18 08/21/18         Dose: 1 tablet  Freq: ONCE Route: PO  Start: 18   End: 18   Admin Instructions: DO NOT CRUSH.    Admin. Amount: 1 tablet  Last Admin: 18  Dispense Loc: [facility]  Administrations Remainin           (1 tablet)-Given              Dose: 3 mL  Freq: ONCE Route: NEBULIZATION  Start: 18   End: 18   Admin. Amount: 3 mL  Last Admin: 18  Dispense Loc: [facility] ED  Administrations Remainin  Volume: 3 mL           (3 mL)-Given              Dose: 125 mg  Freq: ONCE Route: IV  Start: 18    End: 18   Admin Instructions: Give Doses 125mg and less IV Push over 2-3 minutes - reconstitute with 2 mL of bacteriostatic water if no diluent is provided. Doses greater than 125 mg need to be in at least 50 mL IVPB.    Admin. Amount: 125 mg = 2 mL Conc: 125 mg/2 mL  Last Admin: 18  Dispense Loc: [facility] ED  Administrations Remainin  Volume: 2 mL   Current Line: Peripheral IV 18 Right Lower forearm          (125 mg)-Given              Dose: 4.5 g  Freq: ONCE Route: IV  Indications Comment: Hospital Acquired pneumonia  Last Dose: Stopped (18)  Start: 18   End: 18   Admin Instructions: FIRST DOSE STAT    Admin. Amount: 4.5 g  Last Admin: 18  Dispense Loc: [facility] ED  Infused Over: 30 Minutes  Administrations Remainin  Volume: 20 mL   Current Line: Peripheral IV 18 Right Lower forearm          (4.5 g)-New Bag       -Stopped           Discontinued Medications  Medications 08/15/18 08/16/18 08/17/18 08/18/18 08/19/18 08/20/18 08/21/18         Dose: 0.25 mg  Freq: EVERY 48 HOURS Route: PO  Start: 18 0000   End: 188   Admin. Amount: 1 tablet (1 × 0.25 mg tablet)           0038-Med Discontinued         Dose: 0.1-0.4 mg  Freq: EVERY 2 MIN PRN Route: IV  PRN Reason: opioid reversal  Start: 18 0109   End: 18 012   Admin Instructions: For respiratory rate LESS than or EQUAL to 8.  Partial reversal dose:  0.1 mg titrated q 2 minutes for Analgesia Side Effects Monitoring Sedation Level of 3 (frequently drowsy, arousable, drifts to sleep during conversation).Full reversal dose:  0.4 mg bolus for Analgesia Side Effects Monitoring Sedation Level of 4 (somnolent, minimal or no response to stimulation).  For ordered doses up to 2mg give IVP. Give each 0.4mg over 15 seconds in emergency situations. For non-emergent situations further dilute in 9mL of NS to facilitate titration of response.    Admin.  Amount: 0.1-0.4 mg = 0.25-1 mL Conc: 0.4 mg/mL  Dispense Loc: [facility] ADS ED  Volume: 1 mL           0122-Med Discontinued         Dose: 4.5 g  Freq: EVERY 6 HOURS Route: IV  Indications Comment: Hospital Acquired pneumonia  Last Dose: Stopped (08/21/18 0945)  Start: 08/21/18 0300   End: 08/21/18 1014   Admin Instructions: Dose adjusted per renal dosing policy (Increased for HCAP indication).  Estimated CrCl = >50 mL/min. <br>    Admin. Amount: 4.5 g  Last Admin: 08/21/18 0911  Dispense Loc: [facility]  Infused Over: 30 Minutes  Volume: 20 mL   Current Line: Peripheral IV 08/20/18 Right Lower forearm          0336 (4.5 g)-New Bag       0400-Stopped       0911 (4.5 g)-New Bag       0945-Stopped       1014-Med Discontinued                INTERAGENCY TRANSFER FORM - NOTES (H&P, Discharge Summary, Consults, Procedures, Therapies)   8/20/2018                       UNIT 6D OBSERVATION [facility] BANK: [phone]               History & Physicals      H&P by Myranda Chambers PA-C at 8/20/2018 11:20 PM     Author:  Myranda Chambers PA-C Service:  General Medicine Author Type:  Physician Assistant    Filed:  8/21/2018  3:46 AM Date of Service:  8/20/2018 11:20 PM Creation Time:  8/20/2018 11:20 PM    Status:  Cosign Needed :  Myranda Chambers PA-C (Physician Assistant)    Cosign Required:  Yes             [facility] ED Observation Admission Note    Chief Complaint   Patient presents with     Shortness of Breath       Assessment/Plan:  Sanjay Cano is a patient with a history of Parkinson disease, COPD, recurrent productive cough, pulmonary nodules, pneumonia, hypertension, and depression who presented to the Emergency Department for the evaluation of a cough and shortness of breath.     1. Pneumonia/COPD exacerbation: x 2-3 days of productive cough with green/yellowish phlegm, intermittent dyspnea, and subjective fevers. Pt currently lives in a long term facility. Has history of  "pneumonia for which he was hospitalized. Cough is exacerbated with exertion and improves with rest. Has been using his albuterol inhaler frequently but it is helping minimally.  Denies chest pain but admits to chest tightness.   - Duobneb q4h  - Check peak flows  - Albuterol neb q2h prn  - Prednisone 40 mg x 5 days  -Tr[IM1.1]ansition to po abx[IM1.2] at d/c[IM1.3]    Chronic conditions  ## PD: - Continue PTA sinemet and donepezil   ## Depression: - Continue venlafaxine, seroquel, remeron  ## HTN: - Continue with Lisinopril and Amlodipine        HPI:    Per ED note \"With a history of Parkinson disease, COPD, recurrent productive cough, pulmonary nodules, pneumonia who presents to the Emergency Department for the evaluation of a cough and shortness of breath. Patient presents with his wife who provided most of the patient s history. Patient has had a productive cough with wheezing,  chest tightness,  and on and off subjective fevers (98.5 here in Emergency Department) for the past two days. Patient s wife was concerned given the patient s history of pneumonia and so brought him here to the Emergency Department as advised by advanced living care facility. Patient s wife states his coughing has woken him up from his sleep and almost causes him to choke while eating. Within the past two days, his temperature fluctuates  over 100.  Patient denies use of Tylenol or ibuprofen. Patient denies vomiting. Patient denies history of stroke. Patient takes Combivent in the morning, afternoon, and evening and Flovent in the morning and night\".      In the ED, Temp 98.5, Pulse 69, 's-160's/70's-100's, SaO2 87-95% on RA. Labs show normal BMP. CBC with mild anemia (hgb 11.8, at baseline) otherwise normal. Glucose 103. BNP normal at 368. Procalcitonin <0.05. Trop x 1 negative. EKG without ischemic changes. CXR shows patchy bibasilar opacities, more prominent on the left representing atelectasis versus pulmonary edema versus " infection. In the ED the patient was given Duoneb treatment, Solumedrol 125 mg x 1, and Zosyn 4.5 g x 1.[IM1.1] Pt admitted for continued breathing treatment and antibiotics.[IM1.2]     On admission to the observation unit the patient was stable.     History:    Past Medical History:   Diagnosis Date     At risk for falling 9/3/2012     COPD (chronic obstructive pulmonary disease) (H)      Family history of thyroid cancer 7/13/2016     Malignant neoplasm (H)     skin - nose     Moderate recurrent major depression (H) 5/17/2012     Neuromuscular disorder (H)      Paralysis agitans (H)     Parkinson's Disease     Parkinson disease (H)      Rosacea        Past Surgical History:   Procedure Laterality Date     EYE SURGERY       ORTHOPEDIC SURGERY       PHACOEMULSIFICATION CLEAR CORNEA WITH STANDARD INTRAOCULAR LENS IMPLANT  5/21/2014    Procedure: PHACOEMULSIFICATION CLEAR CORNEA WITH STANDARD INTRAOCULAR LENS IMPLANT;  Surgeon: Tomy Hunter MD;  Location: [facility]     PHACOEMULSIFICATION CLEAR CORNEA WITH TORIC INTRAOCULAR LENS IMPLANT  4/30/2014    Procedure: PHACOEMULSIFICATION CLEAR CORNEA WITH TORIC INTRAOCULAR LENS IMPLANT;  Surgeon: Tomy Hunter MD;  Location: [facility]       Family History   Problem Relation Age of Onset     Cerebrovascular Disease Mother      Diabetes Mother      GASTROINTESTINAL DISEASE Mother      Hypertension Mother      Neurologic Disorder Father      Cerebrovascular Disease Father      Cerebrovascular Disease Maternal Grandfather      Cancer Paternal Grandmother      Other - See Comments Sister      gi - unknown       Social History     Social History     Marital status:      Spouse name: N/A     Number of children: N/A     Years of education: N/A     Occupational History     Not on file.     Social History Main Topics     Smoking status: Former Smoker     Packs/day: 0.01     Years: 40.00     Types: Cigarettes     Quit date: 7/31/2008     Smokeless tobacco: Never Used      Comment:  very passive cigarettes in 6 months     Alcohol use No     Drug use: No     Sexual activity: Yes     Partners: Female     Other Topics Concern     Parent/Sibling W/ Cabg, Mi Or Angioplasty Before 65f 55m? No     Social History Narrative         No current facility-administered medications on file prior to encounter.   Current Outpatient Prescriptions on File Prior to Encounter:  amLODIPine (NORVASC) 5 MG tablet Take 1 tablet (5 mg) by mouth At Bedtime   aspirin 81 MG tablet Take by mouth twice a week   carbidopa-levodopa (SINEMET CR)  MG per CR tablet Take 1 tablet by mouth At Bedtime   carbidopa-levodopa (SINEMET)  MG per tablet Take 1-2 tablets by mouth 5 times daily Takes 2 tablets at 6 AM, 1.5 tablets at 9 AM, 2 tablets at noon, 1.5 tablets at 3 PM, 1.5 tablets at 6 PM. Can take additional 0.5 tablet in the middle of the night if needed.   cholecalciferol (VITAMIN D) 1000 UNIT tablet Take 1 tablet (1,000 Units) by mouth every morning   COMBIVENT RESPIMAT  MCG/ACT inhaler INHALE 1 PUFF BY MOUTH FOUR TIMES DAILY. NOT TO EXCEED 6 DOSES PER DAY   donepezil (ARICEPT) 10 MG tablet Take 5mg  by mouth twice a day (take with the 2.5 mg dose for a total dose of 7.5mg twice daily)   donepezil (ARICEPT) 5 MG tablet Take 2.5 mg twice a day (take with the 5 mg dose for a total dose of 7.5mg twice daily)   FLOVENT  MCG/ACT Inhaler INHALE 2 PUFFS INTO THE LUNGS TWICE DAILY   lactobacillus rhamnosus, GG, (CULTURELL) capsule Take 1 capsule by mouth every morning   lisinopril (PRINIVIL/ZESTRIL) 5 MG tablet Take 1 tablet (5 mg) by mouth daily   mirtazapine (REMERON) 15 MG tablet TAKE 1.5 TABLETs (30 MG) BY MOUTH AT BEDTIME   QUEtiapine (SEROQUEL) 25 MG tablet Take 25 mg BID; once at 3:00 pm and once at 9:00 pm.   QUEtiapine (SEROQUEL) 50 MG tablet Take 0.5 tablets (25 mg) by mouth 2 times daily Give after day program ~ 3 pm and at bedtime. Discontinue other seroquel orders   ranitidine (ZANTAC) 75 MG tablet  Take 1 tablet (75 mg) by mouth At Bedtime   Sulfamethoxazole-Trimethoprim (BACTRIM PO)    clonazePAM (KLONOPIN) 0.5 MG ODT tab Take 1/2 tablet (0.25 mg) by mouth every other night.   doxycycline (VIBRAMYCIN) 100 MG capsule Take 1 capsule (100 mg) by mouth every 12 hours Give at 8 am and 8 pm   Ipratropium-Albuterol (COMBIVENT RESPIMAT)  MCG/ACT inhaler Inhale 1 puff into the lungs 4 times daily May take 1 to 2 additional doses as needed during the day   methylcellulose (CITRUCEL) 500 MG TABS tablet Take 1 tablet (500 mg) by mouth daily as needed       Data:    Results for orders placed or performed during the hospital encounter of 08/20/18   XR Chest 2 Views    Narrative    Exam: XR CHEST 2 VW, 8/20/2018 8:12 PM    Indication: dyspnea;     Comparison: Chest x-ray 1/26/2018    Findings:   Frontal and lateral projection of the chest. No pneumothorax or large  pleural effusion. Stable cardiac mediastinal silhouette. Patchy  bibasilar opacities, more prominent on the left. Again noted thoracic  compression deformity.      Impression    Impression: Patchy bibasilar opacities, more prominent on the left  representing atelectasis versus pulmonary edema versus infection.     I have personally reviewed the examination and initial interpretation  and I agree with the findings.    SHELLIE MCCARTHY MD   CBC with platelets differential   Result Value Ref Range    WBC 7.0 4.0 - 11.0 10e9/L    RBC Count 3.70 (L) 4.4 - 5.9 10e12/L    Hemoglobin 11.8 (L) 13.3 - 17.7 g/dL    Hematocrit 34.8 (L) 40.0 - 53.0 %    MCV 94 78 - 100 fl    MCH 31.9 26.5 - 33.0 pg    MCHC 33.9 31.5 - 36.5 g/dL    RDW 12.5 10.0 - 15.0 %    Platelet Count 182 150 - 450 10e9/L    Diff Method Automated Method     % Neutrophils 75.7 %    % Lymphocytes 9.7 %    % Monocytes 11.0 %    % Eosinophils 3.4 %    % Basophils 0.1 %    % Immature Granulocytes 0.1 %    Nucleated RBCs 0 0 /100    Absolute Neutrophil 5.3 1.6 - 8.3 10e9/L    Absolute Lymphocytes 0.7  (L) 0.8 - 5.3 10e9/L    Absolute Monocytes 0.8 0.0 - 1.3 10e9/L    Absolute Eosinophils 0.2 0.0 - 0.7 10e9/L    Absolute Basophils 0.0 0.0 - 0.2 10e9/L    Abs Immature Granulocytes 0.0 0 - 0.4 10e9/L    Absolute Nucleated RBC 0.0    Troponin I   Result Value Ref Range    Troponin I ES <0.015 0.000 - 0.045 ug/L   Basic metabolic panel   Result Value Ref Range    Sodium 142 133 - 144 mmol/L    Potassium 3.9 3.4 - 5.3 mmol/L    Chloride 108 94 - 109 mmol/L    Carbon Dioxide 27 20 - 32 mmol/L    Anion Gap 8 3 - 14 mmol/L    Glucose 103 (H) 70 - 99 mg/dL    Urea Nitrogen 32 (H) 7 - 30 mg/dL    Creatinine 1.14 0.66 - 1.25 mg/dL    GFR Estimate 63 >60 mL/min/1.7m2    GFR Estimate If Black 76 >60 mL/min/1.7m2    Calcium 8.2 (L) 8.5 - 10.1 mg/dL   Nt probnp inpatient   Result Value Ref Range    N-Terminal Pro BNP Inpatient 368 0 - 900 pg/mL   Procalcitonin   Result Value Ref Range    Procalcitonin <0.05 ng/ml   EKG 12-lead, tracing only   Result Value Ref Range    Interpretation ECG Click View Image link to view waveform and result              EKG Interpretation:[IM1.1]      Interpreted by Jose Carlos Tilley  Time reviewed: 1945  Symptoms at time of EKG: dyspnea   Rhythm: normal sinus   Rate: normal  Axis: normal  Ectopy: none  Conduction: normal  ST Segments/ T Waves: No ST-T wave changes  Q Waves: none  Comparison to prior: Unchanged     Clinical Impression: normal EKG[IM1.2]    ROS:[IM1.1]    REVIEW OF SYSTEMS:   General: fatigue   EYES: Negative for vision changes or eye problems   ENT: No problems with ears, nose or throat. No difficulty swallowing.   RESP: Cough and shortness of breath.   CV: No chest pains or palpitations   GI: No nausea, vomiting, heartburn, abdominal pain, diarrhea, constipation or change in bowel habits   : No urinary frequency or dysuria, bladder or kidney problems   MUSCULOSKELETAL: No significant muscle or joint pains   NEUROLOGIC: No headaches, numbness, tingling, weakness, problems with  balance or coordination   PSYCHIATRIC: No problems with anxiety, depression or mental health   HEME/IMMUNE/ALLERGY: No history of bleeding or clotting problems or anemia. No allergies or immune system problems   ENDOCRINE: No history of thyroid disease, diabetes or other endocrine disorders   SKIN: No rashes,worrisome lesions or skin problems[IM1.2]    PCP:[IM1.1] CEM GANDARA[IM1.2]  CARDIAC RISK:[IM1.1] HTN, CHF[IM1.2]    10 point ROS negative other than the symptoms noted above.      Exam:    Vitals:  B/P: 159/94, T: 98.5, P: 69, R: Data Unavailable[IM1.1]  Physical Exam   Constitutional: Pt is oriented to person, place, and time.Pt appears well-developed and well-nourished.   HENT:   Head: Normocephalic and atraumatic.   Eyes: Conjunctivae are normal. Pupils are equal, round, and reactive to light.   Neck: Normal range of motion. Neck supple.   Cardiovascular: Normal rate, regular rhythm, normal heart sounds and intact distal pulses.    Pulmonary/Chest: Effort normal and breath sounds normal. No respiratory distress. Pt has no wheezes. Pt has no rales  Abdominal: Soft. Bowel sounds are normal. Pt exhibits no distension and no mass. No tenderness. Pt has no rebound and no guarding.   Musculoskeletal: Normal range of motion. Pt exhibits no edema.   Neurological: Pt is alert and oriented to person, place, and time. Normal reflexes.   Skin: Skin is warm and dry. No rash noted.   Psychiatric: Pt has a normal mood and affect. Behavior is normal. Judgment and thought content normal.       Consults: Low threshold  FEN: Regular  DVT prophylaxis: Early ambulation  Code Status: Full  Disposition: Stable vital signs. Patient return to baseline.  All labs/images reviewed[IM1.2]    Signed:[IM1.1]  Myranda Chambers PA-C[IM1.2]  August 20, 2018 at 11:20 PM[IM1.1]     Revision History        User Key Date/Time User Provider Type Action    > IM1.3 8/21/2018  3:46 AM Myranda Chambers PA-C Physician Assistant Sign     IM1.2  8/20/2018 11:50 PM Myranda Chambers PA-C Physician Assistant      IM1.1 8/20/2018 11:20 PM Myranda Chambers PA-C Physician Assistant                      Discharge Summaries      Discharge Summaries by Chanel Calderon APRN CNP at 8/21/2018  1:26 PM     Author:  Chanel Calderon APRN CNP Service:  General Medicine Author Type:  Nurse Practitioner    Filed:  8/21/2018  1:29 PM Date of Service:  8/21/2018  1:26 PM Creation Time:  8/21/2018  1:26 PM    Status:  Cosign Needed :  Chanel Calderon APRN CNP (Nurse Practitioner)    Cosign Required:  Yes             Discharge Summary    Sanjay Cano MRN# [id number]   YOB: 1945 Age: 72 year old     Date of Admission:  8/20/2018  Date of Discharge:  8/21/2018  Admitting Physician:  Adam Clay MD  Discharge Physician:  Dr. Martinez  Discharging Service:  Emergency Medicine     Primary Provider: Bharath Buchanan          Discharge Diagnosis:     Pneumonia    * No resolved hospital problems. *               Discharge Disposition:   Discharged to long-term care facility           Condition on Discharge:   Discharge condition: Stable   Code status on discharge: Full Code           Procedures:   No procedures performed during this admission          Discharge Medications:     Current Discharge Medication List      START taking these medications    Details   guaiFENesin (ROBITUSSIN) 20 mg/mL SOLN solution Take 10 mLs by mouth every 4 hours as needed for cough  Qty: 1200 mL, Refills: 0    Associated Diagnoses: Pneumonia due to infectious organism, unspecified laterality, unspecified part of lung      predniSONE (DELTASONE) 20 MG tablet Take 2 tablets (40 mg) by mouth daily for 5 days    Associated Diagnoses: Chronic obstructive pulmonary disease with acute exacerbation (H)         CONTINUE these medications which have CHANGED    Details   doxycycline (VIBRAMYCIN) 100 MG capsule Take 1 capsule (100 mg) by mouth every 12 hours for  10 days Give at 8 am and 8 pm  Qty: 20 capsule, Refills: 0    Associated Diagnoses: Pneumonia due to infectious organism, unspecified laterality, unspecified part of lung         CONTINUE these medications which have NOT CHANGED    Details   amLODIPine (NORVASC) 5 MG tablet Take 1 tablet (5 mg) by mouth At Bedtime  Qty: 90 tablet, Refills: 3    Associated Diagnoses: Essential hypertension, benign      aspirin 81 MG tablet Take by mouth twice a week      carbidopa-levodopa (SINEMET CR)  MG per CR tablet Take 1 tablet by mouth At Bedtime  Qty: 1 tablet, Refills: 0    Comments: Patient reported medication      carbidopa-levodopa (SINEMET)  MG per tablet Take 1-2 tablets by mouth 5 times daily Takes 2 tablets at 6 AM, 1.5 tablets at 9 AM, 2 tablets at noon, 1.5 tablets at 3 PM, 1.5 tablets at 6 PM. Can take additional 0.5 tablet in the middle of the night if needed.  Qty: 90 tablet      cholecalciferol (VITAMIN D) 1000 UNIT tablet Take 1 tablet (1,000 Units) by mouth every morning  Qty: 90 tablet, Refills: 1    Associated Diagnoses: At risk for falling      !! COMBIVENT RESPIMAT  MCG/ACT inhaler INHALE 1 PUFF BY MOUTH FOUR TIMES DAILY. NOT TO EXCEED 6 DOSES PER DAY  Qty: 4 g, Refills: 4    Associated Diagnoses: Other emphysema (H)      !! donepezil (ARICEPT) 10 MG tablet Take 5mg  by mouth twice a day (take with the 2.5 mg dose for a total dose of 7.5mg twice daily)  Refills: 3      !! 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)      FLOVENT  MCG/ACT Inhaler INHALE 2 PUFFS INTO THE LUNGS TWICE DAILY  Qty: 36 g, Refills: 1    Associated Diagnoses: Other emphysema (H)      lactobacillus rhamnosus, GG, (CULTURELL) capsule Take 1 capsule by mouth every morning  Qty: 60 capsule, Refills: 11     Associated Diagnoses: Diarrhea, unspecified type      lisinopril (PRINIVIL/ZESTRIL) 5 MG tablet Take 1 tablet (5 mg) by mouth daily  Qty: 30 tablet, Refills: 1      mirtazapine (REMERON) 15 MG tablet TAKE 1.5 TABLETs (30 MG) BY MOUTH AT BEDTIME  Qty: 135 tablet, Refills: 1    Associated Diagnoses: Depression, major, recurrent, moderate (H)      !! QUEtiapine (SEROQUEL) 25 MG tablet Take 25 mg BID; once at 3:00 pm and once at 9:00 pm.  Qty: 60 tablet, Refills: 11    Associated Diagnoses: Moderate recurrent major depression (H)      !! QUEtiapine (SEROQUEL) 50 MG tablet Take 0.5 tablets (25 mg) by mouth 2 times daily Give after day program ~ 3 pm and at bedtime. Discontinue other seroquel orders  Qty: 180 tablet, Refills: 0    Associated Diagnoses: Moderate recurrent major depression (H)      ranitidine (ZANTAC) 75 MG tablet Take 1 tablet (75 mg) by mouth At Bedtime  Qty: 30 tablet      senna-docusate (SENOKOT-S;PERICOLACE) 8.6-50 MG per tablet Take 1 tablet by mouth daily      Sulfamethoxazole-Trimethoprim (BACTRIM PO)       !! venlafaxine (EFFEXOR-XR) 37.5 MG 24 hr capsule Take 37.5 mg by mouth daily      !! venlafaxine (EFFEXOR-XR) 75 MG 24 hr capsule Take 75 mg by mouth daily      clonazePAM (KLONOPIN) 0.5 MG ODT tab Take 1/2 tablet (0.25 mg) by mouth every other night.  Qty: 45 tablet, Refills: 0    Associated Diagnoses: Primary insomnia      !! Ipratropium-Albuterol (COMBIVENT RESPIMAT)  MCG/ACT inhaler Inhale 1 puff into the lungs 4 times daily May take 1 to 2 additional doses as needed during the day  Qty: 1 Inhaler, Refills: 4    Associated Diagnoses: Other emphysema (H)      methylcellulose (CITRUCEL) 500 MG TABS tablet Take 1 tablet (500 mg) by mouth daily as needed  Qty: 14 each, Refills: 0       !! - Potential duplicate medications found. Please discuss with provider.                Consultations:   No consultations were requested during this admission             Brief History of Illness:   Please  "see detailed H&P from 8/20/18, in brief:    Sanjay Cano is a patient with a history of Parkinson disease, COPD, recurrent productive cough, pulmonary nodules, pneumonia, hypertension, and depression who presented to the Emergency Department for the evaluation of a cough and shortness of breath.               Hospital Course:        1. Pneumonia/COPD exacerbation: x 2-3 days of productive cough with green/yellowish phlegm, intermittent dyspnea, and subjective fevers. Pt currently lives in a long term facility. Has history of pneumonia for which he was hospitalized. Cough is exacerbated with exertion and improves with rest. Has been using his albuterol inhaler frequently but it is helping minimally.  Denies chest pain but admits to chest tightness. He was given zosyn initially while in the observation unit.  He had clear lung sounds, no leukocytosis and normal O2 sats while in the observation unit.  He was deemed stable for discharge back to the LTC facility.  This was arranged by the .  He was discharged on prednisone 40 mg po daily X 5 days, doxycycline 100 mg po BID x 10 days, and robitussin prn.  He was also given an incentive spirometer to use.  Follow up with PCP in one week.     Chronic conditions  ## PD: - Continue PTA sinemet and donepezil   ## Depression: - Continue venlafaxine, seroquel, remeron  ## HTN: - Continue with Lisinopril and Amlodipine            Final Day of Progress before Discharge:       Physical Exam:  Blood pressure 153/79, pulse 69, temperature 99  F (37.2  C), temperature source Oral, resp. rate 20, height 1.88 m (6' 2\"), weight 78 kg (172 lb), SpO2 94 %.    EXAM:  Exam:  Constitutional: healthy, alert and no distress  Cardiovascular: No lifts, heaves, or thrills. RRR. No murmurs, clicks gallops or rub  Respiratory: Good diaphragmatic excursion. Lungs clear  Gastrointestinal: Abdomen soft, non-tender. BS normal. No masses, organomegaly  Musculoskeletal: extremities normal- no " "gross deformities noted, and normal muscle tone  Skin: no suspicious lesions or rashes  Neurologic:  Alert and oriented.   Psychiatric: mentation appears normal and affect normal/bright[KH1.1]    /79 (BP Location: Left arm)  Pulse 69  Temp 99  F (37.2  C) (Oral)  Resp 20  Ht 1.88 m (6' 2\")  Wt 78 kg (172 lb)  SpO2 94%  BMI 22.08 kg/m2[KH1.2]             Data:  All laboratory data reviewed             Significant Results:[KH1.1]     Results for orders placed or performed during the hospital encounter of 08/20/18   XR Chest 2 Views    Narrative    Exam: XR CHEST 2 VW, 8/20/2018 8:12 PM    Indication: dyspnea;     Comparison: Chest x-ray 1/26/2018    Findings:   Frontal and lateral projection of the chest. No pneumothorax or large  pleural effusion. Stable cardiac mediastinal silhouette. Patchy  bibasilar opacities, more prominent on the left. Again noted thoracic  compression deformity.      Impression    Impression: Patchy bibasilar opacities, more prominent on the left  representing atelectasis versus pulmonary edema versus infection.     I have personally reviewed the examination and initial interpretation  and I agree with the findings.    SHELLIE MCCARTHY MD   CBC with platelets differential   Result Value Ref Range    WBC 7.0 4.0 - 11.0 10e9/L    RBC Count 3.70 (L) 4.4 - 5.9 10e12/L    Hemoglobin 11.8 (L) 13.3 - 17.7 g/dL    Hematocrit 34.8 (L) 40.0 - 53.0 %    MCV 94 78 - 100 fl    MCH 31.9 26.5 - 33.0 pg    MCHC 33.9 31.5 - 36.5 g/dL    RDW 12.5 10.0 - 15.0 %    Platelet Count 182 150 - 450 10e9/L    Diff Method Automated Method     % Neutrophils 75.7 %    % Lymphocytes 9.7 %    % Monocytes 11.0 %    % Eosinophils 3.4 %    % Basophils 0.1 %    % Immature Granulocytes 0.1 %    Nucleated RBCs 0 0 /100    Absolute Neutrophil 5.3 1.6 - 8.3 10e9/L    Absolute Lymphocytes 0.7 (L) 0.8 - 5.3 10e9/L    Absolute Monocytes 0.8 0.0 - 1.3 10e9/L    Absolute Eosinophils 0.2 0.0 - 0.7 10e9/L    Absolute " Basophils 0.0 0.0 - 0.2 10e9/L    Abs Immature Granulocytes 0.0 0 - 0.4 10e9/L    Absolute Nucleated RBC 0.0    Troponin I   Result Value Ref Range    Troponin I ES <0.015 0.000 - 0.045 ug/L   Basic metabolic panel   Result Value Ref Range    Sodium 142 133 - 144 mmol/L    Potassium 3.9 3.4 - 5.3 mmol/L    Chloride 108 94 - 109 mmol/L    Carbon Dioxide 27 20 - 32 mmol/L    Anion Gap 8 3 - 14 mmol/L    Glucose 103 (H) 70 - 99 mg/dL    Urea Nitrogen 32 (H) 7 - 30 mg/dL    Creatinine 1.14 0.66 - 1.25 mg/dL    GFR Estimate 63 >60 mL/min/1.7m2    GFR Estimate If Black 76 >60 mL/min/1.7m2    Calcium 8.2 (L) 8.5 - 10.1 mg/dL   Nt probnp inpatient   Result Value Ref Range    N-Terminal Pro BNP Inpatient 368 0 - 900 pg/mL   Procalcitonin   Result Value Ref Range    Procalcitonin <0.05 ng/ml   EKG 12-lead, tracing only   Result Value Ref Range    Interpretation ECG Click View Image link to view waveform and result[KH1.2]       Recent Results (from the past 48 hour(s))   XR Chest 2 Views    Narrative    Exam: XR CHEST 2 VW, 8/20/2018 8:12 PM    Indication: dyspnea;     Comparison: Chest x-ray 1/26/2018    Findings:   Frontal and lateral projection of the chest. No pneumothorax or large  pleural effusion. Stable cardiac mediastinal silhouette. Patchy  bibasilar opacities, more prominent on the left. Again noted thoracic  compression deformity.      Impression    Impression: Patchy bibasilar opacities, more prominent on the left  representing atelectasis versus pulmonary edema versus infection.     I have personally reviewed the examination and initial interpretation  and I agree with the findings.    SHELLIE MCCARTHY MD                Pending Results:   Unresulted Labs Ordered in the Past 30 Days of this Admission     No orders found for last 61 day(s).                  Discharge Instructions and Follow-Up:     Discharge Procedure Orders  General info for SNF   Order Comments: Length of Stay Estimate: Long Term  Care  Condition at Discharge: Stable  Level of care:skilled   Rehabilitation Potential: Fair  Admission H&P remains valid and up-to-date: Yes  Recent Chemotherapy: N/A  Use Nursing Home Standing Orders: Yes     Mantoux instructions   Order Comments: Give two-step Mantoux (PPD) Per Facility Policy Yes     Reason for your hospital stay   Order Comments: Pneumonia/COPD exacerbation     Follow Up and recommended labs and tests   Order Comments: Follow up with Nursing home physician.  No follow up labs or test are needed.     Activity - Up with nursing assistance   Order Specific Question Answer Comments   Is discharge order? Yes      Full Code     Fall precautions     Advance Diet as Tolerated   Order Comments: Follow this diet upon discharge: Orders Placed This Encounter     Regular Diet Adult   Order Specific Question Answer Comments   Is discharge order? Yes             Attestation:  Chanel Calderon.  YAZAN MORAN[KH1.1]               Revision History        User Key Date/Time User Provider Type Action    > KH1.2 8/21/2018  1:29 PM Chanel Calderon APRN CNP Nurse Practitioner Sign     KH1.1 8/21/2018  1:26 PM Chanel Calderon APRN CNP Nurse Practitioner                   Consult Notes     No notes of this type exist for this encounter.         Progress Notes - Physician (Notes for yesterday and today)      ED Provider Notes by Jose Carlos Tilley MD at 8/20/2018  6:43 PM     Author:  Jose Carlos Tilley MD Service:  Emergency Medicine Author Type:  Physician    Filed:  8/20/2018  8:59 PM Date of Service:  8/20/2018  6:43 PM Creation Time:  8/20/2018  7:07 PM    Status:  Signed :  Jose Carlos Tilley MD (Physician)           History     Chief Complaint   Patient presents with     Shortness of Breath     HPI[MO1.1]  With a history of Parkinson disease, COPD, recurrent productive cough, pulmonary nodules, pneumonia who presents to the Emergency Department for the evaluation of a cough and shortness of  breath. Patient presents with his wife who provided most of the patient s history. Patient has had a productive cough with wheezing,  chest tightness,  and on and off subjective fevers (98.5 here in Emergency Department) for the past two days. Patient s wife was concerned given the patient s history of pneumonia and so brought him here to the Emergency Department as advised by advanced living care facility. Patient s wife states his coughing has woken him up from his sleep and almost causes him to choke while eating. Within the past two days, his temperature fluctuates  over 100.  Patient denies use of Tylenol or ibuprofen. Patient denies vomiting. Patient denies history of stroke. Patient takes Combivent in the morning, afternoon, and evening and Flovent in the morning and night.[MO1.2]      I have reviewed the Medications, Allergies, Past Medical and Surgical History, and Social History in the Epic system.[MO1.1]  Past Medical History:   Diagnosis Date     At risk for falling 9/3/2012     COPD (chronic obstructive pulmonary disease) (H)      Family history of thyroid cancer 7/13/2016     Malignant neoplasm (H)     skin - nose     Moderate recurrent major depression (H) 5/17/2012     Neuromuscular disorder (H)      Paralysis agitans (H)     Parkinson's Disease     Parkinson disease (H)      Rosacea        Past Surgical History:   Procedure Laterality Date     EYE SURGERY       ORTHOPEDIC SURGERY       PHACOEMULSIFICATION CLEAR CORNEA WITH STANDARD INTRAOCULAR LENS IMPLANT  5/21/2014    Procedure: PHACOEMULSIFICATION CLEAR CORNEA WITH STANDARD INTRAOCULAR LENS IMPLANT;  Surgeon: Tomy Hunter MD;  Location: [facility]     PHACOEMULSIFICATION CLEAR CORNEA WITH TORIC INTRAOCULAR LENS IMPLANT  4/30/2014    Procedure: PHACOEMULSIFICATION CLEAR CORNEA WITH TORIC INTRAOCULAR LENS IMPLANT;  Surgeon: Tomy Hunter MD;  Location: [facility]       Family History   Problem Relation Age of Onset     Cerebrovascular Disease Mother       Diabetes Mother      GASTROINTESTINAL DISEASE Mother      Hypertension Mother      Neurologic Disorder Father      Cerebrovascular Disease Father      Cerebrovascular Disease Maternal Grandfather      Cancer Paternal Grandmother      Other - See Comments Sister      gi - unknown       Social History   Substance Use Topics     Smoking status: Former Smoker     Packs/day: 0.01     Years: 40.00     Types: Cigarettes     Quit date: 7/31/2008     Smokeless tobacco: Never Used      Comment: very passive cigarettes in 6 months     Alcohol use No       Current Facility-Administered Medications   Medication     ipratropium - albuterol 0.5 mg/2.5 mg/3 mL (DUONEB) neb solution 3 mL     Current Outpatient Prescriptions   Medication     amLODIPine (NORVASC) 5 MG tablet     aspirin 81 MG tablet     carbidopa-levodopa (SINEMET CR)  MG per CR tablet     carbidopa-levodopa (SINEMET)  MG per tablet     cholecalciferol (VITAMIN D) 1000 UNIT tablet     COMBIVENT RESPIMAT  MCG/ACT inhaler     donepezil (ARICEPT) 10 MG tablet     donepezil (ARICEPT) 5 MG tablet     FLOVENT  MCG/ACT Inhaler     lactobacillus rhamnosus, GG, (CULTURELL) capsule     lisinopril (PRINIVIL/ZESTRIL) 5 MG tablet     mirtazapine (REMERON) 15 MG tablet     QUEtiapine (SEROQUEL) 25 MG tablet     QUEtiapine (SEROQUEL) 50 MG tablet     ranitidine (ZANTAC) 75 MG tablet     senna-docusate (SENOKOT-S;PERICOLACE) 8.6-50 MG per tablet     Sulfamethoxazole-Trimethoprim (BACTRIM PO)     venlafaxine (EFFEXOR-XR) 37.5 MG 24 hr capsule     venlafaxine (EFFEXOR-XR) 75 MG 24 hr capsule     clonazePAM (KLONOPIN) 0.5 MG ODT tab     doxycycline (VIBRAMYCIN) 100 MG capsule     Ipratropium-Albuterol (COMBIVENT RESPIMAT)  MCG/ACT inhaler     methylcellulose (CITRUCEL) 500 MG TABS tablet        Allergies   Allergen Reactions     Erythromycin Other (See Comments)     Pathological fractures     Levaquin [Levofloxacin]      Fracture prevention      "Seasonal Allergies[MO1.3]        Review of Systems   Constitutional: Positive for[MJ1.1] fever (100 subective)[MO1.2].   HENT: Negative for[MJ1.1] congestion[MO1.2].    Eyes: Negative for[MJ1.1] redness[MO1.2].   Respiratory: Positive for[MJ1.1] cough (productive)[MO1.2],[MJ1.1] chest tightness[MO1.2],[MJ1.1] shortness of breath[MO1.2] and[MJ1.1] wheezing[MO1.2].    Cardiovascular: Negative for[MJ1.1] chest pain[MO1.2].   Gastrointestinal: Negative for[MJ1.1] abdominal pain[MO1.2] and[MJ1.1] vomiting[MO1.2].   Genitourinary: Negative for[MJ1.1] difficulty urinating[MO1.2].   Musculoskeletal: Negative for[MJ1.1] arthralgias[MO1.2] and[MJ1.1] neck stiffness[MO1.2].   Skin: Negative for[MJ1.1] color change[MO1.2].   Neurological: Negative for[MJ1.1] headaches[MO1.2].   Psychiatric/Behavioral: Negative for[MJ1.1] confusion[MO1.2].[MJ1.1]       Physical Exam   BP: 147/89  Pulse: 69  Temp: 98.5  F (36.9  C)  Height: 188 cm (6' 2\")  Weight: 78 kg (172 lb)      Physical Exam[MO1.1]  Physical Exam   Constitutional: oriented to person, place, and time. appears well-developed and well-nourished.   HENT:   Head: Normocephalic and atraumatic.   Neck: Normal range of motion.   Pulmonary/Chest: Expiratory wheezes worse on L. No respiratory distress.   Cardiac: No murmurs, rubs, gallops. RRR.  Abdominal: Abdomen soft, nontender, nondistended. No rebound tenderness.  MSK: Long bones without deformity or evidence of trauma. No lower extremity swelling or tenderness along the calves.  Neurological: alert and oriented to person, place, and time.   Skin: Skin is warm and dry.   Psychiatric:  normal mood and affect.  behavior is normal. Thought content normal.[MJ1.1]     ED Course   7:07 PM  The patient was seen and examined by[MO1.1] Jose Carlos Tilley MD[MO1.2] in Room[MO1.1] 12[MO1.2].     ED Course     Procedures[MO1.1]             EKG Interpretation:      Interpreted by Jose Carlos Tilley  Time reviewed: 1945  Symptoms at time of EKG: " dyspnea   Rhythm: normal sinus   Rate: normal  Axis: normal  Ectopy: none  Conduction: normal  ST Segments/ T Waves: No ST-T wave changes  Q Waves: none  Comparison to prior: Unchanged    Clinical Impression: normal EKG[MJ1.2]          Critical Care time:[MO1.1]  none[MJ1.2]       Results for orders placed or performed during the hospital encounter of 08/20/18   XR Chest 2 Views    Impression    Impression: Patchy bibasilar opacities, more prominent on the left  representing atelectasis versus pulmonary edema versus infection.    CBC with platelets differential   Result Value Ref Range    WBC 7.0 4.0 - 11.0 10e9/L    RBC Count 3.70 (L) 4.4 - 5.9 10e12/L    Hemoglobin 11.8 (L) 13.3 - 17.7 g/dL    Hematocrit 34.8 (L) 40.0 - 53.0 %    MCV 94 78 - 100 fl    MCH 31.9 26.5 - 33.0 pg    MCHC 33.9 31.5 - 36.5 g/dL    RDW 12.5 10.0 - 15.0 %    Platelet Count 182 150 - 450 10e9/L    Diff Method Automated Method     % Neutrophils 75.7 %    % Lymphocytes 9.7 %    % Monocytes 11.0 %    % Eosinophils 3.4 %    % Basophils 0.1 %    % Immature Granulocytes 0.1 %    Nucleated RBCs 0 0 /100    Absolute Neutrophil 5.3 1.6 - 8.3 10e9/L    Absolute Lymphocytes 0.7 (L) 0.8 - 5.3 10e9/L    Absolute Monocytes 0.8 0.0 - 1.3 10e9/L    Absolute Eosinophils 0.2 0.0 - 0.7 10e9/L    Absolute Basophils 0.0 0.0 - 0.2 10e9/L    Abs Immature Granulocytes 0.0 0 - 0.4 10e9/L    Absolute Nucleated RBC 0.0    Troponin I   Result Value Ref Range    Troponin I ES <0.015 0.000 - 0.045 ug/L   Basic metabolic panel   Result Value Ref Range    Sodium 142 133 - 144 mmol/L    Potassium 3.9 3.4 - 5.3 mmol/L    Chloride 108 94 - 109 mmol/L    Carbon Dioxide 27 20 - 32 mmol/L    Anion Gap 8 3 - 14 mmol/L    Glucose 103 (H) 70 - 99 mg/dL    Urea Nitrogen 32 (H) 7 - 30 mg/dL    Creatinine 1.14 0.66 - 1.25 mg/dL    GFR Estimate 63 >60 mL/min/1.7m2    GFR Estimate If Black 76 >60 mL/min/1.7m2    Calcium 8.2 (L) 8.5 - 10.1 mg/dL   EKG 12-lead, tracing only   Result  Value Ref Range    Interpretation ECG Click View Image link to view waveform and result[MJ1.3]               Assessments & Plan (with Medical Decision Making)[MO1.1]   Differential includes pneumonia, COPD exacerbation, ACS, pulmonary embolism, heart failure.      MDM and Plan  Patient presented with concerns for pneumonia.  Patient looks overall well but does have cough noted that his course.  No evidence of blood clot on exam, physical exam or vital signs.  Will do chest x-ray, EKG and labs.  Re eval[MJ1.1]: Patient does have evidence of opacities on x-ray, does appear to have pneumonia clinically and on x-ray.  Will cover for hospital-acquired pneumonia as this patient does live in a nursing care facility.  Labs, EKG and troponin otherwise unremarkable.  Did add a BNP on case patient does have some heart failure.  Patient will be admitted to medicine for further care.[MJ1.4]      I have reviewed the nursing notes.    I have reviewed the findings, diagnosis, plan and need for follow up with the patient.    New Prescriptions    No medications on file[MO1.1]       Final diagnoses:   Pneumonia[MJ1.3]     I, Jose Carlos Nichole, am serving as a trained medical scribe to document services personally performed by Jose Carlos Tilley MD, based on the provider's statements to me.   IJose Carlos MD, was physically present and have reviewed and verified the accuracy of this note documented by Jose Carlos Nichole.[MO1.2]    8/20/2018   [facility], [place], EMERGENCY DEPARTMENT[MO1.1]     Jose Carlos Tilley MD  08/20/18 2059  [MJ1.3]     Revision History        User Key Date/Time User Provider Type Action    > MJ1.3 8/20/2018  8:59 PM Jose Carlos Tilley MD Physician Sign     MJ1.4 8/20/2018  8:48 PM Jose Carlos Tilley MD Physician Share     MJ1.2 8/20/2018  7:45 PM Jose Carlos Tilley MD Physician Share     MO1.3 8/20/2018  7:41 PM Jose Carlos Nichole Scribe Share     MO1.2 8/20/2018  7:27 PM Jose Carlos Nichole Scribe      [N/A]  8/20/2018  7:16 PM Jose Carlos Tilley MD Physician Share     MO1.1 8/20/2018  7:14 PM Jose Carlos Nichole Share     MJ1.1 8/20/2018  7:14 PM Jose Carlos Tilley MD Physician                   Procedure Notes     No notes of this type exist for this encounter.      Progress Notes - Therapies (Notes from 08/18/18 through 08/21/18)     No notes of this type exist for this encounter.                                          INTERAGENCY TRANSFER FORM - LAB / IMAGING / EKG / EMG RESULTS   8/20/2018                       UNIT 6D OBSERVATION [facility] BANK: [phone]            Unresulted Labs (24h ago through future)    Start       Ordered    08/22/18 0600  Basic metabolic panel  AM DRAW,   Routine      08/21/18 0109    08/22/18 0600  CBC with platelets  AM DRAW,   Routine     Comments:  Last Lab Result: Hemoglobin (g/dL)       Date                     Value                 08/20/2018               11.8 (L)         ----------    08/21/18 0109         Lab Results - 3 Days      Procalcitonin [[id number]]  Resulted: 08/20/18 2235, Result status: Final result    Ordering provider: Jose Carlos Tilley MD  08/20/18 1958 Resulting lab: [facility]    Specimen Information    Type Source Collected On     08/20/18 1958          Components       Value Reference Range Flag Lab   Procalcitonin <0.05 ng/ml  51   Comment:         <0.05 ng/ml  Normal  Recommendation: Very low risk of bacterial infection.   Discourage antibiotics unless strong clinical suspicion for serious infection.              Nt probnp inpatient [[id number]]  Resulted: 08/20/18 2137, Result status: Final result    Ordering provider: Jose Carlos Tilley MD  08/20/18 1958 Resulting lab: [facility]    Specimen Information    Type Source Collected On     08/20/18 1958          Components       Value Reference Range Flag Lab   N-Terminal Pro BNP Inpatient 368 0 - 900 pg/mL  51   Comment:             Reference range shown and results flagged as abnormal are suggested inpatient   cut points for confirming diagnosis if CHF in an acute setting. Establishing a   baseline value for each individual patient is useful for follow-up. An   inpatient or emergency department NT-proPBNP <300 pg/mL effectively rules out   acute CHF, with 99% negative predictive value.  The outpatient non-acute reference range for ruling out CHF is:   0-125 pg/mL (age 18 to less than 75)   0-450 pg/mL (age 75 yrs and older)              Troponin I [[id number]]  Resulted: 08/20/18 2047, Result status: Final result    Ordering provider: Jose Carlos Tilley MD  08/20/18 1917 Resulting lab: [facility]    Specimen Information    Type Source Collected On   Blood  08/20/18 1958          Components       Value Reference Range Flag Lab   Troponin I ES <0.015 0.000 - 0.045 ug/L  51   Comment:         The 99th percentile for upper reference range is 0.045 ug/L.  Troponin values   in the range of 0.045 - 0.120 ug/L may be associated with risks of adverse   clinical events.              Basic metabolic panel [[id number]] (Abnormal)  Resulted: 08/20/18 2047, Result status: Final result    Ordering provider: Jose Carlos Tilley MD  08/20/18 1917 Resulting lab: [facility]    Specimen Information    Type Source Collected On   Blood  08/20/18 1958          Components       Value Reference Range Flag Lab   Sodium 142 133 - 144 mmol/L  51   Potassium 3.9 3.4 - 5.3 mmol/L  51   Chloride 108 94 - 109 mmol/L  51   Carbon Dioxide 27 20 - 32 mmol/L  51   Anion Gap 8 3 - 14 mmol/L  51   Glucose 103 70 - 99 mg/dL H 51   Urea Nitrogen 32 7 - 30 mg/dL H 51   Creatinine 1.14 0.66 - 1.25 mg/dL  51   GFR Estimate 63 >60 mL/min/1.7m2  51   Comment:  Non  GFR Calc   GFR Estimate If Black 76 >60 mL/min/1.7m2  51   Comment:  African American GFR Calc   Calcium 8.2 8.5 - 10.1 mg/dL L 51             CBC with platelets differential [[id number]] (Abnormal)  Resulted: 08/20/18 2021, Result status: Final result    Ordering provider: Jose Carlos Tilley MD  08/20/18 1917 Resulting lab: [facility]    Specimen Information    Type Source Collected On   Blood  08/20/18 1958          Components       Value Reference Range Flag Lab   WBC 7.0 4.0 - 11.0 10e9/L  51   RBC Count 3.70 4.4 - 5.9 10e12/L L 51   Hemoglobin 11.8 13.3 - 17.7 g/dL L 51   Hematocrit 34.8 40.0 - 53.0 % L 51   MCV 94 78 - 100 fl  51   MCH 31.9 26.5 - 33.0 pg  51   MCHC 33.9 31.5 - 36.5 g/dL  51   RDW 12.5 10.0 - 15.0 %  51   Platelet Count 182 150 - 450 10e9/L  51   Diff Method Automated Method   51   % Neutrophils 75.7 %  51   % Lymphocytes 9.7 %  51   % Monocytes 11.0 %  51   % Eosinophils 3.4 %  51   % Basophils 0.1 %  51   % Immature Granulocytes 0.1 %  51   Nucleated RBCs 0 0 /100  51   Absolute Neutrophil 5.3 1.6 - 8.3 10e9/L  51   Absolute Lymphocytes 0.7 0.8 - 5.3 10e9/L L 51   Absolute Monocytes 0.8 0.0 - 1.3 10e9/L  51   Absolute Eosinophils 0.2 0.0 - 0.7 10e9/L  51   Absolute Basophils 0.0 0.0 - 0.2 10e9/L  51   Abs Immature Granulocytes 0.0 0 - 0.4 10e9/L  51   Absolute Nucleated RBC 0.0   51            Testing Performed By     Lab - Abbreviation Name Director Address Valid Date Range    51 - Unknown [facility] Unknown [address] [facility] [address] 12/31/14 1010 - Present               Imaging Results - 3 Days      XR Chest 2 Views [[id number]]  Resulted: 08/20/18 2109, Result status: Final result    Ordering provider: Jose Carlos Tilley MD  08/20/18 1917 Resulted by: Lokesh Henry MD Faizi, Nauroze, MD    Performed: 08/20/18 2001 - 08/20/18 2012 Resulting lab: RADIOLOGY RESULTS    Narrative:       Exam: XR CHEST 2 VW, 8/20/2018 8:12 PM    Indication: dyspnea;     Comparison: Chest x-ray 1/26/2018    Findings:   Frontal and lateral  projection of the chest. No pneumothorax or large  pleural effusion. Stable cardiac mediastinal silhouette. Patchy  bibasilar opacities, more prominent on the left. Again noted thoracic  compression deformity.      Impression:       Impression: Patchy bibasilar opacities, more prominent on the left  representing atelectasis versus pulmonary edema versus infection.     I have personally reviewed the examination and initial interpretation  and I agree with the findings.    SHELLIE MCCARTHY MD      Testing Performed By     Lab - Abbreviation Name Director Address Valid Date Range    104 - Rad Rslts RADIOLOGY RESULTS Unknown Unknown 02/16/05 1553 - Present            Encounter-Level Documents:     There are no encounter-level documents.      Order-Level Documents:     There are no order-level documents.

## 2018-08-20 NOTE — IP AVS SNAPSHOT
` ` Patient Information     Patient Name Sex     Sanjay Cano ([id number]) Male 1945       Room Bed    6510 6510-01      Patient Demographics     Address Phone    [address] [facility] [address] [phone] (Home)      Patient Ethnicity & Race     Ethnic Group Patient Race    American White      Emergency Contact(s)     Name Relation Home Work Mobile    Caren Cano Spouse [phone]      Fredy Cano Son [phone]        Documents on File        Status Date Received Description       Documents for the Patient    Face Sheet Received () 09     Face Sheet Received () 08/24/10     Insurance Card Received 11 MEDICA/MEDICARE    External Medication Information Consent Accepted () 11     Patient ID Received 12 DL    Consent for Services - Hospital/Clinic Received () 11     Privacy Notice - [place] Received 10/12/11     Business/Insurance/Care Coordination/Health Form - Patient.1  11 OUTPATIENT MEDICARE CERTIFICATION    Business/Insurance/Care Coordination/Health Form - Patient.1  11 OUTPATIENT MEDICARE CERTIFICATION    Business/Insurance/Care Coordination/Health Form - Patient.1   OUTPATIENT MEDICARE CERTIFICATION    Business/Insurance/Care Coordination/Health Form - Patient.2   OT plan of progress, [employer] 12    Business/Insurance/Care Coordination/Health Form - Patient.2   OT plan of progress [employer] 12    External Medication Information Consent Accepted () 12     Consent for Services - Hospital/Clinic Received () 12     Consent for EHR Access  13 Copied from existing Consent for services - C/HOD collected on 2012    [facility] Specified Other       Consent for Services - Hospital/Clinic Received () 13     External Medication Information Consent Accepted 13 [employer]    Consent to Communicate   Auth to Discuss PHI, wife  Caren Cano, iftikhar Cano 13    Insurance Card Received 14 MEDICA    Patient ID Received 14     Consent for Services - Hospital/Clinic Received () 02/03/15     Insurance Card Received 05/27/15 MEDICA    Business/Insurance/Care Coordination/Health Form - Patient  06/23/15 LETTER OF MEDICAL NECESSITY WHEELCHAIR    Business/Insurance/Care Coordination/Health Form - Patient  07/22/15 LETTER OF MEDICAL NECESSITY WHEELCHAIR 06/22/15    Consent for Services/Privacy Notice - Hospital/Clinic Received () 16     Consent for Services - [facility]       Consent for Services/Privacy Notice - Hospital/Clinic-[facility]sign Received 18     Business/Insurance/Care Coordination/Health Form - Patient  17 MEDICA APPROVAL OF [facility] 17 - 18    Consent for Services/Privacy Notice - Hospital/Clinic Received () 17     Insurance Card Received 17 MEDICA    Patient ID Scan Refused 17     Care Everywhere Prospective Auth Received 18     Insurance Card Received 18 [employer]    Insurance Card Received 18 [place]    Consent for Services - Hospital and Clinic Received 18     HIE Auth Received 18     Consent for Services - Hospital and Clinic  05/15/18 CONSENT FOR SERVICES - HOSPITAL AND CLINIC    Privacy Notice - [place] Received (Deleted) 06        Documents for the Encounter    CMS IM for Patient Signature Received 18       Admission Information     Attending Provider Admitting Provider Admission Type Admission Date/Time    Adam Clay MD Klos, Steven E, MD Emergency 18  1902    Discharge Date Hospital Service Auth/Cert Status Service Area     Emergency Medicine Incomplete [facility]    Unit Room/Bed Admission Status       UU U6D OBSERVATION 6510/6510-01 Admission (Confirmed)       Admission     Complaint    Pneumonia      Hospital Account     Name Acct ID Class Status Primary Coverage    Jerome  Sanjay MORRIS [id number] Observation Open MEDICARE - MEDICARE FOR HB SUPPLEMENT            Guarantor Account (for Hospital Account #[id number])     Name Relation to Pt Service Area Active? Acct Type    Sanjay Cano ARTURO Self FCS Yes Personal/Family    Address Phone          [address] [place] [address] [phone](H)              Coverage Information (for Hospital Account #[id number])     1. MEDICARE/MEDICARE FOR HB SUPPLEMENT     F/O Payor/Plan Precert #    MEDICARE/MEDICARE FOR HB SUPPLEMENT     Subscriber Subscriber #    Sanjay Cano ARTURO [id number]    Address Phone    [employer] CLAIM[employer]  [address]  [place] [address] [phone]          2. MEDICA/MEDICA PRIME SOLUTION     F/O Payor/Plan Precert #    MEDICA/MEDICA PRIME SOLUTION     Subscriber Subscriber #    Sanjay Cano [id number]    Address Phone    [address] [address]  [place] [address] [phone]          3. MEDICA/MEDICA CHOICE CARE MSC PLUS     F/O Payor/Plan Precert #    MEDICA/MEDICA CHOICE CARE MSC PLUS     Subscriber Subscriber #    Sanjay Cano ARTURO [id number]    Address Phone    [address] [address]  [place] [address] [phone]

## 2018-08-20 NOTE — TELEPHONE ENCOUNTER
BOAZ Mckeon      I did call [facility] staff and they stated they were told by nurse Solange to take him to the ED and that is what they are going to do    (Staff at [facility] care had called the [facility] at 5:30pm today and directed to go to ED)    Bernadette Sanchez RN, BSN

## 2018-08-20 NOTE — IP AVS SNAPSHOT
MRN:[id number]                      After Visit Summary   8/20/2018    Sanjay Cano    MRN: [id number]           Thank you!     Thank you for choosing [facility] for your care. Our goal is always to provide you with excellent care. Hearing back from our patients is one way we can continue to improve our services. Please take a few minutes to complete the written survey that you may receive in the mail after you visit with us. Thank you!        Patient Information     Date Of Birth          1945        About your hospital stay     You were admitted on:  August 21, 2018 You last received care in the:  Unit [facility] Observation [facility]    You were discharged on:  August 21, 2018        Reason for your hospital stay       Pneumonia/COPD exacerbation                  Who to Call     For medical emergencies, please call 911.  For non-urgent questions about your medical care, please call your primary care provider or clinic, [phone]          Attending Provider     Provider Specialty    Jose Carlos Tilley MD Emergency Medicine    Adam Clay MD Emergency Medicine       Primary Care Provider Office Phone # Fax #    Bharath Buchanan [phone]      After Care Instructions     Activity - Up with nursing assistance           Advance Diet as Tolerated       Follow this diet upon discharge: Orders Placed This Encounter      Regular Diet Adult            Fall precautions           General info for SNF       Length of Stay Estimate: Long Term Care  Condition at Discharge: Stable  Level of care:skilled   Rehabilitation Potential: Fair  Admission H&P remains valid and up-to-date: Yes  Recent Chemotherapy: N/A  Use Nursing Home Standing Orders: Yes            Mantoux instructions       Give two-step Mantoux (PPD) Per Facility Policy Yes                  Follow-up Appointments     Follow Up and recommended labs and tests       Follow up with Nursing home physician.  No follow  "up labs or test are needed.                  Your next 10 appointments already scheduled     Sep 04, 2018 12:50 PM CDT   (Arrive by 12:35 PM)   RETURN HAND with Marietta Mercedes MD   [facility] ([facility] [place])    [address]  [address] [facility] [id number] [phone]              Further instructions from your care team       Have patient use incentive spirometer every 2-4 hours while awake for the next week    Pending Results     Date and Time Order Name Status Description    8/20/2018 1917 EKG 12-lead, tracing only Preliminary             Statement of Approval     Ordered          08/21/18 1325  I have reviewed and agree with all the recommendations and orders detailed in this document.  EFFECTIVE NOW     Approved and electronically signed by:  Chanel Calderon APRN CNP             Admission Information     Date & Time Provider Department Dept. Phone    8/20/2018 Adam Clay MD Unit 6D Observation [facility] [place] [phone]      Your Vitals Were     Blood Pressure Pulse Temperature Respirations Height Weight    153/79 (BP Location: Left arm) 69 99  F (37.2  C) (Oral) 20 1.88 m (6' 2\") 78 kg (172 lb)    Pulse Oximetry BMI (Body Mass Index)                94% 22.08 kg/m2          Care EveryWhere ID     This is your Care EveryWhere ID. This could be used by other organizations to access your [facility] medical records  [id number]        Equal Access to Services     JERARDO LONGORIA AH: Hadii hanh ku hadasho Soomaali, waaxda luqadaha, qaybta kaalmada adeegyada, nataliya capps. So [facility] [phone].    ATENCIÓN: Si habla español, tiene a herring disposición servicios gratuitos de asistencia lingüística. Yanique al [phone].    We comply with applicable federal civil rights laws and [place] laws. We do not discriminate on the basis of race, color, national origin, age, disability, sex, sexual orientation, or gender identity.             "   Review of your medicines      START taking        Dose / Directions    guaiFENesin 20 mg/mL Soln solution   Commonly known as:  ROBITUSSIN   Used for:  Pneumonia due to infectious organism, unspecified laterality, unspecified part of lung        Dose:  10 mL   Take 10 mLs by mouth every 4 hours as needed for cough   Quantity:  1200 mL   Refills:  0       predniSONE 20 MG tablet   Commonly known as:  DELTASONE   Used for:  Chronic obstructive pulmonary disease with acute exacerbation (H)        Dose:  40 mg   Start taking on:  8/22/2018   Take 2 tablets (40 mg) by mouth daily for 5 days   Refills:  0         CONTINUE these medicines which may have CHANGED, or have new prescriptions. If we are uncertain of the size of tablets/capsules you have at home, strength may be listed as something that might have changed.        Dose / Directions    mirtazapine 15 MG tablet   Commonly known as:  REMERON   This may have changed:  additional instructions   Used for:  Depression, major, recurrent, moderate (H)        TAKE 1.5 TABLETs (22.5 MG) BY MOUTH AT BEDTIME   Quantity:  135 tablet   Refills:  1       QUEtiapine 50 MG tablet   Commonly known as:  SEROquel   This may have changed:  Another medication with the same name was removed. Continue taking this medication, and follow the directions you see here.   Used for:  Moderate recurrent major depression (H)        Dose:  25 mg   Take 0.5 tablets (25 mg) by mouth 2 times daily Give after day program ~ 3 pm and at bedtime. 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)    Generic drug:  donepezil        Take 2.5 mg twice a day (take with the 5 mg dose for a total dose of 7.5mg twice daily)   Quantity:  180 tablet   Refills:  3       * donepezil 10 MG tablet   Commonly known as:  ARICEPT        Take 5mg  by mouth twice a day (take with the 2.5 mg dose for a total dose of 7.5mg twice daily)   Refills:  3       aspirin 81 MG tablet        Take by mouth twice a week   Refills:  0       BACTRIM PO        Refills:  0       * carbidopa-levodopa  MG per tablet   Commonly known as:  SINEMET        Dose:  1-2 tablet   Take 1-2 tablets by mouth 5 times daily Takes 2 tablets at 6 AM, 1.5 tablets at 9 AM, 2 tablets at noon, 1.5 tablets at 3 PM, 1.5 tablets at 6 PM. Can take additional 0.5 tablet in the middle of the night if needed.   Quantity:  90 tablet   Refills:  0       * carbidopa-levodopa  MG per CR tablet   Commonly known as:  SINEMET CR        Dose:  1 tablet   Take 1 tablet by mouth At Bedtime   Quantity:  1 tablet   Refills:  0       cholecalciferol 1000 UNIT tablet   Commonly known as:  vitamin D3   Used for:  At risk for falling        Dose:  1000 Units   Take 1 tablet (1,000 Units) by mouth every morning   Quantity:  90 tablet   Refills:  1       clonazePAM 0.5 MG ODT tab   Commonly known as:  klonoPIN   Used for:  Primary insomnia        Take 1/2 tablet (0.25 mg) by mouth every other night.   Quantity:  45 tablet   Refills:  0       * COMBIVENT RESPIMAT  MCG/ACT inhaler   Used for:  Other emphysema (H)   Generic drug:  Ipratropium-Albuterol        INHALE 1 PUFF BY MOUTH FOUR TIMES DAILY. NOT TO EXCEED 6 DOSES PER DAY   Quantity:  4 g   Refills:  4       * COMBIVENT RESPIMAT  MCG/ACT inhaler   Used for:  Other emphysema (H)   Generic drug:  Ipratropium-Albuterol        Dose:  1 puff   Inhale 1 puff into the lungs 4 times daily May take 1 to 2 additional doses as needed during the day   Quantity:  1 Inhaler   Refills:  4       doxycycline 100 MG capsule    Commonly known as:  VIBRAMYCIN   Used for:  Pneumonia due to infectious organism, unspecified laterality, unspecified part of lung        Dose:  100 mg   Take 1 capsule (100 mg) by mouth every 12 hours for 10 days Give at 8 am and 8 pm   Quantity:  20 capsule   Refills:  0       FLOVENT  MCG/ACT Inhaler   Used for:  Other emphysema (H)   Generic drug:  fluticasone        INHALE 2 PUFFS INTO THE LUNGS TWICE DAILY   Quantity:  36 g   Refills:  1       lactobacillus rhamnosus (GG) capsule   Used for:  Diarrhea, unspecified type        Dose:  1 capsule   Take 1 capsule by mouth every morning   Quantity:  60 capsule   Refills:  11       lisinopril 5 MG tablet   Commonly known as:  PRINIVIL/ZESTRIL        Dose:  5 mg   Take 1 tablet (5 mg) by mouth daily   Quantity:  30 tablet   Refills:  1       methylcellulose 500 MG Tabs tablet   Commonly known as:  CITRUCEL        Dose:  500 mg   Take 1 tablet (500 mg) by mouth daily as needed   Quantity:  14 each   Refills:  0       ranitidine 75 MG tablet   Commonly known as:  ZANTAC        Dose:  75 mg   Take 1 tablet (75 mg) by mouth At Bedtime   Quantity:  30 tablet   Refills:  0       senna-docusate 8.6-50 MG per tablet   Commonly known as:  SENOKOT-S;PERICOLACE        Dose:  1 tablet   Take 1 tablet by mouth daily   Refills:  0       * venlafaxine 75 MG 24 hr capsule   Commonly known as:  EFFEXOR-XR        Dose:  75 mg   Take 75 mg by mouth daily   Refills:  0       * venlafaxine 37.5 MG 24 hr capsule   Commonly known as:  EFFEXOR-XR        Dose:  37.5 mg   Take 37.5 mg by mouth daily   Refills:  0       * Notice:  This list has 8 medication(s) that are the same as other medications prescribed for you. Read the directions carefully, and ask your doctor or other care provider to review them with you.         Where to get your medicines      Some of these will need a paper prescription and others can be bought over the counter. Ask your nurse if you have questions.      You don't need a prescription for these medications     doxycycline 100 MG capsule    guaiFENesin 20 mg/mL Soln solution    mirtazapine 15 MG tablet    predniSONE 20 MG tablet                Protect others around you: Learn how to safely use, store and throw away your medicines at www.disposemymeds.org.        ANTIBIOTIC INSTRUCTION     You've Been Prescribed an Antibiotic - Now What?  Your healthcare team thinks that you or your loved one might have an infection. Some infections can be treated with antibiotics, which are powerful, life-saving drugs. Like all medications, antibiotics have side effects and should only be used when necessary. There are some important things you should know about your antibiotic treatment.      Your healthcare team may run tests before you start taking an antibiotic.    Your team may take samples (e.g., from your blood, urine or other areas) to run tests to look for bacteria. These test can be important to determine if you need an antibiotic at all and, if you do, which antibiotic will work best.      Within a few days, your healthcare team might change or even stop your antibiotic.    Your team may start you on an antibiotic while they are working to find out what is making you sick.    Your team might change your antibiotic because test results show that a different antibiotic would be better to treat your infection.    In some cases, once your team has more information, they learn that you do not need an antibiotic at all. They may find out that you don't have an infection, or that the antibiotic you're taking won't work against your infection. For example, an infection caused by a virus can't be treated with antibiotics. Staying on an antibiotic when you don't need it is more likely to be harmful than helpful.      You may experience side effects from your antibiotic.    Like all medications, antibiotics have side effects. Some of these can be serious.    Let you healthcare team  know if you have any known allergies when you are admitted to the hospital.    One significant side effect of nearly all antibiotics is the risk of severe and sometimes deadly diarrhea caused by Clostridium difficile (C. Difficile). This occurs when a person takes antibiotics because some good germs are destroyed. Antibiotic use allows C. diificile to take over, putting patients at high risk for this serious infection.    As a patient or caregiver, it is important to understand your or your loved one's antibiotic treatment. It is especially important for caregivers to speak up when patients can't speak for themselves. Here are some important questions to ask your healthcare team.    What infection is this antibiotic treating and how do you know I have that infection?    What side effects might occur from this antibiotic?    How long will I need to take this antibiotic?    Is it safe to take this antibiotic with other medications or supplements (e.g., vitamins) that I am taking?     Are there any special directions I need to know about taking this antibiotic? For example, should I take it with food?    How will I be monitored to know whether my infection is responding to the antibiotic?    What tests may help to make sure the right antibiotic is prescribed for me?      Information provided by:  www.cdc.gov/getsmart  U.S. Department of Health and Human Services  Centers for disease Control and Prevention  National Center for Emerging and Zoonotic Infectious Diseases  Division of Healthcare Quality Promotion             Medication List: This is a list of all your medications and when to take them. Check marks below indicate your daily home schedule. Keep this list as a reference.      Medications           Morning Afternoon Evening Bedtime As Needed    amLODIPine 5 MG tablet   Commonly known as:  NORVASC   Take 1 tablet (5 mg) by mouth At Bedtime   Last time this was given:  5 mg on 8/21/2018  1:30 AM                                 * ARICEPT 5 MG tablet   Take 2.5 mg twice a day (take with the 5 mg dose for a total dose of 7.5mg twice daily)   Last time this was given:  7.5 mg on 8/21/2018  8:00 AM   Generic drug:  donepezil                                * donepezil 10 MG tablet   Commonly known as:  ARICEPT   Take 5mg  by mouth twice a day (take with the 2.5 mg dose for a total dose of 7.5mg twice daily)   Last time this was given:  7.5 mg on 8/21/2018  8:00 AM                                aspirin 81 MG tablet   Take by mouth twice a week                                BACTRIM PO                                * carbidopa-levodopa  MG per tablet   Commonly known as:  SINEMET   Take 1-2 tablets by mouth 5 times daily Takes 2 tablets at 6 AM, 1.5 tablets at 9 AM, 2 tablets at noon, 1.5 tablets at 3 PM, 1.5 tablets at 6 PM. Can take additional 0.5 tablet in the middle of the night if needed.   Last time this was given:  2 tablets on 8/21/2018 12:05 PM                                * carbidopa-levodopa  MG per CR tablet   Commonly known as:  SINEMET CR   Take 1 tablet by mouth At Bedtime   Last time this was given:  1 tablet on 8/20/2018  9:47 PM                                cholecalciferol 1000 UNIT tablet   Commonly known as:  vitamin D3   Take 1 tablet (1,000 Units) by mouth every morning                                clonazePAM 0.5 MG ODT tab   Commonly known as:  klonoPIN   Take 1/2 tablet (0.25 mg) by mouth every other night.                                * COMBIVENT RESPIMAT  MCG/ACT inhaler   INHALE 1 PUFF BY MOUTH FOUR TIMES DAILY. NOT TO EXCEED 6 DOSES PER DAY   Generic drug:  Ipratropium-Albuterol                                * COMBIVENT RESPIMAT  MCG/ACT inhaler   Inhale 1 puff into the lungs 4 times daily May take 1 to 2 additional doses as needed during the day   Generic drug:  Ipratropium-Albuterol                                doxycycline 100 MG capsule   Commonly known as:   VIBRAMYCIN   Take 1 capsule (100 mg) by mouth every 12 hours for 10 days Give at 8 am and 8 pm   Last time this was given:  100 mg on 8/21/2018 10:50 AM                                FLOVENT  MCG/ACT Inhaler   INHALE 2 PUFFS INTO THE LUNGS TWICE DAILY   Generic drug:  fluticasone                                guaiFENesin 20 mg/mL Soln solution   Commonly known as:  ROBITUSSIN   Take 10 mLs by mouth every 4 hours as needed for cough                                lactobacillus rhamnosus (GG) capsule   Take 1 capsule by mouth every morning                                lisinopril 5 MG tablet   Commonly known as:  PRINIVIL/ZESTRIL   Take 1 tablet (5 mg) by mouth daily   Last time this was given:  5 mg on 8/21/2018  8:00 AM                                methylcellulose 500 MG Tabs tablet   Commonly known as:  CITRUCEL   Take 1 tablet (500 mg) by mouth daily as needed                                mirtazapine 15 MG tablet   Commonly known as:  REMERON   TAKE 1.5 TABLETs (22.5 MG) BY MOUTH AT BEDTIME                                predniSONE 20 MG tablet   Commonly known as:  DELTASONE   Take 2 tablets (40 mg) by mouth daily for 5 days   Start taking on:  8/22/2018   Last time this was given:  40 mg on 8/21/2018  8:00 AM                                QUEtiapine 50 MG tablet   Commonly known as:  SEROquel   Take 0.5 tablets (25 mg) by mouth 2 times daily Give after day program ~ 3 pm and at bedtime. Discontinue other seroquel orders   Last time this was given:  25 mg on 8/21/2018  8:00 AM                                ranitidine 75 MG tablet   Commonly known as:  ZANTAC   Take 1 tablet (75 mg) by mouth At Bedtime   Last time this was given:  75 mg on 8/21/2018  1:30 AM                                senna-docusate 8.6-50 MG per tablet   Commonly known as:  SENOKOT-S;PERICOLACE   Take 1 tablet by mouth daily                                * venlafaxine 75 MG 24 hr capsule   Commonly known as:  EFFEXOR-XR    Take 75 mg by mouth daily   Last time this was given:  37.5 mg on 8/21/2018  8:00 AM                                * venlafaxine 37.5 MG 24 hr capsule   Commonly known as:  EFFEXOR-XR   Take 37.5 mg by mouth daily   Last time this was given:  37.5 mg on 8/21/2018  8:00 AM                                * Notice:  This list has 8 medication(s) that are the same as other medications prescribed for you. Read the directions carefully, and ask your doctor or other care provider to review them with you.

## 2018-08-20 NOTE — IP AVS SNAPSHOT
Unit 6D Observation [address] [address]    Phone:  [phone]    Fax:  [phone]                                       After Visit Summary   8/20/2018    Sanjay Cano    MRN: [id number]           After Visit Summary Signature Page     I have received my discharge instructions, and my questions have been answered. I have discussed any challenges I see with this plan with the nurse or doctor.    ..........................................................................................................................................  Patient/Patient Representative Signature      ..........................................................................................................................................  Patient Representative Print Name and Relationship to Patient    ..................................................               ................................................  Date                                            Time    ..........................................................................................................................................  Reviewed by Signature/Title    ...................................................              ..............................................  Date                                                            Time

## 2018-08-20 NOTE — ED TRIAGE NOTES
72 year old male with history of emphysema presents with complaints of increased cough and shortness of breath. Patient's wife has also noted URI symptoms

## 2018-08-20 NOTE — TELEPHONE ENCOUNTER
"LTC nurse is calling. They called the clinic twice today, but did not hear back. Patient's wife is there now and she wants patient to be seen in ER because of increased difficulty breathing and wheezing.  Solange Anders RN  [facility]      Reason for Disposition    [1] MODERATE difficulty breathing (e.g., speaks in phrases, SOB even at rest, pulse 100-120) AND [2] NEW-onset or WORSE than normal    Additional Information    Difficulty breathing, severe    Negative: [1] Breathing stopped AND [2] hasn't returned    Negative: Choking on something    Negative: Severe difficulty breathing (e.g., struggling for each breath, speaks in single words)    Negative: Bluish lips, tongue, or face now    Negative: Difficult to awaken or acting confused  (e.g., disoriented, slurred speech)    Negative: Passed out (i.e., lost consciousness, collapsed and was not responding)    Negative: Wheezing started suddenly after medicine, an allergic food or bee sting    Negative: Stridor    Negative: Slow, shallow and weak breathing    Negative: Sounds like a life-threatening emergency to the triager    Negative: Chest pain    Negative: [1] Wheezing (high pitched whistling sound) AND [2] previous asthma attacks or use of asthma medicines    Negative: [1] Difficulty breathing AND [2] only present when coughing    Negative: [1] Difficulty breathing AND [2] only from stuffy or runny nose    Answer Assessment - Initial Assessment Questions  1. RESPIRATORY STATUS: \"Describe your breathing?\" (e.g., wheezing, shortness of breath, unable to speak, severe coughing)       Congested, wheezing, coughing increase  2. ONSET: \"When did this breathing problem begin?\"       Increasing past 2 days  3. PATTERN \"Does the difficult breathing come and go, or has it been constant since it started?\"       Getting worse  4. SEVERITY: \"How bad is your breathing?\" (e.g., mild, moderate, severe)     - MILD: No SOB at rest, mild SOB with walking, speaks " "normally in sentences, can lay down, no retractions, pulse < 100.     - MODERATE: SOB at rest, SOB with minimal exertion and prefers to sit, cannot lie down flat, speaks in phrases, mild retractions, audible wheezing, pulse 100-120.     - SEVERE: Very SOB at rest, speaks in single words, struggling to breathe, sitting hunched forward, retractions, pulse > 120       Moderate-severe at times  5. RECURRENT SYMPTOM: \"Have you had difficulty breathing before?\" If so, ask: \"When was the last time?\" and \"What happened that time?\"       History of Parkinson's, frequent pneumonia  6. CARDIAC HISTORY: \"Do you have any history of heart disease?\" (e.g., heart attack, angina, bypass surgery, angioplasty)       no  7. LUNG HISTORY: \"Do you have any history of lung disease?\"  (e.g., pulmonary embolus, asthma, emphysema)      Nodules in lungs, emphysema  8. CAUSE: \"What do you think is causing the breathing problem?\"       ?pneumonia  9. OTHER SYMPTOMS: \"Do you have any other symptoms? (e.g., dizziness, runny nose, cough, chest pain, fever)      Fever, congestion, cough, wheezing  10. PREGNANCY: \"Is there any chance you are pregnant?\" \"When was your last menstrual period?\"        no  11. TRAVEL: \"Have you traveled out of the country in the last month?\" (e.g., travel history, exposures)        no    Protocols used: BREATHING DIFFICULTY-ADULT-AH, RESPIRATORY MULTIPLE SYMPTOMS - GUIDELINE SELECTION-ADULT-AH      "

## 2018-08-20 NOTE — TELEPHONE ENCOUNTER
Sanjay is presenting with cold symptoms. [facility] is requesting a call back from an RN.    Call back number:  [phone]

## 2018-08-20 NOTE — TELEPHONE ENCOUNTER
See other phone noted dated from today, 8/20/2018, for this same issue    Encounter will be closed    Bernadette Sanchez RN

## 2018-08-21 VITALS
OXYGEN SATURATION: 94 % | HEART RATE: 69 BPM | DIASTOLIC BLOOD PRESSURE: 79 MMHG | HEIGHT: 74 IN | TEMPERATURE: 99 F | SYSTOLIC BLOOD PRESSURE: 153 MMHG | BODY MASS INDEX: 22.07 KG/M2 | WEIGHT: 172 LBS | RESPIRATION RATE: 20 BRPM

## 2018-08-21 PROBLEM — S62.309A CLOSED FRACTURE OF METACARPAL BONE: Status: RESOLVED | Noted: 2018-08-17 | Resolved: 2018-08-21

## 2018-08-21 PROBLEM — J18.9 PNEUMONIA: Status: ACTIVE | Noted: 2018-08-21

## 2018-08-21 LAB — INTERPRETATION ECG - MUSE: NORMAL

## 2018-08-21 PROCEDURE — 25000128 H RX IP 250 OP 636: Performed by: PHYSICIAN ASSISTANT

## 2018-08-21 PROCEDURE — 99217 ZZC OBSERVATION CARE DISCHARGE: CPT | Mod: Z6 | Performed by: NURSE PRACTITIONER

## 2018-08-21 PROCEDURE — G0378 HOSPITAL OBSERVATION PER HR: HCPCS

## 2018-08-21 PROCEDURE — A9270 NON-COVERED ITEM OR SERVICE: HCPCS | Mod: GY | Performed by: EMERGENCY MEDICINE

## 2018-08-21 PROCEDURE — 96376 TX/PRO/DX INJ SAME DRUG ADON: CPT | Performed by: EMERGENCY MEDICINE

## 2018-08-21 PROCEDURE — 25000132 ZZH RX MED GY IP 250 OP 250 PS 637: Mod: GY | Performed by: EMERGENCY MEDICINE

## 2018-08-21 PROCEDURE — A9270 NON-COVERED ITEM OR SERVICE: HCPCS | Mod: GY | Performed by: PHYSICIAN ASSISTANT

## 2018-08-21 PROCEDURE — 25000125 ZZHC RX 250: Performed by: PHYSICIAN ASSISTANT

## 2018-08-21 PROCEDURE — 25000132 ZZH RX MED GY IP 250 OP 250 PS 637: Mod: GY | Performed by: PHYSICIAN ASSISTANT

## 2018-08-21 PROCEDURE — 40000275 ZZH STATISTIC RCP TIME EA 10 MIN

## 2018-08-21 PROCEDURE — 25000132 ZZH RX MED GY IP 250 OP 250 PS 637: Mod: GY | Performed by: NURSE PRACTITIONER

## 2018-08-21 PROCEDURE — A9270 NON-COVERED ITEM OR SERVICE: HCPCS | Mod: GY | Performed by: NURSE PRACTITIONER

## 2018-08-21 PROCEDURE — 96366 THER/PROPH/DIAG IV INF ADDON: CPT

## 2018-08-21 RX ORDER — NALOXONE HYDROCHLORIDE 0.4 MG/ML
.1-.4 INJECTION, SOLUTION INTRAMUSCULAR; INTRAVENOUS; SUBCUTANEOUS
Status: DISCONTINUED | OUTPATIENT
Start: 2018-08-21 | End: 2018-08-21 | Stop reason: HOSPADM

## 2018-08-21 RX ORDER — QUETIAPINE FUMARATE 25 MG/1
25 TABLET, FILM COATED ORAL 2 TIMES DAILY
Status: DISCONTINUED | OUTPATIENT
Start: 2018-08-21 | End: 2018-08-21 | Stop reason: HOSPADM

## 2018-08-21 RX ORDER — VENLAFAXINE HYDROCHLORIDE 37.5 MG/1
37.5 CAPSULE, EXTENDED RELEASE ORAL DAILY
Status: DISCONTINUED | OUTPATIENT
Start: 2018-08-21 | End: 2018-08-21 | Stop reason: HOSPADM

## 2018-08-21 RX ORDER — ALBUTEROL SULFATE 0.83 MG/ML
3 SOLUTION RESPIRATORY (INHALATION)
Status: DISCONTINUED | OUTPATIENT
Start: 2018-08-21 | End: 2018-08-21 | Stop reason: HOSPADM

## 2018-08-21 RX ORDER — CARBIDOPA AND LEVODOPA 50; 200 MG/1; MG/1
1 TABLET, EXTENDED RELEASE ORAL AT BEDTIME
Status: DISCONTINUED | OUTPATIENT
Start: 2018-08-21 | End: 2018-08-21 | Stop reason: HOSPADM

## 2018-08-21 RX ORDER — DOXYCYCLINE 100 MG/1
100 CAPSULE ORAL EVERY 12 HOURS
Qty: 20 CAPSULE | Refills: 0 | DISCHARGE
Start: 2018-08-21 | End: 2018-08-31

## 2018-08-21 RX ORDER — PREDNISONE 20 MG/1
40 TABLET ORAL DAILY
Status: DISCONTINUED | OUTPATIENT
Start: 2018-08-21 | End: 2018-08-21 | Stop reason: HOSPADM

## 2018-08-21 RX ORDER — ONDANSETRON 4 MG/1
4 TABLET, ORALLY DISINTEGRATING ORAL EVERY 6 HOURS PRN
Status: DISCONTINUED | OUTPATIENT
Start: 2018-08-21 | End: 2018-08-21 | Stop reason: HOSPADM

## 2018-08-21 RX ORDER — DOXYCYCLINE 100 MG/1
100 CAPSULE ORAL EVERY 12 HOURS SCHEDULED
Status: DISCONTINUED | OUTPATIENT
Start: 2018-08-21 | End: 2018-08-21 | Stop reason: HOSPADM

## 2018-08-21 RX ORDER — ACETAMINOPHEN 325 MG/1
650 TABLET ORAL EVERY 4 HOURS PRN
Status: DISCONTINUED | OUTPATIENT
Start: 2018-08-21 | End: 2018-08-21 | Stop reason: HOSPADM

## 2018-08-21 RX ORDER — AMLODIPINE BESYLATE 2.5 MG/1
5 TABLET ORAL AT BEDTIME
Status: DISCONTINUED | OUTPATIENT
Start: 2018-08-21 | End: 2018-08-21 | Stop reason: HOSPADM

## 2018-08-21 RX ORDER — MIRTAZAPINE 45 MG/1
22.5 TABLET, FILM COATED ORAL AT BEDTIME
Status: DISCONTINUED | OUTPATIENT
Start: 2018-08-21 | End: 2018-08-21 | Stop reason: HOSPADM

## 2018-08-21 RX ORDER — MIRTAZAPINE 15 MG/1
TABLET, FILM COATED ORAL
Qty: 135 TABLET | Refills: 1 | DISCHARGE
Start: 2018-08-21 | End: 2019-01-01

## 2018-08-21 RX ORDER — NALOXONE HYDROCHLORIDE 0.4 MG/ML
.1-.4 INJECTION, SOLUTION INTRAMUSCULAR; INTRAVENOUS; SUBCUTANEOUS
Status: DISCONTINUED | OUTPATIENT
Start: 2018-08-21 | End: 2018-08-21

## 2018-08-21 RX ORDER — ONDANSETRON 2 MG/ML
4 INJECTION INTRAMUSCULAR; INTRAVENOUS EVERY 6 HOURS PRN
Status: DISCONTINUED | OUTPATIENT
Start: 2018-08-21 | End: 2018-08-21 | Stop reason: HOSPADM

## 2018-08-21 RX ORDER — VENLAFAXINE HYDROCHLORIDE 75 MG/1
75 CAPSULE, EXTENDED RELEASE ORAL AT BEDTIME
Status: DISCONTINUED | OUTPATIENT
Start: 2018-08-21 | End: 2018-08-21 | Stop reason: HOSPADM

## 2018-08-21 RX ORDER — PREDNISONE 20 MG/1
40 TABLET ORAL DAILY
DISCHARGE
Start: 2018-08-22 | End: 2018-08-27

## 2018-08-21 RX ORDER — LISINOPRIL 5 MG/1
5 TABLET ORAL DAILY
Status: DISCONTINUED | OUTPATIENT
Start: 2018-08-21 | End: 2018-08-21 | Stop reason: HOSPADM

## 2018-08-21 RX ORDER — CARBIDOPA AND LEVODOPA 25; 100 MG/1; MG/1
2 TABLET ORAL 2 TIMES DAILY
Status: DISCONTINUED | OUTPATIENT
Start: 2018-08-21 | End: 2018-08-21 | Stop reason: HOSPADM

## 2018-08-21 RX ORDER — CLONAZEPAM 0.25 MG/1
0.25 TABLET, ORALLY DISINTEGRATING ORAL
Status: DISCONTINUED | OUTPATIENT
Start: 2018-08-22 | End: 2018-08-21

## 2018-08-21 RX ORDER — PIPERACILLIN SODIUM, TAZOBACTAM SODIUM 4; .5 G/20ML; G/20ML
4.5 INJECTION, POWDER, LYOPHILIZED, FOR SOLUTION INTRAVENOUS EVERY 6 HOURS
Status: DISCONTINUED | OUTPATIENT
Start: 2018-08-21 | End: 2018-08-21

## 2018-08-21 RX ORDER — IPRATROPIUM BROMIDE AND ALBUTEROL SULFATE 2.5; .5 MG/3ML; MG/3ML
3 SOLUTION RESPIRATORY (INHALATION) EVERY 4 HOURS
Status: DISCONTINUED | OUTPATIENT
Start: 2018-08-21 | End: 2018-08-21 | Stop reason: HOSPADM

## 2018-08-21 RX ADMIN — LISINOPRIL 5 MG: 5 TABLET ORAL at 08:00

## 2018-08-21 RX ADMIN — PREDNISONE 40 MG: 20 TABLET ORAL at 08:00

## 2018-08-21 RX ADMIN — PIPERACILLIN SODIUM,TAZOBACTAM SODIUM 4.5 G: 4; .5 INJECTION, POWDER, FOR SOLUTION INTRAVENOUS at 03:36

## 2018-08-21 RX ADMIN — VENLAFAXINE HYDROCHLORIDE 75 MG: 75 CAPSULE, EXTENDED RELEASE ORAL at 01:30

## 2018-08-21 RX ADMIN — PIPERACILLIN SODIUM,TAZOBACTAM SODIUM 4.5 G: 4; .5 INJECTION, POWDER, FOR SOLUTION INTRAVENOUS at 09:11

## 2018-08-21 RX ADMIN — IPRATROPIUM BROMIDE AND ALBUTEROL SULFATE 3 ML: .5; 3 SOLUTION RESPIRATORY (INHALATION) at 01:31

## 2018-08-21 RX ADMIN — AMLODIPINE BESYLATE 5 MG: 5 TABLET ORAL at 01:30

## 2018-08-21 RX ADMIN — RANITIDINE 75 MG: 75 TABLET, FILM COATED ORAL at 01:30

## 2018-08-21 RX ADMIN — QUETIAPINE FUMARATE 25 MG: 25 TABLET ORAL at 08:00

## 2018-08-21 RX ADMIN — IPRATROPIUM BROMIDE AND ALBUTEROL SULFATE 3 ML: .5; 3 SOLUTION RESPIRATORY (INHALATION) at 14:08

## 2018-08-21 RX ADMIN — CARBIDOPA AND LEVODOPA 2 TABLET: 25; 100 TABLET ORAL at 12:05

## 2018-08-21 RX ADMIN — DONEPEZIL HYDROCHLORIDE 7.5 MG: 5 TABLET, FILM COATED ORAL at 01:30

## 2018-08-21 RX ADMIN — CARBIDOPA AND LEVODOPA 3 HALF-TAB: 25; 100 TABLET ORAL at 09:10

## 2018-08-21 RX ADMIN — DOXYCYCLINE HYCLATE 100 MG: 100 CAPSULE ORAL at 10:50

## 2018-08-21 RX ADMIN — CARBIDOPA AND LEVODOPA 3 HALF-TAB: 25; 100 TABLET ORAL at 14:44

## 2018-08-21 RX ADMIN — VENLAFAXINE HYDROCHLORIDE 37.5 MG: 37.5 CAPSULE, EXTENDED RELEASE ORAL at 08:00

## 2018-08-21 RX ADMIN — CARBIDOPA AND LEVODOPA 2 TABLET: 25; 100 TABLET ORAL at 07:13

## 2018-08-21 RX ADMIN — DONEPEZIL HYDROCHLORIDE 7.5 MG: 5 TABLET, FILM COATED ORAL at 08:00

## 2018-08-21 RX ADMIN — QUETIAPINE FUMARATE 25 MG: 25 TABLET ORAL at 01:30

## 2018-08-21 NOTE — H&P
"[facility] ED Observation Admission Note    Chief Complaint   Patient presents with     Shortness of Breath       Assessment/Plan:  Sanjay Cano is a patient with a history of Parkinson disease, COPD, recurrent productive cough, pulmonary nodules, pneumonia, hypertension, and depression who presented to the Emergency Department for the evaluation of a cough and shortness of breath.     1. Pneumonia/COPD exacerbation: x 2-3 days of productive cough with green/yellowish phlegm, intermittent dyspnea, and subjective fevers. Pt currently lives in a long term facility. Has history of pneumonia for which he was hospitalized. Cough is exacerbated with exertion and improves with rest. Has been using his albuterol inhaler frequently but it is helping minimally.  Denies chest pain but admits to chest tightness.   - Duobneb q4h  - Check peak flows  - Albuterol neb q2h prn  - Prednisone 40 mg x 5 days  -Transition to po abx at d/c    Chronic conditions  ## PD: - Continue PTA sinemet and donepezil   ## Depression: - Continue venlafaxine, seroquel, remeron  ## HTN: - Continue with Lisinopril and Amlodipine        HPI:    Per ED note \"With a history of Parkinson disease, COPD, recurrent productive cough, pulmonary nodules, pneumonia who presents to the Emergency Department for the evaluation of a cough and shortness of breath. Patient presents with his wife who provided most of the patient s history. Patient has had a productive cough with wheezing,  chest tightness,  and on and off subjective fevers (98.5 here in Emergency Department) for the past two days. Patient s wife was concerned given the patient s history of pneumonia and so brought him here to the Emergency Department as advised by advanced living care facility. Patient s wife states his coughing has woken him up from his sleep and almost causes him to choke while eating. Within the past two days, his temperature fluctuates  over 100.  Patient denies use of Tylenol or " "ibuprofen. Patient denies vomiting. Patient denies history of stroke. Patient takes Combivent in the morning, afternoon, and evening and Flovent in the morning and night\".      In the ED, Temp 98.5, Pulse 69, 's-160's/70's-100's, SaO2 87-95% on RA. Labs show normal BMP. CBC with mild anemia (hgb 11.8, at baseline) otherwise normal. Glucose 103. BNP normal at 368. Procalcitonin <0.05. Trop x 1 negative. EKG without ischemic changes. CXR shows patchy bibasilar opacities, more prominent on the left representing atelectasis versus pulmonary edema versus infection. In the ED the patient was given Duoneb treatment, Solumedrol 125 mg x 1, and Zosyn 4.5 g x 1. Pt admitted for continued breathing treatment and antibiotics.     On admission to the observation unit the patient was stable.     History:    Past Medical History:   Diagnosis Date     At risk for falling 9/3/2012     COPD (chronic obstructive pulmonary disease) (H)      Family history of thyroid cancer 7/13/2016     Malignant neoplasm (H)     skin - nose     Moderate recurrent major depression (H) 5/17/2012     Neuromuscular disorder (H)      Paralysis agitans (H)     Parkinson's Disease     Parkinson disease (H)      Rosacea        Past Surgical History:   Procedure Laterality Date     EYE SURGERY       ORTHOPEDIC SURGERY       PHACOEMULSIFICATION CLEAR CORNEA WITH STANDARD INTRAOCULAR LENS IMPLANT  5/21/2014    Procedure: PHACOEMULSIFICATION CLEAR CORNEA WITH STANDARD INTRAOCULAR LENS IMPLANT;  Surgeon: Tomy Hunter MD;  Location: [facility]     PHACOEMULSIFICATION CLEAR CORNEA WITH TORIC INTRAOCULAR LENS IMPLANT  4/30/2014    Procedure: PHACOEMULSIFICATION CLEAR CORNEA WITH TORIC INTRAOCULAR LENS IMPLANT;  Surgeon: Tomy Hunter MD;  Location: [facility]       Family History   Problem Relation Age of Onset     Cerebrovascular Disease Mother      Diabetes Mother      GASTROINTESTINAL DISEASE Mother      Hypertension Mother      Neurologic Disorder Father      " Cerebrovascular Disease Father      Cerebrovascular Disease Maternal Grandfather      Cancer Paternal Grandmother      Other - See Comments Sister      gi - unknown       Social History     Social History     Marital status:      Spouse name: N/A     Number of children: N/A     Years of education: N/A     Occupational History     Not on file.     Social History Main Topics     Smoking status: Former Smoker     Packs/day: 0.01     Years: 40.00     Types: Cigarettes     Quit date: 7/31/2008     Smokeless tobacco: Never Used      Comment: very passive cigarettes in 6 months     Alcohol use No     Drug use: No     Sexual activity: Yes     Partners: Female     Other Topics Concern     Parent/Sibling W/ Cabg, Mi Or Angioplasty Before 65f 55m? No     Social History Narrative         No current facility-administered medications on file prior to encounter.   Current Outpatient Prescriptions on File Prior to Encounter:  amLODIPine (NORVASC) 5 MG tablet Take 1 tablet (5 mg) by mouth At Bedtime   aspirin 81 MG tablet Take by mouth twice a week   carbidopa-levodopa (SINEMET CR)  MG per CR tablet Take 1 tablet by mouth At Bedtime   carbidopa-levodopa (SINEMET)  MG per tablet Take 1-2 tablets by mouth 5 times daily Takes 2 tablets at 6 AM, 1.5 tablets at 9 AM, 2 tablets at noon, 1.5 tablets at 3 PM, 1.5 tablets at 6 PM. Can take additional 0.5 tablet in the middle of the night if needed.   cholecalciferol (VITAMIN D) 1000 UNIT tablet Take 1 tablet (1,000 Units) by mouth every morning   COMBIVENT RESPIMAT  MCG/ACT inhaler INHALE 1 PUFF BY MOUTH FOUR TIMES DAILY. NOT TO EXCEED 6 DOSES PER DAY   donepezil (ARICEPT) 10 MG tablet Take 5mg  by mouth twice a day (take with the 2.5 mg dose for a total dose of 7.5mg twice daily)   donepezil (ARICEPT) 5 MG tablet Take 2.5 mg twice a day (take with the 5 mg dose for a total dose of 7.5mg twice daily)   FLOVENT  MCG/ACT Inhaler INHALE 2 PUFFS INTO THE LUNGS  TWICE DAILY   lactobacillus rhamnosus, GG, (CULTURELL) capsule Take 1 capsule by mouth every morning   lisinopril (PRINIVIL/ZESTRIL) 5 MG tablet Take 1 tablet (5 mg) by mouth daily   mirtazapine (REMERON) 15 MG tablet TAKE 1.5 TABLETs (30 MG) BY MOUTH AT BEDTIME   QUEtiapine (SEROQUEL) 25 MG tablet Take 25 mg BID; once at 3:00 pm and once at 9:00 pm.   QUEtiapine (SEROQUEL) 50 MG tablet Take 0.5 tablets (25 mg) by mouth 2 times daily Give after day program ~ 3 pm and at bedtime. Discontinue other seroquel orders   ranitidine (ZANTAC) 75 MG tablet Take 1 tablet (75 mg) by mouth At Bedtime   Sulfamethoxazole-Trimethoprim (BACTRIM PO)    clonazePAM (KLONOPIN) 0.5 MG ODT tab Take 1/2 tablet (0.25 mg) by mouth every other night.   doxycycline (VIBRAMYCIN) 100 MG capsule Take 1 capsule (100 mg) by mouth every 12 hours Give at 8 am and 8 pm   Ipratropium-Albuterol (COMBIVENT RESPIMAT)  MCG/ACT inhaler Inhale 1 puff into the lungs 4 times daily May take 1 to 2 additional doses as needed during the day   methylcellulose (CITRUCEL) 500 MG TABS tablet Take 1 tablet (500 mg) by mouth daily as needed       Data:    Results for orders placed or performed during the hospital encounter of 08/20/18   XR Chest 2 Views    Narrative    Exam: XR CHEST 2 VW, 8/20/2018 8:12 PM    Indication: dyspnea;     Comparison: Chest x-ray 1/26/2018    Findings:   Frontal and lateral projection of the chest. No pneumothorax or large  pleural effusion. Stable cardiac mediastinal silhouette. Patchy  bibasilar opacities, more prominent on the left. Again noted thoracic  compression deformity.      Impression    Impression: Patchy bibasilar opacities, more prominent on the left  representing atelectasis versus pulmonary edema versus infection.     I have personally reviewed the examination and initial interpretation  and I agree with the findings.    SHELLIE MCCARTHY MD   CBC with platelets differential   Result Value Ref Range    WBC 7.0 4.0 -  11.0 10e9/L    RBC Count 3.70 (L) 4.4 - 5.9 10e12/L    Hemoglobin 11.8 (L) 13.3 - 17.7 g/dL    Hematocrit 34.8 (L) 40.0 - 53.0 %    MCV 94 78 - 100 fl    MCH 31.9 26.5 - 33.0 pg    MCHC 33.9 31.5 - 36.5 g/dL    RDW 12.5 10.0 - 15.0 %    Platelet Count 182 150 - 450 10e9/L    Diff Method Automated Method     % Neutrophils 75.7 %    % Lymphocytes 9.7 %    % Monocytes 11.0 %    % Eosinophils 3.4 %    % Basophils 0.1 %    % Immature Granulocytes 0.1 %    Nucleated RBCs 0 0 /100    Absolute Neutrophil 5.3 1.6 - 8.3 10e9/L    Absolute Lymphocytes 0.7 (L) 0.8 - 5.3 10e9/L    Absolute Monocytes 0.8 0.0 - 1.3 10e9/L    Absolute Eosinophils 0.2 0.0 - 0.7 10e9/L    Absolute Basophils 0.0 0.0 - 0.2 10e9/L    Abs Immature Granulocytes 0.0 0 - 0.4 10e9/L    Absolute Nucleated RBC 0.0    Troponin I   Result Value Ref Range    Troponin I ES <0.015 0.000 - 0.045 ug/L   Basic metabolic panel   Result Value Ref Range    Sodium 142 133 - 144 mmol/L    Potassium 3.9 3.4 - 5.3 mmol/L    Chloride 108 94 - 109 mmol/L    Carbon Dioxide 27 20 - 32 mmol/L    Anion Gap 8 3 - 14 mmol/L    Glucose 103 (H) 70 - 99 mg/dL    Urea Nitrogen 32 (H) 7 - 30 mg/dL    Creatinine 1.14 0.66 - 1.25 mg/dL    GFR Estimate 63 >60 mL/min/1.7m2    GFR Estimate If Black 76 >60 mL/min/1.7m2    Calcium 8.2 (L) 8.5 - 10.1 mg/dL   Nt probnp inpatient   Result Value Ref Range    N-Terminal Pro BNP Inpatient 368 0 - 900 pg/mL   Procalcitonin   Result Value Ref Range    Procalcitonin <0.05 ng/ml   EKG 12-lead, tracing only   Result Value Ref Range    Interpretation ECG Click View Image link to view waveform and result              EKG Interpretation:      Interpreted by Jose Carlos Tilley  Time reviewed: 1945  Symptoms at time of EKG: dyspnea   Rhythm: normal sinus   Rate: normal  Axis: normal  Ectopy: none  Conduction: normal  ST Segments/ T Waves: No ST-T wave changes  Q Waves: none  Comparison to prior: Unchanged     Clinical Impression: normal EKG    ROS:    REVIEW OF  SYSTEMS:   General: fatigue   EYES: Negative for vision changes or eye problems   ENT: No problems with ears, nose or throat. No difficulty swallowing.   RESP: Cough and shortness of breath.   CV: No chest pains or palpitations   GI: No nausea, vomiting, heartburn, abdominal pain, diarrhea, constipation or change in bowel habits   : No urinary frequency or dysuria, bladder or kidney problems   MUSCULOSKELETAL: No significant muscle or joint pains   NEUROLOGIC: No headaches, numbness, tingling, weakness, problems with balance or coordination   PSYCHIATRIC: No problems with anxiety, depression or mental health   HEME/IMMUNE/ALLERGY: No history of bleeding or clotting problems or anemia. No allergies or immune system problems   ENDOCRINE: No history of thyroid disease, diabetes or other endocrine disorders   SKIN: No rashes,worrisome lesions or skin problems    PCP: CEM GANDARA  CARDIAC RISK: HTN, CHF    10 point ROS negative other than the symptoms noted above.      Exam:    Vitals:  B/P: 159/94, T: 98.5, P: 69, R: Data Unavailable  Physical Exam   Constitutional: Pt is oriented to person, place, and time.Pt appears well-developed and well-nourished.   HENT:   Head: Normocephalic and atraumatic.   Eyes: Conjunctivae are normal. Pupils are equal, round, and reactive to light.   Neck: Normal range of motion. Neck supple.   Cardiovascular: Normal rate, regular rhythm, normal heart sounds and intact distal pulses.    Pulmonary/Chest: Effort normal and breath sounds normal. No respiratory distress. Pt has no wheezes. Pt has no rales  Abdominal: Soft. Bowel sounds are normal. Pt exhibits no distension and no mass. No tenderness. Pt has no rebound and no guarding.   Musculoskeletal: Normal range of motion. Pt exhibits no edema.   Neurological: Pt is alert and oriented to person, place, and time. Normal reflexes.   Skin: Skin is warm and dry. No rash noted.   Psychiatric: Pt has a normal mood and affect. Behavior is  normal. Judgment and thought content normal.       Consults: Low threshold  FEN: Regular  DVT prophylaxis: Early ambulation  Code Status: Full  Disposition: Stable vital signs. Patient return to baseline.  All labs/images reviewed    Signed:  Myranda Chambers PA-C  August 20, 2018 at 11:20 PM

## 2018-08-21 NOTE — ED PROVIDER NOTES
History     Chief Complaint   Patient presents with     Shortness of Breath     HPI  With a history of Parkinson disease, COPD, recurrent productive cough, pulmonary nodules, pneumonia who presents to the Emergency Department for the evaluation of a cough and shortness of breath. Patient presents with his wife who provided most of the patient s history. Patient has had a productive cough with wheezing,  chest tightness,  and on and off subjective fevers (98.5 here in Emergency Department) for the past two days. Patient s wife was concerned given the patient s history of pneumonia and so brought him here to the Emergency Department as advised by advanced living care facility. Patient s wife states his coughing has woken him up from his sleep and almost causes him to choke while eating. Within the past two days, his temperature fluctuates  over 100.  Patient denies use of Tylenol or ibuprofen. Patient denies vomiting. Patient denies history of stroke. Patient takes Combivent in the morning, afternoon, and evening and Flovent in the morning and night.      I have reviewed the Medications, Allergies, Past Medical and Surgical History, and Social History in the [employer] system.  Past Medical History:   Diagnosis Date     At risk for falling 9/3/2012     COPD (chronic obstructive pulmonary disease) (H)      Family history of thyroid cancer 7/13/2016     Malignant neoplasm (H)     skin - nose     Moderate recurrent major depression (H) 5/17/2012     Neuromuscular disorder (H)      Paralysis agitans (H)     Parkinson's Disease     Parkinson disease (H)      Rosacea        Past Surgical History:   Procedure Laterality Date     EYE SURGERY       ORTHOPEDIC SURGERY       PHACOEMULSIFICATION CLEAR CORNEA WITH STANDARD INTRAOCULAR LENS IMPLANT  5/21/2014    Procedure: PHACOEMULSIFICATION CLEAR CORNEA WITH STANDARD INTRAOCULAR LENS IMPLANT;  Surgeon: Tomy Hunter MD;  Location: [facility]     PHACOEMULSIFICATION CLEAR CORNEA WITH  TORIC INTRAOCULAR LENS IMPLANT  4/30/2014    Procedure: PHACOEMULSIFICATION CLEAR CORNEA WITH TORIC INTRAOCULAR LENS IMPLANT;  Surgeon: Tomy Hunter MD;  [facility]: [facility]       Family History   Problem Relation Age of Onset     Cerebrovascular Disease Mother      Diabetes Mother      GASTROINTESTINAL DISEASE Mother      Hypertension Mother      Neurologic Disorder Father      Cerebrovascular Disease Father      Cerebrovascular Disease Maternal Grandfather      Cancer Paternal Grandmother      Other - See Comments Sister      gi - unknown       Social History   Substance Use Topics     Smoking status: Former Smoker     Packs/day: 0.01     Years: 40.00     Types: Cigarettes     Quit date: 7/31/2008     Smokeless tobacco: Never Used      Comment: very passive cigarettes in 6 months     Alcohol use No       Current Facility-Administered Medications   Medication     ipratropium - albuterol 0.5 mg/2.5 mg/3 mL (DUONEB) neb solution 3 mL     Current Outpatient Prescriptions   Medication     amLODIPine (NORVASC) 5 MG tablet     aspirin 81 MG tablet     carbidopa-levodopa (SINEMET CR)  MG per CR tablet     carbidopa-levodopa (SINEMET)  MG per tablet     cholecalciferol (VITAMIN D) 1000 UNIT tablet     COMBIVENT RESPIMAT  MCG/ACT inhaler     donepezil (ARICEPT) 10 MG tablet     donepezil (ARICEPT) 5 MG tablet     FLOVENT  MCG/ACT Inhaler     lactobacillus rhamnosus, GG, (CULTURELL) capsule     lisinopril (PRINIVIL/ZESTRIL) 5 MG tablet     mirtazapine (REMERON) 15 MG tablet     QUEtiapine (SEROQUEL) 25 MG tablet     QUEtiapine (SEROQUEL) 50 MG tablet     ranitidine (ZANTAC) 75 MG tablet     senna-docusate (SENOKOT-S;PERICOLACE) 8.6-50 MG per tablet     Sulfamethoxazole-Trimethoprim (BACTRIM PO)     venlafaxine (EFFEXOR-XR) 37.5 MG 24 hr capsule     venlafaxine (EFFEXOR-XR) 75 MG 24 hr capsule     clonazePAM (KLONOPIN) 0.5 MG ODT tab     doxycycline (VIBRAMYCIN) 100 MG capsule     Ipratropium-Albuterol  "(COMBIVENT RESPIMAT)  MCG/ACT inhaler     methylcellulose (CITRUCEL) 500 MG TABS tablet        Allergies   Allergen Reactions     Erythromycin Other (See Comments)     Pathological fractures     Levaquin [Levofloxacin]      Fracture prevention     Seasonal Allergies        Review of Systems   Constitutional: Positive for fever (100 subective).   HENT: Negative for congestion.    Eyes: Negative for redness.   Respiratory: Positive for cough (productive), chest tightness, shortness of breath and wheezing.    Cardiovascular: Negative for chest pain.   Gastrointestinal: Negative for abdominal pain and vomiting.   Genitourinary: Negative for difficulty urinating.   Musculoskeletal: Negative for arthralgias and neck stiffness.   Skin: Negative for color change.   Neurological: Negative for headaches.   Psychiatric/Behavioral: Negative for confusion.       Physical Exam   BP: 147/89  Pulse: 69  Temp: 98.5  F (36.9  C)  Height: 188 cm (6' 2\")  Weight: 78 kg (172 lb)      Physical Exam  Physical Exam   Constitutional: oriented to person, place, and time. appears well-developed and well-nourished.   HENT:   Head: Normocephalic and atraumatic.   Neck: Normal range of motion.   Pulmonary/Chest: Expiratory wheezes worse on L. No respiratory distress.   Cardiac: No murmurs, rubs, gallops. RRR.  Abdominal: Abdomen soft, nontender, nondistended. No rebound tenderness.  MSK: Long bones without deformity or evidence of trauma. No lower extremity swelling or tenderness along the calves.  Neurological: alert and oriented to person, place, and time.   Skin: Skin is warm and dry.   Psychiatric:  normal mood and affect.  behavior is normal. Thought content normal.     ED Course   7:07 PM  The patient was seen and examined by Jose Carlos Tilley MD in Room 12.     ED Course     Procedures             EKG Interpretation:      Interpreted by Jose Carlos Tilley  Time reviewed: 1945  Symptoms at time of EKG: dyspnea   Rhythm: normal sinus "   Rate: normal  Axis: normal  Ectopy: none  Conduction: normal  ST Segments/ T Waves: No ST-T wave changes  Q Waves: none  Comparison to prior: Unchanged    Clinical Impression: normal EKG          Critical Care time:  none       Results for orders placed or performed during the hospital encounter of 08/20/18   XR Chest 2 Views    Impression    Impression: Patchy bibasilar opacities, more prominent on the left  representing atelectasis versus pulmonary edema versus infection.    CBC with platelets differential   Result Value Ref Range    WBC 7.0 4.0 - 11.0 10e9/L    RBC Count 3.70 (L) 4.4 - 5.9 10e12/L    Hemoglobin 11.8 (L) 13.3 - 17.7 g/dL    Hematocrit 34.8 (L) 40.0 - 53.0 %    MCV 94 78 - 100 fl    MCH 31.9 26.5 - 33.0 pg    MCHC 33.9 31.5 - 36.5 g/dL    RDW 12.5 10.0 - 15.0 %    Platelet Count 182 150 - 450 10e9/L    Diff Method Automated Method     % Neutrophils 75.7 %    % Lymphocytes 9.7 %    % Monocytes 11.0 %    % Eosinophils 3.4 %    % Basophils 0.1 %    % Immature Granulocytes 0.1 %    Nucleated RBCs 0 0 /100    Absolute Neutrophil 5.3 1.6 - 8.3 10e9/L    Absolute Lymphocytes 0.7 (L) 0.8 - 5.3 10e9/L    Absolute Monocytes 0.8 0.0 - 1.3 10e9/L    Absolute Eosinophils 0.2 0.0 - 0.7 10e9/L    Absolute Basophils 0.0 0.0 - 0.2 10e9/L    Abs Immature Granulocytes 0.0 0 - 0.4 10e9/L    Absolute Nucleated RBC 0.0    Troponin I   Result Value Ref Range    Troponin I ES <0.015 0.000 - 0.045 ug/L   Basic metabolic panel   Result Value Ref Range    Sodium 142 133 - 144 mmol/L    Potassium 3.9 3.4 - 5.3 mmol/L    Chloride 108 94 - 109 mmol/L    Carbon Dioxide 27 20 - 32 mmol/L    Anion Gap 8 3 - 14 mmol/L    Glucose 103 (H) 70 - 99 mg/dL    Urea Nitrogen 32 (H) 7 - 30 mg/dL    Creatinine 1.14 0.66 - 1.25 mg/dL    GFR Estimate 63 >60 mL/min/1.7m2    GFR Estimate If Black 76 >60 mL/min/1.7m2    Calcium 8.2 (L) 8.5 - 10.1 mg/dL   EKG 12-lead, tracing only   Result Value Ref Range    Interpretation ECG Click View Image  link to view waveform and result               Assessments & Plan (with Medical Decision Making)   Differential includes pneumonia, COPD exacerbation, ACS, pulmonary embolism, heart failure.      MDM and Plan  Patient presented with concerns for pneumonia.  Patient looks overall well but does have cough noted that his course.  No evidence of blood clot on exam, physical exam or vital signs.  Will do chest x-ray, EKG and labs.  Re eval: Patient does have evidence of opacities on x-ray, does appear to have pneumonia clinically and on x-ray.  Will cover for hospital-acquired pneumonia as this patient does live in a nursing care facility.  Labs, EKG and troponin otherwise unremarkable.  Did add a BNP on case patient does have some heart failure.  Patient will be admitted to medicine for further care.      I have reviewed the nursing notes.    I have reviewed the findings, diagnosis, plan and need for follow up with the patient.    New Prescriptions    No medications on file       Final diagnoses:   Pneumonia     Jose Carlos KOENIG, am serving as a trained medical scribe to document services personally performed by Jose Carlos Tilley MD, based on the provider's statements to me.   Jose Carlos KOENIG MD, was physically present and have reviewed and verified the accuracy of this note documented by Jose Carlos Nichole.    8/20/2018   [facility], [place], EMERGENCY DEPARTMENT     Jose Carlos Tilley MD  08/20/18 [id number]

## 2018-08-21 NOTE — PLAN OF CARE
Problem: Patient Care Overview  Goal: Discharge Needs Assessment  -diagnostic tests and consults completed and resulted   -vital signs normal or at patient baseline   -tolerating oral antibiotics or has plans for home infusion setup   -dyspnea improved and O2 sats greater than 88% on room air or prior home oxygen levels   -returns to baseline functional status   -safe disposition plan has been identified   Nurse to notify provider when observation goals have been met and patient is ready for discharge.    Spouse states pt is completely dependent with cares. Briefs on. Incontinent x1. Dentures and glasses at bedside. O2 sat at 93-94% on room air. Fracture to the left hand. Cast on. No weight bearing on left hand. Confusion with time and place. Bed alarm on.

## 2018-08-21 NOTE — PLAN OF CARE
Problem: Patient Care Overview  Goal: Plan of Care/Patient Progress Review  Outcome: No Change    Observation goals PRIOR TO DISCHARGE     Comments: -diagnostic tests and consults completed and resulted -- Yes.   -vital signs normal or at patient baseline  -- Yes.   -tolerating oral antibiotics or has plans for home infusion setup -- Yes.   -dyspnea improved and O2 sats greater than 88% on room air or prior home oxygen levels -- Yes.   -returns to baseline functional status -- No.   -safe disposition plan has been identified -- No.   Nurse to notify provider when observation goals have been met and patient is ready for discharge.

## 2018-08-21 NOTE — PLAN OF CARE
Problem: Patient Care Overview  Goal: Plan of Care/Patient Progress Review  Outcome: No Change   Observation goals PRIOR TO DISCHARGE     Comments: -diagnostic tests and consults completed and resulted -- No.  -vital signs normal or at patient baseline  -- Yes.   -tolerating oral antibiotics or has plans for home infusion setup -- No.   -dyspnea improved and O2 sats greater than 88% on room air or prior home oxygen levels -- Yes.   -returns to baseline functional status -- No.   -safe disposition plan has been identified -- No.   Nurse to notify provider when observation goals have been met and patient is ready for discharge.    Patient maintaining O2 sats on RA. Lungs clear but diminished. Wife at bedside. Bed alarm on for safety.

## 2018-08-21 NOTE — DISCHARGE SUMMARY
Discharge Summary    Sanjay Cano MRN# [id number]   YOB: 1945 Age: 72 year old     Date of Admission:  8/20/2018  Date of Discharge:  8/21/2018  Admitting Physician:  Adam Clay MD  Discharge Physician:  Dr. Martinez  Discharging Service:  Emergency Medicine     Primary Provider: Bharath Buchanan          Discharge Diagnosis:     Pneumonia    * No resolved hospital problems. *               Discharge Disposition:   Discharged to long-term care facility           Condition on Discharge:   Discharge condition: Stable   Code status on discharge: Full Code           Procedures:   No procedures performed during this admission          Discharge Medications:     Current Discharge Medication List      START taking these medications    Details   guaiFENesin (ROBITUSSIN) 20 mg/mL SOLN solution Take 10 mLs by mouth every 4 hours as needed for cough  Qty: 1200 mL, Refills: 0    Associated Diagnoses: Pneumonia due to infectious organism, unspecified laterality, unspecified part of lung      predniSONE (DELTASONE) 20 MG tablet Take 2 tablets (40 mg) by mouth daily for 5 days    Associated Diagnoses: Chronic obstructive pulmonary disease with acute exacerbation (H)         CONTINUE these medications which have CHANGED    Details   doxycycline (VIBRAMYCIN) 100 MG capsule Take 1 capsule (100 mg) by mouth every 12 hours for 10 days Give at 8 am and 8 pm  Qty: 20 capsule, Refills: 0    Associated Diagnoses: Pneumonia due to infectious organism, unspecified laterality, unspecified part of lung         CONTINUE these medications which have NOT CHANGED    Details   amLODIPine (NORVASC) 5 MG tablet Take 1 tablet (5 mg) by mouth At Bedtime  Qty: 90 tablet, Refills: 3    Associated Diagnoses: Essential hypertension, benign      aspirin 81 MG tablet Take by mouth twice a week      carbidopa-levodopa (SINEMET CR)  MG per CR tablet Take 1 tablet by mouth At Bedtime  Qty: 1 tablet, Refills: 0    Comments:  Patient reported medication      carbidopa-levodopa (SINEMET)  MG per tablet Take 1-2 tablets by mouth 5 times daily Takes 2 tablets at 6 AM, 1.5 tablets at 9 AM, 2 tablets at noon, 1.5 tablets at 3 PM, 1.5 tablets at 6 PM. Can take additional 0.5 tablet in the middle of the night if needed.  Qty: 90 tablet      cholecalciferol (VITAMIN D) 1000 UNIT tablet Take 1 tablet (1,000 Units) by mouth every morning  Qty: 90 tablet, Refills: 1    Associated Diagnoses: At risk for falling      !! COMBIVENT RESPIMAT  MCG/ACT inhaler INHALE 1 PUFF BY MOUTH FOUR TIMES DAILY. NOT TO EXCEED 6 DOSES PER DAY  Qty: 4 g, Refills: 4    Associated Diagnoses: Other emphysema (H)      !! donepezil (ARICEPT) 10 MG tablet Take 5mg  by mouth twice a day (take with the 2.5 mg dose for a total dose of 7.5mg twice daily)  Refills: 3      !! 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)      FLOVENT  MCG/ACT Inhaler INHALE 2 PUFFS INTO THE LUNGS TWICE DAILY  Qty: 36 g, Refills: 1    Associated Diagnoses: Other emphysema (H)      lactobacillus rhamnosus, GG, (CULTURELL) capsule Take 1 capsule by mouth every morning  Qty: 60 capsule, Refills: 11    Associated Diagnoses: Diarrhea, unspecified type      lisinopril (PRINIVIL/ZESTRIL) 5 MG tablet Take 1 tablet (5 mg) by mouth daily  Qty: 30 tablet, Refills: 1      mirtazapine (REMERON) 15 MG tablet TAKE 1.5 TABLETs (30 MG) BY MOUTH AT BEDTIME  Qty: 135 tablet, Refills: 1    Associated Diagnoses: Depression, major, recurrent, moderate (H)      !! QUEtiapine (SEROQUEL) 25 MG tablet Take 25 mg BID; once at 3:00 pm and once at 9:00 pm.  Qty: 60 tablet, Refills: 11    Associated Diagnoses: Moderate recurrent major depression (H)      !! QUEtiapine (SEROQUEL) 50 MG tablet Take 0.5 tablets (25  mg) by mouth 2 times daily Give after day program ~ 3 pm and at bedtime. Discontinue other seroquel orders  Qty: 180 tablet, Refills: 0    Associated Diagnoses: Moderate recurrent major depression (H)      ranitidine (ZANTAC) 75 MG tablet Take 1 tablet (75 mg) by mouth At Bedtime  Qty: 30 tablet      senna-docusate (SENOKOT-S;PERICOLACE) 8.6-50 MG per tablet Take 1 tablet by mouth daily      Sulfamethoxazole-Trimethoprim (BACTRIM PO)       !! venlafaxine (EFFEXOR-XR) 37.5 MG 24 hr capsule Take 37.5 mg by mouth daily      !! venlafaxine (EFFEXOR-XR) 75 MG 24 hr capsule Take 75 mg by mouth daily      clonazePAM (KLONOPIN) 0.5 MG ODT tab Take 1/2 tablet (0.25 mg) by mouth every other night.  Qty: 45 tablet, Refills: 0    Associated Diagnoses: Primary insomnia      !! Ipratropium-Albuterol (COMBIVENT RESPIMAT)  MCG/ACT inhaler Inhale 1 puff into the lungs 4 times daily May take 1 to 2 additional doses as needed during the day  Qty: 1 Inhaler, Refills: 4    Associated Diagnoses: Other emphysema (H)      methylcellulose (CITRUCEL) 500 MG TABS tablet Take 1 tablet (500 mg) by mouth daily as needed  Qty: 14 each, Refills: 0       !! - Potential duplicate medications found. Please discuss with provider.                Consultations:   No consultations were requested during this admission             Brief History of Illness:   Please see detailed H&P from 8/20/18, in brief:    Sanjay Cano is a patient with a history of Parkinson disease, COPD, recurrent productive cough, pulmonary nodules, pneumonia, hypertension, and depression who presented to the Emergency Department for the evaluation of a cough and shortness of breath.               Hospital Course:        1. Pneumonia/COPD exacerbation: x 2-3 days of productive cough with green/yellowish phlegm, intermittent dyspnea, and subjective fevers. Pt currently lives in a long term facility. Has history of pneumonia for which he was hospitalized. Cough is exacerbated  "with exertion and improves with rest. Has been using his albuterol inhaler frequently but it is helping minimally.  Denies chest pain but admits to chest tightness. He was given zosyn initially while in the observation unit.  He had clear lung sounds, no leukocytosis and normal O2 sats while in the observation unit.  He was deemed stable for discharge back to the LTC facility.  This was arranged by the .  He was discharged on prednisone 40 mg po daily X 5 days, doxycycline 100 mg po BID x 10 days, and robitussin prn.  He was also given an incentive spirometer to use.  Follow up with PCP in one week.     Chronic conditions  ## PD: - Continue PTA sinemet and donepezil   ## Depression: - Continue venlafaxine, seroquel, remeron  ## HTN: - Continue with Lisinopril and Amlodipine            Final Day of Progress before Discharge:       Physical Exam:  Blood pressure 153/79, pulse 69, temperature 99  F (37.2  C), temperature source Oral, resp. rate 20, height 1.88 m (6' 2\"), weight 78 kg (172 lb), SpO2 94 %.    EXAM:  Exam:  Constitutional: healthy, alert and no distress  Cardiovascular: No lifts, heaves, or thrills. RRR. No murmurs, clicks gallops or rub  Respiratory: Good diaphragmatic excursion. Lungs clear  Gastrointestinal: Abdomen soft, non-tender. BS normal. No masses, organomegaly  Musculoskeletal: extremities normal- no gross deformities noted, and normal muscle tone  Skin: no suspicious lesions or rashes  Neurologic:  Alert and oriented.   Psychiatric: mentation appears normal and affect normal/bright    /79 (BP Location: Left arm)  Pulse 69  Temp 99  F (37.2  C) (Oral)  Resp 20  Ht 1.88 m (6' 2\")  Wt 78 kg (172 lb)  SpO2 94%  BMI 22.08 kg/m2             Data:  All laboratory data reviewed             Significant Results:     Results for orders placed or performed during the hospital encounter of 08/20/18   XR Chest 2 Views    Narrative    Exam: XR CHEST 2 VW, 8/20/2018 8:12 " PM    Indication: dyspnea;     Comparison: Chest x-ray 1/26/2018    Findings:   Frontal and lateral projection of the chest. No pneumothorax or large  pleural effusion. Stable cardiac mediastinal silhouette. Patchy  bibasilar opacities, more prominent on the left. Again noted thoracic  compression deformity.      Impression    Impression: Patchy bibasilar opacities, more prominent on the left  representing atelectasis versus pulmonary edema versus infection.     I have personally reviewed the examination and initial interpretation  and I agree with the findings.    SHELLIE MCCARTHY MD   CBC with platelets differential   Result Value Ref Range    WBC 7.0 4.0 - 11.0 10e9/L    RBC Count 3.70 (L) 4.4 - 5.9 10e12/L    Hemoglobin 11.8 (L) 13.3 - 17.7 g/dL    Hematocrit 34.8 (L) 40.0 - 53.0 %    MCV 94 78 - 100 fl    MCH 31.9 26.5 - 33.0 pg    MCHC 33.9 31.5 - 36.5 g/dL    RDW 12.5 10.0 - 15.0 %    Platelet Count 182 150 - 450 10e9/L    Diff Method Automated Method     % Neutrophils 75.7 %    % Lymphocytes 9.7 %    % Monocytes 11.0 %    % Eosinophils 3.4 %    % Basophils 0.1 %    % Immature Granulocytes 0.1 %    Nucleated RBCs 0 0 /100    Absolute Neutrophil 5.3 1.6 - 8.3 10e9/L    Absolute Lymphocytes 0.7 (L) 0.8 - 5.3 10e9/L    Absolute Monocytes 0.8 0.0 - 1.3 10e9/L    Absolute Eosinophils 0.2 0.0 - 0.7 10e9/L    Absolute Basophils 0.0 0.0 - 0.2 10e9/L    Abs Immature Granulocytes 0.0 0 - 0.4 10e9/L    Absolute Nucleated RBC 0.0    Troponin I   Result Value Ref Range    Troponin I ES <0.015 0.000 - 0.045 ug/L   Basic metabolic panel   Result Value Ref Range    Sodium 142 133 - 144 mmol/L    Potassium 3.9 3.4 - 5.3 mmol/L    Chloride 108 94 - 109 mmol/L    Carbon Dioxide 27 20 - 32 mmol/L    Anion Gap 8 3 - 14 mmol/L    Glucose 103 (H) 70 - 99 mg/dL    Urea Nitrogen 32 (H) 7 - 30 mg/dL    Creatinine 1.14 0.66 - 1.25 mg/dL    GFR Estimate 63 >60 mL/min/1.7m2    GFR Estimate If Black 76 >60 mL/min/1.7m2    Calcium 8.2  (L) 8.5 - 10.1 mg/dL   Nt probnp inpatient   Result Value Ref Range    N-Terminal Pro BNP Inpatient 368 0 - 900 pg/mL   Procalcitonin   Result Value Ref Range    Procalcitonin <0.05 ng/ml   EKG 12-lead, tracing only   Result Value Ref Range    Interpretation ECG Click View Image link to view waveform and result       Recent Results (from the past 48 hour(s))   XR Chest 2 Views    Narrative    Exam: XR CHEST 2 VW, 8/20/2018 8:12 PM    Indication: dyspnea;     Comparison: Chest x-ray 1/26/2018    Findings:   Frontal and lateral projection of the chest. No pneumothorax or large  pleural effusion. Stable cardiac mediastinal silhouette. Patchy  bibasilar opacities, more prominent on the left. Again noted thoracic  compression deformity.      Impression    Impression: Patchy bibasilar opacities, more prominent on the left  representing atelectasis versus pulmonary edema versus infection.     I have personally reviewed the examination and initial interpretation  and I agree with the findings.    SHELLIE MCCARTHY MD                Pending Results:   Unresulted Labs Ordered in the Past 30 Days of this Admission     No orders found for last 61 day(s).                  Discharge Instructions and Follow-Up:     Discharge Procedure Orders  General info for SNF   Order Comments: Length of Stay Estimate: Long Term Care  Condition at Discharge: Stable  Level of care:skilled   Rehabilitation Potential: Fair  Admission H&P remains valid and up-to-date: Yes  Recent Chemotherapy: N/A  Use Nursing Home Standing Orders: Yes     Mantoux instructions   Order Comments: Give two-step Mantoux (PPD) Per Facility Policy Yes     Reason for your hospital stay   Order Comments: Pneumonia/COPD exacerbation     Follow Up and recommended labs and tests   Order Comments: Follow up with Nursing home physician.  No follow up labs or test are needed.     Activity - Up with nursing assistance   Order Specific Question Answer Comments   Is discharge  order? Yes      Full Code     Fall precautions     Advance Diet as Tolerated   Order Comments: Follow this diet upon discharge: Orders Placed This Encounter     Regular Diet Adult   Order Specific Question Answer Comments   Is discharge order? Yes             Attestation:  Chanel Calderon.  APRN, CNP          This patient was discussed with the Care Team in the OBS Unit.  The patient's chart was reviewed and the patient was also seen and evaluated by me.  The plan of care was discussed and reviewed with the Care Team.  The above documentation reflects the evaluation, medical decision making and plan under my supervision.    Edson Martinez MD, FACEP  [facility] Staff Emergency Physician

## 2018-08-21 NOTE — PROGRESS NOTES
Social Work Services Discharge Note      Patient Name:  Sanjay Cano     Anticipated Discharge Date:  08/21/18 15:15    Discharge Disposition:   [facility]    Following MD:  per LTC     Pre-Admission Screening (PAS) online form has been completed.  The Level of Care (LOC) is:  Determined  Confirmation Code is:  Not applicable as came from LTC  Patient/caregiver informed of referral to [facility] Line for Pre-Admission Screening for skilled nursing facility (SNF) placement and to expect a phone call post discharge from SNF.     Additional Services/Equipment Arranged:  [address] [employer] (Ph: [phone])     Patient / Family response to discharge plan:  agreeable     Persons notified of above discharge plan:  Patient, patient's spouse, obs medical team, bedside RN, LTC    Staff Discharge Instructions:  Please fax discharge orders and signed hard scripts for any controlled substances.  Please print a packet and send with patient.     CTS Handoff completed:  YES    Medicare Notice of Rights provided to the patient/family:  YES - 08/20    [facility]  [address] [facility], [place], [place] [address]  (p) [phone] LTC unit, (f) [phone]    JAVED Nathan, [facility]  Social Work Services, Emergency Dept [facility]  Pager: [phone] Mon-Sat 9 am - 9 pm, on-call/after hours pager [phone]

## 2018-08-21 NOTE — DISCHARGE SUMMARY
Discharge instructions and report called to RN at [facility]. VSS, PIV removed. Patient leaving via [facility] transport at 1515.

## 2018-08-22 ENCOUNTER — TELEPHONE (OUTPATIENT)
Dept: FAMILY MEDICINE | Facility: CLINIC | Age: 73
End: 2018-08-22

## 2018-08-22 NOTE — TELEPHONE ENCOUNTER
Reason for call:  Other   Patient called regarding (reason for call): call back  Additional comments: The patients wife called and stated that he was seen in the ER on Monday 8/20 and was diagnosed with pneumonia. She stated that he apparently became belligerent and he had just gotten over a uti and also a hand fracture. She was told to contact Dr. Buchanan with any follow up questions. She would like a call back from Dr. Buchanan specifically if possible to discuss how they should go about moving forward with his care.    Phone number to reach patient:  Home number on file [phone] (home)    Best Time:  Before 1pm if possible    Can we leave a detailed message on this number?  YES

## 2018-08-22 NOTE — TELEPHONE ENCOUNTER
BOAZ Quintero message with patient updates.     Pt was seen in the ED on 08/20/18 and diagnosed with pneumonia. Pt was given abx, IV fluids, steroids, and nebulizers. Discharged on prednisone, robitussin, and doxycycline (10 day course). Pt has follow up scheduled for 08/31/18.    Called patient's spouse, Caren. Per ED note, recommend to follow up with PCP.     Spouse had two questions: Should Sanjay come to see the Doctor. And next question: Dr. Martinez from [facility] prior to discharge stated that he checked pts lungs and told her that they were clear. Meaning he is able to breathe and nothing is preventing breathing. Wife was wondering if patient can have clear lungs and still have infection/pneumonia. Spouse stating that the pt's care facility told her that this wasn't true. Reviewed s/s of pneumonia with spouse. Caren stated that pt was confused and incoherent all day yesterday. This affects how he reacts to things. Then he is afraid, less co-operative and doesn't like people touching him.     Lita Mckeon RN  [facility]

## 2018-08-27 NOTE — TELEPHONE ENCOUNTER
Manoj is calling pack from [facility] regarding how Sanjay is doing. He can be reached at [phone].

## 2018-08-27 NOTE — TELEPHONE ENCOUNTER
Dr. Buchanan    Pt is doing fine, cough has decreased and they are giving, he did miss a dose of the pred. Yesterday    Joana Gandhi RN   [facility]

## 2018-08-28 ENCOUNTER — TELEPHONE (OUTPATIENT)
Dept: FAMILY MEDICINE | Facility: CLINIC | Age: 73
End: 2018-08-28

## 2018-08-29 RX ORDER — DONEPEZIL HYDROCHLORIDE 10 MG/1
10 TABLET, FILM COATED ORAL AT BEDTIME
Qty: 30 TABLET | Refills: 11 | Status: ON HOLD | COMMUNITY
Start: 2018-08-29 | End: 2019-01-01

## 2018-08-29 RX ORDER — DONEPEZIL HYDROCHLORIDE 5 MG/1
5 TABLET, FILM COATED ORAL EVERY MORNING
Qty: 30 TABLET | Refills: 11 | Status: ON HOLD | COMMUNITY
Start: 2018-08-29 | End: 2019-01-01

## 2018-08-29 NOTE — TELEPHONE ENCOUNTER
Order received from Dr. Buchanan for donepezil faxed to [facility]. See medications/orders.    Orders faxed per Brenden to [phone].    Lita Mckeon RN  [facility]

## 2018-08-31 ENCOUNTER — OFFICE VISIT (OUTPATIENT)
Dept: FAMILY MEDICINE | Facility: CLINIC | Age: 73
End: 2018-08-31
Payer: COMMERCIAL

## 2018-08-31 VITALS
HEART RATE: 61 BPM | TEMPERATURE: 98.1 F | HEIGHT: 74 IN | SYSTOLIC BLOOD PRESSURE: 118 MMHG | OXYGEN SATURATION: 96 % | RESPIRATION RATE: 12 BRPM | DIASTOLIC BLOOD PRESSURE: 76 MMHG

## 2018-08-31 DIAGNOSIS — G20.A1 PARKINSON'S DISEASE (H): ICD-10-CM

## 2018-08-31 DIAGNOSIS — S62.323A CLOSED DISPLACED FRACTURE OF SHAFT OF THIRD METACARPAL BONE OF LEFT HAND, INITIAL ENCOUNTER: ICD-10-CM

## 2018-08-31 DIAGNOSIS — S62.92XA FRACTURE OF LEFT HAND: Primary | ICD-10-CM

## 2018-08-31 DIAGNOSIS — J18.9 PNEUMONIA OF LEFT LOWER LOBE DUE TO INFECTIOUS ORGANISM: Primary | ICD-10-CM

## 2018-08-31 PROBLEM — R05.8 RECURRENT PRODUCTIVE COUGH: Status: RESOLVED | Noted: 2018-03-21 | Resolved: 2018-08-31

## 2018-08-31 PROCEDURE — G0008 ADMIN INFLUENZA VIRUS VAC: HCPCS | Performed by: FAMILY MEDICINE

## 2018-08-31 PROCEDURE — 90662 IIV NO PRSV INCREASED AG IM: CPT | Performed by: FAMILY MEDICINE

## 2018-08-31 PROCEDURE — 99215 OFFICE O/P EST HI 40 MIN: CPT | Mod: 25 | Performed by: FAMILY MEDICINE

## 2018-08-31 NOTE — PATIENT INSTRUCTIONS
Discontinue Seroquel 25mg dose at 3pm, but continue taking Seroquel 25mg at bedtime.   I recommend sitting in a regular chair for 30 minutes once daily.

## 2018-08-31 NOTE — MR AVS SNAPSHOT
"              After Visit Summary   8/31/2018    Sanjay Cano    MRN: [id number]           Patient Information     Date Of Birth          1945        Visit Information        Provider Department      8/31/2018 11:30 AM Bharath Buchanan MD [facility]        Today's Diagnoses     Pneumonia of left lower lobe due to infectious organism (H)    -  1    Parkinson's disease (H)        Closed displaced fracture of shaft of third metacarpal bone of left hand, initial encounter          Care Instructions    Discontinue Seroquel 25mg dose at 3pm, but continue taking Seroquel 25mg at bedtime.   I recommend sitting in a regular chair for 30 minutes once daily.           Follow-ups after your visit        Who to contact     If you have questions or need follow up information about today's clinic visit or your schedule please contact [facility] directly at [phone].  Normal or non-critical lab and imaging results will be communicated to you by MyChart, letter or phone within 4 business days after the clinic has received the results. If you do not hear from us within 7 days, please contact the clinic through MyChart or phone. If you have a critical or abnormal lab result, we will notify you by phone as soon as possible.  Submit refill requests through [employer] or call your pharmacy and they will forward the refill request to us. Please allow 3 business days for your refill to be completed.          Additional Information About Your Visit        Care EveryWhere ID     This is your Care EveryWhere ID. This could be used by other organizations to access your [place] medical records  [id number]        Your Vitals Were     Pulse Temperature Respirations Height Pulse Oximetry       61 98.1  F (36.7  C) (Oral) 12 6' 2\" (1.88 m) 96%        Blood Pressure from Last 3 Encounters:   08/31/18 118/76   08/21/18 153/79   07/27/18 121/83    Weight from Last 3 Encounters:   08/20/18 172 lb (78 kg) "   06/06/18 172 lb 11.2 oz (78.3 kg)   01/26/18 117 lb (53.1 kg)              We Performed the Following     FLU VACCINE, INCREASED ANTIGEN, PRESV FREE, AGE 65+ [01183]     Vaccine Administration, Initial [05645]        Primary Care Provider Office Phone # Fax #    Bharath Buchanan [phone]       [address] [address]        Equal Access to Services     JERARDO LONGORIA : Hadii aad ku hadasho Soomaali, waaxda luqadaha, qaybta kaalmada adeegyada, waxay idiin hayaan adeeg khararishi laani . So [facility] [phone].    ATENCIÓN: Si habla español, tiene a herring disposición servicios gratuitos de asistencia lingüística. [facility] [phone].    We comply with applicable federal civil rights laws and [place] laws. We do not discriminate on the basis of race, color, national origin, age, disability, sex, sexual orientation, or gender identity.            Thank you!     Thank you for choosing [facility]  for your [place]. Our goal is always to provide you with excellent care. Hearing back from our patients is one way we can continue to improve our services. Please take a few minutes to complete the written survey that you may receive in the mail after your visit with us. Thank you!             Your Updated Medication List - Protect others around you: Learn how to safely use, store and throw away your medicines at www.disposemymeds.org.          This list is accurate as of 8/31/18 11:59 PM.  Always use your most recent med list.                   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)       * donepezil  10 MG tablet    ARICEPT     Take 5mg  by mouth twice a day (take with the 2.5 mg dose for a total dose of 7.5mg twice daily)        * ARICEPT 5 MG tablet   Generic drug:  donepezil     30 tablet    Take 1 tablet (5 mg) by mouth every morning        * ARICEPT 10 MG tablet   Generic drug:  donepezil     30 tablet    Take 1 tablet (10 mg) by mouth At Bedtime        aspirin 81 MG tablet      Take by mouth twice a week        BACTRIM PO           * carbidopa-levodopa  MG per tablet    SINEMET    90 tablet    Take 1-2 tablets by mouth 5 times daily Takes 2 tablets at 6 AM, 1.5 tablets at 9 AM, 2 tablets at noon, 1.5 tablets at 3 PM, 1.5 tablets at 6 PM. Can take additional 0.5 tablet in the middle of the night if needed.        * carbidopa-levodopa  MG per CR tablet    SINEMET CR    1 tablet    Take 1 tablet by mouth At Bedtime        cholecalciferol 1000 UNIT tablet    vitamin D3    90 tablet    Take 1 tablet (1,000 Units) by mouth every morning    At risk for falling       clonazePAM 0.5 MG ODT tab    klonoPIN    45 tablet    Take 1/2 tablet (0.25 mg) by mouth every other night.    Primary insomnia       * COMBIVENT RESPIMAT  MCG/ACT inhaler   Generic drug:  Ipratropium-Albuterol     4 g    INHALE 1 PUFF BY MOUTH FOUR TIMES DAILY. NOT TO EXCEED 6 DOSES PER DAY    Other emphysema (H)       * COMBIVENT RESPIMAT  MCG/ACT inhaler   Generic drug:  Ipratropium-Albuterol     1 Inhaler    Inhale 1 puff into the lungs 4 times daily May take 1 to 2 additional doses as needed during the day    Other emphysema (H)       doxycycline 100 MG capsule    VIBRAMYCIN    20 capsule    Take 1 capsule (100 mg) by mouth every 12 hours for 10 days Give at 8 am and 8 pm    Pneumonia due to infectious organism, unspecified laterality, unspecified part of lung       FLOVENT  MCG/ACT Inhaler   Generic drug:  fluticasone     36 g    INHALE 2 PUFFS INTO THE LUNGS TWICE DAILY    Other emphysema (H)       guaiFENesin 20  mg/mL Soln solution    ROBITUSSIN    1200 mL    Take 10 mLs by mouth every 4 hours as needed for cough    Pneumonia due to infectious organism, unspecified laterality, unspecified part of lung       lactobacillus rhamnosus (GG) capsule     60 capsule    Take 1 capsule by mouth every morning    Diarrhea, unspecified type       lisinopril 5 MG tablet    PRINIVIL/ZESTRIL    30 tablet    Take 1 tablet (5 mg) by mouth daily        methylcellulose 500 MG Tabs tablet    CITRUCEL    14 each    Take 1 tablet (500 mg) by mouth daily as needed        mirtazapine 15 MG tablet    REMERON    135 tablet    TAKE 1.5 TABLETs (22.5 MG) BY MOUTH AT BEDTIME    Depression, major, recurrent, moderate (H)       QUEtiapine 50 MG tablet    SEROquel    180 tablet    Take 0.5 tablets (25 mg) by mouth 2 times daily Give after day program ~ 3 pm and at bedtime. Discontinue other seroquel orders    Moderate recurrent major depression (H)       ranitidine 75 MG tablet    ZANTAC    30 tablet    Take 1 tablet (75 mg) by mouth At Bedtime        senna-docusate 8.6-50 MG per tablet    SENOKOT-S;PERICOLACE     Take 1 tablet by mouth daily        * venlafaxine 75 MG 24 hr capsule    EFFEXOR-XR     Take 75 mg by mouth daily        * venlafaxine 37.5 MG 24 hr capsule    EFFEXOR-XR     Take 37.5 mg by mouth daily        * Notice:  This list has 10 medication(s) that are the same as other medications prescribed for you. Read the directions carefully, and ask your doctor or other care provider to review them with you.

## 2018-08-31 NOTE — PROGRESS NOTES

## 2018-09-04 ENCOUNTER — RADIANT APPOINTMENT (OUTPATIENT)
Dept: GENERAL RADIOLOGY | Facility: CLINIC | Age: 73
End: 2018-09-04
Attending: ORTHOPAEDIC SURGERY
Payer: COMMERCIAL

## 2018-09-04 ENCOUNTER — TELEPHONE (OUTPATIENT)
Dept: FAMILY MEDICINE | Facility: CLINIC | Age: 73
End: 2018-09-04

## 2018-09-04 ENCOUNTER — OFFICE VISIT (OUTPATIENT)
Dept: ORTHOPEDICS | Facility: CLINIC | Age: 73
End: 2018-09-04
Payer: COMMERCIAL

## 2018-09-04 ENCOUNTER — MEDICAL CORRESPONDENCE (OUTPATIENT)
Dept: HEALTH INFORMATION MANAGEMENT | Facility: CLINIC | Age: 73
End: 2018-09-04

## 2018-09-04 DIAGNOSIS — S62.323D CLOSED DISPLACED FRACTURE OF SHAFT OF THIRD METACARPAL BONE OF LEFT HAND WITH ROUTINE HEALING, SUBSEQUENT ENCOUNTER: Primary | ICD-10-CM

## 2018-09-04 DIAGNOSIS — S69.92XA INJURY OF LEFT HAND, INITIAL ENCOUNTER: Primary | ICD-10-CM

## 2018-09-04 DIAGNOSIS — S62.92XA FRACTURE OF LEFT HAND: ICD-10-CM

## 2018-09-04 NOTE — TELEPHONE ENCOUNTER
Reason for call:  Other   Patient called regarding (reason for call): call back  Additional comments: Manoj with [facility] called because he has questions about the dosing for the prescription for Donepezil that Dr. Buchanan had sent over to the pharmacy for the patient. He would like a call back to discuss this.    Phone number to reach patient:  Other phone number: [phone]    Best Time: Any    Can we leave a detailed message on this number?  YES

## 2018-09-04 NOTE — TELEPHONE ENCOUNTER
Spoke with Manoj, faxed the aricept orders to the facility    Called and left a VM for Danuta regarding orders    [phone] fax attn Manoj Gandhi RN   [facility]

## 2018-09-04 NOTE — NURSING NOTE
Reason For Visit:   Chief Complaint   Patient presents with     Left Hand - Pain, RECHECK     DOI 7/25/18. Left hand 3rd metacarpal fracture. Pt's wife states that he has been having increase in pain. Also that he is doing an increase in activity that he should not be doing while alone with nursing home staff.       Primary MD: Bharath Buchanan  Ref. MD: Bharath Buchanan    Age: 72 year old    ?  No      There were no vitals taken for this visit.      Pain Assessment  Patient Currently in Pain: Yes  0-10 Pain Scale:  (Pain has increased recently due to increase activity)  Primary Pain Location: Hand  Pain Orientation: Left               QuickDASH Assessment  QuickDASH Main 7/31/2018   1.Open a tight or new jar. Unable   2. Do heavy household chores (e.g., wash walls, floors) Unable   3. Carry a shopping bag or briefcase. Unable   4. Wash your back. Unable   5. Use a knife to cut food. Unable   6. Recreational activities in which you take some force or impact through your arm, shoulder or hand (e.g., golf, hammering, tennis, etc.). Unable   7. During the past week, to what extent has your arm, shoulder or hand problem interfered with your normal social activities with family, friends, neighbours or groups? Extremely   8. During the past week, were you limited in your work or other regular daily activities as a result of your arm, shoulder or hand problem? Unable   9. Arm, shoulder or hand pain. None   10.Tingling (pins and needles) in your arm,shoulder or hand. None   11. During the past week, how much difficulty have you had sleeping because of the pain in your arm, shoulder or hand? ([place] number) No difficulty   Quickdash Ability Score 72.72          Current Outpatient Prescriptions   Medication Sig Dispense Refill     PREDNISONE PO        amLODIPine (NORVASC) 5 MG tablet Take 1 tablet (5 mg) by mouth At Bedtime 90 tablet 3     aspirin 81 MG tablet Take by mouth twice a week       carbidopa-levodopa  (SINEMET CR)  MG per CR tablet Take 1 tablet by mouth At Bedtime 1 tablet 0     carbidopa-levodopa (SINEMET)  MG per tablet Take 1-2 tablets by mouth 5 times daily Takes 2 tablets at 6 AM, 1.5 tablets at 9 AM, 2 tablets at noon, 1.5 tablets at 3 PM, 1.5 tablets at 6 PM. Can take additional 0.5 tablet in the middle of the night if needed. 90 tablet      cholecalciferol (VITAMIN D) 1000 UNIT tablet Take 1 tablet (1,000 Units) by mouth every morning 90 tablet 1     clonazePAM (KLONOPIN) 0.5 MG ODT tab Take 1/2 tablet (0.25 mg) by mouth every other night. 45 tablet 0     COMBIVENT RESPIMAT  MCG/ACT inhaler INHALE 1 PUFF BY MOUTH FOUR TIMES DAILY. NOT TO EXCEED 6 DOSES PER DAY 4 g 4     donepezil (ARICEPT) 10 MG tablet Take 1 tablet (10 mg) by mouth At Bedtime 30 tablet 11     donepezil (ARICEPT) 10 MG tablet Take 5mg  by mouth twice a day (take with the 2.5 mg dose for a total dose of 7.5mg twice daily)  3     donepezil (ARICEPT) 5 MG tablet Take 1 tablet (5 mg) by mouth every morning 30 tablet 11     donepezil (ARICEPT) 5 MG tablet Take 2.5 mg twice a day (take with the 5 mg dose for a total dose of 7.5mg twice daily) 180 tablet 3     FLOVENT  MCG/ACT Inhaler INHALE 2 PUFFS INTO THE LUNGS TWICE DAILY 36 g 1     guaiFENesin (ROBITUSSIN) 20 mg/mL SOLN solution Take 10 mLs by mouth every 4 hours as needed for cough 1200 mL 0     Ipratropium-Albuterol (COMBIVENT RESPIMAT)  MCG/ACT inhaler Inhale 1 puff into the lungs 4 times daily May take 1 to 2 additional doses as needed during the day 1 Inhaler 4     lactobacillus rhamnosus, GG, (CULTURELL) capsule Take 1 capsule by mouth every morning 60 capsule 11     lisinopril (PRINIVIL/ZESTRIL) 5 MG tablet Take 1 tablet (5 mg) by mouth daily 30 tablet 1     methylcellulose (CITRUCEL) 500 MG TABS tablet Take 1 tablet (500 mg) by mouth daily as needed 14 each 0     mirtazapine (REMERON) 15 MG tablet TAKE 1.5 TABLETs (22.5 MG) BY MOUTH AT BEDTIME 135  tablet 1     QUEtiapine (SEROQUEL) 50 MG tablet Take 0.5 tablets (25 mg) by mouth 2 times daily Give after day program ~ 3 pm and at bedtime. Discontinue other seroquel orders 180 tablet 0     ranitidine (ZANTAC) 75 MG tablet Take 1 tablet (75 mg) by mouth At Bedtime 30 tablet      senna-docusate (SENOKOT-S;PERICOLACE) 8.6-50 MG per tablet Take 1 tablet by mouth daily       Sulfamethoxazole-Trimethoprim (BACTRIM PO)        venlafaxine (EFFEXOR-XR) 37.5 MG 24 hr capsule Take 37.5 mg by mouth daily       venlafaxine (EFFEXOR-XR) 75 MG 24 hr capsule Take 75 mg by mouth daily         Allergies   Allergen Reactions     Erythromycin Other (See Comments)     Pathological fractures     Levaquin [Levofloxacin]      Fracture prevention     Seasonal Allergies        Jaleel Dudley, ATC

## 2018-09-04 NOTE — TELEPHONE ENCOUNTER
Manoj from [facility] returned a call and would like a call back whenever someone is available. He stated that he can also be reached at [phone].

## 2018-09-04 NOTE — PROGRESS NOTES
Date of Service: 9/04/18    Chief Complaint: left hand fracture follow-up      Date of Injury: 7/25/18     History of Present Illness: The patient is a 72 year old, left-hand dominant male with a history of Parkinson's disease, dementia, COPD, GERD, and depression who returns today for follow-up left third metacarpal fracture with 6 mm of shortening. I last evaluated the patient on 7/31/18, at which time he expressed 5 days of left hand pain with no known inciting event. Given the patient's functional demands, overall alignment, as well as his medical comorbidities we elected to proceed nonoperatively. Provided referral to hand therapy and recommended being fitted for a custom brace. Provided restrictions that were given to his care facility.     Today, the patient's spouse reports that she has to spend 40 hours of week at his facility due to her concern of inadequate cares. She feels that he has possibly further injured himself and has not been taking his pills appropriately. He does not voice concerns with his dementia.     Physical Examination:  VITALS: There were no vitals taken for this visit.  QUICKDASH: 72.72  GENERAL: Aged male in no acute distress.  Alert and oriented times three.  RESPIRATORY: Breathing is regular and non-labored on room air.  Left upper extremity: splint in place. Non-tender at the fracture sight. Patient has 5/5 finger abduction, EPL, FPL. Sensation intact to light touch in median, ulnar, and radial nerve distribution. Fingers are warm and well perfused with capillary refill less than 2 seconds.    SKIN: Intact     Radiographs: Three views of the left hand were obtained today and independently reviewed.   IMPRESSION: Stable alignment with increased callus formation at a  known spiral fracture involving the left third metacarpal. Per radiology report     Results reviewed with the patient and spouse.     Assessment: The patient is a 72 year old, left-hand dominant male with a left third  metacarpal fracture with 6 mm of shortening. Well healing with no positional changes.     Plan:   We reviewed the patient's condition and films. The spouse was very concerned for the patient's well-being at his care facility. She would like a note to be made that she will do the daily bandaging change and splint application. I advised minimal grasping and lifting with the left hand. These will be restrictions for the next 2 weeks. The spouse was reassured with the findings today. All the spouse's questions were answered and she voiced understanding of the plan going forward. Follow-up as needed.     I, Marietta Mercedes MD, have reviewed the above note and agree with the scribe's notation as written.   Scribe Disclosure:   I, Vishnu Mas, am serving as a scribe to document services personally performed by Marietta Mercedes MD at this visit, based upon the provider's statements to me. All documentation has been reviewed by the aforementioned provider prior to being entered into the official medical record.     Portions of this medical record were completed by a scribe. UPON MY REVIEW AND AUTHENTICATION BY ELECTRONIC SIGNATURE, this confirms (a) I performed the applicable clinical services, and (b) the record is accurate.

## 2018-09-04 NOTE — MR AVS SNAPSHOT
After Visit Summary   9/4/2018    Sanjay Cano    MRN: [id number]           Patient Information     Date Of Birth          1945        Visit Information        Provider Department      9/4/2018 12:50 PM Marietta Mercedes MD [facility]         Follow-ups after your visit        Follow-up notes from your care team     Return if symptoms worsen or fail to improve.      Who to contact     Please call your clinic at [phone] to:    Ask questions about your health    Make or cancel appointments    Discuss your medicines    Learn about your test results    Speak to your doctor            Additional Information About Your Visit        Care EveryWhere ID     This is your Care EveryWhere ID. This could be used by other organizations to access your [place] medical records  [id number]         Blood Pressure from Last 3 Encounters:   08/31/18 118/76   08/21/18 153/79   07/27/18 121/83    Weight from Last 3 Encounters:   08/20/18 78 kg (172 lb)   06/06/18 78.3 kg (172 lb 11.2 oz)   01/26/18 53.1 kg (117 lb)              Today, you had the following     No orders found for display       Primary Care Provider Office Phone # Fax #    Bharath Buchanan [phone]       [address] [facility] [address]  [facility] [address]        Equal Access to Services     FIOR LONGORIA : Hadii aad ku hadasho Soomaali, waaxda luqadaha, qaybta kaalmada adeegyada, waxay idiin haywilln sebastien stein . So [facility] [phone].    ATENCIÓN: Si habla español, tiene a herring disposición servicios gratlayos de asistencia lingüística. Llkaren al [phone].    We comply with applicable federal civil rights laws and [place] laws. We do not discriminate on the basis of race, color, national origin, age, disability, sex, sexual orientation, or gender identity.            Thank you!     Thank you for choosing [facility]  for your care. Our goal is always to provide you with excellent  care. Hearing back from our patients is one way we can continue to improve our services. Please take a few minutes to complete the written survey that you may receive in the mail after your visit with us. Thank you!             Your Updated Medication List - Protect others around you: Learn how to safely use, store and throw away your medicines at www.disposemymeds.org.          This list is accurate as of 9/4/18  1:45 PM.  Always use your most recent med list.                   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)       * donepezil 10 MG tablet    ARICEPT     Take 5mg  by mouth twice a day (take with the 2.5 mg dose for a total dose of 7.5mg twice daily)        * ARICEPT 5 MG tablet   Generic drug:  donepezil     30 tablet    Take 1 tablet (5 mg) by mouth every morning        * ARICEPT 10 MG tablet   Generic drug:  donepezil     30 tablet    Take 1 tablet (10 mg) by mouth At Bedtime        aspirin 81 MG tablet      Take by mouth twice a week        BACTRIM PO           * carbidopa-levodopa  MG per tablet    SINEMET    90 tablet    Take 1-2 tablets by mouth 5 times daily Takes 2 tablets at 6 AM, 1.5 tablets at 9 AM, 2 tablets at noon, 1.5 tablets at 3 PM, 1.5 tablets at 6 PM. Can take additional 0.5 tablet in the middle of the night if needed.        * carbidopa-levodopa  MG per CR tablet    SINEMET CR    1 tablet    Take 1 tablet by mouth At Bedtime        cholecalciferol 1000 UNIT tablet    vitamin D3    90 tablet    Take 1 tablet (1,000 Units) by mouth every morning    At risk for falling       clonazePAM 0.5 MG ODT tab    klonoPIN     45 tablet    Take 1/2 tablet (0.25 mg) by mouth every other night.    Primary insomnia       * COMBIVENT RESPIMAT  MCG/ACT inhaler   Generic drug:  Ipratropium-Albuterol     4 g    INHALE 1 PUFF BY MOUTH FOUR TIMES DAILY. NOT TO EXCEED 6 DOSES PER DAY    Other emphysema (H)       * COMBIVENT RESPIMAT  MCG/ACT inhaler   Generic drug:  Ipratropium-Albuterol     1 Inhaler    Inhale 1 puff into the lungs 4 times daily May take 1 to 2 additional doses as needed during the day    Other emphysema (H)       doxycycline 50 MG/5ML Syrp    VIBRAMYCIN     Take 100 mg by mouth 2 times daily        FLOVENT  MCG/ACT Inhaler   Generic drug:  fluticasone     36 g    INHALE 2 PUFFS INTO THE LUNGS TWICE DAILY    Other emphysema (H)       guaiFENesin 20 mg/mL Soln solution    ROBITUSSIN    1200 mL    Take 10 mLs by mouth every 4 hours as needed for cough    Pneumonia due to infectious organism, unspecified laterality, unspecified part of lung       lactobacillus rhamnosus (GG) capsule     60 capsule    Take 1 capsule by mouth every morning    Diarrhea, unspecified type       lisinopril 5 MG tablet    PRINIVIL/ZESTRIL    30 tablet    Take 1 tablet (5 mg) by mouth daily        methylcellulose 500 MG Tabs tablet    CITRUCEL    14 each    Take 1 tablet (500 mg) by mouth daily as needed        mirtazapine 15 MG tablet    REMERON    135 tablet    TAKE 1.5 TABLETs (22.5 MG) BY MOUTH AT BEDTIME    Depression, major, recurrent, moderate (H)       PREDNISONE PO      Take 20 mg by mouth 2 times daily (with meals)        QUEtiapine 50 MG tablet    SEROquel    180 tablet    Take 0.5 tablets (25 mg) by mouth 2 times daily Give after day program ~ 3 pm and at bedtime. Discontinue other seroquel orders    Moderate recurrent major depression (H)       ranitidine 75 MG tablet    ZANTAC    30 tablet    Take 1 tablet (75 mg) by mouth At Bedtime        senna-docusate 8.6-50 MG per tablet    SENOKOT-S;PERICOLACE     Take 1 tablet by  mouth daily        * venlafaxine 75 MG 24 hr capsule    EFFEXOR-XR     Take 75 mg by mouth daily        * venlafaxine 37.5 MG 24 hr capsule    EFFEXOR-XR     Take 37.5 mg by mouth daily        * Notice:  This list has 10 medication(s) that are the same as other medications prescribed for you. Read the directions carefully, and ask your doctor or other care provider to review them with you.

## 2018-09-05 ENCOUNTER — COMMUNICATION - HEALTHEAST (OUTPATIENT)
Dept: NEUROLOGY | Facility: CLINIC | Age: 73
End: 2018-09-05

## 2018-09-05 ENCOUNTER — TRANSFERRED RECORDS (OUTPATIENT)
Dept: HEALTH INFORMATION MANAGEMENT | Facility: CLINIC | Age: 73
End: 2018-09-05
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
## 2018-09-05 NOTE — TELEPHONE ENCOUNTER
Order for xray was faxed to [facility]     Joana Gandhi RN   [facility]

## 2018-09-05 NOTE — TELEPHONE ENCOUNTER
The patients wife called and stated she wanted to talk to Joana specficially. She would like a call back and stated she would be home until around 1pm.

## 2018-09-06 ENCOUNTER — TELEPHONE (OUTPATIENT)
Dept: FAMILY MEDICINE | Facility: CLINIC | Age: 73
End: 2018-09-06

## 2018-09-07 ENCOUNTER — HOSPITAL ENCOUNTER (OUTPATIENT)
Dept: NEUROLOGY | Facility: CLINIC | Age: 73
Setting detail: THERAPIES SERIES
Discharge: STILL ADMITTED OR ADMINISTRATIVE | End: 2018-09-07
Attending: PSYCHIATRY & NEUROLOGY

## 2018-09-07 ENCOUNTER — TELEPHONE (OUTPATIENT)
Dept: FAMILY MEDICINE | Facility: CLINIC | Age: 73
End: 2018-09-07

## 2018-09-07 NOTE — TELEPHONE ENCOUNTER
Reason for call:  Order   Order or referral being requested: Cough medicine prescription  Reason for request: NA  Date needed: as soon as possible  Has the patient been seen by the PCP for this problem? NO    Additional comments: Manoj with [facility] called to get a prescription for cough medicine for the patient from Dr. Buchanan. He stated that if there are any questions he can be contacted.    Phone number to reach patient:  Other phone number: [phone]    Best Time: Any    Can we leave a detailed message on this number?  YES

## 2018-09-12 DIAGNOSIS — F51.01 PRIMARY INSOMNIA: ICD-10-CM

## 2018-09-12 RX ORDER — CLONAZEPAM 0.5 MG/1
TABLET, ORALLY DISINTEGRATING ORAL
Qty: 45 TABLET | Refills: 0 | Status: SHIPPED | OUTPATIENT
Start: 2018-09-12 | End: 2019-01-01

## 2018-09-12 NOTE — TELEPHONE ENCOUNTER
Called [facility]. Patient needs a refill of the following medication:     Dr. Buchanan-    Please review/sign or advise regarding refill request for:     clonazepam (KLONOPIN) 0.5 MG ODT tab  Take 1/2 tablet (0.25 mg) by mouth every other night.      Thank you,   Sandra Guadalupe RN

## 2018-09-12 NOTE — TELEPHONE ENCOUNTER
[facility] and [facility] sent a fax regarding the prescription for clonazePAM (KLONOPIN) 0.5 MG ODT tab.     Please call [facility] at [phone] and [facility] [phone]  for clarification

## 2018-09-17 NOTE — TELEPHONE ENCOUNTER
Received fax from [facility] requesting valid DX for clonazepam. Called [facility] pharmacy. They stated that they received script faxed on 09/12/18 and have already dispensed medication to [facility].     Lita Mckeon RN  [facility]

## 2018-09-18 NOTE — TELEPHONE ENCOUNTER
No further action needed at thistime    Joana Gandhi RN   [facility] [place]

## 2018-09-21 ENCOUNTER — TELEPHONE (OUTPATIENT)
Dept: FAMILY MEDICINE | Facility: CLINIC | Age: 73
End: 2018-09-21

## 2018-09-21 NOTE — TELEPHONE ENCOUNTER
Telephone call placed to patient, left a voice message with request for return call.     SEJAL SmithN RN  [facility]

## 2018-09-21 NOTE — TELEPHONE ENCOUNTER
Reason for call:  Other   Patient called regarding (reason for call): call back  Additional comments: Pt's wife, Caren, would like to speak with a nurse regarding the x-ray that the patient had done on 9/5 of his hand. She has questions about how they'll know when the fracture has healed.     Phone number to reach patient:  Home number on file [phone] (home)    Best Time:  Any    Can we leave a detailed message on this number?  YES

## 2018-09-24 NOTE — TELEPHONE ENCOUNTER
Dr. Buchanan    Have you looked at the hand xray that was done on 9/5/18    Also report from xray on 9/4/18  IMPRESSION: Stable alignment with increased callus formation at a  known spiral fracture involving the left third metacarpal.     CK MIRANDA MD    Pt wife is wanting to know when it would be OK to stop using the hand splint    Advise    Per wife OK to leave a message    Joana Gandhi RN   [facility] [place][note 3]

## 2018-09-24 NOTE — TELEPHONE ENCOUNTER
Reduce splint use for hand to day only x 3 days then can discontinue. Please notify, thanks Bharath

## 2018-09-24 NOTE — TELEPHONE ENCOUNTER
Consent on file for Caren - return call to patient left message with provider instructions - to return call to clinic for any questions and ask to speak with a nurse    George Virgen RN

## 2018-10-05 ENCOUNTER — TELEPHONE (OUTPATIENT)
Dept: FAMILY MEDICINE | Facility: CLINIC | Age: 73
End: 2018-10-05

## 2018-10-05 DIAGNOSIS — R45.86 LABILE MOOD: Primary | ICD-10-CM

## 2018-10-05 DIAGNOSIS — Z87.440 H/O RECURRENT URINARY TRACT INFECTION: ICD-10-CM

## 2018-10-05 PROBLEM — J18.9 PNEUMONIA: Status: RESOLVED | Noted: 2018-08-21 | Resolved: 2018-10-05
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
## 2018-10-05 NOTE — TELEPHONE ENCOUNTER
Message is incorrect - nurse is NOT concerned that patient has UTI    Routing to provider - Chanel - please review and advise as appropriate    OK to bear weight on hand and remove splint?    Thank you,  George Vigren RN

## 2018-10-05 NOTE — LETTER
[address] [address] [phone]          October 8, 2018    Sanjay Cano                                                                                                                     1945            To whom it may concern    Please obtain a UA/UC on pt, d/t symptoms reported by spouse, pt restless, upset,  assure results to PCP Dr. Jackson office fax     Also OK for splint to be removed and bear weight        Sincerely,             Bharath Buchanan MD

## 2018-10-05 NOTE — TELEPHONE ENCOUNTER
Reason for call:  Other   Patient called regarding (reason for call): call back  Additional comments: Caren called regarding Sanjay. She stated that over the weekend he was sick to his stomach, and then this morning he became very disoriented and combative with the staff at the [facility]. She is wondering if an order for a UA needs to be placed, because this is how he acted the last time he got a UTI back in July (or there about). She is getting ready to go to the home and find out exactly what is going on.     Phone number to reach patient:  Home number on file [phone] (home) please try home first, if no answer call cell at [phone]    Best Time:  Any    Can we leave a detailed message on this number?  YES

## 2018-10-05 NOTE — TELEPHONE ENCOUNTER
Reason for call:  Other   Patient called regarding (reason for call): call back  Additional comments: Manoj with [facility] called and stated that the patient may have a uti and he also stated they wanted the ok to remove the splint on his hand. He would like a call back to discuss this.    Phone number to reach patient:  Other phone number: [phone]    Best Time: Any    Can we leave a detailed message on this number?  YES

## 2018-10-08 ENCOUNTER — TELEPHONE (OUTPATIENT)
Dept: FAMILY MEDICINE | Facility: CLINIC | Age: 73
End: 2018-10-08

## 2018-10-08 ENCOUNTER — TRANSFERRED RECORDS (OUTPATIENT)
Dept: HEALTH INFORMATION MANAGEMENT | Facility: CLINIC | Age: 73
End: 2018-10-08

## 2018-10-08 DIAGNOSIS — R05.9 COUGH: Primary | ICD-10-CM

## 2018-10-08 NOTE — TELEPHONE ENCOUNTER
Spoke with wife, pt slept well last night, he has no symptoms during the day, she will let us know after she sees him today if she has further concerns regarding pt resp. Status    Joana Gandhi RN   [facility]

## 2018-10-08 NOTE — TELEPHONE ENCOUNTER
Left VM for facility nurse to return call to clinic    Orders faxed per letter to facility  [phone]    Joana Gandhi RN   [facility]

## 2018-10-08 NOTE — TELEPHONE ENCOUNTER
Pt's wife is requesting a letter for [facility] for pt resume exersize with weight bearing on his left arm.    Caren request it be sent to [facility]    Caren can be reached @ [phone] soheila

## 2018-10-08 NOTE — TELEPHONE ENCOUNTER
Reason for call:  Symptom   Symptom or request: pt has been coughing at night    Duration (how long have symptoms been present): 2 days  Have you been treated for this before? No    Additional comments: n/a    Phone number to reach patient:  Other phone number:  [phone]    Best Time:  n/a    Can we leave a detailed message on this number?  YES

## 2018-10-08 NOTE — TELEPHONE ENCOUNTER
Spoke with Manoj, he got the letter with orders for UA/UC and hand care. Manoj asked for PT/OT eval and Tx    OK given  He will also assess pt regarding the cough    Joana Gandhi RN   [facility]

## 2018-10-08 NOTE — TELEPHONE ENCOUNTER
See other telephone encounter    Joana Gandhi RN   [facility]

## 2018-10-08 NOTE — TELEPHONE ENCOUNTER
Letter for hand care sent today    Wife notified    Joana Gandhi RN   [facility]

## 2018-10-09 NOTE — TELEPHONE ENCOUNTER
Dr. Buchanan/Provider Pool:    Please see phone message from Manoj, at patient's facility- [facility]    Chest x-ray cued. Please review/sign or advise.    Pt's symtoms: coughing at night and pain when breathing out.    Lita Mckeon RN  [facility]

## 2018-10-09 NOTE — TELEPHONE ENCOUNTER
Called Manoj. Notified of provider message/order placed for chest Xray. Order faxed to [facility].     Lita Mckeon RN  [facility]

## 2018-10-09 NOTE — TELEPHONE ENCOUNTER
Manoj nurse from the facility where Sanjay is staying called to say that they needed an order to do the chest x ray and the order can either be called in if Manoj is not available at [phone] and ok to leave message.

## 2018-10-09 NOTE — TELEPHONE ENCOUNTER
Pt's wife is requesting clarification on the care plan please call her [phone]

## 2018-10-09 NOTE — TELEPHONE ENCOUNTER
Per spouse, Caren, pt's lungs clear, no fever. Pt is on antibiotic for UTI. Pt's spouse stated that she spoke with staff at facility and they ordered a chest x-ray today. Advised that once results are back, they can be faxed to Dr. Buchanan. If they are clear, no infection, then advised that she bring patient into clinic to see Dr. Buchanan. Spouse verbalized understanding.     Lita Mckeon RN  [facility]

## 2018-10-10 ENCOUNTER — TRANSFERRED RECORDS (OUTPATIENT)
Dept: HEALTH INFORMATION MANAGEMENT | Facility: CLINIC | Age: 73
End: 2018-10-10

## 2018-10-10 ENCOUNTER — TELEPHONE (OUTPATIENT)
Dept: FAMILY MEDICINE | Facility: CLINIC | Age: 73
End: 2018-10-10

## 2018-10-10 DIAGNOSIS — N39.0 URINARY TRACT INFECTION: Primary | ICD-10-CM

## 2018-10-10 RX ORDER — NITROFURANTOIN 25; 75 MG/1; MG/1
100 CAPSULE ORAL 2 TIMES DAILY
Qty: 14 CAPSULE | Refills: 0 | Status: SHIPPED | OUTPATIENT
Start: 2018-10-10 | End: 2019-01-01

## 2018-10-10 NOTE — TELEPHONE ENCOUNTER
We don't have any urine test in the system to determine the best antibiotic, but bactrim is a reasonable antibiotic for UTIs in general.  If the place he got the urine test ran a culture, this would be helpful information for us to have to make sure it is an effective medication for him.  In the past he has had UTI with both bacteria susceptible and resistant to bactrim.  If he is not getting better, I would recommend he come in to the clinic.  KARTHIKEYAN Granados

## 2018-10-10 NOTE — TELEPHONE ENCOUNTER
Spoke with spouse, Caren, who stated that the results of the chest x-ray are back and normal/no pneumonia per nurses at [facility]. Spouse wondering what she should do because pt still have pain when breathing. Advised that she should schedule appt with provider in clinic. Since PCP out of clinic, spouse wanting pt to be seen sooner. Scheduled OV with Jessica 10/12/18. Discussed emergent symptoms that would necessitate ER visit. Also advised to work with staff at [facility] if wondering if his symptoms are emergent and she should bring him into ER. Spouse verbalized understanding.     Lita Mckeon RN  [facility]

## 2018-10-10 NOTE — TELEPHONE ENCOUNTER
Dr. Buchanan/Provider Pool:    Please see phone message below from patient.     Cued pharmacy if needed.    Please advise.     Lita Mckeon RN  [facility]

## 2018-10-10 NOTE — TELEPHONE ENCOUNTER
Called [facility]. Provider message given. They verbalized understanding and stated that they will fax results of UA, they are not sure if culture is back yet, they will check and fax over any results that they have from U/A, U/C ordered 10/05/18.    Lita Mckeon RN  [facility]

## 2018-10-10 NOTE — TELEPHONE ENCOUNTER
Spoke with the nurse at the [facility],  Stop bactrim, start the macrobid, if symptoms continue after tx call clinic    Lab results sent for abstracting    Joana Gandhi RN   [facility]

## 2018-10-10 NOTE — TELEPHONE ENCOUNTER
Patient tested positive for a UTI. Patient is already taking bactrim, prescribed by an on call provider. Facility would like to know if Dr. Buchanan would suggest any other treatment.

## 2018-10-12 ENCOUNTER — OFFICE VISIT (OUTPATIENT)
Dept: FAMILY MEDICINE | Facility: CLINIC | Age: 73
End: 2018-10-12
Payer: COMMERCIAL

## 2018-10-12 VITALS — OXYGEN SATURATION: 98 % | SYSTOLIC BLOOD PRESSURE: 104 MMHG | DIASTOLIC BLOOD PRESSURE: 68 MMHG | HEART RATE: 64 BPM

## 2018-10-12 DIAGNOSIS — G20.A1 DEMENTIA DUE TO PARKINSON'S DISEASE WITHOUT BEHAVIORAL DISTURBANCE (H): ICD-10-CM

## 2018-10-12 DIAGNOSIS — R07.1 PAINFUL RESPIRATION: Primary | ICD-10-CM

## 2018-10-12 DIAGNOSIS — D64.9 ANEMIA, UNSPECIFIED TYPE: ICD-10-CM

## 2018-10-12 DIAGNOSIS — R53.1 ALTERATION IN MOBILITY DUE TO WEAKNESS: ICD-10-CM

## 2018-10-12 DIAGNOSIS — Z87.440 H/O RECURRENT URINARY TRACT INFECTION: ICD-10-CM

## 2018-10-12 DIAGNOSIS — G20.A1 PARKINSON'S DISEASE (H): ICD-10-CM

## 2018-10-12 DIAGNOSIS — I10 ESSENTIAL HYPERTENSION WITH GOAL BLOOD PRESSURE LESS THAN 140/90: ICD-10-CM

## 2018-10-12 DIAGNOSIS — F02.80 DEMENTIA DUE TO PARKINSON'S DISEASE WITHOUT BEHAVIORAL DISTURBANCE (H): ICD-10-CM

## 2018-10-12 DIAGNOSIS — F33.1 MODERATE RECURRENT MAJOR DEPRESSION (H): ICD-10-CM

## 2018-10-12 DIAGNOSIS — R53.81 PHYSICAL DECONDITIONING: ICD-10-CM

## 2018-10-12 LAB
BASOPHILS # BLD AUTO: 0 10E9/L (ref 0–0.2)
BASOPHILS NFR BLD AUTO: 0.4 %
DIFFERENTIAL METHOD BLD: NORMAL
EOSINOPHIL # BLD AUTO: 0.2 10E9/L (ref 0–0.7)
EOSINOPHIL NFR BLD AUTO: 1.7 %
ERYTHROCYTE [DISTWIDTH] IN BLOOD BY AUTOMATED COUNT: 12.4 % (ref 10–15)
HCT VFR BLD AUTO: 42.9 % (ref 40–53)
HGB BLD-MCNC: 14.3 G/DL (ref 13.3–17.7)
LYMPHOCYTES # BLD AUTO: 1.1 10E9/L (ref 0.8–5.3)
LYMPHOCYTES NFR BLD AUTO: 12.1 %
MCH RBC QN AUTO: 31.8 PG (ref 26.5–33)
MCHC RBC AUTO-ENTMCNC: 33.3 G/DL (ref 31.5–36.5)
MCV RBC AUTO: 96 FL (ref 78–100)
MONOCYTES # BLD AUTO: 0.8 10E9/L (ref 0–1.3)
MONOCYTES NFR BLD AUTO: 9.1 %
NEUTROPHILS # BLD AUTO: 6.8 10E9/L (ref 1.6–8.3)
NEUTROPHILS NFR BLD AUTO: 76.7 %
PLATELET # BLD AUTO: 220 10E9/L (ref 150–450)
RBC # BLD AUTO: 4.49 10E12/L (ref 4.4–5.9)
WBC # BLD AUTO: 8.9 10E9/L (ref 4–11)

## 2018-10-12 PROCEDURE — 85025 COMPLETE CBC W/AUTO DIFF WBC: CPT | Performed by: NURSE PRACTITIONER

## 2018-10-12 PROCEDURE — 99215 OFFICE O/P EST HI 40 MIN: CPT | Performed by: NURSE PRACTITIONER

## 2018-10-12 PROCEDURE — 82728 ASSAY OF FERRITIN: CPT | Performed by: NURSE PRACTITIONER

## 2018-10-12 PROCEDURE — 36415 COLL VENOUS BLD VENIPUNCTURE: CPT | Performed by: NURSE PRACTITIONER

## 2018-10-12 NOTE — MR AVS SNAPSHOT
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 Take them all the way until gone.     He does not have anemia today, so just be sure he is getting enough iron in his diet.   Likely the pain is musculoskeletal or due to anxiety/ depression. Make sure you are taking care of yourself so you can be calm and rested and any time you are stressed this does indeed affect him, so please ask for help as needed.   Find some hobbies for him or reading to him can be soothing and relaxing, volunteers can sometimes be available too so you can get breaks.     I have put in a referral for PT and OT to evaluate and for staff to do daily exercise to help with strengthening and stretching. Follow up with Neurology as planned.   Return for a physical with Dr. Buchanan at your convenience-maybe in a month or two.                       Follow-ups after your visit        Additional Services     PHYSICAL THERAPY REFERRAL       If you have not heard from the scheduling office within 2 business days, please call [phone] for all locations, with the exception of [place], please call [phone] and [place], please call  [phone].    Please be aware that coverage of these services is subject to the terms and limitations of your health insurance plan.  Call member services at your health plan with any benefit or coverage questions.                  Follow-up notes from your care team     Return in about 4 weeks (around 11/9/2018).      Future tests that were ordered for you today     Open Future Orders        Priority Expected Expires Ordered    PHYSICAL THERAPY REFERRAL Routine  10/12/2019 10/12/2018            Who to contact     If you have questions or need follow up information about today's clinic visit or your schedule please contact [facility] directly at [phone].  Normal or non-critical lab and imaging results will be communicated to you by MyChart, letter or phone within 4 business days after the clinic has received the results. If you do not hear from us within 7 days, please contact the clinic through MyChart or phone. If you have a critical or abnormal lab result, we will notify you by phone as soon as possible.  Submit refill requests through [employer] or call your pharmacy and they will forward the refill request to us. Please allow 3 business days for your refill to be completed.          Additional Information About Your Visit        Care EveryWhere ID     This is your Care EveryWhere ID. This could be used by other organizations to access your [place] medical records  [id number]        Your Vitals Were     Pulse Pulse Oximetry                64 98%           Blood Pressure from Last 3 Encounters:   10/12/18 104/68   08/31/18 118/76   08/21/18 153/79    Weight from Last 3 Encounters:   08/20/18 172 lb (78 kg)   06/06/18 172 lb 11.2 oz (78.3 kg)   01/26/18 117 lb (53.1 kg)              We Performed the Following     CBC with platelets differential     Ferritin        Primary Care Provider Office Phone # Fax #    Bharath Buchanan [phone]       [address]  [facility] [address]        Equal Access to Services     AAKASHFIOR SWATI : Hadii aad ku hadmarbinbruno Bae, wareyda alvarez, qaybta daijamapresley boudreaux, nataliya schillingrikrishi capps. So [facility] [phone].    ATENCIÓN: Si habla español, tiene a herring disposición servicios gratuitos de asistencia lingüística. Freddieame al [phone].    We comply with applicable federal civil rights laws and [place] laws. We do not discriminate on the basis of race, color, national origin, age, disability, sex, sexual orientation, or gender identity.            Thank you!     Thank you for choosing [facility]  for your care. Our goal is always to provide you with excellent care. Hearing back from our patients is one way we can continue to improve our services. Please take a few minutes to complete the written survey that you may receive in the mail after your visit with us. Thank you!             Your Updated Medication List - Protect others around you: Learn how to safely use, store and throw away your medicines at www.disposemymeds.org.          This list is accurate as of 10/12/18 11:55 AM.  Always use your most recent med list.                   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)       * donepezil 10 MG tablet    ARICEPT     Take 5mg  by mouth twice a day (take with the 2.5 mg dose for a total dose of 7.5mg twice daily)        * ARICEPT 5 MG tablet   Generic drug:  donepezil     30 tablet    Take 1 tablet (5 mg) by mouth every morning        * ARICEPT 10 MG tablet   Generic drug:  donepezil     30 tablet    Take 1 tablet (10 mg)  by mouth At Bedtime        aspirin 81 MG tablet      Take by mouth twice a week        * carbidopa-levodopa  MG per tablet    SINEMET    90 tablet    Take 1-2 tablets by mouth 5 times daily Takes 2 tablets at 6 AM, 1.5 tablets at 9 AM, 2 tablets at noon, 1.5 tablets at 3 PM, 1.5 tablets at 6 PM. Can take additional 0.5 tablet in the middle of the night if needed.        * carbidopa-levodopa  MG per CR tablet    SINEMET CR    1 tablet    Take 1 tablet by mouth At Bedtime        cholecalciferol 1000 UNIT tablet    vitamin D3    90 tablet    Take 1 tablet (1,000 Units) by mouth every morning    At risk for falling       clonazePAM 0.5 MG ODT tab    klonoPIN    45 tablet    Take 1/2 tablet (0.25 mg) by mouth every other night.    Primary insomnia       * COMBIVENT RESPIMAT  MCG/ACT inhaler   Generic drug:  Ipratropium-Albuterol     4 g    INHALE 1 PUFF BY MOUTH FOUR TIMES DAILY. NOT TO EXCEED 6 DOSES PER DAY    Other emphysema (H)       * COMBIVENT RESPIMAT  MCG/ACT inhaler   Generic drug:  Ipratropium-Albuterol     1 Inhaler    Inhale 1 puff into the lungs 4 times daily May take 1 to 2 additional doses as needed during the day    Other emphysema (H)       doxycycline 50 MG/5ML Syrp    VIBRAMYCIN     Take 100 mg by mouth 2 times daily        FLOVENT  MCG/ACT Inhaler   Generic drug:  fluticasone     36 g    INHALE 2 PUFFS INTO THE LUNGS TWICE DAILY    Other emphysema (H)       guaiFENesin 20 mg/mL Soln solution    ROBITUSSIN    1200 mL    Take 10 mLs by mouth every 4 hours as needed for cough    Pneumonia due to infectious organism, unspecified laterality, unspecified part of lung       lactobacillus rhamnosus (GG) capsule     60 capsule    Take 1 capsule by mouth every morning    Diarrhea, unspecified type       lisinopril 5 MG tablet    PRINIVIL/ZESTRIL    30 tablet    Take 1 tablet (5 mg) by mouth daily        methylcellulose 500 MG Tabs tablet    CITRUCEL    14 each    Take 1 tablet  (500 mg) by mouth daily as needed        mirtazapine 15 MG tablet    REMERON    135 tablet    TAKE 1.5 TABLETs (22.5 MG) BY MOUTH AT BEDTIME    Depression, major, recurrent, moderate (H)       nitroFURantoin (macrocrystal-monohydrate) 100 MG capsule    MACROBID    14 capsule    Take 1 capsule (100 mg) by mouth 2 times daily    Urinary tract infection       QUEtiapine 50 MG tablet    SEROquel    180 tablet    Take 0.5 tablets (25 mg) by mouth 2 times daily Give after day program ~ 3 pm and at bedtime. Discontinue other seroquel orders    Moderate recurrent major depression (H)       ranitidine 75 MG tablet    ZANTAC    30 tablet    Take 1 tablet (75 mg) by mouth At Bedtime        senna-docusate 8.6-50 MG per tablet    SENOKOT-S;PERICOLACE     Take 1 tablet by mouth daily        * venlafaxine 75 MG 24 hr capsule    EFFEXOR-XR     Take 75 mg by mouth daily        * venlafaxine 37.5 MG 24 hr capsule    EFFEXOR-XR     Take 37.5 mg by mouth daily        * Notice:  This list has 10 medication(s) that are the same as other medications prescribed for you. Read the directions carefully, and ask your doctor or other care provider to review them with you.

## 2018-10-12 NOTE — PROGRESS NOTES
SUBJECTIVE:   Sanjay Cano is a 72 year old male who presents to clinic today for the following health issues:      Musculoskeletal problem/pain      Duration: couple weeks    Description  Location: pain in left side when he breathes, hurts when he tries to catch his breath, had a cough once with a little wheesing    Intensity:  mild    Accompanying signs and symptoms: none    History  Previous similar problem: no   Previous evaluation:  x-ray says he had Pneumonia    Precipitating or alleviating factors:  Trauma or overuse: no   Aggravating factors include: exhaling, eating    Therapies tried and outcome: none    Wife is at his LTC to make sure he gets his meds on time   Aug pneumonia that was missed for a while  CXR 10/9 or 10 said didn't show pneumonia     Urinary tract infection no fevers, wife has seen symptoms such as nausea and combative and halluncinting  Diagnosis urinary tract infection this week, taking macrobid, of note culture resistant to bactrim.   Third urinary tract infection this year, Takes twice a day doxy, no cathter    Wheelchair he normally is in reclines, she is wondering if torso in a V can be causing the painful resp, difficult to read since he has dementia and parkinsons    Severe depression, on mirtazipine, seroquel, effexor, and parkinsons meds, wife thinks he was put in the LTC after she couldn't care for him at home and this was cause of his decline  haivng a difficult time getting therapies for him, he is getting weaker and she cannot help him but wants more help for him    PROBLEMS TO ADD ON...    Problem list and histories reviewed & adjusted, as indicated.  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)     Primary insomnia     Other emphysema (H)     Pulmonary nodules     Thyroid nodule     Family history of thyroid cancer     H/O recurrent urinary tract infection     Essential hypertension with goal blood pressure less than 140/90     Senile purpura (H)     Nocturnal cough     Parkinson's disease (H)     Closed displaced fracture of shaft of third metacarpal bone of left hand, initial encounter     Past Surgical History:   Procedure Laterality Date     EYE SURGERY       ORTHOPEDIC SURGERY       PHACOEMULSIFICATION CLEAR CORNEA WITH STANDARD INTRAOCULAR LENS IMPLANT  5/21/2014    Procedure: PHACOEMULSIFICATION CLEAR CORNEA WITH STANDARD INTRAOCULAR LENS IMPLANT;  Surgeon: Tomy Hunter MD;  Location: [facility]     PHACOEMULSIFICATION CLEAR CORNEA WITH TORIC INTRAOCULAR LENS IMPLANT  4/30/2014    Procedure: PHACOEMULSIFICATION CLEAR CORNEA WITH TORIC INTRAOCULAR LENS IMPLANT;  Surgeon: Tomy Hunter MD;  Location: [facility]       Social History   Substance Use Topics     Smoking status: Former Smoker     Packs/day: 0.01     Years: 40.00     Types: Cigarettes     Quit date: 7/31/2008     Smokeless tobacco: Never Used      Comment: very passive cigarettes in 6 months     Alcohol use No     Family History   Problem Relation Age of Onset     Cerebrovascular Disease Mother      Diabetes Mother      GASTROINTESTINAL DISEASE Mother      Hypertension Mother      Neurologic Disorder Father      Cerebrovascular Disease Father      Cerebrovascular Disease Maternal Grandfather      Cancer Paternal Grandmother      Other - See Comments Sister      gi - unknown         Current Outpatient Prescriptions   Medication Sig Dispense Refill     amLODIPine (NORVASC) 5 MG tablet Take 1 tablet (5 mg) by mouth At Bedtime 90 tablet 3     aspirin 81 MG tablet Take by mouth twice a week       carbidopa-levodopa (SINEMET CR)  MG per CR tablet Take 1 tablet by mouth At  Bedtime 1 tablet 0     carbidopa-levodopa (SINEMET)  MG per tablet Take 1-2 tablets by mouth 5 times daily Takes 2 tablets at 6 AM, 1.5 tablets at 9 AM, 2 tablets at noon, 1.5 tablets at 3 PM, 1.5 tablets at 6 PM. Can take additional 0.5 tablet in the middle of the night if needed. 90 tablet      cholecalciferol (VITAMIN D) 1000 UNIT tablet Take 1 tablet (1,000 Units) by mouth every morning 90 tablet 1     clonazePAM (KLONOPIN) 0.5 MG ODT tab Take 1/2 tablet (0.25 mg) by mouth every other night. 45 tablet 0     COMBIVENT RESPIMAT  MCG/ACT inhaler INHALE 1 PUFF BY MOUTH FOUR TIMES DAILY. NOT TO EXCEED 6 DOSES PER DAY 4 g 4     donepezil (ARICEPT) 10 MG tablet Take 1 tablet (10 mg) by mouth At Bedtime 30 tablet 11     donepezil (ARICEPT) 10 MG tablet Take 5mg  by mouth twice a day (take with the 2.5 mg dose for a total dose of 7.5mg twice daily)  3     donepezil (ARICEPT) 5 MG tablet Take 1 tablet (5 mg) by mouth every morning 30 tablet 11     donepezil (ARICEPT) 5 MG tablet Take 2.5 mg twice a day (take with the 5 mg dose for a total dose of 7.5mg twice daily) 180 tablet 3     doxycycline (VIBRAMYCIN) 50 MG/5ML SYRP Take 100 mg by mouth 2 times daily       FLOVENT  MCG/ACT Inhaler INHALE 2 PUFFS INTO THE LUNGS TWICE DAILY 36 g 1     guaiFENesin (ROBITUSSIN) 20 mg/mL SOLN solution Take 10 mLs by mouth every 4 hours as needed for cough 1200 mL 0     Ipratropium-Albuterol (COMBIVENT RESPIMAT)  MCG/ACT inhaler Inhale 1 puff into the lungs 4 times daily May take 1 to 2 additional doses as needed during the day 1 Inhaler 4     lactobacillus rhamnosus, GG, (CULTURELL) capsule Take 1 capsule by mouth every morning 60 capsule 11     lisinopril (PRINIVIL/ZESTRIL) 5 MG tablet Take 1 tablet (5 mg) by mouth daily 30 tablet 1     methylcellulose (CITRUCEL) 500 MG TABS tablet Take 1 tablet (500 mg) by mouth daily as needed 14 each 0     mirtazapine (REMERON) 15 MG tablet TAKE 1.5 TABLETs (22.5 MG) BY MOUTH  AT BEDTIME 135 tablet 1     nitroFURantoin, macrocrystal-monohydrate, (MACROBID) 100 MG capsule Take 1 capsule (100 mg) by mouth 2 times daily 14 capsule 0     QUEtiapine (SEROQUEL) 50 MG tablet Take 0.5 tablets (25 mg) by mouth 2 times daily Give after day program ~ 3 pm and at bedtime. Discontinue other seroquel orders 180 tablet 0     ranitidine (ZANTAC) 75 MG tablet Take 1 tablet (75 mg) by mouth At Bedtime 30 tablet      senna-docusate (SENOKOT-S;PERICOLACE) 8.6-50 MG per tablet Take 1 tablet by mouth daily       venlafaxine (EFFEXOR-XR) 37.5 MG 24 hr capsule Take 37.5 mg by mouth daily       venlafaxine (EFFEXOR-XR) 75 MG 24 hr capsule Take 75 mg by mouth daily       Allergies   Allergen Reactions     Erythromycin Other (See Comments)     Pathological fractures     Levaquin [Levofloxacin]      Fracture prevention     Seasonal Allergies      Recent Labs   Lab Test  08/20/18   1958  01/31/18   1559  01/27/18   0758  01/26/18   1250  08/09/17   1352   06/28/16   0848   04/02/14   1243   08/31/11   0955   LDL   --    --    --    --    --    --    --    --   78   --   84   HDL   --    --    --    --    --    --    --    --   43   --   47   TRIG   --    --    --    --    --    --    --    --   83   --   56   ALT   --   <6   --   8  8   --   9   < >  19   < >  <6   CR  1.14  0.93  0.83  0.98  0.90   < >  0.97   < >  1.00   < >  0.84   GFRESTIMATED  63  80  >90  75  83   < >  76   < >  74   < >  >90   GFRESTBLACK  76  >90  >90  >90  >90  African American GFR Calc     < >  >90   GFR Calc     < >  90   < >  >90   POTASSIUM  3.9  4.5  3.7  4.0  3.9   < >  3.4   < >  3.8   < >  3.7   TSH   --    --   1.17   --    --    --   0.53   < >  2.25   < >   --     < > = values in this interval not displayed.      BP Readings from Last 3 Encounters:   10/12/18 104/68   08/31/18 118/76   08/21/18 153/79    Wt Readings from Last 3 Encounters:   08/20/18 172 lb (78 kg)   06/06/18 172 lb 11.2 oz (78.3 kg)   01/26/18 117  lb (53.1 kg)                  Labs reviewed in EPIC    Reviewed and updated as needed this visit by clinical staff       Reviewed and updated as needed this visit by Provider         ROS:  Constitutional, HEENT, cardiovascular, pulmonary, GI, , musculoskeletal, neuro, skin, endocrine and psych systems are negative, except as otherwise noted.    OBJECTIVE:     /68 (BP Location: Left arm)  Pulse 64  SpO2 98%  There is no height or weight on file to calculate BMI.  GENERAL: healthy, alert, no distress and elderly in wheelchair    NECK: no adenopathy, no asymmetry, masses, or scars   RESP: lungs clear to auscultation - no rales, rhonchi or wheezes  CV: regular rate and rhythm, normal S1 S2, no S3 or S4, no murmur, click or rub, no peripheral edema and peripheral pulses strong  ABDOMEN: soft, nontender, no hepatosplenomegaly, obese, bowel sounds normal  MS: no gross musculoskeletal defects noted, no edema  SKIN: no suspicious lesions or rashes  NEURO: generalized weakness and deconditioning hunched over in wheelchair, some slight contractures and parkinsonian and mostly listening to his wife and I talk, memory impaired and difficult to assess mental status speech somewhat understandable when he does speak about food he likes sometimes and weather   PSYCH: appears calm and comfortable, observant, wife very attentive to his needs and wife appears frustrated and anxious although calms easily when speaking with her     Diagnostic Test Results:  Results for orders placed or performed in visit on 10/12/18 (from the past 24 hour(s))   CBC with platelets differential   Result Value Ref Range    WBC 8.9 4.0 - 11.0 10e9/L    RBC Count 4.49 4.4 - 5.9 10e12/L    Hemoglobin 14.3 13.3 - 17.7 g/dL    Hematocrit 42.9 40.0 - 53.0 %    MCV 96 78 - 100 fl    MCH 31.8 26.5 - 33.0 pg    MCHC 33.3 31.5 - 36.5 g/dL    RDW 12.4 10.0 - 15.0 %    Platelet Count 220 150 - 450 10e9/L    % Neutrophils 76.7 %    % Lymphocytes 12.1 %    %  Monocytes 9.1 %    % Eosinophils 1.7 %    % Basophils 0.4 %    Absolute Neutrophil 6.8 1.6 - 8.3 10e9/L    Absolute Lymphocytes 1.1 0.8 - 5.3 10e9/L    Absolute Monocytes 0.8 0.0 - 1.3 10e9/L    Absolute Eosinophils 0.2 0.0 - 0.7 10e9/L    Absolute Basophils 0.0 0.0 - 0.2 10e9/L    Diff Method Automated Method        ASSESSMENT/PLAN:         ICD-10-CM    1. Painful respiration R07.1    2. Anemia, unspecified type D64.9 Ferritin     CBC with platelets differential   3. Physical deconditioning R53.81 PHYSICAL THERAPY REFERRAL   4. Alteration in mobility due to weakness R53.1 PHYSICAL THERAPY REFERRAL   5. Parkinson's disease (H) G20 PHYSICAL THERAPY REFERRAL   6. Essential hypertension with goal blood pressure less than 140/90 I10    7. H/O recurrent urinary tract infection Z87.440    8. 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 Moderate recurrent major depression (H) F33.1    pt with complex medical history as above, living in LTC facility and paperwork signed. Urinary tract infection resolving, no signs or symptoms of pneumonia on exam today, had normal CXR couple days ago and no new symptoms.    BP at goal, pt appears well hydrated and reassured his wife no infection concerns and antibiotics okay for senstitivities so continue.   Anemia recheked and normal today, likely pain is musculoskeletal will to PT OT ash, work on anxiety and depression, wife with many questions I answered today, see below     Patient Instructions   His lungs today are clear and he is on antibiotics for the urinary tract infection, so we will give this more time. Take them all the way until gone.     He does not have anemia today, so just be sure he is getting enough iron in his diet.   Likely the pain is musculoskeletal or due to anxiety/ depression. Make sure you are taking care of yourself so you can be calm and rested and any time you are stressed this does indeed affect him, so please  ask for help as needed.   Find some hobbies for him or reading to him can be soothing and relaxing, volunteers can sometimes be available too so you can get breaks.     I have put in a referral for PT and OT to evaluate and for staff to do daily exercise to help with strengthening and stretching. Follow up with Neurology as planned.   Return for a physical with Dr. Buchanan at your convenience-maybe in a month or two.     Return to Clinic sooner if not improving as expected or any concerns     50 min spent in direct face to face time with this pt, greater than 50% in counseling and coordination of care of above diagnoses    LUISA Felder [facility]

## 2018-10-12 NOTE — PATIENT INSTRUCTIONS
His lungs today are clear and he is on antibiotics for the urinary tract infection, so we will give this more time. Take them all the way until gone.     He does not have anemia today, so just be sure he is getting enough iron in his diet.   Likely the pain is musculoskeletal or due to anxiety/ depression. Make sure you are taking care of yourself so you can be calm and rested and any time you are stressed this does indeed affect him, so please ask for help as needed.   Find some hobbies for him or reading to him can be soothing and relaxing, volunteers can sometimes be available too so you can get breaks.     I have put in a referral for PT and OT to evaluate and for staff to do daily exercise to help with strengthening and stretching. Follow up with Neurology as planned.   Return for a physical with Dr. Buchanan at your convenience-maybe in a month or two.

## 2018-10-13 LAB — FERRITIN SERPL-MCNC: 140 NG/ML (ref 26–388)

## 2018-10-19 ENCOUNTER — TRANSFERRED RECORDS (OUTPATIENT)
Dept: HEALTH INFORMATION MANAGEMENT | Facility: CLINIC | Age: 73
End: 2018-10-19

## 2018-10-26 ENCOUNTER — TELEPHONE (OUTPATIENT)
Dept: FAMILY MEDICINE | Facility: CLINIC | Age: 73
End: 2018-10-26

## 2018-10-26 DIAGNOSIS — G20.A1 PARKINSON'S DISEASE (H): Primary | ICD-10-CM

## 2018-10-26 DIAGNOSIS — Z87.01 HISTORY OF PNEUMONIA: ICD-10-CM

## 2018-10-26 NOTE — TELEPHONE ENCOUNTER
Reason for call:  Order   Order or referral being requested: swallow test  Reason for request: per speech therapist request  Date needed: as soon as possible  Has the patient been seen by the PCP for this problem? NO    Additional comments: n/a    Phone number to reach patient:  Other phone number:  [phone]    Best Time:  n/a    Can we leave a detailed message on this number?  YES

## 2018-10-26 NOTE — TELEPHONE ENCOUNTER
Route to provider [facility] to do a swallow test    Advise    Joana Gandhi RN   [facility]

## 2018-10-29 NOTE — TELEPHONE ENCOUNTER
Spoke with wife and let her know the referral for ST has been placed  Gave her the info    Joana Gandhi RN   Formerly named [facility]

## 2018-10-30 ENCOUNTER — TELEPHONE (OUTPATIENT)
Dept: FAMILY MEDICINE | Facility: CLINIC | Age: 73
End: 2018-10-30

## 2018-10-30 NOTE — TELEPHONE ENCOUNTER
Dr. Buchanan    The facility will be doing their protocol for falls, pt denies hitting his head  Denies pain, denies injury, no injury noted  He has a low bed and mat on floor for his fall risk    Joana Gandhi RN   [facility]

## 2018-10-30 NOTE — TELEPHONE ENCOUNTER
Reason for call:  Other   Patient called regarding (reason for call): BOAZ  Additional comments: Scar with [facility] called to let Dr. Buchanan know that the patient had a fall today. She also stated that there were no injuries.    Phone number to reach patient:  Other phone number: [phone]    Best Time: Any    Can we leave a detailed message on this number?  YES

## 2018-11-06 ENCOUNTER — MEDICAL CORRESPONDENCE (OUTPATIENT)
Dept: HEALTH INFORMATION MANAGEMENT | Facility: CLINIC | Age: 73
End: 2018-11-06

## 2018-11-07 ENCOUNTER — OFFICE VISIT (OUTPATIENT)
Dept: FAMILY MEDICINE | Facility: CLINIC | Age: 73
End: 2018-11-07
Payer: COMMERCIAL

## 2018-11-07 ENCOUNTER — TELEPHONE (OUTPATIENT)
Dept: FAMILY MEDICINE | Facility: CLINIC | Age: 73
End: 2018-11-07

## 2018-11-07 VITALS
SYSTOLIC BLOOD PRESSURE: 126 MMHG | RESPIRATION RATE: 18 BRPM | HEART RATE: 70 BPM | OXYGEN SATURATION: 94 % | TEMPERATURE: 98.6 F | DIASTOLIC BLOOD PRESSURE: 78 MMHG | WEIGHT: 176 LBS | BODY MASS INDEX: 22.6 KG/M2

## 2018-11-07 DIAGNOSIS — L21.9 SEBORRHEIC DERMATITIS: ICD-10-CM

## 2018-11-07 DIAGNOSIS — H61.23 BILATERAL IMPACTED CERUMEN: ICD-10-CM

## 2018-11-07 DIAGNOSIS — Z00.00 MEDICARE ANNUAL WELLNESS VISIT, SUBSEQUENT: Primary | ICD-10-CM

## 2018-11-07 DIAGNOSIS — L21.0 DANDRUFF: ICD-10-CM

## 2018-11-07 DIAGNOSIS — I10 ESSENTIAL HYPERTENSION WITH GOAL BLOOD PRESSURE LESS THAN 140/90: ICD-10-CM

## 2018-11-07 DIAGNOSIS — G20.A1 PARKINSON'S DISEASE (H): ICD-10-CM

## 2018-11-07 PROCEDURE — 69210 REMOVE IMPACTED EAR WAX UNI: CPT | Mod: 50 | Performed by: FAMILY MEDICINE

## 2018-11-07 PROCEDURE — 99213 OFFICE O/P EST LOW 20 MIN: CPT | Mod: 25 | Performed by: FAMILY MEDICINE

## 2018-11-07 PROCEDURE — 99397 PER PM REEVAL EST PAT 65+ YR: CPT | Performed by: FAMILY MEDICINE

## 2018-11-07 RX ORDER — HYDROCORTISONE VALERATE CREAM 2 MG/G
CREAM TOPICAL
Qty: 45 G | Refills: 0 | Status: SHIPPED | OUTPATIENT
Start: 2018-11-07 | End: 2018-12-05

## 2018-11-07 NOTE — MR AVS SNAPSHOT
"              After Visit Summary   11/7/2018    Sanjay Cano    MRN: [id number]           Patient Information     Date Of Birth          1945        Visit Information        Provider Department      11/7/2018 11:00 AM Bharath Buchanan MD [facility]        Today's Diagnoses     Medicare annual wellness visit, subsequent    -  1    Seborrheic dermatitis        Dandruff        Essential hypertension with goal blood pressure less than 140/90        Parkinson's disease (H)        Bilateral impacted cerumen          Care Instructions    Try the shampoo and medicated cream for skin flaking over the next 2 months then revaluate.           Follow-ups after your visit        Follow-up notes from your care team     Return in about 2 months (around 1/7/2019) for Routine Visit.      Who to contact     If you have questions or need follow up information about today's clinic visit or your schedule please contact [facility] directly at [phone].  Normal or non-critical lab and imaging results will be communicated to you by MyChart, letter or phone within 4 business days after the clinic has received the results. If you do not hear from us within 7 days, please contact the clinic through [employer] or phone. If you have a critical or abnormal lab result, we will notify you by phone as soon as possible.  Submit refill requests through [email or web address] or call your pharmacy and they will forward the refill request to us. Please allow 3 business days for your refill to be completed.          Additional Information About Your Visit        MyChart Information     [email or web address] lets you send messages to your doctor, view your test results, renew your prescriptions, schedule appointments and more. To sign up, go to [email or web address] . Click on \"Log in\" on the left side of the screen, which will take you to the Welcome page. Then click on \"Sign up Now\" on the right side of the page.     You will be " asked to enter the access code listed below, as well as some personal information. Please follow the directions to create your username and password.     Your access code is: [email or web address]  Expires: 2019 12:14 PM     Your access code will  in 90 days. If you need help or a new code, please call your [place] clinic or [phone].        Care EveryWhere ID     This is your Care EveryWhere ID. This could be used by other organizations to access your [place] medical records  [id number]        Your Vitals Were     Pulse Temperature Respirations Pulse Oximetry BMI (Body Mass Index)       70 98.6  F (37  C) (Temporal) 18 94% 22.6 kg/m2        Blood Pressure from Last 3 Encounters:   18 126/78   10/12/18 104/68   18 118/76    Weight from Last 3 Encounters:   18 176 lb (79.8 kg)   18 172 lb (78 kg)   18 172 lb 11.2 oz (78.3 kg)              Today, you had the following     No orders found for display         Today's Medication Changes          These changes are accurate as of 18 12:14 PM.  If you have any questions, ask your nurse or doctor.               Start taking these medicines.        Dose/Directions    hydrocortisone 0.2 % cream   Commonly known as:  WESTCORT   Used for:  Seborrheic dermatitis   Started by:  Bharath Buchanan MD        Apply sparingly to affected area twice times daily after using moist cloth to wipe skin flakes away.   Quantity:  45 g   Refills:  0       pyrithione zinc 1 % Sham   Commonly known as:  SELSUN BLUE/HEAD AND SHOULDERS   Used for:  Seborrheic dermatitis, Dandruff   Started by:  Bharath Buchanan MD        Dose:  1 applicator   Apply 1 mL topically daily as needed for irritation   Quantity:  1 Bottle   Refills:  11            Where to get your medicines      These medications were sent to [facility], [place] - [address], [facility] [address]     Phone:   [phone]     hydrocortisone 0.2 % cream    pyrithione zinc 1 % Sham                Primary Care Provider Office Phone # Fax #    Bharath Buchanan [phone]       [address] [address]        Equal Access to Services     JERARDO LONGORIA : Hadii aad ku hadasho Soomaali, waaxda luqadaha, qaybta kaalmada adeegyada, nataliya schillingrikrishi capps. So [facility] [phone].    ATENCIÓN: Si habla español, tiene a herring disposición servicios gratuitos de asistencia lingüística. Yanique al [phone].    We comply with applicable federal civil rights laws and [place] laws. We do not discriminate on the basis of race, color, national origin, age, disability, sex, sexual orientation, or gender identity.            Thank you!     Thank you for choosing [facility]  for your care. Our goal is always to provide you with excellent care. Hearing back from our patients is one way we can continue to improve our services. Please take a few minutes to complete the written survey that you may receive in the mail after your visit with us. Thank you!             Your Updated Medication List - Protect others around you: Learn how to safely use, store and throw away your medicines at www.disposemymeds.org.          This list is accurate as of 11/7/18 12:14 PM.  Always use your most recent med list.                   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)       * donepezil 10 MG tablet    ARICEPT     Take 5mg  by mouth twice a day (take with the 2.5 mg dose for a total dose of 7.5mg twice daily)         * ARICEPT 5 MG tablet   Generic drug:  donepezil     30 tablet    Take 1 tablet (5 mg) by mouth every morning        * ARICEPT 10 MG tablet   Generic drug:  donepezil     30 tablet    Take 1 tablet (10 mg) by mouth At Bedtime        aspirin 81 MG tablet      Take by mouth twice a week        * carbidopa-levodopa  MG per tablet    SINEMET    90 tablet    Take 1-2 tablets by mouth 5 times daily Takes 2 tablets at 6 AM, 1.5 tablets at 9 AM, 2 tablets at noon, 1.5 tablets at 3 PM, 1.5 tablets at 6 PM. Can take additional 0.5 tablet in the middle of the night if needed.        * carbidopa-levodopa  MG per CR tablet    SINEMET CR    1 tablet    Take 1 tablet by mouth At Bedtime        cholecalciferol 1000 UNIT tablet    vitamin D3    90 tablet    Take 1 tablet (1,000 Units) by mouth every morning    At risk for falling       clonazePAM 0.5 MG ODT tab    klonoPIN    45 tablet    Take 1/2 tablet (0.25 mg) by mouth every other night.    Primary insomnia       * COMBIVENT RESPIMAT  MCG/ACT inhaler   Generic drug:  Ipratropium-Albuterol     4 g    INHALE 1 PUFF BY MOUTH FOUR TIMES DAILY. NOT TO EXCEED 6 DOSES PER DAY    Other emphysema (H)       * COMBIVENT RESPIMAT  MCG/ACT inhaler   Generic drug:  Ipratropium-Albuterol     1 Inhaler    Inhale 1 puff into the lungs 4 times daily May take 1 to 2 additional doses as needed during the day    Other emphysema (H)       doxycycline 50 MG/5ML Syrp    VIBRAMYCIN     Take 100 mg by mouth 2 times daily        FLOVENT  MCG/ACT Inhaler   Generic drug:  fluticasone     36 g    INHALE 2 PUFFS INTO THE LUNGS TWICE DAILY    Other emphysema (H)       guaiFENesin 20 mg/mL Soln solution    ROBITUSSIN    1200 mL    Take 10 mLs by mouth every 4 hours as needed for cough    Pneumonia due to infectious organism, unspecified laterality, unspecified part of lung       hydrocortisone 0.2 % cream    WESTCORT    45 g    Apply sparingly to affected area twice times daily  after using moist cloth to wipe skin flakes away.    Seborrheic dermatitis       lactobacillus rhamnosus (GG) capsule     60 capsule    Take 1 capsule by mouth every morning    Diarrhea, unspecified type       lisinopril 5 MG tablet    PRINIVIL/ZESTRIL    30 tablet    Take 1 tablet (5 mg) by mouth daily        methylcellulose 500 MG Tabs tablet    CITRUCEL    14 each    Take 1 tablet (500 mg) by mouth daily as needed        mirtazapine 15 MG tablet    REMERON    135 tablet    TAKE 1.5 TABLETs (22.5 MG) BY MOUTH AT BEDTIME    Depression, major, recurrent, moderate (H)       nitroFURantoin (macrocrystal-monohydrate) 100 MG capsule    MACROBID    14 capsule    Take 1 capsule (100 mg) by mouth 2 times daily    Urinary tract infection       pyrithione zinc 1 % Sham    SELSUN BLUE/HEAD AND SHOULDERS    1 Bottle    Apply 1 mL topically daily as needed for irritation    Seborrheic dermatitis, Dandruff       QUEtiapine 50 MG tablet    SEROquel    180 tablet    Take 0.5 tablets (25 mg) by mouth 2 times daily Give after day program ~ 3 pm and at bedtime. Discontinue other seroquel orders    Moderate recurrent major depression (H)       ranitidine 75 MG tablet    ZANTAC    30 tablet    Take 1 tablet (75 mg) by mouth At Bedtime        senna-docusate 8.6-50 MG per tablet    SENOKOT-S;PERICOLACE     Take 1 tablet by mouth daily        * venlafaxine 75 MG 24 hr capsule    EFFEXOR-XR     Take 75 mg by mouth daily        * venlafaxine 37.5 MG 24 hr capsule    EFFEXOR-XR     Take 37.5 mg by mouth daily        * Notice:  This list has 10 medication(s) that are the same as other medications prescribed for you. Read the directions carefully, and ask your doctor or other care provider to review them with you.

## 2018-11-07 NOTE — TELEPHONE ENCOUNTER
Reason for call:  Other   Patient called regarding (reason for call): call back  Additional comments:   [facility] is requesting the order for Head and Shoulders shampoo be changed to weekly with his shower instead of daily prn.    Phone number to reach patient:  Other phone number:  [phone]    Best Time:  any    Can we leave a detailed message on this number?  NO

## 2018-11-07 NOTE — PROGRESS NOTES
"    SUBJECTIVE:   Sanjay Cano is a 73 year old male who presents for Preventive Visit with Wife.    HPI was limited due to Patient's condition. HPI was given by Wife.     Are you in the first 12 months of your Medicare Part B coverage?  No    Physical Health:    In general, how would you rate your overall physical health? fair    Outside of work, how many days during the week do you exercise? none    Outside of work, approximately how many minutes a day do you exercise?not applicable    If you drink alcohol do you typically have >3 drinks per day or >7 drinks per week? Not Applicable    Do you usually eat at least 4 servings of fruit and vegetables a day, include whole grains & fiber and avoid regularly eating high fat or \"junk\" foods? Yes    Do you have any problems taking medications regularly?  YES    Do you have any side effects from medications? not applicable    Needs assistance for the following daily activities    Which of the following safety concerns are present in your home?:  Nursing home facility     Hearing impairment: Yes    In the past 6 months, have you been bothered by leaking of urine? no    Mental Health:    In general, how would you rate your overall mental or emotional health? poor  PHQ-2 Score:      Additional concerns to address?  No    Fall risk:      Fall Risk Assessment not completed.  COGNITIVE SCREENING:  Not appropriate due to known dementia    Skin  He has flaky skin on his ears. Wife tries to clean off his ears, and tries to apply lotion.     ENT  Patient may need his ears flushed.     GI  Patient has large bowel movements. He waits 30-45 minutes as the staff member will turn off his light and will not come back for a while before he will get to use the bathroom. He has been stating that he is not hungry at meals. He will state that he does not have to go, but his wife will insist that the staff members take him to the bathroom where he will go \"a toilet full\".       Wife states " that he has had UTIs previously.     Parkinson's disease  Patient saw Dr. Valverde with Neurology Yesterday. Wife does not think that the Patient is having trouble with swallowing. Patient is having more trouble with eating, and has to have help. He has had trouble with his hand since his fracture. He was asked if he needs a swallowing evaluation. Lately he has been leaning to the left. His OT has been concentrating on him using his walker, which he has been able to do.     Patient thinks he sees objects that he thinks he is holding in his hands, but there is nothing in his hands.     His home staff have not given the Patient a bath in 2 weeks as they called him combative as he defers them moving him to take a shower. His wife suggested for them to give him a shower earlier when he might be more willing.     Reviewed and updated as needed this visit by clinical staff  Tobacco  Allergies  Meds  Med Hx  Surg Hx  Fam Hx  Soc Hx        Reviewed and updated as needed this visit by Provider        Social History   Substance Use Topics     Smoking status: Former Smoker     Packs/day: 0.01     Years: 40.00     Types: Cigarettes     Quit date: 7/31/2008     Smokeless tobacco: Never Used      Comment: very passive cigarettes in 6 months     Alcohol use No                             Do you feel safe in your environment -     Do you have a Health Care Directive?: Not discussed.     Current providers sharing in care for this patient include:   Patient Care Team:  Bharath Bcuhanan MD as PCP - General (Family Practice)  Zach Valverde MD as MD (Neurology)  Keaton Carrington (Family Medicine - Geriatric Medicine)    The following health maintenance items are reviewed in Epic and correct as of today:  Health Maintenance   Topic Date Due     ADVANCE DIRECTIVE PLANNING Q5 YRS  08/29/2017     DEPRESSION ACTION PLAN Q1 YR  01/31/2019     PHQ-9 Q6 MONTHS  02/28/2019     LIPID SCREEN Q5 YR MALE (SYSTEM ASSIGNED)  04/02/2019      COPD ACTION PLAN Q1 YR  06/06/2019     FALL RISK ASSESSMENT  08/31/2019     TETANUS IMMUNIZATION (SYSTEM ASSIGNED)  01/31/2023     COLON CANCER SCREEN (SYSTEM ASSIGNED)  11/27/2023     SPIROMETRY ONETIME  Completed     PNEUMOCOCCAL  Completed     INFLUENZA VACCINE  Completed     AORTIC ANEURYSM SCREENING (SYSTEM ASSIGNED)  Completed     Labs reviewed in EPIC  BP Readings from Last 3 Encounters:   11/07/18 126/78   10/12/18 104/68   08/31/18 118/76    Wt Readings from Last 3 Encounters:   11/07/18 176 lb (79.8 kg)   08/20/18 172 lb (78 kg)   06/06/18 172 lb 11.2 oz (78.3 kg)                  Patient Active Problem List   Diagnosis     Rosacea     Moderate recurrent major depression (H)     Health Care Home     Advanced directives, counseling/discussion     At risk for falling     CARDIOVASCULAR SCREENING; LDL GOAL LESS THAN 130     Urinary frequency     Constipation     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)     Primary insomnia     Other emphysema (H)     Pulmonary nodules     Thyroid nodule     Family history of thyroid cancer     H/O recurrent urinary tract infection     Essential hypertension with goal blood pressure less than 140/90     Senile purpura (H)     Nocturnal cough     Parkinson's disease (H)     Closed displaced fracture of shaft of third metacarpal bone of left hand, initial encounter     Past Surgical History:   Procedure Laterality Date     EYE SURGERY       ORTHOPEDIC SURGERY       PHACOEMULSIFICATION CLEAR CORNEA WITH STANDARD INTRAOCULAR LENS IMPLANT  5/21/2014    Procedure: PHACOEMULSIFICATION CLEAR CORNEA WITH STANDARD INTRAOCULAR LENS IMPLANT;  Surgeon: Tomy Hunter MD;  Location: [facility]     PHACOEMULSIFICATION CLEAR CORNEA WITH TORIC INTRAOCULAR LENS IMPLANT  4/30/2014    Procedure: PHACOEMULSIFICATION CLEAR CORNEA WITH TORIC INTRAOCULAR LENS IMPLANT;  Surgeon: Tomy Hunter MD;  Location: [facility]       Social History   Substance Use Topics     Smoking  status: Former Smoker     Packs/day: 0.01     Years: 40.00     Types: Cigarettes     Quit date: 7/31/2008     Smokeless tobacco: Never Used      Comment: very passive cigarettes in 6 months     Alcohol use No     Family History   Problem Relation Age of Onset     Cerebrovascular Disease Mother      Diabetes Mother      GASTROINTESTINAL DISEASE Mother      Hypertension Mother      Neurologic Disorder Father      Cerebrovascular Disease Father      Cerebrovascular Disease Maternal Grandfather      Cancer Paternal Grandmother      Other - See Comments Sister      gi - unknown         Current Outpatient Prescriptions   Medication Sig Dispense Refill     amLODIPine (NORVASC) 5 MG tablet Take 1 tablet (5 mg) by mouth At Bedtime 90 tablet 3     aspirin 81 MG tablet Take by mouth twice a week       carbidopa-levodopa (SINEMET CR)  MG per CR tablet Take 1 tablet by mouth At Bedtime 1 tablet 0     carbidopa-levodopa (SINEMET)  MG per tablet Take 1-2 tablets by mouth 5 times daily Takes 2 tablets at 6 AM, 1.5 tablets at 9 AM, 2 tablets at noon, 1.5 tablets at 3 PM, 1.5 tablets at 6 PM. Can take additional 0.5 tablet in the middle of the night if needed. 90 tablet      cholecalciferol (VITAMIN D) 1000 UNIT tablet Take 1 tablet (1,000 Units) by mouth every morning 90 tablet 1     clonazePAM (KLONOPIN) 0.5 MG ODT tab Take 1/2 tablet (0.25 mg) by mouth every other night. 45 tablet 0     COMBIVENT RESPIMAT  MCG/ACT inhaler INHALE 1 PUFF BY MOUTH FOUR TIMES DAILY. NOT TO EXCEED 6 DOSES PER DAY 4 g 4     donepezil (ARICEPT) 10 MG tablet Take 1 tablet (10 mg) by mouth At Bedtime 30 tablet 11     donepezil (ARICEPT) 10 MG tablet Take 5mg  by mouth twice a day (take with the 2.5 mg dose for a total dose of 7.5mg twice daily)  3     donepezil (ARICEPT) 5 MG tablet Take 1 tablet (5 mg) by mouth every morning 30 tablet 11     donepezil (ARICEPT) 5 MG tablet Take 2.5 mg twice a day (take with the 5 mg dose for a total dose  of 7.5mg twice daily) 180 tablet 3     doxycycline (VIBRAMYCIN) 50 MG/5ML SYRP Take 100 mg by mouth 2 times daily       FLOVENT  MCG/ACT Inhaler INHALE 2 PUFFS INTO THE LUNGS TWICE DAILY 36 g 1     guaiFENesin (ROBITUSSIN) 20 mg/mL SOLN solution Take 10 mLs by mouth every 4 hours as needed for cough 1200 mL 0     hydrocortisone (WESTCORT) 0.2 % cream Apply sparingly to affected area twice times daily after using moist cloth to wipe skin flakes away. 45 g 0     Ipratropium-Albuterol (COMBIVENT RESPIMAT)  MCG/ACT inhaler Inhale 1 puff into the lungs 4 times daily May take 1 to 2 additional doses as needed during the day 1 Inhaler 4     lactobacillus rhamnosus, GG, (CULTURELL) capsule Take 1 capsule by mouth every morning 60 capsule 11     lisinopril (PRINIVIL/ZESTRIL) 5 MG tablet Take 1 tablet (5 mg) by mouth daily 30 tablet 1     methylcellulose (CITRUCEL) 500 MG TABS tablet Take 1 tablet (500 mg) by mouth daily as needed 14 each 0     mirtazapine (REMERON) 15 MG tablet TAKE 1.5 TABLETs (22.5 MG) BY MOUTH AT BEDTIME 135 tablet 1     nitroFURantoin, macrocrystal-monohydrate, (MACROBID) 100 MG capsule Take 1 capsule (100 mg) by mouth 2 times daily 14 capsule 0     pyrithione zinc (SELSUN BLUE/HEAD AND SHOULDERS) 1 % SHAM Apply 1 mL topically daily as needed for irritation 1 Bottle 11     QUEtiapine (SEROQUEL) 50 MG tablet Take 0.5 tablets (25 mg) by mouth 2 times daily Give after day program ~ 3 pm and at bedtime. Discontinue other seroquel orders 180 tablet 0     ranitidine (ZANTAC) 75 MG tablet Take 1 tablet (75 mg) by mouth At Bedtime 30 tablet      senna-docusate (SENOKOT-S;PERICOLACE) 8.6-50 MG per tablet Take 1 tablet by mouth daily       venlafaxine (EFFEXOR-XR) 37.5 MG 24 hr capsule Take 37.5 mg by mouth daily       venlafaxine (EFFEXOR-XR) 75 MG 24 hr capsule Take 75 mg by mouth daily       Allergies   Allergen Reactions     Azithromycin      Erythromycin Other (See Comments)     Pathological  "fractures     Levaquin [Levofloxacin]      Fracture prevention     Seasonal Allergies        ROS:  Remainder of ROS is negative unless otherwise noted above or in HPI.    This document serves as a record of the services and decisions personally performed and made by Bharath Buchanan MD. It was created on his behalf by Heather Trujillo and Hosea Rome, trained medical scribes. The creation of this document is based on the provider's statements to the medical scribe.  Heather Trujillo 11:41 AM November 7, 2018    OBJECTIVE:   /78 (BP Location: Right arm, Patient Position: Sitting, Cuff Size: Adult Regular)  Pulse 70  Temp 98.6  F (37  C) (Temporal)  Resp 18  Wt 176 lb (79.8 kg)  SpO2 94%  BMI 22.6 kg/m2 Estimated body mass index is 22.6 kg/(m^2) as calculated from the following:    Height as of 8/31/18: 6' 2\" (1.88 m).    Weight as of this encounter: 176 lb (79.8 kg).  EXAM:   GENERAL: healthy, alert and no distress  EYES: Eyes grossly normal to inspection, PERRL and conjunctivae and sclerae normal  HENT: ear canals with cerumen at outer edge of ears,and TM's normal, nose and mouth without ulcers or lesions  NECK: no adenopathy, no asymmetry, masses, or scars and thyroid normal to palpation  RESP: lungs clear to auscultation - no rales, rhonchi or wheezes  CV: regular rate and rhythm, normal S1 S2, no S3 or S4, no murmur, click or rub, no peripheral edema and peripheral pulses strong  ABDOMEN: soft, nontender, no hepatosplenomegaly, no masses and bowel sounds normal   (male): not completed  RECTAL: not completed  MS: calf muscle non tender, 1+edema in left leg, 1+edema in right leg, no gross musculoskeletal defects noted  SKIN:  Flaky skin on eyebrows and scalp, otherwise no suspicious lesions or rashes  NEURO: Normal strength and tone, mentation intact and speech normal  PSYCH: mentation appears normal, affect normal/bright    Diagnostic Test Results:  No results found for this or any previous visit (from " "the past 24 hour(s)).    ASSESSMENT / PLAN:   (Z00.00) Medicare annual wellness visit, subsequent  (primary encounter diagnosis)  Comment: Patient is doing well. Routine physical completed.   Plan: Follow up as needed.     (L21.9) Seborrheic dermatitis  Comment: Uncontrolled.  Plan: pyrithione zinc (SELSUN BLUE/HEAD AND         SHOULDERS) 1 % SHAM, hydrocortisone (WESTCORT)         0.2 % cream        Try the shampoo and medicated cream for skin flaking over the next 2 months, then revaluate.     (L21.0) Dandruff  Comment: Uncontrolled.  Plan: pyrithione zinc (SELSUN BLUE/HEAD AND         SHOULDERS) 1 % SHAM              Try the shampoo and medicated cream for skin flaking over the next 2 months, then revaluate.     (I10) Essential hypertension with goal blood pressure less than 140/90  Comment: <140/90, At goal. Controlled with medication.   Plan: Continue current medication.     (G20) Parkinson's disease (H)  Comment: clarified with spouse that patient is not choking on regular diet; and that cough during meals was related to COMMUNITY ACQUIRED PNEUMONIA and bronchitis.  Plan: Follow up as needed.     (H61.23) Bilateral impacted cerumen  Comment: Seen during exam.  Plan: Bilateral cerumenosis is noted.  PROCEDURE: Wax is removed by manual debridement with plastic ear spud raduette. Instructions for home care to prevent wax buildup are given.    Patient Instructions   Try the shampoo and medicated cream for skin flaking over the next 2 months then revaluate.     End of Life Planning:  Patient currently has an advanced directive: Not discussed.     COUNSELING:  Reviewed preventive health counseling, as reflected in patient instructions    BP Readings from Last 1 Encounters:   11/07/18 126/78     Estimated body mass index is 22.6 kg/(m^2) as calculated from the following:    Height as of 8/31/18: 6' 2\" (1.88 m).    Weight as of this encounter: 176 lb (79.8 kg).      Weight management plan noted, stable and " monitoring     reports that he quit smoking about 10 years ago. His smoking use included Cigarettes. He has a 0.40 pack-year smoking history. He has never used smokeless tobacco.      Appropriate preventive services were discussed with this patient, including applicable screening as appropriate for cardiovascular disease, diabetes, osteopenia/osteoporosis, and glaucoma.  As appropriate for age/gender, discussed screening for colorectal cancer, prostate cancer, breast cancer, and cervical cancer. Checklist reviewing preventive services available has been given to the patient.    Reviewed patients plan of care and provided an AVS. The Basic Care Plan (routine screening as documented in Health Maintenance) for [place] meets the Care Plan requirement. This Care Plan has been established and reviewed with the Patient.      The information in this document, created by the medical scribes for me, accurately reflects the services I personally performed and the decisions made by me. I have reviewed and approved this document for accuracy prior to leaving the patient care area.  November 7, 2018 3:28 PM    Bharath Buchanan MD  [facility]

## 2018-11-07 NOTE — TELEPHONE ENCOUNTER
Gave the verbal order to care staff at facility, ok to change to 1x week with shower and daily as needed    Joana Gandhi RN   [facility]

## 2018-11-09 ENCOUNTER — TELEPHONE (OUTPATIENT)
Dept: FAMILY MEDICINE | Facility: CLINIC | Age: 73
End: 2018-11-09

## 2018-11-09 NOTE — TELEPHONE ENCOUNTER
Reason for call:  Other   Patient called regarding (reason for call): call back  Additional comments: The patients wife called and stated that her husbands medication list that they got at his last appointment with Dr. Buchanan on 11/7. She would like a call back to have a nurse help her get the medication list updated.    Phone number to reach patient:  Home number on file [phone] (home)    Best Time: Any    Can we leave a detailed message on this number?  YES

## 2018-11-12 ENCOUNTER — TELEPHONE (OUTPATIENT)
Dept: FAMILY MEDICINE | Facility: CLINIC | Age: 73
End: 2018-11-12

## 2018-11-12 DIAGNOSIS — G20.A1 PARKINSON'S DISEASE (H): Primary | ICD-10-CM

## 2018-11-12 NOTE — TELEPHONE ENCOUNTER
Pt's wife is requesting the donepezil (ARICEPT) on pt's current med list from 2017 and 2016 be discontinued.

## 2018-11-12 NOTE — TELEPHONE ENCOUNTER
Left VM for wife to return call    Is she wanting OT at the facility    Joana Gandhi RN   [facility]

## 2018-11-12 NOTE — TELEPHONE ENCOUNTER
Reason for call:  Order   Order or referral being requested: Occupational therapy  Reason for request: pt has lost use of his left hand  Date needed: as soon as possible  Has the patient been seen by the PCP for this problem? NO    Additional comments: pt had a physical therapist that has left the practice    Phone number to reach patient:  Home number on file [phone] (home)    Best Time:  n/a    Can we leave a detailed message on this number?  YES

## 2018-11-13 NOTE — TELEPHONE ENCOUNTER
Dr. Buchanan    Wife is requesting OT to be done at his facility  For loss of use left hand  Fax order to [facility]    Mail copy of referral to wife for her records    Referral cued    Joana Gandhi RN   [facility]

## 2018-11-13 NOTE — TELEPHONE ENCOUNTER
Spoke with wife    reconciliation done with her    Joanarené Gandhi RN   [facility]      '

## 2018-11-14 NOTE — TELEPHONE ENCOUNTER
Referral faxed to [facility] at [phone]. Called spouse. Left detailed VM notifying that referral was faxed. Requested call to clinic for further questions.    Lita Mckeon RN  [facility]

## 2018-11-19 ENCOUNTER — TELEPHONE (OUTPATIENT)
Dept: FAMILY MEDICINE | Facility: CLINIC | Age: 73
End: 2018-11-19

## 2018-11-19 ENCOUNTER — TRANSFERRED RECORDS (OUTPATIENT)
Dept: HEALTH INFORMATION MANAGEMENT | Facility: CLINIC | Age: 73
End: 2018-11-19

## 2018-11-19 NOTE — TELEPHONE ENCOUNTER
Caren is calling regarding information from Dr. Buchanan regarding medical marijuana as she has heard that it helps significantly with Parkinson's. She would like to know what Dr. Buchanan's thoughts are and if he can suggest anything.    Caren can be reached @ [phone] soheila

## 2018-11-19 NOTE — TELEPHONE ENCOUNTER
Dr. Buchanan,    Please see phone message below. Please advise.    Lita Mckeon RN  [facility]

## 2018-11-20 ENCOUNTER — COMMUNICATION - HEALTHEAST (OUTPATIENT)
Dept: NEUROLOGY | Facility: CLINIC | Age: 73
End: 2018-11-20

## 2018-11-20 ENCOUNTER — TELEPHONE (OUTPATIENT)
Dept: FAMILY MEDICINE | Facility: CLINIC | Age: 73
End: 2018-11-20

## 2018-11-20 NOTE — TELEPHONE ENCOUNTER
Called patient's spouse, Caren. Notified of provider message. Pt verbalized understanding.    Lita Mckeon RN  [facility]

## 2018-11-20 NOTE — TELEPHONE ENCOUNTER
Not aware of any proven benefits; may cause sedation, unsteadiness. Perhaps Dr Bragg may know more. Please notify, thanks Bharath

## 2018-11-20 NOTE — TELEPHONE ENCOUNTER
Dr. Vernon Thompson is a  medical director and he asked if you would call him back regarding Sanjay. Dr. Thompson will be off on Friday, but said ok to contact him on Monday. He can be reached at [phone].

## 2018-11-27 ENCOUNTER — TELEPHONE (OUTPATIENT)
Dept: FAMILY MEDICINE | Facility: CLINIC | Age: 73
End: 2018-11-27

## 2018-11-27 DIAGNOSIS — F33.1 MODERATE RECURRENT MAJOR DEPRESSION (H): ICD-10-CM

## 2018-11-27 RX ORDER — QUETIAPINE FUMARATE 50 MG/1
25 TABLET, FILM COATED ORAL
Qty: 180 TABLET | Refills: 0 | Status: SHIPPED | OUTPATIENT
Start: 2018-11-27 | End: 2019-01-01

## 2018-11-27 NOTE — TELEPHONE ENCOUNTER
Discussed reducing unnecessary antibiotic use; will e-mail [facility] guidelines. Bharath Buchanan MD

## 2018-11-27 NOTE — LETTER
[address] [address]  [phone]          November 27, 2018    Sanjay Cano                                                                                                                     [address] [facility] [address]            Re: Sanjay Cano                                                                                                                     1945            To whom it may concern    Please change the evening dose of quetiapine to as needed for agitation and stop the afternoon dose if has not been stopped per the after visit summary from  on 8/31/18    If you have any questions please feel free to contact my office at       Sincerely,           Bharath Buchanan MD

## 2018-11-27 NOTE — LETTER
[address] [address]  [phone]          November 27, 2018    Re: Sanjay Cano                                                                                                                     1945            To whom it may concern    Please change the evening dose of quetiapine to as needed for agitation    If you have any questions please feel free to contact my office at     Sincerely,           Bharath Buchanan MD

## 2018-12-03 ENCOUNTER — TELEPHONE (OUTPATIENT)
Dept: FAMILY MEDICINE | Facility: CLINIC | Age: 73
End: 2018-12-03

## 2018-12-03 DIAGNOSIS — R53.81 PHYSICAL DECONDITIONING: ICD-10-CM

## 2018-12-03 DIAGNOSIS — G20.A1 PARKINSON'S DISEASE (H): ICD-10-CM

## 2018-12-03 DIAGNOSIS — R53.1 ALTERATION IN MOBILITY DUE TO WEAKNESS: ICD-10-CM

## 2018-12-03 DIAGNOSIS — Z87.01 HISTORY OF PNEUMONIA: Primary | ICD-10-CM

## 2018-12-03 NOTE — TELEPHONE ENCOUNTER
Reason for call:  Order   Order or referral being requested: Referral for physical therapy and possibly occupational therapy  Reason for request: NA  Date needed: as soon as possible  Has the patient been seen by the PCP for this problem? YES    Additional comments: The patients wife called and stated that the patient already has a referral for physical therapy but she does not feel that they are helping. She also thinks he may need occupational therapy because he broke his hand but is still unable to use his hand. She also thinks he may need speech therapy. She would like Dr. Buchanan to sent directly to her.     Phone number to reach patient:  Home number on file [phone] (home)    Best Time: Before 12:15    Can we leave a detailed message on this number?  YES

## 2018-12-03 NOTE — TELEPHONE ENCOUNTER
Dr. Buchanan,    Spoke with Caren, stated that pt has been doing PT/OT/Speech at [facility]. Caren states that there is never a result so she questions what they do. She states that at other places, they have seen results. She would like a referral if okay to [facility] [place] [facility] because they have worked with them in the past, otherwise states that she would be open to location within .     Cued referral for PT/ST/OT-- for [facility] services. Please review/sign or advise.    Lita Mckeon RN  [facility]

## 2018-12-03 NOTE — TELEPHONE ENCOUNTER
RD Referral - is location requested within network?     Does current referral include location requested?      Thank you,  George Virgen RN

## 2018-12-04 NOTE — TELEPHONE ENCOUNTER
Called patient's spouse, Caren, notified of referrals written by PCP. Also reviewed message from Belkys Sanchez regarding checking with insurance plan regarding coverage for [facility] locations. Caren verbalized understanding stating that she will check with her insurance again. Caren requested to have referral mailed to her home address. Referrals mailed to patient home address.    Lita Mckeon RN  [facility]

## 2018-12-04 NOTE — TELEPHONE ENCOUNTER
We can give a order at patient request for services but Sanjay will need to check with his insurance for benefits and coverage at requested location as this is a self referring location. Other wise we can redirect to [place] for services as they are aviliable within.     Belkys Sanchez Referral Rep

## 2018-12-05 ENCOUNTER — TELEPHONE (OUTPATIENT)
Dept: FAMILY MEDICINE | Facility: CLINIC | Age: 73
End: 2018-12-05

## 2018-12-05 DIAGNOSIS — L21.9 SEBORRHEIC DERMATITIS: ICD-10-CM

## 2018-12-05 RX ORDER — HYDROCORTISONE VALERATE CREAM 2 MG/G
CREAM TOPICAL
Qty: 45 G | Refills: 0 | Status: SHIPPED | OUTPATIENT
Start: 2018-12-05 | End: 2018-01-01

## 2018-12-05 NOTE — TELEPHONE ENCOUNTER
Reason for call:  Other   Patient called regarding (reason for call): Medication request  Additional comments: The patients  called and stated that the pharmacy that [facility] uses would not fill this prescription because they said he can just get it over the counter. She called [facility] and they stated that they would fill the prescription.  She would like Dr. Buchanan to write a new prescription for hydrocortisone (WESTCORT) 0.2 % cream so that it can be sent to [facility] in [facility] and be filled.       Phone number to reach patient:  Home number on file [phone] (home)    Best Time: Before 12:30    Can we leave a detailed message on this number?  YES

## 2018-12-06 ENCOUNTER — TRANSFERRED RECORDS (OUTPATIENT)
Dept: HEALTH INFORMATION MANAGEMENT | Facility: CLINIC | Age: 73
End: 2018-12-06

## 2018-12-07 ENCOUNTER — TELEPHONE (OUTPATIENT)
Dept: FAMILY MEDICINE | Facility: CLINIC | Age: 73
End: 2018-12-07

## 2018-12-07 DIAGNOSIS — G45.3 AMAUROSIS FUGAX, BOTH EYES: Primary | ICD-10-CM

## 2018-12-07 NOTE — TELEPHONE ENCOUNTER
Caren returned call to clinic. Relayed provider result message below. [facility] number given to Caren. Caren verbalized understanding.    Ibeth Saba, RN  Triage Nurse

## 2018-12-07 NOTE — TELEPHONE ENCOUNTER
Per Ophthalmology -Needs repeat ESR and CRP and bilat carotid ultrasounds. Order signed, please notify, thanks Bharath

## 2018-12-07 NOTE — TELEPHONE ENCOUNTER
Received call from patient's spouse, Caren. She sounded very agitated over the phone and stated that she was not able to schedule the US at [facility], was directed to the [facility] to schedule. Pt stated that she needs a nurse to call because they stated that they did not have lab orders. Caren sounded very flustered and stated that she did not have time to talk and needed to go. She ended call before writer able to direct her to lab scheduling at [facility].     Writer called [facility] to verify that they are able to schedule patient with current orders in the chart. Per lab scheduling, as long as orders are in epic, there is nothing else that they need. Pt will just need to call to schedule. Writer called spouse and left detailed VM that she will need to call lab scheduling at [facility] to schedule lab appointment. Provided scheduling number [phone].    Lita Mckeon RN  [facility]

## 2018-12-11 NOTE — TELEPHONE ENCOUNTER
Dr. Chanel Fabian was asking if the right US was obtained, she thought it was to be  carotid vs temporal US  She was wanting to make sure it was ordered correctly  temporal was done    They also were not able to get the UA yesterday, did you want it obtained at his facility    Advise    Joana Gandhi, RN   [facility]

## 2018-12-12 NOTE — TELEPHONE ENCOUNTER
Spoke with wife detailed message given, she was given the number to schedule appointment    Joana Gandhi RN   [facility]

## 2018-12-12 NOTE — TELEPHONE ENCOUNTER
Please call- specialist says unlikely to be a vasculitis problem with full vision recovery. Recommends carotid ultrasound to start workup to rule out TIA. Order signed, please notify, thanks Bharath

## 2018-12-12 NOTE — TELEPHONE ENCOUNTER
Called patient's spouse, Caren. KEVENM to call clinic.    Lita Mckeon RN  [facility]

## 2018-12-13 NOTE — TELEPHONE ENCOUNTER
The patients wife stated that if she is going to have to get the sample to a [facility] lab it would be very hard for her because she can't drive right now. She is wondering if there is another option for getting it to a lab so she doesn't have to drive.

## 2018-12-13 NOTE — TELEPHONE ENCOUNTER
Spoke with patient's spouse, Caren. Notified that we will fax order to [facility]. Per spouse, she would like to  collection from them and bring to a [facility] lab. Spoke with Manoj, manager at [facility] to communicate this message. Order faxed to them at [phone].    Lita Mckeon RN  [facility]

## 2018-12-13 NOTE — TELEPHONE ENCOUNTER
Reason for call:  Order   Order or referral being requested: Urine specimen order  Reason for request: NA  Date needed: as soon as possible  Has the patient been seen by the PCP for this problem? YES    Additional comments: The patients wife called and stated that she needs Dr. Buchanan to get an order for a urine specimen sent to [facility] so that they can get it taken care of. She stated she would like a call on her cell phone but the voicemail box is not set up so if they don't reach her she needs the fareed line to be called so a message can be left there.    Phone number to reach patient:  Cell number on file:    No relevant phone numbers on file.       Best Time:  Before 2:00 pm if possible    Can we leave a detailed message on this number?  Not Applicable

## 2018-12-18 NOTE — TELEPHONE ENCOUNTER
Reason for call:  Results   Name of test or procedure: U/S and UA  Date of test or procedure: 12/13 and 12/17  Location of test or procedure: [facility]    Additional comments: Pt's wife is requesting the results    Phone number to reach patient:  Home number on file [phone] (home)    Best Time:  n/a    Can we leave a detailed message on this number?  YES

## 2018-12-18 NOTE — TELEPHONE ENCOUNTER
Dr. Buchanan,    Spouse requesting results of UA/UC 12/17/18 and US Carotid bilateral 12/13/18.    Please advise.    Lita Mckeon RN  [facility]

## 2018-12-19 NOTE — TELEPHONE ENCOUNTER
Carotid ultrasound is normal; ua did not show significant bacteria, UC not done. Please notify spouse. Thanks Bharath

## 2018-12-19 NOTE — TELEPHONE ENCOUNTER
Returned call to spouse. Relayed provider result message as per below. Patient's spouse verbalized understanding.    Ibeth Saba RN  Triage Nurse

## 2018-12-20 NOTE — RESULT ENCOUNTER NOTE
Please notify patient: UA did not reveal typical findings of UTI. Please check to see if already done; if not please notify spouse.    Thanks Bharath

## 2018-12-31 NOTE — TELEPHONE ENCOUNTER
Dr. Buchanan,    Received call from Adia with [facility]. They are requesting an updated prescription for the hydrocortisone (Westcort) 0.2% external cream. Right now RX instructions are to apply BID. Adia states that the pt is resistant when they try to apply. She is wondering if the can get a new RX for BID PRN or to apply once weekly after his bath.     Please advise. Discontinued old RX.    Cued new script for both. Please sign with preference.     Per Adia, can verbally call in TO to her at [phone].    Lita Mckeon RN  [facility]

## 2018-12-31 NOTE — TELEPHONE ENCOUNTER
Dr. Buchanan,    Please see phone message below from Manoj nurse at [facility].     Cued new RX for senna-docusate (Senakot/Pericolace) 8.6-50 mg tablet. Please review/sign or advise.    Lita Mckeon RN  [facility]

## 2018-12-31 NOTE — TELEPHONE ENCOUNTER
Nurse would like an increase of medication due to pt becoming constipated. Pt currently takes 1 tablet daily, but would like to increase to 2 tablets daily.

## 2019-01-01 ENCOUNTER — TELEPHONE (OUTPATIENT)
Dept: FAMILY MEDICINE | Facility: CLINIC | Age: 74
End: 2019-01-01

## 2019-01-01 ENCOUNTER — APPOINTMENT (OUTPATIENT)
Dept: SPEECH THERAPY | Facility: CLINIC | Age: 74
DRG: 177 | End: 2019-01-01
Attending: STUDENT IN AN ORGANIZED HEALTH CARE EDUCATION/TRAINING PROGRAM
Payer: COMMERCIAL

## 2019-01-01 ENCOUNTER — AMBULATORY - HEALTHEAST (OUTPATIENT)
Dept: OTHER | Facility: CLINIC | Age: 74
End: 2019-01-01

## 2019-01-01 ENCOUNTER — APPOINTMENT (OUTPATIENT)
Dept: SPEECH THERAPY | Facility: CLINIC | Age: 74
DRG: 871 | End: 2019-01-01
Payer: COMMERCIAL

## 2019-01-01 ENCOUNTER — TRANSFERRED RECORDS (OUTPATIENT)
Dept: HEALTH INFORMATION MANAGEMENT | Facility: CLINIC | Age: 74
End: 2019-01-01

## 2019-01-01 ENCOUNTER — MEDICAL CORRESPONDENCE (OUTPATIENT)
Dept: HEALTH INFORMATION MANAGEMENT | Facility: CLINIC | Age: 74
End: 2019-01-01

## 2019-01-01 ENCOUNTER — APPOINTMENT (OUTPATIENT)
Dept: PHYSICAL THERAPY | Facility: CLINIC | Age: 74
DRG: 177 | End: 2019-01-01
Attending: STUDENT IN AN ORGANIZED HEALTH CARE EDUCATION/TRAINING PROGRAM
Payer: COMMERCIAL

## 2019-01-01 ENCOUNTER — OFFICE VISIT (OUTPATIENT)
Dept: FAMILY MEDICINE | Facility: CLINIC | Age: 74
End: 2019-01-01
Payer: COMMERCIAL

## 2019-01-01 ENCOUNTER — NURSE TRIAGE (OUTPATIENT)
Dept: NURSING | Facility: CLINIC | Age: 74
End: 2019-01-01

## 2019-01-01 ENCOUNTER — APPOINTMENT (OUTPATIENT)
Dept: SPEECH THERAPY | Facility: CLINIC | Age: 74
DRG: 177 | End: 2019-01-01
Payer: COMMERCIAL

## 2019-01-01 ENCOUNTER — HOSPITAL ENCOUNTER (OUTPATIENT)
Dept: GENERAL RADIOLOGY | Facility: CLINIC | Age: 74
Discharge: HOME | End: 2019-10-28
Attending: FAMILY MEDICINE | Admitting: FAMILY MEDICINE
Payer: COMMERCIAL

## 2019-01-01 ENCOUNTER — APPOINTMENT (OUTPATIENT)
Dept: CT IMAGING | Facility: CLINIC | Age: 74
DRG: 177 | End: 2019-01-01
Attending: EMERGENCY MEDICINE
Payer: COMMERCIAL

## 2019-01-01 ENCOUNTER — APPOINTMENT (OUTPATIENT)
Dept: GENERAL RADIOLOGY | Facility: CLINIC | Age: 74
DRG: 202 | End: 2019-01-01
Attending: EMERGENCY MEDICINE
Payer: COMMERCIAL

## 2019-01-01 ENCOUNTER — APPOINTMENT (OUTPATIENT)
Dept: PHYSICAL THERAPY | Facility: CLINIC | Age: 74
DRG: 871 | End: 2019-01-01
Payer: COMMERCIAL

## 2019-01-01 ENCOUNTER — APPOINTMENT (OUTPATIENT)
Dept: SPEECH THERAPY | Facility: CLINIC | Age: 74
DRG: 202 | End: 2019-01-01
Payer: COMMERCIAL

## 2019-01-01 ENCOUNTER — APPOINTMENT (OUTPATIENT)
Dept: CT IMAGING | Facility: CLINIC | Age: 74
DRG: 871 | End: 2019-01-01
Attending: EMERGENCY MEDICINE
Payer: COMMERCIAL

## 2019-01-01 ENCOUNTER — APPOINTMENT (OUTPATIENT)
Dept: GENERAL RADIOLOGY | Facility: CLINIC | Age: 74
DRG: 871 | End: 2019-01-01
Attending: INTERNAL MEDICINE
Payer: COMMERCIAL

## 2019-01-01 ENCOUNTER — COMMUNICATION - HEALTHEAST (OUTPATIENT)
Dept: NEUROLOGY | Facility: CLINIC | Age: 74
End: 2019-01-01

## 2019-01-01 ENCOUNTER — DOCUMENTATION ONLY (OUTPATIENT)
Facility: CLINIC | Age: 74
End: 2019-01-01

## 2019-01-01 ENCOUNTER — TELEPHONE (OUTPATIENT)
Dept: NURSING | Facility: CLINIC | Age: 74
End: 2019-01-01

## 2019-01-01 ENCOUNTER — DOCUMENTATION ONLY (OUTPATIENT)
Dept: OTHER | Facility: CLINIC | Age: 74
End: 2019-01-01

## 2019-01-01 ENCOUNTER — APPOINTMENT (OUTPATIENT)
Dept: GENERAL RADIOLOGY | Facility: CLINIC | Age: 74
DRG: 871 | End: 2019-01-01
Attending: EMERGENCY MEDICINE
Payer: COMMERCIAL

## 2019-01-01 ENCOUNTER — ANCILLARY PROCEDURE (OUTPATIENT)
Dept: CT IMAGING | Facility: CLINIC | Age: 74
End: 2019-01-01
Attending: FAMILY MEDICINE
Payer: COMMERCIAL

## 2019-01-01 ENCOUNTER — APPOINTMENT (OUTPATIENT)
Dept: SPEECH THERAPY | Facility: CLINIC | Age: 74
DRG: 202 | End: 2019-01-01
Attending: INTERNAL MEDICINE
Payer: COMMERCIAL

## 2019-01-01 ENCOUNTER — HOSPITAL ENCOUNTER (INPATIENT)
Facility: CLINIC | Age: 74
LOS: 5 days | Discharge: NURSING, REHAB OR LONG-TERM CARE | DRG: 871 | End: 2019-04-23
Attending: EMERGENCY MEDICINE | Admitting: HOSPITALIST
Payer: COMMERCIAL

## 2019-01-01 ENCOUNTER — HOSPITAL ENCOUNTER (INPATIENT)
Facility: CLINIC | Age: 74
LOS: 2 days | Discharge: NURSING, REHAB OR LONG-TERM CARE | DRG: 202 | End: 2019-08-17
Attending: EMERGENCY MEDICINE | Admitting: INTERNAL MEDICINE
Payer: COMMERCIAL

## 2019-01-01 ENCOUNTER — HOSPITAL ENCOUNTER (INPATIENT)
Facility: CLINIC | Age: 74
LOS: 12 days | Discharge: HOSPICE | DRG: 177 | End: 2019-12-01
Attending: EMERGENCY MEDICINE | Admitting: PEDIATRICS
Payer: COMMERCIAL

## 2019-01-01 ENCOUNTER — NURSE TRIAGE (OUTPATIENT)
Dept: FAMILY MEDICINE | Facility: CLINIC | Age: 74
End: 2019-01-01

## 2019-01-01 ENCOUNTER — APPOINTMENT (OUTPATIENT)
Dept: GENERAL RADIOLOGY | Facility: CLINIC | Age: 74
DRG: 177 | End: 2019-01-01
Attending: EMERGENCY MEDICINE
Payer: COMMERCIAL

## 2019-01-01 VITALS
DIASTOLIC BLOOD PRESSURE: 76 MMHG | HEIGHT: 74 IN | BODY MASS INDEX: 23.38 KG/M2 | OXYGEN SATURATION: 98 % | HEART RATE: 60 BPM | TEMPERATURE: 97.3 F | SYSTOLIC BLOOD PRESSURE: 126 MMHG

## 2019-01-01 VITALS
TEMPERATURE: 98.6 F | DIASTOLIC BLOOD PRESSURE: 78 MMHG | OXYGEN SATURATION: 95 % | SYSTOLIC BLOOD PRESSURE: 136 MMHG | HEART RATE: 75 BPM

## 2019-01-01 VITALS
DIASTOLIC BLOOD PRESSURE: 82 MMHG | TEMPERATURE: 99.3 F | OXYGEN SATURATION: 95 % | SYSTOLIC BLOOD PRESSURE: 130 MMHG | HEART RATE: 72 BPM

## 2019-01-01 VITALS
DIASTOLIC BLOOD PRESSURE: 78 MMHG | SYSTOLIC BLOOD PRESSURE: 130 MMHG | HEART RATE: 67 BPM | TEMPERATURE: 98.6 F | OXYGEN SATURATION: 96 %

## 2019-01-01 VITALS
SYSTOLIC BLOOD PRESSURE: 144 MMHG | RESPIRATION RATE: 18 BRPM | DIASTOLIC BLOOD PRESSURE: 87 MMHG | OXYGEN SATURATION: 95 % | WEIGHT: 182.1 LBS | BODY MASS INDEX: 23.38 KG/M2 | TEMPERATURE: 98.6 F | HEART RATE: 77 BPM

## 2019-01-01 VITALS
HEART RATE: 66 BPM | DIASTOLIC BLOOD PRESSURE: 76 MMHG | RESPIRATION RATE: 18 BRPM | SYSTOLIC BLOOD PRESSURE: 143 MMHG | WEIGHT: 188.1 LBS | TEMPERATURE: 98.8 F | OXYGEN SATURATION: 93 % | BODY MASS INDEX: 24.15 KG/M2

## 2019-01-01 VITALS
OXYGEN SATURATION: 94 % | DIASTOLIC BLOOD PRESSURE: 68 MMHG | HEART RATE: 66 BPM | SYSTOLIC BLOOD PRESSURE: 112 MMHG | TEMPERATURE: 98 F

## 2019-01-01 VITALS
DIASTOLIC BLOOD PRESSURE: 76 MMHG | RESPIRATION RATE: 12 BRPM | TEMPERATURE: 98.6 F | OXYGEN SATURATION: 96 % | HEART RATE: 70 BPM | SYSTOLIC BLOOD PRESSURE: 104 MMHG

## 2019-01-01 VITALS
BODY MASS INDEX: 22.97 KG/M2 | WEIGHT: 179.01 LBS | SYSTOLIC BLOOD PRESSURE: 134 MMHG | HEIGHT: 74 IN | HEART RATE: 72 BPM | RESPIRATION RATE: 18 BRPM | OXYGEN SATURATION: 95 % | TEMPERATURE: 99.7 F | DIASTOLIC BLOOD PRESSURE: 75 MMHG

## 2019-01-01 VITALS
RESPIRATION RATE: 16 BRPM | OXYGEN SATURATION: 98 % | HEART RATE: 60 BPM | DIASTOLIC BLOOD PRESSURE: 95 MMHG | SYSTOLIC BLOOD PRESSURE: 155 MMHG | TEMPERATURE: 97.7 F

## 2019-01-01 VITALS
HEART RATE: 68 BPM | TEMPERATURE: 98.9 F | OXYGEN SATURATION: 97 % | RESPIRATION RATE: 12 BRPM | TEMPERATURE: 98.4 F | DIASTOLIC BLOOD PRESSURE: 78 MMHG | RESPIRATION RATE: 22 BRPM | OXYGEN SATURATION: 94 % | HEART RATE: 66 BPM | SYSTOLIC BLOOD PRESSURE: 114 MMHG

## 2019-01-01 DIAGNOSIS — J43.8 OTHER EMPHYSEMA (H): ICD-10-CM

## 2019-01-01 DIAGNOSIS — J96.21 ACUTE AND CHRONIC RESPIRATORY FAILURE WITH HYPOXIA (H): ICD-10-CM

## 2019-01-01 DIAGNOSIS — N39.0 URINARY TRACT INFECTION: Primary | ICD-10-CM

## 2019-01-01 DIAGNOSIS — H01.005 BLEPHARITIS OF LOWER EYELIDS OF BOTH EYES, UNSPECIFIED TYPE: Primary | ICD-10-CM

## 2019-01-01 DIAGNOSIS — R06.2 WHEEZING: ICD-10-CM

## 2019-01-01 DIAGNOSIS — G20.A1 PARKINSON'S DISEASE (H): Primary | ICD-10-CM

## 2019-01-01 DIAGNOSIS — S69.91XA INJURY OF FINGER OF RIGHT HAND, INITIAL ENCOUNTER: ICD-10-CM

## 2019-01-01 DIAGNOSIS — I10 ESSENTIAL HYPERTENSION WITH GOAL BLOOD PRESSURE LESS THAN 140/90: ICD-10-CM

## 2019-01-01 DIAGNOSIS — J18.9 HEALTHCARE-ASSOCIATED PNEUMONIA: ICD-10-CM

## 2019-01-01 DIAGNOSIS — J43.8 OTHER EMPHYSEMA (H): Primary | ICD-10-CM

## 2019-01-01 DIAGNOSIS — R05.9 COUGH: Primary | ICD-10-CM

## 2019-01-01 DIAGNOSIS — Z87.898 HISTORY OF CHRONIC COUGH: ICD-10-CM

## 2019-01-01 DIAGNOSIS — Z86.69 H/O IMPACTED CERUMEN: ICD-10-CM

## 2019-01-01 DIAGNOSIS — L03.011 CELLULITIS OF FINGER OF RIGHT HAND: Primary | ICD-10-CM
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
## 2019-01-01 DIAGNOSIS — J44.1 COPD EXACERBATION (H): ICD-10-CM

## 2019-01-01 DIAGNOSIS — E73.8 ACQUIRED LACTOSE INTOLERANCE: ICD-10-CM

## 2019-01-01 DIAGNOSIS — Z20.828 EXPOSURE TO INFLUENZA: ICD-10-CM

## 2019-01-01 DIAGNOSIS — J18.9 RECURRENT PNEUMONIA: ICD-10-CM

## 2019-01-01 DIAGNOSIS — L21.9 SEBORRHEIC DERMATITIS: ICD-10-CM

## 2019-01-01 DIAGNOSIS — F51.01 PRIMARY INSOMNIA: ICD-10-CM

## 2019-01-01 DIAGNOSIS — J18.9 PNEUMONIA DUE TO INFECTIOUS ORGANISM, UNSPECIFIED LATERALITY, UNSPECIFIED PART OF LUNG: ICD-10-CM

## 2019-01-01 DIAGNOSIS — S69.91XA INJURY OF FINGER OF RIGHT HAND, INITIAL ENCOUNTER: Primary | ICD-10-CM

## 2019-01-01 DIAGNOSIS — H01.005 BLEPHARITIS OF LOWER EYELIDS OF BOTH EYES, UNSPECIFIED TYPE: ICD-10-CM

## 2019-01-01 DIAGNOSIS — F51.01 PRIMARY INSOMNIA: Primary | ICD-10-CM

## 2019-01-01 DIAGNOSIS — F02.80 DEMENTIA DUE TO PARKINSON'S DISEASE WITHOUT BEHAVIORAL DISTURBANCE (H): ICD-10-CM

## 2019-01-01 DIAGNOSIS — R41.82 ALTERED MENTAL STATUS, UNSPECIFIED ALTERED MENTAL STATUS TYPE: ICD-10-CM

## 2019-01-01 DIAGNOSIS — H61.23 BILATERAL IMPACTED CERUMEN: Primary | ICD-10-CM

## 2019-01-01 DIAGNOSIS — I10 ESSENTIAL HYPERTENSION WITH GOAL BLOOD PRESSURE LESS THAN 140/90: Primary | ICD-10-CM

## 2019-01-01 DIAGNOSIS — J44.1 ACUTE EXACERBATION OF CHRONIC OBSTRUCTIVE PULMONARY DISEASE (COPD) (H): ICD-10-CM

## 2019-01-01 DIAGNOSIS — H01.002 BLEPHARITIS OF LOWER EYELIDS OF BOTH EYES, UNSPECIFIED TYPE: Primary | ICD-10-CM

## 2019-01-01 DIAGNOSIS — Z23 NEED FOR PROPHYLACTIC VACCINATION AND INOCULATION AGAINST INFLUENZA: ICD-10-CM

## 2019-01-01 DIAGNOSIS — H61.23 EXCESSIVE CERUMEN IN BOTH EAR CANALS: ICD-10-CM

## 2019-01-01 DIAGNOSIS — G20.A1 DEMENTIA DUE TO PARKINSON'S DISEASE WITHOUT BEHAVIORAL DISTURBANCE (H): ICD-10-CM

## 2019-01-01 DIAGNOSIS — R05.8 RECURRENT COUGH: ICD-10-CM

## 2019-01-01 DIAGNOSIS — R19.7 DIARRHEA, UNSPECIFIED TYPE: Primary | ICD-10-CM

## 2019-01-01 DIAGNOSIS — R91.8 PULMONARY NODULES: ICD-10-CM

## 2019-01-01 DIAGNOSIS — F05 SUNDOWNING: ICD-10-CM

## 2019-01-01 DIAGNOSIS — R19.7 DIARRHEA, UNSPECIFIED TYPE: ICD-10-CM

## 2019-01-01 DIAGNOSIS — M19.041 ARTHRITIS OF FINGER OF RIGHT HAND: Primary | ICD-10-CM

## 2019-01-01 DIAGNOSIS — J44.89 OBSTRUCTIVE CHRONIC BRONCHITIS WITHOUT EXACERBATION (H): ICD-10-CM

## 2019-01-01 DIAGNOSIS — H01.002 BLEPHARITIS OF LOWER EYELIDS OF BOTH EYES, UNSPECIFIED TYPE: ICD-10-CM

## 2019-01-01 DIAGNOSIS — Z91.81 AT RISK FOR FALLING: ICD-10-CM

## 2019-01-01 DIAGNOSIS — K58.0 IRRITABLE BOWEL SYNDROME WITH DIARRHEA: Primary | ICD-10-CM

## 2019-01-01 DIAGNOSIS — J15.9 COMMUNITY ACQUIRED BACTERIAL PNEUMONIA: ICD-10-CM

## 2019-01-01 DIAGNOSIS — L71.9 ROSACEA: ICD-10-CM

## 2019-01-01 DIAGNOSIS — Z87.440 H/O RECURRENT URINARY TRACT INFECTION: ICD-10-CM

## 2019-01-01 DIAGNOSIS — Z78.9 POOR TOLERANCE FOR AMBULATION: ICD-10-CM

## 2019-01-01 DIAGNOSIS — J44.1 COPD EXACERBATION (H): Primary | ICD-10-CM

## 2019-01-01 DIAGNOSIS — G20.A1 PARKINSON'S DISEASE (H): ICD-10-CM

## 2019-01-01 DIAGNOSIS — J18.9 UNRESOLVED PNEUMONIA: ICD-10-CM

## 2019-01-01 DIAGNOSIS — J15.9 COMMUNITY ACQUIRED BACTERIAL PNEUMONIA: Primary | ICD-10-CM

## 2019-01-01 DIAGNOSIS — J18.9 HCAP (HEALTHCARE-ASSOCIATED PNEUMONIA): Primary | ICD-10-CM

## 2019-01-01 DIAGNOSIS — R05.9 COUGH: ICD-10-CM

## 2019-01-01 DIAGNOSIS — R05.8 RECURRENT COUGH: Primary | ICD-10-CM

## 2019-01-01 DIAGNOSIS — R91.8 PULMONARY NODULES: Primary | ICD-10-CM

## 2019-01-01 DIAGNOSIS — R26.2 AMBULATORY DYSFUNCTION: ICD-10-CM

## 2019-01-01 LAB
ALBUMIN SERPL-MCNC: 3.5 G/DL (ref 3.4–5)
ALBUMIN SERPL-MCNC: 3.7 G/DL (ref 3.4–5)
ALBUMIN SERPL-MCNC: 3.7 G/DL (ref 3.4–5)
ALBUMIN UR-MCNC: 10 MG/DL
ALBUMIN UR-MCNC: 10 MG/DL
ALP SERPL-CCNC: 111 U/L (ref 40–150)
ALP SERPL-CCNC: 122 U/L (ref 40–150)
ALP SERPL-CCNC: 126 U/L (ref 40–150)
ALT SERPL W P-5'-P-CCNC: 12 U/L (ref 0–70)
ALT SERPL W P-5'-P-CCNC: 8 U/L (ref 0–70)
ALT SERPL W P-5'-P-CCNC: 9 U/L (ref 0–70)
AMMONIA PLAS-SCNC: 25 UMOL/L (ref 10–50)
AMMONIA PLAS-SCNC: 31 UMOL/L (ref 10–50)
ANION GAP SERPL CALCULATED.3IONS-SCNC: 10 MMOL/L (ref 3–14)
ANION GAP SERPL CALCULATED.3IONS-SCNC: 3 MMOL/L (ref 3–14)
ANION GAP SERPL CALCULATED.3IONS-SCNC: 4 MMOL/L (ref 3–14)
ANION GAP SERPL CALCULATED.3IONS-SCNC: 4 MMOL/L (ref 3–14)
ANION GAP SERPL CALCULATED.3IONS-SCNC: 5 MMOL/L (ref 3–14)
ANION GAP SERPL CALCULATED.3IONS-SCNC: 6 MMOL/L (ref 3–14)
ANION GAP SERPL CALCULATED.3IONS-SCNC: 7 MMOL/L (ref 3–14)
ANION GAP SERPL CALCULATED.3IONS-SCNC: 8 MMOL/L (ref 3–14)
ANION GAP SERPL CALCULATED.3IONS-SCNC: 9 MMOL/L (ref 3–14)
APPEARANCE UR: CLEAR
APPEARANCE UR: CLEAR
APTT PPP: 40 SEC (ref 22–37)
AST SERPL W P-5'-P-CCNC: 11 U/L (ref 0–45)
AST SERPL W P-5'-P-CCNC: 14 U/L (ref 0–45)
AST SERPL W P-5'-P-CCNC: 25 U/L (ref 0–45)
B-HCG FREE SERPL-ACNC: 1.3 [IU]/L (ref 0.86–1.14)
BACTERIA SPEC CULT: ABNORMAL
BACTERIA SPEC CULT: NO GROWTH
BACTERIA SPEC CULT: NORMAL
BACTERIA SPEC CULT: NORMAL
BASE EXCESS BLDA CALC-SCNC: 1.7 MMOL/L
BASE EXCESS BLDV CALC-SCNC: 2.5 MMOL/L
BASE EXCESS BLDV CALC-SCNC: 3 MMOL/L
BASE EXCESS BLDV CALC-SCNC: 3.9 MMOL/L
BASOPHILS # BLD AUTO: 0 10E9/L (ref 0–0.2)
BASOPHILS # BLD AUTO: 0.1 10E9/L (ref 0–0.2)
BASOPHILS NFR BLD AUTO: 0.2 %
BASOPHILS NFR BLD AUTO: 0.2 %
BASOPHILS NFR BLD AUTO: 0.4 %
BASOPHILS NFR BLD AUTO: 0.4 %
BASOPHILS NFR BLD AUTO: 0.8 %
BILIRUB SERPL-MCNC: 0.5 MG/DL (ref 0.2–1.3)
BILIRUB SERPL-MCNC: 0.6 MG/DL (ref 0.2–1.3)
BILIRUB SERPL-MCNC: 1 MG/DL (ref 0.2–1.3)
BILIRUB UR QL STRIP: NEGATIVE
BILIRUB UR QL STRIP: NEGATIVE
BUN SERPL-MCNC: 14 MG/DL (ref 7–30)
BUN SERPL-MCNC: 15 MG/DL (ref 7–30)
BUN SERPL-MCNC: 16 MG/DL (ref 7–30)
BUN SERPL-MCNC: 17 MG/DL (ref 7–30)
BUN SERPL-MCNC: 17 MG/DL (ref 7–30)
BUN SERPL-MCNC: 18 MG/DL (ref 7–30)
BUN SERPL-MCNC: 19 MG/DL (ref 7–30)
BUN SERPL-MCNC: 20 MG/DL (ref 7–30)
BUN SERPL-MCNC: 21 MG/DL (ref 7–30)
BUN SERPL-MCNC: 23 MG/DL (ref 7–30)
BUN SERPL-MCNC: 23 MG/DL (ref 7–30)
BUN SERPL-MCNC: 24 MG/DL (ref 7–30)
BUN SERPL-MCNC: 26 MG/DL (ref 7–30)
BUN SERPL-MCNC: 26 MG/DL (ref 7–30)
BUN SERPL-MCNC: 29 MG/DL (ref 7–30)
BUN SERPL-MCNC: 31 MG/DL (ref 7–30)
BUN SERPL-MCNC: 32 MG/DL (ref 7–30)
BUN SERPL-MCNC: 34 MG/DL (ref 7–30)
CA-I BLD-SCNC: 4.8 MG/DL (ref 4.4–5.2)
CALCIUM SERPL-MCNC: 7.9 MG/DL (ref 8.5–10.1)
CALCIUM SERPL-MCNC: 7.9 MG/DL (ref 8.5–10.1)
CALCIUM SERPL-MCNC: 8 MG/DL (ref 8.5–10.1)
CALCIUM SERPL-MCNC: 8 MG/DL (ref 8.5–10.1)
CALCIUM SERPL-MCNC: 8.1 MG/DL (ref 8.5–10.1)
CALCIUM SERPL-MCNC: 8.2 MG/DL (ref 8.5–10.1)
CALCIUM SERPL-MCNC: 8.2 MG/DL (ref 8.5–10.1)
CALCIUM SERPL-MCNC: 8.3 MG/DL (ref 8.5–10.1)
CALCIUM SERPL-MCNC: 8.4 MG/DL (ref 8.5–10.1)
CALCIUM SERPL-MCNC: 8.5 MG/DL (ref 8.5–10.1)
CALCIUM SERPL-MCNC: 8.6 MG/DL (ref 8.5–10.1)
CALCIUM SERPL-MCNC: 8.7 MG/DL (ref 8.5–10.1)
CALCIUM SERPL-MCNC: 8.7 MG/DL (ref 8.5–10.1)
CALCIUM SERPL-MCNC: 8.8 MG/DL (ref 8.5–10.1)
CALCIUM SERPL-MCNC: 9.4 MG/DL (ref 8.5–10.1)
CHLORIDE SERPL-SCNC: 103 MMOL/L (ref 94–109)
CHLORIDE SERPL-SCNC: 104 MMOL/L (ref 94–109)
CHLORIDE SERPL-SCNC: 104 MMOL/L (ref 94–109)
CHLORIDE SERPL-SCNC: 105 MMOL/L (ref 94–109)
CHLORIDE SERPL-SCNC: 106 MMOL/L (ref 94–109)
CHLORIDE SERPL-SCNC: 106 MMOL/L (ref 94–109)
CHLORIDE SERPL-SCNC: 107 MMOL/L (ref 94–109)
CHLORIDE SERPL-SCNC: 108 MMOL/L (ref 94–109)
CHLORIDE SERPL-SCNC: 110 MMOL/L (ref 94–109)
CHLORIDE SERPL-SCNC: 110 MMOL/L (ref 94–109)
CHLORIDE SERPL-SCNC: 111 MMOL/L (ref 94–109)
CHLORIDE SERPL-SCNC: 112 MMOL/L (ref 94–109)
CHLORIDE SERPL-SCNC: 113 MMOL/L (ref 94–109)
CHLORIDE SERPL-SCNC: 114 MMOL/L (ref 94–109)
CHLORIDE SERPL-SCNC: 115 MMOL/L (ref 94–109)
CHLORIDE SERPL-SCNC: 116 MMOL/L (ref 94–109)
CHLORIDE SERPL-SCNC: 116 MMOL/L (ref 94–109)
CO2 BLDCOV-SCNC: 28 MMOL/L (ref 21–28)
CO2 BLDCOV-SCNC: 28 MMOL/L (ref 21–28)
CO2 BLDCOV-SCNC: 30 MMOL/L (ref 21–28)
CO2 SERPL-SCNC: 23 MMOL/L (ref 20–32)
CO2 SERPL-SCNC: 24 MMOL/L (ref 20–32)
CO2 SERPL-SCNC: 25 MMOL/L (ref 20–32)
CO2 SERPL-SCNC: 26 MMOL/L (ref 20–32)
CO2 SERPL-SCNC: 27 MMOL/L (ref 20–32)
CO2 SERPL-SCNC: 28 MMOL/L (ref 20–32)
CO2 SERPL-SCNC: 29 MMOL/L (ref 20–32)
CO2 SERPL-SCNC: 29 MMOL/L (ref 20–32)
CO2 SERPL-SCNC: 30 MMOL/L (ref 20–32)
COLOR UR AUTO: YELLOW
COLOR UR AUTO: YELLOW
CREAT BLD-MCNC: 1.3 MG/DL (ref 0.66–1.25)
CREAT SERPL-MCNC: 0.89 MG/DL (ref 0.66–1.25)
CREAT SERPL-MCNC: 0.91 MG/DL (ref 0.66–1.25)
CREAT SERPL-MCNC: 0.94 MG/DL (ref 0.66–1.25)
CREAT SERPL-MCNC: 0.95 MG/DL (ref 0.66–1.25)
CREAT SERPL-MCNC: 0.96 MG/DL (ref 0.66–1.25)
CREAT SERPL-MCNC: 0.97 MG/DL (ref 0.66–1.25)
CREAT SERPL-MCNC: 0.97 MG/DL (ref 0.66–1.25)
CREAT SERPL-MCNC: 1 MG/DL (ref 0.66–1.25)
CREAT SERPL-MCNC: 1.01 MG/DL (ref 0.66–1.25)
CREAT SERPL-MCNC: 1.02 MG/DL (ref 0.66–1.25)
CREAT SERPL-MCNC: 1.04 MG/DL (ref 0.66–1.25)
CREAT SERPL-MCNC: 1.05 MG/DL (ref 0.66–1.25)
CREAT SERPL-MCNC: 1.05 MG/DL (ref 0.66–1.25)
CREAT SERPL-MCNC: 1.06 MG/DL (ref 0.66–1.25)
CREAT SERPL-MCNC: 1.09 MG/DL (ref 0.66–1.25)
CREAT SERPL-MCNC: 1.09 MG/DL (ref 0.66–1.25)
CREAT SERPL-MCNC: 1.11 MG/DL (ref 0.66–1.25)
CREAT SERPL-MCNC: 1.11 MG/DL (ref 0.66–1.25)
CREAT SERPL-MCNC: 1.12 MG/DL (ref 0.66–1.25)
CREAT SERPL-MCNC: 1.13 MG/DL (ref 0.66–1.25)
CREAT SERPL-MCNC: 1.14 MG/DL (ref 0.66–1.25)
CREAT SERPL-MCNC: 1.15 MG/DL (ref 0.66–1.25)
CREAT SERPL-MCNC: 1.15 MG/DL (ref 0.66–1.25)
CREAT SERPL-MCNC: 1.3 MG/DL (ref 0.66–1.25)
CREAT SERPL-MCNC: 1.4 MG/DL (ref 0.66–1.25)
CREAT SERPL-MCNC: 1.44 MG/DL (ref 0.66–1.25)
CREAT SERPL-MCNC: 1.61 MG/DL (ref 0.66–1.25)
CRP SERPL-MCNC: 46 MG/L (ref 0–8)
D DIMER PPP FEU-MCNC: 11.5 UG/ML FEU (ref 0–0.5)
DIFFERENTIAL METHOD BLD: ABNORMAL
EOSINOPHIL # BLD AUTO: 0 10E9/L (ref 0–0.7)
EOSINOPHIL # BLD AUTO: 0.1 10E9/L (ref 0–0.7)
EOSINOPHIL # BLD AUTO: 0.2 10E9/L (ref 0–0.7)
EOSINOPHIL # BLD AUTO: 0.3 10E9/L (ref 0–0.7)
EOSINOPHIL # BLD AUTO: 0.4 10E9/L (ref 0–0.7)
EOSINOPHIL NFR BLD AUTO: 0 %
EOSINOPHIL NFR BLD AUTO: 1.7 %
EOSINOPHIL NFR BLD AUTO: 2.3 %
EOSINOPHIL NFR BLD AUTO: 3 %
EOSINOPHIL NFR BLD AUTO: 6.1 %
ERYTHROCYTE [DISTWIDTH] IN BLOOD BY AUTOMATED COUNT: 11.8 % (ref 10–15)
ERYTHROCYTE [DISTWIDTH] IN BLOOD BY AUTOMATED COUNT: 11.9 % (ref 10–15)
ERYTHROCYTE [DISTWIDTH] IN BLOOD BY AUTOMATED COUNT: 12 % (ref 10–15)
ERYTHROCYTE [DISTWIDTH] IN BLOOD BY AUTOMATED COUNT: 12.1 % (ref 10–15)
ERYTHROCYTE [DISTWIDTH] IN BLOOD BY AUTOMATED COUNT: 12.2 % (ref 10–15)
ERYTHROCYTE [DISTWIDTH] IN BLOOD BY AUTOMATED COUNT: 12.5 % (ref 10–15)
ERYTHROCYTE [DISTWIDTH] IN BLOOD BY AUTOMATED COUNT: 12.7 % (ref 10–15)
ERYTHROCYTE [DISTWIDTH] IN BLOOD BY AUTOMATED COUNT: 12.8 % (ref 10–15)
ERYTHROCYTE [DISTWIDTH] IN BLOOD BY AUTOMATED COUNT: 12.8 % (ref 10–15)
ERYTHROCYTE [DISTWIDTH] IN BLOOD BY AUTOMATED COUNT: 12.9 % (ref 10–15)
ERYTHROCYTE [DISTWIDTH] IN BLOOD BY AUTOMATED COUNT: 12.9 % (ref 10–15)
FLUAV H1 2009 PAND RNA SPEC QL NAA+PROBE: NEGATIVE
FLUAV H1 RNA SPEC QL NAA+PROBE: NEGATIVE
FLUAV H3 RNA SPEC QL NAA+PROBE: NEGATIVE
FLUAV RNA SPEC QL NAA+PROBE: NEGATIVE
FLUAV+FLUBV RNA SPEC QL NAA+PROBE: NEGATIVE
FLUBV RNA SPEC QL NAA+PROBE: NEGATIVE
GFR SERPL CREATININE-BSD FRML MDRD: 41 ML/MIN/{1.73_M2}
GFR SERPL CREATININE-BSD FRML MDRD: 47 ML/MIN/{1.73_M2}
GFR SERPL CREATININE-BSD FRML MDRD: 49 ML/MIN/{1.73_M2}
GFR SERPL CREATININE-BSD FRML MDRD: 53 ML/MIN/{1.73_M2}
GFR SERPL CREATININE-BSD FRML MDRD: 54 ML/MIN/{1.73_M2}
GFR SERPL CREATININE-BSD FRML MDRD: 62 ML/MIN/{1.73_M2}
GFR SERPL CREATININE-BSD FRML MDRD: 62 ML/MIN/{1.73_M2}
GFR SERPL CREATININE-BSD FRML MDRD: 63 ML/MIN/{1.73_M2}
GFR SERPL CREATININE-BSD FRML MDRD: 63 ML/MIN/{1.73_M2}
GFR SERPL CREATININE-BSD FRML MDRD: 64 ML/MIN/{1.73_M2}
GFR SERPL CREATININE-BSD FRML MDRD: 65 ML/MIN/1.73_M2
GFR SERPL CREATININE-BSD FRML MDRD: 65 ML/MIN/{1.73_M2}
GFR SERPL CREATININE-BSD FRML MDRD: 66 ML/MIN/{1.73_M2}
GFR SERPL CREATININE-BSD FRML MDRD: 66 ML/MIN/{1.73_M2}
GFR SERPL CREATININE-BSD FRML MDRD: 68 ML/MIN/{1.73_M2}
GFR SERPL CREATININE-BSD FRML MDRD: 69 ML/MIN/{1.73_M2}
GFR SERPL CREATININE-BSD FRML MDRD: 69 ML/MIN/{1.73_M2}
GFR SERPL CREATININE-BSD FRML MDRD: 70 ML/MIN/{1.73_M2}
GFR SERPL CREATININE-BSD FRML MDRD: 72 ML/MIN/{1.73_M2}
GFR SERPL CREATININE-BSD FRML MDRD: 72 ML/MIN/{1.73_M2}
GFR SERPL CREATININE-BSD FRML MDRD: 74 ML/MIN/{1.73_M2}
GFR SERPL CREATININE-BSD FRML MDRD: 76 ML/MIN/{1.73_M2}
GFR SERPL CREATININE-BSD FRML MDRD: 76 ML/MIN/{1.73_M2}
GFR SERPL CREATININE-BSD FRML MDRD: 77 ML/MIN/{1.73_M2}
GFR SERPL CREATININE-BSD FRML MDRD: 79 ML/MIN/{1.73_M2}
GFR SERPL CREATININE-BSD FRML MDRD: 79 ML/MIN/{1.73_M2}
GFR SERPL CREATININE-BSD FRML MDRD: 82 ML/MIN/{1.73_M2}
GFR SERPL CREATININE-BSD FRML MDRD: 83 ML/MIN/{1.73_M2}
GLUCOSE BLD-MCNC: 180 MG/DL (ref 70–99)
GLUCOSE BLDC GLUCOMTR-MCNC: 118 MG/DL (ref 70–99)
GLUCOSE BLDC GLUCOMTR-MCNC: 131 MG/DL (ref 70–99)
GLUCOSE BLDC GLUCOMTR-MCNC: 147 MG/DL (ref 70–99)
GLUCOSE SERPL-MCNC: 100 MG/DL (ref 70–99)
GLUCOSE SERPL-MCNC: 103 MG/DL (ref 70–99)
GLUCOSE SERPL-MCNC: 103 MG/DL (ref 70–99)
GLUCOSE SERPL-MCNC: 104 MG/DL (ref 70–99)
GLUCOSE SERPL-MCNC: 108 MG/DL (ref 70–99)
GLUCOSE SERPL-MCNC: 110 MG/DL (ref 70–99)
GLUCOSE SERPL-MCNC: 111 MG/DL (ref 70–99)
GLUCOSE SERPL-MCNC: 113 MG/DL (ref 70–99)
GLUCOSE SERPL-MCNC: 114 MG/DL (ref 70–99)
GLUCOSE SERPL-MCNC: 116 MG/DL (ref 70–99)
GLUCOSE SERPL-MCNC: 116 MG/DL (ref 70–99)
GLUCOSE SERPL-MCNC: 119 MG/DL (ref 70–99)
GLUCOSE SERPL-MCNC: 119 MG/DL (ref 70–99)
GLUCOSE SERPL-MCNC: 121 MG/DL (ref 70–99)
GLUCOSE SERPL-MCNC: 122 MG/DL (ref 70–99)
GLUCOSE SERPL-MCNC: 131 MG/DL (ref 70–99)
GLUCOSE SERPL-MCNC: 136 MG/DL (ref 70–99)
GLUCOSE SERPL-MCNC: 147 MG/DL (ref 70–99)
GLUCOSE SERPL-MCNC: 175 MG/DL (ref 70–99)
GLUCOSE SERPL-MCNC: 83 MG/DL (ref 70–99)
GLUCOSE SERPL-MCNC: 83 MG/DL (ref 70–99)
GLUCOSE SERPL-MCNC: 84 MG/DL (ref 70–99)
GLUCOSE SERPL-MCNC: 90 MG/DL (ref 70–99)
GLUCOSE SERPL-MCNC: 92 MG/DL (ref 70–99)
GLUCOSE SERPL-MCNC: 97 MG/DL (ref 70–99)
GLUCOSE SERPL-MCNC: 97 MG/DL (ref 70–99)
GLUCOSE UR STRIP-MCNC: NEGATIVE MG/DL
GLUCOSE UR STRIP-MCNC: NEGATIVE MG/DL
GRAN CASTS #/AREA URNS LPF: 12 /LPF
HADV DNA SPEC QL NAA+PROBE: NEGATIVE
HADV DNA SPEC QL NAA+PROBE: NEGATIVE
HCO3 BLD-SCNC: 26 MMOL/L (ref 21–28)
HCO3 BLDV-SCNC: 28 MMOL/L (ref 21–28)
HCO3 BLDV-SCNC: 29 MMOL/L (ref 21–28)
HCO3 BLDV-SCNC: 30 MMOL/L (ref 21–28)
HCT VFR BLD AUTO: 35.4 % (ref 40–53)
HCT VFR BLD AUTO: 35.8 % (ref 40–53)
HCT VFR BLD AUTO: 36.7 % (ref 40–53)
HCT VFR BLD AUTO: 36.8 % (ref 40–53)
HCT VFR BLD AUTO: 36.9 % (ref 40–53)
HCT VFR BLD AUTO: 37.7 % (ref 40–53)
HCT VFR BLD AUTO: 37.7 % (ref 40–53)
HCT VFR BLD AUTO: 37.8 % (ref 40–53)
HCT VFR BLD AUTO: 37.8 % (ref 40–53)
HCT VFR BLD AUTO: 37.9 % (ref 40–53)
HCT VFR BLD AUTO: 38.5 % (ref 40–53)
HCT VFR BLD AUTO: 43.8 % (ref 40–53)
HCT VFR BLD AUTO: 44 % (ref 40–53)
HCT VFR BLD CALC: 41 %PCV (ref 40–53)
HGB BLD CALC-MCNC: 13.9 G/DL (ref 13.3–17.7)
HGB BLD-MCNC: 11.8 G/DL (ref 13.3–17.7)
HGB BLD-MCNC: 11.8 G/DL (ref 13.3–17.7)
HGB BLD-MCNC: 12 G/DL (ref 13.3–17.7)
HGB BLD-MCNC: 12.1 G/DL (ref 13.3–17.7)
HGB BLD-MCNC: 12.1 G/DL (ref 13.3–17.7)
HGB BLD-MCNC: 12.3 G/DL (ref 13.3–17.7)
HGB BLD-MCNC: 12.4 G/DL (ref 13.3–17.7)
HGB BLD-MCNC: 12.5 G/DL (ref 13.3–17.7)
HGB BLD-MCNC: 12.6 G/DL (ref 13.3–17.7)
HGB BLD-MCNC: 12.7 G/DL (ref 13.3–17.7)
HGB BLD-MCNC: 12.8 G/DL (ref 13.3–17.7)
HGB BLD-MCNC: 13.9 G/DL (ref 13.3–17.7)
HGB BLD-MCNC: 14 G/DL (ref 13.3–17.7)
HGB UR QL STRIP: ABNORMAL
HGB UR QL STRIP: NEGATIVE
HMPV RNA SPEC QL NAA+PROBE: NEGATIVE
HPIV1 RNA SPEC QL NAA+PROBE: NEGATIVE
HPIV2 RNA SPEC QL NAA+PROBE: NEGATIVE
HPIV3 RNA SPEC QL NAA+PROBE: NEGATIVE
IMM GRANULOCYTES # BLD: 0 10E9/L (ref 0–0.4)
IMM GRANULOCYTES # BLD: 0.1 10E9/L (ref 0–0.4)
IMM GRANULOCYTES NFR BLD: 0.3 %
IMM GRANULOCYTES NFR BLD: 0.3 %
IMM GRANULOCYTES NFR BLD: 0.4 %
IMM GRANULOCYTES NFR BLD: 0.5 %
INR PPP: 1.28 (ref 0.86–1.14)
INTERPRETATION ECG - MUSE: NORMAL
KETONES UR STRIP-MCNC: NEGATIVE MG/DL
KETONES UR STRIP-MCNC: NEGATIVE MG/DL
LACTATE BLD-SCNC: 0.8 MMOL/L (ref 0.7–2.1)
LACTATE BLD-SCNC: 1 MMOL/L (ref 0.7–2.1)
LACTATE BLD-SCNC: 1.7 MMOL/L (ref 0.7–2)
LACTATE BLD-SCNC: 4.6 MMOL/L (ref 0.7–2)
LEUKOCYTE ESTERASE UR QL STRIP: ABNORMAL
LEUKOCYTE ESTERASE UR QL STRIP: NEGATIVE
LYMPHOCYTES # BLD AUTO: 0.6 10E9/L (ref 0.8–5.3)
LYMPHOCYTES # BLD AUTO: 0.8 10E9/L (ref 0.8–5.3)
LYMPHOCYTES # BLD AUTO: 0.8 10E9/L (ref 0.8–5.3)
LYMPHOCYTES # BLD AUTO: 1 10E9/L (ref 0.8–5.3)
LYMPHOCYTES # BLD AUTO: 1.3 10E9/L (ref 0.8–5.3)
LYMPHOCYTES NFR BLD AUTO: 10.4 %
LYMPHOCYTES NFR BLD AUTO: 11.7 %
LYMPHOCYTES NFR BLD AUTO: 19.5 %
LYMPHOCYTES NFR BLD AUTO: 4.9 %
LYMPHOCYTES NFR BLD AUTO: 8 %
Lab: ABNORMAL
Lab: NORMAL
MAGNESIUM SERPL-MCNC: 1.8 MG/DL (ref 1.6–2.3)
MAGNESIUM SERPL-MCNC: 1.9 MG/DL (ref 1.6–2.3)
MCH RBC QN AUTO: 31 PG (ref 26.5–33)
MCH RBC QN AUTO: 31.1 PG (ref 26.5–33)
MCH RBC QN AUTO: 31.4 PG (ref 26.5–33)
MCH RBC QN AUTO: 31.6 PG (ref 26.5–33)
MCH RBC QN AUTO: 31.7 PG (ref 26.5–33)
MCH RBC QN AUTO: 31.8 PG (ref 26.5–33)
MCH RBC QN AUTO: 31.9 PG (ref 26.5–33)
MCH RBC QN AUTO: 32.2 PG (ref 26.5–33)
MCH RBC QN AUTO: 32.4 PG (ref 26.5–33)
MCH RBC QN AUTO: 32.6 PG (ref 26.5–33)
MCHC RBC AUTO-ENTMCNC: 31.7 G/DL (ref 31.5–36.5)
MCHC RBC AUTO-ENTMCNC: 31.8 G/DL (ref 31.5–36.5)
MCHC RBC AUTO-ENTMCNC: 32.2 G/DL (ref 31.5–36.5)
MCHC RBC AUTO-ENTMCNC: 32.6 G/DL (ref 31.5–36.5)
MCHC RBC AUTO-ENTMCNC: 32.6 G/DL (ref 31.5–36.5)
MCHC RBC AUTO-ENTMCNC: 32.7 G/DL (ref 31.5–36.5)
MCHC RBC AUTO-ENTMCNC: 32.8 G/DL (ref 31.5–36.5)
MCHC RBC AUTO-ENTMCNC: 33 G/DL (ref 31.5–36.5)
MCHC RBC AUTO-ENTMCNC: 33.2 G/DL (ref 31.5–36.5)
MCHC RBC AUTO-ENTMCNC: 33.3 G/DL (ref 31.5–36.5)
MCHC RBC AUTO-ENTMCNC: 33.3 G/DL (ref 31.5–36.5)
MCHC RBC AUTO-ENTMCNC: 33.8 G/DL (ref 31.5–36.5)
MCHC RBC AUTO-ENTMCNC: 33.9 G/DL (ref 31.5–36.5)
MCV RBC AUTO: 100 FL (ref 78–100)
MCV RBC AUTO: 100 FL (ref 78–100)
MCV RBC AUTO: 95 FL (ref 78–100)
MCV RBC AUTO: 96 FL (ref 78–100)
MCV RBC AUTO: 97 FL (ref 78–100)
MCV RBC AUTO: 97 FL (ref 78–100)
MCV RBC AUTO: 98 FL (ref 78–100)
MICROBIOLOGIST REVIEW: NORMAL
MONOCYTES # BLD AUTO: 0.5 10E9/L (ref 0–1.3)
MONOCYTES # BLD AUTO: 0.6 10E9/L (ref 0–1.3)
MONOCYTES # BLD AUTO: 0.7 10E9/L (ref 0–1.3)
MONOCYTES # BLD AUTO: 0.8 10E9/L (ref 0–1.3)
MONOCYTES # BLD AUTO: 0.9 10E9/L (ref 0–1.3)
MONOCYTES NFR BLD AUTO: 10.5 %
MONOCYTES NFR BLD AUTO: 12.4 %
MONOCYTES NFR BLD AUTO: 4.5 %
MONOCYTES NFR BLD AUTO: 5 %
MONOCYTES NFR BLD AUTO: 8 %
MRSA DNA SPEC QL NAA+PROBE: NEGATIVE
MUCOUS THREADS #/AREA URNS LPF: PRESENT /LPF
MUCOUS THREADS #/AREA URNS LPF: PRESENT /LPF
NEUTROPHILS # BLD AUTO: 11.3 10E9/L (ref 1.6–8.3)
NEUTROPHILS # BLD AUTO: 4.1 10E9/L (ref 1.6–8.3)
NEUTROPHILS # BLD AUTO: 5.1 10E9/L (ref 1.6–8.3)
NEUTROPHILS # BLD AUTO: 7.5 10E9/L (ref 1.6–8.3)
NEUTROPHILS # BLD AUTO: 8 10E9/L (ref 1.6–8.3)
NEUTROPHILS NFR BLD AUTO: 62.8 %
NEUTROPHILS NFR BLD AUTO: 73.8 %
NEUTROPHILS NFR BLD AUTO: 80.2 %
NEUTROPHILS NFR BLD AUTO: 81.8 %
NEUTROPHILS NFR BLD AUTO: 89.9 %
NITRATE UR QL: NEGATIVE
NITRATE UR QL: NEGATIVE
NRBC # BLD AUTO: 0 10*3/UL
NRBC BLD AUTO-RTO: 0 /100
NT-PROBNP SERPL-MCNC: 114 PG/ML (ref 0–900)
NT-PROBNP SERPL-MCNC: 215 PG/ML (ref 0–1800)
NT-PROBNP SERPL-MCNC: 219 PG/ML (ref 0–1800)
NT-PROBNP SERPL-MCNC: 94 PG/ML (ref 0–900)
O2/TOTAL GAS SETTING VFR VENT: 21 %
O2/TOTAL GAS SETTING VFR VENT: 30 %
O2/TOTAL GAS SETTING VFR VENT: ABNORMAL %
O2/TOTAL GAS SETTING VFR VENT: ABNORMAL %
PCO2 BLD: 41 MM HG (ref 35–45)
PCO2 BLDV: 41 MM HG (ref 40–50)
PCO2 BLDV: 43 MM HG (ref 40–50)
PCO2 BLDV: 45 MM HG (ref 40–50)
PCO2 BLDV: 50 MM HG (ref 40–50)
PCO2 BLDV: 55 MM HG (ref 40–50)
PCO2 BLDV: 67 MM HG (ref 40–50)
PH BLD: 7.42 PH (ref 7.35–7.45)
PH BLDV: 7.26 PH (ref 7.32–7.43)
PH BLDV: 7.34 PH (ref 7.32–7.43)
PH BLDV: 7.38 PH (ref 7.32–7.43)
PH BLDV: 7.4 PH (ref 7.32–7.43)
PH BLDV: 7.42 PH (ref 7.32–7.43)
PH BLDV: 7.45 PH (ref 7.32–7.43)
PH UR STRIP: 5 PH (ref 5–7)
PH UR STRIP: 5.5 PH (ref 5–7)
PLATELET # BLD AUTO: 171 10E9/L (ref 150–450)
PLATELET # BLD AUTO: 171 10E9/L (ref 150–450)
PLATELET # BLD AUTO: 185 10E9/L (ref 150–450)
PLATELET # BLD AUTO: 191 10E9/L (ref 150–450)
PLATELET # BLD AUTO: 194 10E9/L (ref 150–450)
PLATELET # BLD AUTO: 196 10E9/L (ref 150–450)
PLATELET # BLD AUTO: 197 10E9/L (ref 150–450)
PLATELET # BLD AUTO: 197 10E9/L (ref 150–450)
PLATELET # BLD AUTO: 198 10E9/L (ref 150–450)
PLATELET # BLD AUTO: 203 10E9/L (ref 150–450)
PLATELET # BLD AUTO: 203 10E9/L (ref 150–450)
PLATELET # BLD AUTO: 219 10E9/L (ref 150–450)
PLATELET # BLD AUTO: 226 10E9/L (ref 150–450)
PLATELET # BLD AUTO: 232 10E9/L (ref 150–450)
PLATELET # BLD AUTO: 235 10E9/L (ref 150–450)
PLATELET # BLD AUTO: 241 10E9/L (ref 150–450)
PO2 BLD: 142 MM HG (ref 80–105)
PO2 BLDV: 21 MM HG (ref 25–47)
PO2 BLDV: 22 MM HG (ref 25–47)
PO2 BLDV: 32 MM HG (ref 25–47)
PO2 BLDV: 55 MM HG (ref 25–47)
PO2 BLDV: 56 MM HG (ref 25–47)
PO2 BLDV: 63 MM HG (ref 25–47)
POTASSIUM BLD-SCNC: 3.5 MMOL/L (ref 3.4–5.3)
POTASSIUM SERPL-SCNC: 3 MMOL/L (ref 3.4–5.3)
POTASSIUM SERPL-SCNC: 3.1 MMOL/L (ref 3.4–5.3)
POTASSIUM SERPL-SCNC: 3.2 MMOL/L (ref 3.4–5.3)
POTASSIUM SERPL-SCNC: 3.3 MMOL/L (ref 3.4–5.3)
POTASSIUM SERPL-SCNC: 3.4 MMOL/L (ref 3.4–5.3)
POTASSIUM SERPL-SCNC: 3.5 MMOL/L (ref 3.4–5.3)
POTASSIUM SERPL-SCNC: 3.6 MMOL/L (ref 3.4–5.3)
POTASSIUM SERPL-SCNC: 3.7 MMOL/L (ref 3.4–5.3)
POTASSIUM SERPL-SCNC: 4.1 MMOL/L (ref 3.4–5.3)
POTASSIUM SERPL-SCNC: 4.2 MMOL/L (ref 3.4–5.3)
POTASSIUM SERPL-SCNC: 4.8 MMOL/L (ref 3.4–5.3)
PROCALCITONIN SERPL-MCNC: 0.09 NG/ML
PROCALCITONIN SERPL-MCNC: 0.19 NG/ML
PROCALCITONIN SERPL-MCNC: 0.88 NG/ML
PROCALCITONIN SERPL-MCNC: <0.05 NG/ML
PROCALCITONIN SERPL-MCNC: <0.05 NG/ML
PROT SERPL-MCNC: 6.6 G/DL (ref 6.8–8.8)
PROT SERPL-MCNC: 6.8 G/DL (ref 6.8–8.8)
PROT SERPL-MCNC: 7.5 G/DL (ref 6.8–8.8)
RADIOLOGIST FLAGS: NORMAL
RADIOLOGIST FLAGS: NORMAL
RBC # BLD AUTO: 3.62 10E12/L (ref 4.4–5.9)
RBC # BLD AUTO: 3.74 10E12/L (ref 4.4–5.9)
RBC # BLD AUTO: 3.8 10E12/L (ref 4.4–5.9)
RBC # BLD AUTO: 3.81 10E12/L (ref 4.4–5.9)
RBC # BLD AUTO: 3.81 10E12/L (ref 4.4–5.9)
RBC # BLD AUTO: 3.92 10E12/L (ref 4.4–5.9)
RBC # BLD AUTO: 3.95 10E12/L (ref 4.4–5.9)
RBC # BLD AUTO: 3.96 10E12/L (ref 4.4–5.9)
RBC # BLD AUTO: 3.98 10E12/L (ref 4.4–5.9)
RBC # BLD AUTO: 3.98 10E12/L (ref 4.4–5.9)
RBC # BLD AUTO: 4 10E12/L (ref 4.4–5.9)
RBC # BLD AUTO: 4.37 10E12/L (ref 4.4–5.9)
RBC # BLD AUTO: 4.4 10E12/L (ref 4.4–5.9)
RBC #/AREA URNS AUTO: 2 /HPF (ref 0–2)
RBC #/AREA URNS AUTO: <1 /HPF (ref 0–2)
RHINOVIRUS RNA SPEC QL NAA+PROBE: NEGATIVE
RSV RNA SPEC NAA+PROBE: NEGATIVE
RSV RNA SPEC NAA+PROBE: NEGATIVE
RSV RNA SPEC QL NAA+PROBE: NEGATIVE
RSV RNA SPEC QL NAA+PROBE: NEGATIVE
SAO2 % BLDV FROM PO2: 29 %
SAO2 % BLDV FROM PO2: 88 %
SAO2 % BLDV FROM PO2: 89 %
SODIUM BLD-SCNC: 140 MMOL/L (ref 133–144)
SODIUM SERPL-SCNC: 138 MMOL/L (ref 133–144)
SODIUM SERPL-SCNC: 139 MMOL/L (ref 133–144)
SODIUM SERPL-SCNC: 140 MMOL/L (ref 133–144)
SODIUM SERPL-SCNC: 142 MMOL/L (ref 133–144)
SODIUM SERPL-SCNC: 143 MMOL/L (ref 133–144)
SODIUM SERPL-SCNC: 144 MMOL/L (ref 133–144)
SODIUM SERPL-SCNC: 144 MMOL/L (ref 133–144)
SODIUM SERPL-SCNC: 145 MMOL/L (ref 133–144)
SODIUM SERPL-SCNC: 146 MMOL/L (ref 133–144)
SODIUM SERPL-SCNC: 146 MMOL/L (ref 133–144)
SODIUM SERPL-SCNC: 147 MMOL/L (ref 133–144)
SODIUM SERPL-SCNC: 148 MMOL/L (ref 133–144)
SODIUM SERPL-SCNC: 149 MMOL/L (ref 133–144)
SOURCE: ABNORMAL
SOURCE: ABNORMAL
SP GR UR STRIP: 1.01 (ref 1–1.03)
SP GR UR STRIP: 1.03 (ref 1–1.03)
SPECIMEN SOURCE: ABNORMAL
SPECIMEN SOURCE: NORMAL
SQUAMOUS #/AREA URNS AUTO: 2 /HPF (ref 0–1)
SQUAMOUS #/AREA URNS AUTO: <1 /HPF (ref 0–1)
TROPONIN I BLD-MCNC: 0 UG/L (ref 0–0.08)
TROPONIN I BLD-MCNC: 0.01 UG/L (ref 0–0.08)
TROPONIN I SERPL-MCNC: <0.015 UG/L (ref 0–0.04)
TSH SERPL DL<=0.005 MIU/L-ACNC: 0.94 MU/L (ref 0.4–4)
UROBILINOGEN UR STRIP-MCNC: NORMAL MG/DL (ref 0–2)
UROBILINOGEN UR STRIP-MCNC: NORMAL MG/DL (ref 0–2)
VANCOMYCIN SERPL-MCNC: 7.6 MG/L
WBC # BLD AUTO: 10 10E9/L (ref 4–11)
WBC # BLD AUTO: 12.6 10E9/L (ref 4–11)
WBC # BLD AUTO: 12.9 10E9/L (ref 4–11)
WBC # BLD AUTO: 4.5 10E9/L (ref 4–11)
WBC # BLD AUTO: 5 10E9/L (ref 4–11)
WBC # BLD AUTO: 6.1 10E9/L (ref 4–11)
WBC # BLD AUTO: 6.6 10E9/L (ref 4–11)
WBC # BLD AUTO: 6.9 10E9/L (ref 4–11)
WBC # BLD AUTO: 6.9 10E9/L (ref 4–11)
WBC # BLD AUTO: 7.5 10E9/L (ref 4–11)
WBC # BLD AUTO: 9.2 10E9/L (ref 4–11)
WBC # BLD AUTO: 9.6 10E9/L (ref 4–11)
WBC # BLD AUTO: 9.7 10E9/L (ref 4–11)
WBC #/AREA URNS AUTO: 11 /HPF (ref 0–5)
WBC #/AREA URNS AUTO: 2 /HPF (ref 0–5)

## 2019-01-01 PROCEDURE — 25000132 ZZH RX MED GY IP 250 OP 250 PS 637

## 2019-01-01 PROCEDURE — 25000125 ZZHC RX 250: Performed by: INTERNAL MEDICINE

## 2019-01-01 PROCEDURE — 93010 ELECTROCARDIOGRAM REPORT: CPT | Mod: Z6 | Performed by: EMERGENCY MEDICINE

## 2019-01-01 PROCEDURE — 94640 AIRWAY INHALATION TREATMENT: CPT | Mod: 76

## 2019-01-01 PROCEDURE — 71045 X-RAY EXAM CHEST 1 VIEW: CPT

## 2019-01-01 PROCEDURE — 92526 ORAL FUNCTION THERAPY: CPT | Mod: GN

## 2019-01-01 PROCEDURE — 40000275 ZZH STATISTIC RCP TIME EA 10 MIN

## 2019-01-01 PROCEDURE — 25800030 ZZH RX IP 258 OP 636: Performed by: STUDENT IN AN ORGANIZED HEALTH CARE EDUCATION/TRAINING PROGRAM

## 2019-01-01 PROCEDURE — 87077 CULTURE AEROBIC IDENTIFY: CPT | Performed by: EMERGENCY MEDICINE

## 2019-01-01 PROCEDURE — 99223 1ST HOSP IP/OBS HIGH 75: CPT | Mod: AI | Performed by: INTERNAL MEDICINE

## 2019-01-01 PROCEDURE — 85025 COMPLETE CBC W/AUTO DIFF WBC: CPT | Performed by: EMERGENCY MEDICINE

## 2019-01-01 PROCEDURE — 80048 BASIC METABOLIC PNL TOTAL CA: CPT | Performed by: EMERGENCY MEDICINE

## 2019-01-01 PROCEDURE — 12000001 ZZH R&B MED SURG/OB UMMC

## 2019-01-01 PROCEDURE — 82803 BLOOD GASES ANY COMBINATION: CPT | Performed by: INTERNAL MEDICINE

## 2019-01-01 PROCEDURE — 87641 MR-STAPH DNA AMP PROBE: CPT

## 2019-01-01 PROCEDURE — 25000132 ZZH RX MED GY IP 250 OP 250 PS 637: Performed by: PEDIATRICS

## 2019-01-01 PROCEDURE — 12000004 ZZH R&B IMCU UMMC

## 2019-01-01 PROCEDURE — 25000128 H RX IP 250 OP 636: Performed by: STUDENT IN AN ORGANIZED HEALTH CARE EDUCATION/TRAINING PROGRAM

## 2019-01-01 PROCEDURE — 40000501 ZZHCL STATISTIC HEMATOCRIT ED POCT

## 2019-01-01 PROCEDURE — 96361 HYDRATE IV INFUSION ADD-ON: CPT | Performed by: EMERGENCY MEDICINE

## 2019-01-01 PROCEDURE — 25800030 ZZH RX IP 258 OP 636: Performed by: INTERNAL MEDICINE

## 2019-01-01 PROCEDURE — 80048 BASIC METABOLIC PNL TOTAL CA: CPT | Performed by: INTERNAL MEDICINE

## 2019-01-01 PROCEDURE — 99214 OFFICE O/P EST MOD 30 MIN: CPT | Mod: 25 | Performed by: FAMILY MEDICINE

## 2019-01-01 PROCEDURE — 94640 AIRWAY INHALATION TREATMENT: CPT

## 2019-01-01 PROCEDURE — 36415 COLL VENOUS BLD VENIPUNCTURE: CPT | Performed by: INTERNAL MEDICINE

## 2019-01-01 PROCEDURE — 25000128 H RX IP 250 OP 636: Performed by: PEDIATRICS

## 2019-01-01 PROCEDURE — 25000132 ZZH RX MED GY IP 250 OP 250 PS 637: Performed by: STUDENT IN AN ORGANIZED HEALTH CARE EDUCATION/TRAINING PROGRAM

## 2019-01-01 PROCEDURE — 97110 THERAPEUTIC EXERCISES: CPT | Mod: GP | Performed by: PHYSICAL THERAPIST

## 2019-01-01 PROCEDURE — 81001 URINALYSIS AUTO W/SCOPE: CPT | Performed by: STUDENT IN AN ORGANIZED HEALTH CARE EDUCATION/TRAINING PROGRAM

## 2019-01-01 PROCEDURE — G0378 HOSPITAL OBSERVATION PER HR: HCPCS

## 2019-01-01 PROCEDURE — 25800030 ZZH RX IP 258 OP 636: Performed by: PEDIATRICS

## 2019-01-01 PROCEDURE — 85610 PROTHROMBIN TIME: CPT

## 2019-01-01 PROCEDURE — 81001 URINALYSIS AUTO W/SCOPE: CPT | Performed by: EMERGENCY MEDICINE

## 2019-01-01 PROCEDURE — 87040 BLOOD CULTURE FOR BACTERIA: CPT | Performed by: EMERGENCY MEDICINE

## 2019-01-01 PROCEDURE — 96365 THER/PROPH/DIAG IV INF INIT: CPT | Performed by: EMERGENCY MEDICINE

## 2019-01-01 PROCEDURE — 25000132 ZZH RX MED GY IP 250 OP 250 PS 637: Performed by: INTERNAL MEDICINE

## 2019-01-01 PROCEDURE — 99215 OFFICE O/P EST HI 40 MIN: CPT | Performed by: FAMILY MEDICINE

## 2019-01-01 PROCEDURE — 99239 HOSP IP/OBS DSCHRG MGMT >30: CPT | Performed by: PEDIATRICS

## 2019-01-01 PROCEDURE — 80048 BASIC METABOLIC PNL TOTAL CA: CPT | Performed by: PEDIATRICS

## 2019-01-01 PROCEDURE — 36415 COLL VENOUS BLD VENIPUNCTURE: CPT | Performed by: STUDENT IN AN ORGANIZED HEALTH CARE EDUCATION/TRAINING PROGRAM

## 2019-01-01 PROCEDURE — 83605 ASSAY OF LACTIC ACID: CPT | Performed by: INTERNAL MEDICINE

## 2019-01-01 PROCEDURE — 85610 PROTHROMBIN TIME: CPT | Performed by: EMERGENCY MEDICINE

## 2019-01-01 PROCEDURE — 85027 COMPLETE CBC AUTOMATED: CPT | Performed by: INTERNAL MEDICINE

## 2019-01-01 PROCEDURE — G0463 HOSPITAL OUTPT CLINIC VISIT: HCPCS

## 2019-01-01 PROCEDURE — 25000125 ZZHC RX 250: Performed by: PEDIATRICS

## 2019-01-01 PROCEDURE — 70450 CT HEAD/BRAIN W/O DYE: CPT

## 2019-01-01 PROCEDURE — 25800030 ZZH RX IP 258 OP 636: Performed by: EMERGENCY MEDICINE

## 2019-01-01 PROCEDURE — 83880 ASSAY OF NATRIURETIC PEPTIDE: CPT | Performed by: EMERGENCY MEDICINE

## 2019-01-01 PROCEDURE — 84484 ASSAY OF TROPONIN QUANT: CPT

## 2019-01-01 PROCEDURE — 87633 RESP VIRUS 12-25 TARGETS: CPT | Performed by: EMERGENCY MEDICINE

## 2019-01-01 PROCEDURE — 99214 OFFICE O/P EST MOD 30 MIN: CPT | Performed by: FAMILY MEDICINE

## 2019-01-01 PROCEDURE — 85730 THROMBOPLASTIN TIME PARTIAL: CPT | Performed by: EMERGENCY MEDICINE

## 2019-01-01 PROCEDURE — 40000274 ZZH STATISTIC RCP CONSULT EA 30 MIN

## 2019-01-01 PROCEDURE — 25000125 ZZHC RX 250: Performed by: STUDENT IN AN ORGANIZED HEALTH CARE EDUCATION/TRAINING PROGRAM

## 2019-01-01 PROCEDURE — 80048 BASIC METABOLIC PNL TOTAL CA: CPT | Performed by: STUDENT IN AN ORGANIZED HEALTH CARE EDUCATION/TRAINING PROGRAM

## 2019-01-01 PROCEDURE — 36415 COLL VENOUS BLD VENIPUNCTURE: CPT | Performed by: PEDIATRICS

## 2019-01-01 PROCEDURE — 82803 BLOOD GASES ANY COMBINATION: CPT

## 2019-01-01 PROCEDURE — 99285 EMERGENCY DEPT VISIT HI MDM: CPT | Mod: 25 | Performed by: EMERGENCY MEDICINE

## 2019-01-01 PROCEDURE — 82565 ASSAY OF CREATININE: CPT | Performed by: STUDENT IN AN ORGANIZED HEALTH CARE EDUCATION/TRAINING PROGRAM

## 2019-01-01 PROCEDURE — 97530 THERAPEUTIC ACTIVITIES: CPT | Mod: GP

## 2019-01-01 PROCEDURE — 82803 BLOOD GASES ANY COMBINATION: CPT | Performed by: STUDENT IN AN ORGANIZED HEALTH CARE EDUCATION/TRAINING PROGRAM

## 2019-01-01 PROCEDURE — 99207 ZZC CDG-MDM COMPONENT: MEETS LOW - DOWN CODED: CPT | Performed by: INTERNAL MEDICINE

## 2019-01-01 PROCEDURE — 97161 PT EVAL LOW COMPLEX 20 MIN: CPT | Mod: GP

## 2019-01-01 PROCEDURE — 99233 SBSQ HOSP IP/OBS HIGH 50: CPT | Mod: GC | Performed by: INTERNAL MEDICINE

## 2019-01-01 PROCEDURE — 25000128 H RX IP 250 OP 636: Performed by: EMERGENCY MEDICINE

## 2019-01-01 PROCEDURE — 40000809 ZZH STATISTIC NO DOCUMENTATION TO SUPPORT CHARGE

## 2019-01-01 PROCEDURE — 83605 ASSAY OF LACTIC ACID: CPT | Performed by: HOSPITALIST

## 2019-01-01 PROCEDURE — 85049 AUTOMATED PLATELET COUNT: CPT | Performed by: STUDENT IN AN ORGANIZED HEALTH CARE EDUCATION/TRAINING PROGRAM

## 2019-01-01 PROCEDURE — 12000012 ZZH R&B MS OVERFLOW UMMC

## 2019-01-01 PROCEDURE — 84443 ASSAY THYROID STIM HORMONE: CPT | Performed by: EMERGENCY MEDICINE

## 2019-01-01 PROCEDURE — 99233 SBSQ HOSP IP/OBS HIGH 50: CPT | Performed by: INTERNAL MEDICINE

## 2019-01-01 PROCEDURE — 82140 ASSAY OF AMMONIA: CPT | Performed by: PEDIATRICS

## 2019-01-01 PROCEDURE — 97530 THERAPEUTIC ACTIVITIES: CPT | Mod: GP | Performed by: PHYSICAL THERAPIST

## 2019-01-01 PROCEDURE — 87631 RESP VIRUS 3-5 TARGETS: CPT | Performed by: STUDENT IN AN ORGANIZED HEALTH CARE EDUCATION/TRAINING PROGRAM

## 2019-01-01 PROCEDURE — 40000141 ZZH STATISTIC PERIPHERAL IV START W/O US GUIDANCE

## 2019-01-01 PROCEDURE — 36415 COLL VENOUS BLD VENIPUNCTURE: CPT | Performed by: FAMILY MEDICINE

## 2019-01-01 PROCEDURE — 99233 SBSQ HOSP IP/OBS HIGH 50: CPT | Mod: GC | Performed by: PEDIATRICS

## 2019-01-01 PROCEDURE — 25000128 H RX IP 250 OP 636: Performed by: HOSPITALIST

## 2019-01-01 PROCEDURE — 84145 PROCALCITONIN (PCT): CPT | Performed by: INTERNAL MEDICINE

## 2019-01-01 PROCEDURE — 80202 ASSAY OF VANCOMYCIN: CPT | Performed by: PEDIATRICS

## 2019-01-01 PROCEDURE — 40000497 ZZHCL STATISTIC SODIUM ED POCT

## 2019-01-01 PROCEDURE — 00000146 ZZHCL STATISTIC GLUCOSE BY METER IP

## 2019-01-01 PROCEDURE — 36415 COLL VENOUS BLD VENIPUNCTURE: CPT | Performed by: EMERGENCY MEDICINE

## 2019-01-01 PROCEDURE — 96361 HYDRATE IV INFUSION ADD-ON: CPT

## 2019-01-01 PROCEDURE — 97161 PT EVAL LOW COMPLEX 20 MIN: CPT | Mod: GP | Performed by: PHYSICAL THERAPIST

## 2019-01-01 PROCEDURE — 85027 COMPLETE CBC AUTOMATED: CPT | Performed by: STUDENT IN AN ORGANIZED HEALTH CARE EDUCATION/TRAINING PROGRAM

## 2019-01-01 PROCEDURE — 40000275 ZZH STATISTIC RCP TIME EA 10 MIN: Performed by: OPTOMETRIST

## 2019-01-01 PROCEDURE — 96365 THER/PROPH/DIAG IV INF INIT: CPT | Mod: 59 | Performed by: EMERGENCY MEDICINE

## 2019-01-01 PROCEDURE — 36600 WITHDRAWAL OF ARTERIAL BLOOD: CPT

## 2019-01-01 PROCEDURE — 84132 ASSAY OF SERUM POTASSIUM: CPT | Performed by: PEDIATRICS

## 2019-01-01 PROCEDURE — 20000004 ZZH R&B ICU UMMC

## 2019-01-01 PROCEDURE — G0008 ADMIN INFLUENZA VIRUS VAC: HCPCS | Performed by: FAMILY MEDICINE

## 2019-01-01 PROCEDURE — 25000128 H RX IP 250 OP 636: Performed by: INTERNAL MEDICINE

## 2019-01-01 PROCEDURE — 87086 URINE CULTURE/COLONY COUNT: CPT | Performed by: EMERGENCY MEDICINE

## 2019-01-01 PROCEDURE — 40000893 ZZH STATISTIC PT IP EVAL DEFER

## 2019-01-01 PROCEDURE — 40000894 ZZH STATISTIC OT IP EVAL DEFER: Performed by: OCCUPATIONAL THERAPIST

## 2019-01-01 PROCEDURE — 99358 PROLONG SERVICE W/O CONTACT: CPT | Performed by: INTERNAL MEDICINE

## 2019-01-01 PROCEDURE — 25000131 ZZH RX MED GY IP 250 OP 636 PS 637: Performed by: INTERNAL MEDICINE

## 2019-01-01 PROCEDURE — 86140 C-REACTIVE PROTEIN: CPT | Performed by: EMERGENCY MEDICINE

## 2019-01-01 PROCEDURE — 96372 THER/PROPH/DIAG INJ SC/IM: CPT | Performed by: EMERGENCY MEDICINE

## 2019-01-01 PROCEDURE — 40000502 ZZHCL STATISTIC GLUCOSE ED POCT

## 2019-01-01 PROCEDURE — 84484 ASSAY OF TROPONIN QUANT: CPT | Performed by: EMERGENCY MEDICINE

## 2019-01-01 PROCEDURE — 96366 THER/PROPH/DIAG IV INF ADDON: CPT | Performed by: EMERGENCY MEDICINE

## 2019-01-01 PROCEDURE — 80053 COMPREHEN METABOLIC PANEL: CPT | Performed by: EMERGENCY MEDICINE

## 2019-01-01 PROCEDURE — 83735 ASSAY OF MAGNESIUM: CPT | Performed by: INTERNAL MEDICINE

## 2019-01-01 PROCEDURE — 36415 COLL VENOUS BLD VENIPUNCTURE: CPT | Performed by: PHYSICIAN ASSISTANT

## 2019-01-01 PROCEDURE — 94640 AIRWAY INHALATION TREATMENT: CPT | Performed by: EMERGENCY MEDICINE

## 2019-01-01 PROCEDURE — 94640 AIRWAY INHALATION TREATMENT: CPT | Mod: 76 | Performed by: OPTOMETRIST

## 2019-01-01 PROCEDURE — 25000132 ZZH RX MED GY IP 250 OP 250 PS 637: Performed by: EMERGENCY MEDICINE

## 2019-01-01 PROCEDURE — 40000894 ZZH STATISTIC OT IP EVAL DEFER

## 2019-01-01 PROCEDURE — 85025 COMPLETE CBC W/AUTO DIFF WBC: CPT | Performed by: PHYSICIAN ASSISTANT

## 2019-01-01 PROCEDURE — 92526 ORAL FUNCTION THERAPY: CPT | Mod: GN | Performed by: SPEECH-LANGUAGE PATHOLOGIST

## 2019-01-01 PROCEDURE — 97112 NEUROMUSCULAR REEDUCATION: CPT | Mod: GP

## 2019-01-01 PROCEDURE — 25000125 ZZHC RX 250: Performed by: EMERGENCY MEDICINE

## 2019-01-01 PROCEDURE — 93005 ELECTROCARDIOGRAM TRACING: CPT | Performed by: EMERGENCY MEDICINE

## 2019-01-01 PROCEDURE — 71046 X-RAY EXAM CHEST 2 VIEWS: CPT

## 2019-01-01 PROCEDURE — 94660 CPAP INITIATION&MGMT: CPT

## 2019-01-01 PROCEDURE — 82330 ASSAY OF CALCIUM: CPT

## 2019-01-01 PROCEDURE — 99233 SBSQ HOSP IP/OBS HIGH 50: CPT | Performed by: PEDIATRICS

## 2019-01-01 PROCEDURE — 82140 ASSAY OF AMMONIA: CPT | Performed by: EMERGENCY MEDICINE

## 2019-01-01 PROCEDURE — 99232 SBSQ HOSP IP/OBS MODERATE 35: CPT | Performed by: INTERNAL MEDICINE

## 2019-01-01 PROCEDURE — 87631 RESP VIRUS 3-5 TARGETS: CPT | Performed by: EMERGENCY MEDICINE

## 2019-01-01 PROCEDURE — 87640 STAPH A DNA AMP PROBE: CPT

## 2019-01-01 PROCEDURE — 92610 EVALUATE SWALLOWING FUNCTION: CPT | Mod: GN

## 2019-01-01 PROCEDURE — 99291 CRITICAL CARE FIRST HOUR: CPT | Mod: 25 | Performed by: EMERGENCY MEDICINE

## 2019-01-01 PROCEDURE — 85025 COMPLETE CBC W/AUTO DIFF WBC: CPT | Performed by: HOSPITALIST

## 2019-01-01 PROCEDURE — 84145 PROCALCITONIN (PCT): CPT | Performed by: EMERGENCY MEDICINE

## 2019-01-01 PROCEDURE — 73140 X-RAY EXAM OF FINGER(S): CPT | Mod: RT

## 2019-01-01 PROCEDURE — 80053 COMPREHEN METABOLIC PANEL: CPT | Performed by: FAMILY MEDICINE

## 2019-01-01 PROCEDURE — 96376 TX/PRO/DX INJ SAME DRUG ADON: CPT | Performed by: EMERGENCY MEDICINE

## 2019-01-01 PROCEDURE — 99239 HOSP IP/OBS DSCHRG MGMT >30: CPT | Mod: GC | Performed by: INTERNAL MEDICINE

## 2019-01-01 PROCEDURE — 85027 COMPLETE CBC AUTOMATED: CPT | Performed by: PEDIATRICS

## 2019-01-01 PROCEDURE — 87800 DETECT AGNT MULT DNA DIREC: CPT | Performed by: EMERGENCY MEDICINE

## 2019-01-01 PROCEDURE — 80048 BASIC METABOLIC PNL TOTAL CA: CPT | Performed by: HOSPITALIST

## 2019-01-01 PROCEDURE — 96375 TX/PRO/DX INJ NEW DRUG ADDON: CPT | Performed by: EMERGENCY MEDICINE

## 2019-01-01 PROCEDURE — 94640 AIRWAY INHALATION TREATMENT: CPT | Performed by: FAMILY MEDICINE

## 2019-01-01 PROCEDURE — 99214 OFFICE O/P EST MOD 30 MIN: CPT | Performed by: PHYSICIAN ASSISTANT

## 2019-01-01 PROCEDURE — 82803 BLOOD GASES ANY COMBINATION: CPT | Performed by: EMERGENCY MEDICINE

## 2019-01-01 PROCEDURE — 70498 CT ANGIOGRAPHY NECK: CPT

## 2019-01-01 PROCEDURE — 84484 ASSAY OF TROPONIN QUANT: CPT | Performed by: PEDIATRICS

## 2019-01-01 PROCEDURE — 69209 REMOVE IMPACTED EAR WAX UNI: CPT | Mod: 50 | Performed by: FAMILY MEDICINE

## 2019-01-01 PROCEDURE — 83605 ASSAY OF LACTIC ACID: CPT

## 2019-01-01 PROCEDURE — 82565 ASSAY OF CREATININE: CPT

## 2019-01-01 PROCEDURE — 74230 X-RAY XM SWLNG FUNCJ C+: CPT

## 2019-01-01 PROCEDURE — 85379 FIBRIN DEGRADATION QUANT: CPT | Performed by: EMERGENCY MEDICINE

## 2019-01-01 PROCEDURE — 71260 CT THORAX DX C+: CPT

## 2019-01-01 PROCEDURE — 82803 BLOOD GASES ANY COMBINATION: CPT | Performed by: HOSPITALIST

## 2019-01-01 PROCEDURE — 87186 SC STD MICRODIL/AGAR DIL: CPT | Performed by: EMERGENCY MEDICINE

## 2019-01-01 PROCEDURE — 99223 1ST HOSP IP/OBS HIGH 75: CPT | Performed by: INTERNAL MEDICINE

## 2019-01-01 PROCEDURE — 36415 COLL VENOUS BLD VENIPUNCTURE: CPT | Performed by: HOSPITALIST

## 2019-01-01 PROCEDURE — 99233 SBSQ HOSP IP/OBS HIGH 50: CPT | Performed by: CLINICAL NURSE SPECIALIST

## 2019-01-01 PROCEDURE — 99213 OFFICE O/P EST LOW 20 MIN: CPT | Mod: 25 | Performed by: FAMILY MEDICINE

## 2019-01-01 PROCEDURE — 40000498 ZZHCL STATISTIC POTASSIUM ED POCT

## 2019-01-01 PROCEDURE — 97110 THERAPEUTIC EXERCISES: CPT | Mod: GP

## 2019-01-01 PROCEDURE — 99223 1ST HOSP IP/OBS HIGH 75: CPT | Mod: AI | Performed by: PEDIATRICS

## 2019-01-01 PROCEDURE — 90662 IIV NO PRSV INCREASED AG IM: CPT | Performed by: FAMILY MEDICINE

## 2019-01-01 PROCEDURE — 99232 SBSQ HOSP IP/OBS MODERATE 35: CPT | Mod: GC | Performed by: PEDIATRICS

## 2019-01-01 PROCEDURE — 99239 HOSP IP/OBS DSCHRG MGMT >30: CPT | Performed by: INTERNAL MEDICINE

## 2019-01-01 PROCEDURE — 92611 MOTION FLUOROSCOPY/SWALLOW: CPT | Mod: GN

## 2019-01-01 PROCEDURE — 40000556 ZZH STATISTIC PERIPHERAL IV START W US GUIDANCE

## 2019-01-01 PROCEDURE — 87040 BLOOD CULTURE FOR BACTERIA: CPT | Performed by: STUDENT IN AN ORGANIZED HEALTH CARE EDUCATION/TRAINING PROGRAM

## 2019-01-01 PROCEDURE — 40000225 ZZH STATISTIC SLP WARD VISIT: Performed by: SPEECH-LANGUAGE PATHOLOGIST

## 2019-01-01 PROCEDURE — 25000125 ZZHC RX 250

## 2019-01-01 RX ORDER — CARBIDOPA AND LEVODOPA 25; 100 MG/1; MG/1
2 TABLET, ORALLY DISINTEGRATING ORAL 4 TIMES DAILY
Status: DISCONTINUED | OUTPATIENT
Start: 2019-01-01 | End: 2019-01-01

## 2019-01-01 RX ORDER — AMLODIPINE BESYLATE 10 MG/1
10 TABLET ORAL AT BEDTIME
Status: ON HOLD | COMMUNITY
End: 2019-01-01

## 2019-01-01 RX ORDER — ACETAMINOPHEN 160 MG/5ML
325 LIQUID ORAL EVERY 6 HOURS PRN
DISCHARGE
Start: 2019-01-01

## 2019-01-01 RX ORDER — BARIUM SULFATE 400 MG/ML
SUSPENSION ORAL ONCE
Status: DISCONTINUED | OUTPATIENT
Start: 2019-01-01 | End: 2019-01-01 | Stop reason: CLARIF

## 2019-01-01 RX ORDER — DONEPEZIL HYDROCHLORIDE 5 MG/1
5 TABLET, FILM COATED ORAL EVERY MORNING
Status: DISCONTINUED | OUTPATIENT
Start: 2019-01-01 | End: 2019-01-01 | Stop reason: HOSPADM

## 2019-01-01 RX ORDER — NALOXONE HYDROCHLORIDE 0.4 MG/ML
.1-.4 INJECTION, SOLUTION INTRAMUSCULAR; INTRAVENOUS; SUBCUTANEOUS
Status: DISCONTINUED | OUTPATIENT
Start: 2019-01-01 | End: 2019-01-01 | Stop reason: HOSPADM

## 2019-01-01 RX ORDER — AMLODIPINE BESYLATE 10 MG/1
10 TABLET ORAL AT BEDTIME
DISCHARGE
Start: 2019-01-01 | End: 2019-01-01

## 2019-01-01 RX ORDER — CARBIDOPA AND LEVODOPA 25; 100 MG/1; MG/1
2 TABLET, ORALLY DISINTEGRATING ORAL ONCE
Status: COMPLETED | OUTPATIENT
Start: 2019-01-01 | End: 2019-01-01

## 2019-01-01 RX ORDER — ONDANSETRON 2 MG/ML
4 INJECTION INTRAMUSCULAR; INTRAVENOUS EVERY 6 HOURS PRN
Status: DISCONTINUED | OUTPATIENT
Start: 2019-01-01 | End: 2019-01-01

## 2019-01-01 RX ORDER — CARBIDOPA AND LEVODOPA 25; 100 MG/1; MG/1
2 TABLET ORAL 3 TIMES DAILY
Status: DISCONTINUED | OUTPATIENT
Start: 2019-01-01 | End: 2019-01-01

## 2019-01-01 RX ORDER — BENZONATATE 100 MG/1
100 CAPSULE ORAL 3 TIMES DAILY PRN
Qty: 60 CAPSULE | Refills: 0 | Status: ON HOLD | OUTPATIENT
Start: 2019-01-01 | End: 2019-01-01

## 2019-01-01 RX ORDER — DONEPEZIL HYDROCHLORIDE 10 MG/1
10 TABLET, FILM COATED ORAL DAILY
Qty: 30 TABLET | Refills: 11 | DISCHARGE
Start: 2019-01-01 | End: 2019-01-01 | Stop reason: DRUGHIGH

## 2019-01-01 RX ORDER — IPRATROPIUM BROMIDE AND ALBUTEROL SULFATE 2.5; .5 MG/3ML; MG/3ML
1 SOLUTION RESPIRATORY (INHALATION) 4 TIMES DAILY
DISCHARGE
Start: 2019-01-01 | End: 2019-01-01

## 2019-01-01 RX ORDER — IPRATROPIUM BROMIDE AND ALBUTEROL SULFATE 2.5; .5 MG/3ML; MG/3ML
3 SOLUTION RESPIRATORY (INHALATION)
Status: DISCONTINUED | OUTPATIENT
Start: 2019-01-01 | End: 2019-01-01 | Stop reason: HOSPADM

## 2019-01-01 RX ORDER — ASPIRIN 81 MG/1
81 TABLET ORAL
Status: DISCONTINUED | OUTPATIENT
Start: 2019-01-01 | End: 2019-01-01

## 2019-01-01 RX ORDER — ACETYLCYSTEINE 100 MG/ML
2 SOLUTION ORAL; RESPIRATORY (INHALATION) EVERY 4 HOURS
Status: DISCONTINUED | OUTPATIENT
Start: 2019-01-01 | End: 2019-01-01

## 2019-01-01 RX ORDER — CARBIDOPA AND LEVODOPA 25; 100 MG/1; MG/1
2 TABLET ORAL 2 TIMES DAILY
Status: DISCONTINUED | OUTPATIENT
Start: 2019-01-01 | End: 2019-01-01 | Stop reason: ALTCHOICE

## 2019-01-01 RX ORDER — BACITRACIN 500 [USP'U]/G
OINTMENT OPHTHALMIC
Qty: 1 G | Refills: 3 | DISCHARGE
Start: 2019-01-01

## 2019-01-01 RX ORDER — MORPHINE SULFATE 10 MG/5ML
2.5 SOLUTION ORAL
Refills: 0 | Status: SHIPPED | DISCHARGE
Start: 2019-01-01

## 2019-01-01 RX ORDER — LACTOBACILLUS RHAMNOSUS GG 10B CELL
1 CAPSULE ORAL EVERY MORNING
Qty: 60 CAPSULE | Refills: 11 | DISCHARGE
Start: 2019-01-01 | End: 2019-01-01

## 2019-01-01 RX ORDER — MORPHINE SULFATE 100 MG/5ML
2.5 SOLUTION ORAL
Status: DISCONTINUED | OUTPATIENT
Start: 2019-01-01 | End: 2019-01-01 | Stop reason: HOSPADM

## 2019-01-01 RX ORDER — CARBIDOPA AND LEVODOPA 25; 100 MG/1; MG/1
1 TABLET, ORALLY DISINTEGRATING ORAL 2 TIMES DAILY
Status: DISCONTINUED | OUTPATIENT
Start: 2019-01-01 | End: 2019-01-01 | Stop reason: HOSPADM

## 2019-01-01 RX ORDER — IPRATROPIUM BROMIDE AND ALBUTEROL SULFATE 2.5; .5 MG/3ML; MG/3ML
3 SOLUTION RESPIRATORY (INHALATION) ONCE
Status: COMPLETED | OUTPATIENT
Start: 2019-01-01 | End: 2019-01-01

## 2019-01-01 RX ORDER — HYDRALAZINE HYDROCHLORIDE 20 MG/ML
10 INJECTION INTRAMUSCULAR; INTRAVENOUS ONCE
Status: COMPLETED | OUTPATIENT
Start: 2019-01-01 | End: 2019-01-01

## 2019-01-01 RX ORDER — MIRTAZAPINE 45 MG/1
22.5 TABLET, FILM COATED ORAL AT BEDTIME
Status: DISCONTINUED | OUTPATIENT
Start: 2019-01-01 | End: 2019-01-01 | Stop reason: HOSPADM

## 2019-01-01 RX ORDER — FUROSEMIDE 10 MG/ML
10 INJECTION INTRAMUSCULAR; INTRAVENOUS ONCE
Status: COMPLETED | OUTPATIENT
Start: 2019-01-01 | End: 2019-01-01

## 2019-01-01 RX ORDER — DEXTROSE MONOHYDRATE 50 MG/ML
INJECTION, SOLUTION INTRAVENOUS CONTINUOUS
Status: DISPENSED | OUTPATIENT
Start: 2019-01-01 | End: 2019-01-01

## 2019-01-01 RX ORDER — IPRATROPIUM BROMIDE AND ALBUTEROL SULFATE 2.5; .5 MG/3ML; MG/3ML
1 SOLUTION RESPIRATORY (INHALATION)
Qty: 10 VIAL | Refills: 11 | Status: SHIPPED | OUTPATIENT
Start: 2019-01-01 | End: 2019-01-01

## 2019-01-01 RX ORDER — POTASSIUM CHLORIDE 20MEQ/15ML
20 LIQUID (ML) ORAL DAILY
Status: DISCONTINUED | OUTPATIENT
Start: 2019-01-01 | End: 2019-01-01 | Stop reason: HOSPADM

## 2019-01-01 RX ORDER — PIPERACILLIN SODIUM, TAZOBACTAM SODIUM 3; .375 G/15ML; G/15ML
3.38 INJECTION, POWDER, LYOPHILIZED, FOR SOLUTION INTRAVENOUS ONCE
Status: COMPLETED | OUTPATIENT
Start: 2019-01-01 | End: 2019-01-01

## 2019-01-01 RX ORDER — LISINOPRIL 10 MG/1
10 TABLET ORAL DAILY
Qty: 30 TABLET | Status: CANCELLED | OUTPATIENT
Start: 2019-01-01

## 2019-01-01 RX ORDER — CARBIDOPA AND LEVODOPA 25; 100 MG/1; MG/1
1-2 TABLET ORAL
Status: DISCONTINUED | OUTPATIENT
Start: 2019-01-01 | End: 2019-01-01

## 2019-01-01 RX ORDER — LORAZEPAM 0.5 MG/1
0.25 TABLET ORAL
Status: SHIPPED | DISCHARGE
Start: 2019-01-01

## 2019-01-01 RX ORDER — POTASSIUM CHLORIDE 750 MG/1
20-40 TABLET, EXTENDED RELEASE ORAL
Status: DISCONTINUED | OUTPATIENT
Start: 2019-01-01 | End: 2019-01-01

## 2019-01-01 RX ORDER — PIPERACILLIN SODIUM, TAZOBACTAM SODIUM 4; .5 G/20ML; G/20ML
4.5 INJECTION, POWDER, LYOPHILIZED, FOR SOLUTION INTRAVENOUS EVERY 6 HOURS
Status: DISCONTINUED | OUTPATIENT
Start: 2019-01-01 | End: 2019-01-01

## 2019-01-01 RX ORDER — SODIUM CHLORIDE FOR INHALATION 3 %
3 VIAL, NEBULIZER (ML) INHALATION
Status: DISCONTINUED | OUTPATIENT
Start: 2019-01-01 | End: 2019-01-01 | Stop reason: HOSPADM

## 2019-01-01 RX ORDER — IPRATROPIUM BROMIDE AND ALBUTEROL SULFATE 2.5; .5 MG/3ML; MG/3ML
3 SOLUTION RESPIRATORY (INHALATION) 2 TIMES DAILY
Status: DISCONTINUED | OUTPATIENT
Start: 2019-01-01 | End: 2019-01-01

## 2019-01-01 RX ORDER — PREDNISONE 20 MG/1
40 TABLET ORAL DAILY
Qty: 4 TABLET | Refills: 0 | Status: SHIPPED | OUTPATIENT
Start: 2019-01-01 | End: 2019-01-01

## 2019-01-01 RX ORDER — AMLODIPINE BESYLATE 10 MG/1
10 TABLET ORAL AT BEDTIME
Status: DISCONTINUED | OUTPATIENT
Start: 2019-01-01 | End: 2019-01-01

## 2019-01-01 RX ORDER — HYDRALAZINE HYDROCHLORIDE 20 MG/ML
10 INJECTION INTRAMUSCULAR; INTRAVENOUS EVERY 6 HOURS PRN
Status: DISCONTINUED | OUTPATIENT
Start: 2019-01-01 | End: 2019-01-01 | Stop reason: HOSPADM

## 2019-01-01 RX ORDER — CEFPODOXIME PROXETIL 200 MG/1
200 TABLET, FILM COATED ORAL
Status: ON HOLD | COMMUNITY
Start: 2019-01-01 | End: 2019-01-01

## 2019-01-01 RX ORDER — IPRATROPIUM BROMIDE AND ALBUTEROL SULFATE 2.5; .5 MG/3ML; MG/3ML
1 SOLUTION RESPIRATORY (INHALATION) EVERY 6 HOURS PRN
Qty: 10 VIAL | Refills: 3 | Status: SHIPPED | OUTPATIENT
Start: 2019-01-01 | End: 2019-01-01

## 2019-01-01 RX ORDER — MULTIPLE VITAMINS W/ MINERALS TAB 9MG-400MCG
1 TAB ORAL DAILY
Status: DISCONTINUED | OUTPATIENT
Start: 2019-01-01 | End: 2019-01-01

## 2019-01-01 RX ORDER — CEFTRIAXONE 1 G/1
1 INJECTION, POWDER, FOR SOLUTION INTRAMUSCULAR; INTRAVENOUS ONCE
Status: DISCONTINUED | OUTPATIENT
Start: 2019-01-01 | End: 2019-01-01

## 2019-01-01 RX ORDER — BACITRACIN 500 [USP'U]/G
OINTMENT OPHTHALMIC
Qty: 1 G | Refills: 3 | Status: SHIPPED | OUTPATIENT
Start: 2019-01-01 | End: 2019-01-01

## 2019-01-01 RX ORDER — CARBIDOPA AND LEVODOPA 25; 100 MG/1; MG/1
2 TABLET, ORALLY DISINTEGRATING ORAL 2 TIMES DAILY
DISCHARGE
Start: 2019-01-01

## 2019-01-01 RX ORDER — LISINOPRIL 10 MG/1
10 TABLET ORAL DAILY
Status: DISCONTINUED | OUTPATIENT
Start: 2019-01-01 | End: 2019-01-01 | Stop reason: HOSPADM

## 2019-01-01 RX ORDER — MORPHINE SULFATE 10 MG/5ML
2.5 SOLUTION ORAL
Refills: 0 | Status: SHIPPED | DISCHARGE
Start: 2019-01-01 | End: 2019-01-01

## 2019-01-01 RX ORDER — ASPIRIN 81 MG/1
81 TABLET, CHEWABLE ORAL
Status: DISCONTINUED | OUTPATIENT
Start: 2019-01-01 | End: 2019-01-01 | Stop reason: HOSPADM

## 2019-01-01 RX ORDER — IPRATROPIUM BROMIDE AND ALBUTEROL SULFATE 2.5; .5 MG/3ML; MG/3ML
1 SOLUTION RESPIRATORY (INHALATION) 2 TIMES DAILY PRN
Qty: 10 VIAL | Refills: 11 | Status: SHIPPED | OUTPATIENT
Start: 2019-01-01 | End: 2019-01-01

## 2019-01-01 RX ORDER — DONEPEZIL HYDROCHLORIDE 5 MG/1
TABLET, FILM COATED ORAL
Qty: 30 TABLET | Refills: 11 | Status: SHIPPED | OUTPATIENT
Start: 2019-01-01

## 2019-01-01 RX ORDER — IBUPROFEN 200 MG
400 TABLET ORAL EVERY 6 HOURS PRN
Status: DISCONTINUED | OUTPATIENT
Start: 2019-01-01 | End: 2019-01-01 | Stop reason: HOSPADM

## 2019-01-01 RX ORDER — MIRTAZAPINE 15 MG/1
TABLET, FILM COATED ORAL
Status: ON HOLD | COMMUNITY
End: 2019-01-01

## 2019-01-01 RX ORDER — SODIUM CHLORIDE 9 MG/ML
1000 INJECTION, SOLUTION INTRAVENOUS CONTINUOUS
Status: DISCONTINUED | OUTPATIENT
Start: 2019-01-01 | End: 2019-01-01

## 2019-01-01 RX ORDER — POTASSIUM CHLORIDE 29.8 MG/ML
20 INJECTION INTRAVENOUS ONCE
Status: COMPLETED | OUTPATIENT
Start: 2019-01-01 | End: 2019-01-01

## 2019-01-01 RX ORDER — HYDRALAZINE HYDROCHLORIDE 20 MG/ML
10 INJECTION INTRAMUSCULAR; INTRAVENOUS EVERY 6 HOURS PRN
Status: DISCONTINUED | OUTPATIENT
Start: 2019-01-01 | End: 2019-01-01

## 2019-01-01 RX ORDER — CHLORTHALIDONE 25 MG/1
12.5 TABLET ORAL DAILY
Qty: 45 TABLET | Refills: 3 | Status: ON HOLD | OUTPATIENT
Start: 2019-01-01 | End: 2019-01-01

## 2019-01-01 RX ORDER — POTASSIUM CHLORIDE 29.8 MG/ML
20 INJECTION INTRAVENOUS
Status: DISCONTINUED | OUTPATIENT
Start: 2019-01-01 | End: 2019-01-01

## 2019-01-01 RX ORDER — DONEPEZIL HYDROCHLORIDE 10 MG/1
10 TABLET, FILM COATED ORAL AT BEDTIME
Status: DISCONTINUED | OUTPATIENT
Start: 2019-01-01 | End: 2019-01-01 | Stop reason: HOSPADM

## 2019-01-01 RX ORDER — LIDOCAINE 40 MG/G
CREAM TOPICAL
Status: DISCONTINUED | OUTPATIENT
Start: 2019-01-01 | End: 2019-01-01 | Stop reason: HOSPADM

## 2019-01-01 RX ORDER — CARBIDOPA AND LEVODOPA 25; 100 MG/1; MG/1
1 TABLET, ORALLY DISINTEGRATING ORAL 3 TIMES DAILY
Status: DISCONTINUED | OUTPATIENT
Start: 2019-01-01 | End: 2019-01-01 | Stop reason: HOSPADM

## 2019-01-01 RX ORDER — DEXTROSE MONOHYDRATE 50 MG/ML
INJECTION, SOLUTION INTRAVENOUS CONTINUOUS
Status: DISCONTINUED | OUTPATIENT
Start: 2019-01-01 | End: 2019-01-01

## 2019-01-01 RX ORDER — LIDOCAINE 40 MG/G
CREAM TOPICAL
Status: DISCONTINUED | OUTPATIENT
Start: 2019-01-01 | End: 2019-01-01

## 2019-01-01 RX ORDER — CHOLECALCIFEROL (VITAMIN D3) 125 MCG
6000 CAPSULE ORAL
Qty: 540 TABLET | Refills: 3 | Status: SHIPPED | OUTPATIENT
Start: 2019-01-01 | End: 2019-01-01

## 2019-01-01 RX ORDER — BENZONATATE 100 MG/1
100 CAPSULE ORAL 3 TIMES DAILY PRN
Qty: 60 CAPSULE | Refills: 0 | DISCHARGE
Start: 2019-01-01 | End: 2019-01-01

## 2019-01-01 RX ORDER — AMOXICILLIN AND CLAVULANATE POTASSIUM 500; 125 MG/1; MG/1
1 TABLET, FILM COATED ORAL EVERY 8 HOURS SCHEDULED
Status: DISCONTINUED | OUTPATIENT
Start: 2019-01-01 | End: 2019-01-01

## 2019-01-01 RX ORDER — CEFTRIAXONE 2 G/1
2 INJECTION, POWDER, FOR SOLUTION INTRAMUSCULAR; INTRAVENOUS EVERY 24 HOURS
Status: DISCONTINUED | OUTPATIENT
Start: 2019-01-01 | End: 2019-01-01 | Stop reason: HOSPADM

## 2019-01-01 RX ORDER — ALBUTEROL SULFATE 5 MG/ML
2.5 SOLUTION RESPIRATORY (INHALATION) EVERY 6 HOURS PRN
Status: DISCONTINUED | OUTPATIENT
Start: 2019-01-01 | End: 2019-01-01 | Stop reason: HOSPADM

## 2019-01-01 RX ORDER — MORPHINE SULFATE 10 MG/5ML
2.5 SOLUTION ORAL
Status: DISCONTINUED | OUTPATIENT
Start: 2019-01-01 | End: 2019-01-01 | Stop reason: HOSPADM

## 2019-01-01 RX ORDER — FAMOTIDINE 20 MG/1
20 TABLET, FILM COATED ORAL DAILY
Qty: 7 TABLET | Refills: 0 | Status: ON HOLD | OUTPATIENT
Start: 2019-01-01 | End: 2019-01-01

## 2019-01-01 RX ORDER — FLUTICASONE PROPIONATE 110 UG/1
2 AEROSOL, METERED RESPIRATORY (INHALATION) EVERY 12 HOURS
Status: DISCONTINUED | OUTPATIENT
Start: 2019-01-01 | End: 2019-01-01 | Stop reason: HOSPADM

## 2019-01-01 RX ORDER — FLUTICASONE PROPIONATE 110 UG/1
2 AEROSOL, METERED RESPIRATORY (INHALATION) 2 TIMES DAILY
Status: ON HOLD | COMMUNITY
End: 2019-01-01

## 2019-01-01 RX ORDER — VENLAFAXINE HYDROCHLORIDE 75 MG/1
75 CAPSULE, EXTENDED RELEASE ORAL AT BEDTIME
Status: DISCONTINUED | OUTPATIENT
Start: 2019-01-01 | End: 2019-01-01

## 2019-01-01 RX ORDER — ONDANSETRON 4 MG/1
4 TABLET, ORALLY DISINTEGRATING ORAL EVERY 6 HOURS PRN
DISCHARGE
Start: 2019-01-01

## 2019-01-01 RX ORDER — PREDNISONE 10 MG/1
TABLET ORAL
Qty: 23 TABLET | Refills: 0 | Status: SHIPPED | OUTPATIENT
Start: 2019-01-01 | End: 2019-01-01

## 2019-01-01 RX ORDER — VENLAFAXINE HYDROCHLORIDE 37.5 MG/1
37.5 CAPSULE, EXTENDED RELEASE ORAL DAILY
Status: DISCONTINUED | OUTPATIENT
Start: 2019-01-01 | End: 2019-01-01 | Stop reason: HOSPADM

## 2019-01-01 RX ORDER — DONEPEZIL HYDROCHLORIDE 10 MG/1
10 TABLET, FILM COATED ORAL AT BEDTIME
Qty: 30 TABLET | Refills: 11 | DISCHARGE
Start: 2019-01-01 | End: 2019-01-01

## 2019-01-01 RX ORDER — CARBIDOPA AND LEVODOPA 25; 100 MG/1; MG/1
2 TABLET, ORALLY DISINTEGRATING ORAL 3 TIMES DAILY
Status: DISCONTINUED | OUTPATIENT
Start: 2019-01-01 | End: 2019-01-01

## 2019-01-01 RX ORDER — CARBIDOPA AND LEVODOPA 25; 100 MG/1; MG/1
2 TABLET, ORALLY DISINTEGRATING ORAL 2 TIMES DAILY
Status: DISCONTINUED | OUTPATIENT
Start: 2019-01-01 | End: 2019-01-01

## 2019-01-01 RX ORDER — VENLAFAXINE HYDROCHLORIDE 37.5 MG/1
37.5 CAPSULE, EXTENDED RELEASE ORAL
Status: DISCONTINUED | OUTPATIENT
Start: 2019-01-01 | End: 2019-01-01 | Stop reason: HOSPADM

## 2019-01-01 RX ORDER — AMLODIPINE BESYLATE 5 MG/1
5 TABLET ORAL AT BEDTIME
Status: DISCONTINUED | OUTPATIENT
Start: 2019-01-01 | End: 2019-01-01

## 2019-01-01 RX ORDER — PIPERACILLIN SODIUM, TAZOBACTAM SODIUM 3; .375 G/15ML; G/15ML
3.38 INJECTION, POWDER, LYOPHILIZED, FOR SOLUTION INTRAVENOUS EVERY 6 HOURS
Status: DISCONTINUED | OUTPATIENT
Start: 2019-01-01 | End: 2019-01-01

## 2019-01-01 RX ORDER — PREDNISONE 20 MG/1
40 TABLET ORAL DAILY
Status: DISCONTINUED | OUTPATIENT
Start: 2019-01-01 | End: 2019-01-01 | Stop reason: HOSPADM

## 2019-01-01 RX ORDER — HYDROCORTISONE VALERATE CREAM 2 MG/G
CREAM TOPICAL DAILY
DISCHARGE
Start: 2019-01-01 | End: 2019-01-01

## 2019-01-01 RX ORDER — CEFTRIAXONE 1 G/1
1 INJECTION, POWDER, FOR SOLUTION INTRAMUSCULAR; INTRAVENOUS ONCE
Status: COMPLETED | OUTPATIENT
Start: 2019-01-01 | End: 2019-01-01

## 2019-01-01 RX ORDER — DONEPEZIL HYDROCHLORIDE 10 MG/1
10 TABLET, FILM COATED ORAL AT BEDTIME
Status: DISCONTINUED | OUTPATIENT
Start: 2019-01-01 | End: 2019-01-01

## 2019-01-01 RX ORDER — IPRATROPIUM BROMIDE AND ALBUTEROL SULFATE 2.5; .5 MG/3ML; MG/3ML
3 SOLUTION RESPIRATORY (INHALATION)
Status: DISCONTINUED | OUTPATIENT
Start: 2019-01-01 | End: 2019-01-01

## 2019-01-01 RX ORDER — CARBIDOPA AND LEVODOPA 25; 100 MG/1; MG/1
1 TABLET, ORALLY DISINTEGRATING ORAL 3 TIMES DAILY
Status: DISCONTINUED | OUTPATIENT
Start: 2019-01-01 | End: 2019-01-01

## 2019-01-01 RX ORDER — VENLAFAXINE HYDROCHLORIDE 37.5 MG/1
37.5 CAPSULE, EXTENDED RELEASE ORAL DAILY
Status: DISCONTINUED | OUTPATIENT
Start: 2019-01-01 | End: 2019-01-01

## 2019-01-01 RX ORDER — POTASSIUM CHLORIDE 7.45 MG/ML
10 INJECTION INTRAVENOUS
Status: DISCONTINUED | OUTPATIENT
Start: 2019-01-01 | End: 2019-01-01

## 2019-01-01 RX ORDER — IOPAMIDOL 755 MG/ML
75 INJECTION, SOLUTION INTRAVASCULAR ONCE
Status: DISCONTINUED | OUTPATIENT
Start: 2019-01-01 | End: 2019-01-01 | Stop reason: CLARIF

## 2019-01-01 RX ORDER — VENLAFAXINE HYDROCHLORIDE 75 MG/1
75 CAPSULE, EXTENDED RELEASE ORAL EVERY EVENING
Status: DISCONTINUED | OUTPATIENT
Start: 2019-01-01 | End: 2019-01-01 | Stop reason: HOSPADM

## 2019-01-01 RX ORDER — CARBIDOPA AND LEVODOPA 25; 100 MG/1; MG/1
1 TABLET ORAL 3 TIMES DAILY
Status: DISCONTINUED | OUTPATIENT
Start: 2019-01-01 | End: 2019-01-01 | Stop reason: HOSPADM

## 2019-01-01 RX ORDER — CARBIDOPA AND LEVODOPA 25; 100 MG/1; MG/1
TABLET, ORALLY DISINTEGRATING ORAL
DISCHARGE
Start: 2019-01-01

## 2019-01-01 RX ORDER — LACTOBACILLUS RHAMNOSUS GG 10B CELL
1 CAPSULE ORAL EVERY MORNING
Status: DISCONTINUED | OUTPATIENT
Start: 2019-01-01 | End: 2019-01-01

## 2019-01-01 RX ORDER — FAMOTIDINE 20 MG/1
20 TABLET, FILM COATED ORAL DAILY
Status: DISCONTINUED | OUTPATIENT
Start: 2019-01-01 | End: 2019-01-01

## 2019-01-01 RX ORDER — INHALER, ASSIST DEVICES
SPACER (EA) MISCELLANEOUS
Qty: 1 EACH | Refills: 0 | Status: SHIPPED | OUTPATIENT
Start: 2019-01-01

## 2019-01-01 RX ORDER — LISINOPRIL 10 MG/1
10 TABLET ORAL DAILY
Qty: 1 TABLET | Refills: 0 | COMMUNITY
Start: 2019-01-01 | End: 2019-01-01 | Stop reason: DRUGHIGH

## 2019-01-01 RX ORDER — CARBIDOPA AND LEVODOPA 25; 100 MG/1; MG/1
1 TABLET, ORALLY DISINTEGRATING ORAL 2 TIMES DAILY
DISCHARGE
Start: 2019-01-01

## 2019-01-01 RX ORDER — LORAZEPAM 2 MG/ML
0.25 INJECTION INTRAMUSCULAR
Status: DISCONTINUED | OUTPATIENT
Start: 2019-01-01 | End: 2019-01-01

## 2019-01-01 RX ORDER — CARBIDOPA AND LEVODOPA 50; 200 MG/1; MG/1
1 TABLET, EXTENDED RELEASE ORAL AT BEDTIME
Status: DISCONTINUED | OUTPATIENT
Start: 2019-01-01 | End: 2019-01-01

## 2019-01-01 RX ORDER — QUETIAPINE FUMARATE 25 MG/1
25 TABLET, FILM COATED ORAL
Status: DISCONTINUED | OUTPATIENT
Start: 2019-01-01 | End: 2019-01-01 | Stop reason: HOSPADM

## 2019-01-01 RX ORDER — PREDNISONE 20 MG/1
40 TABLET ORAL
Status: DISCONTINUED | OUTPATIENT
Start: 2019-01-01 | End: 2019-01-01 | Stop reason: HOSPADM

## 2019-01-01 RX ORDER — CEFTRIAXONE 1 G/1
1 INJECTION, POWDER, FOR SOLUTION INTRAMUSCULAR; INTRAVENOUS EVERY 24 HOURS
Status: DISCONTINUED | OUTPATIENT
Start: 2019-01-01 | End: 2019-01-01

## 2019-01-01 RX ORDER — HYDROCORTISONE VALERATE CREAM 2 MG/G
CREAM TOPICAL
Status: ON HOLD | COMMUNITY
End: 2019-01-01

## 2019-01-01 RX ORDER — DONEPEZIL HYDROCHLORIDE 5 MG/1
5 TABLET, FILM COATED ORAL EVERY MORNING
Qty: 30 TABLET | Refills: 11 | DISCHARGE
Start: 2019-01-01 | End: 2019-01-01

## 2019-01-01 RX ORDER — GINSENG 100 MG
CAPSULE ORAL DAILY
DISCHARGE
Start: 2019-01-01

## 2019-01-01 RX ORDER — MIRTAZAPINE 45 MG/1
22.5 TABLET, FILM COATED ORAL AT BEDTIME
Status: DISCONTINUED | OUTPATIENT
Start: 2019-01-01 | End: 2019-01-01

## 2019-01-01 RX ORDER — HYDRALAZINE HYDROCHLORIDE 20 MG/ML
10 INJECTION INTRAMUSCULAR; INTRAVENOUS EVERY 4 HOURS PRN
Status: DISCONTINUED | OUTPATIENT
Start: 2019-01-01 | End: 2019-01-01

## 2019-01-01 RX ORDER — ALBUTEROL SULFATE 1.25 MG/3ML
1.25 SOLUTION RESPIRATORY (INHALATION) ONCE
Status: COMPLETED | OUTPATIENT
Start: 2019-01-01 | End: 2019-01-01

## 2019-01-01 RX ORDER — FAMOTIDINE 40 MG/5ML
20 POWDER, FOR SUSPENSION ORAL DAILY
DISCHARGE
Start: 2019-01-01 | End: 2019-01-01

## 2019-01-01 RX ORDER — MIRTAZAPINE 15 MG/1
22.5 TABLET, FILM COATED ORAL DAILY
DISCHARGE
Start: 2019-01-01

## 2019-01-01 RX ORDER — DEXTROSE MONOHYDRATE 50 MG/ML
INJECTION, SOLUTION INTRAVENOUS CONTINUOUS
Status: ACTIVE | OUTPATIENT
Start: 2019-01-01 | End: 2019-01-01

## 2019-01-01 RX ORDER — CEPHALEXIN 500 MG/1
500 CAPSULE ORAL 2 TIMES DAILY
Qty: 14 CAPSULE | Refills: 0 | Status: SHIPPED | OUTPATIENT
Start: 2019-01-01 | End: 2019-01-01

## 2019-01-01 RX ORDER — LISINOPRIL 10 MG/1
10 TABLET ORAL DAILY
Status: DISCONTINUED | OUTPATIENT
Start: 2019-01-01 | End: 2019-01-01

## 2019-01-01 RX ORDER — DOXYCYCLINE HYCLATE 100 MG
100 TABLET ORAL
Status: ON HOLD | COMMUNITY
Start: 2019-01-01 | End: 2019-01-01

## 2019-01-01 RX ORDER — AMPICILLIN AND SULBACTAM 2; 1 G/1; G/1
3 INJECTION, POWDER, FOR SOLUTION INTRAMUSCULAR; INTRAVENOUS ONCE
Status: COMPLETED | OUTPATIENT
Start: 2019-01-01 | End: 2019-01-01

## 2019-01-01 RX ORDER — VENLAFAXINE 37.5 MG/1
37.5 TABLET ORAL
DISCHARGE
Start: 2019-01-01

## 2019-01-01 RX ORDER — CARBIDOPA AND LEVODOPA 25; 100 MG/1; MG/1
2 TABLET ORAL 2 TIMES DAILY
Status: DISCONTINUED | OUTPATIENT
Start: 2019-01-01 | End: 2019-01-01 | Stop reason: HOSPADM

## 2019-01-01 RX ORDER — ACETAMINOPHEN 650 MG/1
650 SUPPOSITORY RECTAL EVERY 4 HOURS PRN
Status: DISCONTINUED | OUTPATIENT
Start: 2019-01-01 | End: 2019-01-01 | Stop reason: HOSPADM

## 2019-01-01 RX ORDER — ACETAMINOPHEN 325 MG/1
650 TABLET ORAL AT BEDTIME
Status: ON HOLD | COMMUNITY
End: 2019-01-01

## 2019-01-01 RX ORDER — POTASSIUM CHLORIDE 1.5 G/1.58G
40 POWDER, FOR SOLUTION ORAL ONCE
Status: COMPLETED | OUTPATIENT
Start: 2019-01-01 | End: 2019-01-01

## 2019-01-01 RX ORDER — FLUTICASONE PROPIONATE 110 UG/1
2 AEROSOL, METERED RESPIRATORY (INHALATION) 2 TIMES DAILY
DISCHARGE
Start: 2019-01-01 | End: 2019-01-01

## 2019-01-01 RX ORDER — PSEUDOEPHEDRINE HCL 30 MG
30 TABLET ORAL ONCE
Status: COMPLETED | OUTPATIENT
Start: 2019-01-01 | End: 2019-01-01

## 2019-01-01 RX ORDER — ACETAMINOPHEN 325 MG/1
650 TABLET ORAL EVERY 4 HOURS PRN
Status: DISCONTINUED | OUTPATIENT
Start: 2019-01-01 | End: 2019-01-01 | Stop reason: HOSPADM

## 2019-01-01 RX ORDER — HYDROCORTISONE VALERATE CREAM 2 MG/G
CREAM TOPICAL
Qty: 45 G | Refills: 1 | Status: SHIPPED | OUTPATIENT
Start: 2019-01-01 | End: 2019-01-01

## 2019-01-01 RX ORDER — IPRATROPIUM BROMIDE AND ALBUTEROL SULFATE 2.5; .5 MG/3ML; MG/3ML
3 SOLUTION RESPIRATORY (INHALATION) EVERY 4 HOURS
Status: DISCONTINUED | OUTPATIENT
Start: 2019-01-01 | End: 2019-01-01

## 2019-01-01 RX ORDER — CARBIDOPA AND LEVODOPA 25; 100 MG/1; MG/1
1 TABLET, ORALLY DISINTEGRATING ORAL 2 TIMES DAILY
Status: DISCONTINUED | OUTPATIENT
Start: 2019-01-01 | End: 2019-01-01

## 2019-01-01 RX ORDER — CARBIDOPA AND LEVODOPA 50; 200 MG/1; MG/1
1 TABLET, EXTENDED RELEASE ORAL AT BEDTIME
Status: DISCONTINUED | OUTPATIENT
Start: 2019-01-01 | End: 2019-01-01 | Stop reason: ALTCHOICE

## 2019-01-01 RX ORDER — LORAZEPAM 0.5 MG/1
0.25 TABLET ORAL
Status: SHIPPED | DISCHARGE
Start: 2019-01-01 | End: 2019-01-01

## 2019-01-01 RX ORDER — CARBIDOPA AND LEVODOPA 25; 100 MG/1; MG/1
2 TABLET ORAL
Status: DISCONTINUED | OUTPATIENT
Start: 2019-01-01 | End: 2019-01-01

## 2019-01-01 RX ORDER — VENLAFAXINE HYDROCHLORIDE 37.5 MG/1
75 CAPSULE, EXTENDED RELEASE ORAL AT BEDTIME
Status: DISCONTINUED | OUTPATIENT
Start: 2019-01-01 | End: 2019-01-01 | Stop reason: HOSPADM

## 2019-01-01 RX ORDER — NITROFURANTOIN 25; 75 MG/1; MG/1
100 CAPSULE ORAL 2 TIMES DAILY
Qty: 14 CAPSULE | Refills: 0 | Status: ON HOLD | OUTPATIENT
Start: 2019-01-01 | End: 2019-01-01

## 2019-01-01 RX ORDER — IOPAMIDOL 755 MG/ML
75 INJECTION, SOLUTION INTRAVASCULAR ONCE
Status: COMPLETED | OUTPATIENT
Start: 2019-01-01 | End: 2019-01-01

## 2019-01-01 RX ORDER — IPRATROPIUM BROMIDE AND ALBUTEROL SULFATE 2.5; .5 MG/3ML; MG/3ML
1 SOLUTION RESPIRATORY (INHALATION) EVERY 6 HOURS PRN
COMMUNITY
End: 2019-01-01

## 2019-01-01 RX ORDER — GINSENG 100 MG
CAPSULE ORAL DAILY
Status: ON HOLD | COMMUNITY
End: 2019-01-01

## 2019-01-01 RX ORDER — FAMOTIDINE 20 MG/1
20 TABLET, FILM COATED ORAL DAILY
Qty: 90 TABLET | Refills: 3 | Status: SHIPPED | OUTPATIENT
Start: 2019-01-01 | End: 2019-01-01

## 2019-01-01 RX ORDER — IPRATROPIUM BROMIDE AND ALBUTEROL SULFATE 2.5; .5 MG/3ML; MG/3ML
SOLUTION RESPIRATORY (INHALATION)
Qty: 10 VIAL | Refills: 3 | Status: ON HOLD | OUTPATIENT
Start: 2019-01-01 | End: 2019-01-01

## 2019-01-01 RX ORDER — BACITRACIN 500 [USP'U]/G
OINTMENT OPHTHALMIC DAILY PRN
Status: DISCONTINUED | OUTPATIENT
Start: 2019-01-01 | End: 2019-01-01 | Stop reason: HOSPADM

## 2019-01-01 RX ORDER — CARBIDOPA AND LEVODOPA 50; 200 MG/1; MG/1
1 TABLET, EXTENDED RELEASE ORAL ONCE
Status: COMPLETED | OUTPATIENT
Start: 2019-01-01 | End: 2019-01-01

## 2019-01-01 RX ORDER — FAMOTIDINE 40 MG/5ML
20 POWDER, FOR SUSPENSION ORAL DAILY
Status: DISCONTINUED | OUTPATIENT
Start: 2019-01-01 | End: 2019-01-01

## 2019-01-01 RX ORDER — ONDANSETRON 2 MG/ML
4 INJECTION INTRAMUSCULAR; INTRAVENOUS EVERY 6 HOURS PRN
Status: DISCONTINUED | OUTPATIENT
Start: 2019-01-01 | End: 2019-01-01 | Stop reason: HOSPADM

## 2019-01-01 RX ORDER — LORAZEPAM 0.5 MG/1
0.25 TABLET ORAL
Status: DISCONTINUED | OUTPATIENT
Start: 2019-01-01 | End: 2019-01-01 | Stop reason: HOSPADM

## 2019-01-01 RX ORDER — ACETAMINOPHEN 325 MG/1
325 TABLET ORAL EVERY 4 HOURS PRN
Status: DISCONTINUED | OUTPATIENT
Start: 2019-01-01 | End: 2019-01-01 | Stop reason: HOSPADM

## 2019-01-01 RX ORDER — IPRATROPIUM BROMIDE AND ALBUTEROL SULFATE 2.5; .5 MG/3ML; MG/3ML
1 SOLUTION RESPIRATORY (INHALATION) EVERY 6 HOURS PRN
DISCHARGE
Start: 2019-01-01 | End: 2019-01-01

## 2019-01-01 RX ORDER — CLONAZEPAM 0.5 MG/1
TABLET, ORALLY DISINTEGRATING ORAL
Qty: 45 TABLET | Refills: 5 | Status: ON HOLD | OUTPATIENT
Start: 2019-01-01 | End: 2019-01-01

## 2019-01-01 RX ORDER — FAMOTIDINE 40 MG/5ML
20 POWDER, FOR SUSPENSION ORAL DAILY
DISCHARGE
Start: 2019-01-01

## 2019-01-01 RX ORDER — DOXYCYCLINE 100 MG/10ML
100 INJECTION, POWDER, LYOPHILIZED, FOR SOLUTION INTRAVENOUS ONCE
Status: COMPLETED | OUTPATIENT
Start: 2019-01-01 | End: 2019-01-01

## 2019-01-01 RX ORDER — CLONAZEPAM 0.25 MG/1
0.25 TABLET, ORALLY DISINTEGRATING ORAL AT BEDTIME
Qty: 45 TABLET | Refills: 3 | Status: CANCELLED | OUTPATIENT
Start: 2019-01-01

## 2019-01-01 RX ORDER — IOPAMIDOL 755 MG/ML
70 INJECTION, SOLUTION INTRAVASCULAR ONCE
Status: COMPLETED | OUTPATIENT
Start: 2019-01-01 | End: 2019-01-01

## 2019-01-01 RX ORDER — PREDNISONE 20 MG/1
40 TABLET ORAL DAILY
Qty: 10 TABLET | Refills: 0 | Status: SHIPPED | OUTPATIENT
Start: 2019-01-01 | End: 2019-01-01

## 2019-01-01 RX ORDER — ONDANSETRON 4 MG/1
4 TABLET, ORALLY DISINTEGRATING ORAL EVERY 6 HOURS PRN
Status: DISCONTINUED | OUTPATIENT
Start: 2019-01-01 | End: 2019-01-01 | Stop reason: HOSPADM

## 2019-01-01 RX ORDER — SODIUM CHLORIDE 9 MG/ML
INJECTION, SOLUTION INTRAVENOUS CONTINUOUS
Status: DISCONTINUED | OUTPATIENT
Start: 2019-01-01 | End: 2019-01-01

## 2019-01-01 RX ORDER — CARBIDOPA AND LEVODOPA 50; 200 MG/1; MG/1
1 TABLET, EXTENDED RELEASE ORAL AT BEDTIME
Status: DISCONTINUED | OUTPATIENT
Start: 2019-01-01 | End: 2019-01-01 | Stop reason: HOSPADM

## 2019-01-01 RX ORDER — IPRATROPIUM BROMIDE AND ALBUTEROL SULFATE 2.5; .5 MG/3ML; MG/3ML
SOLUTION RESPIRATORY (INHALATION)
Qty: 10 VIAL | Refills: 3 | Status: SHIPPED | OUTPATIENT
Start: 2019-01-01 | End: 2019-01-01

## 2019-01-01 RX ORDER — FAMOTIDINE 40 MG/5ML
20 POWDER, FOR SUSPENSION ORAL DAILY
Status: DISCONTINUED | OUTPATIENT
Start: 2019-01-01 | End: 2019-01-01 | Stop reason: HOSPADM

## 2019-01-01 RX ORDER — GUAIFENESIN 200 MG/10ML
200 LIQUID ORAL 4 TIMES DAILY
Qty: 236 ML | Refills: 0 | Status: ON HOLD | OUTPATIENT
Start: 2019-01-01 | End: 2019-01-01

## 2019-01-01 RX ORDER — PREDNISONE 20 MG/1
40 TABLET ORAL DAILY
Qty: 10 TABLET | Refills: 0 | Status: ON HOLD | OUTPATIENT
Start: 2019-01-01 | End: 2019-01-01

## 2019-01-01 RX ORDER — IPRATROPIUM BROMIDE AND ALBUTEROL SULFATE 2.5; .5 MG/3ML; MG/3ML
3 SOLUTION RESPIRATORY (INHALATION) EVERY 4 HOURS PRN
Status: DISCONTINUED | OUTPATIENT
Start: 2019-01-01 | End: 2019-01-01

## 2019-01-01 RX ORDER — VITAMIN B COMPLEX
1000 TABLET ORAL EVERY MORNING
Status: DISCONTINUED | OUTPATIENT
Start: 2019-01-01 | End: 2019-01-01

## 2019-01-01 RX ORDER — CARBIDOPA AND LEVODOPA 25; 100 MG/1; MG/1
2 TABLET, ORALLY DISINTEGRATING ORAL 2 TIMES DAILY
Status: DISCONTINUED | OUTPATIENT
Start: 2019-01-01 | End: 2019-01-01 | Stop reason: HOSPADM

## 2019-01-01 RX ORDER — AMOXICILLIN AND CLAVULANATE POTASSIUM 500; 125 MG/1; MG/1
1 TABLET, FILM COATED ORAL EVERY 8 HOURS
Qty: 9 TABLET | Refills: 0 | Status: SHIPPED | OUTPATIENT
Start: 2019-01-01 | End: 2019-01-01

## 2019-01-01 RX ORDER — METHYLPREDNISOLONE SODIUM SUCCINATE 40 MG/ML
40 INJECTION, POWDER, LYOPHILIZED, FOR SOLUTION INTRAMUSCULAR; INTRAVENOUS EVERY 24 HOURS
Status: COMPLETED | OUTPATIENT
Start: 2019-01-01 | End: 2019-01-01

## 2019-01-01 RX ORDER — DOXYCYCLINE 100 MG/1
100 CAPSULE ORAL EVERY 12 HOURS SCHEDULED
Status: DISCONTINUED | OUTPATIENT
Start: 2019-01-01 | End: 2019-01-01

## 2019-01-01 RX ORDER — CODEINE PHOSPHATE AND GUAIFENESIN 10; 100 MG/5ML; MG/5ML
1-2 SOLUTION ORAL EVERY 4 HOURS PRN
Qty: 120 ML | Refills: 0 | Status: SHIPPED | OUTPATIENT
Start: 2019-01-01 | End: 2019-01-01

## 2019-01-01 RX ORDER — ALBUTEROL SULFATE 0.83 MG/ML
2.5 SOLUTION RESPIRATORY (INHALATION) EVERY 4 HOURS PRN
Status: DISCONTINUED | OUTPATIENT
Start: 2019-01-01 | End: 2019-01-01 | Stop reason: HOSPADM

## 2019-01-01 RX ORDER — IPRATROPIUM BROMIDE AND ALBUTEROL SULFATE 2.5; .5 MG/3ML; MG/3ML
1 SOLUTION RESPIRATORY (INHALATION) 4 TIMES DAILY
Status: ON HOLD | COMMUNITY
End: 2019-01-01

## 2019-01-01 RX ORDER — CLONAZEPAM 0.5 MG/1
TABLET ORAL
Qty: 45 TABLET | Refills: 3 | Status: ON HOLD | OUTPATIENT
Start: 2019-01-01 | End: 2019-01-01

## 2019-01-01 RX ORDER — GINSENG 100 MG
CAPSULE ORAL DAILY
DISCHARGE
Start: 2019-01-01 | End: 2019-01-01

## 2019-01-01 RX ORDER — VENLAFAXINE 37.5 MG/1
37.5 TABLET ORAL
Status: DISCONTINUED | OUTPATIENT
Start: 2019-01-01 | End: 2019-01-01 | Stop reason: HOSPADM

## 2019-01-01 RX ORDER — HYDROCORTISONE VALERATE CREAM 2 MG/G
CREAM TOPICAL
COMMUNITY

## 2019-01-01 RX ORDER — DOXYCYCLINE 100 MG/1
100 CAPSULE ORAL 2 TIMES DAILY
Qty: 20 CAPSULE | Refills: 0 | Status: SHIPPED | OUTPATIENT
Start: 2019-01-01 | End: 2019-01-01

## 2019-01-01 RX ORDER — IPRATROPIUM BROMIDE AND ALBUTEROL SULFATE 2.5; .5 MG/3ML; MG/3ML
SOLUTION RESPIRATORY (INHALATION)
Status: COMPLETED
Start: 2019-01-01 | End: 2019-01-01

## 2019-01-01 RX ORDER — ACETYLCYSTEINE 100 MG/ML
2 SOLUTION ORAL; RESPIRATORY (INHALATION) 4 TIMES DAILY
Status: DISCONTINUED | OUTPATIENT
Start: 2019-01-01 | End: 2019-01-01

## 2019-01-01 RX ORDER — HYDROCORTISONE VALERATE CREAM 2 MG/G
CREAM TOPICAL
Qty: 45 G | Refills: 11 | OUTPATIENT
Start: 2019-01-01

## 2019-01-01 RX ORDER — DOXYCYCLINE 100 MG/10ML
100 INJECTION, POWDER, LYOPHILIZED, FOR SOLUTION INTRAVENOUS EVERY 12 HOURS
Status: DISCONTINUED | OUTPATIENT
Start: 2019-01-01 | End: 2019-01-01 | Stop reason: HOSPADM

## 2019-01-01 RX ORDER — CARBIDOPA AND LEVODOPA 25; 100 MG/1; MG/1
2 TABLET ORAL 3 TIMES DAILY
Status: DISCONTINUED | OUTPATIENT
Start: 2019-01-01 | End: 2019-01-01 | Stop reason: HOSPADM

## 2019-01-01 RX ORDER — POTASSIUM CL/LIDO/0.9 % NACL 10MEQ/0.1L
10 INTRAVENOUS SOLUTION, PIGGYBACK (ML) INTRAVENOUS
Status: DISCONTINUED | OUTPATIENT
Start: 2019-01-01 | End: 2019-01-01

## 2019-01-01 RX ORDER — ALBUTEROL SULFATE 0.83 MG/ML
3 SOLUTION RESPIRATORY (INHALATION)
Status: DISCONTINUED | OUTPATIENT
Start: 2019-01-01 | End: 2019-01-01 | Stop reason: HOSPADM

## 2019-01-01 RX ORDER — POTASSIUM CHLORIDE 1.5 G/1.58G
20-40 POWDER, FOR SOLUTION ORAL
Status: DISCONTINUED | OUTPATIENT
Start: 2019-01-01 | End: 2019-01-01

## 2019-01-01 RX ORDER — IPRATROPIUM BROMIDE AND ALBUTEROL SULFATE 2.5; .5 MG/3ML; MG/3ML
1 SOLUTION RESPIRATORY (INHALATION) EVERY 6 HOURS PRN
Status: ON HOLD | COMMUNITY
End: 2019-01-01

## 2019-01-01 RX ORDER — PREDNISONE 20 MG/1
40 TABLET ORAL DAILY
Status: DISCONTINUED | OUTPATIENT
Start: 2019-01-01 | End: 2019-01-01

## 2019-01-01 RX ORDER — BENZONATATE 100 MG/1
100 CAPSULE ORAL 3 TIMES DAILY PRN
Status: DISCONTINUED | OUTPATIENT
Start: 2019-01-01 | End: 2019-01-01 | Stop reason: HOSPADM

## 2019-01-01 RX ORDER — IPRATROPIUM BROMIDE AND ALBUTEROL SULFATE 2.5; .5 MG/3ML; MG/3ML
1 SOLUTION RESPIRATORY (INHALATION) 4 TIMES DAILY
DISCHARGE
Start: 2019-01-01

## 2019-01-01 RX ORDER — DOXYCYCLINE 100 MG/1
100 CAPSULE ORAL 2 TIMES DAILY
Qty: 20 CAPSULE | Refills: 0 | Status: ON HOLD | OUTPATIENT
Start: 2019-01-01 | End: 2019-01-01

## 2019-01-01 RX ADMIN — CARBIDOPA AND LEVODOPA 1 TABLET: 25; 100 TABLET ORAL at 17:50

## 2019-01-01 RX ADMIN — Medication 1 HALF-TAB: at 09:11

## 2019-01-01 RX ADMIN — VENLAFAXINE HYDROCHLORIDE 37.5 MG: 37.5 CAPSULE, EXTENDED RELEASE ORAL at 09:47

## 2019-01-01 RX ADMIN — DONEPEZIL HYDROCHLORIDE 10 MG: 10 TABLET, FILM COATED ORAL at 22:21

## 2019-01-01 RX ADMIN — IPRATROPIUM BROMIDE AND ALBUTEROL SULFATE 3 ML: .5; 3 SOLUTION RESPIRATORY (INHALATION) at 16:08

## 2019-01-01 RX ADMIN — CARBIDOPA AND LEVODOPA 1 TABLET: 25; 100 TABLET, ORALLY DISINTEGRATING ORAL at 20:59

## 2019-01-01 RX ADMIN — CARBIDOPA AND LEVODOPA 1 TABLET: 25; 100 TABLET, ORALLY DISINTEGRATING ORAL at 18:04

## 2019-01-01 RX ADMIN — IPRATROPIUM BROMIDE AND ALBUTEROL SULFATE 3 ML: .5; 3 SOLUTION RESPIRATORY (INHALATION) at 15:58

## 2019-01-01 RX ADMIN — ACETYLCYSTEINE 2 ML: 100 INHALANT RESPIRATORY (INHALATION) at 12:28

## 2019-01-01 RX ADMIN — CARBIDOPA AND LEVODOPA 2 TABLET: 25; 100 TABLET ORAL at 12:59

## 2019-01-01 RX ADMIN — Medication 1 HALF-TAB: at 15:43

## 2019-01-01 RX ADMIN — FLUTICASONE FUROATE 1 PUFF: 200 POWDER RESPIRATORY (INHALATION) at 08:29

## 2019-01-01 RX ADMIN — IPRATROPIUM BROMIDE AND ALBUTEROL SULFATE 3 ML: .5; 3 SOLUTION RESPIRATORY (INHALATION) at 11:41

## 2019-01-01 RX ADMIN — IPRATROPIUM BROMIDE AND ALBUTEROL SULFATE 3 ML: .5; 3 SOLUTION RESPIRATORY (INHALATION) at 07:50

## 2019-01-01 RX ADMIN — FLUTICASONE FUROATE 1 PUFF: 200 POWDER RESPIRATORY (INHALATION) at 07:59

## 2019-01-01 RX ADMIN — Medication 1 HALF-TAB: at 18:28

## 2019-01-01 RX ADMIN — DONEPEZIL HYDROCHLORIDE 10 MG: 5 TABLET ORAL at 21:13

## 2019-01-01 RX ADMIN — CARBIDOPA AND LEVODOPA 1 TABLET: 25; 100 TABLET, ORALLY DISINTEGRATING ORAL at 15:11

## 2019-01-01 RX ADMIN — AMLODIPINE BESYLATE 10 MG: 10 TABLET ORAL at 21:25

## 2019-01-01 RX ADMIN — IPRATROPIUM BROMIDE AND ALBUTEROL SULFATE 3 ML: .5; 3 SOLUTION RESPIRATORY (INHALATION) at 16:17

## 2019-01-01 RX ADMIN — DONEPEZIL HYDROCHLORIDE 10 MG: 5 TABLET ORAL at 21:02

## 2019-01-01 RX ADMIN — CARBIDOPA AND LEVODOPA 1 TABLET: 25; 100 TABLET, ORALLY DISINTEGRATING ORAL at 09:11

## 2019-01-01 RX ADMIN — DEXTROSE AND SODIUM CHLORIDE: 5; 900 INJECTION, SOLUTION INTRAVENOUS at 14:50

## 2019-01-01 RX ADMIN — CARBIDOPA AND LEVODOPA 2 TABLET: 25; 100 TABLET ORAL at 21:38

## 2019-01-01 RX ADMIN — CARBIDOPA AND LEVODOPA 1 TABLET: 25; 100 TABLET, ORALLY DISINTEGRATING ORAL at 20:56

## 2019-01-01 RX ADMIN — CARBIDOPA AND LEVODOPA 1 TABLET: 25; 100 TABLET ORAL at 14:50

## 2019-01-01 RX ADMIN — LISINOPRIL 10 MG: 10 TABLET ORAL at 08:19

## 2019-01-01 RX ADMIN — VANCOMYCIN HYDROCHLORIDE 1500 MG: 10 INJECTION, POWDER, LYOPHILIZED, FOR SOLUTION INTRAVENOUS at 00:34

## 2019-01-01 RX ADMIN — IPRATROPIUM BROMIDE AND ALBUTEROL SULFATE 6 ML: .5; 3 SOLUTION RESPIRATORY (INHALATION) at 03:12

## 2019-01-01 RX ADMIN — CARBIDOPA AND LEVODOPA 2 TABLET: 25; 100 TABLET, ORALLY DISINTEGRATING ORAL at 12:15

## 2019-01-01 RX ADMIN — ALBUTEROL SULFATE 2.5 MG: 2.5 SOLUTION RESPIRATORY (INHALATION) at 05:36

## 2019-01-01 RX ADMIN — IPRATROPIUM BROMIDE AND ALBUTEROL SULFATE 3 ML: .5; 3 SOLUTION RESPIRATORY (INHALATION) at 21:18

## 2019-01-01 RX ADMIN — CEFTRIAXONE SODIUM 2 G: 2 INJECTION, POWDER, FOR SOLUTION INTRAMUSCULAR; INTRAVENOUS at 06:59

## 2019-01-01 RX ADMIN — ENOXAPARIN SODIUM 40 MG: 40 INJECTION SUBCUTANEOUS at 08:08

## 2019-01-01 RX ADMIN — Medication 1 HALF-TAB: at 15:19

## 2019-01-01 RX ADMIN — IPRATROPIUM BROMIDE AND ALBUTEROL SULFATE 3 ML: .5; 3 SOLUTION RESPIRATORY (INHALATION) at 21:11

## 2019-01-01 RX ADMIN — ENOXAPARIN SODIUM 40 MG: 40 INJECTION SUBCUTANEOUS at 08:51

## 2019-01-01 RX ADMIN — DONEPEZIL HYDROCHLORIDE 5 MG: 5 TABLET, FILM COATED ORAL at 08:20

## 2019-01-01 RX ADMIN — IPRATROPIUM BROMIDE AND ALBUTEROL SULFATE 3 ML: .5; 3 SOLUTION RESPIRATORY (INHALATION) at 12:28

## 2019-01-01 RX ADMIN — CEFTRIAXONE 1 G: 1 INJECTION, POWDER, FOR SOLUTION INTRAMUSCULAR; INTRAVENOUS at 14:52

## 2019-01-01 RX ADMIN — IPRATROPIUM BROMIDE AND ALBUTEROL SULFATE 3 ML: .5; 3 SOLUTION RESPIRATORY (INHALATION) at 08:03

## 2019-01-01 RX ADMIN — CARBIDOPA AND LEVODOPA 1 TABLET: 25; 100 TABLET, ORALLY DISINTEGRATING ORAL at 09:12

## 2019-01-01 RX ADMIN — IPRATROPIUM BROMIDE AND ALBUTEROL SULFATE 3 ML: .5; 3 SOLUTION RESPIRATORY (INHALATION) at 08:05

## 2019-01-01 RX ADMIN — Medication 1 CAPSULE: at 07:58

## 2019-01-01 RX ADMIN — IPRATROPIUM BROMIDE AND ALBUTEROL SULFATE 3 ML: .5; 3 SOLUTION RESPIRATORY (INHALATION) at 19:54

## 2019-01-01 RX ADMIN — ACETYLCYSTEINE 2 ML: 100 INHALANT RESPIRATORY (INHALATION) at 08:20

## 2019-01-01 RX ADMIN — VITAMIN D, TAB 1000IU (100/BT) 1000 UNITS: 25 TAB at 07:49

## 2019-01-01 RX ADMIN — Medication 1 CAPSULE: at 08:32

## 2019-01-01 RX ADMIN — Medication 22.5 MG: at 21:07

## 2019-01-01 RX ADMIN — Medication 1 HALF-TAB: at 08:49

## 2019-01-01 RX ADMIN — RANITIDINE HYDROCHLORIDE 75 MG: 150 SOLUTION ORAL at 21:05

## 2019-01-01 RX ADMIN — CARBIDOPA AND LEVODOPA 1 TABLET: 25; 100 TABLET, ORALLY DISINTEGRATING ORAL at 15:13

## 2019-01-01 RX ADMIN — CARBIDOPA AND LEVODOPA 1 TABLET: 25; 100 TABLET, ORALLY DISINTEGRATING ORAL at 15:44

## 2019-01-01 RX ADMIN — METHYLPREDNISOLONE 40 MG: 40 INJECTION, POWDER, LYOPHILIZED, FOR SOLUTION INTRAMUSCULAR; INTRAVENOUS at 10:02

## 2019-01-01 RX ADMIN — DONEPEZIL HYDROCHLORIDE 10 MG: 5 TABLET ORAL at 21:07

## 2019-01-01 RX ADMIN — IPRATROPIUM BROMIDE AND ALBUTEROL SULFATE 3 ML: .5; 3 SOLUTION RESPIRATORY (INHALATION) at 20:29

## 2019-01-01 RX ADMIN — CARBIDOPA AND LEVODOPA 2 TABLET: 25; 100 TABLET, ORALLY DISINTEGRATING ORAL at 06:19

## 2019-01-01 RX ADMIN — CARBIDOPA AND LEVODOPA 2 TABLET: 25; 100 TABLET, ORALLY DISINTEGRATING ORAL at 02:20

## 2019-01-01 RX ADMIN — IPRATROPIUM BROMIDE AND ALBUTEROL SULFATE 3 ML: .5; 3 SOLUTION RESPIRATORY (INHALATION) at 04:11

## 2019-01-01 RX ADMIN — IPRATROPIUM BROMIDE AND ALBUTEROL SULFATE 3 ML: .5; 3 SOLUTION RESPIRATORY (INHALATION) at 11:03

## 2019-01-01 RX ADMIN — DONEPEZIL HYDROCHLORIDE 5 MG: 5 TABLET, FILM COATED ORAL at 08:39

## 2019-01-01 RX ADMIN — Medication 22.5 MG: at 21:48

## 2019-01-01 RX ADMIN — SODIUM CHLORIDE 500 ML: 9 INJECTION, SOLUTION INTRAVENOUS at 15:54

## 2019-01-01 RX ADMIN — IPRATROPIUM BROMIDE AND ALBUTEROL SULFATE 3 ML: .5; 3 SOLUTION RESPIRATORY (INHALATION) at 16:55

## 2019-01-01 RX ADMIN — CARBIDOPA AND LEVODOPA 1 TABLET: 25; 100 TABLET, ORALLY DISINTEGRATING ORAL at 20:57

## 2019-01-01 RX ADMIN — DEXTROSE AND SODIUM CHLORIDE: 5; 900 INJECTION, SOLUTION INTRAVENOUS at 14:40

## 2019-01-01 RX ADMIN — PIPERACILLIN SODIUM AND TAZOBACTAM SODIUM 4.5 G: 4; .5 INJECTION, POWDER, LYOPHILIZED, FOR SOLUTION INTRAVENOUS at 15:57

## 2019-01-01 RX ADMIN — Medication 22.5 MG: at 21:04

## 2019-01-01 RX ADMIN — POTASSIUM CHLORIDE 40 MEQ: 1.5 POWDER, FOR SOLUTION ORAL at 08:29

## 2019-01-01 RX ADMIN — FLUTICASONE FUROATE 1 PUFF: 200 POWDER RESPIRATORY (INHALATION) at 09:58

## 2019-01-01 RX ADMIN — CARBIDOPA AND LEVODOPA 2 TABLET: 25; 100 TABLET ORAL at 12:18

## 2019-01-01 RX ADMIN — METHYLPREDNISOLONE 40 MG: 40 INJECTION, POWDER, LYOPHILIZED, FOR SOLUTION INTRAMUSCULAR; INTRAVENOUS at 11:48

## 2019-01-01 RX ADMIN — PIPERACILLIN SODIUM AND TAZOBACTAM SODIUM 4.5 G: 4; .5 INJECTION, POWDER, LYOPHILIZED, FOR SOLUTION INTRAVENOUS at 15:40

## 2019-01-01 RX ADMIN — IPRATROPIUM BROMIDE AND ALBUTEROL SULFATE 3 ML: .5; 3 SOLUTION RESPIRATORY (INHALATION) at 08:25

## 2019-01-01 RX ADMIN — CARBIDOPA AND LEVODOPA 2 TABLET: 25; 100 TABLET, ORALLY DISINTEGRATING ORAL at 21:02

## 2019-01-01 RX ADMIN — FLUTICASONE PROPIONATE 2 PUFF: 110 AEROSOL, METERED RESPIRATORY (INHALATION) at 19:41

## 2019-01-01 RX ADMIN — CARBIDOPA AND LEVODOPA 1 TABLET: 25; 100 TABLET ORAL at 09:50

## 2019-01-01 RX ADMIN — CARBIDOPA AND LEVODOPA 1 TABLET: 25; 100 TABLET, ORALLY DISINTEGRATING ORAL at 21:05

## 2019-01-01 RX ADMIN — IPRATROPIUM BROMIDE AND ALBUTEROL SULFATE 3 ML: .5; 3 SOLUTION RESPIRATORY (INHALATION) at 07:55

## 2019-01-01 RX ADMIN — Medication 1 HALF-TAB: at 17:49

## 2019-01-01 RX ADMIN — CARBIDOPA AND LEVODOPA 1 TABLET: 25; 100 TABLET, ORALLY DISINTEGRATING ORAL at 09:03

## 2019-01-01 RX ADMIN — GUAIFENESIN 10 ML: 100 SOLUTION ORAL at 10:44

## 2019-01-01 RX ADMIN — ENOXAPARIN SODIUM 40 MG: 40 INJECTION SUBCUTANEOUS at 07:49

## 2019-01-01 RX ADMIN — Medication 22.5 MG: at 21:02

## 2019-01-01 RX ADMIN — CARBIDOPA AND LEVODOPA 2 TABLET: 25; 100 TABLET, ORALLY DISINTEGRATING ORAL at 09:53

## 2019-01-01 RX ADMIN — CEFTRIAXONE SODIUM 2 G: 2 INJECTION, POWDER, FOR SOLUTION INTRAMUSCULAR; INTRAVENOUS at 09:37

## 2019-01-01 RX ADMIN — Medication 10 MEQ: at 21:22

## 2019-01-01 RX ADMIN — DOXYCYCLINE 100 MG: 100 INJECTION, POWDER, LYOPHILIZED, FOR SOLUTION INTRAVENOUS at 18:10

## 2019-01-01 RX ADMIN — IPRATROPIUM BROMIDE AND ALBUTEROL SULFATE 3 ML: .5; 3 SOLUTION RESPIRATORY (INHALATION) at 09:16

## 2019-01-01 RX ADMIN — VENLAFAXINE HYDROCHLORIDE 75 MG: 37.5 CAPSULE, EXTENDED RELEASE ORAL at 21:15

## 2019-01-01 RX ADMIN — DOXYCYCLINE 100 MG: 100 INJECTION, POWDER, LYOPHILIZED, FOR SOLUTION INTRAVENOUS at 06:02

## 2019-01-01 RX ADMIN — DONEPEZIL HYDROCHLORIDE 5 MG: 5 TABLET ORAL at 07:40

## 2019-01-01 RX ADMIN — DOXYCYCLINE 100 MG: 100 INJECTION, POWDER, LYOPHILIZED, FOR SOLUTION INTRAVENOUS at 18:53

## 2019-01-01 RX ADMIN — CEFTRIAXONE SODIUM 2 G: 2 INJECTION, POWDER, FOR SOLUTION INTRAMUSCULAR; INTRAVENOUS at 08:42

## 2019-01-01 RX ADMIN — IPRATROPIUM BROMIDE AND ALBUTEROL SULFATE 3 ML: .5; 3 SOLUTION RESPIRATORY (INHALATION) at 12:00

## 2019-01-01 RX ADMIN — AMLODIPINE BESYLATE 5 MG: 5 TABLET ORAL at 21:20

## 2019-01-01 RX ADMIN — CARBIDOPA AND LEVODOPA 2 TABLET: 25; 100 TABLET ORAL at 05:28

## 2019-01-01 RX ADMIN — CARBIDOPA AND LEVODOPA 2 TABLET: 25; 100 TABLET ORAL at 05:33

## 2019-01-01 RX ADMIN — VENLAFAXINE HYDROCHLORIDE 75 MG: 37.5 CAPSULE, EXTENDED RELEASE ORAL at 22:21

## 2019-01-01 RX ADMIN — RANITIDINE HYDROCHLORIDE 75 MG: 150 SOLUTION ORAL at 21:15

## 2019-01-01 RX ADMIN — Medication 1 HALF-TAB: at 18:04

## 2019-01-01 RX ADMIN — IPRATROPIUM BROMIDE AND ALBUTEROL SULFATE 3 ML: .5; 3 SOLUTION RESPIRATORY (INHALATION) at 20:35

## 2019-01-01 RX ADMIN — CARBIDOPA AND LEVODOPA 1 TABLET: 25; 100 TABLET, ORALLY DISINTEGRATING ORAL at 21:37

## 2019-01-01 RX ADMIN — HYDRALAZINE HYDROCHLORIDE 10 MG: 20 INJECTION INTRAMUSCULAR; INTRAVENOUS at 16:38

## 2019-01-01 RX ADMIN — CARBIDOPA AND LEVODOPA 2 TABLET: 25; 100 TABLET ORAL at 21:48

## 2019-01-01 RX ADMIN — AMLODIPINE BESYLATE 5 MG: 5 TABLET ORAL at 21:48

## 2019-01-01 RX ADMIN — CARBIDOPA AND LEVODOPA 1 TABLET: 25; 100 TABLET, ORALLY DISINTEGRATING ORAL at 02:38

## 2019-01-01 RX ADMIN — PSEUDOEPHEDRINE HCL 30 MG: 30 TABLET, FILM COATED ORAL at 13:36

## 2019-01-01 RX ADMIN — AMLODIPINE BESYLATE 10 MG: 10 TABLET ORAL at 21:02

## 2019-01-01 RX ADMIN — DOXYCYCLINE 100 MG: 100 INJECTION, POWDER, LYOPHILIZED, FOR SOLUTION INTRAVENOUS at 17:56

## 2019-01-01 RX ADMIN — VENLAFAXINE 37.5 MG: 37.5 TABLET ORAL at 18:13

## 2019-01-01 RX ADMIN — IPRATROPIUM BROMIDE AND ALBUTEROL SULFATE 3 ML: .5; 3 SOLUTION RESPIRATORY (INHALATION) at 07:49

## 2019-01-01 RX ADMIN — AMLODIPINE BESYLATE 10 MG: 10 TABLET ORAL at 21:05

## 2019-01-01 RX ADMIN — CEFTRIAXONE 1 G: 1 INJECTION, POWDER, FOR SOLUTION INTRAMUSCULAR; INTRAVENOUS at 11:07

## 2019-01-01 RX ADMIN — FAMOTIDINE 20 MG: 40 POWDER, FOR SUSPENSION ORAL at 20:56

## 2019-01-01 RX ADMIN — FLUTICASONE FUROATE 1 PUFF: 200 POWDER RESPIRATORY (INHALATION) at 07:57

## 2019-01-01 RX ADMIN — LISINOPRIL 10 MG: 10 TABLET ORAL at 09:21

## 2019-01-01 RX ADMIN — DONEPEZIL HYDROCHLORIDE 10 MG: 5 TABLET ORAL at 21:00

## 2019-01-01 RX ADMIN — CARBIDOPA AND LEVODOPA 2 TABLET: 25; 100 TABLET, ORALLY DISINTEGRATING ORAL at 06:03

## 2019-01-01 RX ADMIN — CARBIDOPA AND LEVODOPA 1 TABLET: 25; 100 TABLET ORAL at 09:03

## 2019-01-01 RX ADMIN — CARBIDOPA AND LEVODOPA 1 TABLET: 25; 100 TABLET ORAL at 18:18

## 2019-01-01 RX ADMIN — DEXTROSE MONOHYDRATE: 50 INJECTION, SOLUTION INTRAVENOUS at 07:59

## 2019-01-01 RX ADMIN — Medication 1 HALF-TAB: at 17:56

## 2019-01-01 RX ADMIN — Medication 22.5 MG: at 21:14

## 2019-01-01 RX ADMIN — ACETYLCYSTEINE 2 ML: 100 INHALANT RESPIRATORY (INHALATION) at 12:17

## 2019-01-01 RX ADMIN — IPRATROPIUM BROMIDE AND ALBUTEROL SULFATE 3 ML: .5; 3 SOLUTION RESPIRATORY (INHALATION) at 11:40

## 2019-01-01 RX ADMIN — SODIUM CHLORIDE: 9 INJECTION, SOLUTION INTRAVENOUS at 00:32

## 2019-01-01 RX ADMIN — MULTIVITAMIN 15 ML: LIQUID ORAL at 07:58

## 2019-01-01 RX ADMIN — IPRATROPIUM BROMIDE AND ALBUTEROL SULFATE 3 ML: .5; 3 SOLUTION RESPIRATORY (INHALATION) at 16:27

## 2019-01-01 RX ADMIN — CARBIDOPA AND LEVODOPA 2 TABLET: 25; 100 TABLET ORAL at 21:14

## 2019-01-01 RX ADMIN — ACETYLCYSTEINE 2 ML: 100 INHALANT RESPIRATORY (INHALATION) at 09:01

## 2019-01-01 RX ADMIN — DONEPEZIL HYDROCHLORIDE 5 MG: 5 TABLET ORAL at 08:46

## 2019-01-01 RX ADMIN — CARBIDOPA AND LEVODOPA 1 TABLET: 25; 100 TABLET, ORALLY DISINTEGRATING ORAL at 06:14

## 2019-01-01 RX ADMIN — VITAMIN D, TAB 1000IU (100/BT) 1000 UNITS: 25 TAB at 08:28

## 2019-01-01 RX ADMIN — MELATONIN 1000 UNITS: at 09:09

## 2019-01-01 RX ADMIN — ACETYLCYSTEINE 2 ML: 100 INHALANT RESPIRATORY (INHALATION) at 16:06

## 2019-01-01 RX ADMIN — PIPERACILLIN SODIUM AND TAZOBACTAM SODIUM 3.38 G: 3; .375 INJECTION, POWDER, LYOPHILIZED, FOR SOLUTION INTRAVENOUS at 08:56

## 2019-01-01 RX ADMIN — IPRATROPIUM BROMIDE AND ALBUTEROL SULFATE 3 ML: .5; 3 SOLUTION RESPIRATORY (INHALATION) at 21:21

## 2019-01-01 RX ADMIN — POTASSIUM CHLORIDE 20 MEQ: 29.8 INJECTION, SOLUTION INTRAVENOUS at 09:45

## 2019-01-01 RX ADMIN — PREDNISONE 40 MG: 20 TABLET ORAL at 08:08

## 2019-01-01 RX ADMIN — CARBIDOPA AND LEVODOPA 1 TABLET: 25; 100 TABLET, ORALLY DISINTEGRATING ORAL at 18:13

## 2019-01-01 RX ADMIN — VENLAFAXINE HYDROCHLORIDE 37.5 MG: 37.5 CAPSULE, EXTENDED RELEASE ORAL at 08:41

## 2019-01-01 RX ADMIN — DONEPEZIL HYDROCHLORIDE 10 MG: 5 TABLET ORAL at 19:25

## 2019-01-01 RX ADMIN — IPRATROPIUM BROMIDE AND ALBUTEROL SULFATE 3 ML: .5; 3 SOLUTION RESPIRATORY (INHALATION) at 16:06

## 2019-01-01 RX ADMIN — CARBIDOPA AND LEVODOPA 1 TABLET: 25; 100 TABLET, ORALLY DISINTEGRATING ORAL at 15:09

## 2019-01-01 RX ADMIN — MELATONIN 1000 UNITS: at 08:32

## 2019-01-01 RX ADMIN — SODIUM CHLORIDE 500 ML: 9 INJECTION, SOLUTION INTRAVENOUS at 20:48

## 2019-01-01 RX ADMIN — Medication 1 HALF-TAB: at 08:32

## 2019-01-01 RX ADMIN — ENOXAPARIN SODIUM 40 MG: 40 INJECTION SUBCUTANEOUS at 08:19

## 2019-01-01 RX ADMIN — CARBIDOPA AND LEVODOPA 1 TABLET: 25; 100 TABLET, ORALLY DISINTEGRATING ORAL at 18:21

## 2019-01-01 RX ADMIN — ENOXAPARIN SODIUM 40 MG: 40 INJECTION SUBCUTANEOUS at 09:05

## 2019-01-01 RX ADMIN — ACETYLCYSTEINE 2 ML: 100 INHALANT RESPIRATORY (INHALATION) at 16:33

## 2019-01-01 RX ADMIN — CEFTRIAXONE SODIUM 2 G: 2 INJECTION, POWDER, FOR SOLUTION INTRAMUSCULAR; INTRAVENOUS at 07:14

## 2019-01-01 RX ADMIN — CARBIDOPA AND LEVODOPA 1 TABLET: 25; 100 TABLET ORAL at 09:18

## 2019-01-01 RX ADMIN — CARBIDOPA AND LEVODOPA 2 TABLET: 25; 100 TABLET, ORALLY DISINTEGRATING ORAL at 06:57

## 2019-01-01 RX ADMIN — Medication 1 HALF-TAB: at 15:13

## 2019-01-01 RX ADMIN — ACETYLCYSTEINE 2 ML: 100 INHALANT RESPIRATORY (INHALATION) at 20:28

## 2019-01-01 RX ADMIN — CARBIDOPA AND LEVODOPA 2 TABLET: 25; 100 TABLET ORAL at 21:19

## 2019-01-01 RX ADMIN — ACETYLCYSTEINE 2 ML: 100 INHALANT RESPIRATORY (INHALATION) at 11:41

## 2019-01-01 RX ADMIN — SODIUM CHLORIDE, PRESERVATIVE FREE 91 ML: 5 INJECTION INTRAVENOUS at 06:42

## 2019-01-01 RX ADMIN — DOXYCYCLINE 100 MG: 100 CAPSULE ORAL at 08:41

## 2019-01-01 RX ADMIN — ACETYLCYSTEINE 2 ML: 100 INHALANT RESPIRATORY (INHALATION) at 20:14

## 2019-01-01 RX ADMIN — IPRATROPIUM BROMIDE AND ALBUTEROL SULFATE 3 ML: .5; 3 SOLUTION RESPIRATORY (INHALATION) at 16:14

## 2019-01-01 RX ADMIN — CARBIDOPA AND LEVODOPA 1 TABLET: 25; 100 TABLET, ORALLY DISINTEGRATING ORAL at 02:05

## 2019-01-01 RX ADMIN — IPRATROPIUM BROMIDE AND ALBUTEROL SULFATE 3 ML: .5; 3 SOLUTION RESPIRATORY (INHALATION) at 12:30

## 2019-01-01 RX ADMIN — ACETYLCYSTEINE 2 ML: 100 INHALANT RESPIRATORY (INHALATION) at 07:36

## 2019-01-01 RX ADMIN — CARBIDOPA AND LEVODOPA 1 TABLET: 25; 100 TABLET ORAL at 17:55

## 2019-01-01 RX ADMIN — DONEPEZIL HYDROCHLORIDE 10 MG: 10 TABLET, FILM COATED ORAL at 21:18

## 2019-01-01 RX ADMIN — CARBIDOPA AND LEVODOPA 1 TABLET: 25; 100 TABLET, ORALLY DISINTEGRATING ORAL at 18:06

## 2019-01-01 RX ADMIN — CARBIDOPA AND LEVODOPA 2 TABLET: 25; 100 TABLET ORAL at 11:50

## 2019-01-01 RX ADMIN — Medication: at 09:15

## 2019-01-01 RX ADMIN — Medication 22.5 MG: at 20:32

## 2019-01-01 RX ADMIN — PIPERACILLIN SODIUM AND TAZOBACTAM SODIUM 4.5 G: 4; .5 INJECTION, POWDER, LYOPHILIZED, FOR SOLUTION INTRAVENOUS at 21:37

## 2019-01-01 RX ADMIN — IPRATROPIUM BROMIDE AND ALBUTEROL SULFATE 3 ML: .5; 3 SOLUTION RESPIRATORY (INHALATION) at 08:51

## 2019-01-01 RX ADMIN — ENOXAPARIN SODIUM 40 MG: 40 INJECTION SUBCUTANEOUS at 10:22

## 2019-01-01 RX ADMIN — CEFTRIAXONE SODIUM 2 G: 2 INJECTION, POWDER, FOR SOLUTION INTRAMUSCULAR; INTRAVENOUS at 07:49

## 2019-01-01 RX ADMIN — CARBIDOPA AND LEVODOPA 1 TABLET: 25; 100 TABLET ORAL at 14:45

## 2019-01-01 RX ADMIN — CARBIDOPA AND LEVODOPA 2 TABLET: 25; 100 TABLET ORAL at 11:41

## 2019-01-01 RX ADMIN — PREDNISONE 40 MG: 20 TABLET ORAL at 07:51

## 2019-01-01 RX ADMIN — Medication 22.5 MG: at 21:00

## 2019-01-01 RX ADMIN — Medication 10 MEQ: at 11:36

## 2019-01-01 RX ADMIN — Medication: at 15:19

## 2019-01-01 RX ADMIN — HYDRALAZINE HYDROCHLORIDE 10 MG: 20 INJECTION INTRAMUSCULAR; INTRAVENOUS at 03:58

## 2019-01-01 RX ADMIN — PIPERACILLIN SODIUM AND TAZOBACTAM SODIUM 4.5 G: 4; .5 INJECTION, POWDER, LYOPHILIZED, FOR SOLUTION INTRAVENOUS at 20:34

## 2019-01-01 RX ADMIN — CARBIDOPA AND LEVODOPA 1 TABLET: 25; 100 TABLET, ORALLY DISINTEGRATING ORAL at 15:01

## 2019-01-01 RX ADMIN — Medication: at 18:19

## 2019-01-01 RX ADMIN — IPRATROPIUM BROMIDE AND ALBUTEROL SULFATE 3 ML: .5; 3 SOLUTION RESPIRATORY (INHALATION) at 19:47

## 2019-01-01 RX ADMIN — VANCOMYCIN HYDROCHLORIDE 1500 MG: 10 INJECTION, POWDER, LYOPHILIZED, FOR SOLUTION INTRAVENOUS at 17:23

## 2019-01-01 RX ADMIN — CARBIDOPA AND LEVODOPA 2 TABLET: 25; 100 TABLET ORAL at 19:43

## 2019-01-01 RX ADMIN — VENLAFAXINE 37.5 MG: 37.5 TABLET ORAL at 18:19

## 2019-01-01 RX ADMIN — MELATONIN 1000 UNITS: at 07:58

## 2019-01-01 RX ADMIN — IPRATROPIUM BROMIDE AND ALBUTEROL SULFATE 3 ML: .5; 3 SOLUTION RESPIRATORY (INHALATION) at 15:48

## 2019-01-01 RX ADMIN — Medication 1 HALF-TAB: at 09:58

## 2019-01-01 RX ADMIN — CARBIDOPA AND LEVODOPA 2 TABLET: 25; 100 TABLET, ORALLY DISINTEGRATING ORAL at 20:56

## 2019-01-01 RX ADMIN — DOXYCYCLINE 100 MG: 100 CAPSULE ORAL at 19:40

## 2019-01-01 RX ADMIN — Medication 1 HALF-TAB: at 09:02

## 2019-01-01 RX ADMIN — CARBIDOPA AND LEVODOPA 1 TABLET: 25; 100 TABLET, ORALLY DISINTEGRATING ORAL at 21:08

## 2019-01-01 RX ADMIN — DONEPEZIL HYDROCHLORIDE 5 MG: 5 TABLET, FILM COATED ORAL at 08:28

## 2019-01-01 RX ADMIN — CARBIDOPA AND LEVODOPA 1 TABLET: 25; 100 TABLET, ORALLY DISINTEGRATING ORAL at 02:20

## 2019-01-01 RX ADMIN — CARBIDOPA AND LEVODOPA 2 TABLET: 25; 100 TABLET ORAL at 06:31

## 2019-01-01 RX ADMIN — CARBIDOPA AND LEVODOPA 2 TABLET: 25; 100 TABLET, ORALLY DISINTEGRATING ORAL at 12:41

## 2019-01-01 RX ADMIN — CARBIDOPA AND LEVODOPA 1 TABLET: 25; 100 TABLET, ORALLY DISINTEGRATING ORAL at 10:42

## 2019-01-01 RX ADMIN — CARBIDOPA AND LEVODOPA 2 TABLET: 25; 100 TABLET, ORALLY DISINTEGRATING ORAL at 08:07

## 2019-01-01 RX ADMIN — AMLODIPINE BESYLATE 5 MG: 10 TABLET ORAL at 09:18

## 2019-01-01 RX ADMIN — IPRATROPIUM BROMIDE AND ALBUTEROL SULFATE 3 ML: .5; 3 SOLUTION RESPIRATORY (INHALATION) at 07:52

## 2019-01-01 RX ADMIN — GUAIFENESIN 10 ML: 100 SOLUTION ORAL at 03:54

## 2019-01-01 RX ADMIN — MELATONIN 1000 UNITS: at 09:56

## 2019-01-01 RX ADMIN — VENLAFAXINE 37.5 MG: 37.5 TABLET ORAL at 08:32

## 2019-01-01 RX ADMIN — CARBIDOPA AND LEVODOPA 2 TABLET: 25; 100 TABLET ORAL at 11:54

## 2019-01-01 RX ADMIN — Medication 22.5 MG: at 21:05

## 2019-01-01 RX ADMIN — PIPERACILLIN SODIUM AND TAZOBACTAM SODIUM 4.5 G: 4; .5 INJECTION, POWDER, LYOPHILIZED, FOR SOLUTION INTRAVENOUS at 03:43

## 2019-01-01 RX ADMIN — METHYLPREDNISOLONE 40 MG: 40 INJECTION, POWDER, LYOPHILIZED, FOR SOLUTION INTRAMUSCULAR; INTRAVENOUS at 12:12

## 2019-01-01 RX ADMIN — DONEPEZIL HYDROCHLORIDE 5 MG: 5 TABLET ORAL at 09:03

## 2019-01-01 RX ADMIN — AMLODIPINE BESYLATE 10 MG: 10 TABLET ORAL at 20:41

## 2019-01-01 RX ADMIN — SODIUM CHLORIDE 1000 ML: 9 INJECTION, SOLUTION INTRAVENOUS at 05:28

## 2019-01-01 RX ADMIN — CARBIDOPA AND LEVODOPA 2 TABLET: 25; 100 TABLET, ORALLY DISINTEGRATING ORAL at 02:11

## 2019-01-01 RX ADMIN — FAMOTIDINE 20 MG: 20 TABLET ORAL at 08:47

## 2019-01-01 RX ADMIN — Medication 1 HALF-TAB: at 18:13

## 2019-01-01 RX ADMIN — Medication 22.5 MG: at 21:20

## 2019-01-01 RX ADMIN — Medication 1 HALF-TAB: at 18:19

## 2019-01-01 RX ADMIN — DONEPEZIL HYDROCHLORIDE 10 MG: 10 TABLET, FILM COATED ORAL at 21:25

## 2019-01-01 RX ADMIN — IPRATROPIUM BROMIDE AND ALBUTEROL SULFATE 3 ML: .5; 3 SOLUTION RESPIRATORY (INHALATION) at 13:09

## 2019-01-01 RX ADMIN — Medication 1 HALF-TAB: at 18:38

## 2019-01-01 RX ADMIN — DOXYCYCLINE 100 MG: 100 INJECTION, POWDER, LYOPHILIZED, FOR SOLUTION INTRAVENOUS at 05:32

## 2019-01-01 RX ADMIN — AMLODIPINE BESYLATE 5 MG: 10 TABLET ORAL at 21:15

## 2019-01-01 RX ADMIN — CARBIDOPA AND LEVODOPA 1 TABLET: 25; 100 TABLET, ORALLY DISINTEGRATING ORAL at 14:54

## 2019-01-01 RX ADMIN — DONEPEZIL HYDROCHLORIDE 10 MG: 5 TABLET ORAL at 21:05

## 2019-01-01 RX ADMIN — HYDRALAZINE HYDROCHLORIDE 10 MG: 20 INJECTION INTRAMUSCULAR; INTRAVENOUS at 02:55

## 2019-01-01 RX ADMIN — DONEPEZIL HYDROCHLORIDE 5 MG: 5 TABLET, FILM COATED ORAL at 07:49

## 2019-01-01 RX ADMIN — ENOXAPARIN SODIUM 40 MG: 40 INJECTION SUBCUTANEOUS at 18:11

## 2019-01-01 RX ADMIN — SODIUM CHLORIDE 1000 ML: 9 INJECTION, SOLUTION INTRAVENOUS at 03:39

## 2019-01-01 RX ADMIN — PIPERACILLIN SODIUM AND TAZOBACTAM SODIUM 4.5 G: 4; .5 INJECTION, POWDER, LYOPHILIZED, FOR SOLUTION INTRAVENOUS at 10:21

## 2019-01-01 RX ADMIN — CARBIDOPA AND LEVODOPA 1 TABLET: 25; 100 TABLET ORAL at 15:28

## 2019-01-01 RX ADMIN — VENLAFAXINE 37.5 MG: 37.5 TABLET ORAL at 12:41

## 2019-01-01 RX ADMIN — CARBIDOPA AND LEVODOPA 2 TABLET: 25; 100 TABLET, ORALLY DISINTEGRATING ORAL at 12:09

## 2019-01-01 RX ADMIN — CEFTRIAXONE 1 G: 1 INJECTION, POWDER, FOR SOLUTION INTRAMUSCULAR; INTRAVENOUS at 10:52

## 2019-01-01 RX ADMIN — POTASSIUM CHLORIDE 40 MEQ: 1.5 POWDER, FOR SOLUTION ORAL at 09:21

## 2019-01-01 RX ADMIN — Medication 1 HALF-TAB: at 09:20

## 2019-01-01 RX ADMIN — Medication 1 HALF-TAB: at 15:27

## 2019-01-01 RX ADMIN — PIPERACILLIN SODIUM AND TAZOBACTAM SODIUM 3.38 G: 3; .375 INJECTION, POWDER, LYOPHILIZED, FOR SOLUTION INTRAVENOUS at 20:57

## 2019-01-01 RX ADMIN — Medication 1 HALF-TAB: at 15:09

## 2019-01-01 RX ADMIN — IPRATROPIUM BROMIDE AND ALBUTEROL SULFATE 3 ML: .5; 3 SOLUTION RESPIRATORY (INHALATION) at 08:34

## 2019-01-01 RX ADMIN — VENLAFAXINE 37.5 MG: 37.5 TABLET ORAL at 12:16

## 2019-01-01 RX ADMIN — CARBIDOPA AND LEVODOPA 2 TABLET: 25; 100 TABLET, ORALLY DISINTEGRATING ORAL at 06:01

## 2019-01-01 RX ADMIN — AMLODIPINE BESYLATE 5 MG: 5 TABLET ORAL at 22:21

## 2019-01-01 RX ADMIN — CARBIDOPA AND LEVODOPA 1 TABLET: 25; 100 TABLET, ORALLY DISINTEGRATING ORAL at 15:18

## 2019-01-01 RX ADMIN — Medication 1 HALF-TAB: at 15:16

## 2019-01-01 RX ADMIN — DEXTROSE MONOHYDRATE: 50 INJECTION, SOLUTION INTRAVENOUS at 16:21

## 2019-01-01 RX ADMIN — CARBIDOPA AND LEVODOPA 1 TABLET: 25; 100 TABLET, ORALLY DISINTEGRATING ORAL at 09:02

## 2019-01-01 RX ADMIN — DOXYCYCLINE 100 MG: 100 INJECTION, POWDER, LYOPHILIZED, FOR SOLUTION INTRAVENOUS at 06:26

## 2019-01-01 RX ADMIN — FLUTICASONE FUROATE 1 PUFF: 200 POWDER RESPIRATORY (INHALATION) at 08:20

## 2019-01-01 RX ADMIN — DEXTROSE MONOHYDRATE: 50 INJECTION, SOLUTION INTRAVENOUS at 22:21

## 2019-01-01 RX ADMIN — ACETAMINOPHEN 325 MG: 325 TABLET, FILM COATED ORAL at 21:14

## 2019-01-01 RX ADMIN — ACETYLCYSTEINE 2 ML: 100 INHALANT RESPIRATORY (INHALATION) at 19:45

## 2019-01-01 RX ADMIN — CARBIDOPA AND LEVODOPA 1 TABLET: 25; 100 TABLET, ORALLY DISINTEGRATING ORAL at 15:24

## 2019-01-01 RX ADMIN — DOXYCYCLINE 100 MG: 100 INJECTION, POWDER, LYOPHILIZED, FOR SOLUTION INTRAVENOUS at 17:54

## 2019-01-01 RX ADMIN — LISINOPRIL 10 MG: 10 TABLET ORAL at 08:40

## 2019-01-01 RX ADMIN — CARBIDOPA AND LEVODOPA 1 TABLET: 25; 100 TABLET ORAL at 15:15

## 2019-01-01 RX ADMIN — IPRATROPIUM BROMIDE AND ALBUTEROL SULFATE 3 ML: .5; 3 SOLUTION RESPIRATORY (INHALATION) at 11:48

## 2019-01-01 RX ADMIN — CARBIDOPA AND LEVODOPA 2 TABLET: 25; 100 TABLET ORAL at 06:49

## 2019-01-01 RX ADMIN — CARBIDOPA AND LEVODOPA 1 TABLET: 25; 100 TABLET, ORALLY DISINTEGRATING ORAL at 17:49

## 2019-01-01 RX ADMIN — IPRATROPIUM BROMIDE AND ALBUTEROL SULFATE 3 ML: .5; 3 SOLUTION RESPIRATORY (INHALATION) at 12:16

## 2019-01-01 RX ADMIN — RANITIDINE 75 MG: 75 TABLET, COATED ORAL at 19:39

## 2019-01-01 RX ADMIN — VENLAFAXINE 37.5 MG: 37.5 TABLET ORAL at 18:06

## 2019-01-01 RX ADMIN — IPRATROPIUM BROMIDE AND ALBUTEROL SULFATE 3 ML: .5; 3 SOLUTION RESPIRATORY (INHALATION) at 11:43

## 2019-01-01 RX ADMIN — IPRATROPIUM BROMIDE AND ALBUTEROL SULFATE 3 ML: .5; 3 SOLUTION RESPIRATORY (INHALATION) at 07:38

## 2019-01-01 RX ADMIN — DONEPEZIL HYDROCHLORIDE 10 MG: 10 TABLET, FILM COATED ORAL at 21:14

## 2019-01-01 RX ADMIN — Medication: at 09:23

## 2019-01-01 RX ADMIN — Medication 1 HALF-TAB: at 10:41

## 2019-01-01 RX ADMIN — FLUTICASONE FUROATE 1 PUFF: 200 POWDER RESPIRATORY (INHALATION) at 09:30

## 2019-01-01 RX ADMIN — CARBIDOPA AND LEVODOPA 2 TABLET: 25; 100 TABLET ORAL at 22:20

## 2019-01-01 RX ADMIN — CARBIDOPA AND LEVODOPA 2 TABLET: 25; 100 TABLET, ORALLY DISINTEGRATING ORAL at 12:03

## 2019-01-01 RX ADMIN — CARBIDOPA AND LEVODOPA 2 TABLET: 25; 100 TABLET ORAL at 21:06

## 2019-01-01 RX ADMIN — IPRATROPIUM BROMIDE AND ALBUTEROL SULFATE 3 ML: .5; 3 SOLUTION RESPIRATORY (INHALATION) at 23:35

## 2019-01-01 RX ADMIN — HYDRALAZINE HYDROCHLORIDE 10 MG: 20 INJECTION INTRAMUSCULAR; INTRAVENOUS at 06:53

## 2019-01-01 RX ADMIN — MULTIVITAMIN 15 ML: LIQUID ORAL at 15:13

## 2019-01-01 RX ADMIN — DEXTROSE AND SODIUM CHLORIDE: 5; 900 INJECTION, SOLUTION INTRAVENOUS at 07:07

## 2019-01-01 RX ADMIN — Medication: at 08:47

## 2019-01-01 RX ADMIN — IPRATROPIUM BROMIDE AND ALBUTEROL SULFATE 3 ML: .5; 3 SOLUTION RESPIRATORY (INHALATION) at 20:11

## 2019-01-01 RX ADMIN — VENLAFAXINE HYDROCHLORIDE 37.5 MG: 37.5 CAPSULE, EXTENDED RELEASE ORAL at 08:20

## 2019-01-01 RX ADMIN — VENLAFAXINE 37.5 MG: 37.5 TABLET ORAL at 15:27

## 2019-01-01 RX ADMIN — QUETIAPINE FUMARATE 25 MG: 25 TABLET ORAL at 21:14

## 2019-01-01 RX ADMIN — DONEPEZIL HYDROCHLORIDE 5 MG: 5 TABLET ORAL at 09:12

## 2019-01-01 RX ADMIN — ACETYLCYSTEINE 2 ML: 100 INHALANT RESPIRATORY (INHALATION) at 16:15

## 2019-01-01 RX ADMIN — AMLODIPINE BESYLATE 10 MG: 10 TABLET ORAL at 21:04

## 2019-01-01 RX ADMIN — IPRATROPIUM BROMIDE AND ALBUTEROL SULFATE 3 ML: .5; 3 SOLUTION RESPIRATORY (INHALATION) at 19:45

## 2019-01-01 RX ADMIN — Medication 1 HALF-TAB: at 18:21

## 2019-01-01 RX ADMIN — IPRATROPIUM BROMIDE AND ALBUTEROL SULFATE 3 ML: .5; 3 SOLUTION RESPIRATORY (INHALATION) at 20:14

## 2019-01-01 RX ADMIN — FAMOTIDINE 20 MG: 40 POWDER, FOR SUSPENSION ORAL at 19:26

## 2019-01-01 RX ADMIN — CARBIDOPA AND LEVODOPA 1 TABLET: 25; 100 TABLET ORAL at 18:08

## 2019-01-01 RX ADMIN — Medication 10 MEQ: at 09:32

## 2019-01-01 RX ADMIN — ACETYLCYSTEINE 2 ML: 100 INHALANT RESPIRATORY (INHALATION) at 20:39

## 2019-01-01 RX ADMIN — METHYLPREDNISOLONE 40 MG: 40 INJECTION, POWDER, LYOPHILIZED, FOR SOLUTION INTRAMUSCULAR; INTRAVENOUS at 10:35

## 2019-01-01 RX ADMIN — Medication 1 HALF-TAB: at 18:06

## 2019-01-01 RX ADMIN — CARBIDOPA AND LEVODOPA 1 TABLET: 25; 100 TABLET ORAL at 18:35

## 2019-01-01 RX ADMIN — Medication 22.5 MG: at 21:06

## 2019-01-01 RX ADMIN — MELATONIN 1000 UNITS: at 08:47

## 2019-01-01 RX ADMIN — VANCOMYCIN HYDROCHLORIDE 1500 MG: 10 INJECTION, POWDER, LYOPHILIZED, FOR SOLUTION INTRAVENOUS at 21:38

## 2019-01-01 RX ADMIN — Medication 1 HALF-TAB: at 14:54

## 2019-01-01 RX ADMIN — VENLAFAXINE 37.5 MG: 37.5 TABLET ORAL at 20:33

## 2019-01-01 RX ADMIN — Medication 10 MEQ: at 09:38

## 2019-01-01 RX ADMIN — Medication 1 HALF-TAB: at 09:12

## 2019-01-01 RX ADMIN — DOXYCYCLINE CALCIUM 100 MG: 50 SYRUP ORAL at 08:06

## 2019-01-01 RX ADMIN — CARBIDOPA AND LEVODOPA 2 TABLET: 25; 100 TABLET, ORALLY DISINTEGRATING ORAL at 21:03

## 2019-01-01 RX ADMIN — Medication 10 MEQ: at 00:36

## 2019-01-01 RX ADMIN — VENLAFAXINE HYDROCHLORIDE 37.5 MG: 37.5 CAPSULE, EXTENDED RELEASE ORAL at 09:56

## 2019-01-01 RX ADMIN — IPRATROPIUM BROMIDE AND ALBUTEROL SULFATE 3 ML: .5; 3 SOLUTION RESPIRATORY (INHALATION) at 08:07

## 2019-01-01 RX ADMIN — VENLAFAXINE HYDROCHLORIDE 75 MG: 37.5 CAPSULE, EXTENDED RELEASE ORAL at 21:06

## 2019-01-01 RX ADMIN — Medication 1 HALF-TAB: at 15:08

## 2019-01-01 RX ADMIN — CARBIDOPA AND LEVODOPA 2 TABLET: 25; 100 TABLET ORAL at 06:56

## 2019-01-01 RX ADMIN — CARBIDOPA AND LEVODOPA 1 TABLET: 25; 100 TABLET ORAL at 15:17

## 2019-01-01 RX ADMIN — CARBIDOPA AND LEVODOPA 2 TABLET: 25; 100 TABLET ORAL at 05:52

## 2019-01-01 RX ADMIN — DOXYCYCLINE 100 MG: 100 INJECTION, POWDER, LYOPHILIZED, FOR SOLUTION INTRAVENOUS at 05:46

## 2019-01-01 RX ADMIN — VENLAFAXINE HYDROCHLORIDE 37.5 MG: 37.5 CAPSULE, EXTENDED RELEASE ORAL at 07:49

## 2019-01-01 RX ADMIN — IPRATROPIUM BROMIDE AND ALBUTEROL SULFATE 3 ML: .5; 3 SOLUTION RESPIRATORY (INHALATION) at 21:22

## 2019-01-01 RX ADMIN — FAMOTIDINE 20 MG: 40 POWDER, FOR SUSPENSION ORAL at 20:31

## 2019-01-01 RX ADMIN — DONEPEZIL HYDROCHLORIDE 5 MG: 5 TABLET ORAL at 09:11

## 2019-01-01 RX ADMIN — VENLAFAXINE HYDROCHLORIDE 37.5 MG: 37.5 CAPSULE, EXTENDED RELEASE ORAL at 07:52

## 2019-01-01 RX ADMIN — Medication 22.5 MG: at 22:20

## 2019-01-01 RX ADMIN — AMPICILLIN SODIUM AND SULBACTAM SODIUM 3 G: 2; 1 INJECTION, POWDER, FOR SOLUTION INTRAMUSCULAR; INTRAVENOUS at 23:35

## 2019-01-01 RX ADMIN — VITAMIN D, TAB 1000IU (100/BT) 1000 UNITS: 25 TAB at 09:06

## 2019-01-01 RX ADMIN — ACETYLCYSTEINE 2 ML: 100 INHALANT RESPIRATORY (INHALATION) at 16:54

## 2019-01-01 RX ADMIN — Medication 22.5 MG: at 20:35

## 2019-01-01 RX ADMIN — RANITIDINE 75 MG: 75 TABLET, COATED ORAL at 20:38

## 2019-01-01 RX ADMIN — SODIUM CHLORIDE: 9 INJECTION, SOLUTION INTRAVENOUS at 10:06

## 2019-01-01 RX ADMIN — IPRATROPIUM BROMIDE AND ALBUTEROL SULFATE 3 ML: .5; 3 SOLUTION RESPIRATORY (INHALATION) at 11:52

## 2019-01-01 RX ADMIN — IPRATROPIUM BROMIDE AND ALBUTEROL SULFATE 3 ML: .5; 3 SOLUTION RESPIRATORY (INHALATION) at 20:59

## 2019-01-01 RX ADMIN — CARBIDOPA AND LEVODOPA 1 TABLET: 50; 200 TABLET, EXTENDED RELEASE ORAL at 23:56

## 2019-01-01 RX ADMIN — IPRATROPIUM BROMIDE AND ALBUTEROL SULFATE 3 ML: .5; 3 SOLUTION RESPIRATORY (INHALATION) at 16:15

## 2019-01-01 RX ADMIN — CARBIDOPA AND LEVODOPA 1 TABLET: 25; 100 TABLET, ORALLY DISINTEGRATING ORAL at 18:28

## 2019-01-01 RX ADMIN — IPRATROPIUM BROMIDE AND ALBUTEROL SULFATE 3 ML: .5; 3 SOLUTION RESPIRATORY (INHALATION) at 20:39

## 2019-01-01 RX ADMIN — IPRATROPIUM BROMIDE AND ALBUTEROL SULFATE 3 ML: .5; 3 SOLUTION RESPIRATORY (INHALATION) at 19:28

## 2019-01-01 RX ADMIN — CARBIDOPA AND LEVODOPA 2 TABLET: 25; 100 TABLET ORAL at 12:36

## 2019-01-01 RX ADMIN — CARBIDOPA AND LEVODOPA 1 TABLET: 25; 100 TABLET ORAL at 15:16

## 2019-01-01 RX ADMIN — IPRATROPIUM BROMIDE AND ALBUTEROL SULFATE 3 ML: .5; 3 SOLUTION RESPIRATORY (INHALATION) at 11:45

## 2019-01-01 RX ADMIN — DONEPEZIL HYDROCHLORIDE 10 MG: 5 TABLET ORAL at 20:33

## 2019-01-01 RX ADMIN — VENLAFAXINE HYDROCHLORIDE 75 MG: 75 CAPSULE, EXTENDED RELEASE ORAL at 20:35

## 2019-01-01 RX ADMIN — CARBIDOPA AND LEVODOPA 1 TABLET: 25; 100 TABLET, ORALLY DISINTEGRATING ORAL at 09:57

## 2019-01-01 RX ADMIN — Medication 22.5 MG: at 21:25

## 2019-01-01 RX ADMIN — IPRATROPIUM BROMIDE AND ALBUTEROL SULFATE 3 ML: .5; 3 SOLUTION RESPIRATORY (INHALATION) at 19:27

## 2019-01-01 RX ADMIN — IPRATROPIUM BROMIDE AND ALBUTEROL SULFATE 3 ML: .5; 3 SOLUTION RESPIRATORY (INHALATION) at 20:55

## 2019-01-01 RX ADMIN — FAMOTIDINE 20 MG: 40 POWDER, FOR SUSPENSION ORAL at 21:05

## 2019-01-01 RX ADMIN — IPRATROPIUM BROMIDE AND ALBUTEROL SULFATE 3 ML: .5; 3 SOLUTION RESPIRATORY (INHALATION) at 08:01

## 2019-01-01 RX ADMIN — CEFTRIAXONE 1 G: 1 INJECTION, POWDER, FOR SOLUTION INTRAMUSCULAR; INTRAVENOUS at 10:54

## 2019-01-01 RX ADMIN — CARBIDOPA AND LEVODOPA 1 TABLET: 25; 100 TABLET ORAL at 08:29

## 2019-01-01 RX ADMIN — POTASSIUM CHLORIDE 20 MEQ: 20 SOLUTION ORAL at 06:13

## 2019-01-01 RX ADMIN — DOXYCYCLINE 100 MG: 100 CAPSULE ORAL at 07:52

## 2019-01-01 RX ADMIN — DONEPEZIL HYDROCHLORIDE 5 MG: 5 TABLET ORAL at 09:57

## 2019-01-01 RX ADMIN — CARBIDOPA AND LEVODOPA 1 TABLET: 25; 100 TABLET, ORALLY DISINTEGRATING ORAL at 18:19

## 2019-01-01 RX ADMIN — DONEPEZIL HYDROCHLORIDE 10 MG: 10 TABLET, FILM COATED ORAL at 21:48

## 2019-01-01 RX ADMIN — CARBIDOPA AND LEVODOPA 1 TABLET: 25; 100 TABLET, ORALLY DISINTEGRATING ORAL at 15:47

## 2019-01-01 RX ADMIN — CARBIDOPA AND LEVODOPA 1 TABLET: 25; 100 TABLET, ORALLY DISINTEGRATING ORAL at 20:55

## 2019-01-01 RX ADMIN — DONEPEZIL HYDROCHLORIDE 10 MG: 5 TABLET ORAL at 21:04

## 2019-01-01 RX ADMIN — CARBIDOPA AND LEVODOPA 1 TABLET: 25; 100 TABLET, ORALLY DISINTEGRATING ORAL at 09:09

## 2019-01-01 RX ADMIN — ACETYLCYSTEINE 2 ML: 100 INHALANT RESPIRATORY (INHALATION) at 12:00

## 2019-01-01 RX ADMIN — CARBIDOPA AND LEVODOPA 1 TABLET: 25; 100 TABLET, ORALLY DISINTEGRATING ORAL at 08:49

## 2019-01-01 RX ADMIN — DOXYCYCLINE 100 MG: 100 INJECTION, POWDER, LYOPHILIZED, FOR SOLUTION INTRAVENOUS at 05:52

## 2019-01-01 RX ADMIN — PIPERACILLIN SODIUM AND TAZOBACTAM SODIUM 4.5 G: 4; .5 INJECTION, POWDER, LYOPHILIZED, FOR SOLUTION INTRAVENOUS at 03:14

## 2019-01-01 RX ADMIN — CARBIDOPA AND LEVODOPA 1 TABLET: 25; 100 TABLET, ORALLY DISINTEGRATING ORAL at 15:08

## 2019-01-01 RX ADMIN — IPRATROPIUM BROMIDE AND ALBUTEROL SULFATE 3 ML: .5; 3 SOLUTION RESPIRATORY (INHALATION) at 08:04

## 2019-01-01 RX ADMIN — IPRATROPIUM BROMIDE AND ALBUTEROL SULFATE 3 ML: .5; 3 SOLUTION RESPIRATORY (INHALATION) at 16:34

## 2019-01-01 RX ADMIN — ACETAMINOPHEN 325 MG: 325 TABLET, FILM COATED ORAL at 21:48

## 2019-01-01 RX ADMIN — CARBIDOPA AND LEVODOPA 1 TABLET: 25; 100 TABLET, ORALLY DISINTEGRATING ORAL at 02:00

## 2019-01-01 RX ADMIN — DONEPEZIL HYDROCHLORIDE 5 MG: 5 TABLET, FILM COATED ORAL at 08:08

## 2019-01-01 RX ADMIN — VENLAFAXINE HYDROCHLORIDE 37.5 MG: 37.5 CAPSULE, EXTENDED RELEASE ORAL at 09:05

## 2019-01-01 RX ADMIN — VENLAFAXINE HYDROCHLORIDE 75 MG: 37.5 CAPSULE, EXTENDED RELEASE ORAL at 21:48

## 2019-01-01 RX ADMIN — DONEPEZIL HYDROCHLORIDE 5 MG: 5 TABLET, FILM COATED ORAL at 09:04

## 2019-01-01 RX ADMIN — IPRATROPIUM BROMIDE AND ALBUTEROL SULFATE 3 ML: .5; 3 SOLUTION RESPIRATORY (INHALATION) at 16:03

## 2019-01-01 RX ADMIN — CARBIDOPA AND LEVODOPA 2 TABLET: 25; 100 TABLET ORAL at 12:15

## 2019-01-01 RX ADMIN — VENLAFAXINE HYDROCHLORIDE 37.5 MG: 37.5 CAPSULE, EXTENDED RELEASE ORAL at 08:28

## 2019-01-01 RX ADMIN — DONEPEZIL HYDROCHLORIDE 5 MG: 5 TABLET ORAL at 08:32

## 2019-01-01 RX ADMIN — FAMOTIDINE 20 MG: 40 POWDER, FOR SUSPENSION ORAL at 21:03

## 2019-01-01 RX ADMIN — Medication 10 MEQ: at 12:40

## 2019-01-01 RX ADMIN — DONEPEZIL HYDROCHLORIDE 5 MG: 5 TABLET ORAL at 09:09

## 2019-01-01 RX ADMIN — Medication 1 CAPSULE: at 09:56

## 2019-01-01 RX ADMIN — Medication: at 18:11

## 2019-01-01 RX ADMIN — FAMOTIDINE 20 MG: 40 POWDER, FOR SUSPENSION ORAL at 20:32

## 2019-01-01 RX ADMIN — GUAIFENESIN 10 ML: 100 SOLUTION ORAL at 21:48

## 2019-01-01 RX ADMIN — AMLODIPINE BESYLATE 5 MG: 10 TABLET ORAL at 21:05

## 2019-01-01 RX ADMIN — SODIUM CHLORIDE 1000 ML: 9 INJECTION, SOLUTION INTRAVENOUS at 03:20

## 2019-01-01 RX ADMIN — IPRATROPIUM BROMIDE AND ALBUTEROL SULFATE 3 ML: .5; 3 SOLUTION RESPIRATORY (INHALATION) at 07:18

## 2019-01-01 RX ADMIN — DEXTROSE AND SODIUM CHLORIDE: 5; 900 INJECTION, SOLUTION INTRAVENOUS at 15:54

## 2019-01-01 RX ADMIN — ACETYLCYSTEINE 2 ML: 100 INHALANT RESPIRATORY (INHALATION) at 08:07

## 2019-01-01 RX ADMIN — VENLAFAXINE 37.5 MG: 37.5 TABLET ORAL at 07:40

## 2019-01-01 RX ADMIN — Medication 10 MEQ: at 08:26

## 2019-01-01 RX ADMIN — DEXTROSE MONOHYDRATE: 50 INJECTION, SOLUTION INTRAVENOUS at 11:07

## 2019-01-01 RX ADMIN — PIPERACILLIN SODIUM AND TAZOBACTAM SODIUM 3.38 G: 3; .375 INJECTION, POWDER, LYOPHILIZED, FOR SOLUTION INTRAVENOUS at 03:57

## 2019-01-01 RX ADMIN — AMLODIPINE BESYLATE 10 MG: 10 TABLET ORAL at 21:07

## 2019-01-01 RX ADMIN — IPRATROPIUM BROMIDE AND ALBUTEROL SULFATE 3 ML: .5; 3 SOLUTION RESPIRATORY (INHALATION) at 12:03

## 2019-01-01 RX ADMIN — Medication 1 HALF-TAB: at 15:47

## 2019-01-01 RX ADMIN — CARBIDOPA AND LEVODOPA 2 TABLET: 25; 100 TABLET, ORALLY DISINTEGRATING ORAL at 15:24

## 2019-01-01 RX ADMIN — CARBIDOPA AND LEVODOPA 2 TABLET: 25; 100 TABLET, ORALLY DISINTEGRATING ORAL at 06:11

## 2019-01-01 RX ADMIN — DONEPEZIL HYDROCHLORIDE 10 MG: 10 TABLET, FILM COATED ORAL at 20:37

## 2019-01-01 RX ADMIN — ACETYLCYSTEINE 2 ML: 100 INHALANT RESPIRATORY (INHALATION) at 11:52

## 2019-01-01 RX ADMIN — DONEPEZIL HYDROCHLORIDE 5 MG: 5 TABLET ORAL at 07:58

## 2019-01-01 RX ADMIN — DOXYCYCLINE 100 MG: 100 INJECTION, POWDER, LYOPHILIZED, FOR SOLUTION INTRAVENOUS at 18:35

## 2019-01-01 RX ADMIN — DEXTROSE AND SODIUM CHLORIDE: 5; 900 INJECTION, SOLUTION INTRAVENOUS at 06:45

## 2019-01-01 RX ADMIN — CARBIDOPA AND LEVODOPA 1 TABLET: 25; 100 TABLET ORAL at 09:21

## 2019-01-01 RX ADMIN — CEFTRIAXONE SODIUM 2 G: 2 INJECTION, POWDER, FOR SOLUTION INTRAMUSCULAR; INTRAVENOUS at 06:32

## 2019-01-01 RX ADMIN — IPRATROPIUM BROMIDE AND ALBUTEROL SULFATE 3 ML: .5; 3 SOLUTION RESPIRATORY (INHALATION) at 07:42

## 2019-01-01 RX ADMIN — IPRATROPIUM BROMIDE AND ALBUTEROL SULFATE 3 ML: .5; 3 SOLUTION RESPIRATORY (INHALATION) at 07:36

## 2019-01-01 RX ADMIN — MELATONIN 1000 UNITS: at 09:02

## 2019-01-01 RX ADMIN — Medication 1 HALF-TAB: at 09:18

## 2019-01-01 RX ADMIN — IPRATROPIUM BROMIDE AND ALBUTEROL SULFATE 3 ML: .5; 3 SOLUTION RESPIRATORY (INHALATION) at 07:04

## 2019-01-01 RX ADMIN — Medication 1 HALF-TAB: at 19:35

## 2019-01-01 RX ADMIN — CARBIDOPA AND LEVODOPA 2 TABLET: 25; 100 TABLET, ORALLY DISINTEGRATING ORAL at 06:28

## 2019-01-01 RX ADMIN — ENOXAPARIN SODIUM 40 MG: 40 INJECTION SUBCUTANEOUS at 08:41

## 2019-01-01 RX ADMIN — IPRATROPIUM BROMIDE AND ALBUTEROL SULFATE 3 ML: .5; 3 SOLUTION RESPIRATORY (INHALATION) at 11:50

## 2019-01-01 RX ADMIN — IPRATROPIUM BROMIDE AND ALBUTEROL SULFATE 3 ML: .5; 3 SOLUTION RESPIRATORY (INHALATION) at 03:57

## 2019-01-01 RX ADMIN — CARBIDOPA AND LEVODOPA 2 TABLET: 25; 100 TABLET, ORALLY DISINTEGRATING ORAL at 12:01

## 2019-01-01 RX ADMIN — ENOXAPARIN SODIUM 40 MG: 40 INJECTION SUBCUTANEOUS at 09:31

## 2019-01-01 RX ADMIN — Medication 22.5 MG: at 21:13

## 2019-01-01 RX ADMIN — CARBIDOPA AND LEVODOPA 2 TABLET: 25; 100 TABLET ORAL at 12:06

## 2019-01-01 RX ADMIN — CARBIDOPA AND LEVODOPA 1 TABLET: 25; 100 TABLET, ORALLY DISINTEGRATING ORAL at 21:02

## 2019-01-01 RX ADMIN — CARBIDOPA AND LEVODOPA 2 TABLET: 25; 100 TABLET ORAL at 12:05

## 2019-01-01 RX ADMIN — DONEPEZIL HYDROCHLORIDE 5 MG: 5 TABLET, FILM COATED ORAL at 07:54

## 2019-01-01 RX ADMIN — ACETYLCYSTEINE 2 ML: 100 INHALANT RESPIRATORY (INHALATION) at 16:17

## 2019-01-01 RX ADMIN — CARBIDOPA AND LEVODOPA 1 TABLET: 25; 100 TABLET, ORALLY DISINTEGRATING ORAL at 02:03

## 2019-01-01 RX ADMIN — Medication 10 MEQ: at 22:26

## 2019-01-01 RX ADMIN — METHYLPREDNISOLONE 40 MG: 40 INJECTION, POWDER, LYOPHILIZED, FOR SOLUTION INTRAMUSCULAR; INTRAVENOUS at 10:51

## 2019-01-01 RX ADMIN — Medication 1 HALF-TAB: at 15:01

## 2019-01-01 RX ADMIN — FLUTICASONE PROPIONATE 2 PUFF: 110 AEROSOL, METERED RESPIRATORY (INHALATION) at 21:36

## 2019-01-01 RX ADMIN — IPRATROPIUM BROMIDE AND ALBUTEROL SULFATE 3 ML: .5; 3 SOLUTION RESPIRATORY (INHALATION) at 08:18

## 2019-01-01 RX ADMIN — Medication 75 MG: at 21:18

## 2019-01-01 RX ADMIN — Medication 10 MEQ: at 13:46

## 2019-01-01 RX ADMIN — DONEPEZIL HYDROCHLORIDE 10 MG: 10 TABLET, FILM COATED ORAL at 21:06

## 2019-01-01 RX ADMIN — VENLAFAXINE HYDROCHLORIDE 37.5 MG: 37.5 CAPSULE, EXTENDED RELEASE ORAL at 08:08

## 2019-01-01 RX ADMIN — CARBIDOPA AND LEVODOPA 2 TABLET: 25; 100 TABLET, ORALLY DISINTEGRATING ORAL at 12:32

## 2019-01-01 RX ADMIN — AMLODIPINE BESYLATE 10 MG: 10 TABLET ORAL at 21:00

## 2019-01-01 RX ADMIN — FLUTICASONE FUROATE 1 PUFF: 200 POWDER RESPIRATORY (INHALATION) at 09:07

## 2019-01-01 RX ADMIN — CARBIDOPA AND LEVODOPA 1 TABLET: 25; 100 TABLET, ORALLY DISINTEGRATING ORAL at 19:36

## 2019-01-01 RX ADMIN — ENOXAPARIN SODIUM 40 MG: 40 INJECTION SUBCUTANEOUS at 08:05

## 2019-01-01 RX ADMIN — ACETAMINOPHEN 325 MG: 325 TABLET, FILM COATED ORAL at 14:45

## 2019-01-01 RX ADMIN — Medication: at 15:08

## 2019-01-01 RX ADMIN — ACETYLCYSTEINE 2 ML: 100 INHALANT RESPIRATORY (INHALATION) at 20:11

## 2019-01-01 RX ADMIN — Medication 1 HALF-TAB: at 15:25

## 2019-01-01 RX ADMIN — CARBIDOPA AND LEVODOPA 2 TABLET: 25; 100 TABLET ORAL at 05:55

## 2019-01-01 RX ADMIN — Medication 10 MEQ: at 13:36

## 2019-01-01 RX ADMIN — PREDNISONE 40 MG: 20 TABLET ORAL at 08:39

## 2019-01-01 RX ADMIN — FLUTICASONE PROPIONATE 2 PUFF: 110 AEROSOL, METERED RESPIRATORY (INHALATION) at 08:23

## 2019-01-01 RX ADMIN — CARBIDOPA AND LEVODOPA 1 TABLET: 50; 200 TABLET, EXTENDED RELEASE ORAL at 21:15

## 2019-01-01 RX ADMIN — DOXYCYCLINE 100 MG: 100 INJECTION, POWDER, LYOPHILIZED, FOR SOLUTION INTRAVENOUS at 05:24

## 2019-01-01 RX ADMIN — CARBIDOPA AND LEVODOPA 2 TABLET: 25; 100 TABLET, ORALLY DISINTEGRATING ORAL at 12:23

## 2019-01-01 RX ADMIN — CARBIDOPA AND LEVODOPA 2 TABLET: 25; 100 TABLET, ORALLY DISINTEGRATING ORAL at 02:05

## 2019-01-01 RX ADMIN — ENOXAPARIN SODIUM 40 MG: 40 INJECTION SUBCUTANEOUS at 08:29

## 2019-01-01 RX ADMIN — VENLAFAXINE HYDROCHLORIDE 75 MG: 75 CAPSULE, EXTENDED RELEASE ORAL at 19:40

## 2019-01-01 RX ADMIN — IPRATROPIUM BROMIDE AND ALBUTEROL SULFATE 3 ML: .5; 3 SOLUTION RESPIRATORY (INHALATION) at 12:05

## 2019-01-01 RX ADMIN — ALBUTEROL SULFATE 1.25 MG: 1.25 SOLUTION RESPIRATORY (INHALATION) at 14:49

## 2019-01-01 RX ADMIN — FUROSEMIDE 10 MG: 10 INJECTION, SOLUTION INTRAVENOUS at 15:16

## 2019-01-01 RX ADMIN — CARBIDOPA AND LEVODOPA 2 TABLET: 25; 100 TABLET ORAL at 06:09

## 2019-01-01 RX ADMIN — IOPAMIDOL 70 ML: 755 INJECTION, SOLUTION INTRAVENOUS at 06:42

## 2019-01-01 RX ADMIN — IPRATROPIUM BROMIDE AND ALBUTEROL SULFATE 3 ML: .5; 3 SOLUTION RESPIRATORY (INHALATION) at 11:56

## 2019-01-01 RX ADMIN — AMLODIPINE BESYLATE 10 MG: 10 TABLET ORAL at 06:49

## 2019-01-01 RX ADMIN — CARBIDOPA AND LEVODOPA 2 TABLET: 25; 100 TABLET, ORALLY DISINTEGRATING ORAL at 12:08

## 2019-01-01 RX ADMIN — VENLAFAXINE 37.5 MG: 37.5 TABLET ORAL at 18:28

## 2019-01-01 RX ADMIN — VENLAFAXINE 37.5 MG: 37.5 TABLET ORAL at 12:23

## 2019-01-01 RX ADMIN — Medication 3 HALF-TAB: at 09:46

## 2019-01-01 RX ADMIN — IPRATROPIUM BROMIDE AND ALBUTEROL SULFATE 3 ML: .5; 3 SOLUTION RESPIRATORY (INHALATION) at 16:32

## 2019-01-01 RX ADMIN — IPRATROPIUM BROMIDE AND ALBUTEROL SULFATE 3 ML: .5; 3 SOLUTION RESPIRATORY (INHALATION) at 15:40

## 2019-01-01 RX ADMIN — IPRATROPIUM BROMIDE AND ALBUTEROL SULFATE 3 ML: .5; 3 SOLUTION RESPIRATORY (INHALATION) at 09:01

## 2019-01-01 RX ADMIN — Medication 75 MG: at 21:48

## 2019-01-01 RX ADMIN — Medication: at 14:42

## 2019-01-01 RX ADMIN — IOPAMIDOL 75 ML: 755 INJECTION, SOLUTION INTRAVENOUS at 22:04

## 2019-01-01 RX ADMIN — Medication 10 MEQ: at 11:48

## 2019-01-01 RX ADMIN — IPRATROPIUM BROMIDE AND ALBUTEROL SULFATE 3 ML: .5; 3 SOLUTION RESPIRATORY (INHALATION) at 23:44

## 2019-01-01 RX ADMIN — CARBIDOPA AND LEVODOPA 2 TABLET: 25; 100 TABLET, ORALLY DISINTEGRATING ORAL at 05:54

## 2019-01-01 RX ADMIN — ACETYLCYSTEINE 2 ML: 100 INHALANT RESPIRATORY (INHALATION) at 08:04

## 2019-01-01 RX ADMIN — IPRATROPIUM BROMIDE AND ALBUTEROL SULFATE 3 ML: .5; 3 SOLUTION RESPIRATORY (INHALATION) at 22:35

## 2019-01-01 RX ADMIN — AMLODIPINE BESYLATE 10 MG: 10 TABLET ORAL at 21:13

## 2019-01-01 RX ADMIN — CARBIDOPA AND LEVODOPA 2 TABLET: 25; 100 TABLET, ORALLY DISINTEGRATING ORAL at 06:04

## 2019-01-01 RX ADMIN — VANCOMYCIN HYDROCHLORIDE 1500 MG: 10 INJECTION, POWDER, LYOPHILIZED, FOR SOLUTION INTRAVENOUS at 12:31

## 2019-01-01 RX ADMIN — CARBIDOPA AND LEVODOPA 2 TABLET: 25; 100 TABLET ORAL at 06:20

## 2019-01-01 RX ADMIN — Medication 1 HALF-TAB: at 18:03

## 2019-01-01 RX ADMIN — Medication 1 HALF-TAB: at 09:10

## 2019-01-01 RX ADMIN — DONEPEZIL HYDROCHLORIDE 5 MG: 5 TABLET, FILM COATED ORAL at 09:20

## 2019-01-01 RX ADMIN — FAMOTIDINE 20 MG: 40 POWDER, FOR SUSPENSION ORAL at 21:08

## 2019-01-01 RX ADMIN — VENLAFAXINE HYDROCHLORIDE 37.5 MG: 37.5 CAPSULE, EXTENDED RELEASE ORAL at 09:21

## 2019-01-01 RX ADMIN — VENLAFAXINE 37.5 MG: 37.5 TABLET ORAL at 07:58

## 2019-01-01 RX ADMIN — HYDRALAZINE HYDROCHLORIDE 10 MG: 20 INJECTION INTRAMUSCULAR; INTRAVENOUS at 06:28

## 2019-01-01 RX ADMIN — Medication 1 HALF-TAB: at 15:11

## 2019-01-01 RX ADMIN — CARBIDOPA AND LEVODOPA 1 TABLET: 25; 100 TABLET, ORALLY DISINTEGRATING ORAL at 02:24

## 2019-01-01 RX ADMIN — CARBIDOPA AND LEVODOPA 1 TABLET: 25; 100 TABLET, ORALLY DISINTEGRATING ORAL at 08:32

## 2019-01-01 RX ADMIN — CARBIDOPA AND LEVODOPA 2 TABLET: 25; 100 TABLET, ORALLY DISINTEGRATING ORAL at 06:00

## 2019-01-01 RX ADMIN — Medication 10 MEQ: at 01:54

## 2019-01-01 ASSESSMENT — ACTIVITIES OF DAILY LIVING (ADL)
ADLS_ACUITY_SCORE: 33
ADLS_ACUITY_SCORE: 34
ADLS_ACUITY_SCORE: 30
ADLS_ACUITY_SCORE: 36
AMBULATION: 4-->COMPLETELY DEPENDENT
ADLS_ACUITY_SCORE: 40
BATHING: 2-->ASSISTIVE PERSON
ADLS_ACUITY_SCORE: 40
ADLS_ACUITY_SCORE: 40
ADLS_ACUITY_SCORE: 36
ADLS_ACUITY_SCORE: 38
ADLS_ACUITY_SCORE: 40
ADLS_ACUITY_SCORE: 37
WHICH_OF_THE_ABOVE_FUNCTIONAL_RISKS_HAD_A_RECENT_ONSET_OR_CHANGE?: COGNITION
ADLS_ACUITY_SCORE: 33
ADLS_ACUITY_SCORE: 37
ADLS_ACUITY_SCORE: 33
ADLS_ACUITY_SCORE: 33
ADLS_ACUITY_SCORE: 38
ADLS_ACUITY_SCORE: 35
ADLS_ACUITY_SCORE: 40
ADLS_ACUITY_SCORE: 40
ADLS_ACUITY_SCORE: 36
ADLS_ACUITY_SCORE: 30
ADLS_ACUITY_SCORE: 37
ADLS_ACUITY_SCORE: 38
ADLS_ACUITY_SCORE: 36
ADLS_ACUITY_SCORE: 33
ADLS_ACUITY_SCORE: 40
ADLS_ACUITY_SCORE: 40
RETIRED_COMMUNICATION: 2-->DIFFICULTY UNDERSTANDING (NOT RELATED TO LANGUAGE BARRIER)
ADLS_ACUITY_SCORE: 36
ADLS_ACUITY_SCORE: 38
ADLS_ACUITY_SCORE: 38
ADLS_ACUITY_SCORE: 36
ADLS_ACUITY_SCORE: 40
ADLS_ACUITY_SCORE: 38
ADLS_ACUITY_SCORE: 38
ADLS_ACUITY_SCORE: 40
ADLS_ACUITY_SCORE: 36
TOILETING: 3-->ASSISTIVE EQUIPMENT AND PERSON
ADLS_ACUITY_SCORE: 36
ADLS_ACUITY_SCORE: 40
ADLS_ACUITY_SCORE: 30
ADLS_ACUITY_SCORE: 38
ADLS_ACUITY_SCORE: 40
ADLS_ACUITY_SCORE: 38
ADLS_ACUITY_SCORE: 37
ADLS_ACUITY_SCORE: 36
ADLS_ACUITY_SCORE: 36
ADLS_ACUITY_SCORE: 35
SWALLOWING: 2-->DIFFICULTY SWALLOWING LIQUIDS
ADLS_ACUITY_SCORE: 36
ADLS_ACUITY_SCORE: 38
ADLS_ACUITY_SCORE: 36
ADLS_ACUITY_SCORE: 38
ADLS_ACUITY_SCORE: 38
ADLS_ACUITY_SCORE: 35
ADLS_ACUITY_SCORE: 40
ADLS_ACUITY_SCORE: 38
ADLS_ACUITY_SCORE: 38
ADLS_ACUITY_SCORE: 33
ADLS_ACUITY_SCORE: 33
ADLS_ACUITY_SCORE: 30
ADLS_ACUITY_SCORE: 33
ADLS_ACUITY_SCORE: 40
ADLS_ACUITY_SCORE: 40
ADLS_ACUITY_SCORE: 35
ADLS_ACUITY_SCORE: 23
ADLS_ACUITY_SCORE: 38
ADLS_ACUITY_SCORE: 36
ADLS_ACUITY_SCORE: 30
ADLS_ACUITY_SCORE: 38
ADLS_ACUITY_SCORE: 33
ADLS_ACUITY_SCORE: 38
ADLS_ACUITY_SCORE: 33
ADLS_ACUITY_SCORE: 40
ADLS_ACUITY_SCORE: 38
ADLS_ACUITY_SCORE: 36
ADLS_ACUITY_SCORE: 38
ADLS_ACUITY_SCORE: 35
ADLS_ACUITY_SCORE: 40
ADLS_ACUITY_SCORE: 38
ADLS_ACUITY_SCORE: 36
ADLS_ACUITY_SCORE: 35
RETIRED_EATING: 2-->ASSISTIVE PERSON
ADLS_ACUITY_SCORE: 37
FALL_HISTORY_WITHIN_LAST_SIX_MONTHS: NO
ADLS_ACUITY_SCORE: 37
ADLS_ACUITY_SCORE: 38
BATHING: 3-->ASSISTIVE EQUIPMENT AND PERSON
ADLS_ACUITY_SCORE: 33
ADLS_ACUITY_SCORE: 36
ADLS_ACUITY_SCORE: 38
ADLS_ACUITY_SCORE: 17
ADLS_ACUITY_SCORE: 38
ADLS_ACUITY_SCORE: 40
TOILETING: 2-->ASSISTIVE PERSON
ADLS_ACUITY_SCORE: 35
ADLS_ACUITY_SCORE: 17
ADLS_ACUITY_SCORE: 33
ADLS_ACUITY_SCORE: 37
ADLS_ACUITY_SCORE: 37
RETIRED_COMMUNICATION: 3-->UNABLE TO UNDERSTAND (NOT RELATED TO LANGUAGE BARRIER)
AMBULATION: 1-->ASSISTIVE EQUIPMENT
TRANSFERRING: 1-->ASSISTIVE EQUIPMENT
ADLS_ACUITY_SCORE: 40
ADLS_ACUITY_SCORE: 37
RETIRED_EATING: 2-->ASSISTIVE PERSON
ADLS_ACUITY_SCORE: 40
ADLS_ACUITY_SCORE: 33
COGNITION: 1 - ATTENTION OR MEMORY DEFICITS
ADLS_ACUITY_SCORE: 36
ADLS_ACUITY_SCORE: 38
ADLS_ACUITY_SCORE: 35
ADLS_ACUITY_SCORE: 40
COGNITION: 0 - NO COGNITION ISSUES REPORTED
TRANSFERRING: 3-->ASSISTIVE EQUIPMENT AND PERSON
ADLS_ACUITY_SCORE: 36
ADLS_ACUITY_SCORE: 40
ADLS_ACUITY_SCORE: 36
ADLS_ACUITY_SCORE: 40
ADLS_ACUITY_SCORE: 35
SWALLOWING: 0-->SWALLOWS FOODS/LIQUIDS WITHOUT DIFFICULTY
ADLS_ACUITY_SCORE: 38
DRESS: 2-->ASSISTIVE PERSON
DRESS: 2-->ASSISTIVE PERSON

## 2019-01-01 ASSESSMENT — ENCOUNTER SYMPTOMS
FEVER: 0
CHILLS: 1
CONSTIPATION: 0
VOMITING: 0
COLOR CHANGE: 0
COUGH: 1
FREQUENCY: 0
NAUSEA: 0
WHEEZING: 1
BLOOD IN STOOL: 0
NECK PAIN: 0
NECK STIFFNESS: 1
NUMBNESS: 0
HEMATURIA: 0
BACK PAIN: 0
VOICE CHANGE: 0
FATIGUE: 1
DYSURIA: 0
ABDOMINAL PAIN: 0
DIARRHEA: 0
PALPITATIONS: 0
SEIZURES: 0
ARTHRALGIAS: 0
DIAPHORESIS: 0
SORE THROAT: 0
TROUBLE SWALLOWING: 0
HEADACHES: 0
SHORTNESS OF BREATH: 1
TREMORS: 1

## 2019-01-01 ASSESSMENT — MIFFLIN-ST. JEOR
SCORE: 1638.75
SCORE: 1616.75
SCORE: 1589.75

## 2019-01-01 NOTE — TELEPHONE ENCOUNTER
Return call to Adia santos verbal for rx - she agrees with plan    Signed Prescriptions:                        Disp   Refills    hydrocortisone (WESTCORT) 0.2 % external c*45 g   0        Sig: Apply sparingly to affected area once a week after           patient's bath.  Authorizing Provider: CEM GANDARA      Closing encounter - no further actions needed at this time    George Virgen RN

## 2019-01-08 NOTE — TELEPHONE ENCOUNTER
Results mailed to pt home, last page of the US carotid bilat not sent as it contained no pertinent information  MD note from 12/11/18 was also sent    Wife notified    Joana Gandhi RN   [facility]

## 2019-01-08 NOTE — TELEPHONE ENCOUNTER
Reason for call:  Other   Patient called regarding (reason for call): Pt's wife would like a copy of his U/S results from 12/10 and 12/13 and the Dr's notes sent to her home address so she can have them for her records.   Additional comments: N/A    Phone number to reach patient:  Home number on file [phone] (home)    Best Time:  N/A    Can we leave a detailed message on this number?  YES

## 2019-01-18 NOTE — TELEPHONE ENCOUNTER
Reason for call:  Symptom   Symptom or request: diarrhea     Duration (how long have symptoms been present): 1 day  Have you been treated for this before? No    Additional comments: n/a    Phone number to reach patient:  Other phone number: [phone]    Best Time:  n/a    Can we leave a detailed message on this number?  YES

## 2019-01-18 NOTE — PROGRESS NOTES
SUBJECTIVE:   Sanjay Cano is a 73 year old male who presents to clinic today for the following health issues:    Ultrasound  Patient had an ultrasound, his wife is wondering if the patient needs more testing or if there is a problem.    Eye  Patient believes his vision is okay, but his wife is unsure.    Respiratory  His wife states that he has had a bit of a cough in the last week.    MS  His wife notes that he had a hand fracture in the past, and has decreased use of his left hand as a result. The lack of function in the hand is very upsetting for him. She noticed that he had a bandage on the hand, apparently a nurse did lab work on the wrong patient. He reports that his shoulder hurts, his wife is concerned that it is from the staff getting him in and out of his wheelchair.    GI  His wife states that patient is having loose stools, as well as bowel urgency. He does not have blood in his stool. His wife is unsure if dairy bothers him.    Exercise  His wife expresses that he has not been getting enough exercise at his living facility as they have been short staffed. His wife is concerned about his lack of exercise as she believes he is lethargic and wants to sleep all the time.      Problem list and histories reviewed & adjusted, as indicated.  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)     Primary insomnia     Other emphysema (H)     Pulmonary nodules     Thyroid nodule     Family history of thyroid cancer     H/O recurrent urinary tract infection     Essential hypertension with goal blood pressure less than 140/90     Senile purpura (H)     Nocturnal cough      Parkinson's disease (H)     Closed displaced fracture of shaft of third metacarpal bone of left hand, initial encounter     Past Surgical History:   Procedure Laterality Date     EYE SURGERY       ORTHOPEDIC SURGERY       PHACOEMULSIFICATION CLEAR CORNEA WITH STANDARD INTRAOCULAR LENS IMPLANT  5/21/2014    Procedure: PHACOEMULSIFICATION CLEAR CORNEA WITH STANDARD INTRAOCULAR LENS IMPLANT;  Surgeon: Tomy Hunter MD;  Location: [facility]     PHACOEMULSIFICATION CLEAR CORNEA WITH TORIC INTRAOCULAR LENS IMPLANT  4/30/2014    Procedure: PHACOEMULSIFICATION CLEAR CORNEA WITH TORIC INTRAOCULAR LENS IMPLANT;  Surgeon: Tomy Hunter MD;  Location: [facility]       Social History     Tobacco Use     Smoking status: Former Smoker     Packs/day: 0.01     Years: 40.00     Pack years: 0.40     Types: Cigarettes     Last attempt to quit: 7/31/2008     Years since quitting: 10.4     Smokeless tobacco: Never Used     Tobacco comment: very passive cigarettes in 6 months   Substance Use Topics     Alcohol use: No     Alcohol/week: 0.0 oz     Family History   Problem Relation Age of Onset     Cerebrovascular Disease Mother      Diabetes Mother      Gastrointestinal Disease Mother      Hypertension Mother      Neurologic Disorder Father      Cerebrovascular Disease Father      Cerebrovascular Disease Maternal Grandfather      Cancer Paternal Grandmother      Other - See Comments Sister         gi - unknown         Current Outpatient Medications   Medication Sig Dispense Refill     amLODIPine (NORVASC) 5 MG tablet Take 1 tablet (5 mg) by mouth At Bedtime 90 tablet 3     aspirin 81 MG tablet Take by mouth twice a week       carbidopa-levodopa (SINEMET CR)  MG per CR tablet Take 1 tablet by mouth At Bedtime 1 tablet 0     carbidopa-levodopa (SINEMET)  MG per tablet Take 1-2 tablets by mouth 5 times daily Takes 2 tablets at 6 AM, 1.5 tablets at 9 AM, 2 tablets at noon, 1.5 tablets at 3 PM, 1.5 tablets at 6 PM. Can take additional  0.5 tablet in the middle of the night if needed. 90 tablet      cholecalciferol (VITAMIN D) 1000 UNIT tablet Take 1 tablet (1,000 Units) by mouth every morning 90 tablet 1     clonazePAM (KLONOPIN) 0.5 MG ODT tab Take 1/2 tablet (0.25 mg) by mouth every other night. 45 tablet 0     COMBIVENT RESPIMAT  MCG/ACT inhaler INHALE 1 PUFF BY MOUTH FOUR TIMES DAILY. NOT TO EXCEED 6 DOSES PER DAY 4 g 4     donepezil (ARICEPT) 10 MG tablet Take 1 tablet (10 mg) by mouth At Bedtime 30 tablet 11     donepezil (ARICEPT) 5 MG tablet Take 1 tablet (5 mg) by mouth every morning 30 tablet 11     doxycycline (VIBRAMYCIN) 50 MG/5ML SYRP Take 100 mg by mouth 2 times daily       FLOVENT  MCG/ACT Inhaler INHALE 2 PUFFS INTO THE LUNGS TWICE DAILY 36 g 1     guaiFENesin (ROBITUSSIN) 20 mg/mL SOLN solution Take 10 mLs by mouth every 4 hours as needed for cough 1200 mL 0     hydrocortisone (WESTCORT) 0.2 % external cream Apply sparingly to affected area once a week after patient's bath. 45 g 0     Ipratropium-Albuterol (COMBIVENT RESPIMAT)  MCG/ACT inhaler Inhale 1 puff into the lungs 4 times daily May take 1 to 2 additional doses as needed during the day 1 Inhaler 4     lactobacillus rhamnosus, GG, (CULTURELL) capsule Take 1 capsule by mouth every morning 60 capsule 11     lisinopril (PRINIVIL/ZESTRIL) 5 MG tablet Take 1 tablet (5 mg) by mouth daily 30 tablet 1     methylcellulose (CITRUCEL) 500 MG TABS tablet Take 1 tablet (500 mg) by mouth daily as needed 14 each 0     mirtazapine (REMERON) 15 MG tablet TAKE 1.5 TABLETs (22.5 MG) BY MOUTH AT BEDTIME 135 tablet 1     nitroFURantoin, macrocrystal-monohydrate, (MACROBID) 100 MG capsule Take 1 capsule (100 mg) by mouth 2 times daily 14 capsule 0     pyrithione zinc (SELSUN BLUE/HEAD AND SHOULDERS) 1 % SHAM Apply 1 mL topically daily as needed for irritation 1 Bottle 11     QUEtiapine (SEROQUEL) 50 MG tablet Take 0.5 tablets (25 mg) by mouth nightly as needed (agitation) 180  tablet 0     ranitidine (ZANTAC) 75 MG tablet Take 1 tablet (75 mg) by mouth At Bedtime 30 tablet      senna-docusate (SENOKOT-S/PERICOLACE) 8.6-50 MG tablet Take 1 tablet by mouth 2 times daily 100 tablet 11     venlafaxine (EFFEXOR-XR) 37.5 MG 24 hr capsule Take 37.5 mg by mouth daily       venlafaxine (EFFEXOR-XR) 75 MG 24 hr capsule Take 75 mg by mouth daily       Allergies   Allergen Reactions     Azithromycin      Erythromycin Other (See Comments)     Pathological fractures     Levaquin [Levofloxacin]      Fracture prevention     Seasonal Allergies      BP Readings from Last 3 Encounters:   01/18/19 136/78   11/07/18 126/78   10/12/18 104/68    Wt Readings from Last 3 Encounters:   11/07/18 79.8 kg (176 lb)   08/20/18 78 kg (172 lb)   06/06/18 78.3 kg (172 lb 11.2 oz)                  Labs reviewed in EPIC    Reviewed and updated as needed this visit by clinical staff       Reviewed and updated as needed this visit by Provider         ROS:  Remainder of ROS is negative unless otherwise noted above or in HPI.    This document serves as a record of the services and decisions personally performed and made by Bharath Buchanan MD. It was created on his behalf by Hosea Rome, trained medical scribe. The creation of this document is based on the provider's statements to the medical scribe.  Hosea Rome 2:37 PM January 18, 2019    OBJECTIVE:     /78   Pulse 75   Temp 98.6  F (37  C) (Temporal)   SpO2 95%   There is no height or weight on file to calculate BMI.  Physical exam completed with patient in a wheelchair.  GENERAL: healthy, alert and no distress  RESP: lungs clear to auscultation - no rales, rhonchi or wheezes  CV: regular rate and rhythm, normal S1 S2, no S3 or S4, no murmur, click or rub, no peripheral edema and peripheral pulses strong  MS: no gross musculoskeletal defects noted, calves are nontender, trace of edema in LE  SKIN: no suspicious lesions or rashes, scaling of skin on the  scalp    Diagnostic Test Results:  none     ASSESSMENT/PLAN:   (G20) Parkinson's disease (H)  (primary encounter diagnosis)  Comment: Progressively worsening.  Plan: Monitoring. Follow up as needed.    (J43.8) Other emphysema (H)  Comment: Worsening in the last week.  Plan: Patient is being evaluated with a spirometer. Follow up as needed.      (E73.1) Acquired lactose intolerance  Comment: Worsening recently.  Plan: Patient will stop eating dairy products. Follow up as needed.          Patient Instructions   He should stop eating dairy products until his loose stools alleviate.     40 minutes were spent with the patient with more than 50% of time spent in counseling and coordination of care      The information in this document, created by the medical scribe for me, accurately reflects the services I personally performed and the decisions made by me. I have reviewed and approved this document for accuracy prior to leaving the patient care area.  January 18, 2019 2:37 PM    Bharath Buchanan MD  [facility]

## 2019-01-18 NOTE — TELEPHONE ENCOUNTER
Spoke with wife    Pt had 1 large loose stool this morning, they have an appointment this afternoon, wife was wondering if he should come in  No other symptoms per wife    Offered if he has no further episodes he should come in for appointment  Have lunch per usual if hungry    Joana Gandhi RN   [facility]

## 2019-01-19 NOTE — TELEPHONE ENCOUNTER
Spouse calling with concerns that pt's Lisinopril was increased from 5mg daily to 10mg daily last night.  She reports that the LTC staff noticed an increase in pt's BP, the on-call physician was contacted and increased the medication.  Spouse concerned that this increase might be too much putting him at an increased risk for falls.     2:45PM:  Writer contacted the LTC staff and was told the BP last night was 178/117.   Most recent BP today was at 8:51AM and was 156/90.    2:50PM:  Writer paged on-call Dr. Wegener via assistance from the answering service.  Writer was connected with Dr. Wegener's cell phone who stated this increase is okay and that the staff should continue this dose.  They also should f/u with pcp on Monday.   2:55PM:  Writer updated LTC nurse Lashonda and spouse with the information above.  They verbalized understanding and had no further questions.      Margie Figueroa RN/FNA

## 2019-01-21 NOTE — TELEPHONE ENCOUNTER
Route to provider pool  Dr. Buchanan    Pt BP this AM was 163 /93 and then 172/92, they rechecked it is now 118/64 30min later from last time  He is attending activities, no concerns noted    Med was given at 9a  No s/sx of high BP, like H/A, no vision changes, no SOB, no nose bleed    Lisinopril was increased as instructed to 10mg on 1/19/19. See triage call  Med is given in at 8a     Advised continue to monitor BP, call if pt has any of the above symptoms    Advise    Joana Gandhi, RN   [facility]

## 2019-01-25 NOTE — TELEPHONE ENCOUNTER
Dr. Buchanan,  Returned call to Caren. States she has questions regarding 's recent medications changes and blood pressure    Saturday night, Sanjay was having issues with HTN. On call doctor notified lisinopril was increased to 10 mg. Caren is wondering how long Sanjay should continue to take lisinopril 10 mg? Most recent BP below:   1//76  1//81  1//78  1//79  1/21 -156/74    Sanjay continues to have frothy BMs and frequent diarrhea. Spoke with care facility. Senna was discontinued on 01/21/2019. Caren is wondering what the plan should be for diarrhea. Patient is also getting a supplemental fruit drink in place of Ensure. Caren is wondering if patient should continue to take the fruit drink. Caren is wondering if Sanjay could go back on dairy products?    Wife concerned that eye symptoms in the past (December) could be related to stroke-denies other stroke symptoms- reassurance provided to patient's wife     Please advise    Thank You!  Ibeth Saba, RN  Triage Nurse    Patient's wife requests call back, OK to leave detailed voice message

## 2019-01-25 NOTE — LETTER
Marv 25, 2019    RE: Sanjay Cano      To Whom It May Concern:    We are writing to inform you of medication changes and orders regarding a patient staying at your residence, Sanjay Cano ([id number]). As of today, the following orders are current:    1. Continue lisinopril 10 mg daily  2. Discontinue supplemental fruit drinks  3. Return to consuming dairy products and ensure  4. Start Lact-aid tablets: two tablets-three times daily with meals        For questions or concerns, please contact my office at the number listed above        Sincerely,          Bharath Buchanan MD

## 2019-01-25 NOTE — TELEPHONE ENCOUNTER
Dr. Buchanan,  Prescription for lact-aid cued as well as letter reflecting medications/orders to be faxed to patient's care facility    Please review/sign or advise    Thank You!  Ibeth Saba, RN  Triage Nurse    Returned call to patient's wife, left detailed message per patient's wife request updating on provider plan. Instructed call back if further questions or concerns.

## 2019-01-25 NOTE — TELEPHONE ENCOUNTER
Reason for call:  Other   Patient called regarding (reason for call): call back  Additional comments: The patients wife called and stated that her  has had high blood pressure since Sunday. She stated that he was told to up the dosage on his lisinopril and she just wants to know if he should continue to take the higher dosage or if he should go back to taking the normal dose. She also stated that his stools have been not solid but not diarrhea and frothy. She is wondering if Dr. Buchanan would like him to continue to hold off taking the stool softener that he has been prescribed. She is also concerned because the people are supposed to be monitoring his blood pressure and reporting it directly to her and they have not been doing that. She would like a call back to discuss all of her questions and concerns.     Phone number to reach patient:  Home number on file [phone] (home)    Best Time:  Before 12:00 when she leaves to go to the nursing home    Can we leave a detailed message on this number?  YES

## 2019-01-25 NOTE — TELEPHONE ENCOUNTER
1. Continue lisinopril 10 mg  2. Discontinue fruit drinks  3. Return to giving dairy and ensure, along with Lact-aide tabs: two 3 x daily with meals  4. Recommend followup with eye doctor.    Please notify spouse, enrique Sinha

## 2019-01-28 NOTE — TELEPHONE ENCOUNTER
Pt's wife did not receive message it was cut off, please call again.  She also request that home care is contacted, they claimed not have received new orders.

## 2019-01-28 NOTE — TELEPHONE ENCOUNTER
Letter and prescription for lactase faxed to care facility at [phone].    Lita Mckeon RN  [facility]

## 2019-01-28 NOTE — TELEPHONE ENCOUNTER
Returned call to patient's wife. Relayed provider message below. Patient's wife verbalized understanding and is in agreement with plan    Returned call to [facility], informed that new orders have been signed and faxed. Staff verbalized understanding    Ibeth Saba RN  Triage Nurse

## 2019-01-31 NOTE — TELEPHONE ENCOUNTER
Reason for call:  Other   Patient called regarding (reason for call): call back  Additional comments: Pt's wife, Caren, is calling in regards to the dosage of the medication lisinopril (PRINIVIL/ZESTRIL) 5 MG tablet.  She states that the patient is supposed to be taking lisinopril (PRINIVIL/ZESTRIL) 5 MG tablet 10 mg in the AM and the assisted living facility is administering the pt the 10 mg in the AM and 5 mg in the afternoon.  The pt's wife would like clarification on this.    Phone number to reach patient:  Home number on file [phone] (home)    Best Time:  Anytime this morning.  She will be out this afternoon    Can we leave a detailed message on this number?  YES

## 2019-01-31 NOTE — TELEPHONE ENCOUNTER
Called patient's facility [facility] ([phone]). Spoke with the nurse for the patient who stated that they are giving pt 10 mg daily every morning at 8 am.    Spoke with patient's spouse, Caren, and let her know what the nurse said about the patient's lisinopril. She stated that her sheet has both 10 and 5 mg written on his medication list. It likely just wasn't discontinue from his current med list. Caren stated that the pt's stools are filmy, look like wet cotton candy, still seem to be frothy. Pt's blood pressure is better. Podiatrist went out last Tuesday and had to take toe-nail off almost to bed of toe due to fungus on toes. Recommended to call our clinic next week when Dr. Buchanan is back if pt is still having loose stools. Caren verbalized understanding.    Lita Mckeon RN  [facility]

## 2019-02-04 NOTE — TELEPHONE ENCOUNTER
"Requested Prescriptions   Pending Prescriptions Disp Refills     hydrocortisone (WESTCORT) 0.2 % external cream [Pharmacy Med Name: HYDROCORTISONE LISANDRA 0.2% CREAM(GM)]  11    Last Written Prescription Date:  12/31/2018  Last Fill Quantity: 45,  # refills: 0   Last office visit: 1/18/2019 with prescribing provider:  01/18/2019   Future Office Visit:   Sig: APPLY SPARINGLY TO AFFECTED AREA TWICE TIMES DAILY AFTER USING MOIST CLOTH TO WIPE SKIN FLAKES AWAY.    Topical Steroids and Nonsteroidals Protocol Passed - 2/2/2019  3:48 AM       Passed - Patient is age 6 or older       Passed - Authorizing prescriber's most recent note related to this medication read.    If refill request is for ophthalmic use, please forward request to provider for approval.         Passed - High potency steroid not ordered       Passed - Recent (12 mo) or future (30 days) visit within the authorizing provider's specialty    Patient had office visit in the last 12 months or has a visit in the next 30 days with authorizing provider or within the authorizing provider's specialty.  See \"Patient Info\" tab in inbasket, or \"Choose Columns\" in Meds & Orders section of the refill encounter.             Passed - Medication is active on med list        "

## 2019-02-04 NOTE — TELEPHONE ENCOUNTER
Prescription approved per [facility].    Lita Mckeon RN  [facility]

## 2019-02-06 NOTE — TELEPHONE ENCOUNTER
Spoke with wife and let her know about new med    Also faxed the med order to [facility] and called them spoke to Manoj with new med/instruction  Also asked the facility to use regular BP cuff not the wrist one    Joana Gandhi RN   [facility]

## 2019-02-06 NOTE — TELEPHONE ENCOUNTER
Dr. Buchanan    record of January BP checks from pt facility show half were high readings and there was also a reading of 95/65  Document placed to be abstracted to pt chart    Joana Gandhi RN   [facility]

## 2019-02-26 NOTE — TELEPHONE ENCOUNTER
CXR 10/10/18 no new infiltrates, bibasilar reticular markings seen. Office appointment reviewed- 'pleurisy' ; plan to finish doxy for UTI, observe if worse resp symptoms. Consider prednisone if COPD exacerbates.  Bharath Buchanan MD    no sweating/no weight loss/no weight gain/no polyphagia/no chills/no fever

## 2019-03-12 NOTE — TELEPHONE ENCOUNTER
Spoke to wife regarding order and she will wait to see what the results to see if an appointment is needed with provider    Order faxed to [facility] and also called  Daniel nurse at [facility] was given a verbal order for the UA/UC    Joana Gandhi RN   [facility]

## 2019-03-12 NOTE — TELEPHONE ENCOUNTER
Route to provider pool    See request for UA/UC    Per wife  After dinner he tells his wife he is feeling sick for the last 3 days, sick to stomach  He has also been more combative, this is his usual pattern when he is getting a UTI  Denies pain, no temp  BP was elevated on Sunday morning per wife, but she wasn't sure what it was    Lab cued  Order will need to be faxed to pt facility  [phone]    Wife may want pt to be seen in clinic this week    Joana Gandhi RN   [facility]

## 2019-03-12 NOTE — TELEPHONE ENCOUNTER
Spoke with wife    She will let us about the appointment foir thei week, see other encounter    Joana Gandhi RN   [facility]

## 2019-03-12 NOTE — TELEPHONE ENCOUNTER
Reason for call:  Other   Patient called regarding (reason for call): call back  Additional comments: The patients wife called and stated that she would like her  to see Dr. Buchanan this week for a possible uti. She was wondering if he could be squeezed in tomorrow. She would like a call back to discuss if this would be possible or not.     Phone number to reach patient:  Home number on file [phone] (home)    Best Time: Before 12:15    Can we leave a detailed message on this number?  YES

## 2019-03-12 NOTE — TELEPHONE ENCOUNTER
Dr. Buchanan,    Please review/sign or advise for refill of clonazePAM (KLONOPIN) 0.5 MG ODT tab.    : Clonazepam last filled on 02/17/2019 for 7 tablets.      Lita Mckeon RN  [facility]

## 2019-03-12 NOTE — TELEPHONE ENCOUNTER
Controlled Substance Refill Request for clonazePAM (KLONOPIN) 0.5 MG ODT tab  Problem List Complete:  No     PROVIDER TO CONSIDER COMPLETION OF PROBLEM LIST AND OVERVIEW/CONTROLLED SUBSTANCE AGREEMENT    Last Written Prescription Date:  09/12/2018  Last Fill Quantity: 45,   # refills: 0    THE MOST RECENT OFFICE VISIT MUST BE WITHIN THE PAST 3 MONTHS. AT LEAST ONE FACE TO FACE VISIT MUST OCCUR EVERY 6 MONTHS. ADDITIONAL VISITS CAN BE VIRTUAL.  (THIS STATEMENT SHOULD BE DELETED.)    Last Office Visit with [facility] primary care provider: 01/18/2019    Future Office visit:     Controlled substance agreement:   Encounter-Level CSA:    There are no encounter-level csa.     Patient-Level CSA:    There are no patient-level csa.         Last Urine Drug Screen: No results found for: CDAUT, No results found for: COMDAT, No results found for: THC13, PCP13, COC13, MAMP13, OPI13, AMP13, BZO13, TCA13, MTD13, BAR13, OXY13, PPX13, BUP13     Processing:  Fax Rx to [facility] pharmacy     [email or web address]       checked in past 3 months?  No, route to RN

## 2019-03-12 NOTE — TELEPHONE ENCOUNTER
Reason for call:  Order   Order or referral being requested: Urine lab orders for possible uti  Reason for request: NA  Date needed: as soon as possible  Has the patient been seen by the PCP for this problem? YES    Additional comments: The patients wife called and stated that she would like Dr. Buchanan to call [facility] where he is staying and request that they get a urine sample from him because she suspects that he may have a uti.     Phone number to reach patient:  Other phone number: [phone] or Manoj mcfadden RN manager at [phone]     Best Time: Any    Can we leave a detailed message on this number?  YES

## 2019-03-12 NOTE — LETTER
[address] [address]  [phone]          2019    RE: Sanjay Cano                                                                                                                      1945            To whom it may concern,    Please obtain a UA/UC on Sanjay  Call results to , also fax results to         Sincerely,             Long Stanley MD

## 2019-03-13 NOTE — TELEPHONE ENCOUNTER
Prescription for   clonazePAM (KLONOPIN) 0.5 MG ODT  Faxed to [facility] at [phone]

## 2019-03-13 NOTE — TELEPHONE ENCOUNTER
Dr. Buchanan    Pharmacy called and the clonazepam 0.5mg ODT can not be split    Did you want the ODT form you will need to order the clonazepam 0.25 ODT this is a dissolvable form    Or did you want the clonazepam 0.5mg 0.5 tab (0.25mg) of the regular form tab    [phone] Dianne Pharmacist     Both cued for your pick     Joana Gandhi RN   [facility]

## 2019-03-13 NOTE — TELEPHONE ENCOUNTER
Spoke with wife, they obtained the urine yesterday but lab did not pick it up until this AM, she is concerned , I offered reassurance that if the sample obtained is not able to be used by the lab they will let the facility know, there should not be a need for another order as the task is for a lab UA/UC to be obtained. She voiced her frustration with the facility, allowed her to vent    Joana Gandhi RN   [facility]

## 2019-03-14 NOTE — TELEPHONE ENCOUNTER
Prescription for  clonazePAM (KLONOPIN) 0.5 MG tablet  Faxed to [facility] at [phone]

## 2019-03-14 NOTE — TELEPHONE ENCOUNTER
[facility] sates they have faxed urine test results for review and are requesting instruction.     Please call [phone]

## 2019-03-14 NOTE — TELEPHONE ENCOUNTER
Dr. Buchanan/Provider Pool:    Please review and advise regarding results of urinalysis.    Results in provider's folder at Dr. Buchanan's desk.    Lita Mckeon RN  [facility]

## 2019-03-14 NOTE — TELEPHONE ENCOUNTER
Adia SHEA calling from [facility] with Urinary Analysis results. Reports the following Urinary Results.     Color    Shanta     Leukocyte   Moderate    Urobilinogen Urine 2    WBC Urine   Moderate    Bacteria  Many.    States urine culture is still pending. Informed Adia RN will page on call provider who is the patient's primary care provider Dr. Buchanan will call Adia directly for second level triage.     Paged on call provider Bharath Buchanan MD for [facility] at 09:23 through smart web to call Adia SHEA directly at [phone].    Advised Adia to call back if no call from provider within 20 minutes. Caller verbalized understanding. Denies further questions.      Chance Law, RN  [facility]              Reason for Disposition    [1] Follow-up call from patient regarding patient's clinical status AND [2] information urgent    Protocols used: PCP CALL - NO TRIAGE-ADULT-

## 2019-03-15 NOTE — TELEPHONE ENCOUNTER
Received call from Manoj. He requested that we fax the orders for abx and clonazepam. Orders faxed to [phone]. He stated that they did order a culture. Results not back yet. They will fax results once they are back.    Lita Mckeon RN  [facility]

## 2019-03-15 NOTE — TELEPHONE ENCOUNTER
Huddled with Dr. Buchanan who stated that he would like to get patient started on nitroFURantoin macrocrystal-monohydrate (MACROBID) 100 MG capsule. RX was sent to [facility].    However, Dr. Buchanan would like culture results if [facility] has them or once they receive them.    Called Manoj at [facility]. M to call clinic.    Called Caren, notified her of plan and medication prescribed by Dr. Buchanan. Caren stated that she is supposed to get a call from Manoj today so she will ask him to contact us.     Lita Mckeon RN  [facility]

## 2019-03-18 NOTE — TELEPHONE ENCOUNTER
Patient's Wife called to follow up on culture results, but does not look like we have received them yet. Please follow up with culture result.

## 2019-03-18 NOTE — TELEPHONE ENCOUNTER
Called and spoke with Adia at the residence, they have not gotten the sulture sensitivity back from lab, she will call them to inquire how long it will take, if they get them she will fax results to us    Joana Gandhi RN   [facility]

## 2019-03-18 NOTE — TELEPHONE ENCOUNTER
Spoke with wife, pt says he still has sick feeling tummy and is tired, she will see how is is the next day or 2 and if not improving she will call and make appointment with provider    Gave her the message regarding the lab culture and will continue with TX    Huddle with provider  Multiple organisms , will continue with diandra Gandhi RN   [facility]

## 2019-03-18 NOTE — TELEPHONE ENCOUNTER
Dr. Buchanan    Spoke with Adia at [facility], she stated per lab  They don't do sensitivity if less than >100,000 colonies  And pt culture was less than 50,000 gram neg  She will fax the result to us today    Advise  Wife is wondering about results    Joana Gandhi RN   [facility]

## 2019-03-20 NOTE — TELEPHONE ENCOUNTER
Sanjay has a type of dementia, with Parkinson's.  As there are many other conditions that can cause dementia, if no one has any of them we will consider that they have Alzheimer's type.  Sometimes medical folk will use Alzheimer's in place of dementia referring to similar conditions although technically from different causes, which can be confusing to patient's and family.  I hope this helps- please let Danuta know.  Thanks Bharath

## 2019-03-20 NOTE — TELEPHONE ENCOUNTER
Dr. Buchanan,    Spoke with Caren. She stated that she has been receiving reports from a  at the patient's facility because she has requested them. She stated that she doesn't trust the  because she writes things that Caren said that according to Caren, she did not say. In the reports, the SW has written that the patient has Alzheimer's. Caren asked if the patient has that diagnosis. 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 Caren is wondering if pt has Alzheimer's. She states that she was never formally told this before by a doctor, and is in contact with the pt's neurologist frequently.    Discussed with patient that you may recommend to discuss this issue and Alzheimer's diagnosis with pt's neurologist, but that the message would be sent to you.    Please advise.      Caren is also reporting that the patient is doing better/not combative after starting abx, but he is saying that his chest hurst and he has a cough. She is wondering if there is anything that she needs to do. Pt is still on abx for UTI. Recommended to monitor and if cough worsens/continues, or pt develops fever, wheezing, and breathing problems, or has new symptoms to contact our clinic for recommendations from PCP. Caren verbalized understanding.    Lita Mckeon RN  [facility]

## 2019-03-21 NOTE — TELEPHONE ENCOUNTER
Spoke with Manoj. Manoj states there has been a Flu outbreak on floor. They are requesting order for Tamiflu.    Current plan: 75 mg Tamiflu once daily for 2 weeks. The paper work will be brought to office visit for Dr. Buchanan's review/signature.     Manoj states the patient is not currently having symptoms. Declines SOB/diffculty breathing. No fever.     Ibeth Saba, RN  Triage Nurse

## 2019-03-21 NOTE — TELEPHONE ENCOUNTER
Called patient's wife, relayed provider message below. Patient's wife verbalized understanding.     Patient's wife states she is concerned patient is getting a cold. Writer advised patient's wife to assess for symptoms - fever, breathing problems, cough/congestion, wheezing and return a call to the clinic if any are present. Patient's wife verbalized understanding    Ibeth Saba RN  Triage Nurse

## 2019-03-21 NOTE — TELEPHONE ENCOUNTER
Caren returned call to clinic. Caren states Sanjay has been having episodes of shortness of breath and she is worried about him. Caren described Sanjay's breathing as labored. When Caren is there she does deep breathing exercises with him, and this helps breathing return to baseline. Caren states Sanjay is coughing and he sounds more congested. She states he is tired and not acting himself. Sanjay reports to Caren that he is not feeling well. Caren denies fever.     Caren states the nurse manager just informed her that there has been a positive case of the flu on the floor. We should be expecting a request for Tamiflu from Sanjay's care facility.    Caren is concerned that this cough is going to turn into pneumonia. She would like to get him checked out to make sure there is nothing going on because she is worried about the care he will get when she is not there    Appointment scheduled 03/22/2019 at 2:15pm    Ibeth Saba, RN  Triage Nurse

## 2019-03-21 NOTE — TELEPHONE ENCOUNTER
Returned call to jeoy Aranda voicemail to return call to clinic    Ibeth Saba, RN  Triage Nurse

## 2019-03-21 NOTE — TELEPHONE ENCOUNTER
Reason for call:  Other   Patient called regarding (reason for call): call back  Additional comments: Manoj with [facility] called and stated that there has been a flu outbreak at the facility. He stated that all of the residents have been given tamiflu. He stated that Sanjay's wife is worried that he will get it since he is currently on antibiotics. He is wondering if Dr. Buchanan would want them to give Sanjay tamiflu as well. He would like a call back to discuss this.     Phone number to reach patient:  Other phone number: [phone]    Best Time: Any    Can we leave a detailed message on this number?  YES

## 2019-03-22 NOTE — PROGRESS NOTES
"  SUBJECTIVE:   Sanjay Cano is a 73 year old male who presents to clinic today for the following health issues:    Acute Illness   Acute illness concerns: Urinary infection/ cough      Per his wife, the patient has been experiencing loose stool since January. Also describes stool as \"mushy or wet lint\". Has been compliant with lactase. Shanta colored urine per nursing home stuff. Notes he has started feeling nauseaous after dinner. Denies emesis or fever. Difficulty catching his breath. Increased fatigue and lethargy. Increased depression.    Understands there has been a flu diagnosis on his floor.     Onset: over  a week     Fever: no     Chills/Sweats: no     Headache (location?): no    Sinus Pressure:no    Conjunctivitis:  no    Ear Pain: no    Rhinorrhea: no     Congestion: no     Sore Throat: no      Cough: YES    Wheeze: no     Decreased Appetite: no     Nausea: Yes     Vomiting: no     Diarrhea:  no     Dysuria/Freq.: no     Fatigue/Achiness: YES    Sick/Strep Exposure: no      Therapies Tried and outcome: nothing       Social Stress: Challenges at nursing home  His wife is very involved in his care and visits him almost daily for 4 hours. She reports several challenges with the nursing home staff. Does not believe he has been receiving appropriate care. Notes he has been allowed to miss medications (including Sinemet) and meals if he is sleeping. She notes nurses are not doing complete skin checks during showers, which has led to the development of a fungal infection in his great left toe. Notes the staff does not treat him with respect or compassion and often raise their voice towards him. His wife reports that he is rather fearful of nursing home staff due to the way they have treated him. Also reports there could be a cultural barrier between them and the staff.     Wife would like her  to be more active. Notes staff does not feel he is fit or too fatigued to do exercises. States the level of " activity he gets a the residence is significantly less [facility] or [place].       Problem list and histories reviewed & adjusted, as indicated.  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)     Primary insomnia     Other emphysema (H)     Pulmonary nodules     Thyroid nodule     Family history of thyroid cancer     H/O recurrent urinary tract infection     Essential hypertension with goal blood pressure less than 140/90     Senile purpura (H)     Nocturnal cough     Parkinson's disease (H)     Closed displaced fracture of shaft of third metacarpal bone of left hand, initial encounter     Past Surgical History:   Procedure Laterality Date     EYE SURGERY       ORTHOPEDIC SURGERY       PHACOEMULSIFICATION CLEAR CORNEA WITH STANDARD INTRAOCULAR LENS IMPLANT  5/21/2014    Procedure: PHACOEMULSIFICATION CLEAR CORNEA WITH STANDARD INTRAOCULAR LENS IMPLANT;  Surgeon: Tomy Hunter MD;  Location: [facility]     PHACOEMULSIFICATION CLEAR CORNEA WITH TORIC INTRAOCULAR LENS IMPLANT  4/30/2014    Procedure: PHACOEMULSIFICATION CLEAR CORNEA WITH TORIC INTRAOCULAR LENS IMPLANT;  Surgeon: Tomy Hunter MD;  Location: [facility]       Social History     Tobacco Use     Smoking status: Former Smoker     Packs/day: 0.01     Years: 40.00     Pack years: 0.40     Types: Cigarettes     Last attempt to quit: 7/31/2008     Years since quitting: 10.6     Smokeless tobacco: Never Used     Tobacco comment: very passive cigarettes in 6 months   Substance Use Topics     Alcohol use: No     Alcohol/week: 0.0 oz     Family History   Problem Relation Age of Onset     Cerebrovascular Disease Mother       Diabetes Mother      Gastrointestinal Disease Mother      Hypertension Mother      Neurologic Disorder Father      Cerebrovascular Disease Father      Cerebrovascular Disease Maternal Grandfather      Cancer Paternal Grandmother      Other - See Comments Sister         gi - unknown         Current Outpatient Medications   Medication Sig Dispense Refill     amLODIPine (NORVASC) 5 MG tablet Take 1 tablet (5 mg) by mouth At Bedtime 90 tablet 3     aspirin 81 MG tablet Take by mouth twice a week       carbidopa-levodopa (SINEMET CR)  MG per CR tablet Take 1 tablet by mouth At Bedtime 1 tablet 0     carbidopa-levodopa (SINEMET)  MG per tablet Take 1-2 tablets by mouth 5 times daily Takes 2 tablets at 6 AM, 1.5 tablets at 9 AM, 2 tablets at noon, 1.5 tablets at 3 PM, 1.5 tablets at 6 PM. Can take additional 0.5 tablet in the middle of the night if needed. 90 tablet      chlorthalidone (HYGROTON) 25 MG tablet Take 0.5 tablets (12.5 mg) by mouth daily 45 tablet 3     cholecalciferol (VITAMIN D) 1000 UNIT tablet Take 1 tablet (1,000 Units) by mouth every morning 90 tablet 1     clonazePAM (KLONOPIN) 0.5 MG ODT Take 1/2 tablet (0.25 mg) by mouth every other night. 45 tablet 5     clonazePAM (KLONOPIN) 0.5 MG tablet Take 0.5 tab (0.25mg) every other night at bedtime 45 tablet 3     COMBIVENT RESPIMAT  MCG/ACT inhaler INHALE 1 PUFF BY MOUTH FOUR TIMES DAILY. NOT TO EXCEED 6 DOSES PER DAY 4 g 4     donepezil (ARICEPT) 10 MG tablet Take 1 tablet (10 mg) by mouth At Bedtime 30 tablet 11     donepezil (ARICEPT) 5 MG tablet Take 1 tablet (5 mg) by mouth every morning 30 tablet 11     doxycycline (VIBRAMYCIN) 50 MG/5ML SYRP Take 100 mg by mouth 2 times daily       FLOVENT  MCG/ACT Inhaler INHALE 2 PUFFS INTO THE LUNGS TWICE DAILY 36 g 1     guaiFENesin (ROBITUSSIN) 20 mg/mL SOLN solution Take 10 mLs by mouth every 4 hours as needed for cough 1200 mL 0     hydrocortisone (WESTCORT) 0.2 % external cream  Apply sparingly to affected area once a week after patient's bath. 45 g 1     Ipratropium-Albuterol (COMBIVENT RESPIMAT)  MCG/ACT inhaler Inhale 1 puff into the lungs 4 times daily May take 1 to 2 additional doses as needed during the day 1 Inhaler 4     lactase (LACTAID) 3000 UNIT tablet Take 2 tablets (6,000 Units) by mouth 3 times daily (with meals) 540 tablet 3     lactobacillus rhamnosus, GG, (CULTURELL) capsule Take 1 capsule by mouth every morning 60 capsule 11     lisinopril (PRINIVIL/ZESTRIL) 10 MG tablet Take 1 tablet (10 mg) by mouth daily 1 tablet 0     methylcellulose (CITRUCEL) 500 MG TABS tablet Take 1 tablet (500 mg) by mouth daily as needed 14 each 0     mirtazapine (REMERON) 15 MG tablet TAKE 1.5 TABLETs (22.5 MG) BY MOUTH AT BEDTIME 135 tablet 1     nitroFURantoin macrocrystal-monohydrate (MACROBID) 100 MG capsule Take 1 capsule (100 mg) by mouth 2 times daily 14 capsule 0     nitroFURantoin, macrocrystal-monohydrate, (MACROBID) 100 MG capsule Take 1 capsule (100 mg) by mouth 2 times daily 14 capsule 0     pyrithione zinc (SELSUN BLUE/HEAD AND SHOULDERS) 1 % SHAM Apply 1 mL topically daily as needed for irritation 1 Bottle 11     QUEtiapine (SEROQUEL) 50 MG tablet Take 0.5 tablets (25 mg) by mouth nightly as needed (agitation) 180 tablet 0     ranitidine (ZANTAC) 75 MG tablet Take 1 tablet (75 mg) by mouth At Bedtime 30 tablet      senna-docusate (SENOKOT-S/PERICOLACE) 8.6-50 MG tablet Take 1 tablet by mouth 2 times daily 100 tablet 11     venlafaxine (EFFEXOR-XR) 37.5 MG 24 hr capsule Take 37.5 mg by mouth daily       venlafaxine (EFFEXOR-XR) 75 MG 24 hr capsule Take 75 mg by mouth daily       Allergies   Allergen Reactions     Azithromycin      Erythromycin Other (See Comments)     Pathological fractures     Levaquin [Levofloxacin]      Fracture prevention     Seasonal Allergies      BP Readings from Last 3 Encounters:   03/22/19 130/78   01/18/19 136/78   11/07/18 126/78    Wt Readings  from Last 3 Encounters:   11/07/18 79.8 kg (176 lb)   08/20/18 78 kg (172 lb)   06/06/18 78.3 kg (172 lb 11.2 oz)         Labs reviewed in EPIC    Reviewed and updated as needed this visit by clinical staff  Tobacco  Allergies       Reviewed and updated as needed this visit by Provider         ROS:  Denies headache, insomnia, chest pain, shortness of breath, cough, heartburn, bowel issues, bladder issues, neck pain, back pain, hip pain, knee pain, ankle pain, or foot pain. Remainder of ROS is negative unless otherwise noted above or in HPI.    This document serves as a record of the services and decisions personally performed and made by Bharath Buchanan MD. It was created on his behalf by Rosalva Antonio, a trained medical scribe. The creation of this document is based on the provider's statements to the medical scribe.  Rosalva Antonio 2:22 PM March 22, 2019      OBJECTIVE:     /78   Pulse 67   Temp 98.6  F (37  C) (Temporal)   SpO2 96%   There is no height or weight on file to calculate BMI.  GENERAL: healthy, alert and no distress  EYES: Eyes grossly normal to inspection, PERRL and conjunctivae and sclerae normal  HENT: ear canals and TM's normal, nose and mouth without ulcers or lesions  NECK: no adenopathy, no asymmetry, masses, or scars and thyroid normal to palpation  RESP: lungs clear to auscultation - no rales, rhonchi or wheezes  CV: regular rate and rhythm, normal S1 S2, no S3 or S4, no murmur, click or rub, no peripheral edema and peripheral pulses diminished in LE  ABDOMEN: soft, nontender, no hepatosplenomegaly, no masses and bowel sounds normal  MS: no gross musculoskeletal defects noted, edema, cogwheeling with RUE, worse on left  SKIN: no suspicious lesions or rashes  NEURO: Normal strength and tone, mentation intact and speech normal  PSYCH: mentation appears normal, affect normal/bright    Diagnostic Test Results:  none     ASSESSMENT/PLAN:   ASSESSMENT / PLAN:  (R05) Cough  (primary  encounter diagnosis)  Comment: viral, doubt influenza.  Plan: chest XR if worse fever, phlegm    (Z78.9) Poor tolerance for ambulation  (primary encounter diagnosis)  Comment: Limited walking.   Plan: Encouraged increased walking via letter to home residence.     (Z87.440) H/O recurrent urinary tract infection  Comment: on antibiotics  Plan: finish Rx     (Z20.828) Exposure to influenza  Comment: Exposure to influenza at living residence.  Plan: Follow up as needed.    (G20) Parkinson's disease (H)  Comment: Progressivey worsening.  Plan: Monitoring. No medication changes. Follow up in [place]/May.      Patient Instructions   There were no medication changes. Continue chlorthalidone.     I provided instructions for the nursing home staff to increase his daily walking.    Follow up in [place]/May.     45 minutes were spent with the patient and spouse with more than 50% of time spent in counseling and coordination of care regarding above assessment and plan.     The information in this document, created by the medical scribe for me, accurately reflects the services I personally performed and the decisions made by me. I have reviewed and approved this document for accuracy prior to leaving the patient care area.   Bharath Buchanan MD 3:33 PM March 22, 2019    Bharath Buchanan MD  [facility]

## 2019-03-22 NOTE — PATIENT INSTRUCTIONS
There were no medication changes. Continue chlorthalidone.     I provided instructions for the nursing home staff to increase his daily walking.    Follow up in June/May.

## 2019-04-02 NOTE — LETTER
2019        To Whom It May Concern:      I am writing in regards to my patient Sanjay Cano (: 1945) with updated orders.    1. Stop Senekot  2. Administer Metamucil 1 rounded teaspoon in 8 oz water at 2 pm daily for two weeks  3. Contact the clinic at the end of two weeks for update/further orders      For questions, please contact the clinic at the number listed above.      Sincerely          Dr. Bharath Buchanan

## 2019-04-02 NOTE — TELEPHONE ENCOUNTER
Reason for call:  Other   Patient called regarding (reason for call): call back  Additional comments: The patients provider at [facility], Dr. Thompson called to talk to Dr. Buchanan about the patients care. He would like a call back from Dr. Buchanan on 4/3 if possible.     Phone number to reach patient:  Other phone number: [phone]    Best Time: Any    Can we leave a detailed message on this number?  YES

## 2019-04-04 NOTE — TELEPHONE ENCOUNTER
[facility]ayed provider message below. Daniel verbalized understanding.     Letter printed with updated order and faxed to [facility]  [phone]  Attn: Rahul    Thank You!  Ibeth Saba RN  Triage Nurse

## 2019-04-04 NOTE — TELEPHONE ENCOUNTER
Dr. Buchanan,    Cued script for metamucil as requested by nurse at [facility].   Please review/sign or advise.    Call from Daniel with [facility] that they do not have a prescription on file for metamucil. He said that the order faxed to them today stated: Take metamucil 1 rounded teaspoon in 8 oz water 2 pm daily x 2 weeks. They need specification on strength of metamucil that pt should take.    They requested powder instead of packet or capsule. They do not carry in bulk form at facility. Discussed that we can send a script into [facility] for the pt, and then fax the medication order to them.     He said that he will be there in the morning and requested that we send the fax to Attn: Daniel. [phone].    Lita Mckeon RN  [facility]

## 2019-04-04 NOTE — TELEPHONE ENCOUNTER
Called spouse; c/o not feeling well after evening meal x several weeks. senekot stopped while on antibiotics. Foamy stools, not sure if formed.     Plan: stop senekot.  metamucil 1 rounded teaspoon in 8 oz water 2 pm daily x 2 weeks. Contact at end of 2 weeks for update/further orders.     Please call in orders to nursing home   Thanks Bharath

## 2019-04-04 NOTE — TELEPHONE ENCOUNTER
Reason for call:  Other   Patient called regarding (reason for call): call back  Additional comments: Was faxed an order for Metamucil with three different strengths and needs clarification on the order.    Phone number to reach patient:  Other phone number:  [phone]. Can fax the clarification of the order to the fax number of [phone], but call with the verbal clarification first.    Best Time:  ASAP    Can we leave a detailed message on this number?  YES

## 2019-04-12 NOTE — TELEPHONE ENCOUNTER
"Spoke with Daniel. Daniel states when he went to check the patient's vitals signs this morning, he noted low heart rate and low O2 sats. Vital signs listed below:   P-56 (normal 70's)  O2 - 91% (95 greater) -- laying down   BP - 123/71  T- 98.7  R - 20    Daniel denies associated symptoms (faintmess; dizziness/lightheadedness; shortness of breath/labored breathing; chest pain). Patient states he is not feeling well and is refusing to get up. Patient denies pain and nausea/vomiting. Daniel has not noted any change in mental status. Daniel states diarrhea has improved. Daniel states the patient is eating and drinking at his baseline. Is still voiding. Patient does have a dry, non-productive cough.     Vital signs after HOB has been elevated:   P: 56-58  O2- 92%    Spoke with patient's wife, Caren. She was with the patient yesterday. Caren reports patient seemed normal yesterday, patient complains of dry, \"deep\" cough. Caren is worried he is going to get pneumonia. Patient's wife reports the patient denies chest pain but reports a runny nose. Caren states the patient's stools are still brown/cotton candy color, but they are starting to form. Caren states the patient is eating and drinking fine and has been voiding fine.     Plan:  - encourage ambulation/movement  - IS daily  - continue to monitor and notify if no improvement or worsening   - Update clinic on Monday    Returned call to [facility] and Caren and updated both on recommendations and plan. Both verbalized understanding and are in agreement with plan    Ibeth Saba RN  Triage Nurse        "

## 2019-04-12 NOTE — LETTER
[address] [address]  [phone]          2019    RE: Sanjay Cano                                                                                                                      1945            To whom it may concern,    Please check pt O2sats    Obtain 2 view chest xray    Report results to Dr. Buchanan at  also fax results to us at         Thank you        Bharath Buchanan MD

## 2019-04-12 NOTE — TELEPHONE ENCOUNTER
Received call from patient's spouse stating that patient has more of a cough since she has been with them since earlier this morning. It is not really producing any sputum. A little bit of a squeak after. Asked if pt has a temperature. She is unsure. She said that pt declines any chest pain, or pain during cough. Per spouse, pt used the spirometer which she thinks has helped a little bit. She is worried because his cough has been going on for about a week. Sounds kind of throttlie. Wife states that she just doesn't know what to do.     Discussed s/s of pneumonia: fever, cough, sputum production, wheezing/breathing difficulty, chest pain, or pain with cough, fatigue.     Recommended over the weekend to work with staff at [facility], if cough worsening or concerned about symptoms, then options would be seeing if their on call provider can order a chest x-ray or going into the ER. Writer unsure if all order managed through Dr. Buchanan alone or if provider in facility involved as well. Discussed that the staff may need to take certain steps such as checking temp, listening to lungs etc.    Otherwise would recommend OV with Dr. Buchanan or other provider in our clinic. Discussed openings that he has available on Monday. Caren stated that she had to go because pt was becoming upset.    Lita Mckeon RN  [facility]

## 2019-04-12 NOTE — TELEPHONE ENCOUNTER
Reason for call:  Symptom   Symptom or request: pt's pulse is 56 and O2 stat is 91%    Duration (how long have symptoms been present): today  Have you been treated for this before? No    Additional comments: n/a    Phone number to reach patient:  Other phone number:  [phone]    Best Time:  n/a    Can we leave a detailed message on this number?  YES

## 2019-04-15 NOTE — TELEPHONE ENCOUNTER
Dr. Buchanan;   Called [facility], spoke with nursing staff    1. Patient is taking lisinopril 10 mg daily  2. Staff was not sure when the last swallow study/evaluation was done    Please advise    Thank You!  Ibeth Saba RN  Triage Nurse

## 2019-04-15 NOTE — TELEPHONE ENCOUNTER
Sanjay seems to be a bit better over the weekend, yesterday he wasn't quite the same, but they found his simemet on the floor and he wasn't given the corrected dose    Vs 128/77, HR 98, o2sat 97% and R.18  Yesterday had a cough but better    She is planning on bringing him to the home for Easter dinner    Joana Gandhi RN   [facility]

## 2019-04-15 NOTE — TELEPHONE ENCOUNTER
"Received call from patient's wife. Patient's wife states the patient has a  cough. She is concerned that he may have the beginning stages of pneumonia.     Patient's wife states the patient does not have a fever. Cough is non-productive, but she states the patient is lethargic and tired. Patient's wife states the cough is \"squeely\". He has been having this cough for one week.     Writer advised patient's wife to make an appointment to be seen in the clinic. Patient's wife verbalized understanding. Appointment scheduled 04/17/2019    Ibeth Saba RN  Triage Nurse        "

## 2019-04-15 NOTE — TELEPHONE ENCOUNTER
Is patient currently on lisinopril? Do they know how long ago patient had a swallow eval/study? Please contact [facility], thanks, Bharath

## 2019-04-15 NOTE — TELEPHONE ENCOUNTER
"Pt's wife is calling with pt's vitals over the weekend \"as instructed\"    Please call her back   "

## 2019-04-16 NOTE — TELEPHONE ENCOUNTER
Order faxed to [facility], also called them, spoke to Rachel his nurse for the day and gave the order  Wife notified    Joana Gandhi RN   [facility]

## 2019-04-16 NOTE — TELEPHONE ENCOUNTER
Spoke with alyssa Santos cough and possible pneumonia, reason for Xray    Joana Gandhi RN   [facility]

## 2019-04-16 NOTE — TELEPHONE ENCOUNTER
Reason for call:  Other   Patient called regarding (reason for call): call back  Additional comments: Tom a nurse at [facility] called with questions about an x-ray order that the patient has. He is wondering about the reason that the x-ray order was put in. He would like a call back to discuss this.     Phone number to reach patient:  Other phone number: [phone]    Best Time: Any    Can we leave a detailed message on this number?  YES

## 2019-04-16 NOTE — TELEPHONE ENCOUNTER
Received call from patient's wife, Caren Cano. She states that she went to Ramsay's room and pt wasn't waking up/wasn't responding. She states that the nurse squeezed his hand and he grunted but doesn't seem to be moving awake like normal. He is breathing. When she said his name, he opened his eyes, but then closed them again. He is lethargic.    Writer asked: Did pt sleep well last night? Spouse stated she doesn't know, pt staff states that he slept fine  BP: 105/67   O2: 94  HR: 62    Spouse wondering what she should do. Writer explained to spouse that concerns would be if pt becomes unresponsive, or if pt's breathing very shallow or heavy- so either a really low RR or high RR, and if his HR was really high, fever, or very low BP. Discussed that she should talk with pt's nurse and have them assess since writer is not there. If facility/pt's nurse concerned then they may want to consider sending him into the ER. Spouse asked writer to speak to the pt's nurse, Olivier.     Spoke to nurse who stated that the pt just got back from the day program. He participates in talking/activities. Discussed monitoring s/s.    Spoke with spouse again, and discussed that pt is likely just tired after being at the day program. Discussed that we will have to wait for results of chest X-ray, but if positive for pneumonia, pt likely just very fatigued. Spouse verbalized understanding.    Lita Mckeon RN  [facility]

## 2019-04-17 NOTE — TELEPHONE ENCOUNTER
Recived call from patient's wife. Patient's wife wanted to verify if she had written down the correct administration instructions.     Writer reviewed plan made by Tamar listed below. Patient's wife verbalized understanding and is in agreement with plan    Ibeth Saba RN  Triage Nurse

## 2019-04-17 NOTE — TELEPHONE ENCOUNTER
Reviewed medication list for drug interactions with prednisone and doxycycline per RN/family request.      The following drug interactions were reviewed:  1. Prednisone and Aricept - increased risk of muscle weakness with high dose steroid, low risk at 40mg and risk also thought to be lower with Aricept since more CNS selective.  2. Prednisone and chlorthalidone - risk of hypokalemia, low risk and recent K has been wnl.   Neither of the drug interactions above thought to be clinically significant.     Recommend the following for medication administration:  - doxycycline every 12 hours (example administer with Sinemet at 6am and 6pm)  - prednisone in the morning (example administer with 6am medications)  - if his facility has any antacids available for PRN heartburn use, recommend HOLDING while on doxycycline.  OK to continue ranitidine     Routed information to RN  Tamar Arriola, PharmD, BCACP

## 2019-04-17 NOTE — PROGRESS NOTES
SUBJECTIVE:   Sanjay Cano is a 73 year old male who presents to clinic today for the following   health issues:      RESPIRATORY SYMPTOMS      Duration: back bottom left lobe of his left lung, a nurse took his vitals and thought he had heard something there    Description  rhinorrhea, cough, wheezing, fatigue/malaise and difficult to wake up, wife reported him being groggy    Severity: severe    Accompanying signs and symptoms: pain in left chest with taking a deep breath    History (predisposing factors):  Has had these symptoms before     Precipitating or alleviating factors: None    Therapies tried and outcome:  none    Cough sounds productive but no mucus is getting spit out  Started about 5 days ago  He's been very tired, they apparently were unable to wake him up for 20 minutes a few days ago    Portable chest xray was negative but the nurse thought he heard something in his left lower lung    No swelling in the lower legs          Problem list and histories reviewed & adjusted, as indicated.  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)     Primary insomnia     Other emphysema (H)     Pulmonary nodules     Thyroid nodule     Family history of thyroid cancer     H/O recurrent urinary tract infection     Essential hypertension with goal blood pressure less than 140/90     Senile purpura (H)     Nocturnal cough     Parkinson's disease (H)     Closed displaced fracture of shaft of third metacarpal  bone of left hand, initial encounter     Respiratory failure (H)     Past Surgical History:   Procedure Laterality Date     EYE SURGERY       ORTHOPEDIC SURGERY       PHACOEMULSIFICATION CLEAR CORNEA WITH STANDARD INTRAOCULAR LENS IMPLANT  5/21/2014    Procedure: PHACOEMULSIFICATION CLEAR CORNEA WITH STANDARD INTRAOCULAR LENS IMPLANT;  Surgeon: Tomy Hunter MD;  Location: [facility]     PHACOEMULSIFICATION CLEAR CORNEA WITH TORIC INTRAOCULAR LENS IMPLANT  4/30/2014    Procedure: PHACOEMULSIFICATION CLEAR CORNEA WITH TORIC INTRAOCULAR LENS IMPLANT;  Surgeon: Tomy Hunter MD;  Location: [facility]       Social History     Tobacco Use     Smoking status: Former Smoker     Packs/day: 0.01     Years: 40.00     Pack years: 0.40     Types: Cigarettes     Last attempt to quit: 7/31/2008     Years since quitting: 10.7     Smokeless tobacco: Never Used     Tobacco comment: very passive cigarettes in 6 months   Substance Use Topics     Alcohol use: No     Alcohol/week: 0.0 oz     Family History   Problem Relation Age of Onset     Cerebrovascular Disease Mother      Diabetes Mother      Gastrointestinal Disease Mother      Hypertension Mother      Neurologic Disorder Father      Cerebrovascular Disease Father      Cerebrovascular Disease Maternal Grandfather      Cancer Paternal Grandmother      Other - See Comments Sister         gi - unknown           Labs reviewed in EPIC  BP Readings from Last 3 Encounters:   04/18/19 151/83   04/18/19 130/82   03/22/19 130/78    Wt Readings from Last 3 Encounters:   11/07/18 79.8 kg (176 lb)   08/20/18 78 kg (172 lb)   06/06/18 78.3 kg (172 lb 11.2 oz)                  Patient Active Problem List   Diagnosis     Rosacea     Moderate recurrent major depression (H)     Health Care Home     Advanced directives, counseling/discussion     At risk for falling     CARDIOVASCULAR SCREENING; LDL GOAL LESS THAN 130     Urinary frequency     Constipation     Dementia due to Parkinson's disease without  behavioral disturbance (H)     Primary insomnia     Other emphysema (H)     Pulmonary nodules     Thyroid nodule     Family history of thyroid cancer     H/O recurrent urinary tract infection     Essential hypertension with goal blood pressure less than 140/90     Senile purpura (H)     Nocturnal cough     Parkinson's disease (H)     Closed displaced fracture of shaft of third metacarpal bone of left hand, initial encounter     Respiratory failure (H)     Past Surgical History:   Procedure Laterality Date     EYE SURGERY       ORTHOPEDIC SURGERY       PHACOEMULSIFICATION CLEAR CORNEA WITH STANDARD INTRAOCULAR LENS IMPLANT  5/21/2014    Procedure: PHACOEMULSIFICATION CLEAR CORNEA WITH STANDARD INTRAOCULAR LENS IMPLANT;  Surgeon: Tomy Hunter MD;  Location: [facility]     PHACOEMULSIFICATION CLEAR CORNEA WITH TORIC INTRAOCULAR LENS IMPLANT  4/30/2014    Procedure: PHACOEMULSIFICATION CLEAR CORNEA WITH TORIC INTRAOCULAR LENS IMPLANT;  Surgeon: Tomy Hunter MD;  Location: [facility]       Social History     Tobacco Use     Smoking status: Former Smoker     Packs/day: 0.01     Years: 40.00     Pack years: 0.40     Types: Cigarettes     Last attempt to quit: 7/31/2008     Years since quitting: 10.7     Smokeless tobacco: Never Used     Tobacco comment: very passive cigarettes in 6 months   Substance Use Topics     Alcohol use: No     Alcohol/week: 0.0 oz     Family History   Problem Relation Age of Onset     Cerebrovascular Disease Mother      Diabetes Mother      Gastrointestinal Disease Mother      Hypertension Mother      Neurologic Disorder Father      Cerebrovascular Disease Father      Cerebrovascular Disease Maternal Grandfather      Cancer Paternal Grandmother      Other - See Comments Sister         gi - unknown           OBJECTIVE:                                                    /82   Pulse 72   Temp 99.3  F (37.4  C) (Oral)   SpO2 95%  There is no height or weight on file to calculate BMI.   GENERAL:   Alert. No acute distress. WD WN  HEAD:  Normocephalic.Atramautic  EYES:  PERRLA.  EOMs are full.  Sclerae are clear.  Fundi not visualized. No discharge  EARS:  Normal canal without edema exudates or discharge. TM normal. No bulging or retraction  NOSE:  Clear rhinorrhea  MOUTH/THROAT:  Oral hygene is good. Moist mucus membranes. No oral or pharyngeal lesions are seen. Oropharynx without exudates edema or erythema  NECK:  Supple.  No lymphadenopathy. ROM intact without nuchal rigidity.  LUNGS: no rhonchi. No wheezing or rales. Chest rise symmetrical and no tenderness to palpation. Good breath sounds throughout.  HEART:  Regular rhythm.  Normal rate.  No murmur  EXTREMITIES:  Warm, well perfused with good ROM and strength. No cyanosis or edema.    SKIN:  Warm and dry.  No rashes  NEUROLOGIC:  Normal tone    CBC and chest xray normal       ASSESSMENT/PLAN:                                                        ICD-10-CM    1. Community acquired bacterial pneumonia J15.9 CBC with platelets and differential     doxycycline hyclate (VIBRAMYCIN) 100 MG capsule     predniSONE (DELTASONE) 20 MG tablet     DISCONTINUED: doxycycline hyclate (VIBRAMYCIN) 100 MG capsule     DISCONTINUED: predniSONE (DELTASONE) 20 MG tablet     Vitals are currently stable, he is alert and O2 sats are descent. He responded well to prednisone and doxycyline in the past and is currently using combivent four times daily. No sign of heart failure with pedal edema. Since he is at a nursing facility, he will be closely monitored for any new or worsening symptoms. Return to clinic for any new or worsening symptoms or go to ER Urgent care in off hours     Patient Instructions     Get lab done today  Start medication as prescribed follow up in 24 hours if not improved  Return to clinic for any new or worsening symptoms or go to ER Urgent care in off hours    Patient Education     Treating Pneumonia  Pneumonia is an infection of one or both of the lungs.  Pneumonia:    Is usually caused by either a virus or a bacteria    Can be very serious, especially in infants, young children, and older adults. It s also serious for those with other long-term health problems or weakened immune systems.    Is sometimes treated at home and sometimes in the hospital    Antibiotic medicines  Antibiotics may be prescribed for pneumonia caused by bacteria. They may be pills (oral medicines), or shots (injections). Or they may be given by IV (intravenously) into a vein. If you are taking oral medicines at home:    Fill your prescription and start taking your medicine as soon as you can.    You will likely start to feel better in a day or 2, but don t stop taking the antibiotic.    Use a pill organizer to help you remember to take your medicine.    Let your healthcare provider know if you have side effects.    Take your medicine exactly as directed on the label. Talk to your provider or pharmacist if you have any questions.  Antiviral medicines  Antiviral medicine may be prescribed for pneumonia caused by a virus. For example, antiviral medicine may be prescribed for pneumonia caused by the flu virus. Antibiotics do not work against viruses. If you are taking antiviral medicine at home:    Fill your prescription and start taking your medicine as soon as you can.    Talk with your provider or pharmacist about possible side effects.    Take the medicine exactly as instructed.  To relieve symptoms  There are many medicines that can help relieve symptoms of pneumonia. Some are prescription and some are over-the-counter.  Your healthcare provider may recommend:    Acetaminophen or ibuprofen to lower your fever and to lessen headache or other pain    Cough medicine to loosen mucus or to reduce coughing  Make sure you check with your healthcare provider or pharmacist before taking any over-the-counter medicines.  Special treatments  If you are hospitalized for pneumonia, you may have other  therapies, including:    Inhaled medicines to help with breathing or chest congestion    Supplemental oxygen to increase low oxygen levels  Drink fluids and eat healthy  You should eat healthy to help your body fight the infection. Drinking a lot of fluids helps to replace fluids lost from fever and to loosen mucus in your chest.    Diet. Make healthy food choices, including fruits and vegetables, lean meats and other proteins, 100% whole grain and low- or no-fat dairy products.    Fluids. Drink at least 6 to 8 tall glasses a day. Water and 100% fruit or vegetable juice are best.  Get plenty of rest and sleep  You may be more tired than usual for a while. It is important to get enough sleep at night. It s also important to rest during the day. Talk with your healthcare provider if coughing or other symptoms are interfering with your sleep.  Preventing the spread of germs  The best thing you can do to prevent spreading germs is to wash your hands often. You should:    Rub your hand with soap and water for 20 to 30 seconds.    Clean in between your fingers, the backs of your hands, and around your nails.    Dry your hands on a separate towel or use paper towels.  You should also:    Keep alcohol-based hand  nearby.    Make sure you also clean surfaces that you touch. Use a product that kills all types of germs.    Stay away from others until you are feeling better.  When to call your healthcare provider  Call your healthcare provider if you have any of the following:    Symptoms get worse    Fever continues    Shortness of breath gets worse    Increased mucus or mucus that is darker in color    Coughing gets worse    Lips or fingers are bluish in color    Side effects from your medicine   Date Last Reviewed: 12/1/2016 2000-2018 [employer]. [address] [place] [address], [place] [address]. All rights reserved. This information is not intended as a substitute for professional medical care. Always  "follow your healthcare professional's instructions.                    Estimated body mass index is 22.6 kg/m  as calculated from the following:    Height as of 8/31/18: 1.88 m (6' 2\").    Weight as of 11/7/18: 79.8 kg (176 lb).   Weight management plan: See PCP    Amanda You [facility] [place]  [facility] [place] [place]        "

## 2019-04-17 NOTE — TELEPHONE ENCOUNTER
Spoke with pt wife and gave her the recommendations regarding when to give the new meds    Joana Gandhi RN   [facility]

## 2019-04-17 NOTE — PATIENT INSTRUCTIONS
Get lab done today  Start medication as prescribed follow up in 24 hours if not improved  Return to clinic for any new or worsening symptoms or go to ER Urgent care in off hours    Patient Education     Treating Pneumonia  Pneumonia is an infection of one or both of the lungs. Pneumonia:    Is usually caused by either a virus or a bacteria    Can be very serious, especially in infants, young children, and older adults. It s also serious for those with other long-term health problems or weakened immune systems.    Is sometimes treated at home and sometimes in the hospital    Antibiotic medicines  Antibiotics may be prescribed for pneumonia caused by bacteria. They may be pills (oral medicines), or shots (injections). Or they may be given by IV (intravenously) into a vein. If you are taking oral medicines at home:    Fill your prescription and start taking your medicine as soon as you can.    You will likely start to feel better in a day or 2, but don t stop taking the antibiotic.    Use a pill organizer to help you remember to take your medicine.    Let your healthcare provider know if you have side effects.    Take your medicine exactly as directed on the label. Talk to your provider or pharmacist if you have any questions.  Antiviral medicines  Antiviral medicine may be prescribed for pneumonia caused by a virus. For example, antiviral medicine may be prescribed for pneumonia caused by the flu virus. Antibiotics do not work against viruses. If you are taking antiviral medicine at home:    Fill your prescription and start taking your medicine as soon as you can.    Talk with your provider or pharmacist about possible side effects.    Take the medicine exactly as instructed.  To relieve symptoms  There are many medicines that can help relieve symptoms of pneumonia. Some are prescription and some are over-the-counter.  Your healthcare provider may recommend:    Acetaminophen or ibuprofen to lower your fever and to  lessen headache or other pain    Cough medicine to loosen mucus or to reduce coughing  Make sure you check with your healthcare provider or pharmacist before taking any over-the-counter medicines.  Special treatments  If you are hospitalized for pneumonia, you may have other therapies, including:    Inhaled medicines to help with breathing or chest congestion    Supplemental oxygen to increase low oxygen levels  Drink fluids and eat healthy  You should eat healthy to help your body fight the infection. Drinking a lot of fluids helps to replace fluids lost from fever and to loosen mucus in your chest.    Diet. Make healthy food choices, including fruits and vegetables, lean meats and other proteins, 100% whole grain and low- or no-fat dairy products.    Fluids. Drink at least 6 to 8 tall glasses a day. Water and 100% fruit or vegetable juice are best.  Get plenty of rest and sleep  You may be more tired than usual for a while. It is important to get enough sleep at night. It s also important to rest during the day. Talk with your healthcare provider if coughing or other symptoms are interfering with your sleep.  Preventing the spread of germs  The best thing you can do to prevent spreading germs is to wash your hands often. You should:    Rub your hand with soap and water for 20 to 30 seconds.    Clean in between your fingers, the backs of your hands, and around your nails.    Dry your hands on a separate towel or use paper towels.  You should also:    Keep alcohol-based hand  nearby.    Make sure you also clean surfaces that you touch. Use a product that kills all types of germs.    Stay away from others until you are feeling better.  When to call your healthcare provider  Call your healthcare provider if you have any of the following:    Symptoms get worse    Fever continues    Shortness of breath gets worse    Increased mucus or mucus that is darker in color    Coughing gets worse    Lips or fingers are  bluish in color    Side effects from your medicine   Date Last Reviewed: 12/1/2016 2000-2018 [employer]. [address] [place] [address], [place] [address]. All rights reserved. This information is not intended as a substitute for professional medical care. Always follow your healthcare professional's instructions.

## 2019-04-17 NOTE — TELEPHONE ENCOUNTER
Reason for call:  Other   Patient called regarding (reason for call): call back  Additional comments: The patients wife called and stated that he saw Jenifer Roblero today and was diagnosed with pneumonia. He was prescribed two medications for this but they have not been delivered yet. She is wondering how they should be taken so that they don't effect the other mediations that he currently takes. She would like a call back to discuss this.     Phone number to reach patient: [phone]    Best Time: Any    Can we leave a detailed message on this number?  NO, does not have voicemail set up

## 2019-04-17 NOTE — TELEPHONE ENCOUNTER
Pt wife had many questions on what time the antibiotic and prednisone should be given, will it interfere with his other meds, I reached out to Pharmacist Tamar for help. She will review and contact us    Joana Gandhi RN   [facility]

## 2019-04-18 PROBLEM — J96.90 RESPIRATORY FAILURE (H): Status: ACTIVE | Noted: 2019-01-01

## 2019-04-18 NOTE — ED TRIAGE NOTES
Upon EMS arrival the patient was sats mid 70 on room air. Duo neb given in route. Sats increased to 92% with CPAP. The patient seen for possible pneumonia on the 4/16.

## 2019-04-18 NOTE — H&P
[facility] HISTORY & PHYSICAL     Date of Service: 19  Date of Admission: 2019  Patient Name: Sanjay Cano  : 1945  MRN: [id number]         Chief Complaint:     SOB, cough          History of Present Illness     Sanjay Cano is a 73 year old male with history of Parkinson's disease with dementia, HTN, emphysema, and depression who presents with shortness of breath and cough.     History per wife given patient with baseline dementia. Reports patient having cough with wheezing for about a week and half. No fevers. But did have some shortness of breath and lethargy. He was seen at his PCP yesterday and prescribed doxycyline and prednisone however his symptoms did not improve. He was clammy and lethargic at the nursing home and so EMS was called.    Nursing home recently had influenza case and so he completed a course of tamiflu over a month ago for empiric treatment.  He also reportedly has chronic loose stools that have not changed.  His baseline mental status is intermittently oriented and has chronic tremor from Parkinson's. His mentation is improved about an hour after he gets his morning Parkison's medication. She reports that he is essentially bed/wheel chair bound and has not been given opportunity to be physical active and try walking since being at the NH for the past 2 years. She is concerned he is losing muscle mass.  She denies him having difficulty swallowing or pain with swallowing. Was not complaining of abdominal pain. Wife also concerned about whether patient's current insurance will pay for hospital stay.     ED course: Patient was afebrile with oxygen saturation of 78% on room air, requiring BiPAP with improvement of his oxygen saturation. Labs showed WBC 7, Cr 1.44 (baseline ~ 1.14),  troponin < 0.015. CXR showed bibasilar pulm opacities. Patient given 1 L NS. D-dimer was elevated and so CT PE was ordered. Blood cultures were drawn and patient ordered  ceftriaxone/doxycyline (yest to be given). Patient admitted to medicine for further cares.      Review of Symptoms:     Comprehensive 10 point review of systems was negative unless otherwise noted in the HPI.         Past Medical History     Patient Active Problem List   Diagnosis     Rosacea     Moderate recurrent major depression (H)     Health Care Home     Advanced directives, counseling/discussion     At risk for falling     CARDIOVASCULAR SCREENING; LDL GOAL LESS THAN 130     Urinary frequency     Constipation     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)     Primary insomnia     Other emphysema (H)     Pulmonary nodules     Thyroid nodule     Family history of thyroid cancer     H/O recurrent urinary tract infection     Essential hypertension with goal blood pressure less than 140/90     Senile purpura (H)     Nocturnal cough     Parkinson's disease (H)     Closed displaced fracture of shaft of third metacarpal bone of left hand, initial encounter         Past Surgical History     Past Surgical History:   Procedure Laterality Date     EYE SURGERY       ORTHOPEDIC SURGERY       PHACOEMULSIFICATION CLEAR CORNEA WITH STANDARD INTRAOCULAR LENS IMPLANT  5/21/2014    Procedure: PHACOEMULSIFICATION CLEAR CORNEA WITH STANDARD INTRAOCULAR LENS IMPLANT;  Surgeon: Tomy Hunter MD;  Location: [facility]     PHACOEMULSIFICATION CLEAR CORNEA WITH TORIC INTRAOCULAR LENS IMPLANT  4/30/2014    Procedure: PHACOEMULSIFICATION CLEAR CORNEA WITH TORIC INTRAOCULAR LENS IMPLANT;  Surgeon: Tomy Hunter MD;  Location: [facility]          Medications     No current facility-administered medications on file prior to encounter.   Current Outpatient Medications on File Prior to Encounter:  amLODIPine (NORVASC) 5 MG tablet Take 1 tablet (5 mg) by mouth At Bedtime   aspirin 81 MG tablet Take by mouth twice a week   carbidopa-levodopa (SINEMET CR)  MG per CR tablet Take 1 tablet by mouth At Bedtime    carbidopa-levodopa (SINEMET)  MG per tablet Take 1-2 tablets by mouth 5 times daily Takes 2 tablets at 6 AM, 1.5 tablets at 9 AM, 2 tablets at noon, 1.5 tablets at 3 PM, 1.5 tablets at 6 PM. Can take additional 0.5 tablet in the middle of the night if needed.   chlorthalidone (HYGROTON) 25 MG tablet Take 0.5 tablets (12.5 mg) by mouth daily   cholecalciferol (VITAMIN D) 1000 UNIT tablet Take 1 tablet (1,000 Units) by mouth every morning   clonazePAM (KLONOPIN) 0.5 MG ODT Take 1/2 tablet (0.25 mg) by mouth every other night.   clonazePAM (KLONOPIN) 0.5 MG tablet Take 0.5 tab (0.25mg) every other night at bedtime   COMBIVENT RESPIMAT  MCG/ACT inhaler INHALE 1 PUFF BY MOUTH FOUR TIMES DAILY. NOT TO EXCEED 6 DOSES PER DAY   donepezil (ARICEPT) 10 MG tablet Take 1 tablet (10 mg) by mouth At Bedtime   donepezil (ARICEPT) 5 MG tablet Take 1 tablet (5 mg) by mouth every morning   doxycycline (VIBRAMYCIN) 50 MG/5ML SYRP Take 100 mg by mouth 2 times daily   doxycycline hyclate (VIBRAMYCIN) 100 MG capsule Take 1 capsule (100 mg) by mouth 2 times daily   FLOVENT  MCG/ACT Inhaler INHALE 2 PUFFS INTO THE LUNGS TWICE DAILY   guaiFENesin (ROBITUSSIN) 20 mg/mL SOLN solution Take 10 mLs by mouth every 4 hours as needed for cough   hydrocortisone (WESTCORT) 0.2 % external cream Apply sparingly to affected area once a week after patient's bath.   Ipratropium-Albuterol (COMBIVENT RESPIMAT)  MCG/ACT inhaler Inhale 1 puff into the lungs 4 times daily May take 1 to 2 additional doses as needed during the day   lactase (LACTAID) 3000 UNIT tablet Take 2 tablets (6,000 Units) by mouth 3 times daily (with meals)   lactobacillus rhamnosus, GG, (CULTURELL) capsule Take 1 capsule by mouth every morning   lisinopril (PRINIVIL/ZESTRIL) 10 MG tablet Take 1 tablet (10 mg) by mouth daily   methylcellulose (CITRUCEL) 500 MG TABS tablet Take 1 tablet (500 mg) by mouth daily as needed   mirtazapine (REMERON) 15 MG tablet TAKE  1.5 TABLETs (22.5 MG) BY MOUTH AT BEDTIME   nitroFURantoin macrocrystal-monohydrate (MACROBID) 100 MG capsule Take 1 capsule (100 mg) by mouth 2 times daily   predniSONE (DELTASONE) 20 MG tablet Take 40 mg by mouth daily.   psyllium (METAMUCIL/KONSYL) 58.6 % powder Take 1 rounded teaspoon in 8 ounces of water at 2 pm for two weeks.   pyrithione zinc (SELSUN BLUE/HEAD AND SHOULDERS) 1 % SHAM Apply 1 mL topically daily as needed for irritation   QUEtiapine (SEROQUEL) 50 MG tablet Take 0.5 tablets (25 mg) by mouth nightly as needed (agitation)   ranitidine (ZANTAC) 75 MG tablet Take 1 tablet (75 mg) by mouth At Bedtime   venlafaxine (EFFEXOR-XR) 37.5 MG 24 hr capsule Take 37.5 mg by mouth daily   venlafaxine (EFFEXOR-XR) 75 MG 24 hr capsule Take 75 mg by mouth daily          Allergies     Allergies   Allergen Reactions     Azithromycin      Erythromycin Other (See Comments)     Pathological fractures     Levaquin [Levofloxacin]      Fracture prevention     Seasonal Allergies           Family History     Family History   Problem Relation Age of Onset     Cerebrovascular Disease Mother      Diabetes Mother      Gastrointestinal Disease Mother      Hypertension Mother      Neurologic Disorder Father      Cerebrovascular Disease Father      Cerebrovascular Disease Maternal Grandfather      Cancer Paternal Grandmother      Other - See Comments Sister         gi - unknown          Social History     Social History     Tobacco Use     Smoking status: Former Smoker     Packs/day: 0.01     Years: 40.00     Pack years: 0.40     Types: Cigarettes     Last attempt to quit: 7/31/2008     Years since quitting: 10.7     Smokeless tobacco: Never Used     Tobacco comment: very passive cigarettes in 6 months   Substance Use Topics     Alcohol use: No     Alcohol/week: 0.0 oz     Drug use: No     L[facility] in [facility] for the past 2 [facility]          Vitals and Exam     /88   Pulse 92   Temp 98.4  F (36.9  C)  "(Axillary)   Resp 25   SpO2 98%   Wt Readings from Last 2 Encounters:   11/07/18 79.8 kg (176 lb)   08/20/18 78 kg (172 lb)     Gen: elderly appearing man with face mask in place, not answering questions   HEENT: PERRL, face mask in place  NECK: JVP right above clavicle   CARDIOVASCULAR: normal rate, regular rhythm. S1/S2 normal, no murmurs, rubs or gallops   RESPIRATORY: mild wheezes on anterior auscultation bilaterally, no crackles appreciated on anterior auscultation   ABDOMEN: soft, non-tender abdomen without rebound or guarding, no hepatosplenomegaly, no palpable masses, bowel sounds present  EXTREMITIES: trace peripheral edema, warm   Skin: no ecchymoses, no rashes on exposed skin   NEURO: does not respond to questioning, baseline tremor with intermittent jerks, intermittently stating \"Danuta\" (his wifes name), per wife this is his baseline before his morning Parkinsons medication   PSYCH: affect appropriate          Labs and Imaging      CBC  Recent Labs   Lab 04/18/19 0313 04/18/19  0310 04/17/19  1214   WBC 9.2  --  6.9   RBC 4.37*  --  3.98*   HGB 13.9 13.9 12.6*   HCT 43.8  --  37.8*     --  95   MCH 31.8  --  31.7   MCHC 31.7  --  33.3   RDW 12.0  --  12.5     --  203     BMP  Recent Labs   Lab 04/18/19 0313 04/18/19  0310    140   POTASSIUM 3.7 3.5   CHLORIDE 103  --    CO2 27  --    ANIONGAP 9  --    * 180*   BUN 34*  --    CR 1.44*  --    GFRESTIMATED 47*  --    GFRESTBLACK 55*  --    JENNIE 7.9*  --      INRNo lab results found in last 7 days.  Liver panel  Recent Labs   Lab 04/18/19 0313   PROTTOTAL 6.8   ALBUMIN 3.5   BILITOTAL 0.5   ALKPHOS 122   AST 25   ALT 8     Urinalysis  Recent Labs   Lab Test 12/17/18  0830   COLOR Yellow   APPEARANCE Clear   URINEGLC Negative   URINEBILI Negative   URINEKETONE Negative   SG 1.010   UBLD Negative   URINEPH 7.5*   PROTEIN Negative   UROBILINOGEN 0.2   NITRITE Negative   LEUKEST Negative   RBCU O - 2   WBCU 5-10*     Last 6 " Culture results with specimen source  Culture Micro   Date Value Ref Range Status   01/26/2018 >100,000 colonies/mL  Enterococcus faecalis   (A)  Final   01/26/2018 (A)  Final    >100,000 colonies/mL  Aerococcus urinae  Identification obtained by MALDI-TOF mass spectrometry research use only database. Test   characteristics determined and verified by the [facility]   ([facility]) [place], [place].     08/16/2017   Final    <10,000 colonies/mL  mixed urogenital joey  Susceptibility testing not routinely done     08/09/2017 No growth  Final   08/01/2017 (A)  Final    50,000 to 100,000 colonies/mL Escherichia coli  10,000 to 50,000 colonies/mL Strain 2 Escherichia coli  10,000 to 50,000 colonies/mL mixed urogenital joey Susceptibility testing not   routinely done     02/21/2017   Final    <10,000 colonies/mL mixed urogenital joey Susceptibility testing not routinely   done      Specimen Description   Date Value Ref Range Status   01/26/2018 Catheterized Urine  Final   08/16/2017 Midstream Urine  Final   08/09/2017 Blood Left Arm  Final   08/01/2017 Midstream Urine  Final   02/21/2017 Unspecified Urine  Final   11/15/2016 Catheterized Urine  Final        Recent Results (from the past 48 hour(s))   Chest XR,  PA & LAT    Impression    IMPRESSION: Perihilar and bibasilar opacities, of pulmonary edema or  infection.             Assessment and Plan      Sanjay Cano is a 73 year old male with history of Parkinson's disease with dementia, HTN, emphysema, and depression who presents with shortness of breath and cough.     Acute hypoxic and hypercarbic respiratory failure  Patient with 1.5 weeks of progressive dyspnea and cough concerning for viral versus bacterial pneumonia with possible underlying COPD exacerbation (dyspnea, increase cough however no purulent sputum or change in sputum) given history of emphysema and prior smoking history.  Patient reportedly w/o problems with swallowing and CXR  not concerning for clear aspiration process. Consider PE given immobility. Low suspicion for ACS given patient w/o chest pain, trop and ECG wnl. Does not seem to be significantly overloaded however consider volume overload. Received ceftriaxone/doxycyline in the ED.  - follow procalcitonin, blood cultures, sputum culture   - influenza A/B/RSV PCR, nt-BNP  - follow results of CT PE  - continue ceftriaxone/doxycyline, if not improving consider coverage for aspiration PNA  - aggressive pulmonary toilet with meta nebs and hypertonic saline  - trend VBG   - QID duonebs, prn albuterol neb   - hold pta combivent   - cont pta flovent  - cont pta guaifenesin  - prednisone 40 mg daily x 4 days (already received atleast 1 dose prior to admission)     Oliguric JEANA   Presumed JEANA versus more chronic decline in kidney function. Perhaps due to lack of po intake given illness. Received 1 L NS in the ED  - UA with micro  - post void bladder scan     HTN  - cont pta amlodipine  - hold pta lisinopril given JEANA   - hold pta chlorthalidone given JEANA   - cont pta ASA 81 daily    Parkinsons dementia  Depression   - cont pta carbidopa-levodopa  - hold pta klonopin given hypercarbia   - cont pta donepezil   - cont pta venlafaxine  - cont pta effexor   - cont pta seroquel prn   - PT/OT     Chronic diarrhea  - hold pta lactobacillus   - cont pta citrucel   - hold pta metamucil     FEN: No IVF, clear liquid diet for now, monitor and replete lytes  DVT ppx:  enoxaparin   GI ppx: none  Disposition: likely NH in 2-3 days   Code Status: FULL    Patient will be staffed in the AM.     Mamadou Aguilar MD PhD   Internal Medicine, PGY2  [phone]

## 2019-04-18 NOTE — CONSULTS
Social Work Services Progress Note    Hospital Day: 1  Date of Initial Social Work Evaluation:  Not completed.   Collaborated with:  Pt's wife    Data:  Pt is a 73-year old male with hx of Parkinson's Disease.     Intervention:  Writer met with Pt and Pt's wife at bedside. Pt was able to greet writer but was unable to converse further. SW was consulted to answer insurance questions per Pt's wife. Pt's wife expressed concerned about not having filled out any paper work or presenting his insurance card to anyone upon arrival. Pt was brought to [facility] by ambulance and wife said she had not seen any paperwork for insurance. Writer noted that his insurance is in the chart and that it is on file. Pt's wife was satisfied with this answer and did not express any further concerns related to insurance.     Assessment:  Pt's wife appears to be nervous and worried at the current time.    Plan:    Anticipated Disposition:  Unknown at this time, Pt is from a nursing home.     Barriers to d/c plan:  Medical clearance     Follow Up:  SW will remain available as needed and for discharge planning.      Vicky Ying  Social Work Intern   Phone: [phone]   Pager:[phone]

## 2019-04-18 NOTE — ED NOTES
[facility] [place]   ED Nurse to Floor Handoff     Sanjay Cano is a 73 year old male who speaks English and lives alone,  in a nursing home  They arrived in the ED by ambulance from nursing home    ED Chief Complaint: Shortness of Breath    ED Dx;   Final diagnoses:   Acute and chronic respiratory failure with hypoxia (H)   Altered mental status, unspecified altered mental status type   COPD exacerbation (H)         Needed?: No    Allergies:   Allergies   Allergen Reactions     Azithromycin      Erythromycin Other (See Comments)     Pathological fractures     Levaquin [Levofloxacin]      Fracture prevention     Seasonal Allergies    .  Past Medical Hx:   Past Medical History:   Diagnosis Date     At risk for falling 9/3/2012     COPD (chronic obstructive pulmonary disease) (H)      Family history of thyroid cancer 7/13/2016     Malignant neoplasm (H)     skin - nose     Moderate recurrent major depression (H) 5/17/2012     Neuromuscular disorder (H)      Paralysis agitans (H)     Parkinson's Disease     Parkinson disease (H)      Rosacea       Baseline Mental status: WDL  Current Mental Status changes: other slight confusion    Infection present or suspected this encounter: no  Sepsis suspected: No  Isolation type: No active isolations     Activity level - Baseline/Home:  uses wheelchair at all time in nursing home  Activity Level - Current:   Unable to Assess    Bariatric equipment needed?: No    In the ED these meds were given:   Medications   0.9% sodium chloride BOLUS (1,000 mLs Intravenous New Bag 4/18/19 0339)     Followed by   sodium chloride 0.9% infusion (has no administration in time range)   ipratropium - albuterol 0.5 mg/2.5 mg/3 mL (DUONEB) 0.5-2.5 (3) MG/3ML neb solution (6 mLs  Given 4/18/19 0312)       Drips running?  No    Home pump  No    Current LDAs       Labs results:   Labs Ordered and Resulted from Time of ED Arrival Up to the Time of Departure from  the ED   CBC WITH PLATELETS DIFFERENTIAL - Abnormal; Notable for the following components:       Result Value    RBC Count 4.37 (*)     All other components within normal limits   COMPREHENSIVE METABOLIC PANEL - Abnormal; Notable for the following components:    Glucose 175 (*)     Urea Nitrogen 34 (*)     Creatinine 1.44 (*)     GFR Estimate 47 (*)     GFR Estimate If Black 55 (*)     Calcium 7.9 (*)     All other components within normal limits   ISTAT GASES ELEC ICA GLUC KATHRYN POCT - Abnormal; Notable for the following components:    Ph Venous 7.26 (*)     PCO2 Venous 67 (*)     PO2 Venous 22 (*)     Bicarbonate Venous 30 (*)     Glucose 180 (*)     All other components within normal limits   TROPONIN I   D DIMER QUANTITATIVE   PROCALCITONIN   ISTAT TROPONIN NURSING POCT   ISTAT CG8 GAS ELEC ICA GLUC KATHRYN NURSE POCT   ISTAT CG4 GASES LACTATE KATHRYN NURSING POCT   TROPONIN POCT       Imaging Studies: No results found for this or any previous visit (from the past 24 hour(s)).    Recent vital signs:   /81   Pulse 90   Temp 98.4  F (36.9  C) (Axillary)   Resp (!) 37   SpO2 99%     Cardiac Rhythm: Normal Sinus  Pt needs tele? No  Skin/wound Issues: full skin assessment not completed    Code Status: Full Code    Pain control: fair    Nausea control: pt had none    Abnormal labs/tests/findings requiring intervention: NA    Family present during ED course? Yes   Family Comments/Social Situation comments: Wife    Tasks needing completion: [id number]    Elena Sellers RN  ascom-- [phone] [place] [facility]  [phone] [facility] ED

## 2019-04-18 NOTE — PROGRESS NOTES
Admitted/transferred from: ED  Reason for admission/transfer: Hypercarbic respiratory failure  Patient status upon admission/transfer: On 8L oxymask, lethargic, BP/HR stable, febrile.  Interventions: Placed on BP 30%, ice packs applied for fever, condom cath applied for incontinence. UA sent.   Plan: Monitor neuro & respiratory status, recheck labs at 1200, consider feeding tube placement for Parkinson meds.  2 RN skin assessment: completed by Yolanda TORRES   Result of skin assessment and interventions/actions: mepilex to coccyx and forehead, gecko pad to bridge of nose. Heels elevated  Height, weight, drug calc weight: done  Patient belongings (see Flowsheet - Adult Profile for details): Watch removed and given to wife  MDRO education (if applicable): NA

## 2019-04-18 NOTE — ED PROVIDER NOTES
History     Chief Complaint   Patient presents with     Shortness of Breath     HPI  Sanjay Cano is a 73 year old male with PMH notable for COPD, advanced Parkinson's disease with dementia who presents to the ED with shortness of breath. Patient minimally verbal, unable to provider history. History from EMS and later from the wife. EMS reports they were called to the patient's nursing home due to shortness of breath. Patient reportedly with recent pneumonia diagnosis but with negative CXR. SpO2 in 70s on RA upon EMS arrival. Non-rebreather improved SpO2 to mid-80s. CPAP improved SpO2 to low 90s. One neb run.     Patient's wife endorsed recent productive cough for the past few days. She noted that his mental status is a bit worse than usual, as he generally converses somewhat. She notes he was started on doxycycline in clinic.     I have reviewed the Medications, Allergies, Past Medical and Surgical History, and Social History in the Epic system.    Review of Systems  A complete review of systems was performed with pertinent positives and negatives noted in the HPI, and all other systems negative.     Physical Exam   BP: 127/75  Pulse: 89  Heart Rate: 90  Temp: 98.4  F (36.9  C)  Resp: 24  SpO2: 98 %    Physical Exam  General: elderly male in respiratory distress with CPAP mask in place  HENT: CPAP in place.   Eyes: PERRL, normal sclerae  Neck: non-tender, supple  Cardio: regular rate. Regular rhythm. Extremities well perfused  Resp: increased work of breathing, diffuse rhonchi breath sounds  Abdomen: no tenderness, non-distended, no rebound, no guarding  Neuro: alert, oriented x0, able to follow only simple commands. CN II-XII grossly intact. Grossly normal strength and sensation in all extremities.   MSK: no deformities.   Integumentary/Skin: no rash visualized, normal color  Psych: normal affect, normal behavior    ED Course      Procedures             EKG Interpretation:      Interpreted by Etienne LARRY  Chevy  Time reviewed: 0315  Symptoms at time of EKG: dyspnea    Rhythm: normal sinus   Rate: Normal  Axis: Left Axis Deviation  Ectopy: none  Conduction: 1st degree AV block  ST Segments/ T Waves: No ST-T wave changes and No acute ischemic changes  Q Waves: none  Comparison to prior: Unchanged from 8/20/2018    Clinical Impression: normal EKG    Critical Care Addendum    My initial assessment, based on my review of prehospital provider report, established that Sanjay Cano has respiratory failure, which requires immediate intervention, and therefore He is critically ill.     After the initial assessment, the care team initiated multiple lab tests, initiated IV fluid administration and initiated intensive non-invasive respiratory support to provide stabilization care. Due to the critical nature of this patient, I reassessed nursing observations, vital signs, physical exam, review of cardiac rhythm monitor, 12 lead ECG analysis, interpretation of bedside cardiac and thoracic ultrasound, mental status and respiratory status multiple times prior to He disposition.     Time also spent performing documentation, discussion with family to obtain medical information for decision making, reviewing test results and discussion with consultants.     Critical care time (excluding teaching time and procedures): 40 minutes.        Labs Ordered and Resulted from Time of ED Arrival Up to the Time of Departure from the ED   CBC WITH PLATELETS DIFFERENTIAL - Abnormal; Notable for the following components:       Result Value    RBC Count 4.37 (*)     All other components within normal limits   COMPREHENSIVE METABOLIC PANEL - Abnormal; Notable for the following components:    Glucose 175 (*)     Urea Nitrogen 34 (*)     Creatinine 1.44 (*)     GFR Estimate 47 (*)     GFR Estimate If Black 55 (*)     Calcium 7.9 (*)     All other components within normal limits   D DIMER QUANTITATIVE - Abnormal; Notable for the following components:     D Dimer 11.5 (*)     All other components within normal limits   ISTAT GASES ELEC ICA GLUC KATHRYN POCT - Abnormal; Notable for the following components:    Ph Venous 7.26 (*)     PCO2 Venous 67 (*)     PO2 Venous 22 (*)     Bicarbonate Venous 30 (*)     Glucose 180 (*)     All other components within normal limits   TROPONIN I   PROCALCITONIN   NT PROBNP INPATIENT   ROUTINE UA WITH MICROSCOPIC   BLOOD GAS VENOUS   ISTAT TROPONIN NURSING POCT   ISTAT CG8 GAS ELEC ICA GLUC KATHRYN NURSE POCT   ISTAT CG4 GASES LACTATE KATHRYN NURSING POCT   IP ASSIGN PROVIDER TEAM TO TREATMENT TEAM   VITAL SIGNS   PULSE OXIMETRY NURSING   TELEMETRY MONITORING MED/SURG   INTAKE AND OUTPUT   NO INDWELLING URINARY CATHETER (BLACK) PRESENT OR NEEDED   BLADDER SCAN   BLADDER SCAN   TROPONIN POCT   BLOOD CULTURE   BLOOD CULTURE   INFLUENZA A AND B AND RSV PCR   SPUTUM CULTURE AEROBIC BACTERIAL   GRAM STAIN            Assessments & Plan (with Medical Decision Making)   Patient presenting with respiratory distress. Vitals in the ED notable for initial SpO2 in low 90s on CPAP with 100% FiO2, BP and HR stable. Initial differential diagnosis includes but not limited to respiratory failure, pneumonia, PE, COPD exacerbation.     ECG showed NSR without evidence of acute ischemia, troponin wnl - ACS unlikely. bedside cardiac and thoracic ultrasound with grossly normal LV function, intact sliding signs, no visualized evidence of pulmonary edema nor pleural effusions.     Discussed goals of care with the patient's wife, who noted he is full code. She was hesitant about potential intubation, but stated if absolutely needed to keep him alive, he would want it.     Patient was transitioned to BiPAP with duo-nebs running. SpO2 then climbed to %. Respiratory status significantly improved after nebs. Patient eventually able to be weaned down to 8 L by oxi-mask with SpO2 remaining 96+%. CXR with opacities concerning for infection - ceftriaxone and doxycyline  given. D-dimer resulted quite elevated. CT pulmonary angiogram pending.     The complete clinical picture is most consistent with respiratory distress likely related to COPD exacerbation 2/2 respiratory infection. After counseling on the diagnosis, work-up, and treatment plan, the patient was admitted to medicine.       Final diagnoses:   Acute and chronic respiratory failure with hypoxia (H)   Altered mental status, unspecified altered mental status type   COPD exacerbation (H)       --  Etienne Reece MD  Emergency Medicine     I have reviewed the nursing notes.  I have reviewed the findings, diagnosis, plan and need for follow up with the patient.    4/18/2019   [facility], [place], EMERGENCY DEPARTMENT     Etienne Reece MD  04/18/19 0781

## 2019-04-19 NOTE — SUMMARY OF CARE
Pt arrived to unit [facility] at approximately 6:15pm and brought with him, jacket, tennis shoes, two knit shirts, blue jeans, belt, two pairs socks, dentures and container, [place], tote bag and glasses with string and case.  Leander Pollard on 4/19/2019 at 6:26 PM

## 2019-04-19 NOTE — PROGRESS NOTES
"   04/19/19 1309   General Information   Onset Date 04/18/19   Start of Care Date 04/19/19   Referring Physician Agapito Gibbs MD    Patient/Family Goals Statement Per pt's wife, \"to be safe eating\"   Swallowing Evaluation Bedside swallow evaluation   Behaviorial Observations Confused;Distractible;Initiation problems   Mode of current nutrition Oral diet   Type of oral diet Dysphagia diet level 2;Thin liquid   Respiratory Status Room air   Comments Pt is a 73 year old male with history of Parkinson's disease with dementia and emphysema, who was admitted for acute hypoxic and hypercarbic respiratory failure secondary to community acquired pneumonia and COPD exacerbation.  Pt is familiar to SLP service, most recently seem on 7/4/2016 and at that time a regular diet and thin liquids was recommended.  Per pt's wife report, pt with increased coughing during the day, unclear if related to PO intake.  Pt's wife also reports positioning challenges and that that it can be challenging for him to remain with his head in a neutral position when eating and drinking.  No past VFSS, however pt's wife report a past SLP at pt's nursing home recommended one but that it was not completed per MD recommendation.  Prior to hospital admission, pt mainly consuming a soft food diet. Per pt's wife report, cognition and lethargy waxes and wanes, at times pt is able to follow simple 1 step directions. Clinical swallow evaluation completed per MD order.   Clinical Swallow Evaluation   Oral Musculature unable to assess due to poor participation/comprehension   Structural Abnormalities none present   Dentition upper and lower dentures;uses dentures to eat   Mucosal Quality sticky   Mandibular Strength and Mobility intact   Oral Musculature Comments Unable to complete full oral mechanism exam due to participation.  Pt with coughing at baseline, slightly gurgly vocal quality.   Additional Documentation Yes   Swallow Eval   Feeding Assistance " frequent cues/help required   Clinical Swallow Eval: Thin Liquid Texture Trial   Mode of Presentation, Thin Liquids spoon;cup;fed by clinician   Volume of Liquid or Food Presented 2 ice chips, 2 oz   Oral Phase of Swallow Premature pharyngeal entry;Poor AP movement   Oral Residue left lip drooling;right lip drooling   Pharyngeal Phase of Swallow impaired;coughing/choking;repeated swallows  (inconsistent coughing, mutliple swallows x2)   Diagnostic Statement High aspiration risk   Clinical Swallow Eval: Puree Solid Texture Trial   Mode of Presentation, Puree spoon   Volume of Puree Presented 1 tsp (in 1/2 tsp amounts)   Oral Phase, Puree Residue in oral cavity;Poor AP movement   Oral Residue, Puree left lip drooling;right lip drooling   Pharyngeal Phase, Puree wet vocal quality after swallow;coughing/choking  (x1 wet vocal quality)   Diagnostic Statement High aspiration risk   Swallow Compensations   Swallow Compensations Pacing;Reduce amounts   General Therapy Interventions   Planned Therapy Interventions Dysphagia Treatment   Dysphagia treatment Instruction of safe swallow strategies;Compensatory strategies for swallowing;Modified diet education   Swallow Eval: Clinical Impressions   Skilled Criteria for Therapy Intervention Skilled criteria met.  Treatment indicated.   Functional Assessment Scale (FAS) 1   Treatment Diagnosis Severe oropharyngeal dysphagia   Diet texture recommendations NPO   Demonstrates Need for Referral to Another Service physical therapy;occupational therapy;dietitian   Therapy Frequency daily   Predicted Duration of Therapy Intervention (days/wks) 2 weeks   Anticipated Discharge Disposition extended care facility   Risks and Benefits of Treatment have been explained. Yes   Patient, family and/or staff in agreement with Plan of Care Yes   Clinical Impression Comments Clinical swallow evaluation completed per MD order.  Pt presents with severe oropharyngeal dysphagia suspected likely to  "advanced parkinson's disease and dementia.  Recommend NPO status with excellent oral cares 2-3x per week, critical meds okay to be crushed in puree consistency.  Pt awake, with confusion with upright posture in chair.  Pt's wife present, per her report pt seemed \"slower\" in his ability to eat and drink that his baseline prior to hospital admission.  [facility] due to pt's ability to consistently participate, sticky secretions and pt with upper and lower dentures he uses to eat.  Pt with consistent coughing with and without PO intake.  Inconsistently wet vocal quality, inconsistent ability for a cued cough or throat clear.  Coughing and double swallow with thin liquids, and wet vocal quality x1 with puree consistency.  Consistent anterior loss of thin liquid.  Consistent cueing for bolus transit with puree.  Pt presenting with difficulty maintaining optimal positioning during PO intake, impacting amount of anterior loss.  Education provided regarding diet recommendations, NPO status, aspiration v penetration, VFSS, oral cares, pt's wife demonstrated understanding.  SLP to follow for PO readiness.   Total Evaluation Time   Total Evaluation Time (Minutes) 45     "

## 2019-04-19 NOTE — PROGRESS NOTES
[facility]    Medicine Progress Note - Hospitalist Service, Gold 6       Date of Admission:  4/18/2019  Assessment & Plan    Sanjay Cano is a 73 year old male with history of Parkinson's disease with dementia and emphysema, who was admitted for acute hypoxic and hypercarbic respiratory failure secondary to community acquired pneumonia and COPD exacerbation    Changes today: Improving mentally, off oxygen, stopping BiPAP  - Speech/language consult for aspiration this morning, will continue most meds via IV    Acute hypoxic and hypercarbic respiratory failure, community acquired pneumonia, COPD exacerbation, Sepsis with elevated temperature and RR  - continue ceftriaxone/doxycyline  - aggressive pulmonary toilet with meta nebs and hypertonic saline  - QID duonebs, prn albuterol neb   - cont pta flovent  - Methylprednisolone 40 mg daily x 5 days total     Oliguric JEANA   Resolved, likely pre-renal     HTN  - cont pta amlodipine  - holding pta lisinopril and chlorthalidone given JEANA on admission  - cont pta ASA 81 daily     Parkinsons dementia  Depression   - cont pta carbidopa-levodopa  - cont pta donepezil   - cont pta venlafaxine  - cont pta effexor   - cont pta seroquel prn   - PT/OT      Chronic diarrhea  - hold pta lactobacillus   - cont pta citrucel   - hold pta metamucil     Diet: Mechanical/Dental Soft Diet    DVT Prophylaxis: Enoxaparin (Lovenox) SQ  Contreras Catheter: not present  Code Status: Full Code      Disposition Plan   Expected discharge: 2 - 3 days, recommended to prior living arrangement once tolerating oral medications, off supplemental oxygen, mental status at baseline..  Entered: Agapito Gibbs MD 04/19/2019, 11:27 AM       The patient's care was discussed with the Bedside Nurse, Patient and Patient's Family.    Agapito Gibbs MD  Hospitalist Service, [address]  Pager: [phone]  Please see sticky note for  cross cover information  ______________________________________________________________________    Interval History   No acute events overnight. This morning, reports he is breathing comfortably. He is more awake. No fever/chills, no pain in abdomen/chest. No concerns this morning. Wife thinks he is improving    Data reviewed today: I reviewed all medications, new labs and imaging results over the last 24 hours.    Physical Exam   Vital Signs: Temp: 98.7  F (37.1  C) Temp src: Axillary BP: 133/70 Pulse: 73 Heart Rate: 65 Resp: 16 SpO2: 94 % O2 Device: None (Room air) Oxygen Delivery: 2 LPM  Weight: 0 lbs 0 oz  Constitutional: Awake, answers questions appropriately, mumbled speech, flat affect  Respiratory: No increased work of breathing, good air exchange, crackles in bases bilaterally, no wheezing  Cardiovascular: Normal apical impulse, regular rate and rhythm, normal S1 and S2, no S3 or S4, and no murmur noted and pulses 2 plus all extermities bilaterally  Skin: no bruising or bleeding

## 2019-04-19 NOTE — PROGRESS NOTES
04/19/19 1332   Quick Adds   Type of Visit Initial PT Evaluation   Living Environment   Lives With facility resident   Living Arrangements extended care facility   Home Accessibility no concerns   Living Environment Comment pt lives in long term care, has assist for all mobility    Self-Care   Usual Activity Tolerance poor   Current Activity Tolerance poor   Regular Exercise No   Equipment Currently Used at Home wheelchair, manual;shower chair   Activity/Exercise/Self-Care Comment pt currently uses manual wheelchair for mobility (needs it to be pushed dependently), has shower chair for showering    Functional Level Prior   Ambulation 4-->completely dependent   Transferring 3-->assistive equipment and person   Toileting 3-->assistive equipment and person   Bathing 3-->assistive equipment and person   Which of the above functional risks had a recent onset or change? transferring   Prior Functional Level Comment Per pt's wife, pt walking about 1.5 years ago, recently has needed A x 2 for dependent stand pivot transfer at facility    General Information   Onset of Illness/Injury or Date of Surgery - Date 04/18/19   Referring Physician Juliette Aguilar MD   Patient/Family Goals Statement pt does not state, wife states that she wants pt to be able to stand pivot IND   Pertinent History of Current Problem (include personal factors and/or comorbidities that impact the POC) 73 year old male with history of Parkinson's disease with dementia and emphysema, who was admitted for acute hypoxic and hypercarbic respiratory failure secondary to community acquired pneumonia and COPD exacerbation   Precautions/Limitations fall precautions   Heart Disease Risk Factors Medical history;Gender;Age   General Observations pt supine, lethargic but arousable to voice   General Info Comments activity orders: up ad viki   Cognitive Status Examination   Orientation person;place   Level of Consciousness alert   Follows Commands and  "Answers Questions 75% of the time   Personal Safety and Judgment impaired   Memory impaired   Cognitive Comment pt with baseline dementia, alertness waxes and wanes   Pain Assessment   Patient Currently in Pain No   Integumentary/Edema   Integumentary/Edema no deficits were identifed   Posture    Posture Kyphosis;Forward head position;Protracted shoulders   Posture Comments pt with very kyphotic posture and forward head    Range of Motion (ROM)   ROM Comment limited knee extension, limited spine mobility, little neck extension    Strength   Strength Comments unable to fully test due to difficulty following commands   Bed Mobility   Bed Mobility Comments max assist for rolling    Transfer Skills   Transfer Comments STS with mod A x 2   Gait   Gait Comments not safe at this time    Balance   Balance Comments SBA-CGA for EOB balance    General Therapy Interventions   Planned Therapy Interventions progressive activity/exercise;home program guidelines;risk factor education;transfer training;strengthening;balance training;bed mobility training;ROM   Clinical Impression   Criteria for Skilled Therapeutic Intervention yes, treatment indicated   PT Diagnosis impaired mobility    Influenced by the following impairments impaired strength, ROM    Functional limitations due to impairments impaired transfers   Clinical Presentation Stable/Uncomplicated   Clinical Presentation Rationale pt with progressive disorders, limited function    Clinical Decision Making (Complexity) Low complexity   Therapy Frequency` 5 times/week   Predicted Duration of Therapy Intervention (days/wks) 1 week    Anticipated Discharge Disposition Long Term Care Facility   Risk & Benefits of therapy have been explained Yes   Patient, Family & other staff in agreement with plan of care Yes   Clinical Impression Comments anticipate return to LTC    [facility] AM-PAC TM \"6 Clicks\"   2016, Trustees of [facility], under license to [employer].  All " "rights reserved.   6 Clicks Short Forms Basic Mobility Inpatient Short Form   [facility] AM-PAC  \"6 Clicks\" V.2 Basic Mobility Inpatient Short Form   1. Turning from your back to your side while in a flat bed without using bedrails? 2 - A Lot   2. Moving from lying on your back to sitting on the side of a flat bed without using bedrails? 2 - A Lot   3. Moving to and from a bed to a chair (including a wheelchair)? 1 - Total   4. Standing up from a chair using your arms (e.g., wheelchair, or bedside chair)? 2 - A Lot   5. To walk in hospital room? 1 - Total   6. Climbing 3-5 steps with a railing? 1 - Total   Basic Mobility Raw Score (Score out of 24.Lower scores equate to lower levels of function) 9   Total Evaluation Time   Total Evaluation Time (Minutes) 10     "

## 2019-04-19 NOTE — SIGNIFICANT EVENT
SPIRITUAL HEALTH SERVICES Significant Event  [employer] Sacrament of ANOINTING  [facility] ([place]) 4C    PATIENT ANOINTED by Father Fuad on 4/19/19 per request of pt's wife.    PLAN: Will visit weekly.     Fuad Lorenzana M.Div.

## 2019-04-20 NOTE — PROGRESS NOTES
[facility]    Medicine Progress Note - Hospitalist Service, Gold        Date of Admission:  4/18/2019  Assessment & Plan    Sanjay Cano is a 73 year old male with history of Parkinson's disease with dementia and emphysema, who was admitted for acute hypoxic and hypercarbic respiratory failure secondary to community acquired pneumonia and COPD exacerbation    Changes today:  - Speech/language recommends attempting oral thin liquids  - Returning to nursing home prevented by inability to take in oral food/liquids currently    Acute hypoxic and hypercarbic respiratory failure, community acquired pneumonia, COPD exacerbation, Sepsis with elevated temperature and RR  - continue ceftriaxone/doxycyline  - aggressive pulmonary toilet with meta nebs and hypertonic saline  - QID duonebs, prn albuterol neb   - cont pta flovent  - Methylprednisolone 40 mg daily x 5 days total     Oliguric JEANA   Resolved, likely pre-renal     HTN  - cont pta amlodipine  - holding pta lisinopril and chlorthalidone given JEANA on admission  - cont pta ASA 81 daily     Parkinsons dementia  Depression   - cont pta carbidopa-levodopa  - cont pta donepezil   - cont pta venlafaxine  - cont pta effexor   - cont pta seroquel prn   - PT/OT      Chronic diarrhea  - hold pta lactobacillus   - cont pta citrucel   - hold pta metamucil     Diet: Clear Liquid Diet    DVT Prophylaxis: Enoxaparin (Lovenox) SQ  Contreras Catheter: not present  Code Status: Full Code      Disposition Plan   Expected discharge: 2 - 3 days, recommended to prior living arrangement once tolerating oral medications, mental status at baseline.  Entered: Agapito Gibbs MD 04/20/2019, 5:56 PM       The patient's care was discussed with the Bedside Nurse, Patient and Patient's Family.    Agapito Gibbs MD  Hospitalist Service, [address]  Pager: [phone]  Please see sticky note for cross cover  information  ______________________________________________________________________    Interval History   No acute events overnight. No difficulty breathing. Coughing frequently and having pain in front of chest with coughing. SLP cleared for trial of thin liquids today. No fever/chills, no change in bowel movement.    Data reviewed today: I reviewed all medications, new labs and imaging results over the last 24 hours.    Physical Exam   Vital Signs: Temp: 98.2  F (36.8  C) Temp src: Oral BP: 146/76 Pulse: 70 Heart Rate: 71 Resp: 18 SpO2: 91 % O2 Device: None (Room air)    Weight: 178 lbs 12.8 oz  Constitutional: Awake, answers questions appropriately, mumbled speech, flat affect  Respiratory: No increased work of breathing, good air exchange, no crackles/wheezing  Cardiovascular: Normal apical impulse, regular rate and rhythm, normal S1 and S2, no S3 or S4, and no murmur noted and pulses 2 plus all extermities bilaterally  Skin: no bruising or bleeding

## 2019-04-21 NOTE — PROGRESS NOTES
[facility], [place] Progress Note - Hospitalist Service, Gold 6       Date of Admission:  4/18/2019  Assessment & Plan    Sanjay Cano is a 73 year old male with history of Parkinson's disease with dementia and emphysema, who was admitted for acute hypoxic and hypercarbic respiratory failure secondary to community acquired pneumonia and COPD exacerbation    Changes today:  - Speech/language recommends dysphagia diet with nectar thick liquids  - More hypoxic; stopping IVF and getting CXR  - High blood pressure; restarted lisinopril    Acute hypoxic and hypercarbic respiratory failure, community acquired pneumonia, COPD exacerbation, Sepsis with elevated temperature and RR  - continue ceftriaxone/doxycyline  - aggressive pulmonary toilet with meta nebs and hypertonic saline  - QID duonebs, prn albuterol neb   - cont pta flovent  - Methylprednisolone 40 mg daily x 5 days total  - Chest x-ray today     Oliguric JEANA   Resolved, likely pre-renal     HTN  - cont pta amlodipine, lisinopril  - holding chlorthalidone given JEANA on admission and hypokalemia  - cont pta ASA 81 twice weekly     Parkinsons dementia  Depression   - cont pta carbidopa-levodopa  - cont pta donepezil   - cont pta venlafaxine  - cont pta effexor   - cont pta seroquel prn   - PT/OT      Chronic diarrhea  - hold pta lactobacillus   - cont pta citrucel   - hold pta metamucil     Diet: Dysphagia Diet Level 2 Mech Altered Nectar Thickened Liquids (pre-thickened or use instant food thickener)    DVT Prophylaxis: Enoxaparin (Lovenox) SQ  Contreras Catheter: not present  Code Status: Full Code      Disposition Plan   Expected discharge: 2 - 3 days, recommended to prior living arrangement once tolerating oral medications, mental status at baseline, off oxygen.  Entered: Agapito Gibbs MD 04/21/2019, 11:40 AM       The patient's care was discussed with the Bedside Nurse, Patient and Patient's Family.    Agapito LARRY  MD Sai  Hospitalist Service, [address], [place]  Pager: [phone]  Please see sticky note for cross cover information  ______________________________________________________________________    Interval History   No acute events overnight. Had high blood pressure, requiring hydralazine. This morning, more difficulty breathing and coughing. No fever/chills. No chest pain.    Data reviewed today: I reviewed all medications, new labs and imaging results over the last 24 hours.    Physical Exam   Vital Signs: Temp: 97.6  F (36.4  C) Temp src: Axillary BP: 135/61 Pulse: 70 Heart Rate: 62 Resp: 18 SpO2: 95 % O2 Device: Nasal cannula Oxygen Delivery: 2 LPM  Weight: 176 lbs 9.6 oz  Constitutional: Awake, answers questions appropriately, mumbled speech, flat affect  Respiratory: Increased respiratory rate this morning with bilateral wheezing. No crackles  Cardiovascular: Normal apical impulse, regular rate and rhythm, normal S1 and S2, no S3 or S4, and no murmur noted and pulses 2 plus all extermities bilaterally  Skin: no bruising or bleeding

## 2019-04-21 NOTE — PROVIDER NOTIFICATION
Provider notified that pt's BP elevated at 182/98. IV hydralazine ordered. /99 after recheck. Will continue to monitor.

## 2019-04-22 NOTE — PROGRESS NOTES
04/22/19 1400   General Information   Onset Date 04/18/19   Start of Care Date 04/22/19   Referring Physician Agapito Gibbs MD   Patient Profile Review/OT: Additional Occupational Profile Info See Profile for full history and prior level of function   Patient/Family Goals Statement patient's wife wants help reducing aspiratin risk   Swallowing Evaluation Videofluoroscopic evaluation   Behaviorial Observations Confused   Mode of current nutrition Oral diet   Type of oral diet Dysphagia diet level 2;Nectar - thick liquid   Respiratory Status O2 Supply   Type of O2 supply Nasal cannula  (1 li)   Comments Sanjay Cano is a 73 year old male with history of Parkinson's disease with dementia and emphysema, who was admitted for acute hypoxic and hypercarbic respiratory failure secondary to community acquired pneumonia and COPD exacerbation   VFSS Eval: Radiology   Radiologist resident   Views Taken left lateral   Physical Location of Procedure [facility] radiology [address]   VFSS Eval: Thin Liquid Texture Trial   Mode of Presentation, Thin Liquid spoon;fed by clinician   Order of Presentation 1,2,3,4,6,8   Preparatory Phase WFL   Oral Phase, Thin Liquid WFL   Pharyngeal Phase, Thin Liquid Delayed swallow reflex;Residue in valleculae;Residue in pyriform sinus   Rosenbek's Penetration Aspiration Scale: Thin Liquid Trial Results 5 - contrast contacts vocal cords, visible residue remains (penetration)   Diagnostic Statement intermittent penetration to the vocal cords during and after the swallow   VFSS Eval: Nectar Thick Liquid Texture Trial   Mode of Presentation, Nectar straw;fed by clinician   Order of Presentation 9   Preparatory Phase WFL   Oral Phase, Nectar WFL   Pharyngeal Phase, Nectar Delayed swallow reflex   Rosenbek's Penetration Aspiration Scale: Nectar-Thick Liquid Trial Results 1 - no aspiration, contrast does not enter airway   Diagnostic Statement WFL with small sip of nectar thick liquids   VFSS Eval:  Puree Solid Texture Trial   Mode of Presentation, Puree spoon;fed by clinician   Order of Presentation 5   Preparatory Phase WFL   Oral Phase, Puree WFL   Pharyngeal Phase, Puree Delayed swallow reflex   Rosenbek's Penetration Aspiration Scale: Puree Food Trial Results 1 - no aspiration, contrast does not enter airway   Diagnostic Statement WFL with bites of pudding   VFSS Eval: Solid Food Texture Trial   Mode of Presentation, Solid fed by clinician   Order of Presentation 7   Preparatory Phase Insufficient mastication;Other (see comments)  (excessive mastication)   Oral Phase, Solid Poor AP movement   Pharyngeal Phase, Solid   (not able to view, swallow not captured under imaging)   Rosenbek's Penetration Aspiration Scale: Solid Food Trial Results   (not able to view, swallow not captured under imaging)   Diagnostic Statement Excessively slow mastication without oral residue. Not able to view pharyngeal phase of the swallow, swallow not captured under imaging due the patient's difficulty following commands.   Esophageal Phase of Swallow   Patient reports or presents with symptoms of esophageal dysphagia No   General Therapy Interventions   Planned Therapy Interventions Dysphagia Treatment   Dysphagia treatment Oropharyngeal exercise training;Modified diet education;Instruction of safe swallow strategies   Swallow Eval: Clinical Impressions   Skilled Criteria for Therapy Intervention Skilled criteria met.  Treatment indicated.   Dysphagia Outcome Severity Scale (JENNIFER) Level 4 - JENNIFER   Treatment Diagnosis mild to moderate oropharyngeal dysphagia   Diet texture recommendations Dysphagia diet level 3;Nectar thick liquids  (pt's wife hopes for solids to be advanced to dysphagia diet3)   Recommended Feeding/Eating Techniques alternate between small bites and sips of food/liquid;maintain upright posture during/after eating for 30 mins;small sips/bites   Demonstrates Need for Referral to Another Service  dietitian;occupational therapy;physical therapy   Therapy Frequency 5 times/wk   Predicted Duration of Therapy Intervention (days/wks) 2 weeks   Anticipated Discharge Disposition extended care facility   Risks and Benefits of Treatment have been explained. Yes   Patient, family and/or staff in agreement with Plan of Care Yes   Clinical Impression Comments The patient was seen for a videoswallow study per MD order to better assess aspiration risk. Under fluoroscopy the patient appeared to demonstrate trace penetration with residue on the vocal cords intermittently during and after the swallow with thin liquids. Mastication excessively slow, but no significant pharyngeal residue or penetration or aspiration present with puree or regular solids. Of note, this study was challenging due to the patient's positioning and kyphotic posture, which limited view of the airway at times. Also, some imaging was not captured due to the patient's difficulty following commands with the timing of the procedure. During this exam the patient was confused but calm and cooperative. After the exam, SLP had extensive discussion with the patient's wife about dysphagia and factors that could result result in variable swallowing safety and increase aspiration risk (such as positioning, lethargy, timing of PD meds etc). The patient's wife reports that the patient/caregivers struggle with these factors often. She also reports that the patient typically eats a regular diet, but she cuts/dices it into very small pieces. Per discussion with the patient's wife we agreed on recommendation for dysphagia diet 3 (cut into small pieces) and nectar thick liquids (straws ok). Please feed only when fully upright, alert and engaged in the task. Ok to initiate a thin h20 protocol. Ok for small sips of thin H20 for comfort after oral cares when sitting upright and alert. H20 should not be mixed with pills or solid food.    Total Evaluation Time   Total  Evaluation Time (Minutes) 20

## 2019-04-22 NOTE — PROVIDER NOTIFICATION
Provider notified that pt's /101 w/ 0200 VS. 10 mg IV hydralazine ordered and given.    After 35 minutes BP reduced to 103/69. Will continue to monitor and follow POC.

## 2019-04-22 NOTE — PROVIDER NOTIFICATION
Provider notified that pt's potassium is 3.2 this AM. Writer requested provider order replacement parameters. No new orders at this time. Will continue to monitor.

## 2019-04-22 NOTE — PROGRESS NOTES
[facility], [facility] Progress Note - Hospitalist Service, Gold 6       Date of Admission:  4/18/2019  Assessment & Plan    Sanjay Cano is a 73 year old male with history of Parkinson's disease with dementia and emphysema, who was admitted for acute hypoxic and hypercarbic respiratory failure secondary to community acquired pneumonia and COPD exacerbation    Changes today:  - Swallow study performed; recommending dysphagia 3 diet with nectar thick liquids  - Repeat procalcitonin in AM; depending on level (last 0.88) will determine course of antibiotics.    Acute hypoxic and hypercarbic respiratory failure, community acquired pneumonia, COPD exacerbation, Sepsis with elevated temperature and RR  - continue ceftriaxone/doxycyline; today is day 5, but given elevated procalcitonin will continue beyond 5 days. Will likely switch to oral Augmenti   - aggressive pulmonary toilet with meta nebs and hypertonic saline  - QID duonebs, prn albuterol neb   - cont pta flovent  - Methylprednisolone 40 mg daily x 5 days total completed  - Chest x-ray today     Oliguric JEANA   Resolved, likely pre-renal    Dysphagia: Secondary to Parkinson's  Dysphagia 3 diet with nectar thick liquids. OK to have thin water sips between meals for comfort.     HTN  - cont pta amlodipine, lisinopril  - holding chlorthalidone given JEANA on admission and hypokalemia  - cont pta ASA 81 twice weekly     Parkinsons dementia  Depression   - cont pta carbidopa-levodopa  - cont pta donepezil   - cont pta venlafaxine  - cont pta effexor   - cont pta seroquel prn   - PT/OT      Chronic diarrhea  - hold pta lactobacillus   - cont pta citrucel   - hold pta metamucil     Diet: Dysphagia Diet Level 3 Advanced Nectar Thickened Liquids (pre-thickened or use instant food thickener)    DVT Prophylaxis: Enoxaparin (Lovenox) SQ  Contreras Catheter: not present  Code Status: Full Code      Disposition Plan   Expected discharge: 1 - 3  days, recommended to prior living arrangement once tolerating oral medications, mental status at baseline, off oxygen.  Entered: Agapito Gibbs MD 04/22/2019, 4:32 PM       The patient's care was discussed with the Bedside Nurse, Patient and Patient's Family.    Agapito Gibbs MD  Hospitalist Service, [address], [place]  Pager: [phone]  Please see sticky note for cross cover information  ______________________________________________________________________    Interval History   No acute events overnight. Is coughing a lot. No fever/chills. Doing better with eating/drinking. Less confusion. No pain.    Data reviewed today: I reviewed all medications, new labs and imaging results over the last 24 hours.    Physical Exam   Vital Signs: Temp: 98.5  F (36.9  C) Temp src: Oral BP: 103/70   Heart Rate: 88 Resp: 18 SpO2: 94 % O2 Device: None (Room air)   Weight: 174 lbs 3.2 oz  Constitutional: Awake, answers questions appropriately, mumbled speech, flat affect  Respiratory: Occasional cough, no increased work of breathing. No crackles or wheezing  Cardiovascular: Normal apical impulse, regular rate and rhythm, normal S1 and S2, no S3 or S4, and no murmur noted and pulses 2 plus all extermities bilaterally  Skin: no bruising or bleeding

## 2019-04-23 NOTE — PROVIDER NOTIFICATION
"Provider pager #[phone] notified w/ this message: \"Pt's BP this AM was 197/91. No PRN ordered\" Will continue to monitor & await further orders.   "

## 2019-04-23 NOTE — PROGRESS NOTES
Social Work Services Discharge Note      Patient Name:  Sanjay Cano     Anticipated Discharge Date:  4/22/19    Discharge Disposition:   LTC:  [facility]    Following MD:  kisha Carter     Pre-Admission Screening (PAS) online form has been completed.  The Level of Care (LOC) is:  Determined  Confirmation Code is:  NA return to facility  Patient/caregiver informed of referral to [facility] for Pre-Admission Screening for skilled nursing facility (SNF) placement and to expect a phone call post discharge from SNF.     Additional Services/Equipment Arranged:  [employer]  transport today at 4 PM     Patient / Family response to discharge plan:  in agreement     Persons notified of above discharge plan:  Spouse, MD, RN, facility    Staff Discharge Instructions:  Please fax discharge orders and signed hard scripts for any controlled substances.  Please print a packet and send with patient.     CTS Handoff completed:  YES    Medicare Notice of Rights provided to the patient/family:  YES    JERRY Araujo  5B  (Medical/Surgical)  Phone: [phone]  Pager: [phone]

## 2019-04-23 NOTE — PROVIDER NOTIFICATION
Provider pager #[phone] notified w/ pt's high BP even after administration of medication, current BP now 180/96. Will continue to monitor & await further orders.

## 2019-04-23 NOTE — PROGRESS NOTES
Pt discharged to LTC via transport. PIV removed. Pt dressed in home clothes. All belongings left with pt. Team called and answered questions wife had about medication changes. This writer went over discharge instructions with wife and patient. Denies any further questions at this time. Packet sent with transport and with wife.

## 2019-04-23 NOTE — DISCHARGE SUMMARY
[facility], [place]  Hospitalist Discharge Summary       Date of Admission:  4/18/2019  Date of Discharge:  4/23/2019  4:20 PM  Discharging Provider: Micheline Guzman MD  Discharge Team: Hospitalist Service, Gold 6    Discharge Diagnoses   Acute hypoxic and hypercarbic respiratory failure  Healthcare associated pneumonia vs aspiration pneumonia  COPD exacerbation  Sepsis with elevated temperature and respiratory rate on admission  Dysphagia secondary to Parkinson's  Oliguric JEANA  HTN  Parkinson's dementia  Depression  Chronic diarrhea    Follow-ups Needed After Discharge   Follow-up Appointments     Follow Up and recommended labs and tests      Follow up with [facility] physician.  The following labs/tests are   recommended: BMP and CBC (monitor kidney function and potassium).             Unresulted Labs Ordered in the Past 30 Days of this Admission     Date and Time Order Name Status Description    4/18/2019 0503 Blood culture Preliminary     4/18/2019 0503 Blood culture Preliminary           Discharge Disposition   Discharged to long-term care facility  Condition at discharge: Stable    Hospital Course     Sanjay Cano is a 73 year old male with history of Parkinson's disease with dementia and emphysema, who was admitted for acute hypoxic and hypercarbic respiratory failure secondary to community acquired pneumonia and COPD exacerbation     Acute hypoxic and hypercarbic respiratory failure Healthcare associated vs aspiration pneumonia   COPD exacerbation  Sepsis on presentation with elevated temperature and RR  He was started on ceftriaxone/doxycyline and completed 5 day course while in the hospital. He had elevated procal 0.88 which trended down to 0.19 on the day of discharge. We continued aggressive pulmonary toilet with meta nebs and hypertonic saline. He should continue flovent and albuterol inhaler as needed. He completed 5 day course of steroid while in the hospital with  improvement in pulmonary sympoms. Of note, his meds were reviewed and klonopin was discontinued to avoid sedation and respiratory suppression.     Oliguric JEANA   Presented with Cre 1.44 up from baseline 0.9-1.0. This resolved quickly. [place] to be pre renal.     Dysphagia: Secondary to Parkinson's  There were concerns for aspiration possibly contributing to the episode of respiratory failure. He was evaluated by speech therapist and underwent video swallow study. This showed that the patient appeared to demonstrate trace penetration with residue on the vocal cords intermittently during and after the swallow with thin liquids. Mastication was excessively slow, but no significant pharyngeal residue or penetration or aspiration present with puree or regular solids.   Both SLP and providers had detailed conversation with wife, and at the time of discharge, the recommendation is  dysphagia diet 3 (cut into small pieces) and nectar thick liquids (straws ok). Please feed only when fully upright, alert and engaged in the task. Ok to initiate a thin h20 protocol. Ok for small sips of thin H20 for comfort after oral cares when sitting upright and alert. H20 should not be mixed with pills or solid food.      HTN  He was kept on amlodipine, lisinopril. We stopped chlorthalidone given JEANA on admission and hypokalemia. Toward the end of hospitalization, he had occasional BP elevation requiring prn hydralazine. Amlodipine was increased to 10mg. CHlorthalidone was stopped on discharge. HE should follow up with nursing home physician for further titration of BP. He will continue ASA 81 twice weekly     Parkinsons dementia  Depression   All of his home meds including carbidopa-levodopa, donepezil, venlafaxine, effexor and seroquel were continued. His med list included klonopin, which the wife indicated he did not take. During this admission, klonopin was not given and he remained stable.     Chronic diarrhea  PTA lactobacillus and  metamucil were held, and he was kept on citrucel. This needs to be further adjusted to prevent constipation.        Consultations This Hospital Stay   CARE COORDINATOR IP CONSULT  SOCIAL WORK IP CONSULT  PHYSICAL THERAPY ADULT IP CONSULT  OCCUPATIONAL THERAPY ADULT IP CONSULT  NUTRITION SERVICES ADULT IP CONSULT  SPEECH LANGUAGE PATH ADULT IP CONSULT  VASCULAR ACCESS CARE ADULT IP CONSULT  SPIRITUAL HEALTH SERVICES IP CONSULT  PHYSICAL THERAPY ADULT IP CONSULT  OCCUPATIONAL THERAPY ADULT IP CONSULT  SPEECH LANGUAGE PATH ADULT IP CONSULT    Code Status   Full Code    Time Spent on this Encounter   I, Micheline Guzman, personally saw the patient today and spent greater than 30 minutes discharging this patient.       Micheline Guzman MD  [facility], [place]  ______________________________________________________________________    Physical Exam   Vital Signs: Temp: 98.6  F (37  C) Temp src: Oral BP: 144/87 Pulse: 77 Heart Rate: 63 Resp: 18 SpO2: 95 % O2 Device: None (Room air)    Weight: 182 lbs 1.6 oz  General Appearance: Alert, stuttering noted, able to communicate or answer in short sentences  Respiratory: occasional cough, no wheezing or crackles  Cardiovascular: RRR  GI: soft non tender or non distended  Other: Cog wheel rigidity present       Primary Care Physician   Bharath Buchanan    Discharge Orders      General info for SNF    Length of Stay Estimate: Long Term Care  Condition at Discharge: Stable  Level of care:skilled   Rehabilitation Potential: Fair  Admission H&P remains valid and up-to-date: Yes  Recent Chemotherapy: N/A  Use Nursing Home Standing Orders: Yes     Mantoux instructions    Give two-step Mantoux (PPD) Per Facility Policy Yes     Follow Up and recommended labs and tests    Follow up with prison physician.  The following labs/tests are recommended: BMP and CBC (monitor kidney function and potassium).     Activity - Up with nursing assistance     Reason for  your hospital stay    You were admitted to the hospital with shortness of breath. You were treated for pneumonia. You completed a course of antibiotics. We were very concerned about your swallowing ability, and speech therapist was consulted. You had series of swallow evaluations, and their recommendation is to start dysphagia diet 3 (cut into small pieces) and nectar thick liquids (straws ok). Please feed only when fully upright, alert and engaged in the task. Ok to initiate a thin h20 protocol. Ok for small sips of thin H20 for comfort after oral cares when sitting upright and alert. H20 should not be mixed with pills or solid food.  Your swallowing problem is likely coming from underlying parkinsons and aging. Please use all precautions to prevent pneumonia as recommended by speech therapist.  Your BP was noted to be elevated. We increased amlodipine to 10mg daily. Because your potassium was low, we stopped chlorthalidone. This needs to be closely monitored as outpatient.     Full Code     Physical Therapy Adult Consult    Evaluate and treat as clinically indicated.    Reason:  deconditioning     Occupational Therapy Adult Consult    Evaluate and treat as clinically indicated.    Reason:  deconditioning     Speech Language Path Adult Consult    Evaluate and treat as clinically indicated.    Reason:  Dysphagia, concern for aspiration     Fall precautions     Advance Diet as Tolerated    Follow this diet upon discharge: Orders Placed This Encounter      Dysphagia Diet Level 3 Advanced Nectar Thickened Liquids (pre-thickened or use instant food thickener)   Per speech therapist: Recommendation for dysphagia diet 3 (cut into small pieces) and nectar thick liquids (straws ok). Please feed only when fully upright, alert and engaged in the task. Ok to initiate a thin water protocol. Ok for small sips of thin water for comfort after or       Significant Results and Procedures   Most Recent 3 CBC's:  Recent Labs   Lab  Test 04/22/19  0510 04/20/19  0459 04/19/19  0445   WBC 4.5 9.6 12.9*   HGB 12.4* 12.5* 14.0   MCV 96 96 100    191 171     Most Recent 3 BMP's:  Recent Labs   Lab Test 04/23/19  0634 04/22/19  0510 04/21/19  0642    142 142   POTASSIUM 3.5 3.2* 3.0*   CHLORIDE 106 108 110*   CO2 28 25 27   BUN 24 23 24   CR 0.96 0.94 0.95   ANIONGAP 8 8 6   JENNIE 8.7 8.4* 8.1*   GLC 92 110* 111*   ,   Results for orders placed or performed during the hospital encounter of 04/18/19   Chest XR,  PA & LAT    Narrative    EXAM: XR CHEST 2 VW  4/18/2019 4:49 AM      HISTORY: dyspnea, cough    COMPARISON: Chest radiograph 8/20/2018 1/26/2018    FINDINGS:     Two views of the chest. Continued elevated left hemidiaphragm. The  cardiomediastinal silhouette is within normal limits. Perihilar and  bibasilar opacities. No pleural effusion. No pneumothorax.       Impression    IMPRESSION: Perihilar and bibasilar opacities, of pulmonary edema  and/or infection.     I have personally reviewed the examination and initial interpretation  and I agree with the findings.    KEVYN MORALES MD   Chest CT, IV contrast only - PE protocol     Value    Radiologist flags Lung nodule    Narrative    EXAMINATION: CT CHEST PULMONARY EMBOLISM W CONTRAST, 4/18/2019 6:47 AM      COMPARISON: CT 5/9/2018    HISTORY: Proximal, elevated d-dimer of 11.5    TECHNIQUE: Volumetric helical acquisition of CT images of the chest  from the lung apices to the kidneys were acquired after the  administration of 70 mL of Isovue-370 IV contrast. Three-dimensional  (3D) post-processed angiographic images were reconstructed, archived  to PACS and used in interpretation of this study.    Contrast: iopamidol (ISOVUE-370) solution 70 mL    FINDINGS:       Motion limits evaluation for small subsegmental pulmonary emboli.    There is adequate opacification of the pulmonary vasculature. No  central pulmonary embolism. The heart is not enlarged.  No signs of  right heart  strain.  The main pulmonary artery is within normal  limits. Unremarkable thoracic aorta. No pericardial effusion.  Mediastinal and perihilar lymphadenopathy, for example a 1.9 cm  subcarinal and a 1.5 cm left perihilar lymph node.     Small amount of debris the right main bronchus. There is left basilar  opacity dependently and to lesser extent on the right concerning for  aspiration. Patchy ground glass opacities particularly in the right  upper lobe a series 6 image 32 consistent with infection.    Lung nodules:   Series 9 image 178, 10 mm left lower lobe lung nodule, 9.4 mm  5/9/2018, 9 mm 6/28/2016.  Series 9 image 86 subpleural nodule in the left upper lobe just  anterior to the major fissure 11.6 mm, prior 5/9/2018 8 mm, 6/20/2016  6 mm.  Series 9 image 185 right lower lobe in the area of atelectatic scar  there is nodular component to this which measures 13 mm which is  slightly enlarged. Favor this is part of atelectasis.     No pleural effusion or pneumothorax.    Visualized upper abdomen: Unremarkable.    Bones and soft tissues: Unchanged moderate wedge compression fractures  of T9 and L1 vertebral bodies.      Impression    IMPRESSION:   Motion limits evaluation for small subsegmental pulmonary emboli:  1. No central pulmonary embolism.  2. Pneumonia/aspiration in the bilateral lower lobes. Right upper lobe  pneumonia.   3. Unchanged moderate wedge compression fractures of T9 and L1  vertebral bodies.  4. Slowly growing left lung nodules. Consider repeat CT scan in 6  months.    [Recommend Follow Up: Lung nodule]    This report will be copied to the [facility] to ensure a  provider acknowledges the finding.     I have personally reviewed the examination and initial interpretation  and I agree with the findings.    SHELLIE MCCARTHY MD   XR Chest Port 1 View    Narrative    Exam: XR CHEST PORT 1 VW, 4/21/2019 12:14 PM    Indication: Hypoxia, wheezing worsening since last night. Evaluate  for  fluid overload vs worsening pneumonia    Comparison: CT chest 4/19/2019. Chest radiograph 4/18/2017.    Findings:   Single AP radiograph of the chest. Patient is slightly rotated.  Trachea is midline. Unchanged cardiac silhouette. No pneumothorax or  pleural effusion. Scattered streaky perihilar and basilar opacities  are not significantly changed. Bibasilar consolidations are prominent  on radiograph 4/18/2019. No focal airspace consolidation. No acute  osseous abnormality. Nonobstructive bowel gas pattern. 8mm nodule in  the left lateral mid lung grossly unchanged, seen better on CT.      Impression    Impression: Bibasilar consolidations are more prominent than on  radiograph 4/18/2019 which may represent atelectasis or infectious  consolidation. Scattered streaky opacities elsewhere are not  significantly changed, with right upper lobe pneumonia suggested on  CT.    I have personally reviewed the examination and initial interpretation  and I agree with the findings.    BRISEIDA IVAN MD   XR Video Swallow w/o Esophagram    Narrative    EXAMINATION: XR VIDEO SWALLOW W/O ESOPHAGRAM  4/22/2019 1:50 PM      CLINICAL HISTORY: Aspiration, Parkinson's    COMPARISON: None.    PROCEDURE COMMENTS:   Fluoroscopy time: 1.3 minutes  Contrast: The patient was fed barium in the following manner and  consistencies: Thin, pudding, cookie.  Patient position: Lateral view slightly recumbent from the upright  sitting position.    FINDINGS:  The exam is suboptimal due to patient habitus and positioning.  Extensive degenerative changes of the cervical spine.    The oral preparatory and oral phase of swallowing were normal. There  was normal initiation of swallowing. There was normal palatal  elevation with limited epiglottic deflection. There was no penetration  or aspiration with thin liquid or pudding barium. Swallowing of  cracker was not visualized and the patient did not wish to attempting  second time. However, there  was no cough or residual barium noted in  the vestibule or proximal trachea. There was mild oropharyngeal  residue after pudding and cookie barium.     Multilevel degenerative changes in the cervical spine.      Impression    IMPRESSION:  1.  Suboptimal examination due to patient habitus and positioning. No  penetration or aspiration with thin liquid or pudding thick barium.  2.  The swallowing of cookie thick barium was not visualized. The  patient did not wish to attempt a second time.  3.  Please refer to same date speech therapy report for additional  findings.    I have personally reviewed the examination and initial interpretation  and I agree with the findings.    JOSR GARNICA MD       Discharge Medications   Discharge Medication List as of 4/23/2019  4:44 PM      START taking these medications    Details   amLODIPine (NORVASC) 1 mg/mL SUSP Take 10 mLs (10 mg) by mouth At Bedtime, Transitional         CONTINUE these medications which have NOT CHANGED    Details   aspirin 81 MG tablet Take by mouth twice a week, Historical      carbidopa-levodopa (SINEMET CR)  MG per CR tablet Take 1 tablet by mouth At Bedtime, Disp-1 tablet, R-0, HistoricalPatient reported medication      carbidopa-levodopa (SINEMET)  MG per tablet Take 1-2 tablets by mouth 5 times daily Takes 2 tablets at 6 AM, 1.5 tablets at 9 AM, 2 tablets at noon, 1.5 tablets at 3 PM, 1.5 tablets at 6 PM. Can take additional 0.5 tablet in the middle of the night if needed., Disp-90 tablet, Historical      cholecalciferol (VITAMIN D) 1000 UNIT tablet Take 1 tablet (1,000 Units) by mouth every morning, Disp-90 tablet, R-1, Historical      !! COMBIVENT RESPIMAT  MCG/ACT inhaler INHALE 1 PUFF BY MOUTH FOUR TIMES DAILY. NOT TO EXCEED 6 DOSES PER DAY, Disp-4 g, R-4, E-Prescribe      !! donepezil (ARICEPT) 10 MG tablet Take 1 tablet (10 mg) by mouth At Bedtime, Disp-30 tablet, R-11, Historical      !! donepezil (ARICEPT) 5 MG tablet Take 1  tablet (5 mg) by mouth every morning, Disp-30 tablet, R-11, Historical      FLOVENT  MCG/ACT Inhaler INHALE 2 PUFFS INTO THE LUNGS TWICE DAILY, Disp-36 g, R-1, E-Prescribe      guaiFENesin (ROBITUSSIN) 20 mg/mL SOLN solution Take 10 mLs by mouth every 4 hours as needed for cough, Disp-1200 mL, R-0, Transitional      hydrocortisone (WESTCORT) 0.2 % external cream Apply sparingly to affected area once a week after patient's bath.Disp-45 g, [id number]      !! Ipratropium-Albuterol (COMBIVENT RESPIMAT)  MCG/ACT inhaler Inhale 1 puff into the lungs 4 times daily May take 1 to 2 additional doses as needed during the day, Disp-1 Inhaler, R-4, Historical      lactase (LACTAID) 3000 UNIT tablet Take 2 tablets (6,000 Units) by mouth 3 times daily (with meals), Disp-540 tablet, R-3, E-Prescribe      lactobacillus rhamnosus, GG, (CULTURELL) capsule Take 1 capsule by mouth every morning, Disp-60 capsule, R-11, Historical      lisinopril (PRINIVIL/ZESTRIL) 10 MG tablet Take 1 tablet (10 mg) by mouth daily, Disp-1 tablet, R-0, Historical      methylcellulose (CITRUCEL) 500 MG TABS tablet Take 1 tablet (500 mg) by mouth daily as needed, Disp-14 each, R-0, Historical      mirtazapine (REMERON) 15 MG tablet TAKE 1.5 TABLETs (22.5 MG) BY MOUTH AT BEDTIME, Disp-135 tablet, R-1, Transitional      QUEtiapine (SEROQUEL) 50 MG tablet Take 0.5 tablets (25 mg) by mouth nightly as needed (agitation), Disp-180 tablet, R-0, E-Prescribe      ranitidine (ZANTAC) 75 MG tablet Take 1 tablet (75 mg) by mouth At Bedtime, Disp-30 tablet, Historical      !! venlafaxine (EFFEXOR-XR) 37.5 MG 24 hr capsule Take 37.5 mg by mouth daily, Historical      !! venlafaxine (EFFEXOR-XR) 75 MG 24 hr capsule Take 75 mg by mouth daily, Historical       !! - Potential duplicate medications found. Please discuss with provider.      STOP taking these medications       amLODIPine (NORVASC) 5 MG tablet Comments:   Reason for Stopping:          chlorthalidone (HYGROTON) 25 MG tablet Comments:   Reason for Stopping:         clonazePAM (KLONOPIN) 0.5 MG ODT Comments:   Reason for Stopping:         clonazePAM (KLONOPIN) 0.5 MG tablet Comments:   Reason for Stopping:         doxycycline (VIBRAMYCIN) 50 MG/5ML SYRP Comments:   Reason for Stopping:         doxycycline hyclate (VIBRAMYCIN) 100 MG capsule Comments:   Reason for Stopping:         nitroFURantoin macrocrystal-monohydrate (MACROBID) 100 MG capsule Comments:   Reason for Stopping:         predniSONE (DELTASONE) 20 MG tablet Comments:   Reason for Stopping:         psyllium (METAMUCIL/KONSYL) 58.6 % powder Comments:   Reason for Stopping:         pyrithione zinc (SELSUN BLUE/HEAD AND SHOULDERS) 1 % SHAM Comments:   Reason for Stopping:             Allergies   Allergies   Allergen Reactions     Azithromycin      Erythromycin Other (See Comments)     Pathological fractures     Levaquin [Levofloxacin]      Fracture prevention     Seasonal Allergies

## 2019-04-23 NOTE — PLAN OF CARE
"Pt alert and oriented to self only this evening. Appeared agitated this evening. Stated to wife \"I would kill you right now it I could. Can you god damn leave me alone\". Pt did not make any physical movements towards anyone in room. Compliant with cares and taking medication. Pt started to relax by end of shift. VSS on RA. Pt BP elevated but lowered after recheck. Turned q2h when pt allows. Incontinent urine x2. No BM this evening. L PIV SL. Takes all medication crushed in apple sauce or pudding. Diet changed to dysphasia 3 with nectar thick liquids after swallow study. Ate 75% of supper. Dentures top and bottom in cup by sink. Will continue to monitor.     "
/73 (BP Location: Right arm)   Pulse 80   Temp 96.8  F (36  C) (Oral)   Resp 18   Wt 80.1 kg (176 lb 9.6 oz)   SpO2 95%   BMI 22.67 kg/m      Pt alert, oriented only to self. Pt does follow commands and speech is spontaneous though garbled and disarticulate. 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 VSS on 2 LPM O2 via NC. Work of breathing somewhat increased today, fluids discontinued and CXR ordered to eval for fluid overload in lungs. SLP saw patient and upgraded diet to DD2 mechanically altered. Pt needs to be supervised 1:1 during PO intake with strict aspiration precautions. No nausea or emesis today. Pt is eating well with minimal signs of dysphagia. Urine incontinence x 1, no stool. Pt is currently chair bound and movement is very rigid. Turning and repositioning q2 hours. PIV intact, saline locked. Continue plan of care.  
4C: PT evaluation completed, treatment initiated.   Discharge Planner PT   Patient plan for discharge: return to facility   Current status: pt needs max assist for rolling, dependently lifted from bed to EOB, sits EOB for about 8 minutes with SBA-CGA and heavy cues for improved posture due to marked kyphosis. Lifted to recliner chair, completes one STS transfer with mod A x 2 with B knee blocking   Barriers to return to prior living situation: level of assist   Recommendations for discharge: medical status  Rationale for recommendations: pt dependent for transfers and cares at baseline, has 24hr assist available at home. Anticipate return to facility when medically appropriate.        Entered by: Chanle Ramirez 04/19/2019 3:31 PM       
BP (!) 197/91 (BP Location: Right arm)   Pulse 77   Temp 95.6  F (35.3  C) (Axillary)   Resp 18   Wt 82.6 kg (182 lb 1.6 oz)   SpO2 94%   BMI 23.38 kg/m     Full code.  Lung sounds course but Sats 94% on RA. Hypertensive  after wrestling with lab getting his labwork done this am.  Oriented to self and time. Hx of PD and dementia.  Oriented to self only.Denies pain. He had episode of anxiety and restlessness with getting lab draws this am which required 3 staff to team together to support getting process completed to alleviate fear for patient.  He tolerated procedure.  Patient needs one step directions and  several attempts to reroute. Easily agitated but redirectable at times. Patient turned and repositioned q two hours. Restless legs. Incontinent of urine.x2. No BM this shift. Mepilex on sacral area.Strong  and he doesn't let go with hands.  Takes his pills with thick it honey consistency. And sitting upright.  Tolerates well.  Pt received scheduled IV abx.  Updated day shift RN re  events and to clarify PRNs for BP?  Plan:  Continue to monitor and follow POC.  Notify MD of changes.  
D/I  Patient received in tx from 6B around 6pm this evening.  Accompanied by Wife Danuta and 6B staff.  Patient alert but easily drifts off, orientation & ability to follow simple commands waxes and wains depending on how tired he is.  Two PIV's patent in left arm, one infusing and the other is SL'd.  Condom catheter patent and drained 150 ml's of yellow urine.  Continues to be NPO x meds with sips.  Crushed all meds this evening and put in applesauce for ease of swallowing.  Pulse Ox continuous,  on RA.  Turned and repositioned q 2hrs with assist of 2.  Will continue to monitor per POC and notify MD with any acute changes.  
D/I  Uneventful shift.  Incontinent of urine  x two,  Large amount.  Diet advanced to DD 2 with nectar thick liquids, tolerated well.  Crushed all meds and put in pudding for ease of swallowing.  Pulse Ox continuous, on RA. Sats 90%.  Turned and repositioned q 2hrs with assist of 2.  Will continue to monitor per POC and notify MD with any acute changes.       
D/I  Uneventful shift.  Two PIV's patent in left arm, one infusing and the other is SL'd.  Condom catheter removed on day shift.  Incontinent of urine and stool x two.  Large amount.  Diet advanced to Clears.  Wife Danuta assisted with ordering and he tolerated well.  Crushed all meds and put in applesauce for ease of swallowing.  Pulse Ox continuous, on RA.  Turned and repositioned q 2hrs with assist of 2.  Will continue to monitor per POC and notify MD with any acute changes.    
D: Hx parkinsons and COPD, admitted with respiratory failure/pneumonia.    I/A:   Neuro: Pt very lethargic overnight, not opening eyes to stimulation or following commands.  Improved in AM, able to answer orientation questions correctly and follow simple commands.  PERRL. Good sleep overnight.   Resp: Pt on bipap overnight d/t concern that lethargy was related to CO2 retention, no significant improvement with bipap, so switched back to oxymask 2L in AM.     Cardio: Sinus rhythm/sinus bradycardia, BPs ranged from MAPs in 70s to slightly hypertensive in AM.   GI: 2x soft BMs overnight.  Tolerated mech soft diet in evening.   : Pt due to void, bladder scanned for 350 this AM, did not straight cath as pt does was voiding spontaneously yesterday and has no known issues with retention.  Reminded to void x2.     Skin: No changes   Notes:   P: Continue plan of care, possible 5 A/B transfer orders?    
D: Pt admitted for respiratory failure.  I/A: Pt lethargic/minimally responsive upon admission but improved to lethargic but easily aroused. Oriented to self by end of day and will follow commands but remains lethargic. Tremors from Parkinson's at baseline, otherwise able to move all extremities. T-max=101.9, improved to 99 with ice packs and cool temperature/lightweight clothing. NSR 60-80s, BP stable. Weaned from 8L oxymask to RA-2L oxymask, otherwise used BiPap at 30% majority of day for CO2 retention; SpO2 remained >89% even on room air. Has intermittent cough without productive sputum. Chest CT done prior to admission to unit, see results review. Passed swallow eval this afternoon once more alert, able to take pills crushed in applesauce with sips of water; diet advanced to mechanical soft but remains with poor appetite d/t AMS. Incontinent of 1 very small BM. Incontinent of urine x2, condom cath placed with adequate output; UA sent. L.A.=4.9, MD notified and 500 ml fluid bolus administered. Recheck labs in AM. Wife updated at bedside.  P: Continue to monitor and implement POC, notify provider of changes.      Problem: COPD Comorbidity  Goal: Maintenance of COPD Symptom Control  Outcome: Improving     Problem: Memory Impairment Comorbidity  Goal: Memory Impairment Comorbidity  Description  Patient comorbidity will be monitored for signs and symptoms of Memory Impairment condition.  Problems will be absent, minimized or managed by discharge/transition of care.  Outcome: No Change     Problem: Mood Alteration Comorbidity  Goal: Mood Alteration Comorbidity  Description  Patient comorbidity will be monitored for signs and symptoms of Mood Alteration condition.  Problems will be absent, minimized or managed by discharge/transition of care.  Outcome: No Change     
Discharge Planner PT   Patient plan for discharge: per wife, return to facility and wanting to pursue further Parkinson's-specialized therapy at [facility].  Current status: pt minAx2 supine<>sit, stands to arm-in-arm minAx2 to modAx2 x4, variable with command following. Pt follows commands ~50% of time and able to engage in conversation ~50% of time when prompted.  Barriers to return to prior living situation: medical needs  Recommendations for discharge: return to [facility]  Rationale for recommendations: pt near baseline mobility of Ax2 recently for stand pivot transfers. Would benefit from ongoing IP PT to progress IND and improve transfer safety.       Entered by: Tamia Rubalcava 04/22/2019 11:14 AM       
Discharge Planner PT   Patient plan for discharge: return to [facility] with eventual OP follow up at [facility]  Current status: pt with increased mentation this pm, more engaged during session. He performs supine<>sit modAx2, only able to achieve partial stand ~50% x4 with modAx2. Pt also participates in seated LE and trunk control activities to improve muscle control/coordination and postural support.  Barriers to return to prior living situation: medical needs  Recommendations for discharge: return to [facility], OP PT follow up at [facility]  Rationale for recommendations: pt near baseline mobility of Ax2 recently for stand pivot transfers. Would benefit from ongoing IP PT to progress IND and improve transfer safety.       Entered by: Tamia Rubalcava 04/23/2019 2:19 PM     Physical Therapy Discharge Summary    Reason for therapy discharge:    Discharged to long term care facility.    Progress towards therapy goal(s). [facility] for goal details.  Goals partially met.  Barriers to achieving goals:   limited tolerance for therapy and discharge from facility.    Therapy recommendation(s):    Continued therapy is recommended.  Rationale/Recommendations:  OP PT per wife desire to continue optimizing mobility in setting of advanced Parkinsons.      
Discharge Planner SLP   Patient plan for discharge: Did not discuss  Current status: Pt presents with s/sx of aspiration with thin liquids. Increased work of breathing since previous day. Per chest CT likely pneumonia/aspiration in both lungs and upper right lung invovlement. Pt's wife states he has had bouts of pneumonia over the last couple of years. A video swallow had been previously recommend by SLP at pt's care facility, however at that time his primary MDs did not feel it was warranted - per pt's wife's report. Given pt's hx and current difficulty recommend video swallow.     Recommend trialing a dysphagia diet 2 and nectar thick liquids (by spoon, or single/small straw sip - taking the straw away to ensure they are single). Pt requires 1:1 nursing supervision and assistance during meals with close monitoring of diet tolerance and respiratory status.     Barriers to return to prior living situation: Below baseline, respiratory status, aspiration risk  Recommendations for discharge: Previous facility   Rationale for recommendations: SLP at next level of care for dysphagia management          Entered by: Sandra Spaulding 04/21/2019 12:10 PM       
Discharge Planner SLP   Patient plan for discharge: Pt wife would like to discuss alternate LTC options with SW.  Current status: Pt continues to be high risk for aspiration. Seen following meds, cognition at best point per wife. Ongoing impaired oral bolus manipulation, delayed swallow initiation, and moderate residuals with puree. No overt s/sx aspiration with thin liquid via straw. Given weak cough, concern for ability to protect airway.   Cautiously recommend pt to have thin water via straw immediately following thorough oral cares. Pt to be fully alert and positioned fully upright prior to water consumption, limit bolus size to small sips. Pt will need 1:1 assist for feeding.   SLP to follow per POC.   Barriers to return to prior living situation: Dysphagia  Recommendations for discharge: LTC  Rationale for recommendations: Pt will benefit from ongoing SLP services at LTC facility to address swallow strategies and provide staff education.        Entered by: Sandra Mas 04/20/2019 11:21 AM      
Discharge Planner SLP   Patient plan for discharge: Unknown  Current status: Clinical swallow evaluation completed per MD order.  Pt presents with severe oropharyngeal dysphagia suspected likely to advanced parkinson's disease and dementia.  Recommend NPO status with excellent oral cares 2-3x per week, critical meds okay to be crushed in puree consistency.  Pt with consistent coughing with and without PO intake. Inconsistently wet vocal quality, inconsistent ability for a cued cough or throat clear.  Coughing and double swallow with thin liquids, and wet vocal quality x1 with puree consistency.  Consistent anterior loss of thin liquid.  Consistent cueing required for bolus transit with puree.  Pt presenting with difficulty maintaining optimal positioning during PO intake, impacting amount of anterior loss.  SLP to follow for PO readiness.  Barriers to return to prior living situation: NPO status, cognition, medical status  Recommendations for discharge: Defer to PT/OT  Rationale for recommendations: Below baseline function; pt will benefit from ongoing ST targeting swallowing       Entered by: Melinda Peres 04/19/2019 1:35 PM       
Discharge Planner SLP   Patient plan for discharge: home to LTC today  Current status: The patient was seen for f/u education with his wife present. Continue to recommend dysphagia diet 3 (cut into small pieces) and nectar thick liquids with safety precautions. They patient must be fully alert and upright for all PO. Please serve one small bite/sip at a time at a slow rate. Ok for a thin H20 protocol should the patient/wife wish to pursue for hydration. Ok for sips of thin H20 for comfort when upright and alert. Please perform oral cares before sips of H20, do not serve with food or pills, and all other liquids must be nectar thick in consistency.  Barriers to return to prior living situation: none per SLP  Recommendations for discharge: LTC with modified diet, patient's wife has interest in pursuing SLP for PD concerns at [facility] parkinson's [facility]  Rationale for recommendations: appropriate for modified diet with caregivers implementing safety strategies, SLP f/u for PD is appropriate       Entered by: Violeta Ware 04/23/2019 4:03 PM     Speech Language Therapy Discharge Summary    Reason for therapy discharge:    Discharged to long term care facility.    Progress towards therapy goal(s). See goals on Care Plan in [facility] electronic health record for goal details.  Goals partially met.  Barriers to achieving goals:   discharge from facility.    Therapy recommendation(s):    Continued therapy is recommended.  Rationale/Recommendations:  recommend SLP tx for dysphagia.      
Discharge Planner SLP   Patient plan for discharge: not stated  Current status: The patient was seen for a videoswallow study per MD order to better assess aspiration risk. Under fluoroscopy the patient appeared to demonstrate trace penetration with residue on the vocal cords intermittently during and after the swallow with thin liquids. Mastication excessively slow, but no significant pharyngeal residue or penetration or aspiration present with puree or regular solids. Of note, this study was challenging due to the patient's positioning and kyphotic posture, which limited view of the airway at times. Also, some imaging was not captured due to the patient's difficulty following commands with the timing of the procedure. During this exam the patient was confused but calm and cooperative. After the exam, SLP had extensive discussion with the patient's wife about dysphagia and factors that could result result in variable swallowing safety and increase aspiration risk (such as positioning, lethargy, timing of PD meds etc). The patient's wife reports that the patient/caregivers struggle with these factors often. She also reports that the patient typically eats a regular diet, but she cuts/dices it into very small pieces. Per discussion with the patient's wife we agreed on recommendation for dysphagia diet 3 (cut into small pieces) and nectar thick liquids (straws ok). Please feed only when fully upright, alert and engaged in the task. Ok to initiate a thin h20 protocol. Ok for small sips of thin H20 for comfort after oral cares when sitting upright and alert. H20 should not be mixed with pills or solid food.   Barriers to return to prior living situation: dysphagia, modified diet, weakness, cognition  Recommendations for discharge: TCU  Rationale for recommendations: modified diet for safer swallowing       Entered by: Violeta Ware 04/22/2019 2:44 PM       
Mr. Cano became more alert as the day went on, he still talks nonsense and is significantly confused.  This morning he held his pills in his mouth and it was difficult to prompt him to swallow the pills and applesauce.  Speech saw him, he is now NPO except for medications.  His wife told stories of very bad experiences at nursing homes.  I validated her experiences, and I also emphasized the fact that at least part of his recent deterioration is likely due to his chronic, progressive illnesses, and not entirely due to the care he received at assisted living and nursing home facilities.      Problem: Respiratory Compromise (Pneumonia)  Goal: Effective Oxygenation and Ventilation  4/19/2019 1812 by Brenda Neumann RN  Outcome: Improving  4/19/2019 0640 by Amanda Spencer RN  Outcome: Improving     Problem: COPD Comorbidity  Goal: Maintenance of COPD Symptom Control  4/19/2019 1812 by Brenda Neumann RN  Outcome: Improving  4/19/2019 0640 by Amanda Spencer RN  Outcome: Improving     Supplemental oxygen weaned off and Mr. Cano tolerated room air throughout shift.  He continues to have a strong cough.  It is unclear whether the cough is productive or not.  His lung sounds remain clear.    
OT-4C: Cancel/Defer. Per discussion with PT, pt has no acute skilled OT needs at this time. Pt has assist for all ADLs at nursing home. PT to follow for ROM and functional transfers. Will complete OT order and defer.   
Patient appeared drowsy, sleeping in between cares, able to open eyes spontaneously with verbal stimuli, slurred speech noted. Was seen and treated by speech therapy today and was seen and rounded by primary team, Able to take medications orally with apple sauce however patient has been coughing frequently this shift. Moreover, he c/o chest pain more likely pleuritic chest pain and was given tylenol. Potassium 20 mg given as one time order. Resting in bed as of this time. Wife at bedside concerned with patient's well being but very attentive to cares.  Continue to monitor patient closely and notify MD of any significant change.  
Reason for admission: Pneumonia     Status: No acute events this shift. Wife present in room most of shift, involved in cares.   VS: Slightly high BP (170/94, 180/96). Scheduled antihypertensive meds administered, BP now 144/87. PRN hydralazine available if needed.   Pain: No c/o pain.    Neuro: Pt a/o to self & place. Hx of PD & dementia, short attention span and  illogical speech, is at baseline per wife. Bilateral upper muscle jerking noted.   Respiratory: Breathing adequately on RA.  Continuous PO on, sats stable in mid 90s. Coarse crackles in bilateral LS noted.   Skin: Barrier cream applied to blanchable redness in perineal area.   IV/Drains: PIV SL. Site WDL.   GI/: No BM this shift. Voiding appropriately, incontinent of urine x3.   Diet: DD2. Good appetite. Tolerates food well, no n/v.    Activity: Very rigid, ax2 in bed. Uses lift to get up in chair. Complaint w/ repo/turning q2h.  Plan of care: Pt to discharge today, [facility] ride set up at 1600. Will discharge back to LTC facility. Continue to monitor and follow POC.   
Reason for admission: Respiratory failure secondary to CAP and COPD exacerbation    Status: Pt had video swallow study done today. Wife in room, very involved in cares. K+ replaced today, recheck tmrw morning.   VS: VSS.   Pain: No c/o pain.    Neuro: Pt only oriented to self and time; hx of PD and dementia.   Respiratory: Breathing adequately on RA, was weaned down from 2L NC at start of shift. Coarse crackles noted bilaterally. Continuous PO on, sats stable in  mid 90s.   Skin: Protective mepilex on sacral area.   IV/Drains: PIV SL.   GI/: No BM this shift. Voiding appropriately. Incontinent of urine.   Diet: DD2, good appetite, no n/v. Total feed, wife present in room to feet pt. Takes pills crushed w/ applesauce.   Activity: Up w/ lift to transport chair for swallow study. Ax2 in bed, very rigid with turning. Complaint w/ repo/turn schedule q2h.   Plan of care: Continue w/ IV abx for pneumonia. Monitor BPs as they can run high. Pt needs sputum sample. Continue to monitor and follow POC.   
VSS on 2L O2 via NC, hypertensive w/ 0200 VS. Given hydralazine x1 and BP WDL. Slightly hypertensive w/ AM VS, but to receive scheduled BP meds this AM. Alert at times, lethargic at others. MARY ANN orientation as pt unable to follow all commands. Denies pain. Persistent productive cough overnight but pt declined robitussin. Had episode of urinary incontinence x2. No BM on shift. Repositioned x3 overnight and rested well in between. Will continue to monitor and follow POC.  
VSS on RA, pt hypertensive w/ 0200 VS. Team notified and IV hydralazine given x2. Alert at times, lethargic at others. MARY ANN orientation as pt unable to follow all commands/self report. Pt very agitated around 0200, combative w/ staff and refusing brief change/repositioning. Reapproached after 1 hr and pt compliant w/ cares. Denies pain. Persistent weak cough, given robitussin x1 overnight. Refused 0400 neb treatments. Had episode of urinary incontinence x2 and stool incontinence x1 on shift. Repositioned q2-3h as tolerated. D5NS infusing at 75 ml/hr. Will continue to monitor and follow POC.  
VSS on RA. Alert, MARY ANN orientation as pt unable to follow commands or self-report. Denies pain. NPO overnight, but took scheduled PO meds crushed w/ applesauce. Had episode of small stool incontinence x1. Compliant w/ q2h repo overnight. Condom catheter became dislodged, catheter removed and brief applied. Had frequent productive cough overnight. Will continue to monitor and follow POC.  
no

## 2019-04-26 NOTE — TELEPHONE ENCOUNTER
Dr. Buchanan,  Please see phone message below    Please advise    Thank You!  Ibeth Saba, RN  Triage Nurse

## 2019-04-26 NOTE — TELEPHONE ENCOUNTER
Reason for call:  Other   Patient called regarding (reason for call): fall  Additional comments:   [facility] called to report a patient fall out of bed on 04/25/19. No injuries noted.     Phone number to reach patient:  Other phone number:  [phone]    Best Time:  any    Can we leave a detailed message on this number?  YES

## 2019-04-29 NOTE — TELEPHONE ENCOUNTER
Dr. Buchanan,  Please see phone message below. Patient's wife requesting cough syrup w/ guaifenesin    Patient recently hospitalized for respiratory problem. Patient has hospital follow up appointment 05/01/2019    Pharmacy cued    Please advise    Thank You!  Ibeth Saba RN  Triage Nurse

## 2019-04-29 NOTE — TELEPHONE ENCOUNTER
Reason for call:  Medication   If this is a refill request, has the caller requested the refill from the pharmacy already? N/A  Will the patient be using a [facility]? N/A  Name of the pharmacy and phone number for the current request: [facility]     Name of the medication requested: cough syrup w/ guaifenesin    Other request: Call Caren that this has been done [phone]    Phone number to reach patient:  Other phone number:  [facility] [phone]    Best Time:  any    Can we leave a detailed message on this number?  YES

## 2019-04-29 NOTE — TELEPHONE ENCOUNTER
Prescription for guaiFENesin-codeine (ROBITUSSIN AC) 100-10 MG/5ML solution faxed to [facility] at [phone]    Called [facility], spoke with Adia. Informed Adia that prescription has been sent. Adia requests a copy of the prescription be faxed to her. Rx faxed to [phone]     Returned call to patient's wife Caren, on home nuber left non-detailed message informing that requested prescription has been sent to [facility] and the pharmacy. Advised return call to clinic with additional questions/concerns. Also called mobile number listed, voicemail box not set up    Thank You!  Ibeth Saba, RN  Triage Nurse

## 2019-05-01 NOTE — TELEPHONE ENCOUNTER
Dr. Chanel Tipton understood to stop the robitussin AC    The facility was giving him plain robitussin  She would like him to have his own supply    Med cued if you agree    Joana Gandhi RN   [facility]

## 2019-05-01 NOTE — TELEPHONE ENCOUNTER
Reason for call:  Other   Patient called regarding (reason for call): call back  Additional comments: Caren is inquiring whether Sanjay should take the following medication guaiFENesin-codeine (ROBITUSSIN AC) 100-10 MG/5ML solution.   [facility] did not yet get this medication. The pharmacy is inquiring if they should send it to [facility] or not.     Phone number to reach patient:  Cell number on file:    No relevant phone numbers on file.       Best Time:  any    Can we leave a detailed message on this number?  YES

## 2019-05-01 NOTE — TELEPHONE ENCOUNTER
Dr. Buchanan,    Please advise regarding the phone message below. You would like pt to start correct?    Lita Mckeon RN  [facility]

## 2019-05-01 NOTE — PROGRESS NOTES
SUBJECTIVE:   Sanjay Cano is a 73 year old male who presents to clinic today with his wife for the following health issues:      [facility] [place]    Hospital Follow-up Visit:    Hospital/Nursing Home/IP Rehab Facility: [facility]  Date of Admission: 04/18/2019  Date of Discharge: 04/23/2019  Reason(s) for Admission: Acute hypoxic and hypercarbic respiratory failure  Healthcare associated pneumonia vs aspiration pneumonia  COPD exacerbation  Sepsis with elevated temperature and respiratory rate on admission  Dysphagia secondary to Parkinson's  Oliguric JEANA  HTN  Parkinson's dementia  Depression  Chronic diarrhea         Problems taking medications regularly:  None       Medication changes since discharge: None       Problems adhering to non-medication therapy:  None    Summary of hospitalization:  [facility] discharge summary reviewed  Diagnostic Tests/Treatments reviewed.  Follow up needed: none  Other Healthcare Providers Involved in Patient s Care:         Specialist appointment - Parkinson Dr Mathur within a month  Update since discharge: fluctuating course.     Post Discharge Medication Reconciliation: discharge medications reconciled, continue medications without change.  Plan of care communicated with spouse     Coding guidelines for this visit:  Type of Medical   Decision Making Face-to-Face Visit       within 7 Days of discharge Face-to-Face Visit        within 14 days of discharge   Moderate Complexity [id number]   High Complexity [id number]          Patient was in the hospital on 4/18/19-4/23/19 for acute hypoxic and hypercarbic respiratory failure secondary to community acquired pneumonia and COPD exacerbation. He was sent to the hospital from his living facility after his cough began to worsen and he was found to be severely hypertensive. His wife states that he was speaking more and seemed to be more coherent while in the hospital than he is in his living facility. Patient was  given a nebulizer treatment three times daily while in the hospital. She reports that he seems to be improving, but that his cough is still present, and produced phlegm yesterday. She states that he has also been fatigued.    Patient's wife states that his living facility would not fill the cough medicine prescription as it had Codeine and would cost more.      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)     Primary insomnia     Other emphysema (H)     Pulmonary nodules     Thyroid nodule     Family history of thyroid cancer     H/O recurrent urinary tract infection     Essential hypertension with goal blood pressure less than 140/90     Senile purpura (H)     Nocturnal cough     Parkinson's disease (H)     Closed displaced fracture of shaft of third metacarpal bone of left hand, initial encounter     Respiratory failure (H)     Past Surgical History:   Procedure Laterality Date     EYE SURGERY       ORTHOPEDIC SURGERY       PHACOEMULSIFICATION CLEAR CORNEA WITH STANDARD INTRAOCULAR LENS IMPLANT  5/21/2014    Procedure: PHACOEMULSIFICATION CLEAR CORNEA WITH STANDARD INTRAOCULAR LENS IMPLANT;  Surgeon: Tomy Hunter MD;  Location: [facility]     PHACOEMULSIFICATION CLEAR CORNEA WITH TORIC INTRAOCULAR LENS IMPLANT  4/30/2014    Procedure: PHACOEMULSIFICATION CLEAR CORNEA WITH TORIC INTRAOCULAR LENS IMPLANT;  Surgeon: Tomy Hunter MD;  Location: [facility]       Social History     Tobacco Use     Smoking status: Former Smoker     Packs/day: 0.01      Years: 40.00     Pack years: 0.40     Types: Cigarettes     Last attempt to quit: 7/31/2008     Years since quitting: 10.7     Smokeless tobacco: Never Used     Tobacco comment: very passive cigarettes in 6 months   Substance Use Topics     Alcohol use: No     Alcohol/week: 0.0 oz     Family History   Problem Relation Age of Onset     Cerebrovascular Disease Mother      Diabetes Mother      Gastrointestinal Disease Mother      Hypertension Mother      Neurologic Disorder Father      Cerebrovascular Disease Father      Cerebrovascular Disease Maternal Grandfather      Cancer Paternal Grandmother      Other - See Comments Sister         gi - unknown         Current Outpatient Medications   Medication Sig Dispense Refill     amLODIPine (NORVASC) 1 mg/mL SUSP Take 10 mLs (10 mg) by mouth At Bedtime       aspirin 81 MG tablet Take by mouth twice a week       carbidopa-levodopa (SINEMET CR)  MG per CR tablet Take 1 tablet by mouth At Bedtime 1 tablet 0     carbidopa-levodopa (SINEMET)  MG per tablet Take 1-2 tablets by mouth 5 times daily Takes 2 tablets at 6 AM, 1.5 tablets at 9 AM, 2 tablets at noon, 1.5 tablets at 3 PM, 1.5 tablets at 6 PM. Can take additional 0.5 tablet in the middle of the night if needed. 90 tablet      cholecalciferol (VITAMIN D) 1000 UNIT tablet Take 1 tablet (1,000 Units) by mouth every morning 90 tablet 1     COMBIVENT RESPIMAT  MCG/ACT inhaler INHALE 1 PUFF BY MOUTH FOUR TIMES DAILY. NOT TO EXCEED 6 DOSES PER DAY 4 g 4     donepezil (ARICEPT) 10 MG tablet Take 1 tablet (10 mg) by mouth At Bedtime 30 tablet 11     donepezil (ARICEPT) 5 MG tablet Take 1 tablet (5 mg) by mouth every morning 30 tablet 11     FLOVENT  MCG/ACT Inhaler INHALE 2 PUFFS INTO THE LUNGS TWICE DAILY 36 g 1     guaiFENesin (ROBITUSSIN) 20 mg/mL SOLN solution Take 10 mLs by mouth every 4 hours as needed for cough 1200 mL 0     guaiFENesin-codeine (ROBITUSSIN AC) 100-10 MG/5ML solution Take 5-10 mLs by  mouth every 4 hours as needed for cough 120 mL 0     hydrocortisone (WESTCORT) 0.2 % external cream Apply sparingly to affected area once a week after patient's bath. 45 g 1     Ipratropium-Albuterol (COMBIVENT RESPIMAT)  MCG/ACT inhaler Inhale 1 puff into the lungs 4 times daily May take 1 to 2 additional doses as needed during the day 1 Inhaler 4     lactase (LACTAID) 3000 UNIT tablet Take 2 tablets (6,000 Units) by mouth 3 times daily (with meals) 540 tablet 3     lactobacillus rhamnosus, GG, (CULTURELL) capsule Take 1 capsule by mouth every morning 60 capsule 11     lisinopril (PRINIVIL/ZESTRIL) 10 MG tablet Take 1 tablet (10 mg) by mouth daily 1 tablet 0     methylcellulose (CITRUCEL) 500 MG TABS tablet Take 1 tablet (500 mg) by mouth daily as needed 14 each 0     mirtazapine (REMERON) 15 MG tablet TAKE 1.5 TABLETs (22.5 MG) BY MOUTH AT BEDTIME 135 tablet 1     QUEtiapine (SEROQUEL) 50 MG tablet Take 0.5 tablets (25 mg) by mouth nightly as needed (agitation) 180 tablet 0     ranitidine (ZANTAC) 75 MG tablet Take 1 tablet (75 mg) by mouth At Bedtime 30 tablet      venlafaxine (EFFEXOR-XR) 37.5 MG 24 hr capsule Take 37.5 mg by mouth daily       venlafaxine (EFFEXOR-XR) 75 MG 24 hr capsule Take 75 mg by mouth daily       Allergies   Allergen Reactions     Azithromycin      Erythromycin Other (See Comments)     Pathological fractures     Levaquin [Levofloxacin]      Fracture prevention     Seasonal Allergies      BP Readings from Last 3 Encounters:   04/23/19 144/87   04/18/19 130/82   03/22/19 130/78    Wt Readings from Last 3 Encounters:   04/22/19 82.6 kg (182 lb 1.6 oz)   11/07/18 79.8 kg (176 lb)   08/20/18 78 kg (172 lb)                  Labs reviewed in EPIC    ROS:  Remainder of ROS is negative unless otherwise noted above or in HPI.    This document serves as a record of the services and decisions personally performed and made by Bharath Buchanan MD. It was created on his behalf by Hosea Rome  trained medical scribe. The creation of this document is based on the provider's statements to the medical scribe.  Hosea Rome 11:22 AM May 1, 2019    OBJECTIVE:     Pulse 68   Temp 98.9  F (37.2  C) (Temporal)   Resp 22   SpO2 94%   There is no height or weight on file to calculate BMI.    Physical Exam done with patient in a wheelchair.    GENERAL: healthy, alert and no distress  RESP: lungs clear to auscultation - no rales, rhonchi or wheezes  CV: regular rate and rhythm, normal S1 S2, no S3 or S4, no murmur, click or rub, no peripheral edema and peripheral pulses strong    Diagnostic Test Results:  No results found for this or any previous visit (from the past 24 hour(s)).    ASSESSMENT/PLAN:   (J15.9) Community acquired bacterial pneumonia  (primary encounter diagnosis)  Comment: Slowing improving.  Plan: ipratropium - albuterol 0.5 mg/2.5 mg/3 mL         (DUONEB) 0.5-2.5 (3) MG/3ML neb solution        Use duoneb twice daily for the next 5 days, and follow up with how this is going.    (I10) Essential hypertension with goal blood pressure less than 140/90  Comment: Pending lab work.  Plan: Comprehensive metabolic panel        Will notify with results. Follow up as needed.      Patient Instructions   Use twice daily duo nebulizers through Sunday, let me know how this is going.     40 minutes were spent with the patient with more than 50% of time spent in counseling and coordination of care with spouse regarding above assessment and plan.     The information in this document, created by the medical scribe for me, accurately reflects the services I personally performed and the decisions made by me. I have reviewed and approved this document for accuracy prior to leaving the patient care area.  May 1, 2019 11:22 AM    Bharath Buchanan MD  [facility]

## 2019-05-01 NOTE — TELEPHONE ENCOUNTER
Notified Danuta, the med has been ordered    Joana Gandhi RN   [facility]

## 2019-05-02 NOTE — TELEPHONE ENCOUNTER
Spoke with [facility] - paged on call.    Patient demonstrating shaking/rigors and increased respiratory effort.    He is full code status, recent treatment for pneumonia with concern for aspiration risk on admission.    Recommending evaluation in ER.    Raj Sandoval MD  Family Medicine Physician

## 2019-05-02 NOTE — RESULT ENCOUNTER NOTE
Left message to call back: kidney function is still decreased, likely partially due to dehydration.  With difficulty getting enough thickened liquids in, recommend considering tube feeding to assist with hydration and nutritional needs.  Please check to see Danuta got the message and what times would be good to call her back tomorrow.  Before 8 AM works good for me or during the noon hour.  I will not be able to call in the late afternoon.    Thanks Bharath

## 2019-05-07 NOTE — TELEPHONE ENCOUNTER
Reason for call:  Other   Patient called regarding (reason for call): call back  Additional comments: The patients wife called and stated that she would like a call back to discuss information that her husbands neurologist gave her. She would like a call back to discuss this.     Phone number to reach patient:  Home number on file [phone] (home)    Best Time: Before 12:00 pm    Can we leave a detailed message on this number?  YES

## 2019-05-07 NOTE — TELEPHONE ENCOUNTER
Dr. Chanel Tipton spoke with Dr. Valverde nurse today and she stated that it may make sense for you to reach out to Dr. Valverde, [place]   Could you call him to collaborate [facility], [phone]  Regarding 2 recent hospitalizations, dehydration, kidney function, plan for ongoing cares needed    She also was wondering about whether  Dr. Kendall, [place]  should also be included, they had an appointment scheduled yesterday but they had to cancel    Also Danuta is wanting the appointment set up to discuss things with you, her son and [place] sister and herself, she is wanting this soon    Joana Gandhi RN   [facility]

## 2019-05-08 PROBLEM — S62.323A CLOSED DISPLACED FRACTURE OF SHAFT OF THIRD METACARPAL BONE OF LEFT HAND, INITIAL ENCOUNTER: Status: RESOLVED | Noted: 2018-07-27 | Resolved: 2019-01-01

## 2019-05-08 PROBLEM — J96.90 RESPIRATORY FAILURE (H): Status: RESOLVED | Noted: 2019-01-01 | Resolved: 2019-01-01

## 2019-05-08 NOTE — TELEPHONE ENCOUNTER
Dr. Buchanan    Spoke with nurse Adia  Wife is requesting the evening med to 7p from 8p as she wants the meds given before she leaves    I did give them the approval, but if you don't agree will return call to them.    Joana Gandhi RN   [facility]

## 2019-05-08 NOTE — TELEPHONE ENCOUNTER
Called patient's spouse. Notified her of appt time switched to 05/10 at 11 am. Caren verbalized understanding. She stated that she wants to keep the appt at this time for 05/22 because her son isn't available this Friday. If not needed after outcome of Friday's appt, they will cancel.    Lita Mckeon RN  [facility]

## 2019-05-08 NOTE — TELEPHONE ENCOUNTER
[facility] is requesting an okay to change pt's evening medication time.    Please call [phone]

## 2019-05-08 NOTE — PROGRESS NOTES
SUBJECTIVE:   Sanjay Cano is a 73 year old male who presents to clinic today with his wife for the following   health issues:    Having issues with where he lives giving him his medications on time, and how they are being given.    Hospital Follow-up Visit:    Hospital/Nursing Home/ Rehab Facility: [facility], Internal Medicine  Date of Admission: 05/01/2019  Date of Discharge: 05/03/2019  Reason(s) for Admission: Aspiration pneumonia, unspecified aspiration pneumonia type, unspecified laterality, unspecified part of lung (**)    Dementia due to Parkinson's disease without behavioral disturbance (**)             Problems taking medications regularly:  Yes,        Medication changes since discharge: Yes,        Problems adhering to non-medication therapy:  None    Summary of hospitalization:  [facility] discharge summary reviewed  Diagnostic Tests/Treatments reviewed.  Follow up needed: none  Other Healthcare Providers Involved in Patient s Care:         called Dr Mathur  Update since discharge: improved.     Post Discharge Medication Reconciliation: discharge medications reconciled, continue medications without change.  Plan of care communicated with patient and family     Coding guidelines for this visit:  Type of Medical   Decision Making Face-to-Face Visit       within 7 Days of discharge Face-to-Face Visit        within 14 days of discharge   Moderate Complexity [id number]   High Complexity [id number]          Patient's wife believes that some of his health problems like recurrent pneumonia are caused by poor care at his living facility. His wife states that he has had trouble getting down thickened liquids. He has refused his nebulizer treatments at times. Patient's wife states that she is worried the living facility has been giving him food that will exacerbate his problems with dysphagia.        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)     Primary insomnia     Other emphysema (H)     Pulmonary nodules     Thyroid nodule     Family history of thyroid cancer     H/O recurrent urinary tract infection     Essential hypertension with goal blood pressure less than 140/90     Senile purpura (H)     Nocturnal cough     Parkinson's disease (H)     Closed displaced fracture of shaft of third metacarpal bone of left hand, initial encounter     Respiratory failure (H)     Past Surgical History:   Procedure Laterality Date     EYE SURGERY       ORTHOPEDIC SURGERY       PHACOEMULSIFICATION CLEAR CORNEA WITH STANDARD INTRAOCULAR LENS IMPLANT  5/21/2014    Procedure: PHACOEMULSIFICATION CLEAR CORNEA WITH STANDARD INTRAOCULAR LENS IMPLANT;  Surgeon: Tomy Hunter MD;  Location: [facility]     PHACOEMULSIFICATION CLEAR CORNEA WITH TORIC INTRAOCULAR LENS IMPLANT  4/30/2014    Procedure: PHACOEMULSIFICATION CLEAR CORNEA WITH TORIC INTRAOCULAR LENS IMPLANT;  Surgeon: Tomy Hunter MD;  Location: [facility]       Social History     Tobacco Use     Smoking status: Former Smoker     Packs/day: 0.01     Years: 40.00     Pack years: 0.40     Types: Cigarettes     Last attempt to quit: 7/31/2008     Years since quitting: 10.7     Smokeless tobacco: Never Used     Tobacco comment: very passive cigarettes in 6 months   Substance Use Topics     Alcohol use: No     Alcohol/week: 0.0 oz     Family History   Problem Relation Age of Onset     Cerebrovascular Disease Mother      Diabetes Mother      Gastrointestinal Disease Mother       Hypertension Mother      Neurologic Disorder Father      Cerebrovascular Disease Father      Cerebrovascular Disease Maternal Grandfather      Cancer Paternal Grandmother      Other - See Comments Sister         gi - unknown         Current Outpatient Medications   Medication Sig Dispense Refill     amLODIPine (NORVASC) 1 mg/mL SUSP Take 10 mLs (10 mg) by mouth At Bedtime       aspirin 81 MG tablet Take by mouth twice a week       carbidopa-levodopa (SINEMET CR)  MG per CR tablet Take 1 tablet by mouth At Bedtime 1 tablet 0     carbidopa-levodopa (SINEMET)  MG per tablet Take 1-2 tablets by mouth 5 times daily Takes 2 tablets at 6 AM, 1.5 tablets at 9 AM, 2 tablets at noon, 1.5 tablets at 3 PM, 1.5 tablets at 6 PM. Can take additional 0.5 tablet in the middle of the night if needed. 90 tablet      cefpodoxime (VANTIN) 200 MG tablet Take 200 mg by mouth       cholecalciferol (VITAMIN D) 1000 UNIT tablet Take 1 tablet (1,000 Units) by mouth every morning 90 tablet 1     COMBIVENT RESPIMAT  MCG/ACT inhaler INHALE 1 PUFF BY MOUTH FOUR TIMES DAILY. NOT TO EXCEED 6 DOSES PER DAY 4 g 4     donepezil (ARICEPT) 10 MG tablet Take 1 tablet (10 mg) by mouth At Bedtime 30 tablet 11     donepezil (ARICEPT) 5 MG tablet Take 1 tablet (5 mg) by mouth every morning 30 tablet 11     doxycycline hyclate (VIBRA-TABS) 100 MG tablet Take 100 mg by mouth       FLOVENT  MCG/ACT Inhaler INHALE 2 PUFFS INTO THE LUNGS TWICE DAILY 36 g 1     guaiFENesin (ROBITUSSIN) 20 mg/mL SOLN solution Take 10 mLs by mouth every 4 hours as needed for cough 1200 mL 0     guaiFENesin-codeine (ROBITUSSIN AC) 100-10 MG/5ML solution Take 5-10 mLs by mouth every 4 hours as needed for cough 120 mL 0     hydrocortisone (WESTCORT) 0.2 % external cream Apply sparingly to affected area once a week after patient's bath. 45 g 1     ipratropium - albuterol 0.5 mg/2.5 mg/3 mL (DUONEB) 0.5-2.5 (3) MG/3ML neb solution Take 1 vial (3 mLs) by  nebulization 2 times daily as needed for shortness of breath / dyspnea or wheezing 10 vial 11     Ipratropium-Albuterol (COMBIVENT RESPIMAT)  MCG/ACT inhaler Inhale 1 puff into the lungs 4 times daily May take 1 to 2 additional doses as needed during the day 1 Inhaler 4     lactase (LACTAID) 3000 UNIT tablet Take 2 tablets (6,000 Units) by mouth 3 times daily (with meals) 540 tablet 3     lactobacillus rhamnosus, GG, (CULTURELL) capsule Take 1 capsule by mouth every morning 60 capsule 11     lisinopril (PRINIVIL/ZESTRIL) 10 MG tablet Take 1 tablet (10 mg) by mouth daily 1 tablet 0     methylcellulose (CITRUCEL) 500 MG TABS tablet Take 1 tablet (500 mg) by mouth daily as needed 14 each 0     mirtazapine (REMERON) 15 MG tablet TAKE 1.5 TABLETs (22.5 MG) BY MOUTH AT BEDTIME 135 tablet 1     QUEtiapine (SEROQUEL) 50 MG tablet Take 0.5 tablets (25 mg) by mouth nightly as needed (agitation) 180 tablet 0     ranitidine (ZANTAC) 75 MG tablet Take 1 tablet (75 mg) by mouth At Bedtime 30 tablet      venlafaxine (EFFEXOR-XR) 37.5 MG 24 hr capsule Take 37.5 mg by mouth daily       venlafaxine (EFFEXOR-XR) 75 MG 24 hr capsule Take 75 mg by mouth daily       Allergies   Allergen Reactions     Azithromycin      Erythromycin Other (See Comments)     Pathological fractures     Levaquin [Levofloxacin]      Fracture prevention     Seasonal Allergies      BP Readings from Last 3 Encounters:   05/08/19 126/76   04/23/19 144/87   04/18/19 130/82    Wt Readings from Last 3 Encounters:   04/22/19 82.6 kg (182 lb 1.6 oz)   11/07/18 79.8 kg (176 lb)   08/20/18 78 kg (172 lb)                  Labs reviewed in EPIC    ROS:  Remainder of ROS is negative unless otherwise noted above or in HPI.    This document serves as a record of the services and decisions personally performed and made by Bharath Buchanan MD. It was created on his behalf by Hosea Rome, trained medical scribe. The creation of this document is based on the provider's  "statements to the medical scribe.  Hosea Rome 11:43 AM May 8, 2019    OBJECTIVE:     /76   Pulse 60   Temp 97.3  F (36.3  C) (Oral)   Ht 1.88 m (6' 2\")   SpO2 98%   BMI 23.38 kg/m    Body mass index is 23.38 kg/m .  GENERAL: healthy, alert and no distress  RESP: faint rales at the left base, no rhonchi or wheezing  CV: regular rate and rhythm, normal S1 S2, no S3 or S4, no murmur, click or rub, no peripheral edema and peripheral pulses strong    Diagnostic Test Results:  none     ASSESSMENT/PLAN:   (G20) Parkinson's disease (H)  (primary encounter diagnosis)  Comment: Slowly worsening, possible end stage. Hard to keep hydrated.  Plan: Monitoring. Follow up as needed.    (J18.9) Recurrent pneumonia  Comment: Patient has been improving; still an aspiration risk. Thickened liquids not worth patient getting dehydrated.   Plan: Discussed with patient's wife various ways to improve his care at his living facility. Follow up as needed.      Patient Instructions   Discontinue lactase tablets and thickened liquids, OK to use thin liquids, including giving patient 8 oz liquid (including water) twice daily between meals. Please try chopped up 'dysphagia 3 type' diet, not pureed. Call or fax if need to submit as orders.   Thanks Bharath Buchanan MD     40 minutes were spent with the patient with more than 50% of time spent in counseling and coordination of care, regarding above assessment and plan.       The information in this document, created by the medical scribe for me, accurately reflects the services I personally performed and the decisions made by me. I have reviewed and approved this document for accuracy prior to leaving the patient care area.  May 8, 2019 11:44 AM    Bharath Buchanan MD  [facility]        "

## 2019-05-08 NOTE — TELEPHONE ENCOUNTER
Called patient's spouse. LVM to call clinic and ask to speak to a nurse. Pt was scheduled in appt for 05/10/19 at 11:00 am. Does spouse want to cancel appt for 05/22? Writer did not cancel it because unsure what family would like.    Lita Mckeon RN  [facility]

## 2019-05-08 NOTE — TELEPHONE ENCOUNTER
Dr. Buchanan said that he would like to have an hour long meeting with Sanjay Cano's family soon. However, Dr. Buchanan does not have any 1 hour openings until May 22nd which his wife is saying is to far out and they have to seen Dr. Buchanan sooner than that. They are wondering what they can do so that they can get this meeting done soon.

## 2019-05-08 NOTE — TELEPHONE ENCOUNTER
Dr. Buchanan,  Please see phone message below - you do not have any hour long opening until 05/22/2019    Writer notes same day opening on 05/10/2019 at 11:00 am. Your scheduled is blocked off from 11:30-12:00 for a meeting after that    Please advise    Thank You!  Ibeth Saba, GERARD  Triage Nurse

## 2019-05-10 NOTE — TELEPHONE ENCOUNTER
Left VM on [place] provider line, requesting return call from Dr. Carrington. [phone]    Requested he ask to connect with a triage nurse and we will get Dr. Buchanan on the phone    Joana Gandhi RN   [facility]

## 2019-05-10 NOTE — PROGRESS NOTES
SUBJECTIVE:   Sanjay Cano is a 73 year old male who presents to clinic today for the following   health issues:      Concern - Weight loss/Discuss plan of care and recent hospital admissions    Patient's wife and sister present today to discuss patient's general care and possible end of life circumstances. His wife is generally concerned about the care he is getting at his living facility. She is wondering if a feeding tube could be necessary for him.    His sister is concerned that his condition has been declining rapidly since he entered the living facility approximately 1.5 years ago. She is wondering if he could be cared for at home or if hospice care could be an option.        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)     Primary insomnia     Other emphysema (H)     Pulmonary nodules     Thyroid nodule     Family history of thyroid cancer     H/O recurrent urinary tract infection     Essential hypertension with goal blood pressure less than 140/90     Senile purpura (H)     Parkinson's disease (H)     Past Surgical History:   Procedure Laterality Date     EYE SURGERY       ORTHOPEDIC SURGERY       PHACOEMULSIFICATION CLEAR CORNEA WITH STANDARD INTRAOCULAR LENS IMPLANT  5/21/2014    Procedure: PHACOEMULSIFICATION CLEAR CORNEA WITH STANDARD INTRAOCULAR LENS IMPLANT;  Surgeon: Tomy Hunter MD;  Location: [facility]     PHACOEMULSIFICATION CLEAR CORNEA WITH TORIC INTRAOCULAR LENS IMPLANT  4/30/2014    Procedure: PHACOEMULSIFICATION CLEAR  CORNEA WITH TORIC INTRAOCULAR LENS IMPLANT;  Surgeon: Tomy Hunter MD;  Location: [facility]       Social History     Tobacco Use     Smoking status: Former Smoker     Packs/day: 0.01     Years: 40.00     Pack years: 0.40     Types: Cigarettes     Last attempt to quit: 7/31/2008     Years since quitting: 10.7     Smokeless tobacco: Never Used     Tobacco comment: very passive cigarettes in 6 months   Substance Use Topics     Alcohol use: No     Alcohol/week: 0.0 oz     Family History   Problem Relation Age of Onset     Cerebrovascular Disease Mother      Diabetes Mother      Gastrointestinal Disease Mother      Hypertension Mother      Neurologic Disorder Father      Cerebrovascular Disease Father      Cerebrovascular Disease Maternal Grandfather      Cancer Paternal Grandmother      Other - See Comments Sister         gi - unknown         Current Outpatient Medications   Medication Sig Dispense Refill     amLODIPine (NORVASC) 1 mg/mL SUSP Take 10 mLs (10 mg) by mouth At Bedtime       aspirin 81 MG tablet Take by mouth twice a week       carbidopa-levodopa (SINEMET CR)  MG per CR tablet Take 1 tablet by mouth At Bedtime 1 tablet 0     carbidopa-levodopa (SINEMET)  MG per tablet Take 1-2 tablets by mouth 5 times daily Takes 2 tablets at 6 AM, 1.5 tablets at 9 AM, 2 tablets at noon, 1.5 tablets at 3 PM, 1.5 tablets at 6 PM. Can take additional 0.5 tablet in the middle of the night if needed. 90 tablet      cholecalciferol (VITAMIN D) 1000 UNIT tablet Take 1 tablet (1,000 Units) by mouth every morning 90 tablet 1     COMBIVENT RESPIMAT  MCG/ACT inhaler INHALE 1 PUFF BY MOUTH FOUR TIMES DAILY. NOT TO EXCEED 6 DOSES PER DAY 4 g 4     donepezil (ARICEPT) 10 MG tablet Take 1 tablet (10 mg) by mouth At Bedtime 30 tablet 11     donepezil (ARICEPT) 5 MG tablet Take 1 tablet (5 mg) by mouth every morning 30 tablet 11     FLOVENT  MCG/ACT Inhaler INHALE 2 PUFFS INTO THE LUNGS TWICE DAILY 36 g 1      guaiFENesin (ROBITUSSIN) 20 mg/mL SOLN solution Take 10 mLs by mouth every 4 hours as needed for cough 1200 mL 0     guaiFENesin-codeine (ROBITUSSIN AC) 100-10 MG/5ML solution Take 5-10 mLs by mouth every 4 hours as needed for cough 120 mL 0     hydrocortisone (WESTCORT) 0.2 % external cream Apply sparingly to affected area once a week after patient's bath. 45 g 1     ipratropium - albuterol 0.5 mg/2.5 mg/3 mL (DUONEB) 0.5-2.5 (3) MG/3ML neb solution Take 1 vial (3 mLs) by nebulization 2 times daily as needed for shortness of breath / dyspnea or wheezing 10 vial 11     Ipratropium-Albuterol (COMBIVENT RESPIMAT)  MCG/ACT inhaler Inhale 1 puff into the lungs 4 times daily May take 1 to 2 additional doses as needed during the day 1 Inhaler 4     lactobacillus rhamnosus, GG, (CULTURELL) capsule Take 1 capsule by mouth every morning 60 capsule 11     lisinopril (PRINIVIL/ZESTRIL) 10 MG tablet Take 1 tablet (10 mg) by mouth daily 1 tablet 0     methylcellulose (CITRUCEL) 500 MG TABS tablet Take 1 tablet (500 mg) by mouth daily as needed 14 each 0     mirtazapine (REMERON) 15 MG tablet TAKE 1.5 TABLETs (22.5 MG) BY MOUTH AT BEDTIME 135 tablet 1     QUEtiapine (SEROQUEL) 50 MG tablet Take 0.5 tablets (25 mg) by mouth nightly as needed (agitation) 180 tablet 0     ranitidine (ZANTAC) 75 MG tablet Take 1 tablet (75 mg) by mouth At Bedtime 30 tablet      venlafaxine (EFFEXOR-XR) 37.5 MG 24 hr capsule Take 37.5 mg by mouth daily       venlafaxine (EFFEXOR-XR) 75 MG 24 hr capsule Take 75 mg by mouth daily       Allergies   Allergen Reactions     Azithromycin      Erythromycin Other (See Comments)     Pathological fractures     Levaquin [Levofloxacin]      Fracture prevention     Seasonal Allergies      BP Readings from Last 3 Encounters:   05/08/19 126/76   04/23/19 144/87   04/18/19 130/82    Wt Readings from Last 3 Encounters:   04/22/19 82.6 kg (182 lb 1.6 oz)   11/07/18 79.8 kg (176 lb)   08/20/18 78 kg (172 lb)                   Labs reviewed in EPIC    ROS:  Denies headache, insomnia, chest pain, shortness of breath, cough, heartburn, bowel issues, bladder issues, neck pain, back pain, hip pain, knee pain, ankle pain, or foot pain. Remainder of ROS is negative unless otherwise noted above or in HPI.    This document serves as a record of the services and decisions personally performed and made by Bharath Buchanan MD. It was created on his behalf by fabby Alarcon medical scribe. The creation of this document is based on the provider's statements to the medical scribe.  Hosea Rome 10:57 AM May 10, 2019    OBJECTIVE:     There were no vitals taken for this visit.  There is no height or weight on file to calculate BMI.   Patient not attending conf.     ASSESSMENT/PLAN:   (G20) Parkinson's disease (H)  (primary encounter diagnosis)  Comment: End stage.  Plan: HOME CARE NURSING REFERRAL        Discussed possible end of life circumstances with family.    (J18.9) Recurrent pneumonia  Comment: Patient is at constant risk of aspirating.  Plan: HOME CARE NURSING REFERRAL        Referral placed for hospice care.      Patient Instructions   Return to clinic for any new or worsening symptoms or go to ER Urgent care in off hours     45 minutes were spent with the patient with more than 50% of time spent in counseling and coordination of care, regarding above assessment and plan.       This document serves as a record of the services and decisions personally performed and made by Bharath Buchanan MD. It was created on his behalf by fabby Alarcon medical scribrené. The creation of this document is based on the provider's statements to the medical scribe.  Hosea Rome 1:18 PM May 10, 2019    Bharath Buchanan MD  [facility] [place] [facility]

## 2019-05-10 NOTE — TELEPHONE ENCOUNTER
Recived [facility] message from front end staff that Dr. Carrington was returning call to clinic    Pulled Dr. Buchanan out of room to speak with him. Front end transferred call to Dr. Chanel Saba, RN  Triage Nurse

## 2019-05-13 NOTE — LETTER
[address] [address]  Phone: [phone]  Fax: [phone]    19    Sanjay Cano  : 1945  [address] [facility] [address]      To Whom It May Concern:  Attn Nursing:      Please discontinue the medication:  guaiFENesin (ROBITUSSIN) 20 mg/mL SOLN solution; Take 10 mLs by mouth every 4 hours as needed for cough - Oral      Sincerely,          Bharath Buchanan MD

## 2019-05-13 NOTE — LETTER
[address] [address]  Phone: [phone]  Fax: [phone]    19    Sanjay Cano  [address] [facility] [address]      To Whom It May Concern:       I am writing with the following recommendations and orders for Sanjay Cano (: 1945)   -Continue therapies at [facility] for PT, OT, and ST.   -Continue to give nebulizer (per your current orders) for two weeks  -May use incentive spirometer as needed.        Sincerely,          Bharath Buchanan MD

## 2019-05-13 NOTE — TELEPHONE ENCOUNTER
Dr. Buchanan,  Please see phone message below    Patient's wife is wonderin. Should patient  begin PT OT and ST at [facility] or is he strong enough to begin at [facility] [place]?    2. Can patient continue to use neb and how long should it be used for?    3. Should patient be using incentive spirometer?    Caren also states she has contacted hospice.    Please advise    Thank You!  Ibeth Saba RN  Triage Nurse

## 2019-05-13 NOTE — TELEPHONE ENCOUNTER
Reason for call:  Other   Patient called regarding (reason for call): call back  Additional comments:   Caren is inquiring if Sanjay should begin PT OT and ST at [facility] or is he strong enough to begin at [facility] [place]?  Caren is inquiring if he can continue to nebulizer and for how long.  Caren is inquiring if he should continue to use the incentive spirometer.   Caren wanted to let Dr. Buchanan know that she has contacted hospice.    Phone number to reach patient:  Cell number on file:    Home phone: [phone]       Best Time:  any    Can we leave a detailed message on this number?  YES     Patient requested the answers to these inquires be left on her voicemail as she will be out until late this evening.

## 2019-05-15 NOTE — TELEPHONE ENCOUNTER
They have set up friday consult with hospice per Carolyn Gandhi, GERARD   [facility]

## 2019-05-15 NOTE — TELEPHONE ENCOUNTER
Left VM for yolie to return call to clinic    Per Dr. Buchanan  He will sign orders,   hospice is appropriate    Joana Gandhi RN   [facility]

## 2019-05-15 NOTE — TELEPHONE ENCOUNTER
Recommend [facility] for therapies, continue nebs x 2 weeks, may use incentive spirometer as needed.   Please notify, thanks Bharath

## 2019-05-15 NOTE — TELEPHONE ENCOUNTER
[facility] is meeting to discuss hospice  With Wife. They want to know if dr hess think hospice is appropriate for pt? Will Dr Hess sign orders for hospice care?    Please call Carolyn @ [phone] soheila

## 2019-05-16 NOTE — TELEPHONE ENCOUNTER
Dr. Buchanan,    Spoke with pt's spouse, Caren. She is a little nervous and scared because nurse asked about giving pt cough medicine with guaifenesin. She thought that this was to be stopped. Writer referred to telephone encounter 05/01/19 where note states to stop robitussin AC. Explained to pt that this is a different medication/formulation from the robitussin AC.     Do you want the plain guaifenesin stopped as well?    -Per Caren, sister-in-law is fearful that they are slipping to something that is making him tired, but don't know how this would be known or traced.   -Caren states that the 2 8 oz glasses of water that are to be given between meals aren't being given. She states that Dr. Buchanan writes these orders on a slip that she brings with to OV.  -Caren verbalized understanding for Dr. Buchanan's recommendations regarding therapy, nebulizer, and IS. She requested that we call [facility] with these instructions. She also stated that the nebulizers are to be given for 15 minutes. One nurse doesn't agree and give for 7 minutes, another nurse says it is 10-15 minutes. She states that they won't listen to her.   - Caren requested AVS from 05/01/18 and 05/08/19. These were printed and mailed to her  -Caren had questions regarding the medications that pt is being given and the medication schedule. Advised to talk with nurse manager, she already has appt set. Pt requesting copy of our medication list. Copy printed and mailed to her  -Called [facility], spoke with Manoj, he verbalized understanding for orders and requested that a letter be faxed to them.    Lita Mckeon RN  [facility]

## 2019-05-17 NOTE — TELEPHONE ENCOUNTER
Called [facility], spoke with nurse, Daniel. Notified of medication to be discontinued. They requested a fax order. Notified them that we do not have order sheets, but can fax a letter. He stated that this would be sufficient. Letter faxed.    Lita Mckeon RN  [facility]

## 2019-05-22 NOTE — TELEPHONE ENCOUNTER
Pt is requesting to know if Dr Buchanan has the answer to her questions from a week ago.    Please call [phone] soheila

## 2019-05-22 NOTE — TELEPHONE ENCOUNTER
Dr. Buchanan,    Pt was to continue with nebulizer for two weeks from 05/15. Caren is wondering if pt should continue with nebulizer PRN after the two weeks are up. Please advise.    Called patient. Spouse is wondering if pt should use IS. Notified her of message from Dr. Buchanan from TE 05/13/19 and that letter with orders was faxed to [facility]. Caren requested a copy of the letter. Copy mailed to her.    Per Caren, Sanjay did qualify for hospice, but he isn't ready. She states that if he were to go on hospice, he would not be able to continue with PT/OT/ST. She would like him to continue therapy.    Lita Mckeon RN  [facility]

## 2019-05-23 NOTE — TELEPHONE ENCOUNTER
Dr. Buchanan;   Spoke with patient's wife. Patient's wife is wondering when the nebulizer should be discontinued? She would like to know if the medication can be continued long term. She feels it helps with the patient's symptoms    Patient has orders right now for nebulizer PRN for two weeks (05/16/2019-05/30/2019)    Patient is using neb twice a day. Caren feels the neb helps    Please advise    Thank You!  Ibeth Saba, RN  Triage Nurse

## 2019-05-24 NOTE — TELEPHONE ENCOUNTER
Letter signed,  from our clinic or [facility], or other? What is CC?  Order/ signed, please notify, thanks Bharath

## 2019-05-24 NOTE — TELEPHONE ENCOUNTER
Dr. Buchanan,  Returned call to patient's wife, Caren, states she [place] [facility] Caren states she thinks patient is coming down with a cold. Caren reports cough, wheezing and runny nose. Patient is not coughing anything up. The staff around patient has been sick.    1. Caren is wondering if patient should be given guaiFENesin (ROBITUSSIN) 20 mg/mL SOLN solution to help with cough  - Medication cued    Caren also reports that the patient has been participating in therapies and is doing well. Caren states the patient is more tired in the evenings. Reassured Caren that patient is doing therapies and is more active than baseline and this is normal.     Caren also reports cuts on patient's legs. She is unsure where they came from. They resolved after 10-days.     Writer also cued letter for [facility] to update on plan of care - please review/sign or advise    Should SW/CC be involved with patient's care?    Thank You!  Ibeth Saba, RN  Triage Nurse

## 2019-05-24 NOTE — TELEPHONE ENCOUNTER
Agree, recommend duoneb as a better choice than albuterol alone. Order signed, please notify, thanks Bharath

## 2019-05-24 NOTE — TELEPHONE ENCOUNTER
Attempted to contact Caren on cellphone, unable to leave voicemail    Called home phone, left voicemail informing Caren that prescription has been ordered by Dr. Buchanan. Advised return call to clinic with questions or concerns    Ibeth Saba, GERARD  Triage Nurse

## 2019-05-24 NOTE — TELEPHONE ENCOUNTER
Reason for call:  Other   Patient called regarding (reason for call): call back  Additional comments:   Patients wife called and is concerned that  is developing a cold. She mentioned the staff taking care of him all had colds and now he is stating to have a cough. Wife is requesting a call back to discuss what medication she can give him    Phone number to reach patient:  [phone]      Best Time:  Any     Can we leave a detailed message on this number?  YES

## 2019-05-24 NOTE — LETTER
May 24th, 2019      To Whom It May Concern:        I am writing in regards to my patient Sanjay Cano (: 1945) with updated orders.     1. Discontinue albuterol nebulizer treatments    2. Start ipratropium - albuterol 0.5 mg/2.5 mg/3 mL (DUONEB) 0.5-2.5 (3) MG/3ML neb solution  - Take 1 vial (3 mLs) by nebulization 2 times daily - Nebulization    3. Administer guaiFENesin (ROBITUSSIN) 20 mg/mL SOLN solution 10 mLs by mouth every 4 hours as needed for cough - Oral         For questions, please contact the clinic at the number listed above.        Sincerely              Dr. Bharath Buchanan

## 2019-05-24 NOTE — TELEPHONE ENCOUNTER
Called [facility], informed nursing staff of new orders, letter with updated order faxed to [facility] at [phone]    Called Caren to update her on plan, unable to leave voicemail, mailbox not set up    Ibeth Saba, RN  Triage Nurse

## 2019-05-28 NOTE — TELEPHONE ENCOUNTER
Patient's wife returning call from 05/24/2019. Informed patient's wife of updated plan (see 05/24/2019 letter). Caren states the patient has not been coughing as much this week. Caren is concerned that the staff has been sick near patient. Advised Caren of infection prevention techniques. Caren verbalized understanding.     Caren states patient has become more combative with staff. Patient will fall asleep during nebulizer treatments and be startled awake. Caren states they have an appointment with neurology to discuss these symptoms. She will update the clinic after meeting with neurology.    Ibeth Saba, RN  Triage Nurse

## 2019-05-30 NOTE — TELEPHONE ENCOUNTER
Received call from patient's wife. Patient's wife had questions regarding recent changes to nebulizer.     Writer reference letter from 05/16/2019; and 05/24/2019 to answer patient's wife's questions. Patient's wife verbalized understanding.     Patient's wife concerned that they missed follow up appointment. Appointment rescheduled 06/07/2019    Ibeth Saba, RN  Triage Nurse

## 2019-06-03 NOTE — TELEPHONE ENCOUNTER
Order for BMP was given to Manoj he is to send us the results    Message left as requested for wife home phone    Joana Gandhi RN   [facility]

## 2019-06-03 NOTE — TELEPHONE ENCOUNTER
Dr. Chanel Tipton was wondering if he is to be taking lisinopril,  it was not dcd at discharge from  5/3/19  But per Manoj the med had not been transcribed from the discontinue    Per Manoj at [facility], pt has not had the lisinopril since dc  BP readings have been 5/3/19 -123/75   5/4/19 154/90  133/70  5/5 - 104/72  5/9/ - 102/74  5/17/ - 107/70  5/24/19 - 116/76  5/31/10 -151/90, he was upset that day    It was increased from 5mg to 10mg 1/19/19    Leave detailed VM on her home number if unable to connect with her    Med cued    Joana Gandhi RN   [facility]

## 2019-06-03 NOTE — TELEPHONE ENCOUNTER
Manoj from [facility] is calling to check on status to see if pt should be taking lisinopril or not. Notified him that we will call as soon as we have a response from Dr. Buchanan.    Lita Mckeon RN  [facility]

## 2019-06-03 NOTE — TELEPHONE ENCOUNTER
It was stopped at last hospitalization due to decreased renal function. Will recheck BMP to reassess whether to restart and at what dose. Please notify nursing home obout blood draw and update spouse, thanks Bharath

## 2019-06-03 NOTE — TELEPHONE ENCOUNTER
Manoj from [facility] calling in regards to whether or not patient is still taking Lisinopril.     Please call him back [phone]

## 2019-06-07 NOTE — PROGRESS NOTES
Subjective     Sanjay Cano is a 73 year old male who presents to clinic today with his wife for the following health issues:    HPI   Medication Followup of Medications    Taking Medication as prescribed: unsure about Lisinopril and quetiapine    Side Effects:  unsure    Medication Helping Symptoms:       Patient's wife notes that his prescription for Lisinopril 10 mg had not been filled in some time, it was suggested that he undergo lab work to make sure his kidney function was normal before restarting this. His kidney function was found to be normal. She expresses that he has not been going to the bathroom very much recently. She has been trying to give him gatorade, and is wondering if she should be giving him something with that much sugar. Wife has been concerned recently about patient's coughing, she worries that the cough is productive and he has trouble spitting out the phlegm. She states that the nursing home has told her that patient is combative at times. She believes that the staff is too pushy with giving him water or his medications. Patient's wife states that she noticed two cuts on his left knee, she is unsure how this happened. He complained of pain in the knee, she rolled up his pants and saw crusted blood. The cuts have healed.         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)     Primary insomnia     Other emphysema (H)     Pulmonary nodules     Thyroid nodule     Family history of thyroid cancer     H/O recurrent urinary tract infection     Essential hypertension with goal blood pressure less than 140/90     Parkinson's disease (H)     Past Surgical History:   Procedure Laterality Date     EYE SURGERY        ORTHOPEDIC SURGERY       PHACOEMULSIFICATION CLEAR CORNEA WITH STANDARD INTRAOCULAR LENS IMPLANT  5/21/2014    Procedure: PHACOEMULSIFICATION CLEAR CORNEA WITH STANDARD INTRAOCULAR LENS IMPLANT;  Surgeon: Tomy Hunter MD;  Location: [facility]     PHACOEMULSIFICATION CLEAR CORNEA WITH TORIC INTRAOCULAR LENS IMPLANT  4/30/2014    Procedure: PHACOEMULSIFICATION CLEAR CORNEA WITH TORIC INTRAOCULAR LENS IMPLANT;  Surgeon: Tomy Hunter MD;  Location: [facility]       Social History     Tobacco Use     Smoking status: Former Smoker     Packs/day: 0.01     Years: 40.00     Pack years: 0.40     Types: Cigarettes     Last attempt to quit: 7/31/2008     Years since quitting: 10.8     Smokeless tobacco: Never Used     Tobacco comment: very passive cigarettes in 6 months   Substance Use Topics     Alcohol use: No     Alcohol/week: 0.0 oz     Family History   Problem Relation Age of Onset     Cerebrovascular Disease Mother      Diabetes Mother      Gastrointestinal Disease Mother      Hypertension Mother      Neurologic Disorder Father      Cerebrovascular Disease Father      Cerebrovascular Disease Maternal Grandfather      Cancer Paternal Grandmother      Other - See Comments Sister         gi - unknown         Current Outpatient Medications   Medication Sig Dispense Refill     amLODIPine (NORVASC) 1 mg/mL SUSP Take 10 mLs (10 mg) by mouth At Bedtime       aspirin 81 MG tablet Take by mouth twice a week       carbidopa-levodopa (SINEMET CR)  MG per CR tablet Take 1 tablet by mouth At Bedtime 1 tablet 0     carbidopa-levodopa (SINEMET)  MG per tablet Take 1-2 tablets by mouth 5 times daily Takes 2 tablets at 6 AM, 1.5 tablets at 9 AM, 2 tablets at noon, 1.5 tablets at 3 PM, 1.5 tablets at 6 PM. Can take additional 0.5 tablet in the middle of the night if needed. 90 tablet      cholecalciferol (VITAMIN D) 1000 UNIT tablet Take 1 tablet (1,000 Units) by mouth every morning 90 tablet 1     COMBIVENT RESPIMAT   MCG/ACT inhaler INHALE 1 PUFF BY MOUTH FOUR TIMES DAILY. NOT TO EXCEED 6 DOSES PER DAY 4 g 4     donepezil (ARICEPT) 10 MG tablet Take 1 tablet (10 mg) by mouth At Bedtime 30 tablet 11     donepezil (ARICEPT) 5 MG tablet Take 1 tablet (5 mg) by mouth every morning 30 tablet 11     FLOVENT  MCG/ACT Inhaler INHALE 2 PUFFS INTO THE LUNGS TWICE DAILY 36 g 1     guaiFENesin (ROBITUSSIN) 20 mg/mL SOLN solution Take 10 mLs by mouth every 4 hours as needed for cough 120 mL 3     hydrocortisone (WESTCORT) 0.2 % external cream Apply sparingly to affected area once a week after patient's bath. 45 g 1     ipratropium - albuterol 0.5 mg/2.5 mg/3 mL (DUONEB) 0.5-2.5 (3) MG/3ML neb solution Take 1 vial (3 mLs) by nebulization 2 times daily 10 vial 11     lactobacillus rhamnosus, GG, (CULTURELL) capsule Take 1 capsule by mouth every morning 60 capsule 11     methylcellulose (CITRUCEL) 500 MG TABS tablet Take 1 tablet (500 mg) by mouth daily as needed 14 each 0     mirtazapine (REMERON) 15 MG tablet TAKE 1.5 TABLETs (22.5 MG) BY MOUTH AT BEDTIME 135 tablet 1     ranitidine (ZANTAC) 75 MG tablet Take 1 tablet (75 mg) by mouth At Bedtime 30 tablet      venlafaxine (EFFEXOR-XR) 37.5 MG 24 hr capsule Take 37.5 mg by mouth daily       venlafaxine (EFFEXOR-XR) 75 MG 24 hr capsule Take 75 mg by mouth daily       Allergies   Allergen Reactions     Azithromycin      Erythromycin Other (See Comments)     Pathological fractures     Levaquin [Levofloxacin]      Fracture prevention     Seasonal Allergies      BP Readings from Last 3 Encounters:   06/07/19 114/78   05/08/19 126/76   04/23/19 144/87    Wt Readings from Last 3 Encounters:   04/22/19 82.6 kg (182 lb 1.6 oz)   11/07/18 79.8 kg (176 lb)   08/20/18 78 kg (172 lb)           Reviewed and updated as needed this visit by Provider  Problems         Review of Systems   Denies headache, insomnia, chest pain, shortness of breath, cough, heartburn, bowel issues, bladder issues, neck  pain, back pain, hip pain, knee pain, ankle pain, or foot pain. Remainder of ROS is negative unless otherwise noted above or in HPI.    This document serves as a record of the services and decisions personally performed and made by Bharath Buchanan MD. It was created on his behalf by Hosea Rome, trained medical scribe. The creation of this document is based on the provider's statements to the medical scribe.  Hosea Rome 10:06 AM June 7, 2019        Objective    /78   Pulse 66   Temp 98.4  F (36.9  C) (Temporal)   Resp 12   SpO2 97%   There is no height or weight on file to calculate BMI.  Physical Exam   GENERAL: healthy, alert and no distress  RESP: lungs clear to auscultation - no rales, rhonchi or wheezes  CV: regular rate and rhythm, normal S1 S2, no S3 or S4, no murmur, click or rub, no peripheral edema and peripheral pulses strong  MS: no gross musculoskeletal defects noted, no edema, patient is slightly tilted to the left with kyphosis  SKIN: longitudinal superficial skin wounds over the proximal left shin, three that are approximately 1-1.5 inches long, with slight scabbing on two of the three  NEURO: Normal strength and tone, mentation intact and speech normal  PSYCH: mentation appears normal, affect normal/bright    Diagnostic Test Results:  Labs reviewed in Epic  No results found for this or any previous visit (from the past 24 hour(s)).  Results for orders placed or performed in visit on 05/01/19   Comprehensive metabolic panel   Result Value Ref Range    Sodium 143 133 - 144 mmol/L    Potassium 4.8 3.4 - 5.3 mmol/L    Chloride 108 94 - 109 mmol/L    Carbon Dioxide 27 20 - 32 mmol/L    Anion Gap 8 3 - 14 mmol/L    Glucose 116 (H) 70 - 99 mg/dL    Urea Nitrogen 34 (H) 7 - 30 mg/dL    Creatinine 1.61 (H) 0.66 - 1.25 mg/dL    GFR Estimate 41 (L) >60 mL/min/[1.73_m2]    GFR Estimate If Black 48 (L) >60 mL/min/[1.73_m2]    Calcium 9.4 8.5 - 10.1 mg/dL    Bilirubin Total 0.6 0.2 - 1.3 mg/dL     Albumin 3.7 3.4 - 5.0 g/dL    Protein Total 7.5 6.8 - 8.8 g/dL    Alkaline Phosphatase 126 40 - 150 U/L    ALT 12 0 - 70 U/L    AST 14 0 - 45 U/L           Assessment & Plan   (G20) Parkinson's disease (H)  (primary encounter diagnosis)  Comment: Slowly worsening.  Plan: Monitoring. Follow up in 3 months.    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  Plan: Monitoring. Follow up as needed.    (J43.8) Other emphysema (H)  Comment: Probably benefitting from duonebs, will continue for the next 6 months twice daily.  Plan: Continue with Duonebs. Follow up as needed.    (I10) Essential hypertension with goal blood pressure less than 140/90  Comment: <140/90 at goal.  Plan: Discontinued Lisinopril. Follow up as needed.    (F05) Sundowning  Comment: More symptomatic recently.  Plan: Discussed with patient's wife how this is a possible side effect of his dementia. Follow up as needed.      Patient Instructions   Discontinue Lisinopril and Quetiapine.      40 minutes were spent by me face to face with the patient with more than 50% of time spent in counseling and coordination of care regarding above assessment and plan.     The information in this document, created by the medical scribe for me, accurately reflects the services I personally performed and the decisions made by me. I have reviewed and approved this document for accuracy prior to leaving the patient care area.  June 7, 2019 10:07 AM    Bharath Buchanan MD  [facility]

## 2019-06-10 NOTE — TELEPHONE ENCOUNTER
Spoke with Caren, relayed provider's answers below. Patient's wife verbalized understanding.     Ibeth Saba, RN  Triage Nurse

## 2019-06-10 NOTE — TELEPHONE ENCOUNTER
Caren called and has questions about medications Sanjay is taking:    She is wondering about the Asprin that is on the medication list that the nursing home is not giving him    She is wondering if giving the medications with a heading teaspoon of apple sauce can cause Sanjay to aspirate, because sometimes he coughs right after.    The care facility are combining the culturell with the carbidopa and she is wondering if they are okay to be taken at the same time    Her cell phone does not take messages please leave a detailed message on the home phone

## 2019-06-11 NOTE — TELEPHONE ENCOUNTER
Route to provider pool  Dr. Chanel Cheek from [facility]  Hearing wheezing today  Has occasional cough noted  No temp  BP today 177/53  02sat 91 at room air  Pt is alert and playful    They are requesting PRN order for duoneb as well as his BID order    Med cued    Joana Gandhi, RN   [facility]

## 2019-06-11 NOTE — TELEPHONE ENCOUNTER
Returned call to Adia with [facility], informed Adia that nebulizer order has been signed and we would like CXR completed. Advised Adia to have results of CXR faxed to our clinic. Adia verbalized understanding. CXR order and Neb order faxed to [phone].    Called Caren, updated Caren on current plan. Caren verbalized understanding    Ibeth Saba, RN  Triage Nurse

## 2019-06-11 NOTE — TELEPHONE ENCOUNTER
Reason for call:  Other   Patient called regarding (reason for call): call back  Additional comments: Manoj from [facility] called and stated that he just noticed today that the patient has been wheezing. He would like a call back to discuss this.     Phone number to reach patient:  Other phone number: [phone]    Best Time: Any    Can we leave a detailed message on this number?  YES

## 2019-06-11 NOTE — TELEPHONE ENCOUNTER
Significant other (Danuta) called regarding same concerns. Informed patient we will contact [facility] once we receive the order.

## 2019-06-11 NOTE — TELEPHONE ENCOUNTER
Dr. Buchanan/Provider Pool;   Spoke with patient's wife. Caren states the she was assisting the patient earlier today and she noticed him wheezing. Caren went to nurse station and got nurse manager. When nurse manager came into room, he could hear audible wheezing as well. Manager has notified the clinic.    They are requesting orders for open ended prescription for duo-nebs. They are also requesting PRN administration for episodes of wheezing.    Should patient also have CXR to check for aspiration pneumonia? CXR cued and PRN duo nebs cued - unsure of what frequency as patient is already getting scheduled nebs BID    Please advise    Thank You!  Ibeth Saba, RN  Triage Nurse

## 2019-06-11 NOTE — TELEPHONE ENCOUNTER
OK   Orders Placed This Encounter     ipratropium - albuterol 0.5 mg/2.5 mg/3 mL (DUONEB) 0.5-2.5 (3) MG/3ML neb solution     Sig: Take 1 vial (3 mLs) by nebulization 2 times daily     Dispense:  10 vial     Refill:  11

## 2019-06-12 NOTE — TELEPHONE ENCOUNTER
Reason for call:  Symptom   Symptom or request: bad cough    Duration (how long have symptoms been present): couple days  Have you been treated for this before? Yes    Additional comments: Pt had xray of chest, but was negative.  The cough is very severe.  Wife is worried that it will turn into pneumonia again and pt will end up in the ER like last time.  Not sure what to do. Wife was told that if the wheezing and coughing came back, that there is a way to treat it without going to the ER, but she isn't sure how quit the nursing home can treat him. Wife will stay with him until it has worked it's way out of his system.  Pt weighs 155 pounds now.  Has been taking thin liquids well, but aspirates  when taking big teaspoons of medications.    Phone number to reach patient:  Home number on file [phone] (home)    Best Time:  Home until 1 pm, but has cell phone on her.    Can we leave a detailed message on this number?  YES

## 2019-06-12 NOTE — TELEPHONE ENCOUNTER
Spoke with the patient's wife who reports that the neb treatments are helping his wheeze.  Would like to re-start the cough medication; assured her that there is a current script available for Robitussin.    Voiced other concerns about care at the LTC facility.  Encouraged her to talk with the RN manager about her concerns.  She voiced understanding and agreed to plan of care. Josefina Campos RN June 12, 2019 11:27 AM

## 2019-06-14 NOTE — TELEPHONE ENCOUNTER
Spoke with Danuta and let her know that it was a yest to all questions also let her know that I had spoken with Daniel at the [facility], instructed to give neb on fathers day at 8a and 12p before he leaves for the afternoon    Joana Gandhi RN   [facility]

## 2019-06-14 NOTE — TELEPHONE ENCOUNTER
Dr. Buchanan,  Spoke with patient's wife. Patient has been waking up from naps and has wheezing, he has been getting PRN nebulizer treatments around 2:00.  Patient's wife has a few questions.     1. Can patient take wife home for Father's Day? Patient would leave NH at 1 and return around 5:00    2. Caren is wondering if patient can have neb treatment at 8:00 and 12:00 to prevent afternoon wheezing. (Caren is worried these treatments are too close together)    3. If patient has episode of wheezing, can Caren use FLOVENT and COMBIVENT since she does not have access to Nebulizer treatment?    Triage: please call patient's cell first, if no response, leave message on home number    Please advise    Thank You!  Ibeth Saba, GERARD  Triage Nurse

## 2019-06-19 NOTE — TELEPHONE ENCOUNTER
Dr. Buchanan,    Please see phone message below from pt's spouse. Please advise.    Lita Mckeon RN  [facility]

## 2019-06-19 NOTE — TELEPHONE ENCOUNTER
Pt wife wants to know if it's okay for pt to be taking nebulizer and combivent at the same time. She doesn't think it's right.    Please call [phone]

## 2019-06-20 NOTE — TELEPHONE ENCOUNTER
See alternative TE dated 06/19/2019. Patient's spouse has been notified    Ibeth Saba, RN  Triage Nurse

## 2019-06-21 NOTE — TELEPHONE ENCOUNTER
Reason for call:  Other   Patient called regarding (reason for call): call back  Additional comments: The patients wife called and stated that he has been taking several medications for wheezing and the assisted living home is having a hard time keeping up with the doses. She is not sure what time timing between the doses should be. She stated she thinks he may be getting too much of the medication. She would like a call back to discuss this.     Phone number to reach patient:  Home number on file [phone] (home)    Best Time:  Before 1:00 if possible    Can we leave a detailed message on this number?  YES

## 2019-06-24 NOTE — TELEPHONE ENCOUNTER
Abran rodriguez is calling because [facility] nurse spoke with her regarding concern that they are dispensing nebulizer med to closely with the inhaler.    Please call Eileen @ [phone] cannot leave message

## 2019-06-24 NOTE — TELEPHONE ENCOUNTER
Recommend changing Duoneb from every 6 hours to twice daily like the combivent, that way staff can alternate giving one then the other. No benefit to giving neb medication more than 4 times during the 16 hours of the day; may cause side effects. Duo neb order adjusted. Please notify spouse and nursing home. Thanks Bharath

## 2019-06-24 NOTE — TELEPHONE ENCOUNTER
Called Caren's mobile number, unable to leave message, mailbox not set up    Called Caren's home number, left voicemail to return call to clinic    Called [facility], advised Adia that orders have been updated. Adia verbalized understanding. Orders faced to [facility] at [phone]    Ibeth Saba RN  Triage Nurse

## 2019-06-24 NOTE — TELEPHONE ENCOUNTER
Dr. Buchanan,    Called patient's spouse. She states that she is calling for questions regarding the patient's prescriptions/medication schedule for duoneb, combivent and flovent.     She was informed that the combivent and duoneb need to be  by 3-4 hours because they are the same/similar medications. She states that the nurses at the pt's facility said that it is all the same and they are having a hard time getting all of his medications in for the combivent and duonebs. Caren wondering if they can be a higher dosage or given two hours apart instead of 3-4.     Pt wheezing last week, but yesterday he was totally calm, wasn't upset and wasn't wheezing. She thinks that when he gets upset that this worsens his wheezing. She is wondering what to do.    Please see TE from 06/19/19 with medication dosing schedule and pt questions. Please advise.    Lita Mckeon RN  [facility]

## 2019-06-25 NOTE — TELEPHONE ENCOUNTER
Called pt's spouse. Notified her of provider message. She verbalized understanding. She states that she was concerned about him having too much of the neb/combivent. With the new schedule he will be getting one or the other approximately every 4 hours. Caren states that the nursing staff doesn't press the button correctly on the inhaler or they do not turn the tube correctly. Writer advised that she address these concerns with the nurse manager at the facility. Caren wondering what to do when pt starts coughing/wheezing. Writer advised to try the new medication schedule and call us if coughing/wheezing increases. Also recommended that if coughing/wheezing is persisting and not improving, then pt should be assessed by nursing staff at the facility, or she should schedule OV with Dr. Buchanan. She verbalized understanding.    Lita Mckeon RN  [facility]

## 2019-07-09 NOTE — TELEPHONE ENCOUNTER
Spoke with wife, gave her the message, she will contact the eye MD for other option    Joana Gandhi RN   [facility]

## 2019-07-09 NOTE — TELEPHONE ENCOUNTER
Pt wife called and has some questions about his medication.    Please give her a call back. Ok to lm if she does not answer

## 2019-07-09 NOTE — TELEPHONE ENCOUNTER
Route to provider pool  Dr Buchanan    Eye MD prescribed erythromycin 5mg/gm eye ointment for eye infection    Wife was wondering if it was ok for him to use, he has it listed as an allergy, pathologic fractures    Advise    Call wife on cell 1st, ok to leave vm on home number    Joana Gandhi RN   [facility]

## 2019-07-09 NOTE — TELEPHONE ENCOUNTER
Left message on answering machine for patient to call back.  I have  never heard of pathologic fracture on  erythromycin so I so not know what to say   as there are other eye medication options they should call their Opth and get a different medication

## 2019-07-19 NOTE — TELEPHONE ENCOUNTER
"I received call from Maria M at [facility]   patient with worsening \" respiratory difficulty/ wheezing\" not responding to medications   To ED as requested by family  "

## 2019-07-19 NOTE — TELEPHONE ENCOUNTER
Dr Buchanan    Per wife  Nebs are scheduled at 8a and 7p  yesterday had to get it at 2p and 630p as he sounded wheezy and tight  They called yesterday and if he gets worse they were to send him in to ED  Michelle isn't wanting to do that if possible  She stayed overnight she heard wheezing, they gave neb at 450a,   he is getting a neb now on his regular schedule plan  Per Danuta pt says he feels fine    [phone] Daniel nurse at [facility]  Per Daniel, past 2 days a bit more wheezy, sounds a bit more congested   appetite very good   No temp  He looks stable per Daniel  They have PRN o2 to use and they will use if o2 sats at lower than 95%    Call wife at  with plan    Advise, should he try using the guaifenesin on a scheduled basis this weekend    Joana Gandhi RN   [facility]

## 2019-07-19 NOTE — TELEPHONE ENCOUNTER
Order given to facility staff and also faxed to them    Attempted to contact pt wife on her  unable to leave vm  VM not set up  Called her on the home line and gave her the plan    Joana Gandhi RN   [facility] [place]

## 2019-07-19 NOTE — TELEPHONE ENCOUNTER
Wife called and stated that the nursing home said they will continue with the nebs as needed or until  calls and give them a different order. She also stated that the nursing home is waiting on a call from .

## 2019-07-22 NOTE — TELEPHONE ENCOUNTER
Patient's wife is calling with questions about instructions for guaiFENesin (ROBITUSSIN) 100 MG/5ML liquid. Patient's wife would like a call back at [phone]. Will be available tell 1pm.

## 2019-07-22 NOTE — TELEPHONE ENCOUNTER
Dr Buchanan    Congested cough continues, but better  Per wife nadja said they did not get the script for the guaifenesin so pt did not get it as much as it was ordered for the weekend    Also she stated they didn't give him his Parkinson meds as directed over the weekend  There are lots of staffing issues at the facility     Did you want them to continue to give guaifenesin on a regular schedule or return to PRN?    She is very frustrated, she has to be there all the time to make sure things are done correctly    Joana Gandhi RN   [facility]

## 2019-07-22 NOTE — TELEPHONE ENCOUNTER
Spoke with wife, she asked that I call and order a new supply from the pharmacy, she stated they were using someone elses supply    Joana Gandhi RN   [facility]

## 2019-08-06 NOTE — TELEPHONE ENCOUNTER
Pt's wife is requesting to change pt's bacitracin ointment to PRN, because issue resolved.    Please call Alisa [phone] soheila

## 2019-08-07 NOTE — TELEPHONE ENCOUNTER
Dr. Buchanan,    Received call from nurse Adia at [facility]. She states that the pt is having more wheezing per pt's spouse. Pt's spouse is requesting to increase his dose of ipratropium - albuterol 0.5 mg/2.5 mg/3 mL (DUONEB) 0.5-2.5 (3) MG/3ML neb solution.     They have been doing BID- wondering if Dr. Buchanan would like to increase TID? Please advise.    Call back # [phone], Adia.    Lita Mckeon RN  [facility]

## 2019-08-07 NOTE — TELEPHONE ENCOUNTER
Called nurse at [facility]. They verbalized understanding and will change bacitracin to PRN.    Lita Mckeon RN  [facility]

## 2019-08-08 NOTE — TELEPHONE ENCOUNTER
Called [facility] and was transferred to nurse, Ashtyn. Notified her of new order sent in for patient with new dosage. She verbalized understanding.    Lita Mckeon RN  [facility]

## 2019-08-15 PROBLEM — R06.2 WHEEZING: Status: ACTIVE | Noted: 2019-01-01

## 2019-08-15 PROBLEM — J44.1 COPD EXACERBATION (H): Status: ACTIVE | Noted: 2019-01-01

## 2019-08-15 NOTE — TELEPHONE ENCOUNTER
Adia, nurse, [facility], [phone], who says patient is full code.  She says patient is wheezing and panting.  She has nebbed him twice and is still symptomatic.  She says patient's vitals are ok but his face is turning gray.  FNA advised to send patient in and will notify on-call provider.  Caller verbalizes understanding.  FNA spoke to Laura Ayon at 7:30pm via page  who agrees patient should be sent in.      Reason for Disposition    Bluish (or gray) lips or face now    Additional Information    Negative: [1] Breathing stopped AND [2] hasn't returned    Negative: Choking on something    Negative: Severe difficulty breathing (e.g., struggling for each breath, speaks in single words)    Protocols used: BREATHING DIFFICULTY-A-AH

## 2019-08-15 NOTE — ED NOTES
General acute hospital, [place]   ED Nurse to Floor Handoff     Sanjay Cano is a 73 year old male who speaks English and lives with others,  in an assisted living  They arrived in the ED by ambulance from home    ED Chief Complaint: Cough    ED Dx;   Final diagnoses:   Pneumonia due to infectious organism, unspecified laterality, unspecified part of lung         Needed?: No    Allergies:   Allergies   Allergen Reactions     Azithromycin      Erythromycin Other (See Comments)     Pathological fractures     Levaquin [Levofloxacin]      Fracture prevention     Seasonal Allergies    .  Past Medical Hx:   Past Medical History:   Diagnosis Date     At risk for falling 9/3/2012     Family history of thyroid cancer 7/13/2016     Malignant neoplasm (H)     skin - nose     Moderate recurrent major depression (H) 5/17/2012     Rosacea       Baseline Mental status: WDL  Current Mental Status changes: at basesline    Infection present or suspected this encounter: yes respiratory  Sepsis suspected: No  Isolation type: Droplet - pending resp virus panel    Activity level - Baseline/Home:  Ax2 w/wheelchair  Activity Level - Current:   Ax2 w/wheelchair    Bariatric equipment needed?: No    In the ED these meds were given:   Medications   ampicillin-sulbactam (UNASYN) 3 g vial to attach to  mL bag (3 g Intravenous New Bag 8/14/19 6765)   ipratropium - albuterol 0.5 mg/2.5 mg/3 mL (DUONEB) neb solution 3 mL (3 mLs Nebulization Given 8/14/19 7905)   carbidopa-levodopa (SINEMET CR)  MG per CR tablet 1 tablet (1 tablet Oral Given 8/14/19 5670)       Drips running?  Yes- abx    Home pump  No    Current LDAs  Peripheral IV 08/14/19 Right Hand (Active)   Number of days: 1       Labs results:   Labs Ordered and Resulted from Time of ED Arrival Up to the Time of Departure from the ED   CBC WITH PLATELETS DIFFERENTIAL - Abnormal; Notable for the following components:       Result Value    RBC Count  3.96 (*)     Hemoglobin 12.3 (*)     Hematocrit 37.7 (*)     All other components within normal limits   BASIC METABOLIC PANEL - Abnormal; Notable for the following components:    Glucose 103 (*)     Urea Nitrogen 34 (*)     Creatinine 1.30 (*)     GFR Estimate 53 (*)     All other components within normal limits   ISTAT  GASES LACTATE KATHRYN POCT - Abnormal; Notable for the following components:    PO2 Venous 55 (*)     All other components within normal limits   NT PROBNP INPATIENT   PROCALCITONIN   TROPONIN I   ISTAT CG4 GASES LACTATE KATHRYN NURSING POCT       Imaging Studies:   Recent Results (from the past 24 hour(s))   XR Chest 2 Views    Impression    Impression:   Streaky bibasilar left greater than right opacities are slightly  increased from prior exam which may represent atelectasis versus  infection.       Recent vital signs:   BP (!) 149/97   Temp 98.2  F (36.8  C) (Oral)   Wt 85.3 kg (188 lb 1.6 oz)   SpO2 97%   BMI 24.15 kg/m      [place] Coma Scale Score: 15 (08/14/19 2130)       Cardiac Rhythm: Normal Sinus  Pt needs tele? No  Skin/wound Issues: None    Code Status: Full Code    Pain control: good    Nausea control: pt had none    Abnormal labs/tests/findings requiring intervention: see imaging    Family present during ED course? Yes   Family Comments/Social Situation comments: wife at bedside - very pleasant and attentive    Tasks needing completion: None    Andriy Blandon, RN  [phone] [facility]

## 2019-08-15 NOTE — PLAN OF CARE
Discharge Planner SLP   Patient plan for discharge: Unknown  Current status: Clinical swallow eval completed per MD order. Limited eval given pt's progressive agitation and confusion. Pt presents with mild, acute on chronic dysphagia in setting of Parkinson's disease with dementia and acute illness. Assessed with nectar-thick liquids and diced solids. Oral phase characterized by oral residue and anterior loss with nectar-thick liquids. Pharyngeal phase seemingly functional for all, however, cannot eliminate silent aspiration given pt's Parkinson's disease.     Multiple considerations must be made when determining appropriate diet recommendations. Per pt's wife, pt has been drinking thin liquids since May without acquiring pneumonia. Pt's wife also reports that pt does not like thickened liquids, and he would never want a feeding tube or breathing tube. Recommend continuation of current diet with ongoing ST intervention for pt/wife education and training for dysphagia management. Pt may benefit from a videoswallow study, but in his current state he would not likely cooperate enough to obtain meaningful data. When feeding pt, ensure pt is fully alert and upright for all PO, taking small bites/sips at slow rate. SLP to follow.     Barriers to return to prior living situation: Mental status, dysphagia, respiratory status  Recommendations for discharge: Ongoing ST targeting dysphagia  Rationale for recommendations: Pt will benefit from ongoing ST at next level of care targeting swallow function       Entered by: Zo You 08/15/2019 3:01 PM

## 2019-08-15 NOTE — ED PROVIDER NOTES
[place] EMERGENCY DEPARTMENT ([facility])  8/14/19  ED 32  History     Chief Complaint   Patient presents with     Cough     The history is provided by the patient and medical records.     Sanjay Cano is a 73 year old male with PMH notable for parkinsonism and dementia for which he is in a nursing home, HTN, pulmonary thyroid nodules, COPD with previous respiratory failure, HCAP versus aspiration pneumonia for which he was admitted this spring, brought in by family from SNF for worsening shortness of breath and wheezing.      Patient himself provides little of his own history.  Family reports that he usually goes to bed around 8:00 and some of his evening meds can make him quite sleepy.  They believe his sleepy appearance currently is at his normal baseline for this time of night.  The history the patient provides for himself is that he is not in pain, he does feel somewhat short of breath and he has had some nasal congestion and cough.  He denies any traumas or falls,  Denies chest pain or palpitations.  He thinks his inhalers/nebulizers at the SNF are at least somewhat helpful but generally feels as though it is getting worse.  He otherwise has no other history to provide himself at this time.      Patient has been full code past. Further collateral history provided by the patient's wife stating that the symptoms have been worsening over the last few weeks. Phone call from SNF to care providers here say that he was having worsening wheezing and almost panting with regards to his breathing.  They provided him two nebs but still did not think that he looked well or back to his baseline and so sent here for further evaluation and management.     Wife reports that shortness of breath has been worsening over the last few days, though has been somewhat worsening over the last number of weeks, just much worse over the last few days. He has ongoing symptoms related to his COPD.  Over the last few days he  has also had increasing need for his PRN DuoNeb (increased from his twice daily scheduled Duonebs.)  Patient's wife also reports that the patient had increasing nasal congestion and cold-type symptoms.  She notes that multiple care providers at his facility have also had cold-type symptoms.  He has had some sputum production that is white and thick.  This was also newer for him.      Wife reports she was also told to watch in case he coughs after eating and he has had that on multiple occasions as well.  She tries to be very proactive with regards to keeping him on his appropriate diet to prevent aspiration and she feeds him his meals multiple times during the week and all day on the weekends and states that he does well during those but she still witnessed some coughing after eating.  She is not sure how it goes during his other meals during the week.  She says she is with him in the SNF about 40 to 50 hours each week.  She has not noticed any lower extremity swelling or other swelling for him, no pascale cyanosis that she has appreciated.  No nausea, vomiting or change to bowel or bladder habits.  No other new symptoms or complaints at this time, please see ROS for further details, however may be limited by patient's ability to participate with history and ROS at this time.      This part of the document was transcribed by Perla Rosales, Medical Scribe.      I have reviewed the Medications, Allergies, Past Medical and Surgical History, and Social History in the Epic system.    Review of Systems   Constitutional: Positive for chills and fatigue. Negative for diaphoresis and fever.   HENT: Positive for congestion. Negative for sore throat, trouble swallowing and voice change.    Respiratory: Positive for cough, shortness of breath and wheezing.    Cardiovascular: Negative for chest pain, palpitations and leg swelling.   Gastrointestinal: Negative for abdominal pain, blood in stool, constipation, diarrhea, nausea and  vomiting.   Genitourinary: Negative for dysuria, frequency and hematuria.   Musculoskeletal: Positive for neck stiffness ( Baseline in the setting of his parkinsonism.  Patient/wife has been told his back/neck has and will continue to tighten somewhat, limiting his ability to rest his head backwards somewhat.  This is chronic however, and not new or changed in any way.). Negative for arthralgias, back pain and neck pain.   Skin: Negative for color change and rash.   Neurological: Positive for tremors (parkinsonism (baseline)). Negative for seizures, syncope, numbness and headaches.   Psychiatric/Behavioral: Negative for suicidal ideas.   All other systems reviewed and are negative.      Physical Exam   BP: (!) 149/87  Heart Rate: 66  Temp: 98.2  F (36.8  C)  Weight: 85.3 kg (188 lb 1.6 oz)  SpO2: 96 %      Physical Exam  CONSTITUTIONAL: Well-developed and well-nourished. Awake, but frequently falls asleep easily.  Awakes to voice.  Non-toxic appearance. No acute distress.   HENT:   - Head: Normocephalic and atraumatic.   - Ears: Hearing and external ear grossly normal.   - Nose: Nose normal. No rhinorrhea. No epistaxis.   - Mouth/Throat: MMM  EYES: Conjunctivae and lids are normal. No scleral icterus.   NECK: Phonation normal.  Patient has baseline neck stiffness with some tightness of the muscles limiting ROM.  This is consistent with his chronic nature in the setting of his parkinsonism, has already been evaluated by neurology in the past and thought to be related to the parkinsonism.  No acute appearing findings or meningeal findings.  No tracheal deviation, no stridor. No edema or erythema noted.  CARDIOVASCULAR: Normal rate, regular rhythm and no appreciable abnormal heart sounds.  PULMONARY/CHEST: Normal work of breathing. No accessory muscle usage or stridor. No respiratory distress, but does have audible wheezing even when sitting at the bedside.  On auscultation he has diffuse wheezing, some with  inspiration, worse with expiration with a prolonged expiratory phase.  No particular focality to the exam, no other coarse breath sounds or decreased breath sounds.  Despite the wheezing, still seems to be moving air fairly well.  ABDOMEN: Soft, non-distended. No tenderness. No rigidity, rebound or guarding.   MUSCULOSKELETAL: Extremities warm and seemingly well perfused. No edema or calf tenderness.  NEUROLOGIC: Baseline parkinsonism with some rigidity..  No apparent focal deficits.  Patient only answers some of the history taking questions but answers seem appropriate for the questions he does answer.  SKIN: Skin is warm and dry. No rash noted. No diaphoresis. No pallor.   PSYCHIATRIC: Affect flat and somewhat difficult to assess.  Denies any SI.  ED Course            EKG Interpretation:      Interpreted by Yadira Combs MD  Time reviewed: 2145  Symptoms at time of EKG: shortness of breath    Rhythm: normal sinus   Rate: 66  Axis: normal  Ectopy: none  Conduction: 1st degree AV block  ST Segments/ T Waves: No ST-T wave changes  Comparison to prior: Unchanged from 8/20/18  Clinical Impression: Sinus with first-degree AV block, no significant changes from previous.      Assessments & Plan (with Medical Decision Making)   IMPRESSION: Sanjay Cano is a 73 year old male with PMH notable for parkinsonism and dementia for which he is in a nursing home, HTN, pulmonary thyroid nodules, COPD with previous respiratory failure, HCAP versus aspiration pneumonia for which he was admitted this spring, brought in by family from SNF with progressive shortness of breath, cough, sputum production and wheezing as described further above in HPI/ROS.      Clinically, patient does appear fatigued though wife assures me this is his normal baseline at this time of night given his nighttime medications, etc. He does have diffuse wheezing, worse with expiration.  He seems to somewhat slump his head forward with his chin towards his chest.   Patient's wife reports that his Neurologist says that this is likely related to his parkinsonism and as his muscles tightened he has had increasing difficulty with lying back or flat as the neurologic disease has progressed he has developed more tightness/rigidity.  She also reports this is normal baseline and he has to spend most of his day sitting up in wheelchair as he is not able to sit backwards in a bed because of this tightness.  That said seems to be protecting his airway currently.  Not eating and drinking here but at least does not appear to be aspirating on any secretions. Abdominal exam benign.  Outside of the diffuse wheezing (expiratory greater than inspiratory) no other acute findings, does not have any extremity edema or clear findings for fluid overload/heart failure, etc.    Ddx includes, but not limited to, COPD exacerbation, aspiration pneumonia, HCAP, viral respiratory illness, amongst others. I think heart failure/fluid overload or ACS to be of lower likelihood.  Does not sound consistent with PTX, pericarditis, pericardial effusion, amongst others.    PLAN: ECG, laboratory studies, chest imaging, likely admission, final disposition, antimicrobials as per ED course, but likely admission as already in SNF and exceeded their capabilities.     RESULTS:  - Labs: VBG pH 7.4/CO2 45/O2 55/bicarb 28/lactate 1.0.   No leukocytosis, Hgb 12.3 (near previous baseline), creatinine 1.30 (down from May), negative troponin and BNP, negative procalcitonin  --- Respiratory virus panel pending  - Imaging: Written preliminary reports reviewed:  --- CXR: Streaky bibasilar left greater than right opacities are slightly increased from prior exam which may represent atelectasis versus infection.    INTERVENTIONS:   - DuoNeb  - Unasyn for concern of aspiration pneumonia    RE-EVALUATION:  - No escalation in respiratory support needs.  - Pt otherwise continues to do well here in the ED, no acute issues or apparent  concerning changes in vitals or clinical appearance.    DISCUSSIONS:  - w/ IM: They will admit for further evaluation and management  - w/ Patient: I have reviewed the available findings, plan with the patient/his wife. They expressed understanding and agreement with this plan. All questions answered to the best of our ability at this time.     DISPOSITION/PLANNING:  - IMPRESSION: Dyspnea, wheezing, (COPD exacerbation, aspiration pneumonia (vs HCAP) vs. Chem aspiration  - DISPOSITION: Admit to IM for further evaluation management  - PENDING: respiratory viral panel, blood cultures       ______________________________________________________________________     This part of the document was transcribed by Perla Rosales, Medical Scribe.      8/14/2019   [facility], [place], EMERGENCY DEPARTMENT     Yadira Combs MD  08/16/19 5008

## 2019-08-15 NOTE — ED TRIAGE NOTES
BIBA from a nursing facility with a dry expiratory wheeze and a cough since this afternoon. Per EMS the staff report that the cough with wheezing precedes pneumonia. Per EMS no fever at this time but the patient will spike fevers rapidly with aspiration pneumonia. Per EMS pt has mild wheezes. hx parkinson's. Pt is alert and oriented x3 which is baseline for him. Vitals stable in route. Pt is on a pureed diet at the assisted living.

## 2019-08-15 NOTE — ED NOTES
Bed: ED32  Expected date: 8/14/19  Expected time:   Means of arrival: Ambulance  Comments:  [facility]  73 y M  Possible Pneumonia  Yellow

## 2019-08-15 NOTE — PROGRESS NOTES
"Medicine Update Note     Sanjay Cano is a 73 year old male with history of  Parkinson's disease, dementia and COPD presented with increased shortness of breath and diffuse expiratory wheezing consistent with likely COPD exacerbation versus aspiration event.  Patient does have a history of recurrent aspiration as well been treated for aspiration pneumonia as an inpatient the last several months, although no known witnessed aspiration event prior to this admission.  Reviewed 5 prior PFTs he does have moderate COPD and given his exam agree that this is most likely consistent with COPD exacerbation.  He overall seems to be improved currently is on room air, plan to continue doxy prednisone and scheduled duo nebs as well as inhaled steroid.  Speech therapy did evaluate the patient today however he became quite agitated when speech therapy mentioned his CODE STATUS in relation to their dietary recommendations.  He apparently became quite agitated and concerned about \"putting tubes in me .\"  Patient had calm down by the time I arrived and did not require intervention, would like to hold off on Haldol unless absolutely necessary given his underlying Parkinson's disease.  His wife will return later this evening at which point I will attempt to rediscuss the patient's CODE STATUS although he remains a full code at this time.  I will update the chart with any changes.    Patient's wife notes that patient often gets agitated when there are many providers in the room, this is something she consistently sees at his living facility.  She is often able to redirect him.  As discussed we will plan to try to manage dementia/delirium with nonpharmacologic measures as much as able.      Patient was seen and discussed with Dr. Ranjan Butler MD   PGY-3   [phone]   "

## 2019-08-15 NOTE — H&P
History and Physical     Sanjay Cano MRN# [id number]   YOB: 1945 Age: 73 year old      Date of Admission:  8/14/2019    Primary care provider: Bharath Buchanan          Assessment and Plan:   Sanjay Cano is a 73 year old male with history of  Parkinson's disease, dementia and COPD that presents with wheezing likely due to COPD exacerbation. He will be admitted for further workup and monitoring.     Wheezing   Ddx: Likely COPD exacerbation v aspiration pneumonitis. Less likely PNA.    Presents with increased wheezing and neb use over the last few days with thickened white sputum. Per wife has been breathing faster and harder yesterday. On presentation he is AF, HR in 60s, RR 16, and on room air. Exam with normal respiratory effort, prolonged expiratory phase and wheezing in all lung fields. No tachypnea and along with negative procal, no definitive opacities on CXR, PNA is less likely. He did not have a witnessed aspiration event. Per wife his coughing is not associated with eating   -continue PTA flovent BID  -start duonebs 4x/day and albuterol nebs PRN  -discontinue unasyn, start doxy for 7 days   -pred 40 mg for 5 days   -SLP eval in the AM  -continuous O2 monitoring, supplemental O2 as needed   -HOB > 30 degrees, continue DD2 w/ nectar thick liquids  -follow up RVP/blood cultures collected by the ED    Elevated Cr  Cr elevated to 1.3, BUN is 34. Baseline Cr 0.9-1.1  -likely pre-renal azotemia, volume resuscitate, reassess, expand workup if not improving   -will bolus 1 L NS and continue MIVF for 24 hours     Dementia  Parkinson's  -continue PTA sinemet and donepezil    GERD  -continue PTA zantac    Mood  -continue PTA effexor     HTN  -continue PTA amlodipine    FEN: will bolus 1 L NS and continue MIVF for 24 hours, Dysphagia diet 2 w/ nectar thick liquids, electrolyte replacement per protocol  PPX: SCDs for now  Code status: FULL  Dispo: admitted to obs     Will be formally staffed in  the AM    Electronically signed by:  Rodney Ayala M.D.   Pager: [phone]  8/15/2019, 12:30 AM              Chief Complaint:   Wheezing     History is obtained from the patient and electronic health record         History of Present Illness:   Sanjay Cano is a 73 year old male with history of Parkinson's disease with dementia and emphysema who presents with wheezing.     History is obtained from the patient's wife as the patient is is very forgetful.     Over the past week the patient has been coughing more often. His cough is productive of thickened white mucous. He has been using his duonebs more frequently. Over he last two days he has been breathing faster and coughing more. He has also had rhinorrhea. He has not had a witnessed aspiration and has been coughing in between meals. Per his wife (who is with the patient at least 8 hours per day) his coughing is not related to food intake, but he is on a dysphagia diet.     His wife states that many of the workers and residents at the nursing home have colds. No fever, abd pain, vomiting, diarrhea, CP, urinary symptoms, LE edema.     In the ED, he was AF, HR in 60s, BP 140s/90s, RR 16, and on room air. WBC normal.  LA 1.0. Cr elevated to 1.3 from baseline of 0.9-1.1. Procal 0.05, Trop negative. VB.4/45. CXR w/ bibasilar opacities that may represent PNA or atelectasis. He was given albuterol nebs, unasyn and admitted for further workup and monitoring.       Of note, he was discharged from [facility] in May after being treated for aspiration PNA. He was discharged on dysphagia diet 2 w/ nectar thick liquids.              Past Medical History:     Past Medical History:   Diagnosis Date     At risk for falling 9/3/2012     Family history of thyroid cancer 2016     Malignant neoplasm (H)     skin - nose     Moderate recurrent major depression (H) 2012     Rosacea              Past Surgical History:     Past Surgical History:   Procedure Laterality Date      EYE SURGERY       ORTHOPEDIC SURGERY       PHACOEMULSIFICATION CLEAR CORNEA WITH STANDARD INTRAOCULAR LENS IMPLANT  2014    Procedure: PHACOEMULSIFICATION CLEAR CORNEA WITH STANDARD INTRAOCULAR LENS IMPLANT;  Surgeon: Tomy Hunter MD;  Location: [facility]     PHACOEMULSIFICATION CLEAR CORNEA WITH TORIC INTRAOCULAR LENS IMPLANT  2014    Procedure: PHACOEMULSIFICATION CLEAR CORNEA WITH TORIC INTRAOCULAR LENS IMPLANT;  Surgeon: Tomy Hunter MD;  Location: [facility]             Social History:     Social History     Tobacco Use     Smoking status: Former Smoker     Packs/day: 0.01     Years: 40.00     Pack years: 0.40     Types: Cigarettes     Last attempt to quit: 2008     Years since quittin.0     Smokeless tobacco: Never Used     Tobacco comment: very passive cigarettes in 6 months   Substance Use Topics     Alcohol use: No     Alcohol/week: 0.0 oz             Family History:     Family History   Problem Relation Age of Onset     Cerebrovascular Disease Mother      Diabetes Mother      Gastrointestinal Disease Mother      Hypertension Mother      Neurologic Disorder Father      Cerebrovascular Disease Father      Cerebrovascular Disease Maternal Grandfather      Cancer Paternal Grandmother      Other - See Comments Sister         gi - unknown             Immunizations:     Immunization History   Administered Date(s) Administered     Influenza (High Dose) 3 valent vaccine 2013, 2015, 2016, 10/06/2017, 2018     Influenza (IIV3) PF 11/15/2007, 2008, 10/22/2009, 10/13/2010, 10/28/2011, 2012     Mantoux Tuberculin Skin Test 2015     Pneumo Conj 13-V (2010&after) 2016     Pneumococcal 23 valent 11/15/2007, 2011     TDAP Vaccine (Adacel) 2013            Allergies:     Allergies   Allergen Reactions     Azithromycin      Erythromycin Other (See Comments)     Pathological fractures     Levaquin [Levofloxacin]      Fracture prevention      Seasonal Allergies              Medications:     (Not in a hospital admission)          Review of Systems:   The 10 point Review of Systems is negative other than noted in the HPI           Physical Exam:   Vitals were reviewed  Patient Vitals for the past 12 hrs:   BP Temp Temp src Heart Rate SpO2 Weight   08/15/19 0000 (!) 145/101 -- -- -- 97 % --   08/14/19 2300 (!) 149/97 -- -- -- 97 % --   08/14/19 2100 (!) 149/87 -- -- -- 96 % --   08/14/19 2057 (!) 149/87 98.2  F (36.8  C) Oral 66 96 % 85.3 kg (188 lb 1.6 oz)       Constitutional: awake, alert, tangential, forgetful.    Head: Normocephalic. No masses, lesions, tenderness or abnormalities  Eyes: Anicteric, normal extra-ocular movements, Pupils are equal and reactive to light  ENT: ENT exam normal, no neck nodes or sinus tenderness  Neck: Neck supple. No adenopathy.  Lymph: Normal cervical lymph nodes  Cardiovascular: negative, PMI normal. No lifts, heaves, or thrills. RRR. No murmurs, clicks gallops or rub  Respiratory: Normal respiratory effort, prolonged expiratory phase, wheezing in all lung fields. No rales.   Abdomen: Abdomen soft, non-tender. BS normal. No masses, organomegaly  Joint/Extremeties: normal muscle tone, WWP, and no LE edema  Skin: normal, warm, no rash, normal skin turgor  Neuro: CN II-XII intact, moving all ext. Non-focal           Data:     Results for orders placed or performed during the hospital encounter of 08/14/19 (from the past 24 hour(s))   CBC with platelets differential   Result Value Ref Range    WBC 6.6 4.0 - 11.0 10e9/L    RBC Count 3.96 (L) 4.4 - 5.9 10e12/L    Hemoglobin 12.3 (L) 13.3 - 17.7 g/dL    Hematocrit 37.7 (L) 40.0 - 53.0 %    MCV 95 78 - 100 fl    MCH 31.1 26.5 - 33.0 pg    MCHC 32.6 31.5 - 36.5 g/dL    RDW 12.1 10.0 - 15.0 %    Platelet Count 235 150 - 450 10e9/L    Diff Method Automated Method     % Neutrophils 62.8 %    % Lymphocytes 19.5 %    % Monocytes 10.5 %    % Eosinophils 6.1 %    % Basophils 0.8 %    %  Immature Granulocytes 0.3 %    Nucleated RBCs 0 0 /100    Absolute Neutrophil 4.1 1.6 - 8.3 10e9/L    Absolute Lymphocytes 1.3 0.8 - 5.3 10e9/L    Absolute Monocytes 0.7 0.0 - 1.3 10e9/L    Absolute Eosinophils 0.4 0.0 - 0.7 10e9/L    Absolute Basophils 0.1 0.0 - 0.2 10e9/L    Abs Immature Granulocytes 0.0 0 - 0.4 10e9/L    Absolute Nucleated RBC 0.0    Basic metabolic panel   Result Value Ref Range    Sodium 143 133 - 144 mmol/L    Potassium 4.2 3.4 - 5.3 mmol/L    Chloride 107 94 - 109 mmol/L    Carbon Dioxide 27 20 - 32 mmol/L    Anion Gap 9 3 - 14 mmol/L    Glucose 103 (H) 70 - 99 mg/dL    Urea Nitrogen 34 (H) 7 - 30 mg/dL    Creatinine 1.30 (H) 0.66 - 1.25 mg/dL    GFR Estimate 53 (L) >60 mL/min/[1.73_m2]    GFR Estimate If Black 62 >60 mL/min/[1.73_m2]    Calcium 8.5 8.5 - 10.1 mg/dL   Nt probnp inpatient   Result Value Ref Range    N-Terminal Pro BNP Inpatient 114 0 - 900 pg/mL   Procalcitonin   Result Value Ref Range    Procalcitonin <0.05 ng/ml   Troponin I   Result Value Ref Range    Troponin I ES <0.015 0.000 - 0.045 ug/L   XR Chest 2 Views    Impression    Impression:   Streaky bibasilar left greater than right opacities are slightly  increased from prior exam which may represent atelectasis versus  infection.   ISTAT gases lactate valerie POCT   Result Value Ref Range    Ph Venous 7.40 7.32 - 7.43 pH    PCO2 Venous 45 40 - 50 mm Hg    PO2 Venous 55 (H) 25 - 47 mm Hg    Bicarbonate Venous 28 21 - 28 mmol/L    O2 Sat Venous 88 %    Lactic Acid 1.0 0.7 - 2.1 mmol/L

## 2019-08-15 NOTE — PROGRESS NOTES
"   08/15/19 1433   General Information   Onset Date 08/14/19   Start of Care Date 08/15/19   Referring Physician Rodney Ayala MD   Patient Profile Review/OT: Additional Occupational Profile Info See Profile for full history and prior level of function   Patient/Family Goals Statement Pt unable to state - briefly said he wanted to leave   Swallowing Evaluation Bedside swallow evaluation   Behaviorial Observations Combative/agitated;Confused;Impulsive;Inappropriate  (Pt hallucinating and attempting to get out of bed repeatedly)   Mode of current nutrition Oral diet   Type of oral diet Dysphagia diet level 2;Nectar - thick liquid   Respiratory Status Room air   Comments SLP: Pt is a 73 year old male with history of  Parkinson's disease, dementia and COPD that presents with wheezing likely due to COPD exacerbation. Per H&P, low suspicion for PNA. Pt's wife, Danuta, is concerned about the various diet modifications that have made by different speech therapists both here and at [facility]. Pt was last seen by our service 4/23/19; at that time, a videoswallow study had been completed and dysphagia 3 (chopped) diet/nectar-thick liquids was recommended. The SLP also recommended the free water protocol (pt OK for sips of thin H20 between meals after thorough oral care). Per discussion with pt's wife today, pt has NOT been drinking thickened liquids at his care facility since May. It is apparent Danuta is unclear as to why diet modifications have been made, and she reports \"mashing up\" all higher texture solids the pt is served at his care facility. Danuta is very concerned that she is \"feeding the pt pneumonia\" and is looking for guidance re: swallowing function. Danuta was clear that the pt would never want a feeding tube. During this conversation, the pt became very upset and appeared to be under the impression that this clinician was recommending a \"tube\" - recommend discussion of code status with pt and pt's wife given " that the pt is still full code in the chart. Clinical swallow eval completed per MD order.   Clinical Swallow Evaluation   Oral Musculature unable to assess due to poor participation/comprehension  (pt became very agitated and confused)   Laryngeal Function Swallow;Voicing initiated   Oral Musculature Comments Pt became increasingly agitated as the session progressed, eventually becoming physically and verbally aggressive with clinician and pt's wife. Limited evaluation.   Swallow Eval   Feeding Assistance frequent cues/help required   Clinical Swallow Eval: Nectar Thick Liquid Texture Trial   Mode of Presentation, Nectar cup;self-fed  (via nosey cup)   Volume of Nectar Presented 3oz thickened water   Oral Phase, [place] Poor AP movement;Residue in oral cavity  (anterior loss)   Pharyngeal Phase, [place] intact   Diagnostic Statement Difficulty to assess, however, no overt s/sx of aspiration observed   Clinical Swallow Eval: Semisolid Texture Trial   Mode of Presentation, Semisolid fed by clinician   Volume of Semisolid Food Presented 1/2 cup diced fruit   Oral Phase, Semisolid WFL   Pharyngeal Phase, Semisolid intact   Diagnostic Statement Oral phase functional. No overt s/sx of aspiration. Pt observed to be impulsive and wanted to eat a rapid pace.    General Therapy Interventions   Planned Therapy Interventions Dysphagia Treatment   Dysphagia treatment Oropharyngeal exercise training;Modified diet education;Instruction of safe swallow strategies;Compensatory strategies for swallowing   Swallow Eval: Clinical Impressions   Skilled Criteria for Therapy Intervention Skilled criteria met.  Treatment indicated.   Functional Assessment Scale (FAS) 5   Treatment Diagnosis Mild, acute on chronic dysphagia   Diet texture recommendations Dysphagia diet level 2;Nectar thick liquids   Recommended Feeding/Eating Techniques small sips/bites  (upright for all PO, slow rate)   Demonstrates Need for Referral to Another Service  dietitian   Therapy Frequency 5x/week   Predicted Duration of Therapy Intervention (days/wks) 2 weeks   Anticipated Discharge Disposition extended care facility   Risks and Benefits of Treatment have been explained. Yes   Patient, family and/or staff in agreement with Plan of Care Yes   Clinical Impression Comments SLP: Clinical swallow eval completed per MD order. Limited eval given pt's progressive agitation and confusion. Pt presents with mild, acute on chronic dysphagia in setting of Parkinson's disease with dementia and acute illness. Unable to complete formal oral mech exam d/t pt's agitation. Assessed with nectar-thick liquids and diced solids. Oral phase characterized by oral residue and anterior loss with nectar-thick liquids. Pharyngeal phase seemingly functional for all, however, cannot eliminate silent aspiration given pt's Parkinson's disease. Multiple considerations must be made when determining appropriate diet recommendations. Per pt's wife, pt has been drinking thin liquids since May without acquiring pneumonia. Pt's wife also reports that pt does not like thickened liquids, and he would never want a feeding tube or breathing tube. Recommend continuation of current diet with ongoing ST intervention for pt/wife education and training for dysphagia management. Pt may benefit from a videoswallow study, but in his current state he would not likely cooperate enough to obtain meaningful data. SLP to follow.    Total Evaluation Time   Total Evaluation Time (Minutes) 55

## 2019-08-15 NOTE — PLAN OF CARE
BP (!) 175/105 (BP Location: Left arm)   Pulse 87   Temp 98  F (36.7  C) (Oral)   Resp 16   Wt 85.3 kg (188 lb 1.6 oz)   SpO2 98%   BMI 24.15 kg/m       Continues to be hypertensive. Team notified. All of VSS on RA. Continuous 02 monitor on toe; would not read on finger or ear. Denies pain and nausea. DD2 diet; speech therapy came by to evaluate swallowing- patient became aggressive; otherwise calm and cooperative. Fair appetite and PO intake. Total care; feed. BM this afternoon; lots of urine output- incontinent of both. Turned Q3 hours. Alert; but speech gargled and hard to understand. Wife, Sruthi by bedside- will be here overnight. 2 PIVs infiltrated; had 100 NS into it. Will get another place. Up with lift assistance. Continue plan of care; please notify MD with any changes.

## 2019-08-15 NOTE — PROGRESS NOTES
Social Work Services Progress Note    Hospital Day: 2-SW received order for Discharge Planning      Data:  Chart Review concludes that patient lives in Long Term care at [facility] 2nd floor:   [address] [place], [place] [address]  (p) [phone] LTC unit, (f) [phone]    Intervention:  Chart Review. Placed call to nursing facility to confirm bed hold. MA bed hold confirmed    Assessment:  [facility] called back and reported that patient has a bed hold and can return at discharge when medically stable.    Plan:    Anticipated Disposition:  Facility:  See above    Barriers to d/c plan:  Unsure    Follow Up:  HOWIE will continue to follow    HOWIE Chong pgr [phone]

## 2019-08-15 NOTE — PHARMACY-ADMISSION MEDICATION HISTORY
Admission Medication History status for the 8/14/2019 admission is complete.  See EPIC admission navigator for Prior to Admission medications.    Medication history sources:  MAR from [facility] nursing home    Medication history source reliability: Good    Medication adherence:  Good    Changes made to PTA medication list (reason)  Added: Duoneb as a PRN  Deleted: aspirin 81 - take by mouth twice a week (not on MAR)   cefpodoxime 200 mg - 1 tab daily (ended 5/9/19)   doxycycline hyclate 100 mg - 1 tab daily (ended 5/9/19)   guaifenesin 100 mg/mL - 10 mLs 4 times daily (was only for 3 days then to resume PRN script. Pervious PRN script for q4h PRN. Reflected on the PTA list)  Changed: amlodipine from liquid to tablets    hydrocortisone - added that pt gets on Fridays   Venlafaxine 75 mg - from daily to at bedtime    Additional medication history information (including reliability of information, actions taken by pharmacist):   - Please pay attention to the patient's carpidopa-levodopa dose and directions when ordering medications:    Takes 2 tablets at 6 AM, 1.5 tablets at 9 AM, 2 tablets at noon, 1.5 tablets at 3 PM, 1.5 tablets at 6 PM. Can take additional 0.5 tablet in the middle of the night if needed.    Time spent in this activity: 30    Medication history completed by: Olga Duffy, PD2 Pharmacy Intern    Prior to Admission medications    Medication Sig Last Dose Taking? Auth Provider   amLODIPine (NORVASC) 10 MG tablet Take 10 mg by mouth At Bedtime 8/14/2019 at 7:30 pm Yes Unknown, Entered By History   carbidopa-levodopa (SINEMET CR)  MG per CR tablet Take 1 tablet by mouth At Bedtime 8/13/2019 at 9:30 pm Yes Bharath Buchanan MD   carbidopa-levodopa (SINEMET)  MG per tablet Take 1-2 tablets by mouth 5 times daily Takes 2 tablets at 6 AM, 1.5 tablets at 9 AM, 2 tablets at noon, 1.5 tablets at 3 PM, 1.5 tablets at 6 PM. Can take additional 0.5 tablet in the middle of the night  if needed. 8/14/2019 at 6 pm Yes Bharath Buchanan MD   cholecalciferol (VITAMIN D) 1000 UNIT tablet Take 1 tablet (1,000 Units) by mouth every morning 8/14/2019 at 8 am Yes Bharath Buchanan MD   donepezil (ARICEPT) 10 MG tablet Take 1 tablet (10 mg) by mouth At Bedtime 8/14/2019 at 7:30 pm Yes Bharath Buchanan MD   donepezil (ARICEPT) 5 MG tablet Take 1 tablet (5 mg) by mouth every morning 8/14/2019 at 8 am Yes Bharath Buchanan MD   FLOVENT  MCG/ACT Inhaler INHALE 2 PUFFS INTO THE LUNGS TWICE DAILY 8/14/2019 at 8 am Yes Bharath Buchanan MD   guaiFENesin (ROBITUSSIN) 20 mg/mL SOLN solution Take 10 mLs by mouth every 4 hours as needed for cough 7/30/2019 at 4:30 pm Yes Bharath Buchanan MD   hydrocortisone (WESTCORT) 0.2 % external cream Apply sparingly to affected area once a week after patient's bath.  Patient taking differently: Apply sparingly to affected area once a week on Fridays after patient's bath. 8/9/2019 at 10:30 pm Yes Bharath Buchanan MD   ipratropium - albuterol 0.5 mg/2.5 mg/3 mL (DUONEB) 0.5-2.5 (3) MG/3ML neb solution Take 1 vial by nebulization every 6 hours as needed for shortness of breath / dyspnea or wheezing 8/14/2019 at 2 pm Yes Unknown, Entered By History   ipratropium - albuterol 0.5 mg/2.5 mg/3 mL (DUONEB) 0.5-2.5 (3) MG/3ML neb solution Three times daily, nebulization. alternate with combivent 8/14/2019 at 7:30 pm Yes Bharath Buchanan MD   Ipratropium-Albuterol (COMBIVENT RESPIMAT)  MCG/ACT inhaler Inhale 1 puff into the lungs 2 times daily Alternate with duo neb 8/14/2019 at 4 pm Yes Bharath Buchanan MD   lactobacillus rhamnosus, GG, (CULTURELL) capsule Take 1 capsule by mouth every morning 8/14/2019 at 8 am Yes Bharath Buchanan MD   methylcellulose (CITRUCEL) 500 MG TABS tablet Take 1 tablet (500 mg) by mouth daily as needed  Yes Bharath Buchanan MD   mirtazapine (REMERON) 15 MG tablet TAKE 1.5 TABLETs (22.5 MG) BY MOUTH  AT BEDTIME 8/14/2019 at 7:30 pm Yes Chanel Calderon APRN CNP   ranitidine (ZANTAC) 75 MG tablet Take 1 tablet (75 mg) by mouth At Bedtime 8/14/2019 at 7:30 pm Yes Bharath Buchanan MD   venlafaxine (EFFEXOR-XR) 37.5 MG 24 hr capsule Take 37.5 mg by mouth daily 8/14/2019 at 8 am Yes Reported, Patient   venlafaxine (EFFEXOR-XR) 75 MG 24 hr capsule Take 75 mg by mouth At Bedtime  8/14/2019 at 7:30 pm Yes Reported, Patient

## 2019-08-16 NOTE — PLAN OF CARE
[id number]  BP (!) 155/86 (BP Location: Left arm)   Pulse 66   Temp 98.2  F (36.8  C) (Axillary)   Resp 16   Wt 85.3 kg (188 lb 1.6 oz)   SpO2 98%   BMI 24.15 kg/m   VS SS on RA. Patient denies pain. Urine Output - Incont. Bowel Function - Incont. Nutrition - DDL2, upgraded to thin liquids by ST. Drains - R PIV-SL'd. Activity - Mechanical life, pt was repositioned q 2hours. . Plan of Care - Upon repositioning and medication administration pt became agitated and started swinging arms at staff. No PRNs listed, team updated and assessed pt, continue to monitor and minimize time in the pt's room. Wife is at bedside and is helping to calm pt.   Nalini Klein RN on 8/16/2019 at 6:42 PM

## 2019-08-16 NOTE — PLAN OF CARE
[id number]  BP (!) 181/96 (BP Location: Right arm)   Pulse 87   Temp 98.6  F (37  C) (Oral)   Resp 16   Wt 85.3 kg (188 lb 1.6 oz)   SpO2 98%   BMI 24.15 kg/m   VS hypertensive on RA. Patient denies pain. Pt in non-verbal, wife at bedside to assist w/communication. Nutrition - DDL2, nectar thick liquids. Drains - R PIV-infusing NS 100mL/hr. Activity - Mechanical lift. Plan of Care - Team paged, d/t hypertension, per team recheck BP at 2330, and call if SBP >180.  Nalini Klein RN on 8/15/2019 at 10:40 PM

## 2019-08-16 NOTE — PROGRESS NOTES
[facility], [place]    Internal Medicine Progress Note - [facility]    Main Plans for Today   Continue prednisone, doxycyline, QID duonebs   Add tessalon for cough     Assessment & Plan   Sanjay Cano is a 73 year old male with history of  Parkinson's disease, dementia and COPD that presents with wheezing likely due to COPD and/or asthma exacerbation.     COPD exacerbation  Sanjay Cano is a 73 year old male with history of  Parkinson's disease, dementia and COPD presented with increased shortness of breath and diffuse expiratory wheezing consistent with likely COPD exacerbation versus aspiration event.  Patient does have a history of recurrent aspiration as well been treated for aspiration pneumonia as an inpatient the last several months, although no known witnessed aspiration event prior to this admission.  Has improved significantly with current therapy plan, significant interval decrease in expiratory wheeze today .  Plan to continue for 5-day course of prednisone and doxycycline.  We will plan to send him home with DuoNeb's nebulizers which he should use for  Exacerbations. We discussed risk/benefits of discharge today, wife uncomfortable with return to home too soon. Will continue to monitor this evening and hopeful for discharge tomorrow.   -continue PTA flovent BID  -start duonebs 4x/day and albuterol nebs PRN  -S/p Unasyn x1 in the ED   -pred 40 mg for 5 days (day 2), add tessalon perles   -SLP eval in the AM  -HOB > 30 degrees, continue DD2 w/ nectar thick liquids  -follow up RVP/blood cultures collected by the ED     Elevated Cr-resolved   Hyperntaremia  HYpokalemia   Cr elevated to 1.3, BUN is 34. Baseline Cr 0.9-1.1.  Hypernatremia likely due to IV fluids in setting of acute illness and poor p.o. intake.  Continue to monitor for now.  Replace potassium.  Creatinine has returned to baseline.     Dementia  Parkinson's  -continue PTA sinemet and  "donepezil     GERD  -continue PTA zantac     Mood  -continue PTA effexor      HTN  Elevated pressure today likely due to recent salt load with IV fluids.  We will continue to monitor.  -continue PTA amlodipine     FEN: will bolus 1 L NS and continue MIVF for 24 hours, Dysphagia diet 2 w/ nectar thick liquids, electrolyte replacement per protocol  PPX: SCDs for now  Code status: FULL  Dispo: admitted to obs     Disposition Plan   Expected discharge: Tomorrow, recommended to prior living arrangement once respiratory status stable .     Entered: Tima Butler 08/16/2019, 5:27 PM   Information in the above section will display in the discharge planner report.      The patient's care was discussed with the attending physician, Dr. Woodruff.    Tima Butler MD  Internal Medicine PGY-3   [phone]     Please see sticky note for cross cover information    Interval History   No acute events overnight. Patient denies shortness of breath. Wife notes he had several coughing spells overnight although overall feels he looks better. She is concerned about taking him home too soon as this has happened in the past.     4 point ROS conducted, with pertinent positives/negatives discussed above.    Physical Exam   Vital Signs: Temp: 98.2  F (36.8  C) Temp src: Axillary BP: (!) 155/86 Pulse: 66 Heart Rate: 74 Resp: 16 SpO2: 98 % O2 Device: None (Room air)    Weight: 188 lbs 1.6 oz  General Appearance: Not oriented to person or place, states \"yes\" to questions  Respiratory: Faint expiratory wheeze bilaterally, normal work of breathing   Cardiovascular: S1/S2, RRR. No murmur   GI: Soft, NT and ND. BS+   Ext: No edema, warm  Neuro: Not oriented, able to say \"yes\".        Data   Labs/Imaging/Vitals/Meds: Reviewed in Epic    "

## 2019-08-16 NOTE — PROGRESS NOTES
[id number]: A&Ox4. Able to respond to questions with one word responses. Wife, Danuta, at bedside. Up with Ax2 and lift, Q2H repositioning while in bed. R PIV with NS at 100mL/hr until 0600. BP- 160s/80-90s. Incontinent of urine x2. Positive bowel sounds, no BM. Denies pain. Lung sounds with expiratory wheezes. Stable on RA. 0800 Sinemet given at 0630 per wife's request. Will continue to monitor and follow POC.

## 2019-08-16 NOTE — PLAN OF CARE
BP (!) 164/87 (BP Location: Left arm)   Pulse 66   Temp 97.8  F (36.6  C) (Oral)   Resp 16   Wt 85.3 kg (188 lb 1.6 oz)   SpO2 96%   BMI 24.15 kg/m       Hypertensive; baseline. Has PRN orders if higher than 180. All other VSS on RA. Continuous 02 monitored on. PIV saline locked. Turned Q4 hours; good urine output. Brief; clean dry and intact.Total care; wife by bedside. No pain or nausea. Patient seemed more orientated and awake today. Fluids were stopped on him as well .Good PO intake. Regular diet; good appetite. Total feed. Takes meds with apple sauce. Continue plan of care; please notify MD with any changes.

## 2019-08-17 NOTE — PLAN OF CARE
BP (!) 143/76 (BP Location: Left arm)   Pulse 66   Temp 98.8  F (37.1  C) (Axillary)   Resp 18   Wt 85.3 kg (188 lb 1.6 oz)   SpO2 93%   BMI 24.15 kg/m       [id number]  DISCHARGE:  D: Patient with orders to discharge back to nursing home with wife, Danuta.   I: Discharge instructions, medications, & follow ups reviewed with wife; Danuta. Copy of discharge summary given to Danuta and additional copy sent for nursing home facility. PIV removed. All belongings packed & sent with patient. Medications filled & picked up at discharge pharmacy. Paperwork faxed.   A: Patient in stable condition. AVSS. Danuta had no further questions regarding discharge instructions and medications. Patient transferred out by wheelchair & left with wife; Danuta @ 1500.  P: Plan for follow up appointment and continuing medication regimen for prednisone and vibramycin until course is complete. Danuta expresses understanding of this.

## 2019-08-17 NOTE — PLAN OF CARE
BP (!) 144/88 (BP Location: Right arm)   Pulse 66   Temp 97.9  F (36.6  C) (Axillary)   Resp 18   Wt 85.3 kg (188 lb 1.6 oz)   SpO2 94%   BMI 24.15 kg/m    [id number]  Pt lethargic throughout shift, follows commands, speech is garbled. Afebrile, slightly hypertensive 140s-150s, all other VSS on RA. Denied pain, no nonverbal signs of pain evident. Denied nausea throughout shift. Pt on DD2 diet with nectar thick liquids, did not eat overnight. RPIV saline locked. Pt incontinent of urine and stool, brief changed x2. Pt repositioned q 2 hrs with assist x2-3. Plan for possible discharge back to nursing home today.    Wife Danuta at bedside all night- requested multiple time adjustments for medications. Writer will communicate wishes to day RN to make sure team rounds and reviews all meds with wife.

## 2019-08-17 NOTE — PLAN OF CARE
Discharge Planner SLP   Patient plan for discharge: Return to LTC facility  Current status: Pt seen for dysphagia management. Assessed with thin liquids; no overt s/sx of aspiration with thin liquids, however, cannot rule out silent aspiration. Extensive discussion with pt and pt's wife re: pt's diet preferences, as well as benefits and risks of modified diet.  Education provided re: compensatory swallowing strategies, aspiration, and signs of aspiration pneumonia; handouts also provided. Recommend dysphagia 2 (diced) diet/thin liquids with ongoing ST intervention for pt/wife education and training for dysphagia management. Pt and wife may benefit from a videoswallow study, but in his current state he would not likely cooperate enough to obtain meaningful data. When feeding pt, ensure pt is fully alert and upright for all PO, taking small bites/sips at slow rate. SLP to follow.   Barriers to return to prior living situation: Dysphagia  Recommendations for discharge: Ongoing ST at care facility  Rationale for recommendations: Pt will benefit from ongoing ST at next level of care targeting swallow function and pt/spouse education       Entered by: Zo You 08/16/2019 9:19 PM

## 2019-08-17 NOTE — PLAN OF CARE
Discharge Planner SLP   Patient plan for discharge: Return to LTC facility  Current status: Pt remains appropriate for dysphagia 2 (diced) diet/thin liquids with 1:1 assistance for feeding/cueing. Pt to be fully alert and upright for all PO, taking small bites/sips at slow rate. Ensure oral cavity is clear after each bite/sip.   Barriers to return to prior living situation: Dysphagia  Recommendations for discharge: Continued ST services  Rationale for recommendations: Pt well benefit from ongoing ST for dysphagia management and ongoing caregiver education        Entered by: Zo You 08/17/2019 9:23 AM     Speech Language Therapy Discharge Summary    Reason for therapy discharge:    Discharged to long term care facility.    Progress towards therapy goal(s). See goals on [facility] record for goal details.  Goals partially met.  Barriers to achieving goals:   discharge from facility.    Therapy recommendation(s):    Continued therapy is recommended.  Rationale/Recommendations:  Recommend dysphagia 2 (diced) diet/thin liquids. Recommend ongoing ST for dysphagia management and ongoing caregiver education. See chart for additional recommendations.

## 2019-08-17 NOTE — PLAN OF CARE
[id number]: Patient is slightly hypertensive at 144/108, temp was 98.2 Axillary and 97% on RA; oximeter probe is on his left toe for a more accurate reading. Patient is verbal, mumbles at times and other times is coherent. Wife (Danuta) is bedside and gives meds with applesauce as RN scans them on the MAR. Patient needs a great deal of encouragement to swallow pills, one at a time; some meds are in suspension which are also added to applesauce. Doxy is a large capsule, too large to swallow according to wife, so this writer had pharmacy change to suspension as well. When this writer ordered the Doxy suspension form from pharmacy, it got up here too late for the patient to take as he was then lethargic from previous meds he had taken and it was not safe for him to attempt to swallow. Patient is a known aspiration risk. The doses of Doxy are one of the meds re-timed for the morning. Wife also explained that his meds, especially the Parkinson meds, are not scheduled for the appropriate times that his neurologist wants him to get them, so this writer did re-time the evening meds on the MAR. Night RN aware that wife will want some of his morning meds re-timed and wife will discuss which ones she wants starting at 0600. Patient receives nebs q 4 hrs as well as Flovent inhaler, but it as observed by this writer that he is not capable of using his inhaler as it appears that he does not understand the concept of inhaling and holding that breath for it to be effective. It appeared to simply puff into his mouth (on his tongue) as he was not able to inhale it. Incontinent of urine and stool; briefs on and checked q 2 hrs and PRN. Wife stays bedside and is attentive to patient, but she is flustered with medication times. Continue with current POC, assist wife with medication administration and times, notify team with any other concerns.

## 2019-08-17 NOTE — PROGRESS NOTES
Social Work Services Discharge Note      Patient Name:  Sanjay Cano     Anticipated Discharge Date:  8/17/2019    Discharge Disposition:   LTC:  [facility]   [address] [address]  P: [phone]  F: [phone]     Additional Services/Equipment Arranged:  Transport arranged through [employer] (p: [phone]) via wheelchair medical van (transport aware that pt's wife to ride with)     Patient / Family response to discharge plan:  Pt's wife aware and in agreement with return to LTC facility.  (Pt's wife voiced many concerns regarding facility, but has spoken to all appropriate leaders at facility as well as ombudsman many times)     Persons notified of above discharge plan:  Provider, pt's wife, [facility] (floor [facility]), and 7A charge RN    Staff Discharge Instructions:  Please fax discharge orders (fax: [phone]) and signed hard scripts for any controlled substances.  Please print a packet and send with patient.           SERENITY Vicente, MSW  Pager: [phone]  8/17/2019

## 2019-08-19 NOTE — DISCHARGE SUMMARY
Medicine Discharge Summary  Sanjay Cano MRN: [id number]  1945  Primary care provider: Bharath Buchanan  ___________________________________          Date of Admission:  8/14/2019  Date of Discharge:  8/17/2019   Admitting Physician:  Michelle Woodruff MD  Discharge Physician:  Michelle Woodruff MD  Discharging Service:  Internal Medicine, [place] [address]     Primary Provider: Bharath Buchanan         Reason for Admission:   Sanjay Cano is a 73 year old male with history of Parkinson's disease with dementia and emphysema who presents with wheezing. History is obtained from the patient's wife.Over the past week the patient has been coughing more often. His cough is productive of thickened white mucous. He has been using his duonebs more frequently. Over he last two days he has been breathing faster and coughing more. He has also had rhinorrhea. He has not had a witnessed aspiration and has been coughing in between meals. Per his wife (who is with the patient at least 8 hours per day) his coughing is not related to food intake, but he is on a dysphagia diet.           Discharge Diagnosis:   COPD exacerbation  At risk for aspiration   Dementia   Parkinson's   GERD  Hypernatremia         Procedures & Significant Findings:   Exam: XR CHEST 2 VW, 8/14/2019 10:10 PM     Indication: cough, wheeze; parkinsonism hx aspiration pna     Comparison: 4/21/2019     Findings:   AP and lateral views of the chest. Cardiac silhouette is not enlarged.  Streaky perihilar opacities similar to prior. Bibasilar left greater  than right opacity slightly increased from prior. No pneumothorax. No  large pleural effusion. Nonobstructive bowel gas pattern. Unchanged  nodule in the lateral left lung better evaluated on prior CT.                                                                      Impression:   Streaky bibasilar left greater than right opacities  are slightly  increased from prior exam which may represent atelectasis versus  infection.         Consultations:   Speech/Language Pathology          Hospital Course by Problem:    COPD exacerbation  At risk for aspiration  Patient presented with a week shortness of breath, wheezing and moderate sputum production. Patient a known aspiration risk, with history of aspiration pneumonia within the past several months although no known inciting event this admission.  Was given 1 dose of Unasyn in the ED, although given lack of other infectious symptoms such as fevers, leukocytosis or infiltrate on chest x-ray as well as diffuse wheezing on exam overall presentation was felt to be more consistent with a COPD exacerbation.  He was treated with prednisone 40 mg daily and doxycycline 100 mg twice daily with market improvement in his respiratory status as well as diffuse wheezing.  Given concern for aspiration risk speech pathology was consulted, they performed an assessment although patient did become intermittently agitated especially when addressing CODE STATUS.  Thus more invasive testing such as video swallow was deferred and was recommended patient continue on a dysphagia 2 diet with thin liquids.  He appeared to tolerate his diet well, his wife noted his struggles with thickened liquids and poor oral intake.  It was recommended that he be encouraged to take water several times per day.  Given his overall improvement patient was discharged after 2 days with plans to complete a 5-day course of doxycycline and prednisone. He was also recommended to use Duoneb nebulizer up to 4 times per day as opposed to his MDI as his ability to use MDI inhaler significantly impaired and likely derived little benefit. Only exception to this would be when he is at his day program when nebulizer not available, and MDI was provided for this situation.     Hypernatremia   Sodium 146 prior to discharge, as discussed patient was encouraged to  "be offered water with every meal. Likely exacerbated by normal saline bolus upon admission.     Physical Exam on day of Discharge:  Blood pressure (!) 143/76, pulse 66, temperature 98.8  F (37.1  C), temperature source Axillary, resp. rate 18, weight 85.3 kg (188 lb 1.6 oz), SpO2 93 %.  General: Not oriented to person/place, able to state \"yes\", does not appear in distress   HEENT: No scleral icterus, MMM  Respiratory: Faint expiratory wheezing in anterior lung fields, normal work of breathing without use of accessory muscles   Heart/CV: RRR, no murmurs, rubs or gallops  Abdomen/GI: Soft, NT and ND. BS+  Extremities/MSK: No lower extremity edema, warm   Skin: No rash or jaundice   Neuro: Not oriented to place/time/situation, resting tremor more pronounced in upper extremities            Pending Results:   None           Discharge Medications:     Discharge Medication List as of 8/17/2019  2:32 PM      START taking these medications    Details   benzonatate (TESSALON) 100 MG capsule Take 1 capsule (100 mg) by mouth 3 times daily as needed for cough, Disp-60 capsule, R-0, E-Prescribe      doxycycline (VIBRAMYCIN) 50 MG/5ML SYRP Take 10 mLs (100 mg) by mouth every 12 hours, Disp-50 mL, R-0, E-Prescribe      !! Ipratropium-Albuterol (COMBIVENT RESPIMAT)  MCG/ACT inhaler Inhale 1 puff into the lungs 4 times daily, Disp-4 g, R-0, E-PrescribeTo be used at day program if nebulizer not available      predniSONE (DELTASONE) 20 MG tablet Take 2 tablets (40 mg) by mouth daily, Disp-4 tablet, R-0, E-Prescribe       !! - Potential duplicate medications found. Please discuss with provider.      CONTINUE these medications which have CHANGED    Details   !! ipratropium - albuterol 0.5 mg/2.5 mg/3 mL (DUONEB) 0.5-2.5 (3) MG/3ML neb solution Four times daily, nebulization. alternate with combivent, Disp-10 vial, R-3, E-Prescribe       !! - Potential duplicate medications found. Please discuss with provider.      CONTINUE these " medications which have NOT CHANGED    Details   amLODIPine (NORVASC) 10 MG tablet Take 10 mg by mouth At Bedtime, Historical      carbidopa-levodopa (SINEMET CR)  MG per CR tablet Take 1 tablet by mouth At Bedtime, Disp-1 tablet, R-0, HistoricalPatient reported medication      carbidopa-levodopa (SINEMET)  MG per tablet Take 1-2 tablets by mouth 5 times daily Takes 2 tablets at 6 AM, 1.5 tablets at 9 AM, 2 tablets at noon, 1.5 tablets at 3 PM, 1.5 tablets at 6 PM. Can take additional 0.5 tablet in the middle of the night if needed., Disp-90 tablet, Historical      cholecalciferol (VITAMIN D) 1000 UNIT tablet Take 1 tablet (1,000 Units) by mouth every morning, Disp-90 tablet, R-1, Historical      !! donepezil (ARICEPT) 10 MG tablet Take 1 tablet (10 mg) by mouth At Bedtime, Disp-30 tablet, R-11, Historical      !! donepezil (ARICEPT) 5 MG tablet Take 1 tablet (5 mg) by mouth every morning, Disp-30 tablet, R-11, Historical      FLOVENT  MCG/ACT Inhaler INHALE 2 PUFFS INTO THE LUNGS TWICE DAILY, Disp-36 g, R-1, E-Prescribe      guaiFENesin (ROBITUSSIN) 20 mg/mL SOLN solution Take 10 mLs by mouth every 4 hours as needed for cough, Disp-120 mL, R-3, E-Prescribe      hydrocortisone (WESTCORT) 0.2 % external cream Apply sparingly to affected area once a week after patient's bath.Disp-45 g, [id number]      !! ipratropium - albuterol 0.5 mg/2.5 mg/3 mL (DUONEB) 0.5-2.5 (3) MG/3ML neb solution Take 1 vial by nebulization every 6 hours as needed for shortness of breath / dyspnea or wheezing, Historical      !! Ipratropium-Albuterol (COMBIVENT RESPIMAT)  MCG/ACT inhaler Inhale 1 puff into the lungs 2 times daily Alternate with duo neb, Disp-4 g, R-4, E-Prescribe      lactobacillus rhamnosus, GG, (CULTURELL) capsule Take 1 capsule by mouth every morning, Disp-60 capsule, R-11, Historical      methylcellulose (CITRUCEL) 500 MG TABS tablet Take 1 tablet (500 mg) by mouth daily as needed, Disp-14 each,  R-0, Historical      mirtazapine (REMERON) 15 MG tablet TAKE 1.5 TABLETs (22.5 MG) BY MOUTH AT BEDTIME, Disp-135 tablet, R-1, Transitional      ranitidine (ZANTAC) 75 MG tablet Take 1 tablet (75 mg) by mouth At Bedtime, Disp-30 tablet, Historical      !! venlafaxine (EFFEXOR-XR) 37.5 MG 24 hr capsule Take 37.5 mg by mouth daily, Historical      !! venlafaxine (EFFEXOR-XR) 75 MG 24 hr capsule Take 75 mg by mouth At Bedtime , Historical       !! - Potential duplicate medications found. Please discuss with provider.      STOP taking these medications       amLODIPine (NORVASC) 1 mg/mL SUSP Comments:   Reason for Stopping:                    Discharge Instructions and Follow-Up:     Discharge Procedure Orders   Dme Referral     General info for SNF   Order Comments: Length of Stay Estimate: Long Term Care  Condition at Discharge: Stable  Level of care:skilled   Rehabilitation Potential: Poor  Admission H&P remains valid and up-to-date: Yes  Recent Chemotherapy: N/A  Use Nursing Home Standing OPoorrders: Yes     Reason for your hospital stay   Order Comments: You were hospitalized for wheezing and shortness of breath which is likely due to a asthma and or COPD exacerbation. You improved with antibiotics and steroid medications. You were also evaluated by our speech pathologists who recommended continuing with dysphagia 2 diet with thin liquids.     Follow Up and recommended labs and tests   Order Comments: Follow up with Nursing home physician.  No follow up labs or test are needed.    I recommend you see your primary care provider within one week for discharge.     Additional Discharge Instructions   Order Comments: I recommend you finish your course of prednisone, you will take 40 mg every morning for two more days. I also recommend taking doxycyline 100 mg twice daily for 2.5 more days to complete a five day course. I recommend continuing your current diet of dysphagia 2 with thin liquids as recommended by our speech  pathologist. If wheezing recurs you should use the Duoneb nebulizer first up to four times per day, if you are at your day program and do not have access to a nebulizer I will prescribe you an MDI (inhaler) which ever be used.     Activity - Up with nursing assistance     Order Specific Question Answer Comments   Is discharge order? Yes      Encourage PO fluids     Full Code     Order Specific Question Answer Comments   Code status determined by: Discussion with patient/legal decision maker      Speech Language Path Adult Consult   Order Comments: Evaluate and treat as clinically indicated.    Reason:  For outpatient follow-up regarding swallowing and assessment of current diet as above     Fall precautions     Advance Diet as Tolerated   Order Comments: Follow this diet upon discharge: Orders Placed This Encounter      Snacks/Supplements Adult: Boost Shake; Between Meals      Dysphagia Diet Level 2 [facility] Altered Nectar Thickened Liquids (pre-thickened or use instant food thickener)    Please ensure patient has water/gatorade offered multiple times per day especially with meals to prevent dehydration     Order Specific Question Answer Comments   Is discharge order? Yes              Discharge Disposition:   Long Term Care Facility          Condition on Discharge:   Discharge condition: Stable   Code status on discharge: Full Code      Code status was discussed with wife several times this admission. She felt that making decision to change code status be discussed with family's primary provider.     Date of service: 8/17/2019    The patient was discussed with Dr. Woodruff.     Tima Butler  [phone]

## 2019-08-20 NOTE — LETTER
2019    To Whom It May Concern  RE: Sanjay Cano ( 1945)        Please make the following changes to Sanjay's medication dosing schedule    1. Flovent - 2 puffs BID at 0800 and 2100  - Continue as ordered    2. Combivent - BID at 1100 and 1700  - Should only be given while patient is at day program, since nebulizer is not available     3. Duoneb QID at 0800; 1200; 1530; 1900  - Should only be given when patient IS NOT at day program  - Duoneb and Combivent should not be alternated. It could result in too much medication.     4. Please administer bacitracin daily as needed to left eye irritation    For additional questions or concerns, please contact my clinic at the number listed above.       Sincerely,          Dr. Bharath Buchanan

## 2019-08-20 NOTE — LETTER
[address] [address]  [phone]          2019    RE: Sanjay Cano                                                                                                                      [birth year]            To nursing staff at [facility],    Please follow this schedule for pt inhaled medications  He would like the meds as follows to be given at   duoneb 8a  flovent 830a  douneb 12p  duoneb 330p  duoneb 7p   flovent 730p    No combivent at this time scheduled    Any questions contact my office at number above.      Sincerely,           Bharath Buchanan MD

## 2019-08-20 NOTE — TELEPHONE ENCOUNTER
9/9 is fine,  These instructions are taken from doctor's discharge summary:  Flovent schedule is fine,   duoneb is fine when not at day program,  combivent is to be given only while Sanjay is at day program because nebulizer is not available.   duoneb and combivent should not be alternated, and could result in too much medication.     Bacitracin order signed, please notify, thanks Bharath

## 2019-08-20 NOTE — TELEPHONE ENCOUNTER
Reason for call:  Other   Patient called regarding (reason for call): call back  Additional comments:   Caren is requesting a call back to discuss when Sanjay should be seen by Dr. Buchanan. The hospital requested he make an appointment within 1 week after discharge (discharge date 8/17/19, no available appointments in this time frame).    Caren also has questions regarding Sanjay's inhaler.    Caren also has questions regarding the prescribed bacitracin and recurring crusting of Sanjay's eye. She is requesting a prn order for bacitracin in order for the nursing home to continue using this.     Phone number to reach patient:  Home number on file [phone] (home)    Best Time:  any    Can we leave a detailed message on this number?  YES

## 2019-08-20 NOTE — TELEPHONE ENCOUNTER
Dr. Buchanan,  Spoke with Caren, she has the following questions    Sanjay was recently hospitalized. On discharge paperwork, it instructs patient to follow up in 1 week. Sanjay has an appointment scheduled 09/09/2019  1. This this appointment fine for follow up, or do you need to see patient sooner?    Medication changes were made in the hospital. Currently patient is taking:  - Flovent - 2 puffs BID at 0800 and 2100  - Combivent - BID at 1100 and 1700  - Duoneb QID at 0800; 1200; 1530; 1900    Caren states Sanjay sounds better and is breathing better on this medication regimen. Caren wants to know:   1. Can patient continue current schedule of medications?  2. Is it possible for patient to get too much respiratory medication?     Caren states the patient was getting Bacitracin applied to the L corner of his eye lid ans it was getting crusty and red. Medication initially prescribed by optometry, with instruction to use for 14-days and then as needed. [facility] is telling Caren they need a new Rx  - Caren is requesting a prescription for Bacitracin once daily as needed  - Rx cued    Please advise    Thank You!  Ibeth Saba, RN  Triage Nurse

## 2019-08-21 NOTE — TELEPHONE ENCOUNTER
Dr Buchanan    They have a neb machine at the day program now    Duo neb is given at 8a, 12p, 330p, 7p    combivent is being given at 11a and 5p this is an hour apart from the duoneb    flovent is given at 830a and 9p    Danuta is wanting to try this  duoneb 8a  flovent 830a  combivent 12p  no duoneb at noon hour  combivent 4p  Neb 7p  flovent at 9p   but she is concerned pt is not given the neb 3xday with this schedule    He is at the day program from 9a to 130p    So could it be considered to have him take the duoneb at 130p and skip the combivent at 12p and 4p    Joana Gandhi RN   [facility]

## 2019-08-21 NOTE — TELEPHONE ENCOUNTER
Janine with Dr Buchanan    He would like the meds as follows to be given at   duoneb 8a  flovent 830a  douneb 12p  duoneb 330p  duoneb 7p   flovent 730p    No combivent at this time scheduled    Danuta agreed to plan    fax to facility   Also spoke with Daniel shah nurse today, he will get the fax and if any questions contact us    Joana Gandhi RN   [facility]

## 2019-08-22 NOTE — TELEPHONE ENCOUNTER
Dr. Buchanan,    Spoke with patient's spouse, Danuta.    Currently, the patient has duoneb scheduled for 7 pm and flovent scheduled for 7:30 pm. She wants to make sure that this is okay and not too close together. Pt used to take the flovent at 9 pm and this is the latest that he could take it if that would be better.     She also spoke with the speech therapist at his facility who said that his mouth should be swabbed after he eats with mouthwash in order to help prevent pneumonia. She is wondering if this needs to be done after he is given his applesauce with his medications.    She is hoping to get a response Monday morning if possible. Please advise.    Lita Mckeon RN  [facility]

## 2019-08-22 NOTE — TELEPHONE ENCOUNTER
Recommend giving the medications exactly as described: the albuterol 30 min before the fluticasone inhaler should work perfectly to 'open' the bronchial tubes up with the albuterol to enable more of the steroid inhaler to get in afterwards.  Fine with mouthwash plan.  Please notify, thanks Bharath

## 2019-08-22 NOTE — TELEPHONE ENCOUNTER
Caren returned a call to the clinic with a follow up questions regarding Sanjay's medication regimen.

## 2019-08-23 NOTE — TELEPHONE ENCOUNTER
Pt's wife wants to know if pt can take an inhaler when not at home.    Please call [phone] soheila

## 2019-08-23 NOTE — TELEPHONE ENCOUNTER
LVM for the patient's wife to call back to discuss an inhaler for outside the home. Josefina Campos RN August 23, 2019 2:08 PM

## 2019-08-26 NOTE — TELEPHONE ENCOUNTER
Dr. Buchanan,    Spoke with pt's spouse. She is wondering if it is okay to give the duoneb at home if needed for an emergency such as wheezing or difficulty breathing for the patient when he is at home with her? She said that the facility would give the medication to her at her request.    She said that she should be able to give the duoneb, but in the case that something isn't working such as the machine, can she give combivent?    Please advise.    Lita Mckeon RN  [facility]

## 2019-08-26 NOTE — TELEPHONE ENCOUNTER
Pt's spouse was notified of provider message below and verbalized understanding and agreement with plan.    Lita Mckeon RN  [facility]

## 2019-08-26 NOTE — TELEPHONE ENCOUNTER
Called patient's spouse. Notified her of provider's message (okay for duoneb and/or combivent). She verbalized understanding. She states that she did duoneb last time he was home and it worked fine. She just wanted a back up for in case.    Lita Mckeon RN  [facility]

## 2019-09-04 NOTE — TELEPHONE ENCOUNTER
Dr. Buchanan,    Spoke with patient's spouse. She states that pt is coming into the clinic for f/u appt on 09/09/19. She was advised that pt should have follow up imaging done for pulmonary nodule. She is wondering if an order can be placed so that she can schedule for same day that he has apt here.     Writer notes that CT chest w/o contrast done 05/09/18 with findings of pulmonary nodule. Cued new order for CT. Please review/sign or advise.    RN's-- spouse requests that we leave a detailed message on her home phone if she does not answer. She already has imaging scheduling number.    Lita Downs RN  [facility]

## 2019-09-06 NOTE — TELEPHONE ENCOUNTER
Left detailed message on pt home number    Joana Gandhi RN   [facility]

## 2019-09-09 NOTE — PROGRESS NOTES
Subjective     Sanjay Cano is a 73 year old male who presents to clinic today with his wife for the following health issues:    HPI   Concern - Parkinson's Follow up    Cough/Cold  Wife states that patient has been exhibiting cold symptoms the last few days, notes that he seems lethargic today. She first noticed him coughing last night, states that he got a nebulizer in the evening. She is unsure how he did last night as she does not stay at his facility. Patient has a lung nodule, wife is wondering whether he should have a follow up scan completed.    Eye  Patient expresses having difficulty with his vision. He has seen Ophthalmology, was told that he needs an increase in strength in the left lens of his glasses, to go back to his old glasses if not helpful. These new glasses are expensive, his wife is wondering whether or not to go through with this.    Medications  Wife states that he was prescribed 100 mg Tessalon TID at the hospital, she is unsure if this has been helpful or if he should continue to take it.    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)     Primary insomnia     Other emphysema (H)     Pulmonary nodules     Thyroid nodule     Family history of thyroid cancer     H/O recurrent urinary tract infection     Essential hypertension with goal blood pressure less than 140/90     Parkinson's disease (H)     Wheezing     COPD exacerbation (H)     Past Surgical History:   Procedure Laterality Date     EYE SURGERY       ORTHOPEDIC SURGERY       PHACOEMULSIFICATION CLEAR CORNEA WITH STANDARD INTRAOCULAR LENS IMPLANT  5/21/2014    Procedure: PHACOEMULSIFICATION CLEAR CORNEA WITH STANDARD INTRAOCULAR LENS IMPLANT;   Surgeon: Tomy Hunter MD;  Location: [facility]     PHACOEMULSIFICATION CLEAR CORNEA WITH TORIC INTRAOCULAR LENS IMPLANT  2014    Procedure: PHACOEMULSIFICATION CLEAR CORNEA WITH TORIC INTRAOCULAR LENS IMPLANT;  Surgeon: Tomy Hunter MD;  Location: [facility]       Social History     Tobacco Use     Smoking status: Former Smoker     Packs/day: 0.01     Years: 40.00     Pack years: 0.40     Types: Cigarettes     Last attempt to quit: 2008     Years since quittin.1     Smokeless tobacco: Never Used     Tobacco comment: very passive cigarettes in 6 months   Substance Use Topics     Alcohol use: No     Alcohol/week: 0.0 oz     Family History   Problem Relation Age of Onset     Cerebrovascular Disease Mother      Diabetes Mother      Gastrointestinal Disease Mother      Hypertension Mother      Neurologic Disorder Father      Cerebrovascular Disease Father      Cerebrovascular Disease Maternal Grandfather      Cancer Paternal Grandmother      Other - See Comments Sister         gi - unknown         Current Outpatient Medications   Medication Sig Dispense Refill     order for DME Equipment being ordered: Nebulizer with mask and tubing    For delivery please call Danuta [phone] 1 Device 0     amLODIPine (NORVASC) 10 MG tablet Take 10 mg by mouth At Bedtime       bacitracin 500 UNIT/GM ophthalmic ointment Apply to affected area, crusting at corner of eyelids once daily as needed 1 g 3     benzonatate (TESSALON) 100 MG capsule Take 1 capsule (100 mg) by mouth 3 times daily as needed for cough 60 capsule 0     carbidopa-levodopa (SINEMET CR)  MG per CR tablet Take 1 tablet by mouth At Bedtime 1 tablet 0     carbidopa-levodopa (SINEMET)  MG per tablet Take 1-2 tablets by mouth 5 times daily Takes 2 tablets at 6 AM, 1.5 tablets at 9 AM, 2 tablets at noon, 1.5 tablets at 3 PM, 1.5 tablets at 6 PM. Can take additional 0.5 tablet in the middle of the night if needed. 90 tablet      cholecalciferol  (VITAMIN D) 1000 UNIT tablet Take 1 tablet (1,000 Units) by mouth every morning 90 tablet 1     donepezil (ARICEPT) 10 MG tablet Take 1 tablet (10 mg) by mouth At Bedtime 30 tablet 11     donepezil (ARICEPT) 5 MG tablet Take 1 tablet (5 mg) by mouth every morning 30 tablet 11     doxycycline (VIBRAMYCIN) 50 MG/5ML SYRP Take 10 mLs (100 mg) by mouth every 12 hours 50 mL 0     FLOVENT  MCG/ACT Inhaler INHALE 2 PUFFS INTO THE LUNGS TWICE DAILY 36 g 1     guaiFENesin (ROBITUSSIN) 20 mg/mL SOLN solution Take 10 mLs by mouth every 4 hours as needed for cough 120 mL 3     hydrocortisone (WESTCORT) 0.2 % external cream Apply sparingly to affected area once a week after patient's bath. (Patient taking differently: Apply sparingly to affected area once a week on Fridays after patient's bath.) 45 g 1     ipratropium - albuterol 0.5 mg/2.5 mg/3 mL (DUONEB) 0.5-2.5 (3) MG/3ML neb solution Four times daily, nebulization. alternate with combivent 10 vial 3     ipratropium - albuterol 0.5 mg/2.5 mg/3 mL (DUONEB) 0.5-2.5 (3) MG/3ML neb solution Take 1 vial by nebulization every 6 hours as needed for shortness of breath / dyspnea or wheezing       Ipratropium-Albuterol (COMBIVENT RESPIMAT)  MCG/ACT inhaler Inhale 1 puff into the lungs 4 times daily 4 g 0     Ipratropium-Albuterol (COMBIVENT RESPIMAT)  MCG/ACT inhaler Inhale 1 puff into the lungs 2 times daily Alternate with duo neb 4 g 4     lactobacillus rhamnosus, GG, (CULTURELL) capsule Take 1 capsule by mouth every morning 60 capsule 11     methylcellulose (CITRUCEL) 500 MG TABS tablet Take 1 tablet (500 mg) by mouth daily as needed 14 each 0     mirtazapine (REMERON) 15 MG tablet TAKE 1.5 TABLETs (22.5 MG) BY MOUTH AT BEDTIME 135 tablet 1     predniSONE (DELTASONE) 20 MG tablet Take 2 tablets (40 mg) by mouth daily 4 tablet 0     ranitidine (ZANTAC) 75 MG tablet Take 1 tablet (75 mg) by mouth At Bedtime 30 tablet      venlafaxine (EFFEXOR-XR) 37.5 MG 24 hr  capsule Take 37.5 mg by mouth daily       venlafaxine (EFFEXOR-XR) 75 MG 24 hr capsule Take 75 mg by mouth At Bedtime        Allergies   Allergen Reactions     Azithromycin      Erythromycin Other (See Comments)     Pathological fractures     Levaquin [Levofloxacin]      Fracture prevention     Seasonal Allergies      BP Readings from Last 3 Encounters:   09/09/19 104/76   08/17/19 (!) 143/76   06/07/19 114/78    Wt Readings from Last 3 Encounters:   08/14/19 85.3 kg (188 lb 1.6 oz)   04/22/19 82.6 kg (182 lb 1.6 oz)   11/07/18 79.8 kg (176 lb)            Reviewed and updated as needed this visit by Provider         Review of Systems   POSITIVE cough    Denies headache, insomnia, chest pain, shortness of breath, heartburn, bowel issues, bladder issues, neck pain, back pain, hip pain, knee pain, ankle pain, or foot pain. Remainder of ROS is negative unless otherwise noted above or in HPI.    This document serves as a record of the services and decisions personally performed and made by Bharath Buchanan MD. It was created on his behalf by Hosea Rome, trained medical scribe. The creation of this document is based on the provider's statements to the medical scribe.  Hosea Rome 11:47 AM September 9, 2019        Objective    /76   Pulse 70   Temp 98.6  F (37  C) (Temporal)   Resp 12   SpO2 96%   There is no height or weight on file to calculate BMI.  Physical Exam   GENERAL: healthy, alert and no distress  RESP: inspiratory and expiratory wheezing, no rales. insp wheeze better after neb  CV: regular rate and rhythm, normal S1 S2, no S3 or S4, no murmur, click or rub, no peripheral edema and peripheral pulses strong    Diagnostic Test Results:  Labs reviewed in Epic  none         Assessment & Plan   (J43.8) Other emphysema (H)  (primary encounter diagnosis)  Comment: More symptomatic recently. Slightly better after neb  Plan: albuterol (ACCUNEB) nebulizer solution 1.25 mg,        order for DME         Prescription given for albuterol neb. Follow up as needed.    (R91.8) Pulmonary nodules  Comment: Patient will call to schedule imaging.  Plan: Will notify with results. Follow up as needed.    (R06.2) Wheezing  Comment: More symptomatic recently.  Plan: order for DME        Prescription given for albuterol neb. Follow up as needed.      Patient Instructions   Schedule imaging for lung nodule.      The information in this document, created by the medical scribe for me, accurately reflects the services I personally performed and the decisions made by me. I have reviewed and approved this document for accuracy prior to leaving the patient care area.  September 9, 2019 11:48 AM    Bharath Buchanan MD  [facility]

## 2019-09-13 NOTE — TELEPHONE ENCOUNTER
Caren (wife) calls and says that Sanjay takes Zantac 75 mg @ HS and that she heard on the news that this medication causes cancer. Caren wants to know if this is true. RN then referred Caren to a pharmacy, for assistance with her question. Caren says that she will call a pharmacy now.    Reason for Disposition    Health Information question, no triage required and triager able to answer question    Additional Information    Negative: [1] Caller is not with the adult (patient) AND [2] reporting urgent symptoms    Negative: Lab result questions    Negative: Medication questions    Negative: Caller can't be reached by phone    Negative: Caller has already spoken to PCP or another triager    Negative: RN needs further essential information from caller in order to complete triage    Negative: Requesting regular office appointment    Negative: [1] Caller requesting NON-URGENT health information AND [2] PCP's office is the best resource    Protocols used: INFORMATION ONLY CALL-A-

## 2019-09-14 NOTE — TELEPHONE ENCOUNTER
Clinic Action Needed:No    Reason for Call: , staff from [facility], calling regarding the chest X/R that Sanjay had today to rule out pneumonia. She has the results and is wondering if the on call provider for Dr Bharath Buchanan would like to see it or start any treatment as may be needed.     2:36pm Via the [facility]  I paged Dr Joel Wegener as he is on call and to be contacted via his cell phone. I gave the  the phone number of [phone] to contact  who has all the information for Dr Wegener. I informed  that if she had not heard back from a provider within 20 minutes, to call us back.     Routed to: None    Haritha Mckeon RN, [facility]

## 2019-09-16 NOTE — TELEPHONE ENCOUNTER
Reason for call:  Other   Patient called regarding (reason for call): call back  Additional comments:   [facility] calling for results of xray from 8/14/19. Notified they would receive a call back as soon as available.     Phone number to reach patient:  Cell number on file:    No relevant phone numbers on file.    or Other phone number:    [phone] RN at [facility]    Best Time:  any    Can we leave a detailed message on this number?  NO

## 2019-09-16 NOTE — TELEPHONE ENCOUNTER
Spoke with the nurse staff at [facility]  We have not received the xray results from them, they are to refax to us, at the fax number     Joana Gandhi RN   [facility]

## 2019-09-16 NOTE — TELEPHONE ENCOUNTER
Patient's wife would like a call back to discuss how to treat patient's cough. The medication that was given is not working. Can call [phone] which Is the nurse. And also call wife at [phone]

## 2019-09-17 NOTE — TELEPHONE ENCOUNTER
See other TE regarding pt  No furhter action needed on this encounter    Joana Gandhi RN   [facility]

## 2019-09-17 NOTE — TELEPHONE ENCOUNTER
Dr Chanel Godwin pt nurse today  Pt is doing his nebs scheduled  He got a prn on 9/11 and 9/13  Taking the inhalers ordered, not always sure he is getting the med in    Would a spacer be good to try?    Message given regarding the Xray results to both facility and wife    Spacer cued if you agree    Joana Gandhi RN   [facility]

## 2019-09-19 NOTE — TELEPHONE ENCOUNTER
Received call from Daniel. They have two orders for ipratropium - albuterol 0.5 mg/2.5 mg/3 mL (DUONEB) 0.5-2.5 (3) MG/3ML neb solution     One for PRN every 6 hours, and scheduled 4 times a daily.  Question-- since scheduled 4 x a day? What is the time between that PRN should be given.    Writer reviewed pt's chart and advised that the only order we currently have is for QID (see TE 08/20/19) and letters. Daniel will discontinue PRN order.

## 2019-09-19 NOTE — TELEPHONE ENCOUNTER
[facility] elder care states that they are still trying to clarify as discontinuing the PRN is opposite of what they think is appropriate for the pt's care

## 2019-09-19 NOTE — TELEPHONE ENCOUNTER
[facility] is requesting clarification on pt's prescription for ipratropium - albuterol 0.5 mg/2.5 mg/3 mL (DUONEB) 0.5-2.5 (3) MG/3ML neb solution    Please call [phone] sandra

## 2019-09-20 NOTE — TELEPHONE ENCOUNTER
Spoke with Danuta meds are showing no interactions, ok to also have cough med    The pharmacy would know if the ranitidine was on the recall list, check with them    Joana Gandhi RN   [facility]

## 2019-09-20 NOTE — TELEPHONE ENCOUNTER
Reason for call:  Other   Patient called regarding (reason for call): call back  Additional comments:   Caren requesting a call back to discuss if Sanjay should continue to take Zantac and if there are any medication interactions with the prednisone and doxycycline. Should the cough medicine still be administered or should it be discontinued.     Phone number to reach patient:  Cell number on file:    [phone]       Best Time:  any    Can we leave a detailed message on this number?  YES

## 2019-09-20 NOTE — TELEPHONE ENCOUNTER
Recommend reordering liquid doxy (from 6 weeks ago); I believe what Sanjay has is the same as then- COPD exacerbation, which is what I would call it rather than 'bronchitis'  Order signed, please notify, thanks Bharath

## 2019-09-20 NOTE — TELEPHONE ENCOUNTER
Patient's wife returned call to clinic, Caren had follow up questions regarding prescriptions for predniSONE (DELTASONE) and doxycycline (VIBRAMYCIN) and specific dosing instructions. Advised patient's wife that care facility will space dosing as appropriate with his other medications. Patient's wife verbalized understanding and is in agreement with plan    Ibeth Saba RN  Triage Nurse

## 2019-09-20 NOTE — TELEPHONE ENCOUNTER
See other encounter regarding plan    Joana Gandhi RN   [facility]

## 2019-09-20 NOTE — TELEPHONE ENCOUNTER
Was put into [facility] , detailed message left with dosing instructions  Wife notified of added med and dosing instructions    Joana Gandhi RN   [facility]

## 2019-09-20 NOTE — TELEPHONE ENCOUNTER
Dr Chanel Aranda RN at the facility was asking if it would make sense for pt to have a oral steroid?    Deanna Tipton notified, of doxy order    Joana Gandhi RN   [facility]

## 2019-09-20 NOTE — TELEPHONE ENCOUNTER
Dr Chanel Tipton is wanting to know what times are best for the prednisone    Since it can disrupt sleep for some should it be 8a and 4p or do you want it every 12 hours?    The doxy will be every 12 hours    Advise    Joana Gandhi RN   [facility] [place]

## 2019-09-20 NOTE — TELEPHONE ENCOUNTER
Dr Chanel Tipton stayed overnight with Sanjay last night and the day before  Pt would have a cough spell  then pt had a glob of mucous when he coughs and Danuta was able to get him to spit it out, yellow then lighter yellow and then clear and sticky and Danuta would have to help him by pulling it out of his mouth    Apparently one of nurses at the [facility] told Danuta that Sanjay has bronchitis   Danuta is very upset today    1. As far as a PRN neb did you want the facility to have instruction to give if needed every 4 to 6 hours from the hours of 7p to 8a when the scheduled doses are given    Advisrené Gandhi, RN   [facility]

## 2019-09-20 NOTE — TELEPHONE ENCOUNTER
Please contact nursing home; ask them to give prednisone dosing with breakfast and supper, then inform Danuta Sinha

## 2019-09-20 NOTE — TELEPHONE ENCOUNTER
Caren states the RNs at [facility] have been giving conflicting statements regarding the administration of the nebulizer.     Caren requesting a call back to discuss the updated plan of care.     [phone]

## 2019-09-23 NOTE — TELEPHONE ENCOUNTER
[facility] care called to inform Dr. Buchanan and the care team that Pt had a fall yesterday 09/22/2019 at 9:00Pm. He did not have any injuries.  Home care can be reached at [phone]

## 2019-09-23 NOTE — TELEPHONE ENCOUNTER
Routing to provider - Chanel - please review and advise as appropriate    Last office visit 9/9/19

## 2019-09-23 NOTE — TELEPHONE ENCOUNTER
Spoke with Sid at [facility], message given    Joana Gandhi, RN   [facility]

## 2019-09-30 NOTE — TELEPHONE ENCOUNTER
Patient spouse called in stated that she received a letter from [facility] regarding her  Spouse's medication.    Patient spouse stated that medica stated they will not fill the medication called Vibramycin syp 50 mg/5ml.     Patient spouse stated they only filled it for a 30 day supply on September 20th.     Patient spouse stated that medica will need an authorization from the provider to fill any more of the medication.    Patient spouse cam be reached at [phone]  Okay to [facility]

## 2019-09-30 NOTE — TELEPHONE ENCOUNTER
Returned call to patient's wife Caren. Caren was concerned as she received a letter from [facility] that the patient's doxycycline (VIBRAMYCIN) 50 MG/5ML SYRP would no be refilled without authorization from provider    Writer advised patient's wife that doxycycline (VIBRAMYCIN) is the antibiotic patient was prescribed and patient only needs to take this medication for 10 days. He should not need refill. Patient's wife verbalized understanding.     Patient's wife states the patient is doing much better since starting doxycycline (VIBRAMYCIN) and predniSONE. Wife states he has more energy and is only coughing at night.     Wife has no further questions at this time    Thank You!  Ibeth Saba, RN  Triage Nurse

## 2019-10-02 NOTE — TELEPHONE ENCOUNTER
Pt's states that the pt is on zantac and it is being pulled from the shelf. She is wondering what the pt should do?    Please call [phone] soheila

## 2019-10-02 NOTE — TELEPHONE ENCOUNTER
Dr Buchanan    See message   ranitidine 75mg at HS  Per [facility] pt takes daily    Pharm cued    Joana Gandhi, RN   [facility]

## 2019-10-02 NOTE — LETTER
October 4, 2019      To Whom It May Concern:  RE: Sanjay Cano (1945)      Please discontinue patient's ranitidine and administer famotidine one tablet daily. For questions or concerns, please contact the clinic at the number listed above.        Sincerely,            Dr. Bharath Buchanan

## 2019-10-04 NOTE — TELEPHONE ENCOUNTER
Dorys with Dr. Stanley who suggested 20 mg of famotidine (PEPCID) daily until PCP can review.     Called Daniel to notify. Rx sent in for Pepcid 20 mg daily for 1 week supply.

## 2019-10-04 NOTE — TELEPHONE ENCOUNTER
Nurse from [facility] is calling back and states he is confused about the order that was given to him and would like clarification. Need mg dosage and other info. Can call carmella at [phone].

## 2019-10-04 NOTE — TELEPHONE ENCOUNTER
Discontinue ranitidine; switch to famotidine one tab over the counter daily- please notify, thanks Bharath

## 2019-10-04 NOTE — TELEPHONE ENCOUNTER
Called Daniel, he states that he would like to know what dose pt should be taking of famotidine. Advised Daniel that request for RX was sent to PCP who is not back until Monday. Daniel is wondering what to do in the meantime.

## 2019-10-04 NOTE — TELEPHONE ENCOUNTER
Dr. Buchanan/Provider [place];  [facility] will need prescription for famotidine.    Called patient's wife and relayed provider message below. Patient's wife verbalized understanding.    Pharmacy cued    Please advise    Thank You!  Ibeth Saba RN  Triage Nurse

## 2019-10-08 NOTE — TELEPHONE ENCOUNTER
Dr Buchanan    See message from SNF    CMP is needed dated from 9/1/19 for their records    Also what dose Tamiflu would pt use    Joana Gandhi RN   [facility]

## 2019-10-08 NOTE — TELEPHONE ENCOUNTER
Reason for call:  Other   Patient called regarding (reason for call): call back  Additional comments:   [facility] requesting what mg of tamiflu is approved to provide to Sanjay. Requesting to complete blood tests to check kidney function.     Phone number to reach patient:  Home number on file:    [phone]   Manoj at [facility]: [phone]  Manoj needs to be notified if this is approved.     Best Time:  any    Can we leave a detailed message on this number?  YES

## 2019-10-09 NOTE — TELEPHONE ENCOUNTER
Dr Buchanan    See CT chest results    [facility] called. Pt results show  IMPRESSION:   1. Unchanged left upper and lower lobe nodules, recommend follow-up  chest CT in 12 months.  2. Unchanged rounded atelectatic scar of the right lower lobe.  3. Bibasilar fibroatelectasis with dendriform ossifications,  potentially representing chronic aspirations.     [Consider Follow Up: Lung nodule]    Joana Gandhi RN   [facility]

## 2019-10-09 NOTE — TELEPHONE ENCOUNTER
Left detailed message on wifes home phone    Called pharmacy,  They have not been treating anyone at the facility for flu  Called and spoke with Daniel, nurse at [facility], gave him the verbal order for the CMP, also faxed same to facility    Asked why there is a need for the tamiflu, he stated they do it prophylactically, even if there is no flu in the facility  The dose is based on pt GFR    Joana Gandhi, GERARD   [facility]

## 2019-10-09 NOTE — TELEPHONE ENCOUNTER
Please ask pharm to recommend Tamiflu dose, thanks.  Please notify spouse Danuta- followup chest CT shows stable nodules, and possible chronic aspiration changes. Recommendation is to repeat in 12 months. Please notify, thanks Bharath

## 2019-10-09 NOTE — TELEPHONE ENCOUNTER
Left message with Manoj to return call to clinic. Need to find out if patient is needing to be on Tamiflu, so why they need dosing information, and if it needs to be sent to pharmacy. Also to let them know CMP was ordered and we can fax over to them.     Betina Joe RN   [facility]

## 2019-10-11 NOTE — TELEPHONE ENCOUNTER
Reason for call:  Other   Patient called regarding (reason for call): call back  Additional comments: Sanjay has become increasingly tired over the past 3 days. Danuta is inquiring if thickened liquids can cause an individual to become dehydrated, possibly causing his exhaustion.    Danuta is inquiring what may lead to dehydration and what she should watch for.     Phone number to reach patient:  Home number on file [phone] (home)  Danuta will be home after 12:30PM.     Best Time:  any    Can we leave a detailed message on this number?  YES

## 2019-10-11 NOTE — TELEPHONE ENCOUNTER
Dr Chanel Tipton would like to know what is ment by,   the  possible chronic aspiration changes on the imaging    She also wants to be informed about the dose of tamiflu when that is decided after GFR    Ok to leave a detailed message on the home number    Joana Gandhi RN   [facility]

## 2019-10-11 NOTE — TELEPHONE ENCOUNTER
Left vm for Manoj to return call to clinic    Left vm for Danuta to return call to clinic    Joana Gandhi RN   [facility]

## 2019-10-11 NOTE — TELEPHONE ENCOUNTER
Danuta returned call to clinic stating she received contradictory information that she would like to clarify.    Notified she would receive a call back when RN available.     Please advise.   [phone]

## 2019-10-11 NOTE — TELEPHONE ENCOUNTER
Per Manoj  Pt has free water protocol/thickened liquid    They will offer fluids frequently    Joana Gandhi RN   [facility]

## 2019-10-11 NOTE — TELEPHONE ENCOUNTER
Called, scarring due to aspiration due to increasing swallow difficulties due to parkinsons; tamaflu dose 75 mg bid x 5 days, then 75 mg once daily x 2 weeks. Left message to call back if questions. Bharath Buchanan MD

## 2019-10-11 NOTE — TELEPHONE ENCOUNTER
Manoj GEE [facility] called regarding the following:     BP: 130/79  T: 97.3  P: 64  O2: 97%  RR: 18    [facility] call back number: [phone]

## 2019-10-11 NOTE — TELEPHONE ENCOUNTER
Dr Buchanan    Speech therapist told Danuta that if free water is given 30 minutes to an hour after meals or pills and if it goes down the wrong pipe, that iit will be absorbed by the body    She wanted to know if this was true    He is on a free water protocol/thickened liquids    Manoj had stated the lab results had been faxed to you for the kidney function, did you see them? For the tamiflu dosing    Joana Gandhi RN   [facility]

## 2019-10-11 NOTE — TELEPHONE ENCOUNTER
I would expect that it is possible for water to be absorbed, but only a very small quantity. Please notify, thanks Bharath Paul dosing placed to be faxed. Please notify, thanks Bharath

## 2019-10-14 NOTE — TELEPHONE ENCOUNTER
derek detailed message on home number for Danuta, with providers message    Order faxed to [facility]    Joana Gandhi RN   [facility]

## 2019-10-25 NOTE — TELEPHONE ENCOUNTER
Reason for call:  Other   Patient called regarding (reason for call):  has buildup in his ears  Additional comments: Pt's long term care is wanting to start him on a 3 day mineral water flush for his ears. Pt's wife would like approval from Dr. Buchanan that this treatment is safe for him.    Phone number to reach patient:  Home number on file [phone] (home) can leave message, if you don't need to leave a voicemail can call mobile # [phone]    Best Time:  n/a    Can we leave a detailed message on this number?  YES

## 2019-10-25 NOTE — TELEPHONE ENCOUNTER
Spoke with patients wife regarding ear flushes. She does not think it is a good idea because the staff is not great at the long term care. She is afraid it will just cause more problems having them do this. She would like Dr. Buchanan's advice further. Writer let her know Dr. Buchanan is not in until Monday, and we will let her know then. She said this was fine.    Betina Joe RN   [facility]

## 2019-10-26 NOTE — TELEPHONE ENCOUNTER
Condition, [phone], nurse at [facility], calls asking for x-ray order.  Wife reported patient got his right pinky finger stuck in his wheelchair and injured it at 3pm.  It is currently very swollen and painful.  They have given patient ice for it.     Paged on-call provider, Rekha Mesa, at 6:43 pm via Smart Web to call FNA back re: Sanjay Cano -[phone] : [birth year] PCP: Chanel. Nursing home wants x-ray order for pt who injury his right fifth digit. Manjinder [phone].  FNA received a call back from Dr. Mesa who says patient should be evaluated in urgent care.  FNA informed Conditon that patient should be seen in either urgent care or ED tonight.  Condition stated she will let patient's wife know and she can either transport him or they can call for transportation.      Reason for Disposition    [1] SEVERE pain AND [2] not improved 2 hours after pain medicine/ice packs    Additional Information    Negative: Serious injury with multiple fractures    Negative: [1] Major bleeding (e.g., actively dripping or spurting) AND [2] can't be stopped    Negative: Amputation    Negative: Sounds like a life-threatening emergency to the triager    Negative: Finger injury is main concern    Negative: Caused by an animal bite    Negative: Caused by a human bite    Negative: Wound looks infected    Negative: Cast problems or questions    Negative: Bullet wound, stabbed by knife, or other serious penetrating wound    Negative: Looks like a broken bone (e.g., knuckle is gone or depressed)    Negative: Looks like a dislocated joint (e.g., crooked or deformed)    Negative: Cut over knuckle (MCP joint)    Negative: Skin is split open or gaping  (or length > 1/2 inch or 12 mm)    Negative: [1] Bleeding AND [2] won't stop after 10 minutes of direct pressure (using correct technique)    Negative: [1] Dirt in the wound AND [2] not removed with 15 minutes of scrubbing    Negative: [1] Numbness (loss of  sensation) of finger(s) AND [2] present now    Negative: High pressure injection injury (e.g., from grease gun or paint gun, usually work-related)    Negative: Sounds like a serious injury to the triager    Protocols used: HAND AND WRIST INJURY-A-AH

## 2019-10-26 NOTE — TELEPHONE ENCOUNTER
Discontinue ear washing; recommend appointment with ENT Dr Christy's office to use suction microscope. Order signed, please notify, thanks Bharath

## 2019-10-28 NOTE — TELEPHONE ENCOUNTER
Spoke with Danuta, she made an appointment for pt be seen today    N: [facility] [phone] Danuta will get the referral info at visit if Dr Buchanan feels he should go     Joana Gandhi, RN   [facility]

## 2019-10-28 NOTE — PROGRESS NOTES
Subjective     Sanjay Cano is a 74 year old male who presents to clinic today for the following health issues:    Patient would like ears cleaned out.     HPI   Joint Pain - right index finger pain     Onset: since Saturday, 10/26/2019    Description:   Location: right fifth finger   Character: swelling, sharp pain     Intensity: severe    Progression of Symptoms: worse    Accompanying Signs & Symptoms:  Other symptoms: swelling and redness    History:   Previous similar pain: no       Precipitating factors:   Trauma or overuse: YES    Alleviating factors:  Improved by: ice    Therapies Tried and outcome: icing - not effective     Wife first noticed that his right fifth finger was swollen on Saturday 10/26. He had become agitated and was resisting his wife as she was trying to get him dressed, seemed to be upset for much of the day. She asked him what happened to the finger, he didn't know. She is concerned that he may have hit his finger on his chair, or somehow caught his finger in the chair. He has not had recent x-rays of the hand. Wife notes that patient felt warm on Saturday night, but the nursing staff reports that he has not had a fever.    Ears  Patient's wife states that he has been less responsive lately, is concerned his ears could be impacted with cerumen.    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)     Primary insomnia     Other emphysema (H)     Pulmonary nodules     Thyroid nodule     Family history of thyroid cancer     H/O recurrent urinary tract infection     Essential hypertension with goal blood pressure less than 140/90     Parkinson's disease (H)     Wheezing     COPD exacerbation (H)     Past  Surgical History:   Procedure Laterality Date     EYE SURGERY       ORTHOPEDIC SURGERY       PHACOEMULSIFICATION CLEAR CORNEA WITH STANDARD INTRAOCULAR LENS IMPLANT  2014    Procedure: PHACOEMULSIFICATION CLEAR CORNEA WITH STANDARD INTRAOCULAR LENS IMPLANT;  Surgeon: Tomy Hunter MD;  Location: [facility]     PHACOEMULSIFICATION CLEAR CORNEA WITH TORIC INTRAOCULAR LENS IMPLANT  2014    Procedure: PHACOEMULSIFICATION CLEAR CORNEA WITH TORIC INTRAOCULAR LENS IMPLANT;  Surgeon: Tomy Hunter MD;  Location: [facility]       Social History     Tobacco Use     Smoking status: Former Smoker     Packs/day: 0.01     Years: 40.00     Pack years: 0.40     Types: Cigarettes     Last attempt to quit: 2008     Years since quittin.2     Smokeless tobacco: Never Used     Tobacco comment: very passive cigarettes in 6 months   Substance Use Topics     Alcohol use: No     Alcohol/week: 0.0 standard drinks     Family History   Problem Relation Age of Onset     Cerebrovascular Disease Mother      Diabetes Mother      Gastrointestinal Disease Mother      Hypertension Mother      Neurologic Disorder Father      Cerebrovascular Disease Father      Cerebrovascular Disease Maternal Grandfather      Cancer Paternal Grandmother      Other - See Comments Sister         gi - unknown         Current Outpatient Medications   Medication Sig Dispense Refill     amLODIPine (NORVASC) 10 MG tablet Take 10 mg by mouth At Bedtime       bacitracin 500 UNIT/GM ophthalmic ointment Apply to affected area, crusting at corner of eyelids once daily as needed 1 g 3     benzonatate (TESSALON) 100 MG capsule Take 1 capsule (100 mg) by mouth 3 times daily as needed for cough 60 capsule 0     carbidopa-levodopa (SINEMET CR)  MG per CR tablet Take 1 tablet by mouth At Bedtime 1 tablet 0     carbidopa-levodopa (SINEMET)  MG per tablet Take 1-2 tablets by mouth 5 times daily Takes 2 tablets at 6 AM, 1.5 tablets at 9 AM, 2 tablets at noon, 1.5  tablets at 3 PM, 1.5 tablets at 6 PM. Can take additional 0.5 tablet in the middle of the night if needed. 90 tablet      cholecalciferol (VITAMIN D) 1000 UNIT tablet Take 1 tablet (1,000 Units) by mouth every morning 90 tablet 1     donepezil (ARICEPT) 10 MG tablet Take 1 tablet (10 mg) by mouth At Bedtime 30 tablet 11     donepezil (ARICEPT) 5 MG tablet Take 1 tablet (5 mg) by mouth every morning 30 tablet 11     famotidine (PEPCID) 20 MG tablet Take 1 tablet (20 mg) by mouth daily 90 tablet 3     famotidine (PEPCID) 20 MG tablet Take 1 tablet (20 mg) by mouth daily 7 tablet 0     FLOVENT  MCG/ACT Inhaler INHALE 2 PUFFS INTO THE LUNGS TWICE DAILY 36 g 1     guaiFENesin (ROBITUSSIN) 20 mg/mL SOLN solution Take 10 mLs by mouth every 4 hours as needed for cough 120 mL 3     hydrocortisone (WESTCORT) 0.2 % external cream Apply sparingly to affected area once a week after patient's bath. (Patient taking differently: Apply sparingly to affected area once a week on Fridays after patient's bath.) 45 g 1     ipratropium - albuterol 0.5 mg/2.5 mg/3 mL (DUONEB) 0.5-2.5 (3) MG/3ML neb solution Four times daily, nebulization. alternate with combivent 10 vial 3     Ipratropium-Albuterol (COMBIVENT RESPIMAT)  MCG/ACT inhaler Inhale 1 puff into the lungs 4 times daily 4 g 0     Ipratropium-Albuterol (COMBIVENT RESPIMAT)  MCG/ACT inhaler Inhale 1 puff into the lungs 2 times daily Alternate with duo neb 4 g 4     lactobacillus rhamnosus, GG, (CULTURELL) capsule Take 1 capsule by mouth every morning 60 capsule 11     methylcellulose (CITRUCEL) 500 MG TABS tablet Take 1 tablet (500 mg) by mouth daily as needed 14 each 0     mirtazapine (REMERON) 15 MG tablet TAKE 1.5 TABLETs (22.5 MG) BY MOUTH AT BEDTIME 135 tablet 1     order for DME Equipment being ordered: Nebulizer with mask and tubing    For delivery please call Danuta [phone] 1 Device 0     predniSONE (DELTASONE) 10 MG tablet 2 tabs twice daily x 3 days,  then 1 tab twice daily x 3 days, then 1 tab daily x 3 days, then 0.5 tab daily x 4 days, then stop 23 tablet 0     predniSONE (DELTASONE) 20 MG tablet Take 2 tablets (40 mg) by mouth daily 4 tablet 0     ranitidine (ZANTAC) 75 MG tablet Take 1 tablet (75 mg) by mouth At Bedtime 30 tablet      spacer (OPTICHAMBER GERRI) holding chamber Use with inhaler 1 each 0     venlafaxine (EFFEXOR-XR) 37.5 MG 24 hr capsule Take 37.5 mg by mouth daily       venlafaxine (EFFEXOR-XR) 75 MG 24 hr capsule Take 75 mg by mouth At Bedtime        Allergies   Allergen Reactions     Azithromycin      Erythromycin Other (See Comments)     Pathological fractures     Levaquin [Levofloxacin]      Fracture prevention     Seasonal Allergies      BP Readings from Last 3 Encounters:   10/28/19 (!) 155/95   09/09/19 104/76   08/17/19 (!) 143/76    Wt Readings from Last 3 Encounters:   08/14/19 85.3 kg (188 lb 1.6 oz)   04/22/19 82.6 kg (182 lb 1.6 oz)   11/07/18 79.8 kg (176 lb)           Reviewed and updated as needed this visit by Provider         Review of Systems   POSITIVE finger pain, ear problems    Denies headache, insomnia, chest pain, shortness of breath, cough, heartburn, bowel issues, bladder issues, neck pain, back pain, hip pain, knee pain, ankle pain, or foot pain. Remainder of ROS is negative unless otherwise noted above or in HPI.    This document serves as a record of the services and decisions personally performed and made by Bharath Buchanan MD. It was created on his behalf by Hosea Rome, trained medical scribe. The creation of this document is based on the provider's statements to the medical scribe.  Hosea Rome 2:28 PM October 28, 2019        Objective    BP (!) 155/95   Pulse 60   Temp 97.7  F (36.5  C) (Temporal)   Resp 16   SpO2 98%   There is no height or weight on file to calculate BMI.  Physical Exam   GENERAL: healthy, alert and no distress  HENT: right ear canal with cerumen but not completely impacted, left  ear canal is mostly clear  MS: no gross musculoskeletal defects noted, tenderness at the right fifth PIP joint, tenderness to the shaft of the middle phalynx of the right fifth finger, redness at the distal end of the right fifth finger, fingers are slightly warm, valgus stress of the right fifth finger causes pain  SKIN: no suspicious lesions or rashes    Diagnostic Test Results:  Labs reviewed in Epic  none         Assessment & Plan   (S69.91XA) Injury of finger of right hand, initial encounter  (primary encounter diagnosis)  Comment: Patient will complete x-ray.  Plan: XR Finger Right G/E 2 Views        Will notify with results. Follow up as needed.    (H61.23) Excessive cerumen in both ear canals  Comment: Ear was completed.  Plan: REMOVE IMPACTED CERUMEN        Follow up as needed.        Patient Instructions   I will notify you with the result of the x-ray.         The information in this document, created by the medical scribe for me, accurately reflects the services I personally performed and the decisions made by me. I have reviewed and approved this document for accuracy prior to leaving the patient care area.  October 28, 2019 2:28 PM    Bharath Buchanan MD  [facility]

## 2019-10-29 NOTE — TELEPHONE ENCOUNTER
Spoke with wife, med should be given as close to 12 hours apart as able    Joana Gandhi RN   [facility]

## 2019-10-29 NOTE — TELEPHONE ENCOUNTER
Reason for call:  Medication   If this is a refill request, has the caller requested the refill from the pharmacy already? N/A  Will the patient be using a [facility]? N/A  Name of the pharmacy and phone number for the current request: n/a    Name of the medication requested: n/a    Other request: Pt's wife wants to know how far apart he should be taking cephALEXin (KEFLEX) 500 MG capsule each day    Phone number to reach patient:  Home number on file [phone] (home)    Best Time:  Before 100    Can we leave a detailed message on this number?  YES

## 2019-11-06 PROBLEM — J44.1 COPD EXACERBATION (H): Status: RESOLVED | Noted: 2019-01-01 | Resolved: 2019-01-01

## 2019-11-06 PROBLEM — R06.2 WHEEZING: Status: RESOLVED | Noted: 2019-01-01 | Resolved: 2019-01-01

## 2019-11-06 PROBLEM — Z87.898 HISTORY OF CHRONIC COUGH: Status: ACTIVE | Noted: 2019-01-01

## 2019-11-06 NOTE — PROGRESS NOTES
Subjective     Sanjay Cano is a 74 year old male who presents to clinic today for the following health issues:    HPI   #1 RESPIRATORY SYMPTOMS      Duration: over past week     Description  cough and wheezing occasional     Severity: mild    Accompanying signs and symptoms: None    History (predisposing factors):  none    Precipitating or alleviating factors: None    Therapies tried and outcome:  none    Patient has begun developing a cough and wheezing in the past week. Wife states that many of the aids at the nursing home have been sick.    Wife states that patient has been complaining of nausea, has had loose stools. She is wondering if it could be due to antibiotics. He has been taking Citrosil. Wife reports that he had loose stools this morning. Patient has also been complaining of headaches, wife believes it could be due to cerumen impaction of his ear canals.    Skin  Patient had an infected toenail on his left foot over the summer, believes that the nail could fall off soon.    Finger  Patient's wife reports that his right fourth finger is still swollen, patient has been complaining of pain.    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)     Primary insomnia     Other emphysema (H)     Pulmonary nodules     Thyroid nodule     Family history of thyroid cancer     H/O recurrent urinary tract infection     Essential hypertension with goal blood pressure less than 140/90     Parkinson's disease (H)     History of chronic cough     Past Surgical History:   Procedure Laterality Date     EYE SURGERY       ORTHOPEDIC SURGERY       PHACOEMULSIFICATION CLEAR CORNEA WITH STANDARD INTRAOCULAR LENS IMPLANT  5/21/2014     Procedure: PHACOEMULSIFICATION CLEAR CORNEA WITH STANDARD INTRAOCULAR LENS IMPLANT;  Surgeon: Tomy Hunter MD;  Location: [facility]     PHACOEMULSIFICATION CLEAR CORNEA WITH TORIC INTRAOCULAR LENS IMPLANT  2014    Procedure: PHACOEMULSIFICATION CLEAR CORNEA WITH TORIC INTRAOCULAR LENS IMPLANT;  Surgeon: Tomy Hunter MD;  Location: [facility]       Social History     Tobacco Use     Smoking status: Former Smoker     Packs/day: 0.01     Years: 40.00     Pack years: 0.40     Types: Cigarettes     Last attempt to quit: 2008     Years since quittin.2     Smokeless tobacco: Never Used     Tobacco comment: very passive cigarettes in 6 months   Substance Use Topics     Alcohol use: No     Alcohol/week: 0.0 standard drinks     Family History   Problem Relation Age of Onset     Cerebrovascular Disease Mother      Diabetes Mother      Gastrointestinal Disease Mother      Hypertension Mother      Neurologic Disorder Father      Cerebrovascular Disease Father      Cerebrovascular Disease Maternal Grandfather      Cancer Paternal Grandmother      Other - See Comments Sister         gi - unknown         Current Outpatient Medications   Medication Sig Dispense Refill     amLODIPine (NORVASC) 10 MG tablet Take 10 mg by mouth At Bedtime       bacitracin 500 UNIT/GM ophthalmic ointment Apply to affected area, crusting at corner of eyelids once daily as needed 1 g 3     benzonatate (TESSALON) 100 MG capsule Take 1 capsule (100 mg) by mouth 3 times daily as needed for cough 60 capsule 0     carbidopa-levodopa (SINEMET CR)  MG per CR tablet Take 1 tablet by mouth At Bedtime 1 tablet 0     carbidopa-levodopa (SINEMET)  MG per tablet Take 1-2 tablets by mouth 5 times daily Takes 2 tablets at 6 AM, 1.5 tablets at 9 AM, 2 tablets at noon, 1.5 tablets at 3 PM, 1.5 tablets at 6 PM. Can take additional 0.5 tablet in the middle of the night if needed. 90 tablet      cephALEXin (KEFLEX) 500 MG capsule Take 1 capsule (500  mg) by mouth 2 times daily 14 capsule 0     cholecalciferol (VITAMIN D) 1000 UNIT tablet Take 1 tablet (1,000 Units) by mouth every morning 90 tablet 1     donepezil (ARICEPT) 10 MG tablet Take 1 tablet (10 mg) by mouth At Bedtime 30 tablet 11     donepezil (ARICEPT) 5 MG tablet Take 1 tablet (5 mg) by mouth every morning 30 tablet 11     famotidine (PEPCID) 20 MG tablet Take 1 tablet (20 mg) by mouth daily 90 tablet 3     famotidine (PEPCID) 20 MG tablet Take 1 tablet (20 mg) by mouth daily 7 tablet 0     FLOVENT  MCG/ACT Inhaler INHALE 2 PUFFS INTO THE LUNGS TWICE DAILY 36 g 1     guaiFENesin (ROBITUSSIN) 20 mg/mL SOLN solution Take 10 mLs by mouth every 4 hours as needed for cough 120 mL 3     hydrocortisone (WESTCORT) 0.2 % external cream Apply sparingly to affected area once a week after patient's bath. (Patient taking differently: Apply sparingly to affected area once a week on Fridays after patient's bath.) 45 g 1     ipratropium - albuterol 0.5 mg/2.5 mg/3 mL (DUONEB) 0.5-2.5 (3) MG/3ML neb solution Four times daily, nebulization. alternate with combivent 10 vial 3     Ipratropium-Albuterol (COMBIVENT RESPIMAT)  MCG/ACT inhaler Inhale 1 puff into the lungs 4 times daily 4 g 0     Ipratropium-Albuterol (COMBIVENT RESPIMAT)  MCG/ACT inhaler Inhale 1 puff into the lungs 2 times daily Alternate with duo neb 4 g 4     lactobacillus rhamnosus, GG, (CULTURELL) capsule Take 1 capsule by mouth every morning 60 capsule 11     methylcellulose (CITRUCEL) 500 MG TABS tablet Take 1 tablet (500 mg) by mouth daily as needed 14 each 0     mirtazapine (REMERON) 15 MG tablet TAKE 1.5 TABLETs (22.5 MG) BY MOUTH AT BEDTIME 135 tablet 1     order for DME Equipment being ordered: Nebulizer with mask and tubing    For delivery please call [facility] [phone] 1 Device 0     predniSONE (DELTASONE) 10 MG tablet 2 tabs twice daily x 3 days, then 1 tab twice daily x 3 days, then 1 tab daily x 3 days, then 0.5 tab  daily x 4 days, then stop 23 tablet 0     predniSONE (DELTASONE) 20 MG tablet Take 2 tablets (40 mg) by mouth daily 4 tablet 0     ranitidine (ZANTAC) 75 MG tablet Take 1 tablet (75 mg) by mouth At Bedtime 30 tablet      spacer (OPTICHAMBER GERRI) holding chamber Use with inhaler 1 each 0     venlafaxine (EFFEXOR-XR) 37.5 MG 24 hr capsule Take 37.5 mg by mouth daily       venlafaxine (EFFEXOR-XR) 75 MG 24 hr capsule Take 75 mg by mouth At Bedtime        Allergies   Allergen Reactions     Azithromycin      Erythromycin Other (See Comments)     Pathological fractures     Levaquin [Levofloxacin]      Fracture prevention     Seasonal Allergies      BP Readings from Last 3 Encounters:   11/06/19 112/68   10/28/19 (!) 155/95   09/09/19 104/76    Wt Readings from Last 3 Encounters:   08/14/19 85.3 kg (188 lb 1.6 oz)   04/22/19 82.6 kg (182 lb 1.6 oz)   11/07/18 79.8 kg (176 lb)                    Reviewed and updated as needed this visit by Provider  Problems         Review of Systems   POSITIVE finger pain, cough, bowel problems    Denies headache, insomnia, chest pain, shortness of breath, heartburn, bladder issues, neck pain, back pain, hip pain, knee pain, ankle pain, or foot pain. Remainder of ROS is negative unless otherwise noted above or in HPI.    This document serves as a record of the services and decisions personally performed and made by Bharath Buchanan MD. It was created on his behalf by Hosea Rome, trained medical scribe. The creation of this document is based on the provider's statements to the medical scribe.  Hosea Rome 11:18 AM November 6, 2019        Objective    /68 (BP Location: Right arm, Patient Position: Sitting, Cuff Size: Adult Regular)   Pulse 66   Temp 98  F (36.7  C) (Oral)   SpO2 94%   There is no height or weight on file to calculate BMI.  Physical Exam   GENERAL: healthy, alert and no distress  HENT: ear canals and TM's normal, nose and mouth without ulcers or  lesions  RESP: lungs clear to auscultation - no rales, rhonchi or wheezes, diminished volume of air exchanged  CV: regular rate and rhythm, normal S1 S2, no S3 or S4, no murmur, click or rub, no peripheral edema and peripheral pulses strong  MS: no gross musculoskeletal defects noted, no edema, calves are non tender  SKIN: no suspicious lesions or rashes  NEURO: Normal strength and tone, mentation intact and speech normal  PSYCH: mentation appears normal, affect normal/bright    Diagnostic Test Results:  Labs reviewed in Epic  none         Assessment & Plan   (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  Plan: Continue keeping the finger splinted. Follow up as needed.    (Z86.69) H/O impacted cerumen  Comment: Ears are clear.  Plan: Follow up as needed.    (Z87.09) History of chronic cough  Comment: Lungs are clear.  Plan: Monitoring. Follow up as needed.    (R26.2) Ambulatory dysfunction  Comment: Stable.  Plan: Monitoring. Follow up as needed.      Patient Instructions   Return to clinic for any new or worsening symptoms or go to ER Urgent care in off hours     40 minutes were spent by me face to face with the patient with more than 50% of time spent in counseling and coordination of care regarding above assessment and plan.       The information in this document, created by the medical scribe for me, accurately reflects the services I personally performed and the decisions made by me. I have reviewed and approved this document for accuracy prior to leaving the patient care area.  November 6, 2019 11:27 AM    Bharath Buchanan MD  [facility]

## 2019-11-08 NOTE — TELEPHONE ENCOUNTER
Medications removed from medication list per patient and patient's spouse. Edyta Schmidt RN on 11/8/2019 at 11:10 AM

## 2019-11-08 NOTE — TELEPHONE ENCOUNTER
Patient wife states that patient is not taking Ipratropium-Albuterol (COMBIVENT RESPIMAT)  MCG/ACT inhaler. Please remove from medication list.

## 2019-11-13 NOTE — TELEPHONE ENCOUNTER
Reason for call:  Other   Patient called regarding (reason for call): call back  Additional comments: Patients wife is requesting a call back form nurse to discuss symptoms of dehydration.     Phone number to reach patient: [phone]      Best Time:  Any    Can we leave a detailed message on this number?not set up, please try back if no answer

## 2019-11-13 NOTE — TELEPHONE ENCOUNTER
Pt has been really tired lately, he is getting lots of thickened fluids  Clark is able to get fluids in him, but she isn't sure when she isn't at the facility how much he is getting  She feels like he may be getting a could, has an occasional congested cough and occasional wheeze  He does go to urinate at least every 4 to 6 hours  She doesn't feel like he has a temp. The last couple of days she was told his temp has been good    He did go home on Sunday and had a good day    His last few BM have been mushy    Clark will ask the DON to see about the staff doing an intake log to see how much he is getting as far as liquids, clark will also add to the log how much she gets in him, she will offer small but more frequent amounts  If he gets a temp or symptoms worsen she or the staff should call the clinic for advise    Joana Gandhi RN   [facility]

## 2019-11-14 NOTE — TELEPHONE ENCOUNTER
Reason for call:  Other   Patient called regarding (reason for call): call back  Additional comments: Patient is currently in a nursing home, Nurse called and stated that the patient has a blister on his finger and would like recommendations on what to do. Nursing home facility is called [facility], please ask to speak with Daniel.    Phone number to reach patient:  Other phone number:  [phone]    Best Time:  N/A    Can we leave a detailed message on this number?  NO

## 2019-11-14 NOTE — TELEPHONE ENCOUNTER
Routing to provider for care instructions. Please advise. Edyta Schmidt RN on 11/14/2019 at 1:43 PM

## 2019-11-14 NOTE — LETTER
[address] [address]  [phone]          November 15, 2019    RE: Sanjay Cano                                                                                                                               To nursing staff,    Regarding Sanjay Cano, [birth year]  Please do the following treatment until blister resolves on pt hand  Wash with soap and water, apply bacitracin, cover with bandaid daily until blister resolves. Contact if increased pain, redness, discharge.        Sincerely,           Bharath Buchanan MD

## 2019-11-15 NOTE — TELEPHONE ENCOUNTER
Dr Chanel Tipton is wondering what kind of pain med pt could take as needed    Pharm cued    Leave message on home number regarding pain med    Send written order to [facility] via fax and call    Joana Gandhi RN   [facility]

## 2019-11-15 NOTE — TELEPHONE ENCOUNTER
Wash with soap and water, apply bacitracin, cover with bandaid daily until blister resolves. Contact if increased pain, redness, discharge. Please notify, thanks Bharath

## 2019-11-15 NOTE — TELEPHONE ENCOUNTER
Patient spouse called stated she would like to speak with a nurse regarding the blister on her spouse finger. Patient spouse stated no one is available at the nursing home to speak with her regarding Dr. Buchanan's orders. I did read the orders to the patient spouse, pt spouse had more questions. Patient spouse stated he is have some pain.      Patient spouse can be reached at [phone]    Patient wife stated we can not leave a message at this number.

## 2019-11-15 NOTE — TELEPHONE ENCOUNTER
Order placed on a letter and faxed to [facility]    Joana Gandhi RN   [facility]

## 2019-11-15 NOTE — TELEPHONE ENCOUNTER
Called and left VMM for patient, left providers recommendation. Edyta Schmidt RN on 11/15/2019 at 4:09 PM

## 2019-11-18 NOTE — TELEPHONE ENCOUNTER
Reason for call:  Symptom   Symptom or request: cough, feeling weak    Duration (how long have symptoms been present): 2 weeks  Have you been treated for this before? No    Additional comments: wife is worried he is dehydration and patient is weak and coughing a lot    Phone number to reach patient:  Cell number on file:    [phone]       Best Time:  n/a    Can we leave a detailed message on this number?  YES

## 2019-11-18 NOTE — TELEPHONE ENCOUNTER
Spoke with patients living facility. Patient having wet ongoing cough for 3 weeks, runny nose, and nasal congestion. Patient is not able to verbalize if in pain/symptoms d/t cognitive issues. Temperature today was 98.5 today. They have been giving patient Robitussin, which has not seemed to help. Scheduled patient with Jenifer Roblero for 11/19.    Betina Joe RN   [facility]

## 2019-11-18 NOTE — LETTER
Health Information Management Services               Recipient:  [facility]        Sender:  Tatyana ArroyoJERRY, LICSW. Phone: [phone], Pager: [phone]        Date: November 27, 2019  Patient Name:  Sanjay Cano  Routing Message:    Please see updated MAR for [facility]. Updates notes sent as well.         The documents accompanying this notice contain confidential information belonging to the sender.  This information is intended only for the use of the individual or entity named above.  The authorized recipient of this information is prohibited from disclosing this information to any other party and is required to destroy the information after its stated need has been fulfilled, unless otherwise required by state law.      If you are not the intended recipient, you are hereby notified that any disclosure, copy, distribution or action taken in reliance on the contents of these documents is strictly prohibited.  If you have received this document in error, please return it by fax to [phone] with a note on the cover sheet explaining why you are returning it (e.g. not your patient, not your provider, etc.).  If you need further assistance, please call [place] Centralized Transcription at [phone].  Documents may also be returned by mail to [employer], , [facility] [address], [place], [place] [address].

## 2019-11-18 NOTE — TELEPHONE ENCOUNTER
"Advised to go to the ER. Patient is getting more lethargic and unable to take in any fluids.  Reason for Disposition    Difficulty breathing    Additional Information    Negative: Bluish (or gray) lips or face    Negative: Severe difficulty breathing (e.g., struggling for each breath, speaks in single words)    Negative: Rapid onset of cough and has hives    Negative: Coughing started suddenly after medicine, an allergic food or bee sting    Negative: Difficulty breathing after exposure to flames, smoke, or fumes    Negative: Sounds like a life-threatening emergency to the triager    Negative: Previous asthma attacks and this feels like asthma attack    Negative: Chest pain present when not coughing    Negative: Passed out (i.e., fainted, collapsed and was not responding)    Patient sounds very sick or weak to the triager    Answer Assessment - Initial Assessment Questions  1. ONSET: \"When did the cough begin?\"       2 weeks.   2. SEVERITY: \"How bad is the cough today?\"       Yellow sputum, congestions   3. RESPIRATORY DISTRESS: \"Describe your breathing.\"       Coughing, can't talk.   4. FEVER: \"Do you have a fever?\" If so, ask: \"What is your temperature, how was it measured, and when did it start?\"      Feels warm per wife   5. HEMOPTYSIS: \"Are you coughing up any blood?\" If so ask: \"How much?\" (flecks, streaks, tablespoons, etc.)      NO  6. TREATMENT: \"What have you done so far to treat the cough?\" (e.g., meds, fluids, humidifier)      Nebulizer's 4x/day, Cough syrup   7. CARDIAC HISTORY: \"Do you have any history of heart disease?\" (e.g., heart attack, congestive heart failure)       NO  8. LUNG HISTORY: \"Do you have any history of lung disease?\"  (e.g., pulmonary embolus, asthma, emphysema)      COPD, nodules in lungs   9. PE RISK FACTORS: \"Do you have a history of blood clots?\" (or: recent major surgery, recent prolonged travel, bedridden )      NO  10. OTHER SYMPTOMS: \"Do you have any other symptoms? (e.g., " "runny nose, wheezing, chest pain)        Fatigued, Malaise, Runny Nose, Tx on Right little finger-Cellulitis   11. PREGNANCY: \"Is there any chance you are pregnant?\" \"When was your last menstrual period?\"    NO, MALE   12. TRAVEL: \"Have you traveled out of the country in the last month?\" (e.g., travel history, exposures)        NO    Protocols used: COUGH-A-ANDREI Schmidt RN on 11/18/2019 at 5:01 PM    "

## 2019-11-18 NOTE — TELEPHONE ENCOUNTER
GERARD Cheek from [facility] called to verify that he is supposed to go to the ER. Writer explained yes, per the triage protocol and that he cannot be seen in the clinic today, he needs to go to the ER. Edyta Schmidt RN on 11/18/2019 at 5:23 PM

## 2019-11-18 NOTE — TELEPHONE ENCOUNTER
Huddle with Dr Buchanan    Per Manoj  Pt spit out greenish sputum this afternoon  OK to get a chest xray in facility, this order was given to Manoj    Left detailed message on home number for wife Danuta Gandhi RN   [facility]

## 2019-11-18 NOTE — TELEPHONE ENCOUNTER
Reason for call:  Other   Patient called regarding (reason for call): call back  Additional comments: [facility] called stated patient has a cough with no fever. Looking for recommendation. No other information provided. Please ask to speak with Manoj    Phone number to reach patient:  Other phone number: [phone]    Best Time:  N/A    Can we leave a detailed message on this number?  no vm can be left at this number

## 2019-11-19 PROBLEM — J18.9 HCAP (HEALTHCARE-ASSOCIATED PNEUMONIA): Status: ACTIVE | Noted: 2019-01-01

## 2019-11-19 NOTE — PHARMACY-ADMISSION MEDICATION HISTORY
Admission medication history interview status for the 11/18/2019 admission is complete. See Epic admission navigator for allergy information, pharmacy, prior to admission medications and immunization status.     Medication history interview sources:  Nursing Home MAR    Changes made to PTA medication list (reason)  Added: ipratroprium-albuterol neb four time daily  Ipratropium-albuterol neb every 6 hours as needed  Polyethyl glycol-propyl glycol 0.4-0.3% gel  Deleted: The following medications were not on the patient's MAR:  Prednisone 20mg tablet; Take 2 tablets by mouth daily.  Ranitidine 75mg tablet; Take 1 tablet by mouth daily.  Changed: None    Additional medication history information (including reliability of information, actions taken by pharmacist):  MAR received from facility included active medications but no documentation of last doses.    Prior to Admission medications    Medication Sig Last Dose Taking? Auth Provider   acetaminophen (TYLENOL) 325 MG tablet Take 650 mg by mouth At Bedtime Past Week at Unknown time Yes Unknown, Entered By History   amLODIPine (NORVASC) 10 MG tablet Take 10 mg by mouth At Bedtime Past Week at Unknown time Yes Unknown, Entered By History   bacitracin 500 UNIT/GM OINT Apply topically daily Past Week at Unknown time Yes Unknown, Entered By History   benzonatate (TESSALON) 100 MG capsule Take 1 capsule (100 mg) by mouth 3 times daily as needed for cough Past Week at Unknown time Yes Tima Butler MD   carbidopa-levodopa (SINEMET CR)  MG per CR tablet Take 1 tablet by mouth At Bedtime Past Week at Unknown time Yes Bharath Buchanan MD   carbidopa-levodopa (SINEMET)  MG per tablet Takes 2 tablets by mouth at 6 AM, 1.5 tablets at 9 AM, 2 tablets at noon, 1.5 tablets at 3 PM, and 1.5 tablets at 6 PM Past Week at Unknown time Yes Bharath Buchanan MD   cholecalciferol (VITAMIN D) 1000 UNIT tablet Take 1 tablet (1,000 Units) by mouth every morning  Past Week at Unknown time Yes Bharath Buchanan MD   donepezil (ARICEPT) 10 MG tablet Take 1 tablet (10 mg) by mouth At Bedtime Past Week at Unknown time Yes Bharath Buchanan MD   donepezil (ARICEPT) 5 MG tablet Take 1 tablet (5 mg) by mouth every morning Past Week at Unknown time Yes Bharath Buchanan MD   famotidine (PEPCID) 20 MG tablet Take 1 tablet (20 mg) by mouth daily Past Week at Unknown time Yes Bharath Buchanan MD   fluticasone (FLOVENT HFA) 110 MCG/ACT inhaler Inhale 2 puffs into the lungs 2 times daily Past Week at Unknown time Yes Unknown, Entered By History   guaiFENesin (ROBITUSSIN) 20 mg/mL SOLN solution Take 10 mLs by mouth every 4 hours as needed for cough Past Week at Unknown time Yes Bharath Buchanan MD   hydrocortisone (WESTCORT) 0.2 % external cream Apply to affected area once a week. Past Week at Unknown time Yes Unknown, Entered By History   ipratropium - albuterol 0.5 mg/2.5 mg/3 mL (DUONEB) 0.5-2.5 (3) MG/3ML neb solution Take 1 vial by nebulization 4 times daily Past Week at Unknown time Yes Unknown, Entered By History   ipratropium - albuterol 0.5 mg/2.5 mg/3 mL (DUONEB) 0.5-2.5 (3) MG/3ML neb solution Take 1 vial by nebulization every 6 hours as needed for shortness of breath / dyspnea or wheezing Past Week at Unknown time Yes Unknown, Entered By History   lactobacillus rhamnosus, GG, (CULTURELL) capsule Take 1 capsule by mouth every morning Past Week at Unknown time Yes Bharath Buchanan MD   methylcellulose (CITRUCEL) 500 MG TABS tablet Take 1 tablet (500 mg) by mouth daily as needed Past Week at Unknown time Yes Bharath Buchanan MD   mirtazapine (REMERON) 15 MG tablet Take 1.5 tablets (22.5mg) by mouth at bedtime. Past Week at Unknown time Yes Unknown, Entered By History   polyethyl glycol-propyl glycol 0.4-0.3 % GEL Apply 1 drop to eye 4 times daily as needed Past Week at Unknown time Yes Unknown, Entered By History   venlafaxine (EFFEXOR-XR) 37.5 MG  24 hr capsule Take 37.5 mg by mouth every morning  Past Week at Unknown time Yes Reported, Patient   venlafaxine (EFFEXOR-XR) 75 MG 24 hr capsule Take 75 mg by mouth At Bedtime  Past Week at Unknown time Yes Reported, Patient   order for DME Equipment being ordered: Nebulizer with mask and tubing    For delivery please call Danuta [phone]   Bharath Buchanan MD   spacer (OPTICHAMBER GERRI) holding chamber Use with inhaler   Bharath Buchanan MD       Medication history completed by: Hunter Cano, 4th Year Pharmacy Student, November 19, 2019

## 2019-11-19 NOTE — H&P
[facility], [place]  History and Physical - [place] [facility] Service        Date of Admission:  11/18/2019    Assessment & Plan   Sanjay Cano is a 74 year old male admitted on 11/18/2019. He has a history of Parkinson's, dementia, & COPD and is admitted for productive cough, encephalopathy, and oxygen requirements concerning for healthcare associated pneumonia. He has been requiring BiPAP due to tachypnea and increased respiratory effort, is otherwise hemodynamically stable on IV antibiotics.    Healthcare associated pneumonia  Patient presented from his nursing home with 2-week history of productive cough and increased respiratory effort in the ED requiring BiPAP, concern for respiratory infection: Healthcare associated pneumonia versus aspiration pneumonia given his history of Parkinson's, dementia, and dysphagia diet versus COPD exacerbation.  Will treat patient for healthcare associated pneumonia and if develops significant wheezing or clinical worsening would add steroids for COPD exacerbation.  -Continue vancomycin and Pip-tazo: Consider de-escalating pending ability to wean off BiPAP  -Continue PTA Flovent 110mcg 2 puffs twice daily  -DuoNebs every 4 hours  -N.p.o. except for medications  -2L normal saline  -Follow blood cultures      Dementia  Parkinson's  -continue PTA [id number] (dosed 6am, 9am, 12pm, 3pm, 6pm) and donepezil 5mg qAM, 10mg qHS     GERD  -continue PTA famotidine 20mg qAM     Mood  -continue PTA venlafaxine 37.5mg qAM, 75mg qHS & mirtazapine 22.5mg qHS     HTN  -continue PTA amlodipine 10mg qhs     Diet: NPO for Medical/Clinical Reasons Except for: Meds    Fluids: 2L NS bolus  DVT Prophylaxis: Enoxaparin (Lovenox) SQ  Contreras Catheter: not present  Code Status: DNR/DNI - discussed with wife    Disposition Plan   Expected discharge: 2 - 3 days, recommended to prior living arrangement once antibiotic plan established.  Entered: Mindi Varner MD 11/19/2019,  3:13 AM       Patient to be formally staffed in the morning.    Mindi Varner MD  [place] [id number] Service  [facility], [place]  Please see sticky note for cross cover information  ______________________________________________________________________    Chief Complaint   Altered mental status    History is obtained from the patient's wife    History of Present Illness   Sanjay Cano is a 74 year old male who has a history of Parkinson's, dementia, COPD and is admitted for worsening cough and altered mental status. He presented to the ED via EMS from his nursing home due to altered mental status and concern for pneumonia. Patient was somnolent in the ED and history was obtained from his wife who reported he has had a productive cough for 2 weeks that worsened today.  Patient's wife lives at home and was with patient for 12 hours yesterday and noted that he had significant productive cough but no fever.  She got a call from staff at the facility this morning that reported he was agitated and not cooperative with the staff could not stop coughing.  She otherwise denies significant symptoms: Denies fevers, nausea, vomiting, abdominal pain, diarrhea, constipation, dysuria.  She does think there are some staff and other residents at his nursing home facility that have had respiratory illnesses recently.    While he was in the ED, stroke code called due to concern that patient was not moving his left upper extremity. Patient has limited mobility of his extremities at baseline. CT head and CTA head/neck were completed and findings were not concerning for stroke. Tmax in the ED was 100.2 F, and otherwise hemodynamically stable with one blood pressure reading of hypertension: 172/94, normotensive after. Chest X ray showed linear basilar opacity concerning for pneumonia versus atelectasis. He was given 1 dose of vancomycin and pip-tazo and admitted to inpatient medicine while boarding in the ED.        At baseline patient's wife reports that he is able to communicate verbally with her though at times incoherent.  He is now wheelchair-bound which is new for him since entering the nursing home and does not move his legs and has limited ability to move his upper extremities.  She reports he ended up in the nursing home originally after an incident at their private home where she was attempting to get him out of the lift chair and her foot got stuck on some part of the machine and the 2 of them fell.  He had no injuries but she fractured her tibia and had to be in a cast for several months.  He had been attending the  program at this facility and so went in as part of the TCU thinking this to be a temporary measure and he has not been able to leave since.  In discussion with her it seems like he has had a clinical decline for the past several    Review of Systems    10 point ROS otherwise negative except as noted in HPI.    Past Medical History    I have reviewed this patient's medical history and updated it with pertinent information if needed.   Past Medical History:   Diagnosis Date     At risk for falling 9/3/2012     Family history of thyroid cancer 7/13/2016     Malignant neoplasm (H)     skin - nose     Moderate recurrent major depression (H) 5/17/2012     Rosacea         Past Surgical History   I have reviewed this patient's surgical history and updated it with pertinent information if needed.  Past Surgical History:   Procedure Laterality Date     EYE SURGERY       ORTHOPEDIC SURGERY       PHACOEMULSIFICATION CLEAR CORNEA WITH STANDARD INTRAOCULAR LENS IMPLANT  5/21/2014    Procedure: PHACOEMULSIFICATION CLEAR CORNEA WITH STANDARD INTRAOCULAR LENS IMPLANT;  Surgeon: Tomy Hunter MD;  [facility]     PHACOEMULSIFICATION CLEAR CORNEA WITH TORIC INTRAOCULAR LENS IMPLANT  4/30/2014    Procedure: PHACOEMULSIFICATION CLEAR CORNEA WITH TORIC INTRAOCULAR LENS IMPLANT;  Surgeon: Tomy Hunter MD;   Location: [facility]       Social History   I have reviewed this patient's social history and updated it with pertinent information if needed. Sanjay Cano  reports that he quit smoking about 11 years ago. His smoking use included cigarettes. He has a 0.40 pack-year smoking history. He has never used smokeless tobacco. He reports that he does not drink alcohol or use drugs.   Lives in a nursing home.  to his wife.    Family History   I have reviewed this patient's family history and updated it with pertinent information if needed.   Family History   Problem Relation Age of Onset     Cerebrovascular Disease Mother      Diabetes Mother      Gastrointestinal Disease Mother      Hypertension Mother      Neurologic Disorder Father      Cerebrovascular Disease Father      Cerebrovascular Disease Maternal Grandfather      Cancer Paternal Grandmother      Other - See Comments Sister         gi - unknown       Prior to Admission Medications   Prior to Admission Medications   Prescriptions Last Dose Informant Patient Reported? Taking?   FLOVENT  MCG/ACT Inhaler  Nursing Home No No   Sig: INHALE 2 PUFFS INTO THE LUNGS TWICE DAILY   amLODIPine (NORVASC) 10 MG tablet  Nursing Home Yes No   Sig: Take 10 mg by mouth At Bedtime   bacitracin 500 UNIT/GM ophthalmic ointment   No No   Sig: Apply to affected area, crusting at corner of eyelids once daily as needed   benzonatate (TESSALON) 100 MG capsule   No No   Sig: Take 1 capsule (100 mg) by mouth 3 times daily as needed for cough   carbidopa-levodopa (SINEMET CR)  MG per CR tablet  Nursing Home Yes No   Sig: Take 1 tablet by mouth At Bedtime   carbidopa-levodopa (SINEMET)  MG per tablet  Nursing Home Yes No   Sig: Take 1-2 tablets by mouth 5 times daily Takes 2 tablets at 6 AM, 1.5 tablets at 9 AM, 2 tablets at noon, 1.5 tablets at 3 PM, 1.5 tablets at 6 PM. Can take additional 0.5 tablet in the middle of the night if needed.   cholecalciferol (VITAMIN D)  1000 UNIT tablet  Nursing Home Yes No   Sig: Take 1 tablet (1,000 Units) by mouth every morning   donepezil (ARICEPT) 10 MG tablet  Nursing Home Yes No   Sig: Take 1 tablet (10 mg) by mouth At Bedtime   donepezil (ARICEPT) 5 MG tablet  Nursing Home Yes No   Sig: Take 1 tablet (5 mg) by mouth every morning   doxycycline (VIBRAMYCIN) 50 MG/5ML SYRP   No No   Sig: Take 10 mLs (100 mg) by mouth every 12 hours for 10 days   famotidine (PEPCID) 20 MG tablet   No No   Sig: Take 1 tablet (20 mg) by mouth daily   famotidine (PEPCID) 20 MG tablet   No No   Sig: Take 1 tablet (20 mg) by mouth daily   guaiFENesin (ROBITUSSIN) 20 mg/mL SOLN solution  Nursing Home No No   Sig: Take 10 mLs by mouth every 4 hours as needed for cough   hydrocortisone (WESTCORT) 0.2 % external cream  Nursing Home No No   Sig: Apply sparingly to affected area once a week after patient's bath.   Patient taking differently: Apply sparingly to affected area once a week on  after patient's bath.   lactobacillus rhamnosus, GG, (CULTURELL) capsule  Nursing Home Yes No   Sig: Take 1 capsule by mouth every morning   methylcellulose (CITRUCEL) 500 MG TABS tablet  Nursing Home Yes No   Sig: Take 1 tablet (500 mg) by mouth daily as needed   mirtazapine (REMERON) 15 MG tablet  Nursing Home No No   Sig: TAKE 1.5 TABLETs (22.5 MG) BY MOUTH AT BEDTIME   order for DME   No No   Sig: Equipment being ordered: Nebulizer with mask and tubing    For delivery please call [facility] [phone]   predniSONE (DELTASONE) 10 MG tablet   No No   Si tabs twice daily x 3 days, then 1 tab twice daily x 3 days, then 1 tab daily x 3 days, then 0.5 tab daily x 4 days, then stop   predniSONE (DELTASONE) 20 MG tablet   No No   Sig: Take 2 tablets (40 mg) by mouth daily   ranitidine (ZANTAC) 75 MG tablet  Nursing Home Yes No   Sig: Take 1 tablet (75 mg) by mouth At Bedtime   spacer (OPTICHAMBER GERRI) holding chamber   No No   Sig: Use with inhaler   venlafaxine (EFFEXOR-XR)  37.5 MG 24 hr capsule  Nursing Home Yes No   Sig: Take 37.5 mg by mouth daily   venlafaxine (EFFEXOR-XR) 75 MG 24 hr capsule  Nursing Home Yes No   Sig: Take 75 mg by mouth At Bedtime       Facility-Administered Medications: None     Allergies   Allergies   Allergen Reactions     Azithromycin      Erythromycin Other (See Comments)     Pathological fractures     Levaquin [Levofloxacin]      Fracture prevention     Seasonal Allergies        Physical Exam   Vital Signs: Temp: 100.2  F (37.9  C) Temp src: Axillary BP: 139/77 Pulse: 70 Heart Rate: 77 Resp: 21 SpO2: 100 % O2 Device: BiPAP/CPAP    Weight: 183 lbs 13.82 oz    Constitutional: intermittently awake, not cooperative, BiPAP on  Eyes: Lids and lashes normal, pupils equal, round and reactive to light, sclera clear, conjunctiva normal  ENT: normocepalic, without obvious abnormality  Respiratory: BiPAP on, not tachypnic, fainy end expiratory wheezes over left anterior lung field  Cardiovascular: Regular rate and rhythm, normal S1 and S2, no S3 or S4, and no murmur noted  GI: Soft, non-distended, non-tender, no masses palpated, no hepatosplenomegally  Skin: no bruising or bleeding and normal skin color, texture, turgor; scattered seborrheic keratoses  Neurologic: Intermittent jerking of extremities, does not follow exams    Data   Data reviewed today: I reviewed all medications, new labs and imaging results over the last 24 hours. I personally reviewed the chest x-ray image(s) showing left sided linear opacities when compares to previous chest X rays, concerning for atelectasis versus infiltrate.

## 2019-11-19 NOTE — ED NOTES
[facility], [place]   ED Nurse to Floor Handoff     Sanjay Cano is a 74 year old male who speaks English and lives with others,  in a nursing home  They arrived in the ED by ambulance from home    ED Chief Complaint: Altered Mental Status    ED Dx;   Final diagnoses:   Altered mental status, unspecified altered mental status type   Healthcare-associated pneumonia         Needed?: No    Allergies:   Allergies   Allergen Reactions     Azithromycin      Erythromycin Other (See Comments)     Pathological fractures     Levaquin [Levofloxacin]      Fracture prevention     Seasonal Allergies    .  Past Medical Hx:   Past Medical History:   Diagnosis Date     At risk for falling 9/3/2012     Family history of thyroid cancer 7/13/2016     Malignant neoplasm (H)     skin - nose     Moderate recurrent major depression (H) 5/17/2012     Rosacea       Baseline Mental status: severe dementia  Current Mental Status changes: doesn't talk wife states pt talks some baseline    Infection present or suspected this encounter: cultures pending  Sepsis suspected: No  Isolation type: No active isolations     Activity level - Baseline/Home:  Total Care  Activity Level - Current:   Total Care    Bariatric equipment needed?: No    In the ED these meds were given:   Medications   vancomycin 1500 mg in 0.9% NaCl 250 ml intermittent infusion 1,500 mg (has no administration in time range)   lidocaine 1 % 0.1-1 mL (has no administration in time range)   lidocaine (LMX4) cream (has no administration in time range)   sodium chloride (PF) 0.9% PF flush 3 mL (has no administration in time range)   sodium chloride (PF) 0.9% PF flush 3 mL (3 mLs Intracatheter Given 11/19/19 4904)   enoxaparin ANTICOAGULANT (LOVENOX) injection 40 mg (has no administration in time range)   piperacillin-tazobactam (ZOSYN) 3.375 g vial to attach to  mL bag (0 g Intravenous Stopped 11/19/19 9806)   ipratropium - albuterol 0.5  mg/2.5 mg/3 mL (DUONEB) neb solution 3 mL (3 mLs Nebulization Given 11/19/19 0357)   amLODIPine (NORVASC) tablet 10 mg (has no administration in time range)   bacitracin ophthalmic ointment (has no administration in time range)   carbidopa-levodopa (SINEMET CR)  MG per CR tablet 1 tablet (1 tablet Oral Not Given 11/19/19 0623)   carbidopa-levodopa (SINEMET)  MG per tablet 2 tablet (has no administration in time range)   Vitamin D3 (CHOLECALCIFEROL) 25 mcg (1000 units) tablet 1,000 Units (has no administration in time range)   donepezil (ARICEPT) tablet 10 mg (has no administration in time range)   donepezil (ARICEPT) tablet 5 mg (has no administration in time range)   famotidine (PEPCID) tablet 20 mg (has no administration in time range)   fluticasone (ARNUITY ELLIPTA) 200 MCG/ACT inhaler 1 puff (has no administration in time range)   lactobacillus rhamnosus (GG) (CULTURELL) capsule 1 capsule (has no administration in time range)   mirtazapine (REMERON) half-tab 22.5 mg (has no administration in time range)   venlafaxine (EFFEXOR-XR) 24 hr capsule 37.5 mg (has no administration in time range)   venlafaxine (EFFEXOR-XR) 24 hr capsule 75 mg (has no administration in time range)   carbidopa-levodopa half-tab 12.5-50 mg (has no administration in time range)   0.9% sodium chloride BOLUS (0 mLs Intravenous Stopped 11/18/19 2133)   ipratropium - albuterol 0.5 mg/2.5 mg/3 mL (DUONEB) neb solution 3 mL (3 mLs Nebulization Given 11/18/19 2118)   piperacillin-tazobactam (ZOSYN) 3.375 g vial to attach to  mL bag (0 g Intravenous Stopped 11/18/19 2133)   vancomycin 1500 mg in 0.9% NaCl 250 ml intermittent infusion 1,500 mg (1,500 mg Intravenous Given 11/18/19 2138)   iopamidol (ISOVUE-370) solution 75 mL (75 mLs Intravenous Given 11/18/19 2204)   sodium chloride (PF) 0.9% PF flush 90 mL (90 mLs Intravenous Given 11/18/19 2204)   0.9% sodium chloride BOLUS (0 mLs Intravenous Stopped 11/19/19 7289)       Drips  running?  No    Home pump  No    Current LDAs  Peripheral IV 08/15/19 Right;Anterior Upper forearm (Active)   Number of days: 96       Peripheral IV 11/18/19 Left Lower forearm (Active)   Site Assessment [facility] 11/18/2019  7:13 PM   Line Status Saline locked 11/18/2019  7:13 PM   Number of days: 1       Peripheral IV 11/18/19 Left Upper arm (Active)   Site Assessment [facility] 11/18/2019  8:15 PM   Line Status Saline locked 11/18/2019  8:15 PM   Phlebitis Scale 0-->no symptoms 11/18/2019  8:15 PM   Infiltration Scale 0 11/18/2019  8:15 PM   Infiltration Site Treatment Method  None 11/18/2019  8:15 PM   Extravasation? No 11/18/2019  8:15 PM   Dressing Intervention New dressing  11/18/2019  8:15 PM   Number of days: 1       Labs results:   Labs Ordered and Resulted from Time of ED Arrival Up to the Time of Departure from the ED   GLUCOSE BY METER - Abnormal; Notable for the following components:       Result Value    Glucose 147 (*)     All other components within normal limits   CBC WITH PLATELETS DIFFERENTIAL - Abnormal; Notable for the following components:    RBC Count 3.81 (*)     Hemoglobin 12.1 (*)     Hematocrit 36.9 (*)     All other components within normal limits   PARTIAL THROMBOPLASTIN TIME - Abnormal; Notable for the following components:    PTT 40 (*)     All other components within normal limits   COMPREHENSIVE METABOLIC PANEL - Abnormal; Notable for the following components:    Glucose 113 (*)     Calcium 8.3 (*)     Protein Total 6.6 (*)     All other components within normal limits   INR - Abnormal; Notable for the following components:    INR 1.28 (*)     All other components within normal limits   ROUTINE UA WITH MICROSCOPIC - Abnormal; Notable for the following components:    Protein Albumin Urine 10 (*)     Leukocyte Esterase Urine Small (*)     WBC Urine 11 (*)     Squamous Epithelial /HPF Urine 2 (*)     Mucous Urine Present (*)     All other components within normal limits   CREATININE POCT -  Abnormal; Notable for the following components:    Creatinine 1.3 (*)     GFR Estimate 54 (*)     All other components within normal limits   BLOOD GAS ARTERIAL - Abnormal; Notable for the following components:    pO2 Arterial 142 (*)     All other components within normal limits   CRP INFLAMMATION - Abnormal; Notable for the following components:    CRP Inflammation 46.0 (*)     All other components within normal limits   BASIC METABOLIC PANEL - Abnormal; Notable for the following components:    Chloride 113 (*)     Glucose 104 (*)     Calcium 8.0 (*)     All other components within normal limits   CBC WITH PLATELETS - Abnormal; Notable for the following components:    RBC Count 3.62 (*)     Hemoglobin 11.8 (*)     Hematocrit 35.4 (*)     All other components within normal limits   ISTAT INR POCT - Abnormal; Notable for the following components:    ISTAT INR 1.3 (*)     All other components within normal limits   ISTAT  GASES LACTATE KATHRYN POCT - Abnormal; Notable for the following components:    PO2 Venous 56 (*)     All other components within normal limits   TROPONIN I   AMMONIA   TSH WITH FREE T4 REFLEX   NT PROBNP INPATIENT   NT PROBNP INPATIENT   PROCALCITONIN   PLATELET COUNT   CREATININE   GLUCOSE MONITOR NURSING POCT   ACTIVITY   PULSE OXIMETRY NURSING   ISTAT INR NURSING POCT   ISTAT TROPONIN NURSING POCT   ISTAT CREATININE NURSING POCT   ISTAT CG4 GASES LACTATE KATHRYN NURSING POCT   VITAL SIGNS AND NEURO CHECKS   ASSESSMENT   NOTIFY   IP ASSIGN PROVIDER TEAM TO TREATMENT TEAM   PERIPHERAL IV CATHETER   ACTIVITY   PULSE OXIMETRY NURSING   VITAL SIGNS   INTAKE AND OUTPUT   TROPONIN POCT   URINE CULTURE AEROBIC BACTERIAL   BLOOD CULTURE   BLOOD CULTURE   INFLUENZA A AND B AND RSV PCR       Imaging Studies:   Recent Results (from the past 24 hour(s))   XR Chest Port 1 View    Narrative    EXAMINATION:  XR CHEST PORT 1 VW 11/18/2019 7:58 PM.    COMPARISON: CT 10/8/2019 and radiograph 8/14/2019    HISTORY:  cough,  "AMS< eval for pneumonia    FINDINGS: AP view of the chest at 60 degrees. The cardiomediastinal  silhouette is stable. Pulmonary vasculature is distinct.  Redemonstration of the left upper lobe nodular opacity, better  delineated on comparison CT. Left basilar linear opacities. Chronic  fibroatelectasis/scar in the right lung base.  No pneumothorax or  pleural effusion. Mild gaseous distension of the stomach.      Impression    IMPRESSION:   1.  Linear left basilar opacities may represent atelectasis versus  infection.  2.  Left upper lobe nodular opacity, better delineated on comparison  CT.  Chronic fibroatelectasis/scar in the right lung base.      I have personally reviewed the examination and initial interpretation  and I agree with the findings.    JOSR GARNICA MD   CT Head w/o Contrast    Narrative    PRELIMINARY REPORT - The following report is a preliminary  interpretation.       Impression    Impression: No acute intracranial pathology.   CTA Head Neck with Contrast    Narrative    PRELIMINARY REPORT - The following report is a preliminary  interpretation.      Impression    Impression:    1. Head CTA demonstrates no aneurysm or stenosis of the major  intracranial arteries.   2. Neck CTA demonstrates no stenosis of the major cervical arteries.         Recent vital signs:   BP (!) 168/98   Pulse 80   Temp 100.2  F (37.9  C) (Axillary)   Resp 20   Ht 1.88 m (6' 2\")   Wt 83.4 kg (183 lb 13.8 oz)   SpO2 97%   BMI 23.61 kg/m      Jose Coma Scale Score: 10 (11/18/19 1914)       Cardiac Rhythm: Normal Sinus  Pt needs tele? Yes  Skin/wound Issues: None    Code Status: DNR / DNI    Pain control: fair    Nausea control: pt had none    Abnormal labs/tests/findings requiring intervention: see epic    Family present during ED course? Yes   Family Comments/Social Situation comments: N/A    Tasks needing completion: None    Karina Wadsworth, RN    [phone] [facility]      "

## 2019-11-19 NOTE — CONSULTS
[facility] [place]      Neurology Stroke Consult    Patient Name: Sanjay Cano  : 1945 MRN#: [id number]    STROKE DATA    Stroke Code:  Time called:  2019 191  Time patient seen:  2019 1920  Onset of symptoms:  2019 0800  Last known normal (pt's baseline):  19  Head CT read by Dr Duckworth at:  2019 230  Stroke Code de-escalated at 2019 1950 after discussion with Dr Figueroa due to patient is outside emergent treatment time parameters.     TPA treatment:  Not given due to outside the time window, stroke mimic: malaise due to possible pneumonia .     National Institutes of Health Stroke Scale (at presentation)  NIHSS done at:  time patient seen        Score    Level of consciousness:  (0)   Alert, keenly responsive     LOC questions:  (2)   Answers neither question correctly    LOC commands:  (1)   Performs one task correctly    Best gaze:  (0)   Normal    Visual:  (0)   No visual loss    Facial palsy:  (0)   Normal symmetrical movements    Motor arm (left):  (3)   No effort against gravity    Motor arm (right):  (3)   No effort against gravity    Motor leg (left):  (3)   No effort against gravity    Motor leg (right):  (3)   No effort against gravity    Limb ataxia:  (0)   Absent    Sensory:  (2)   Severe to total sensory loss    Best language:  (2)   Severe aphasia    Dysarthria:  (2)   Severe dysarthria    Extinction and inattention:  (0)   No abnormality        NIHSS Total Score:  22        Dysphagia Screen  Time of screenin2019 Not Screened due to level of awareness   Screening results: Failed screening - Strict NPO pending SLP evaluation  2019 2100     ASSESSMENT & RECOMMENDATIONS     This is a 74-year-old man with a 25-year history of Parkinson disease, dementia likely secondary to parkinsonism who is fully dependent on cares who presented with altered mental status and 2 week history of coughing up colored phlegm.   A stroke code was called out of concern for less movement with the left upper extremity, however patient had been leaning onto his left side which is typical for him, and bearing weight with the left upper extremity at the time.  As the patient was being assessed with a neurologic exam his wife pointed out that many of the things he was being asked to do he is unable to do at his baseline.  These include stating his name, or orientation questions, and is unable to lift extremities on command.  He did not have symptoms that localized to a particular vascular territory, he had generalized weakness throughout.  A CT head and CTA head and neck was performed to ensure that an acute/subacute process would not be missed and these were unremarkable.    The CT head was delayed as part of the stroke code as the patient was unable to lie flat in the scanner secondary to respiratory compromise.  He was stabilized by the emergency department team and then the head/neck imaging was completed following the stabilization.  He had begun to receive empiric antibiotics to treat the respiratory infection and will likely be admitted for this reason.      Impression:   #Respiratory distress, possible pneumonia  #Advanced Parkinson disease  #Dementia likely secondary to parkinsonism    Recommendations:  - Continue to treat underlying cause of diminished mental status  - Continue PTA medications for Parkinson disease  - Given completed CT/CTA head and neck, there are no further recommendations from a stroke neurology perspective         The stroke neurology team will sign off at this time. Thank you for involving us in the care of this patient.     DANIEL Cano is a 74 year old male with a 25+ year history of Parkinson disease, dementia secondary to Parkinson disease, who presented to the ED today out of concern for 2 weeks of cough with yellow/green sputum and suspected pulmonary infection.  When he was first evaluated in the  emergency department he was suspected to have been moving his left upper extremity less than the rest of his body, so stroke code was called.  His last known normal was about 8 PM the evening before his admission.  He arrived to the emergency department with his wife who visits him in his nursing facility every day and assist with his feeding.  She expressed concern that at 1 of his day programs he has been getting 1-2 meals not of puréed food, so she questions whether aspiration might be a possibility.  And she also describes that many other residents and employees of his nursing home have had a respiratory illness and are not wearing masks.  When asked about particular weakness of arms or legs she responds that he is typically not able to raise either of his arms above his head or raise his legs off the bed.    Care everywhere reveals several admissions for acute hypoxic and hypercarbic respiratory failure and aspiration pneumonia, 04/2019, and 05/2019 at [facility].  In 5/2019 he was last given a 7-day course of Cefpodoxime and doxycycline, recommended to have a dysphagia 2 diet.    Regarding his history of Parkinson disease Mr. Cano's wife describes an admission here at [facility] in 2011 for 1 month.  These records were reviewed which described an admission for hallucinations/psychosis secondary to drug reaction of his Parkinson medications.  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  He was weaned off of his Parkinson medications to reduce his psychosis, given an EEG to assess for ongoing seizure activity which was negative, and psychiatry was consulted who recommended continuing Celexa and Seroquel as needed for agitation.  Since this admission in 2011, the patient has been receiving his treatment for Parkinson disease at the [facility] since the patient's wife was frustrated with what was  described as a poor prognosis back in 2011.  Unfortunately we do not have access to the [facility] medical records in care everywhere.  Up-to-date medications were reviewed in care everywhere and are listed in the Medication section of this note.        Pertinent Past Medical/Surgical History  Past Medical History:   Diagnosis Date     At risk for falling 9/3/2012     Family history of thyroid cancer 7/13/2016     Malignant neoplasm (H)     skin - nose     Moderate recurrent major depression (H) 5/17/2012     Rosacea        Past Surgical History:   Procedure Laterality Date     EYE SURGERY       ORTHOPEDIC SURGERY       PHACOEMULSIFICATION CLEAR CORNEA WITH STANDARD INTRAOCULAR LENS IMPLANT  5/21/2014    Procedure: PHACOEMULSIFICATION CLEAR CORNEA WITH STANDARD INTRAOCULAR LENS IMPLANT;  Surgeon: Tomy Hunter MD;  Location:  [facility]     PHACOEMULSIFICATION CLEAR CORNEA WITH TORIC INTRAOCULAR LENS IMPLANT  4/30/2014    Procedure: PHACOEMULSIFICATION CLEAR CORNEA WITH TORIC INTRAOCULAR LENS IMPLANT;  Surgeon: Tomy Hunter MD;  Location:  [facility]       Medications:   Aspirin 81 daily  Sinemet CR and 50/200 nightly  Sinemet 25/100 at 6:00 2 tablets,  9:00 1.5 tablets, 12;00 2 tablets, 15:00 1.5 tablets, 18:00 1.5 tablets  DuoNeb  Combivent  Donepezil 5/10   Lisinopril 10  Citrucel 500 daily  Mirtazapine 22.5 daily   Seroquel 25 nightly  Ranitidine 75 nightly  Venlafaxine 37.5/75      Allergies:   Allergies   Allergen Reactions     Azithromycin      Erythromycin Other (See Comments)     Pathological fractures     Levaquin [Levofloxacin]      Fracture prevention     Seasonal Allergies    .    Family History:   Family History   Problem Relation Age of Onset     Cerebrovascular Disease Mother      Diabetes Mother      Gastrointestinal Disease Mother      Hypertension Mother      Neurologic Disorder Father      Cerebrovascular Disease Father      Cerebrovascular Disease Maternal Grandfather      Cancer Paternal  Grandmother      Other - See Comments Sister         gi - unknown   .    Social History:   Social History     Tobacco Use     Smoking status: Former Smoker     Packs/day: 0.01     Years: 40.00     Pack years: 0.40     Types: Cigarettes     Last attempt to quit: 2008     Years since quittin.3     Smokeless tobacco: Never Used     Tobacco comment: very passive cigarettes in 6 months   Substance Use Topics     Alcohol use: No     Alcohol/week: 0.0 standard drinks   .    Tobacco use: Former smoker, last smoked     ROS:  The 10 point Review of Systems is negative other than noted in the HPI or here.     PHYSICAL EXAMINATION  Vital Signs:  B/P: 163/85,  T: 99,  P: 76,  R: 37    General:  Patient lying in bed, appears in respiratory distress    HEENT:  normocephalic/atraumatic  Cardio:  RRR  Pulmonary:  Belly breathing, in respiratory distress   Abdomen:  soft, non-tender  Extremities:  no edema, peripheral pulses palpable  Skin:  intact, warm/dry     Neurologic  Mental Status:  Eyes are open and patient is scanning the room, looking at individuals who are speaking.  Patient is unable to state his name, month, or year.  He does not follow commands.  Cranial Nerves:  visual fields intact, PERRL, facial sensation intact and symmetric patient is able to look laterally both directions.  Appears to have left facial droop when patient is leaning towards the left side.  Unable to evaluate tone or quality of voice.  Motor:  Patient moves both upper extremities voluntarily.  Bilateral upper extremity drift without hitting the bed.  Unable to hold either leg antigravity.  Not moving in response to command.  Reflexes:  not assessed  Sensory:  not assessed   Coordination:  Unable to assess  Station/Gait:  Unable to assess    Labs  Labs and Imaging reviewed and used in developing the plan; pertinent results included.     Lab Results   Component Value Date     (H) 2019       The patient was discussed with the  Fellow, Dr. Figueroa.  The staff is Dr. Torres.    Doyle Duckworth MD   PGY3 Neurology Resident

## 2019-11-19 NOTE — PROGRESS NOTES
Arrival to Stroke Code: 1919    Stroke Team de-escalate interventions: 1950    ED/Bedside Nurse providing handoff: Jihan    Time left for CT: 1930    Time Returned to ED/Unit: 1950    ED/Bedside Nurse given handoff to & Time: 1955/ Yamel

## 2019-11-19 NOTE — PHARMACY-CONSULT NOTE
Pharmacy Stroke Code Response  Pharmacist responded as part of the Stroke Code Team activation to patient care area ED.  The Stroke Team determined that the patient was not a candidate for IV alteplase therapy and the pharmacy team was dismissed at 1925.

## 2019-11-19 NOTE — ED NOTES
Jeans removed and given to spouse to take, pt rolled and saturated malodorous brief removed. Urinal placed between legs with new brief and chucks pad beneath.

## 2019-11-19 NOTE — PROGRESS NOTES
The stroke neurology team responded to the stroke code emergently and ordered a CT head and CTA head/neck. He was unable to maintain adequate breathing when laying flat so could not tolerate a head CT prior to first addressing his respiratory status. We will continue to recommend these imaging exams when patient is stabilized. He is out of the TPA window and not a thrombectomy candidate due to prior MRS and a last known normal of 23 hours ago.     Full stroke code note to follow.     Doyle Duckworth PGY3  Neurology Resident

## 2019-11-19 NOTE — PROGRESS NOTES
11/19/19 1126   General Information   Onset Date 11/18/19   Start of Care Date 11/19/19   Referring Physician Brenden Yip MD   Patient Profile Review/OT: Additional Occupational Profile Info See Profile for full history and prior level of function   Patient/Family Goals Statement Pt did not state   Swallowing Evaluation Bedside swallow evaluation   Behaviorial Observations Lethargic   Mode of current nutrition NPO   Respiratory Status O2 Supply   Type of O2 supply Nasal cannula   Comments Sanjay Cano is a 74 year old male admitted on 11/18/2019. He has a history of Parkinson's, dementia, & COPD and is admitted for productive cough, encephalopathy, and oxygen requirements concerning for healthcare associated pneumonia. He has been requiring BiPAP due to tachypnea and increased respiratory effort, is otherwise hemodynamically stable on IV antibiotics. Pt known to ST caseload with previous recommendations for dysphagia diet 2 and thin liquids in 8/2019. Pt with baseline congested cough and waxing/waning MADELYN. Clinical swallow eval completed per MD orders to further assess oropharyngeal swallow function.    Clinical Swallow Evaluation   Oral Musculature unable to assess due to poor participation/comprehension  (generalized weakness)   Oral Musculature Comments baseline congested cough, decreased MADELYN   Additional Documentation Yes   Clinical Swallow Eval: Thin Liquid Texture Trial   Mode of Presentation, Thin Liquids spoon;fed by clinician   Volume of Liquid or Food Presented x1 ice chip   Oral Phase of Swallow Premature pharyngeal entry   Pharyngeal Phase of Swallow impaired;coughing/choking   Diagnostic Statement Ice chip trial resulted in overt s/sx of aspiration marked by immediate coughing   Clinical Swallow Eval: Nectar Thick Liquid Texture Trial   Mode of Presentation, Nectar spoon;fed by clinician   Volume of Nectar Presented 2 tbsp   Oral Phase, Nectar Premature pharyngeal entry   Pharyngeal Phase,  Nectar impaired;coughing/choking   Diagnostic Statement Nectar thick liquids via spoon resulted in overt s/sx of aspiration marked by coughing    Clinical Swallow Eval: Puree Solid Texture Trial   Mode of Presentation, Puree spoon;fed by clinician   Volume of Puree Presented 2 tbsp   Oral Phase, Puree Premature pharyngeal entry   Pharyngeal Phase, Puree impaired;coughing/choking   Diagnostic Statement Nectar thick liquids via spoon resulted in overt s/sx of aspiration marked by delayed coughing   VFSS Evaluation   VFSS Additional Documentation No   FEES Evaluation   Additional Documentation No   Swallow Compensations   Swallow Compensations No compensations were used   Results Suspect aspiration   General Therapy Interventions   Planned Therapy Interventions Dysphagia Treatment   Dysphagia treatment   (PO readiness)   Swallow Eval: Clinical Impressions   Skilled Criteria for Therapy Intervention Skilled criteria met.  Treatment indicated.   Functional Assessment Scale (FAS) 1   Treatment Diagnosis severe oropharyngeal dysphagia    Diet texture recommendations NPO   Recommended Feeding/Eating Techniques   (please complete excellent oral cares )   Demonstrates Need for Referral to Another Service dietitian;occupational therapy;physical therapy;respiratory therapy   Therapy Frequency Daily   Predicted Duration of Therapy Intervention (days/wks) 2 weeks   Anticipated Discharge Disposition inpatient rehabilitation facility   Risks and Benefits of Treatment have been explained. Yes   Patient, family and/or staff in agreement with Plan of Care Yes   Clinical Impression Comments SLP: Clinical swallow eval completed per MD orders. Pt presents with severe oropharyngeal dysphagia in the setting of generalized weakness and AMS. Pt required consistent verbal cues to maintain alertness. Pt with baseline congested cough noted and required intermittent oral suctioning via yaunkauer. Thin liquids via spoon, nectar thick liquids via  spoon, and pureed textures resulted in overt s/sx of aspiration marked by coughing. No further PO trials given d/t high aspiration risk. Recommend pt continue NPO status. Please complete excellent oral cares. ST to continue to follow to assess PO readiness. Anticipate pt will require ongoing ST upon discharge.   Total Evaluation Time   Total Evaluation Time (Minutes) 13

## 2019-11-19 NOTE — PLAN OF CARE
Discharge Planner SLP   Patient plan for discharge: none stated   Current status: SLP: Clinical swallow eval completed per MD orders. Pt presents with severe oropharyngeal dysphagia in the setting of generalized weakness and AMS. Pt required consistent verbal cues to maintain alertness. Pt with baseline congested cough noted and required intermittent oral suctioning via yaunkauer. Thin liquids via spoon, nectar thick liquids via spoon, and pureed textures resulted in overt s/sx of aspiration marked by coughing. No further PO trials given d/t high aspiration risk. Recommend pt continue NPO status. Please complete excellent oral cares. ST to continue to follow to assess PO readiness. Anticipate pt will require ongoing ST upon discharge.  Barriers to return to prior living situation: dysphagia, waxing/waning MADELYN, respiratory status   Recommendations for discharge: anticipate inpatient rehab  Rationale for recommendations: pt would benefit from continued ST to safely return to PO intake        Entered by: Cristal Mackenzie 11/19/2019 11:44 AM

## 2019-11-19 NOTE — ED TRIAGE NOTES
Patient presents via EMS from nursing home for c/o altered mental status. Per EMS, patient sent in for evaluation of pneumonia. Patient somnolent. Hx obtained from wife, who reports decreased mental status as of today and cough that started two weeks ago but became worse today. Sats 93-94% on RA.

## 2019-11-19 NOTE — ED PROVIDER NOTES
History     Chief Complaint   Patient presents with     Altered Mental Status     HPI  Sanjay Cano is a 74 year old male with history of hypertension, Parkinson's disease, recurrent UTIs, dementia, possible emphysema however wife denies this who presents for evaluation of cough altered mental status.  Patient lives at a nursing facility and the patient's wife visits him daily.  She states that she last saw him at 830 last night and he had been having a cough for the last few days productive of yellow sputum.  She states however he was at his baseline yesterday.  She states that when she went to visit him at 2 PM today he was more sleepy and not as responsive and appeared generally weak with some difficulty breathing.  She states that he does have generalized weakness related to his Parkinson's and typically worsens throughout the day.  She states however he typically does answer questions appropriately but sometimes he will answer in a whisper towards the end of the night.  She states that typically he has difficulty raising his legs off the bed and that is baseline for him.  She states that typically he also leans to the left which is baseline for him.  She states that he typically is slightly weak in both of his arms.  She is uncertain if the slight facial droop to the left is new or not.  She noticed he was slightly drooling today.  No known fevers per wife.  She states he has not had any vomiting or abdominal pain.  She reports he has had some looser stools.  She states he has had an infection of his right fifth finger that was previously treated and is improving.  She does not know of any falls.  She states he is not on any anticoagulation.  She states he was diagnosed with pneumonia back in September and got doxycycline and prednisone at this time.  She states that with his cough they have been giving him treatments at the nursing facility.  She states he is not currently on any steroids.  She reports he  has been taking his carbidopa.  She denies any prior history of stroke.  She states he has not been complaining of any abdominal pain or chest pain.  Per wife, multiple other residents of the nursing facility are sick with similar symptoms.  Further history review of systems is somewhat limited due to the patient's mental status and is primarily provided by wife.    I have reviewed the Medications, Allergies, Past Medical and Surgical History, and Social History in the [facility] system.    Past Medical History:   Diagnosis Date     At risk for falling 9/3/2012     Family history of thyroid cancer 7/13/2016     Malignant neoplasm (H)     skin - nose     Moderate recurrent major depression (H) 5/17/2012     Rosacea      Past Surgical History:   Procedure Laterality Date     EYE SURGERY       ORTHOPEDIC SURGERY       PHACOEMULSIFICATION CLEAR CORNEA WITH STANDARD INTRAOCULAR LENS IMPLANT  5/21/2014    Procedure: PHACOEMULSIFICATION CLEAR CORNEA WITH STANDARD INTRAOCULAR LENS IMPLANT;  Surgeon: Tomy Hunter MD;  Location: [facility]     PHACOEMULSIFICATION CLEAR CORNEA WITH TORIC INTRAOCULAR LENS IMPLANT  4/30/2014    Procedure: PHACOEMULSIFICATION CLEAR CORNEA WITH TORIC INTRAOCULAR LENS IMPLANT;  Surgeon: Tomy Hunter MD;  Location: [facility]     Current Facility-Administered Medications   Medication     vancomycin 1500 mg in 0.9% NaCl 250 ml intermittent infusion 1,500 mg     Current Outpatient Medications   Medication     amLODIPine (NORVASC) 10 MG tablet     bacitracin 500 UNIT/GM ophthalmic ointment     benzonatate (TESSALON) 100 MG capsule     carbidopa-levodopa (SINEMET CR)  MG per CR tablet     carbidopa-levodopa (SINEMET)  MG per tablet     cholecalciferol (VITAMIN D) 1000 UNIT tablet     donepezil (ARICEPT) 10 MG tablet     donepezil (ARICEPT) 5 MG tablet     famotidine (PEPCID) 20 MG tablet     famotidine (PEPCID) 20 MG tablet     FLOVENT  MCG/ACT Inhaler     guaiFENesin (ROBITUSSIN) 20 mg/mL SOLN  solution     hydrocortisone (WESTCORT) 0.2 % external cream     lactobacillus rhamnosus, GG, (CULTURELL) capsule     methylcellulose (CITRUCEL) 500 MG TABS tablet     mirtazapine (REMERON) 15 MG tablet     order for DME     predniSONE (DELTASONE) 10 MG tablet     predniSONE (DELTASONE) 20 MG tablet     ranitidine (ZANTAC) 75 MG tablet     spacer (OPTICHAMBER GERRI) holding chamber     venlafaxine (EFFEXOR-XR) 37.5 MG 24 hr capsule     venlafaxine (EFFEXOR-XR) 75 MG 24 hr capsule        Allergies   Allergen Reactions     Azithromycin      Erythromycin Other (See Comments)     Pathological fractures     Levaquin [Levofloxacin]      Fracture prevention     Seasonal Allergies      Social History     Socioeconomic History     Marital status:      Spouse name: Not on file     Number of children: Not on file     Years of education: Not on file     Highest education level: Not on file   Occupational History     Not on file   Social Needs     Financial resource strain: Not on file     Food insecurity:     Worry: Not on file     Inability: Not on file     Transportation needs:     Medical: Not on file     Non-medical: Not on file   Tobacco Use     Smoking status: Former Smoker     Packs/day: 0.01     Years: 40.00     Pack years: 0.40     Types: Cigarettes     Last attempt to quit: 2008     Years since quittin.3     Smokeless tobacco: Never Used     Tobacco comment: very passive cigarettes in 6 months   Substance and Sexual Activity     Alcohol use: No     Alcohol/week: 0.0 standard drinks     Drug use: No     Sexual activity: Never     Partners: Female   Lifestyle     Physical activity:     Days per week: Not on file     Minutes per session: Not on file     Stress: Not on file   Relationships     Social connections:     Talks on phone: Not on file     Gets together: Not on file     Attends Worship service: Not on file     Active member of club or organization: Not on file     Attends meetings of clubs or  "organizations: Not on file     Relationship status: Not on file     Intimate partner violence:     Fear of current or ex partner: Not on file     Emotionally abused: Not on file     Physically abused: Not on file     Forced sexual activity: Not on file   Other Topics Concern     Parent/sibling w/ CABG, MI or angioplasty before 65F 55M? No   Social History Narrative     Not on file         Review of Systems   Unable to perform ROS: Mental status change       Physical Exam   BP: 118/72  Pulse: 69  Heart Rate: 71  Temp: 99  F (37.2  C)  Resp: 24  Height: 188 cm (6' 2\")  Weight: 83.4 kg (183 lb 13.8 oz)(bed scale)  SpO2: 94 %      Physical Exam  GEN: Patient does arouse to pain and voice.  He was awake. Initially not answering questions but then was able to state his name.  Then started following commands.  He does appear to be listing towards the left.  HEENT:  PERRL, EOMI, Mucous membranes are dry.  Atraumatic.  No scalp hematoma.  Cardio:  RRR, no murmur, 2+ radial, DP, and PT pulses.  PULM: Tachypneic but satting 95% on room air.  Bibasilar coarse lung sounds noted.  No initial accessory muscle use.  Abd:  Soft, normal bowel sounds, no focal tenderness  Musculoskeletal: At baseline unable to lift his legs.  Has limited movement of his arms per wife.  Does appear to now move both upper extremities spontaneously.  No obvious lower extremity edema noted.  Neuro: Patient arouses to pain and voice.  He was awake.  Initially was not answering questions but then able to state his name.  He then started to follow commands.  He appears to be listing towards the left.  GCS of 10.  Initially would not move his left arm against gravity however then was able to move it with drift. Right arm also with drift.  The upper extremities did not hit the bed.  He was unable to lift either leg off the bed.  Appears to have a left facial droop.  Skin:  Warm, dry.  No rashes.  Psych: Flat affect  ED Course        Procedures             EKG " Interpretation:      Interpreted by Peri Taylor MD  Time reviewed: 8:07 pm  Symptoms at time of EKG: AMS  Rhythm: normal sinus   Rate: normal  Axis: normal  Ectopy: none  Conduction: normal  ST Segments/ T Waves: No ST-T wave changes  Q Waves: none  Comparison to prior: Unchanged from 8/14/19    Clinical Impression: No evidence of acute ischemia.          Critical Care time:  30 minutes    The patient has stroke symptoms:           ED Stroke specific documentation           NIHSS PDF          Protocol PDF     Patient last known well time: 8:30 pm 11/17/19  ED Provider first to bedside at: 1915  CT Results received at: CT was delayed due to patient's respiratory distress    tPA:   Not given due to outside the time window.    If treating with tPA: Ensure SBP<185 and DBP<105 prior to treatment with IV tPA.  Administering IV tPA after treatment with IV labetalol, hydralazine, or nicardipine is reasonable once BP control is established.    Endovascular Retrieval:  Outside of the window    National Institutes of Health Stroke Scale (Baseline)  Time Performed: 1915      Score    Level of consciousness: (1)   Not alert; arousable w/ minor stim to obey/answer/respond    LOC questions: (1)   Answers one question correctly    LOC commands: (0)   Performs both tasks correctly    Best gaze: (0)   Normal    Visual: (0)   No visual loss    Facial palsy: (1)   Minor paralysis (flat nasolabial fold, smile asymmetry)    Motor arm (left): (3)   No effort against gravity    Motor arm (right): (1)   Drift    Motor leg (left): (3)   No effort against gravity    Motor leg (right): (3)   No effort against gravity    Limb ataxia: (0)   Absent    Sensory: (0)   Normal- no sensory loss    Best language: (0)   Normal- no aphasia    Dysarthria: (0)   Normal    Extinction and inattention: (0)   No abnormality        Total Score:  13        Stroke Mimics were considered (including migraine headache, seizure disorder, hypoglycemia (or  hyperglycemia), head or spinal trauma, CNS infection, Toxin ingestion and shock state (e.g. sepsis) .      Evaluation/Treatement was delayed due to: respiratory distress causing CT to be aborted. CT later obtained after patient improved with BiPAP.        National Institutes of Health Stroke Scale  Time Performed: 1958      Score    Level of consciousness: (0)   Alert, keenly responsive    LOC questions: (0)   Answers both questions correctly    LOC commands: (0)   Performs both tasks correctly    Best gaze: (0)   Normal    Visual: (0)   No visual loss    Facial palsy: (1)   Minor paralysis (flat nasolabial fold, smile asymmetry)    Motor arm (left): (0)   No drift    Motor arm (right): (0)   No drift    Motor leg (left): (3)   No effort against gravity    Motor leg (right): (3)   No effort against gravity    Limb ataxia: (0)   Absent    Sensory: (0)   Normal- no sensory loss    Best language: (0)   Normal- no aphasia    Dysarthria: (0)   Normal    Extinction and inattention: (0)   No abnormality        Total Score:  7             Labs Ordered and Resulted from Time of ED Arrival Up to the Time of Departure from the ED   GLUCOSE BY METER - Abnormal; Notable for the following components:       Result Value    Glucose 147 (*)     All other components within normal limits   CBC WITH PLATELETS DIFFERENTIAL - Abnormal; Notable for the following components:    RBC Count 3.81 (*)     Hemoglobin 12.1 (*)     Hematocrit 36.9 (*)     All other components within normal limits   PARTIAL THROMBOPLASTIN TIME - Abnormal; Notable for the following components:    PTT 40 (*)     All other components within normal limits   COMPREHENSIVE METABOLIC PANEL - Abnormal; Notable for the following components:    Glucose 113 (*)     Calcium 8.3 (*)     Protein Total 6.6 (*)     All other components within normal limits   INR - Abnormal; Notable for the following components:    INR 1.28 (*)     All other components within normal limits    CREATININE POCT - Abnormal; Notable for the following components:    Creatinine 1.3 (*)     GFR Estimate 54 (*)     All other components within normal limits   BLOOD GAS ARTERIAL - Abnormal; Notable for the following components:    pO2 Arterial 142 (*)     All other components within normal limits   ISTAT INR POCT - Abnormal; Notable for the following components:    ISTAT INR 1.3 (*)     All other components within normal limits   ISTAT  GASES LACTATE KATHRYN POCT - Abnormal; Notable for the following components:    PO2 Venous 56 (*)     All other components within normal limits   TROPONIN I   AMMONIA   TSH WITH FREE T4 REFLEX   NT PROBNP INPATIENT   NT PROBNP INPATIENT   CRP INFLAMMATION   PROCALCITONIN   GLUCOSE MONITOR NURSING POCT   ROUTINE UA WITH MICROSCOPIC   ACTIVITY   PULSE OXIMETRY NURSING   ISTAT INR NURSING POCT   ISTAT TROPONIN NURSING POCT   ISTAT CREATININE NURSING POCT   ISTAT CG4 GASES LACTATE KATHRYN NURSING POCT   VITAL SIGNS AND NEURO CHECKS   ASSESSMENT   NOTIFY   TROPONIN POCT   BLOOD CULTURE   BLOOD CULTURE   INFLUENZA A AND B AND RSV PCR   URINE CULTURE AEROBIC BACTERIAL     CTA Head Neck with Contrast   Preliminary Result   Impression:     1. Head CTA demonstrates no aneurysm or stenosis of the major   intracranial arteries.    2. Neck CTA demonstrates no stenosis of the major cervical arteries.         CT Head w/o Contrast   Preliminary Result   Impression: No acute intracranial pathology.      XR Chest Port 1 View   Final Result   IMPRESSION:    1.  Linear left basilar opacities may represent atelectasis versus   infection.   2.  Left upper lobe nodular opacity, better delineated on comparison   CT.  Chronic fibroatelectasis/scar in the right lung base.        I have personally reviewed the examination and initial interpretation   and I agree with the findings.      JOSR GARNICA MD                 Assessments & Plan (with Medical Decision Making)   On my initial evaluation, patient appeared to  have possible new left-sided deficits with a left-sided facial droop and increased weakness in the left arm.  It was somewhat difficult to ascertain the baseline his wife was somewhat of a difficult historian.  Due to this, the patient was called as a stroke code and the neurology team was at bedside for evaluation.  He does have baseline weakness in his lower extremities that appears unchanged.  He initially was not communicative but was awake.  He is slumped to the left side however wife reports that this is baseline.  He was tachypneic but satting 95% on room air.  His vital signs were otherwise within normal limits.  He was following commands but was not speaking.  GCS was 10 and thus we did not feel he warranted immediate intubation appeared to be protecting his airway at this time.  With his presentation we did consider a broad differential diagnosis including but not limited to CVA, infectious etiology leading to his altered mental status, intracranial hemorrhage, toxic metabolic encephalopathy, delirium.  Neurology did not feel that he had any signs of seizure at that this time which we did agree as well.  We did obtain broad laboratory work-up including ammonia level.  I-STAT VBG revealed normal pH of 7.42 with a PCO2 of 43 and bicarb of 28.  Lactic acid was within normal limits at 0.8.  In his chart it is reported that he has history of emphysema however wife declined any history of this when I asked her if he had any history of lung problems.  It does appear that previously he had had some respiratory acidosis in the past with elevated CO2.  We did transfer the patient urgently to CT scan to evaluate for evidence of intracranial hemorrhage and CT angiogram of the head and neck was also ordered in discussion with neurology.  As the patient was attempted to be lying flat per nursing staff he developed increase in respiratory distress and thus he was moved back over to the bed and sat upright and suctioned  as he was having nasal secretions.  When I arrived in the CT scanner patient was awake and alert and he was able to answer my questions which was an improvement from my prior evaluation.  He again was mildly tachypneic with now evidence of some accessory muscle use and thus we did bring him back to the emergency department and abort the CT scans at this time.  We did place the patient on BiPAP and with this he did appear improved in his work of breathing.  He was also given a DuoNeb.  He was also more awake and alert and able to appropriately answer my questions.  I did have a long discussion with the patient's wife who initially was unsure if he would want intubation.  Initially she stated that he may want this if need be.  She then later stated after more time to think about this that she did not think he would want to be intubated.  He does appear improved on the BiPAP.  Chest x-ray revealed evidence of atelectasis versus possible area of infection which would be consistent with his symptoms and presentation.  This does appear to be more likely infectious in etiology of his altered mental status and the neurology team agreed with this as well.  He began improving and moving both his left and right upper extremities with his NIH scale improved.  Patient's white blood cell count is 10,000.  EKG was obtained which did not reveal any evidence of acute ischemia.  We did obtain 2 peripheral blood cultures and started the patient on IV Zosyn and IV vancomycin for possible hospital-acquired pneumonia.  Hemoglobin appears baseline at 12.1.  Glucose is within normal limits.  Urinalysis is pending at this time and urine culture was also ordered.  Patient's respiratory status and rate improved with the BiPAP.  He improved enough that he was able to lie flat comfortably and thus we did transfer him to CT to have these obtained.  CT without contrast did not reveal any evidence of acute hemorrhage.  CT angios did not show any  evidence of large vessel occlusion.  He may potentially need MRI for further evaluation of a possible stroke however at this point as he is still on BiPAP he would not be stable for this at this time and this can be performed while he is hospitalized.  His ammonia level was normal at 31 I did speak with the intensivist about the patient as he is somewhat a borderline patient for stepdown versus ICU.  After discussion, she felt the patient would be stable for stepdown bed placement but was aware of his presence and would be able to be consulted as needed.  I spoke with the patient's sister and wife who are in agreement with this plan.  Patient remained well-appearing and was briefly off BiPAP and was satting well on 4 L nasal cannula however still had some accessory muscle use and thus was placed back on BiPAP at this time.  I spoke with the hospitalist service to admit the patient to a stepdown bed and they were in agreement with the current plan.  They accepted the patient for admission.  His blood pressures remained within normal limits and his lactic acid is within normal limits as well.  We did gently rehydrate him initially starting with 500 mL of IV fluids and then ordering a full liter after this.  Influenza and RSV swab was negative.  He was transferred to the [facility] bed in stable condition.    I have reviewed the nursing notes.    I have reviewed the findings, diagnosis, plan and need for follow up with the patient.    New Prescriptions    No medications on file       Final diagnoses:   Altered mental status, unspecified altered mental status type   Healthcare-associated pneumonia       11/18/2019   [facility], [place], EMERGENCY DEPARTMENT     Peri Taylor MD  12/26/19 [id number]

## 2019-11-19 NOTE — CONSULTS
[facility]  Neurology Consultation  Patient Name: Sanjay Cano  MRN: [id number]  : 1945  PCP: Bharath Buchanan  Attending provider: Brenden Yip  Admission Date: 2019  Date of Service: 2019    The Neurology consultation service was asked to see Sanjay Cano to evaluate for Parkinson's disease.    Assessment:  This patient with advanced Parkinson disease and Parkinson disease dementia presented with aspiration pneumonia, he is receiving IV antibiotic for that, he is on Sinemet 6 times a day with different dosages look at HPI, primary team consulted us to help in managing Parkinson disease.  Wife did not feel strongly about putting NG tube as she feels that he can SFR if we do so.  She told the primary team that she does not want him to be on it when he went to talk to her she thought that she was convinced as far as the tube is it is transient.  I discussed with her that if we do not put NG tube and we start him on his Sinemet that would cause more harm and worsening in his dysphasia and his Parkinson symptoms that would worsen his aspiration pneumonia as well.  Wife showed understanding and she felt as far as the NG tube is transient that should be fine with her.  However when I talked to the primary team concerned that they talk to her after I talked to the wife and she still resisting putting the NG tube and she thought that she wants to think about it till tomorrow.  I discussed this with the primary team again and I think that the patient needs to be on his Sinemet as soon as possible to minimize his dysphagia and improve his aspiration risk.    I discussed with the primary team once the NG tube in place we need to start the patient on 2.5 tablets of 25/100 then after 2 hours to give him 2 tablets of 25/100 then to resume his normal schedule which is described in my HPI.  I think the longer we wait the more worse the Parkinson disease  symptom will get.  Especially in his case his Parkinson is pretty much advanced to the level that it affects his swallowing causing him severe rigidity and tremors and dementia and bradykinesia    Recommendations:  -Once to insert NG tube, please give him 2.5 tablet of 25/100 Sinemet after 2 hours give him 2 tablets of 25/100 Sinemet then resume his regular regimen which is 2 tablets of 25/100 at 6 AM, 1.5 tablets of 25/100 at 9 AM, 2 tablets of 25/100 at noon, 1.5 tablets of 25/100 at 3:00, 1.5 tablet of 25/100 Sinemet at 6:00, 1 tablet of 50/200 at 9 CR.   The patient does not receive his Sinemet that would cause more harm and more worsening in his dysphagia  -I would also to see resume his Aricept 5 in the morning and 10 in the evening.    Patient was seen and discussed with Dr. Posey.    Robinson Lyman, [facility]  Neurology PGY3  [facility]  General Neurology Consult pager: [phone]    HPI:  Information prior to consult: Sanjay Cano is a 74 year old male with a 25+ year history of Parkinson disease, dementia secondary to Parkinson disease, who presented to the ED yesterday out of concern for cough with yellow/green sputum and suspected pulmonary infection.  He arrived to the emergency department with his wife who visits him in his nursing facility every day and assist with his feeding.  She expressed concern that at 1 of his day programs he has been getting 1-2 meals not of puréed food, so she questions whether aspiration might be a possibility.  And she also describes that many other residents and employees of his nursing home have had a respiratory illness and are not wearing masks.  When asked about particular weakness of arms or legs she responds that he is typically not able to raise either of his arms above his head or raise his legs off the bed.     Care everywhere reveals several admissions for acute hypoxic and hypercarbic respiratory failure and aspiration pneumonia, 04/2019, and  05/2019 at [facility] [place].  In 5/2019 he was last given a 7-day course of Cefpodoxime and doxycycline, recommended to have a dysphagia 2 diet.     Regarding his history of Parkinson disease Mr. Cano's wife describes an admission here at [facility] in 2011 for 1 month.  These records were reviewed which described an admission for hallucinations/psychosis secondary to drug reaction of his Parkinson medications.  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  He was weaned off of his Parkinson medications to reduce his psychosis, given an EEG to assess for ongoing seizure activity which was negative, and psychiatry was consulted who recommended continuing Celexa and Seroquel as needed for agitation.  Since this admission in 2011, the patient has been receiving his treatment for Parkinson disease at the [facility] since the patient's wife was frustrated with what was described as a poor prognosis back in 2011.  Unfortunately we do not have access to the [facility] medical records in care everywhere.  Up-to-date medications were reviewed in care everywhere and are listed in the Medication section of this note    Information learned by Neurology:   74-year-old male patient with history of advanced Parkinson disease, Parkinson disease dementia and Parkinson's psychosis who lives on long-term facility, started to have mild cough on Sunday, that progressed to more productive cough and being more altered on Monday, that prompted the wife to bring him to the ED.  In the ED they did x-ray chest and thought that the patient has aspiration pneumonia, so they started him on antibiotics including Zosyn and vancomycin.  Neurology was consulted because the patient on Sinemet 6 times a day is taking 2 tablets of 25/100 at 6 AM, 1.5 tablet at 9 AM, 2 tablets at noon, 1.5 tablet at 3 PM, 1.5 tablet at 6 PM, 1 tablets of 50/200  CR at 9 PM.  Medicine team wondering what needs to be done for his Parkinson disease medications since speech did not clear the patient's for swallowing and the wife was resisting him having NG tube in place.  At baseline patient's minimally conversant, he is dependent, he is wheelchair-bound, he can takes on and takes off his shoes, that is the only thing that he can do, was diagnosed with Parkinson's disease about 24 years ago and was diagnosed with dementia about 4- 5 years ago, patient has dysphagia at baseline secondary to his Parkinson disease and he is on puréed or dysphagia diet.  He was evaluated by speech therapy and  recommended dysphagia diet level 3.  Wife feels concerned about long-term facility that he lives and and there is Some of his medications are missed to be given and they do not give him the appropriate nutrition that he requires.    ROS: negative except as per HPI.    PMH:  Past Medical History:   Diagnosis Date     At risk for falling 9/3/2012     Family history of thyroid cancer 7/13/2016     Malignant neoplasm (H)     skin - nose     Moderate recurrent major depression (H) 5/17/2012     Rosacea        PSH:  Past Surgical History:   Procedure Laterality Date     EYE SURGERY       ORTHOPEDIC SURGERY       PHACOEMULSIFICATION CLEAR CORNEA WITH STANDARD INTRAOCULAR LENS IMPLANT  5/21/2014    Procedure: PHACOEMULSIFICATION CLEAR CORNEA WITH STANDARD INTRAOCULAR LENS IMPLANT;  Surgeon: Tomy Hunter MD;  Location: [facility]     PHACOEMULSIFICATION CLEAR CORNEA WITH TORIC INTRAOCULAR LENS IMPLANT  4/30/2014    Procedure: PHACOEMULSIFICATION CLEAR CORNEA WITH TORIC INTRAOCULAR LENS IMPLANT;  Surgeon: Tomy Hunter MD;  Location: [facility]       Medications:  Current Facility-Administered Medications   Medication     amLODIPine (NORVASC) tablet 10 mg     bacitracin ophthalmic ointment     carbidopa-levodopa (SINEMET CR)  MG per CR tablet 1 tablet     carbidopa-levodopa (SINEMET)  MG per  tablet 2 tablet     carbidopa-levodopa half-tab 12.5-50 mg     dextrose 5% and 0.9% NaCl infusion     donepezil (ARICEPT) tablet 10 mg     donepezil (ARICEPT) tablet 5 mg     enoxaparin ANTICOAGULANT (LOVENOX) injection 40 mg     famotidine (PEPCID) tablet 20 mg     fluticasone (ARNUITY ELLIPTA) 200 MCG/ACT inhaler 1 puff     ipratropium - albuterol 0.5 mg/2.5 mg/3 mL (DUONEB) neb solution 3 mL     lactobacillus rhamnosus (GG) (CULTURELL) capsule 1 capsule     lidocaine (LMX4) cream     lidocaine 1 % 0.1-1 mL     mirtazapine (REMERON) half-tab 22.5 mg     piperacillin-tazobactam (ZOSYN) 4.5 g vial to attach to  mL bag     sodium chloride (PF) 0.9% PF flush 3 mL     sodium chloride (PF) 0.9% PF flush 3 mL     vancomycin 1500 mg in 0.9% NaCl 250 ml intermittent infusion 1,500 mg     venlafaxine (EFFEXOR-XR) 24 hr capsule 37.5 mg     venlafaxine (EFFEXOR-XR) 24 hr capsule 75 mg     Vitamin D3 (CHOLECALCIFEROL) 25 mcg (1000 units) tablet 1,000 Units     Current Outpatient Medications   Medication     acetaminophen (TYLENOL) 325 MG tablet     amLODIPine (NORVASC) 10 MG tablet     bacitracin 500 UNIT/GM OINT     benzonatate (TESSALON) 100 MG capsule     carbidopa-levodopa (SINEMET CR)  MG per CR tablet     carbidopa-levodopa (SINEMET)  MG per tablet     cholecalciferol (VITAMIN D) 1000 UNIT tablet     donepezil (ARICEPT) 10 MG tablet     donepezil (ARICEPT) 5 MG tablet     famotidine (PEPCID) 20 MG tablet     fluticasone (FLOVENT HFA) 110 MCG/ACT inhaler     guaiFENesin (ROBITUSSIN) 20 mg/mL SOLN solution     hydrocortisone (WESTCORT) 0.2 % external cream     ipratropium - albuterol 0.5 mg/2.5 mg/3 mL (DUONEB) 0.5-2.5 (3) MG/3ML neb solution     ipratropium - albuterol 0.5 mg/2.5 mg/3 mL (DUONEB) 0.5-2.5 (3) MG/3ML neb solution     lactobacillus rhamnosus, GG, (CULTURELL) capsule     methylcellulose (CITRUCEL) 500 MG TABS tablet     mirtazapine (REMERON) 15 MG tablet     polyethyl glycol-propyl glycol  0.4-0.3 % GEL     venlafaxine (EFFEXOR-XR) 37.5 MG 24 hr capsule     venlafaxine (EFFEXOR-XR) 75 MG 24 hr capsule     order for DME     spacer (OPTICHAMBER GERRI) holding chamber       Allergies:  Allergies   Allergen Reactions     Azithromycin      Erythromycin Other (See Comments)     Pathological fractures     Levaquin [Levofloxacin]      Fracture prevention     Seasonal Allergies        Social History:  Social History     Socioeconomic History     Marital status:      Spouse name: Not on file     Number of children: Not on file     Years of education: Not on file     Highest education level: Not on file   Occupational History     Not on file   Social Needs     Financial resource strain: Not on file     Food insecurity:     Worry: Not on file     Inability: Not on file     Transportation needs:     Medical: Not on file     Non-medical: Not on file   Tobacco Use     Smoking status: Former Smoker     Packs/day: 0.01     Years: 40.00     Pack years: 0.40     Types: Cigarettes     Last attempt to quit: 2008     Years since quittin.3     Smokeless tobacco: Never Used     Tobacco comment: very passive cigarettes in 6 months   Substance and Sexual Activity     Alcohol use: No     Alcohol/week: 0.0 standard drinks     Drug use: No     Sexual activity: Never     Partners: Female   Lifestyle     Physical activity:     Days per week: Not on file     Minutes per session: Not on file     Stress: Not on file   Relationships     Social connections:     Talks on phone: Not on file     Gets together: Not on file     Attends Yazidi service: Not on file     Active member of club or organization: Not on file     Attends meetings of clubs or organizations: Not on file     Relationship status: Not on file     Intimate partner violence:     Fear of current or ex partner: Not on file     Emotionally abused: Not on file     Physically abused: Not on file     Forced sexual activity: Not on file   Other Topics Concern      Parent/sibling w/ CABG, MI or angioplasty before 65F 55M? No   Social History Narrative     Not on file       Family History:  Family History   Problem Relation Age of Onset     Cerebrovascular Disease Mother      Diabetes Mother      Gastrointestinal Disease Mother      Hypertension Mother      Neurologic Disorder Father      Cerebrovascular Disease Father      Cerebrovascular Disease Maternal Grandfather      Cancer Paternal Grandmother      Other - See Comments Sister         gi - unknown       Physical Examination:  Vitals: Temp: 100  F (37.8  C) Temp src: Axillary BP: (!) 159/97 Pulse: 84 Heart Rate: 81 Resp: (!) 31 SpO2: 98 % O2 Device: Nasal cannula Oxygen Delivery: 3 LPM  General: adult, NAD, cooperative  Neuro:   Mental status: Patient was able to mention his name, otherwise he was not able to tell me the month of the year or the place.  Patient was able to open his eyes to commands squeeze my fingers and wiggle his toes.  He cannot follow complex commands.  Was not able to assess aphasia or dysarthria due to minimal speech output.   Cranial nerves: Patient seems to have left facial droop, he was able to move his eyes bilaterally, he was resisting to open his eyes to me to examine his pupil however I think his pupils were about 2 to 3 mm in diameter.  His tongue in midline.   Motor: He was able to keep his both upper extremity antigravity.  He was not able to hold his both legs antigravity.  He has cogwheel rigidity upper and lower extremity.  He has prominent startle reflex.  He has resting tremors more so on the right than the left.   Reflexes: Negative Babinski.  Good hip Tomas.   RIGHT LEFT   Brachioradialis 1+ 1+   Biceps 1+ 1+   Triceps 1+ 1+   Patellar 1+ 1+   Achilles 1+ 1+    Sensory: Intact to l pin prick x 4 extremities   Coordination: Was not able to assess because of patient mental status   gait: I was not able to assess because of patient's mental status  Image Studies:  Recent Results  (from the past 24 hour(s))   XR Chest Port 1 View    Narrative    EXAMINATION:  XR CHEST PORT 1 VW 11/18/2019 7:58 PM.    COMPARISON: CT 10/8/2019 and radiograph 8/14/2019    HISTORY:  cough, AMS< eval for pneumonia    FINDINGS: AP view of the chest at 60 degrees. The cardiomediastinal  silhouette is stable. Pulmonary vasculature is distinct.  Redemonstration of the left upper lobe nodular opacity, better  delineated on comparison CT. Left basilar linear opacities. Chronic  fibroatelectasis/scar in the right lung base.  No pneumothorax or  pleural effusion. Mild gaseous distension of the stomach.      Impression    IMPRESSION:   1.  Linear left basilar opacities may represent atelectasis versus  infection.  2.  Left upper lobe nodular opacity, better delineated on comparison  CT.  Chronic fibroatelectasis/scar in the right lung base.      I have personally reviewed the examination and initial interpretation  and I agree with the findings.    JOSR GARNICA MD   CT Head w/o Contrast    Narrative    CT HEAD W/O CONTRAST 11/18/2019 10:22 PM    Provided History: AMS, eval for bleed, CVA    Comparison: 1/26/2018.    Technique: Using multidetector thin collimation helical acquisition  technique, axial, coronal and sagittal CT images from the skull base  to the vertex were obtained without intravenous contrast.     Findings:    No intracranial hemorrhage, mass effect, or midline shift. The  ventricles are proportionate to the cerebral sulci. The gray to white  matter differentiation of the cerebral hemispheres is preserved. The  basal cisterns are patent. Plagiocephaly. Bilateral pseudophakia.    The visualized paranasal sinuses are clear. The mastoid air cells are  clear.       Impression    Impression: No acute intracranial pathology.    I have personally reviewed the examination and initial interpretation  and I agree with the findings.    AMINATA ANNA MD   CTA Head Neck with Contrast    Narrative    CTA  HEAD NECK  WITH CONTRAST 11/18/2019 10:27 PM    CT angiogram of the neck   CT angiogram of the base of the brain with contrast  Reconstruction by the Radiologist on the 3D workstation    Provided History:  left sided weakness, AMS, eval for CVA    Comparison:  None available.      Technique:  HEAD and NECK CTA: During rapid bolus intravenous injection of  nonionic contrast material, axial images were obtained using thin  collimation multidetector helical technique from the base of the skull  through the [place] of [place]. This CT angiogram data was reconstructed  at thin intervals with mild overlap. Images were sent to the [employer]  workstation, and 3D reconstructions were obtained. The axial source  images, multiplanar reformations, 3D reconstructions in both maximum  intensity projection display and volume rendered models were reviewed,  with reconstructions performed by the technologist and the  radiologist.    Contrast: iopamidol (ISOVUE-370) solution 75 mL    Findings:    Head CTA demonstrates no aneurysm or stenosis of the major  intracranial arteries.    Neck CTA demonstrates no stenosis of the major cervical arteries. The  origins of the great vessels from the aortic arch are patent. The  normal distal right internal carotid artery measures 5 mm. The normal  distal left internal carotid artery measures 5 mm.     No mass within the visualized portions of the cervical soft tissues or  lung apices.     Impression    Impression:    1. Head CTA demonstrates no aneurysm or stenosis of the major  intracranial arteries.   2. Neck CTA demonstrates no stenosis of the major cervical arteries.    I have personally reviewed the examination and initial interpretation  and I agree with the findings.    AMINATA ANNA MD       Laboratory Studies:  WBC 7.5  Chest x-ray shows aspiration pneumonia  CT CTA without any acute intracranial findings    Robinson Lyman  Neurology resident   Pager: [phone]

## 2019-11-19 NOTE — PHARMACY-VANCOMYCIN DOSING SERVICE
Pharmacy Vancomycin Initial Note  Date of Service 2019  Patient's  1945  74 year old, male    Indication: Sepsis    Current estimated CrCl = Estimated Creatinine Clearance: 65.5 mL/min (based on SCr of 1.15 mg/dL).    Creatinine for last 3 days  2019:  8:11 PM Creatinine 1.15 mg/dL    Recent Vancomycin Level(s) for last 3 days  No results found for requested labs within last 72 hours.      Vancomycin IV Administrations (past 72 hours)                   vancomycin 1500 mg in 0.9% NaCl 250 ml intermittent infusion 1,500 mg (mg) 1,500 mg Given 19                Nephrotoxins and other renal medications (From now, onward)    Start     Dose/Rate Route Frequency Ordered Stop    19 1500  vancomycin 1500 mg in 0.9% NaCl 250 ml intermittent infusion 1,500 mg      20 mg/kg × 83.4 kg  over 90 Minutes Intravenous EVERY 18 HOURS 19  vancomycin 1500 mg in 0.9% NaCl 250 ml intermittent infusion 1,500 mg      20 mg/kg × 83.4 kg  over 90 Minutes Intravenous ONCE 19            Contrast Orders - past 72 hours (72h ago, onward)    Start     Dose/Rate Route Frequency Ordered Stop    19  iopamidol (ISOVUE-370) solution 75 mL  Status:  Discontinued      75 mL Intravenous ONCE 19                Plan:  1.  Start vancomycin  1500 mg IV q18h.   2.  Goal Trough Level: 15-20 mg/L   3.  Pharmacy will check trough levels as appropriate in 1-3 Days.    4. Serum creatinine levels will be ordered daily for the first week of therapy and at least twice weekly for subsequent weeks.    5. [place] method utilized to dose vancomycin therapy: Method 2    Jessica Mendes RPH

## 2019-11-19 NOTE — ED NOTES
Patient's full set of upper and lower dentures removed, given to wife.  Wife places in denture cup and places in her purse.

## 2019-11-20 NOTE — PLAN OF CARE
SLP: Attempted to see pt for dysphagia tx, however pt not appropriate for participation d/t decreased MADELYN. Will follow up as appropriate.

## 2019-11-20 NOTE — PROGRESS NOTES
[facility], [place]    Progress Note - Arik 5 Service        Date of Admission:  11/18/2019    Assessment & Plan   Sanjay Cano is a 74 year old male admitted on 11/18/2019. He has a history of Parkinson's, dementia, & COPD and is admitted for productive cough, encephalopathy, and oxygen requirements concerning for healthcare associated pneumonia vs COPD exacerbation vs aspiration pneumonia    Changes today;  - Continue antibiotics  - Blood cultures obtained   - Patient NPO. Patient's wife continue to be uncomfortable with the NGT at this time. Opted to change sinemet to ODT preparations   - Transfer to 5a/b  - Supportive listening to wife    # Respiratory distress, improving  # Concern for health care associated pneumonia  # High risk for aspiration  Presented from his nursing home with 2-week history of productive cough and increased respiratory effort in the ED requiring BiPAP, concern for respiratory infection: Healthcare associated pneumonia versus aspiration pneumonia given his history of Parkinson's, dementia, and dysphagia diet versus COPD exacerbation.  No tachypnea and along with negative procal, no definitive opacities on CXR, PNA is less likely. He did not have a witnessed aspiration event. Per wife his coughing is not associated with eating   -continue PTA flovent BID  -continue duonebs 4x/day and albuterol nebs PRN  -Continue vancomycin and Pip-tazo: Consider de-escalating pending blood cultures  -continuous O2 monitoring, supplemental O2 as needed   -HOB > 30 degrees, N.p.o. except for medications    # Positive blood culture  1/2 blood cultures drawn at admission grew gram positive cocci in clusters, likely to be staph ( not aureus , not epidermis). Felt to be likely to be contaminat.  - Repeat blood cultures obtained today. Will follow  - For now continue IV vancomycin         # Dementia  # Parkinson's  -continue PTA [id number] (dosed 6am, 9am, 12pm,  3pm, 6pm) and donepezil 5mg qAM, 10mg qHS  - patient NPO. Patient's wife continue to be uncomfortable with the NGT at this time. Opted to change sinemet to ODT preparations     # GERD  -continue PTA famotidine 20mg qAM, when able to safely swallow     #Mood  -continue PTA venlafaxine 37.5mg qAM, 75mg qHS & mirtazapine 22.5mg at bedtime, when able to safely swallow     # HTN  -continue PTA amlodipine 10mg at bedtime, when able to safely swallowdtime,     Diet: NPO for Medical/Clinical Reasons Except for: Meds, Other; Specify: Please oral medicines only when patient is awake and able to swallow with supervision.    Fluids: no IVF  DVT Prophylaxis: Mechanical measures  Contreras Catheter: not present  Code Status: DNR/DNI      Disposition Plan   Expected discharge: 4 - 7 days, recommended to prior living arrangement once respiratory status has improved and baseline mental status.  Entered: Vidhi Singh MD 11/20/2019, 6:38 AM       The patient's care was discussed with the Attending Physician, Dr. Yip.    MD Arik Stein [address] [place] [address], [place]  Pager: [phone]  Please see sticky note for cross cover information  ______________________________________________________________________    Interval History   Unable to successfully place NGT last night.   Breathing is somewhat better- required 1-2L NC overnight. Vitals remained stable overnight thought with low grade fevers.  Blood cultures 1/2 drawn at admission positive of possible staph.     Data reviewed today: I reviewed all medications, new labs and imaging results over the last 24 hours    .Physical Exam   Vital Signs: Temp: 99.2  F (37.3  C) Temp src: Axillary BP: (!) 158/78 Pulse: 84 Heart Rate: 68 Resp: 19 SpO2: 98 % O2 Device: Nasal cannula Oxygen Delivery: 1 LPM  Weight: 173 lbs .98 oz    Physical Exam:  General: appears more alert  HEENT: Oral mucosa moist and non-erythematous, PERRLA, EOM  intact  CV: RRR, normal S1S2, no murmur, clicks, rubs  Resp: Clear to auscultation bilaterally, no wheezes, rhonchi  Abd: Soft, non-tender, BS+, no masses appreciated  Extremities: Radial and pedal pulses intact and symmetric, no pedal edema  Neuro: sparse speech, but responds to name.

## 2019-11-20 NOTE — TELEPHONE ENCOUNTER
Clinic Action Needed:Danuta spouse contact phone  stating she is at the hospital with patient [facility] stating   Reason for Call:  Danuta calling requesting to update Dr Buchaann. Stating Sanjay was admitted on Nov 18 th for a deep cough through the  [place] [facility]. Reporting it was initially thought it was a stroke and now diagnosed as  aspiration pneumonia. Danuta stating they have decided to withhold all food and medications. Reporting they attempted to place a feeding tube yesterday that had to be removed due to placement. Danuta is expressing concern that patient is not going to be given medications and feels he will die with out these. Stating they can not place another feeding tube with out her ok. Danuta is requesting to speak with Dr Buchanan.    Gladys Irizarry RN  [facility]

## 2019-11-20 NOTE — PHARMACY-VANCOMYCIN DOSING SERVICE
Pharmacy Vancomycin Note  Date of Service 2019  Patient's  1945   74 year old, male    Indication: Sepsis  Goal Trough Level: 15-20 mg/L  Day of Therapy: 3  Current Vancomycin regimen:  1500 mg IV q18h    Current estimated CrCl = Estimated Creatinine Clearance: 62.2 mL/min (based on SCr of 1.14 mg/dL).    Creatinine for last 3 days  2019:  8:11 PM Creatinine 1.15 mg/dL  2019:  4:10 AM Creatinine 1.09 mg/dL;  4:10 AM Creatinine 1.06 mg/dL  2019:  6:02 AM Creatinine 1.14 mg/dL    Recent Vancomycin Levels (past 3 days)  2019: 10:12 AM Vancomycin Level 7.6 mg/L (The level was drawn at 16.8 hours. The extrapolated 18 hour level is 7.1 mg/L)    Vancomycin IV Administrations (past 72 hours)                   vancomycin 1500 mg in 0.9% NaCl 250 ml intermittent infusion 1,500 mg (mg) 1,500 mg Given 19 1231    vancomycin 1500 mg in 0.9% NaCl 250 ml intermittent infusion 1,500 mg (mg) 1,500 mg Given 19 1723    vancomycin 1500 mg in 0.9% NaCl 250 ml intermittent infusion 1,500 mg (mg) 1,500 mg Given 19 2138                Nephrotoxins and other renal medications (From now, onward)    Start     Dose/Rate Route Frequency Ordered Stop    19 1137  vancomycin 1500 mg in 0.9% NaCl 250 ml intermittent infusion 1,500 mg      20 mg/kg × 83.4 kg  over 90 Minutes Intravenous EVERY 12 HOURS 19 1137      19 1500  piperacillin-tazobactam (ZOSYN) 4.5 g vial to attach to  mL bag      4.5 g  over 30 Minutes Intravenous EVERY 6 HOURS 19 1434               Contrast Orders - past 72 hours (72h ago, onward)    Start     Dose/Rate Route Frequency Ordered Stop    19 2150  iopamidol (ISOVUE-370) solution 75 mL      75 mL Intravenous ONCE 19 21419 2204    19 1934  iopamidol (ISOVUE-370) solution 75 mL  Status:  Discontinued      75 mL Intravenous ONCE 19          Interpretation of levels and current  regimen:  Trough level is  Subtherapeutic. A 16.8 hour vancomycin level came back at 7.6 mg/L with an extrapolated 18 hour trough level of 7.1 mg/L. Since this level is below the goal of 15-20 mg/L, vancomycin dose and/or dose frequency should be adjusted to reach a therapeutic concentration. Considering patient's renal function and age, I would recommend to give the same dose but give it more frequently.    Has serum creatinine changed > 50% in last 72 hours: No    Urine output:  unable to determine    Renal Function: Stable    Plan:  1.  Change the dose to vancomycin 1500 mg IV q12hr (19.1 mg/kg)  2.  Pharmacy will check trough levels as appropriate in 1-3 Days.    3. Serum creatinine levels will be ordered daily for the first week of therapy and at least twice weekly for subsequent weeks.      Mo Roberts  [facility] Nov 20[facility] 2019        .

## 2019-11-20 NOTE — PROVIDER NOTIFICATION
1951:  Paged Ibeth Arellano to notify that upon arrival to the floor patient had Vancomycin IV infiltrate/extravasation.  IV was stopped and area was outlined with marker.    Made aware at this time also that upon arrival patient's temp was elevated at 100.6    Hand-off communication was given to GERARD Roy who will be taking over care.

## 2019-11-20 NOTE — PROGRESS NOTES
.Admission          11/18/2019  6:51 PM  Arrived to Floor in room [facility] at 1839  -----------------------------------------------------------  Reason for admission: AMS/[place]/Productive cough  Primary team notified of pt arrival.  Admitted from: ED  Via: stretcher  Accompanied by: family/wife  Belongings: Placed in closet  Admission Profile: Patient somnolent at this time. Awaiting wife to return to assist with completing Admission Profile  Teaching: Unable to educate patient r/t to somnolence.  Wife not currently present at this time.  Access: Left lower 18 g PIV.  Upon arrival patient had Left 18 g IV extravasation with Vanco infusing.  Pump was stopped and switched to other IV.  PIV d/c'd.  Area outlined.  MD and charge nurse made aware.  On-coming nurse to carry out further interventions.  Telemetry:Placed on pt  Ht./Wt.:   2 RN Skin Assessment Completed By:     GERARD Watson and this writer.  Right skin abrasion noted, scabs to left leg, right pinky swollen and redden, heat rash noted/bruising noted t/o.  Coccyx intact.\    Pt status: Patient somnolent.  Arouses to painful stimuli.  Fever upon arrival.  MD paged.  RA sating above 90%.    Temp:  [100  F (37.8  C)-100.6  F (38.1  C)] 100.6  F (38.1  C)  Pulse:  [65-84] 84  Heart Rate:  [64-85] 80  Resp:  [10-37] 18  BP: (106-172)/(63-98) 152/88  SpO2:  [91 %-100 %] 96 %

## 2019-11-20 NOTE — PROGRESS NOTES
SPIRITUAL HEALTH SERVICES  SPIRITUAL ASSESSMENT Progress Note  [facility] 6B     REFERRAL SOURCE: Hospital  Request    I shared a brief visit with Sanjay's spouse, Danuta. She shares he isn't up for talking today and she has been stressed and didn't feel up for a visit either. I oriented her to [facility], including availability of  for anointing and the meditation center for a space for her. She shared she appreciated me stopping by and that it was helpful to know those resources were available.     PLAN: [facility] continues to remain available for support. I will follow up in a few days to check in if patient is still on the unit.     Nikki Uriostegui  Chaplain Resident  Pager [phone]

## 2019-11-20 NOTE — PLAN OF CARE
"Time: 1900 - 2300  Reason for admission: Pt admitted 11/18 with HCAP.   VS: VSS on 1L NC to RA. Tmax 99.2. Pt showing verbal signs of discomfort (and pain per wife interpretation of pt presentation).   Activity: Assist x 2. Very fidgety and twitchy in bed.   Neuros: Somnolent. Will intermittently arouse when wife talks to him, but speech is garbled and incomprehensible.  Cardiac: No tele.   Respiratory: Very wet sounding. Weak, wet cough. Garbled speech - attempted to suction oral cavity but pt was not allowing younker to go into mouth - pt wife very anxious stating \"stop please, he is in a lot of pain.\" LS coarse with wheezes.  GI/: +gas/+BS. Incontinent. No abd pain, N/V noted.   Diet: Strict NPO per speech. MD discussing NG tube placement for medications d/t pt being a high aspiration risk and not being awake enough.   Skin: Scattered bruising. Scabs on shins. Redness noted to R 5th digit - wife reports that pt has infection/cellulitis - cleaned and dressing applied.   Lines: Previous PIV to LFA extravasation of vanco - picture taken, outlined, elevated, and heat applied (wife's preference). Previous PIV to lower LFA also appeared reddened (though had great blood return), was also noted to be painful - it was removed and marked as well.   Labs: Trop negative, flu negative, CT head/neck - normal, UA growing, +BC from 11/18.   New changes this shift: Pt arrived from [facility] this evening. Wife is very anxious and overwhelmed with everything that is going on; therapeutic communication and caregiver acknowledgement given.   Plan: Possible NG tube placement to get parkinson's/dementia medication d/t being strict NPO.   Will continue to monitor & follow POC.   "

## 2019-11-20 NOTE — PROVIDER NOTIFICATION
Paged: Maroon night float #[phone]    Lab called with critical result:    positive BC from 11/18 in the R wrist, growing gram + cocci in clusters.     Pt wife would also like to talk to you about NG tube placement, she has lots of questions/concerns.     MD thanked for update on +BC. Will come and talk with wife about NG tube questions/concerns. Also, discussed that IV extravasated and that pt had second PIV and was able to get all of Vanco.

## 2019-11-20 NOTE — PROVIDER NOTIFICATION
Paged: Maroon night float #[phone]    Discussed with MD about how NG tube situation went (see nurses note for full situation). Also, discussed that as of right now, wife does not want NG tube tried to be placed again. Seemed hesitant and unsure of if she does or does not want it, so may change her mind overnight.    MD stated that she was sorry about the situation, and that we will just not do the NG tube or do the NG tube, whatever the wife decides.

## 2019-11-20 NOTE — TELEPHONE ENCOUNTER
Clinic Action Needed:Danuta spouse contact phone  stating she is at the hospital with patient [facility] Nursing stating   Reason for Call:  Danuta calling requesting to update Dr Buchanan. Stating Sanjay was admitted on Nov 18 th for a deep cough through the  [place] ER. Reporting it was initially thought it was a stroke and now diagnosed as  aspiration pneumonia. Danuta stating they have decided to withhold all food and medications. Reporting they attempted to place a feeding tube yesterday that had to be removed due to placement. Danuta is expressing concern that patient is not going to be given medications and feels he will die with out these. Stating they can not place another feeding tube with out her ok. Danuta is requesting to speak with Dr Buchanan.    Gladys Irizarry RN  [facility] [facility]

## 2019-11-20 NOTE — PROGRESS NOTES
"Pt wife, MD, and this RN had discussion regarding placement of NG tube. Wife had lots of questions in regard to how procedure would go, what the chances are, if he will be in pain. This RN discussed that the procedure unfortunately is often described as \"unpleasant.\" This RN also discussed that her  is at a high risk for aspiration. Discussed anatomy and how pt could easily aspirate on own spit. MD also discussed with pt wife about how the procedure will go and discussed risks/benefits of not putting it in vs. putting it in.     Wife decided to have staff place the NG tube. This RN discussed that since this procedure is often very uncomfortable for the patient, it is often uncomfortable for the family to watch. Wife stated that we \"can not tell her to leave the patient, as he has had hysteria and can not handle not having her by his side for things like this.\" This RN verbalized understanding for this and stated that we will do what the wife would like us to do.     Unfortunately NG did not go in correct place. Wife then became very upset, shouting at staff stating \"I knew this would happen! Look at him! Does this look comfortable to you guys! Look what you've done to him!\" She was very upset about how no one explained to wife about how \"horrible\" this would be. RN's apologized for lack of education on how \"horrible\" this experience would be for pt. This RN discussed how staff explained that it would not be a comfortable or easy experience and that we are sorry about not explaining it better for wife. Wife stated \"No one ever explains the truth, you all sugar coat everything and never explain what is happening! What is wrong with you people.\"    This RN had long discuss with pt wife and discussed more concerns that pt wife had. Wife stated that she wants to talk to pt and discuss if he wants to do it again. Pt concerned that pt will not be able to tolerate it again.     After wife discussed this with pt, she " "stated that the pt said \"no\" to having another NG tube placed. This RN stated that we will continue to do whatever they would like to have done. Wife was very distraught stating \"I don't know what to do, I don't know what to do at all.\" RN provided emotional support.   "

## 2019-11-20 NOTE — PROVIDER NOTIFICATION
-------------------CRITICAL LAB VALUE-------------------    Lab Value: POSITIVE for Staphylococci  Time of notification: 1:25 AM  MD notified: Dr. Mooney  Patient status: Unchanged at this time  Temp:  [99.2  F (37.3  C)-100.6  F (38.1  C)] 99.5  F (37.5  C)  Pulse:  [68-84] 84  Heart Rate:  [64-85] 71  Resp:  [16-34] 22  BP: (124-169)/(75-98) 124/92  SpO2:  [91 %-100 %] 97 %  Orders received: Awaiting response.

## 2019-11-20 NOTE — TELEPHONE ENCOUNTER
Dr. Buchanan,    Please see message below.     Thanks  Betina Joe RN   [facility]

## 2019-11-20 NOTE — PLAN OF CARE
A: MARY ANN orientation, pt with incomprehensible speech and does not follow simple commands. Pt involuntarily moves extremities and remains still/ridged. VS unchanged, on 1L NC sating >92% with a low grade fever tonight TMAX 99.6. SR. MARY ANN pain and nausea. Strict NPO per speech eval, otherwise 1:1 supervision with pills if patient is alert enough. No new skin deficits noted. Incontinent of urine, no BM overnight bowel sounds hypoactive. Assist x2 with repositioning, otherwise patient frequently is repositioning self. Wife at bedside continues to be apprehensive and distrusting towards staff. No talks of NG overnight with wife. D5NS continued @ 75mL/h. Plan to discuss plan of care and decision of whether they want to place NG or not today.     I/O this shift:  In: 1007.5 [I.V.:1007.5]  Out: -     Temp:  [99.2  F (37.3  C)-100.6  F (38.1  C)] 99.2  F (37.3  C)  Pulse:  [68-84] 84  Heart Rate:  [64-85] 68  Resp:  [18-34] 19  BP: (124-168)/(75-98) 158/78  SpO2:  [96 %-100 %] 98 %     R: Continue with POC. Notify primary team with changes.

## 2019-11-20 NOTE — TELEPHONE ENCOUNTER
Called - no voice mail.   Will try hosp #     Transferred to patient room- talked with Danuta shultz quantity v quality- check with staff re sedative before next feeding tube attempt. Bharath Buchanan MD

## 2019-11-20 NOTE — PROGRESS NOTES
Social Work Services Progress Note    Hospital Day: 2  Date of Initial Social Work Evaluation:  Not completed  Collaborated with:  [facility] (HOWIE Mcmanus), Chart Review    Data:  Pt is 73 y/o male admitted to [facility] on 11/18/19 for productive cough, encephalopathy, and oxygen requirements concerning for healthcare associated pneumonia. Pt has history of Parkinson's, dementia, & COPD.    Pt is admitted from [facility] (Ph: [phone], F: [phone]) where he resides in LTC. HOWIE received call from HOWIE at [facility] ([place] [phone]) who informed HOWIE that Pt is on 18-day MA bedhold. [place] asked for HOWIE to update her when Pt is ready for return.    Intervention:  Coordination of care.     Assessment:  Pt requiring IP hospitalization at this time.    Plan:    Anticipated Disposition:  Return to [facility]    Barriers to d/c plan:  Medical stability    Follow Up:  HOWIE to continue to follow and assist with discharge plan.    JERRY Taylor, LGSW  6B Intermediate Care Unit   [facility]  Phone: [phone]  Pager: [phone]

## 2019-11-21 NOTE — PLAN OF CARE
PT cancelled/deferred:    Discharge Planner PT   Patient plan for discharge: Back to LTC  Current status: Patient is dependent for transfers which has been his baseline at the LTC facility  Barriers to return to prior living situation: n/a  Recommendations for discharge: LTC  Rationale for recommendations: Long discussion with patient's spouse and  regarding POC and need for therapy. Patient has been at his LTC for two years with steadily declining mobility levels. Per spouse, he was intermittently able to transfer with her assistance, however he does not do well working with strangers and he has been unable to work with therapy due to hand injuries (which are on-going). SW and spouse agreed that should therapy be appropriate at his LTC, this would be the best option once his hands heel. Acute care PT is not needed at this time as he is at his baseline level of mobility.        Entered by: Adilia Bar 11/21/2019 4:55 PM

## 2019-11-21 NOTE — PROGRESS NOTES
Neurology would recommend giving PARCOPA SL 25/100 instead of oral sinemet since the patient can not receive oral medications and no NGTin place.     Please give PARCOPA 25/100 as the following  1. 2 tab at 0600  2. 1.5 tab at 0900  3. 2 tab at noon   4. 1.5 tab at 1500  5. 1.5 tab at 1800  6. 2 tab at 2100    Neurology will sign off, please let us know if you have any question     Robinson Lyman  Neurology resident   Pager: [phone]

## 2019-11-21 NOTE — PROGRESS NOTES
Social Work Services Progress Note    Hospital Day: 4  Date of Initial Social Work Evaluation:  Not formally completed  Collaborated with:  Chart review, team rounds, Dietjenifer Vo, pt's wife Caren whom prefers to be called Danuta    Data:  Pt is 73 y/o male admitted to [facility] on 11/18/19 for productive cough, encephalopathy, and oxygen requirements concerning for healthcare associated pneumonia. Pt has history of Parkinson's, dementia, & COPD.    Pt is admitted from [facility] (Ph: [phone], F: [phone]) where he resides in LTC. SW received call from HOWIE at LT ([place] [phone]) who informed SW that Pt is on 18-day MA bedhold. [place] asked for SW to update her when Pt is ready for return.    SW was informed by Dietjenifer Vo that per pt's wife Danuta pt is supposed to be on a DD2 diet and pt's LTC either doesn't always comply with this or are not able to provide what is needed for this leading to an aspiration risk for pt.     Intervention:  HOWIE met with pt's wife Danuta in pt's room while pt slept. Danuta was very talkative and overwhelmed.   Danuta discussed how pt has been at [facility] since the fall of 2017 after she had a fall and broke her leg, which resulted in pt abruptly ending up at [facility], which she reports was traumatizing for pt. Danuta discussed how pt is now terrified to be here in the hospital.     Danuta discussed at length her concerns/frustrations around certain experiences so far this admission as well as an attempt at placing an NG for pt this admission to give medications and how pt was screaming and crying which led to Danuta crying. Danuta discussed how pt really needs his Parkinsons medications and discussed the struggle there has been with figuring out how pt can get these without a tube and without pt being able to swallow them. Danuta reported she spoke with her son and sister in law about trying an NG again and they have all decided this will not be  "attempted again. Danuta also discussed how she wouldn't want pt to have a feeding tube and that the doctors they have worked with for 10 years (at [facility]) have said no to this as well and that anything \"tubal\" would be \"torture\" for pt.     Danuta discussed how she is at [facility] 40-50 hours a week because she is worried about pt's cares. Danuta discussed how she has made [facility] reports and had meetings with [facility] (4 times). Danuta noted having the following concerns/complaints: pt got blood drawn when it was supposed to be drawn from someone else, pt had a broken hand at one point that the staff didn't notice until it was swollen to the size of a baseball glove, pt's glasses were lost, pt's dentures were broken, and medication errors. Danuta reported she has asked the facility to pay for the glasses and dentures and there is indication in chart review that Danuta has spoken with the  and DON in the past regarding care concerns. Danuta discussed how she has talked with other doctors involved and has been told that [place] and all SNFs have issues and that these doctors have personal friends in other LTC facilities and there are the same problems. Danuta discussed not wanting pt to move to a different LTC because her/pt's doctors have said this would traumatize pt more, possibly to the point where pt wouldn't know who Danuta is anymore.   Danuta discussed being told by her/pt's doctors that taking pt home would not be a good option because of the safety risks with that being that Danuta previously got injured transferring pt (which led to LTC placement).   Danuta plans for pt to return to [facility] when ready for discharge.     Danuta reported to SW and bedside nurse Mojgan that pt is not really able to express his needs so she stays with him. Danuta also noted that pt is more articulate and has stronger abilities in " "the morning. Danuta noted that pt participated in day programming at [facility] and when he returns to the LTC area afterwards pt is tired, \"inept\" and \"depressed\" and said that a doctor once said that pt is \"consumed with depression\".     SW explained that [facility] will be kept updated.     Danuta did not have further SW needs or questions at this time and was appreciative of SW visit.     Assessment:  See bedside, RN, PT/OT, medical team notes    Plan:    Anticipated Disposition:  Facility:  Return to LTC at [facility]    Barriers to d/c plan:  Medical stability    Follow Up:  TBJAY, HOWIE will continue to follow    JERRY Day, [facility] Center[facility]     [phone] (pager) [phone]  11/21/2019          "

## 2019-11-21 NOTE — PROGRESS NOTES
[facility] [place]    Progress Note - Arik 5 Service        Date of Admission:  11/18/2019    Assessment & Plan   Sanjay Cano is a 74 year old male admitted on 11/18/2019. He has a history of Parkinson's, dementia, & COPD and is admitted for productive cough, encephalopathy, and oxygen requirements concerning for healthcare associated pneumonia vs COPD exacerbation vs aspiration pneumonia    Changes today;  - Discontinue vancomycin/zoysn.   - Descalate to ceftriaxone 1g Q24H  - D5W to correct hypernatremai  - PT    # Respiratory distress, improving  # Concern for health care associated pneumonia  # High risk for aspiration  Presented from his nursing home with 2-week history of productive cough and increased respiratory effort in the ED requiring BiPAP, concern for respiratory infection: Healthcare associated pneumonia versus aspiration pneumonia given his history of Parkinson's, dementia, and dysphagia diet versus COPD exacerbation.  No tachypnea and along with negative procal, no definitive opacities on CXR, PNA is less likely. He did not have a witnessed aspiration event. Per wife his coughing is not associated with eating   -continue PTA flovent BID  -continue duonebs 4x/day and albuterol nebs PRN  -Discontinue vancomycin/zoysn.   -Descalate to ceftriaxone 1g Q24H  -continuous O2 monitoring, supplemental O2 as needed   -HOB > 30 degrees, N.p.o. except for medications    # Hypernatremia   Na-148 today, ~0.8 free water deficit.  -Will correct with 100 mls of D5W /hour and monitor.    # Dementia  # Parkinson's  -continue PTA [id number] (dosed 6am, 9am, 12pm, 3pm, 6pm) and donepezil 5mg qAM, 10mg qHS  - patient NPO. Patient's wife continue to be uncomfortable with the NGT at this time. Opted to change sinemet to ODT preparations     # GERD  -continue PTA famotidine 20mg qAM, when able to safely swallow     #Mood  -continue PTA venlafaxine 37.5mg qAM, 75mg qHS &  mirtazapine 22.5mg at bedtime, when able to safely swallow     # HTN  -continue PTA amlodipine 10mg at bedtime, when able to safely swallowdtime,     Diet: NPO for Medical/Clinical Reasons Except for: Meds, Other; Specify: Please oral medicines only when patient is awake and able to swallow with supervision.    Fluids: no IVF  DVT Prophylaxis: Mechanical measures  Contreras Catheter: not present  Code Status: DNR/DNI      Disposition Plan   Expected discharge: 4 - 7 days, recommended to prior living arrangement once respiratory status has improved and baseline mental status.  Entered: Vidhi Singh MD 11/21/2019, 5:50 PM       The patient's care was discussed with the Attending Physician, Dr. Yip.    Vidhi Singh MD  [address], [place]  Pager: [phone]  Please see sticky note for cross cover information  ______________________________________________________________________    Interval History    Breathing continues to improve. Vitals remained stable overnight     Data reviewed today: I reviewed all medications, new labs and imaging results over the last 24 hours    .Physical Exam   Vital Signs: Temp: 99  F (37.2  C) Temp src: Axillary BP: (!) 161/78 Pulse: 54 Heart Rate: 58 Resp: 16 SpO2: 96 % O2 Device: None (Room air)    Weight: 173 lbs .98 oz    Physical Exam:  General: appears more alert  HEENT: Oral mucosa moist and non-erythematous, PERRLA, EOM intact  CV: RRR, normal S1S2, no murmur, clicks, rubs  Resp: Clear to auscultation bilaterally, no wheezes, rhonchi  Abd: Soft, non-tender, BS+, no masses appreciated  Extremities: Radial and pedal pulses intact and symmetric, no pedal edema  Neuro: sparse speech, but responds to name.[id number]}

## 2019-11-21 NOTE — PLAN OF CARE
"Shift: 2300 - 0700  VS: BP (!) 184/87 (BP Location: Left arm)   Pulse 54   Temp 98.1  F (36.7  C) (Axillary)   Resp 18   Ht 1.88 m (6' 2\")   Wt 78.5 kg (173 lb 1 oz)   SpO2 94%   BMI 22.22 kg/m      Neuro: unable to assess neuro status, pt opens eyes to gentle touch, speech garbled/slurred, L facial droop, able to move BUE with force (attempted to swing at RN at start of shift)  Cardiac: hypertensive, provider notified  Resp: lung sounds course/congested, SQUIRES observed, sating well on RA, oral suctioning at bedside  GI: passing gas, bowel sounds active, no BM this shift  : voiding spontaneously, urinary incontinence, condom cath placed, adequate amount of clementine urine   Skin: R 5th finger blister intact, pt withdraws from discomfort, coccyx intact, pulsate mattress ordered, L arm extravasation receeding  Pain/Nausea: no physiological indicators of pain; no evidence of nausea  LDA: R PIV infusing IVMF and abx  Diet: NPO, pending NG tube placement  Mobility: strict bedrest, repositioned in bed q2-3h as tolerated  Labs: reviewed, Na 147  Plan: monitor HTN, continue plan of care     "

## 2019-11-21 NOTE — PROGRESS NOTES
"Cared for patient [id number]  Transfer  Transferred to: 7B  Via:bed  Reason for transfer:Pt no longer appropriate for 6B- improved  patient condition  Family: With patient at bedside  Belongings: Packed and sent with pt  Chart: Delivered with pt to next unit  Medications: Meds sent to new unit with pt  Report given to: Darlene GEE  Pt status: Pt lethargic most of shift. On RA.    BP (!) 160/99 (BP Location: Left arm)   Pulse 72   Temp 98.1  F (36.7  C) (Axillary)   Resp 17   Ht 1.88 m (6' 2\")   Wt 78.5 kg (173 lb 1 oz)   SpO2 95%   BMI 22.22 kg/m      "

## 2019-11-21 NOTE — PLAN OF CARE
Took care of pt from [id number]    Status: He has a history of Parkinson's, dementia, & COPD and is admitted for productive cough, encephalopathy, and oxygen requirements concerning for healthcare associated pneumonia vs COPD exacerbation vs aspiration pneumonia  Vitals: VSS  Neuros: Lethargic. MARY ANN neuros/orientation. Pt unable to follow commands. Slurred, garbled soft speech.   IV: PIV infusing D5 in NS at 75 ml/hr  Resp/trach: Pt independently coughing, non-productive.   Diet: NPO.   Bowel status: No BM   : Incontinent of urine   Skin: Bruising/ scabbing throughout   Pain: No c/o pain, pt sleeping comfortably   Activity: Strict bedrest. Up with 2 assist. Turn and repo Q 2hrs  Social: Wife- Danuta in room staying the night   Plan: Continue to follow POC

## 2019-11-21 NOTE — PROVIDER NOTIFICATION
Crosscover provider paged an FYI that pt last BP was 184/87, HR 54. Parameters to notify are SBP > 180. Will recheck at 0600 and continue to monitor.

## 2019-11-21 NOTE — PLAN OF CARE
ST session cancelled. Attempted to see pt for dysphagia tx x2, however pt not appropriate for participation d/t decreased MADELYN. Will follow up as appropriate.

## 2019-11-21 NOTE — PROGRESS NOTES
"CLINICAL NUTRITION SERVICES - ASSESSMENT NOTE     Nutrition Prescription    RECOMMENDATIONS FOR MDs/PROVIDERS TO ORDER:  1.Pt's wife adamantly against the NG tube placement, due to pt's extreme distress during attempt. EN does not seem an option at this time.  2. Diet adv per SLP recs - note pt on DD2 PTA per pt wife  Malnutrition Status:    Based on information available, pt meets criteria for at least severe malnutrition in context of chronic illness.    Recommendations already ordered by Registered Dietitian (RD):  None at this time, see below for future recommendations.    Future/Additional Recommendations:  1. Monitor diet adv/PO intake  2. When diet advances, discuss snacks and supplements.  3. GOC (start EN?)     REASON FOR ASSESSMENT  Sanjay Cano is a/an 74 year old male assessed by the dietitian for Admission Nutrition Risk Screen for reduced oral intake over the last month     PMH:   History of Parkinson's, dementia, & COPD and is admitted for productive cough, encephalopathy, and oxygen requirements concerning for healthcare associated pneumonia vs COPD exacerbation vs aspiration pneumonia    NUTRITION HISTORY  -Pt's wife reported that the diet at [facility] is a DD2, however is not up to standard of a DD2. Pt eats the same food items almost every day such as mash potatoes w/ gravy (sometimes w/o gravy), soups, pureed meats, carrots and green beans. She stated that pt only eats about half the portion of the meats and is unable to eat the carrots and green beans due to toughness. PO intake has decreased due to meals not being properly prepared as DD2.    -Pt's wife reported that the team tried to place the NG tube yesterday (11/20), however there were complications and she does not want to try the NG placement again.     CURRENT NUTRITION ORDERS  Diet: NPO- pending NG placement  Intake/Tolerance: Has been NPO since admission    SLP consult (11/21):  \"ST session cancelled. Attempted to see pt for " "dysphagia tx x2, however pt not appropriate for participation d/t decreased MADELYN. Will follow up as appropriate\"    LABS  Labs reviewed  Na+: 148 (H)    MEDICATIONS  Remeron: 22.5 mg   Pepcid  Vitamin D3  Culturell   D5W @ 75 mL/hr continuous (306 kcals/day)    ANTHROPOMETRICS  Height: 188 cm (6' 2\")  Most Recent Weight: 78.5 kg (173 lb 1 oz)    IBW: 86 kg; %IBW: 92%  BMI: 22.2 kg/m^2; Normal BMI  Weight History:   Weight loss of 8% in ~3 months    Wt Readings from Last 10 Encounters:   11/20/19 78.5 kg (173 lb 1 oz)   08/14/19 85.3 kg (188 lb 1.6 oz)   04/22/19 82.6 kg (182 lb 1.6 oz)   11/07/18 79.8 kg (176 lb)   08/20/18 78 kg (172 lb)   06/06/18 78.3 kg (172 lb 11.2 oz)   01/26/18 53.1 kg (117 lb)   08/01/17 85.3 kg (188 lb)   06/07/17 87.5 kg (193 lb)   03/22/17 87.5 kg (193 lb)     Dosing Weight: 79 kg (actual wt)    ASSESSED NUTRITION NEEDS  Estimated Energy Needs: 1,975- 2,370 kcals/day (25 - 30 kcals/kg)  Justification: Maintenance  Estimated Protein Needs: 79-95 grams protein/day (1 - 1.2 grams of pro/kg)  Justification: Maintenance  Estimated Fluid Needs: (1 mL/kcal)   Justification: Maintenance    PHYSICAL FINDINGS  See malnutrition section below.    MALNUTRITION  % Intake: < 75% for > 7 days (non-severe)  % Weight Loss: >75% in 3 months (severe)  Subcutaneous Fat Loss: Unable to assess  Muscle Loss: Unable to assess  Fluid Accumulation/Edema: none noted per chart review  Malnutrition Diagnosis: Based on information available, pt meets criteria for at least severe malnutrition in context of chronic illness.    NUTRITION DIAGNOSIS  Inadequate oral intake related to dysphagia as evidenced by pt's wife's report of facility improperly preparing food that does not meet DD2 criteria and weight loss of 8% in ~3 months.    INTERVENTIONS  Implementation  Nutrition Education: Provided education on the importance to pt receiving nutrition to prevent malnutrition.     Collaboration with other providers: discussed " with social work nutrition concerns regarding pt's wife's report of LTC not providing DD2 quality items to pt when he requires DD2 foods.    Goals  Diet adv v nutrition support within 2-3 days.     Monitoring/Evaluation  Progress toward goals will be monitored and evaluated per protocol.    Lena Sanchez  Dietetic Intern

## 2019-11-21 NOTE — PLAN OF CARE
Patient lethargic,with difficulty to arouse, remained eyes closed, not following simple commands with encounters,noted episode to open eyes,responded to verbal stimuli lasted approx 10 minutes,Extremities upper and lower stiffen with activity with turn in bed.Cough weak,no apparent respiratory distress noted.Appear at comfort state with encounters.Wife at bedside. Continue to monitor.

## 2019-11-21 NOTE — PLAN OF CARE
Neuro: Patient remained fairly lethargic during shift, however, had increased periods of waking up.  This writer was able to make out a few words patient was trying to say, otherwise speech is garbled/slurred. Following commands inconsistently. Neurology following.  Cardiac: SR. BP elevated in the 150s. Primary team made aware.  Respiratory: Sating above 90% on RA. Lung sounds coarse and improving to diminished.  Encouraging coughing/deep breathing.  GI/: Adequate urine output.  Incont of urine t/o shift. Last BM 11/19/2019  Diet/appetite: Strict NPO r/t to Aspiration pneumonia. Speech following.  Holding off on NG until further notice per wife request.   Activity:  Assist of 2x with turning/repositioning in bed Q 2 hrs.  Pain: Pt did not appear to be in pain t/o shift.  Appeared comfortable.  Wife at bedside during shift and agreed.  Skin: No new deficits noted.  Noted 5th pinky wound prior to admission.  Coccyx intact.  Scattered scabs/bruising.  LDA's: 20 g to right with D5NS at 75 and intermittent antibiotics.\  Pt's Parkinson's meds switched to ODT.  Nares MRSA swab completed- resulted negative.  Wife remained present at bedside.  Pt's wife was calm/cooperative and helpful during shift.    Plan: Continue with POC. Notify primary team with changes.

## 2019-11-21 NOTE — PLAN OF CARE
OT/7B - OT HOLDING. Per chart review, pt came from a LTC facility and has a bed hold. Current activity orders are for strict bedrest. Per RN, unknown reasoning for bedrest at this time. Encouraged nursing staff to obtain updated activity orders as able for improved outcomes. Will hold OT at this time due to a number of reasons including limited alertness on this date and safe discharge plans already in place due to bed hold at LTC facility.

## 2019-11-22 NOTE — PROGRESS NOTES
[facility], [place]    Progress Note - Arik 5 Service        Date of Admission:  11/18/2019    Assessment & Plan   Sanjay Cano is a 74 year old male admitted on 11/18/2019. He has a history of Parkinson's, dementia, & COPD and is admitted for productive cough, encephalopathy, and oxygen requirements concerning for healthcare associated pneumonia vs COPD exacerbation vs aspiration pneumonia    Changes today;  - Palliative care consult to assist with goals of care conversations.   - SLP evaluation today to assess swallowing, if improved will attempt diet per SLP recommendations.  - Discussed with wife to consider guided NGT placement for nutrition, but most importantly to optimize  sinemet dosing that may improve his swallowing/mental status, in case patient is still NPO per SLP.  Emphasized that if he does not receive his medications he is likely to have worsening swallowing and movement function even if infection treated.  Wife expressed that he would never want a permanent feeding tube for the the patient. In terms of a temporary NGT- she was very distraught to see the suffering the patient endured when an NGT was first attempted at admission- she would not like it to be repeat that but would probably consider it if it would done under guidance/IR. She would like to have a few more days with ODT sinemet first before attempting guided NGT placement. She understands that without optimal treatment with carbidopa/sinermet, he is likely not to improve his swallowing and unable to take pills for HTN, dementia etc.  - Continue current cares with IV antibiotics, Duonebs,   - D5W to correct hypernatremia    # Respiratory distress, improving  # Concern for health care associated pneumonia  # High risk for aspiration  Presented from his nursing home with 2-week history of productive cough and increased respiratory effort in the ED requiring BiPAP, concern for respiratory infection: Healthcare  associated pneumonia versus aspiration pneumonia given his history of Parkinson's, dementia, and dysphagia diet versus COPD exacerbation.  No tachypnea and along with negative procal, no definitive opacities on CXR, PNA is less likely. He did not have a witnessed aspiration event. Per wife his coughing is not associated with eating   -continue PTA flovent BID  -continue duonebs 4x/day and albuterol nebs PRN  -S/p vancomycin/zoysn->continue IV ceftriaxone 1g Q24H  -continuous O2 monitoring, supplemental O2 as needed   -HOB > 30 degrees, N.p.o. except for medications    # Hypernatremia, improving   Na-149->147 today, ~0.8 free water deficit.  -Will correct with 100 mls of D5W /hour and monitor.    # Toxic metabolic encephalopathy, improving slowly.  Likely due to underlying respiratory infection. CT head at admission  with no stroke, hemorrhage or mass. Encephalopathy  may also contributed to by inadequate sinemet treatment at this time. He is unable to swallow at this time and would have benefited from NGT placement to try and optimise sinemet dosing that may improve his swallowing/mental status. If he does not receive his medications he is likely to have worsening swallowing and movement function  even if infection treated.  Wife would like to have a few more days with ODT sinemet first before guided NGT placement.  - Continue IV antibiotics as above  - Correct hypernatremia as above  - Will need to address feeding tube so as to have adequate treatment of parkinsonism    # Dementia  # Parkinson's  -continue PTA [id number] (dosed 6am, 9am, 12pm, 3pm, 6pm) and donepezil 5mg qAM, 10mg qHS  - patient NPO. Patient's wife continue to be uncomfortable with the NGT at this time. Opted to change sinemet to ODT preparations     # GERD  -continue PTA famotidine 20mg qAM, when able to safely swallow     #Mood  -continue PTA venlafaxine 37.5mg qAM, 75mg qHS & mirtazapine 22.5mg at bedtime, when able to safely  swallow     # HTN  -continue PTA amlodipine 10mg at bedtime, when able to safely swallow  - prn hydralazine Q6H for SBP >180mmhg     Diet: NPO for Medical/Clinical Reasons Except for: Meds, Other; Specify: Please oral medicines only when patient is awake and able to swallow with supervision.    Fluids: no IVF  DVT Prophylaxis: Mechanical measures  Contreras Catheter: not present  Code Status: DNR/DNI      Disposition Plan   Expected discharge: 4 - 7 days, recommended to prior living arrangement once respiratory status has improved and baseline mental status.  Entered: Vidhi Singh MD 11/22/2019, 1:42 PM       The patient's care was discussed with the Attending Physician, Dr. Yip.    Vidhi Singh MD  [address], [place]  Pager: [phone]  Please see sticky note for cross cover information  ______________________________________________________________________    Interval History    No acute events overnight     Data reviewed today: I reviewed all medications, new labs and imaging results over the last 24 hours    .Physical Exam   Vital Signs: Temp: 98.6  F (37  C) Temp src: Axillary BP: (!) 161/87 Pulse: 64 Heart Rate: 68 Resp: 16 SpO2: 95 % O2 Device: None (Room air)    Weight: 173 lbs .98 oz    Physical Exam:  General: appears more alert  HEENT: Oral mucosa moist and non-erythematous, PERRLA, EOM intact  CV: RRR, normal S1S2, no murmur, clicks, rubs  Resp: Clear to auscultation bilaterally, no wheezes, rhonchi  Abd: Soft, non-tender, BS+, no masses appreciated  Extremities: Radial and pedal pulses intact and symmetric, no pedal edema  Neuro: sparse speech, but responds to name.

## 2019-11-22 NOTE — PROGRESS NOTES
Social Work Services Progress Note    Hospital Day: 5  Date of Initial Social Work Evaluation:  Not formally completed  Collaborated with:  Chart review, team rounds, medical team, [facility] HOWIE ([place]- [phone])    Data:  Pt is 73 y/o male admitted to [facility] on 11/18/19 for productive cough, encephalopathy, and oxygen requirements concerning for healthcare associated pneumonia. Pt has history of Parkinson's, dementia, & COPD.     Pt is admitted from [facility] (Ph: [phone], F: [phone]) where he resides in LTC. HOWIE received call from HOWIE at [facility] ([place] [phone]) who informed HOWIE that Pt is on 18-day MA bedhold. [place] asked for HOWIE to update her when Pt is ready for return.    Received vm from [facility] HOWIE Mcmanus requesting update before the weekend.     Received update from medical team that pt will remain hospitalized through the weekend and Palliative has been consulted to address goals/next steps.     Intervention:  HOWIE returned call to [place] with [facility]- left vm providing update given by team.     Assessment:  See bedside RN, PT/OT, medical team notes    Plan:    Anticipated Disposition:  Facility:  Return to LTC at [facility]    Barriers to d/c plan:  Medical stability, unknown discharge needs    Follow Up:  HOIWE BARAJAS will continue to follow    JERRY Day, LICSW  7B   [phone] (pager) [phone]  11/22/2019

## 2019-11-22 NOTE — PLAN OF CARE
OT/7B - OT deferred. Pt was not seen by OT. See below for rationale.     OT/7B - Discharge Planner OT   Patient plan for discharge: not discussed, per chart review pt has a bed hold at LTC facility  Current status: Per chart review, pt came from a LTC facility and has a bed hold. Was able to encourage updated activity orders for nursing to dependently lift pt to recliner when appropriate. Will complete OT orders at this time and sign off due to safe discharge plan already in place with plan to return to LTC when medically appropriate. Pt can follow up with ongoing therapy needs as appropriate at next level of care.     Barriers to return to prior living situation: no OT barriers identified limiting return to LTC  Recommendations for discharge: return to LTC with therapy follow up as needed  Rationale for recommendations: See above.        Entered by: Melvi Mendez 11/22/2019 11:32 AM

## 2019-11-22 NOTE — PROVIDER NOTIFICATION
Crosscover provider notified pt /87, parameters to notify are SBP > 180. OVSS. Pt not taking PO meds. Will continue to monitor.

## 2019-11-22 NOTE — PLAN OF CARE
"Shift: 2300 - 0700  VS: BP (!) 177/98 (BP Location: Right arm)   Pulse 64   Temp 98.4  F (36.9  C) (Axillary)   Resp 16   Ht 1.88 m (6' 2\")   Wt 78.5 kg (173 lb 1 oz)   SpO2 95%   BMI 22.22 kg/m    Neuro: unable to assess neuro status, pt opens eyes to gentle touch, speech garbled/slurred, L facial droop, able to move BUE with force  Cardiac: hypertensive, provider aware, SBP parameters > 180, provider contacted again for /91, awaiting orders  Resp: lung sounds course/congested, SQUIRES observed, sating well on RA, oral suctioning at bedside  GI: passing gas, bowel sounds active, no BM this shift  : voiding spontaneously, urinary incontinence, condom cath on, adequate amount of clementine urine   Skin: R 5th finger blister intact, pt withdraws from discomfort, coccyx intact, pulsate mattress, L arm extravasation receeding  Pain/Nausea: no physiological indicators of pain; no evidence of nausea  LDA: R PIV infusing abx  Diet: NPO  Mobility: strict bedrest, repositioned in bed q2-3h as tolerated  Labs: reviewed, Na 149, K 3.1 - replaced and 0400 recheck pending with AM labs  Plan: monitor HTN, continue plan of care      "

## 2019-11-22 NOTE — TELEPHONE ENCOUNTER
Patient requesting refill of:     clonazePAM (KLONOPIN) 0.5 MG tablet  Last filled: 5/31/17, 15 tablets, 0 refills      fluticasone (FLOVENT HFA) 110 MCG/ACT inhaler  Last filled: 5/4/16, 3 inhalers, 3 refills      Last office visit: 7/14/17      Sending to Dr. Buchanan for review.   
Script for klonopin was faxed to the pharmacy and Caren was notified that this was done  
clonazePAM (KLONOPIN) 0.5 MG tablet      Last Written Prescription Date:  5/31/17  Last Fill Quantity: 15,   # refills: 0  Last Office Visit with [facility] or [facility] prescribing provider: 7/14/17  Future Office visit:       Routing refill request to provider for review/approval because:  Drug not on the [facility], [facility] or [facility] refill protocol or controlled substance      Flovent       Last Written Prescription Date: 5/4/16  Last Fill Quantity: 3inhaler, # refills: 3    Last Office Visit with [facility], [facility] or [facility] prescribing provider:  7/14/17   Future Office Visit:       Date of Last Asthma Action Plan Letter:   There are no preventive care reminders to display for this patient.   Asthma Control Test: No flowsheet data found.    Date of Last Spirometry Test:   No results found for this or any previous visit.              
Negative Screen

## 2019-11-22 NOTE — PLAN OF CARE
Discharge Planner SLP   Patient plan for discharge: none stated   Current status: SLP: Pt with improved PO tolerance today, however pt remains at risk for aspiration given waxing/waning MADELYN.   Recommend pt cautiously initiate nectar thick full liquids via spoon with 1:1 supervision. Please ensure pt is fully upright and alert for all PO intake.    Recommend ongoing goals of care discussion re: long-term nutrition plan given pts hx of chronic dysphagia and recurrent PNA. Pt would be an appropriate candidate for comfort eating despite aspiration risk given hx of dysphagia and advanced dementia; discussed with MD.       Barriers to return to prior living situation: dysphagia, unclear goals of care, aspiration risk, weakness   Recommendations for discharge: LTC  Rationale for recommendations: pt below baseline swallow function        Entered by: Cristal Mackenzie 11/22/2019 2:35 PM

## 2019-11-22 NOTE — PLAN OF CARE
"BP (!) 165/84 (BP Location: Right arm)   Pulse 64   Temp 98.7  F (37.1  C) (Axillary)   Resp 16   Ht 1.88 m (6' 2\")   Wt 78.5 kg (173 lb 1 oz)   SpO2 96%   BMI 22.22 kg/m      Reason for admission: Pt with hx Parkinson's, dementia, & COPD admitted for productive cough, encephalopathy, and O2 requirements concerning for healthcare associated pneumonia vs COPD exacerbation VS aspiration pneumonia  Neuro: Pt somnolent, difficult to arouse even with sternal rub; inconsistent with following commands; opens eyes few times through day; BUE 5/5 strength when instructed to \"squeeze my [RN] fingers, Sanjay\"  Cardiac: WNL - intermittently bradycardic  Respiratory: 96% RA; lungs coarse/dim; intermittent weak cough; oral suctioning at bedside  GI/: Condom catheter in place - replaced x2; +BS; LBM 11/19  Skin: R 5th finger re-dressed; pt able to withdraw from undesired stimuli; on pulsate mattress  Pain: No nonverbal indicators of pain or nausea  LDA: L PIV TKO with potassium replacement  Activity: Ax2 with ceiling lift; pt stiffens when staff try to turn/reposition pt  Diet/Appetite: NPO; carbidopa crushed with small amount of water to make \"paste\" and placed along gum line to absorb   Plan: Continue with POC; wife at bedside for questions and clarification    "

## 2019-11-23 NOTE — CONSULTS
[facility]  Palliative Care Consultation Note    Patient: Sanjay Cano  Date of Admission:  11/18/2019    Requesting Clinician / Team: medicine  Reason for consult: Goals of care    Recommendations:    Continue current level of care: careful help with feeding/drinking, Parkinson's meds, etc.    Hopefully he can take in enough orally to keep his Na ok off D5.    Long-term d/w wife that if his swallow/ability to take adequate PO continues to be a challenge, eg and his Na is going up, at his NH, despite attempts to help him eat/drink, that that should be considered a sign he is dying.    Discussion:    I had a long discussion with patient's wife and her friend about his history and situation. Essentially here's what I told her about where I thought we were at with his care. While she is intensely involved with the day to day details of every aspect of his care, meds, diet, and needs, she also does seem to understand he has significant dementia, advanced Parkinson's, and swallowing difficulties, and all of these processes, while they may wax and wane a little, are overall progressive and terminal. She, her children, and the patient's long-term docs (Dr Buchanan, and the patient's neurologists) have discussed goals and treatment limitations in the past and decided he should have a DNR/DNI order, and that placing a G tube would not be in his best interest, and she still believes that. I d/w her that when swallowing difficulties become severe enough for patients with dementia (from any cause), that they are having trouble keeping their Na levels normal, that is often a pre-terminal event. When patients can't overall eat/drink enough to keep Na normal, there is no long-term solution for that, apart from a gastrostomy tube, which usually is not a good idea due to pain, restraints, agitation, etc. I d/w her that I am not sure if we're at a point in which his swallowing is bad enough that he's going to continue to  have problems with hyperNa, but we could be close. If he can't keep his Na down even with lots of help with eating/drinking, I think we should enroll him in hospice care at his facility and keep him comfortable for his final weeks of life. I d/w them what EOL care would look like for him. Immediately however, he ate well last night, and she is hopeful that he will continue to swallow adequately he can continue sinemet (which will further help him swallow), and recover from this. I think this is reasonable.      These recommendations have been discussed with Dr Yip at length today.      Thank you for the opportunity to participate in the care of this patient and family. Our team: will continue to follow.     After regular work hours and on weekends/holidays, you can call our answering service at [phone]. Also, who's on call for us is available in [employer].       Thank you for involving us in the patient's care. Time on floor [phone]. 70' of that time with wife, mostly discussing care goals, poor prognosis, and treatment options as described above. 10' discussing findings, discharge plan, overall poc with Dr Yip.   Bobby Morales MD / Palliative Medicine / Text me via [employer] - search for 'Carmen' - then click the pager icon / My clinic is in the [facility]: [phone] (scheduling); [phone] (triage). [facility] Inpatient Team Consult Pager [phone] (answered 8am-430pm M-F) - prefer text pages via [employer] / Palliative After-Hours Answering Service [phone].           Assessments:  Sanjay Cano is a 74 year old male with Parkinsons dz, dementia, COPD, admitted 11/18 with cough, altered mental status, hypoxia c/w healthcare associated pneumonia (lives in NH), suspected aspiration etiology.  Found to have significant dysphagia (yesterday SLP ok'd nectar thick full liquids with spoon and supervision) and hypernatremia complicating this.     Today, the patient was seen for:  Parkinson's  disease  Dysphagia  Dementia from PD  Aspiration pneumonia  Hypernatremia    Prognosis, Goals, & Planning:      Functional Status just prior to hospitalization: 4 (Completely disabled and dependent on others for selfcare; bedbound)      Prognosis, Goals, and/or Advance Care Planning were addressed today: Yes        Summary/Comments: as above      Patient's decision making preferences: unable to assess          Patient has decision-making capacity today for complex decisions: No            I have concerns about the patient/family's health literacy today: No           Patient has a completed Health Care Directive: No. Not with us      Code status: DNR/DNI    POLST form is completed & available but says full code from 2017    Coping, Meaning, & Spirituality:   Mood, coping, and/or meaning in the context of serious illness were addressed today: Yes  Summary/Comments: Discussed wife's coping with her    Social:     Living situation: Was at home with wife until 2 y ago when she broke her leg.    Key family / caregivers: children involved    History of Present Illness:  History gathered today from: family/loved ones, medical chart, medical team members, unit team members    Patient is lethargic and unresponsive when I see him, although by report he is often alert and interactive.    Long discussion with patient's wife about care goals as above.    He's been in a NH x2 y after she broke her leg and couldn't care for him.  While she does not think his NH gives him good care, she assumes other facilities will be worse.  He goes to adult day care in the morning.  On a good day he knows his family and friends, seems interested in the news, and is interactive.  He often has days when he is quiet, slumped over, minimally interactive.    Has a long-term neurology and also sees someone at [facility]. Dr Buchanan his PCP closely involved.    When he presented here he was lethargic and unable to swallow. Meaning he could not get Sinemet.  Medicine team suggested an NGT and apparently the patient screamed and screamed and gasped when it was placed per wife. It was removed and she won't consider it again. Also, prior to this gastromy tube placement has been discussed with wife and family and she says they are clear they wouldn't consider that for him.     Eventually he was able to get some carbo/levodopa and he's been somewhat more alert.  SLP saw yesterday and rec'd nectar thick: he ate dinner well last night per wife    Key Palliative Symptom Data:  We are not helping to manage these symptoms currently in this patient.        ROS:  Unable to obtain     Past Medical History:  Past Medical History:   Diagnosis Date     At risk for falling 9/3/2012     Family history of thyroid cancer 7/13/2016     Malignant neoplasm (H)     skin - nose     Moderate recurrent major depression (H) 5/17/2012     Rosacea         Past Surgical History:  Past Surgical History:   Procedure Laterality Date     EYE SURGERY       ORTHOPEDIC SURGERY       PHACOEMULSIFICATION CLEAR CORNEA WITH STANDARD INTRAOCULAR LENS IMPLANT  5/21/2014    Procedure: PHACOEMULSIFICATION CLEAR CORNEA WITH STANDARD INTRAOCULAR LENS IMPLANT;  Surgeon: Tomy Hunter MD;  Location: [facility]     PHACOEMULSIFICATION CLEAR CORNEA WITH TORIC INTRAOCULAR LENS IMPLANT  4/30/2014    Procedure: PHACOEMULSIFICATION CLEAR CORNEA WITH TORIC INTRAOCULAR LENS IMPLANT;  Surgeon: Tomy Hunter MD;  Location: [facility]         Family History:  Family History   Problem Relation Age of Onset     Cerebrovascular Disease Mother      Diabetes Mother      Gastrointestinal Disease Mother      Hypertension Mother      Neurologic Disorder Father      Cerebrovascular Disease Father      Cerebrovascular Disease Maternal Grandfather      Cancer Paternal Grandmother      Other - See Comments Sister         gi - unknown         Allergies:  Allergies   Allergen Reactions     Azithromycin      Erythromycin Other (See Comments)      Pathological fractures     Levaquin [Levofloxacin]      Fracture prevention     Seasonal Allergies         Medications:  I have reviewed this patient's medication profile and medications from this hospitalization.   Noted scheduled meds are:  sinement  abx    Physical Exam:  Vital Signs: Temp: 98.5  F (36.9  C) Temp src: Axillary BP: (!) 150/71 Pulse: 80 Heart Rate: 58 Resp: 18 SpO2: 94 % O2 Device: None (Room air)    Weight: 179 lbs .22 oz  Sleeping, unresponsive, slumped to L in bed, NAD  MOuth dry no lesions  Neck no masses, trachea midline  CV rrr s1s2  Lungs unlab, ctab  Abd soft, ntnd no masses  RLE in boot  Skin pale no lesions  Diffuse sarcopenia  Unable to particpate in neuro exam. UE stiff when I passively range them    Data reviewed:  Recent imaging reviewed, my comments on pertinents:   Images personally reviewed today:  CTOH  Impression:    1. Head CTA demonstrates no aneurysm or stenosis of the major  intracranial arteries.   2. Neck CTA demonstrates no stenosis of the major cervical arteries.    CT chest  IMPRESSION:   1. Unchanged left upper and lower lobe nodules, recommend follow-up  chest CT in 12 months.  2. Unchanged rounded atelectatic scar of the right lower lobe.  3. Bibasilar fibroatelectasis with dendriform ossifications,  potentially representing chronic aspirations.    I'd add lots of stool noted in upper abdoment    Recent lab data reviewed, my comments on pertinents:   Na 145, down from 148 yesterday  Cr 1  Ca 8.6  Alb 3.7

## 2019-11-23 NOTE — PLAN OF CARE
Activity: Pt remained in bed most of the shift, repositioned q2-3 hours, A2 for cares and use of mechanical lift to recliner. Up to recliner for dinner.  Neuros: MARY ANN neuros, alert later in the day after carbidopa-levodopa doses. Baseline parkinson's.  Cardiac: HTN majority of the shift, team aware. PRN hydralazine administered at 1630, available q 6 hours.  Respiratory: Stable on RA, LS coarse/diminished, oral suction at the bedside.  GI: +BS, MARY ANN if pt is passing flatus, no stool noted.  : Incontinent of urine, condom catheter placed while in bed.  Diet: Diet advanced to full liquid with nectar thick liquids. Pt tolerated dinner  Skin: R 5th finger blister intact, dressing changed this morning. No new deficits noted.  Lines: R PIV infusing D5 @100 mL/hr.  Labs: Reviewed. K+ 3.5  Pain/nausea:  No physiological indicators of pain, no evidence of nausea.  New changes this shift:  D5 MIVF initiated for hypernatremia, diet advanced, activity advanced, carbidopa-levodopa dosing adjusted  Plan: Continue to monitor and follow POC.

## 2019-11-23 NOTE — PLAN OF CARE
Activity: A2 for cares and use of mechanical lift to recliner. Up to recliner for a few hours in the afternoon  Neuros: MARY ANN neuros, alert later in the day after carbidopa-levodopa doses. Baseline parkinson's.  Cardiac: HTN but within parameters. HR in the 60's  Respiratory: Stable on RA, LS coarse/diminished, oral suction at the bedside.  GI: +BS, MARY ANN if pt is passing flatus, no stool noted.  : Incontinent of urine, condom catheter placed while in bed.  Diet: Diet advanced from full liquid to DD1, patient appears to be tolerating.  Skin: R 5th finger blister intact, mepilex placed on sacrum. No new deficits noted.  Lines: R PIV infusing D5 @75 mL/hr. At 1830, RN noticed PIV catheter damaged, order placed for new PIV  Labs: Reviewed. K+ 3.1, replaced. Redraw 3.4.   Pain/nausea:  No physiological indicators of pain, no evidence of nausea.  New changes this shift:  Advanced to DD1, appears more alert today.  Plan: Continue to monitor and follow POC.

## 2019-11-23 NOTE — PROGRESS NOTES
[facility], [place]    Progress Note - [facility] 5 Service        Date of Admission:  11/18/2019    Assessment & Plan   Sanjay Cano is a 74 year old male admitted on 11/18/2019. He has a history of Parkinson's, dementia, & COPD and is admitted for productive cough, encephalopathy, and oxygen requirements concerning for healthcare associated pneumonia vs COPD exacerbation vs aspiration pneumonia    Changes today;  - Palliative care consult to see patient and wife today to explore goals of care.  - Continue oral meds and oral feeds by SLP diet recommendations today, monitor closely for signs of aspiration  - D5W 75 mls/hour to correct hypernatremia, reassess tomorrow and adjust/discontinue infusion.  - Potassium replacement per protocol    # Respiratory distress, improved  # Concern for health care associated pneumonia  # High risk for aspiration  Presented from his nursing home with 2-week history of productive cough and increased respiratory effort in the ED requiring BiPAP, concern for respiratory infection: Healthcare associated pneumonia versus aspiration pneumonia given his history of Parkinson's, dementia, and prior swallowing concerns on dysphagia diet versus COPD exacerbation.  Noted to have negative procal, CXR with linear left basilar opacities may represent atelectasis versus  Infection. He did not have a witnessed aspiration event. Per wife his coughing is not associated with eating. On exam at admission with laboured breathing, bilateral wheezing and crackles. Was managed with IV antibiotics for suspected hospital acquired infection vs aspiration, duonebs with improvement in respiratory status.  -continue PTA flovent BID  -continue duonebs 4x/day and albuterol nebs PRN  -S/p vancomycin/zoysn->continue IV ceftriaxone 1g Q24H till able to swallow safely, then will transition to Augmentin.  -Continuous O2 monitoring, supplemental O2 as needed   -HOB > 30 degrees    #  Hypernatremia, improving  # Hypokalemia   Na-149->145 today; 75 mls of D5W /hour and monitor, till able to drink more free water   K 3.1- replete per protocol      # Toxic metabolic encephalopathy, improving slowly.  Likely due to underlying respiratory infection. CT head at admission with no stroke, hemorrhage or mass. Encephalopathy may also contributed to by inadequate sinemet treatment at this time. He was initially unable to swallow and would have benefited from NGT placement to try and optimise sinemet dosing that would  improve his swallowing/mental status. Mental status improving slowly and now able to swallow thickened liquids  - Continue IV antibiotics as above  - Correct hypernatremia as above  - Delirium precautions    # Dementia  # Parkinson's  -continue PTA [id number] (dosed 6am, 9am, 12pm, 3pm, 6pm) and donepezil 5mg qAM, 10mg qHS  - patient NPO. Patient's wife continue to be uncomfortable with the NGT at this time. Opted to change sinemet to ODT preparations     # GERD  -continue PTA famotidine 20mg qAM, when able to safely swallow     #Mood  -continue PTA venlafaxine 37.5mg qAM, 75mg qHS & mirtazapine 22.5mg at bedtime, when able to safely swallow     # HTN  -continue PTA amlodipine 10mg at bedtime, when able to safely swallow  - prn hydralazine Q6H for SBP >180mmhg     Diet: Full Liquid Diet Nectar Thickened Liquids (pre-thickened or use instant food thickener)    Fluids: no IVF  DVT Prophylaxis: Mechanical measures  Contreras Catheter: not present  Code Status: DNR/DNI      Disposition Plan   Expected discharge: 4 - 7 days, recommended to prior living arrangement once respiratory status has improved and baseline mental status.  Entered: Vidhi Singh MD 11/23/2019, 10:45 AM       The patient's care was discussed with the Attending Physician, Dr. Yip.    Vidhi Singh MD  [address], [place]  Pager: [phone]  Please see  sticky note for cross cover information  ______________________________________________________________________    Interval History    No acute events overnight     Data reviewed today: I reviewed all medications, new labs and imaging results over the last 24 hours    .Physical Exam   Vital Signs: Temp: 98.5  F (36.9  C) Temp src: Axillary BP: (!) 150/71 Pulse: 80 Heart Rate: 58 Resp: 18 SpO2: 94 % O2 Device: None (Room air)    Weight: 179 lbs .22 oz    Physical Exam:  General: appears more alert  HEENT: Oral mucosa moist and non-erythematous, PERRLA, EOM intact  CV: RRR, normal S1S2, no murmur, clicks, rubs  Resp: Clear to auscultation bilaterally, no wheezes, rhonchi  Abd: Soft, non-tender, BS+, no masses appreciated  Extremities: Radial and pedal pulses intact and symmetric, no pedal edema  Neuro: sparse speech, but responds to name.

## 2019-11-23 NOTE — PLAN OF CARE
Discharge Planner SLP   Patient plan for discharge: Unknown  Current status: Pt seen for dysphagia management. Good tolerance of puree texture. Delayed s/sx of aspiration with thin liquids. Recommend dysphagia 1 (puree) diet/nectar-thick liquids with 1:1 assistance for slow hand-feeding. Recommend pills be crushed and administered in puree consistency or small amount of thickened liquid. Ensure pt is fully upright and alert for all PO. Complete oral cares 3x/day. Pt remains an appropriate candidate for comfort eating despite aspiration risk given hx of dysphagia and advanced dementia. SLP will follow.  Barriers to return to prior living situation: Medical condition, aspiration risk, nutritional status, goals of care  Recommendations for discharge: Defer to MD  Rationale for recommendations: Pt with chronic dysphagia; pt will benefit from ongoing ST for dysphagia management and ongoing caregiver education        Entered by: Zo You 11/23/2019 1:54 PM

## 2019-11-24 NOTE — PROGRESS NOTES
"SPIRITUAL HEALTH SERVICES  SPIRITUAL ASSESSMENT Progress Note  [facility] ([place]) 7B     PRIMARY FOCUS:     Goals of care    Symptom/pain management    Emotional/spiritual/Jewish distress    Support for coping    REFERRAL SOURCE: Epic consult    ILLNESS CIRCUMSTANCES:   Reviewed documentation. Reflective conversation shared with pt's wife which integrated elements of illness and family narratives. The WIFE'S QUOTES ARE BELOW.    Context of Serious Illness/Symptom(s) - \"He has had Parkinson's for 25 years. I took care of him at home until [fairly recently]. Now he is at [facility]. It's supposed to be good care there but I have reported them to the authorities for poor care of my .\"     Resources for Support - They have a son who lives in the [facility] and has one or two small children. The wife has two good friends she was with today.    DISTRESS:     Emotional/Spiritual/Existential Distress - \"He is scared that someone's going to hurt him. He's been through so much since he has come here. They tried to give him a shot in the stomach but that doesn't work for him--he has little flesh.\" The wife expressed how she has advocated/cared for him over to years. She was distressed that he was so agitated.    Methodist Distress - None dicussed    Social/Cultural/Economic Distress - None discussed    SPIRITUAL/Jain COPING:     Caodaism/Kayla - Their Confucianist Kayla is important to them. She noted that [facility] is attached to [facility] and so they can easily get to [place].    Spiritual Practice(s) - \"I pray. That's how I get through this.\"    Emotional/Relational/Existential Connections - Friends, son.    GOALS OF CARE:    Goals of Care - \"To get him [pt] back to [facility] and go from there.\"    Meaning/Sense-Making - Not discussed    The pt was agitated by my presence. His wife thought it best to skip for today the prayers she had requested.     PLAN: I will try again by Wednesday.    Fuad" Colton Lorenzanalain  Pager [phone]

## 2019-11-24 NOTE — PROGRESS NOTES
SPIRITUAL HEALTH SERVICES  SPIRITUAL ASSESSMENT Progress Note  [facility] ([place])  7B     REFERRAL SOURCE: Epic consult    Pt unavailable--with nursing staff.    PLAN: Will try again today.    Fuad Lorenzana M.Div.     Pager [phone]

## 2019-11-24 NOTE — PROGRESS NOTES
[facility], [place]    Progress Note - Marashish Grayson Service        Date of Admission:  11/18/2019    Assessment & Plan   Sanjay Cano is a 74 year old male admitted on 11/18/2019. He has a history of Parkinson's, dementia, & COPD and is admitted for productive cough, encephalopathy, and oxygen requirements concerning for healthcare associated pneumonia vs COPD exacerbation vs aspiration pneumonia    Changes today;  - last day of ceftriaxone for treatment of aspiration pneumonia  - continue working on oral nutrition and getting his parkinson medications  - given the difficulty with post nasal drainage from viral URI secretions we are triling a single dose of pseudoephedrine. Will need to watch the BP closely. If this is helpful we can try more doses for symptom management.   - replacing K  - Appreciate palliative meeting with family. Plan is to get him back to LTC facility and family will work with PCP on avoiding further trips to ED if possible. This may require enrolling in Hospice care in the future.     Brenden Yip MD  IM/Peds Hospitalist - [phone]  Please see sticky note for x-cover contact information.       --------------------------------------------------------------------------------------------------------    # Respiratory distress, improved  # Concern for health care associated pneumonia  # High risk for aspiration  Presented from his nursing home with 2-week history of productive cough and increased respiratory effort in the ED requiring BiPAP, concern for respiratory infection: Healthcare associated pneumonia versus aspiration pneumonia given his history of Parkinson's, dementia, and prior swallowing concerns on dysphagia diet versus COPD exacerbation.  Noted to have negative procal, CXR with linear left basilar opacities may represent atelectasis versus  Infection. He did not have a witnessed aspiration event. Per wife his coughing is not associated with eating. On exam at  admission with laboured breathing, bilateral wheezing and crackles. Was managed with IV antibiotics for suspected hospital acquired infection vs aspiration, duonebs with improvement in respiratory status.  -continue PTA flovent BID  -continue duonebs 4x/day and albuterol nebs PRN  -S/p vancomycin/zoysn->continue IV ceftriaxone 1g Q24H till able to swallow safely  -Continuous O2 monitoring, supplemental O2 as needed   -HOB > 30 degrees  ---- Family is clear that the patient should not have placement of NG or other feeding tube.     # Hypernatremia, improving  # Hypokalemia  Na is stable last 2 days.   Hypokalemia -  replete per protocol      # Toxic metabolic encephalopathy, improving slowly.  Likely due to underlying respiratory infection. CT head at admission with no stroke, hemorrhage or mass. Encephalopathy may also contributed to by inadequate sinemet treatment at this time. He was initially unable to swallow and would have benefited from NGT placement to try and optimise sinemet dosing that would  improve his swallowing/mental status. Mental status improving slowly and now able to swallow thickened liquids  - Continue IV antibiotics as above  - Correct hypernatremia as above  - Delirium precautions    # Dementia  # Parkinson's  -continue PTA [id number] (dosed 6am, 9am, 12pm, 3pm, 6pm) and donepezil 5mg qAM, 10mg qHS  - patient NPO. Patient's wife continue to be uncomfortable with the NGT at this time. Opted to change sinemet to ODT preparations     # GERD  -continue PTA famotidine 20mg qAM, when able to safely swallow     #Mood  -continue PTA venlafaxine 37.5mg qAM, 75mg qHS & mirtazapine 22.5mg at bedtime, when able to safely swallow     # HTN  -continue PTA amlodipine 10mg at bedtime, when able to safely swallow  - prn hydralazine Q6H for SBP >180mmhg     Diet: Dysphagia Diet Level 1 Pureed Nectar Thickened Liquids (pre-thickened or use instant food thickener)    Fluids: no IVF  DVT Prophylaxis:  "Mechanical measures  Contreras Catheter: not present  Code Status: DNR/DNI      Disposition Plan   Expected discharge: 2 - 3 days, recommended to prior living arrangement once respiratory status has improved and baseline mental status.  Entered: Brenden Yip MD 11/24/2019, 8:47 AM     ______________________________________________________________________    Interval History    Continue struggles with coughing fits janusz cause him to \"loose his breath\"  He is taking dysphagia diet OK but he needs to be wide awake and coaxed  Nasal drip is significant.  The combination of post nasal drip and swallow are the biggest trigger for coughing fits.   He is much more alert over last 48 hours.   No Oxygen needed.      Data reviewed today: I reviewed all medications, new labs and imaging results over the last 24 hours    .Physical Exam   Vital Signs: Temp: 98.7  F (37.1  C) Temp src: Axillary BP: (!) 146/72   Heart Rate: 71 Resp: 16 SpO2: 97 % O2 Device: None (Room air)    Weight: 179 lbs .22 oz    Physical Exam:  General: appears more alert  HEENT: Oral mucosa moist and non-erythematous, PERRLA, EOM intact  CV: RRR, normal S1S2, no murmur, clicks, rubs  Resp: Clear to auscultation bilaterally, no wheezes, rhonchi  Abd: Soft, non-tender, BS+, no masses appreciated  Extremities: Radial and pedal pulses intact and symmetric, no pedal edema  Neuro: sparse speech, but responds to name.  "

## 2019-11-24 NOTE — PROGRESS NOTES
[facility]  Palliative Care Daily Progress Note       Recommendations & Counseling       Continue current plan, as outlined yesterday and d/w Dr Yip.    Encourage/help with him eat/drink as much as he can tolerate, and prioritize sinemet for his oral meds to further help his swallow    Wife well informed these sorts of swallowing, PO challenges, hypernatremia are often pre-terminal events as discussed yesterday. Re-discussed all that with her today. If he cannot maintain adequate PO to keep his Na stable, hospice is indicated in my opinion----how much of a 'chance' however to give him before making that 'call' is indeterminate/needs to be an individualized decision with his wife    D5 will need to be stopped at some point    Bobby Morales MD / Palliative Medicine / Text me via [employer] - search for 'Carmen' - then click the pager icon / My clinic is in the [facility]: [phone] (scheduling); [phone] (triage). [facility] Inpatient Team Consult Pager [phone] (answered 8am-430pm M-F) - prefer text pages via [employer] / Palliative After-Hours Answering Service [phone].           Assessments          Sanjay Cano is a 74 year old male with Parkinsons dz, dementia, COPD, admitted 11/18 with cough, altered mental status, hypoxia c/w healthcare associated pneumonia (lives in NH), suspected aspiration etiology.  Found to have significant dysphagia (SLP ok'd nectar thick full liquids with spoon and supervision) and hypernatremia complicating this.     Today, the patient was seen for:  Parkinsons dz   Dementia related to PD  Dysphagia  Hypernatremia    Prognosis, Goals, or Advance Care Planning was addressed today with: Yes.  Mood, coping, and/or meaning in the context of serious illness were addressed today: No.  Summary/Comments: REviewed with wife, as above            Interval History:     Chart review/discussion with unit or clinical team members:   No major  events    Per patient or family/caregivers today:  Per wife - he had much coughing at night, up all night.  Dinner went well.  Did not eat much for breakfast ?so tired from being up all night coughing  Sinemet delayed this morning due to coughing/tiredness  Appears to be swallowing adequately when he's alert to eat    Patient is more alert than when I saw him yesterday  Makes eye contact but mumbles incoherently to me when I ask simple Qs    Key Palliative Symptoms:  Cannot obtain symptom review due to no communication               Review of Systems:     Cannot obtain symptom review due to no communication            Medications:     I have reviewed this patient's medication profile and medications during this hospitalization.    Noted meds:  Sinemet - most doses delivered per MAR  D5           Physical Exam:   Vitals were reviewed  Temp: 98.7  F (37.1  C) Temp src: Axillary BP: (!) 146/72   Heart Rate: 71 Resp: 16 SpO2: 97 % O2 Device: None (Room air)    Alert chronically ill man  Sitting hunched forward and to L  Makes eye contact, moaning vocalizations , unintelligible  LUngs coarse upper airway sounds bilat, unlabored  CV rrr s1s2  ABd soft nt  R foot in boot             Data Reviewed:     Reviewed recent pertinent imaging, comments:       Reviewed recent labs, comments:   Cr 1  Na 144 this am, trending down overall

## 2019-11-24 NOTE — PLAN OF CARE
Temp: 97.9  F (36.6  C) Temp src: Axillary BP: (!) 165/79   Heart Rate: 71 Resp: 16 SpO2: 97 % O2 Device: None (Room air)       Status:admitted with HCAP  Neuro: hx parkinson's, able to answer some questions appropriately but MARY ANN orientation  GI/:  condom cath in place with low UO, brief in place  IV Access: PIV with D5 at 75  Pain: denies  Diet:DD1 with nectar thick liquids  Activity: Ax2 to turn q2h, lift   New changes this shift: more alert, able to talk more/converse. Still having a hard time with meds, able to take some crushed in pudding or water, thick secretions in mouth and heard in throat, younker somewhat effective for mouth secretions.  Plan:  palliative care involved

## 2019-11-24 NOTE — PLAN OF CARE
Neuros: MARY ANN neuros, opens eyes to touch/speech, incomprehensible speech. Dx of parkinsons. Wife at bedside    Cardiac: /65    Respiratory: Non-productive cough, oral suction at bedside, O2 96 on RA, Expiratory wheeze    Diet: Pureed, nectar thick liquids     GI/ Condom catheter draining clear yellow urine, came unconnected 1x this shift. 1 soft BM this shift (incontinent). BS+     Skin: CDI, Mepilex to coccyx     Lines: L PIV SL    Pain: No non-verbal signs of pain    PRN: No PRNs this shift    Plan: Continue POC

## 2019-11-24 NOTE — PLAN OF CARE
"Activity: A2 for cares and use of mechanical lift to recliner. Pt refused to transfer to the chair, repositioned q 2-3hrs as tolerated.  Neuros: MARY ANN neuros, periods of alertness and lethargy. More periods of agitation today.  Cardiac: HTN but within parameters. HR in the 60's  Respiratory: Stable on RA, LS coarse/diminished, oral suction at the bedside. One time dose of sudafed administered this afternoon. Neb treatments scheduled 4x daily.  GI: +BS, +flatus, +BM x2  : Incontinent of urine. Brief in place.  Diet: DD1, patient appears to be tolerating.  Skin: R 5th finger blister intact, mepilex placed on sacrum. No new deficits noted.  Lines: R PIV infusing SL  Labs: Reviewed. K+ 3.3, replaced during day shift. Redraw 3.6 .   Pain/nausea:  No physiological indicators of pain, when pt is asked if he is in pain he reponds \"No\". No evidence of nausea.  New changes this shift:  Antibiotics discontinued  Plan: Continue to monitor and follow POC.  "

## 2019-11-24 NOTE — PROGRESS NOTES
SPIRITUAL HEALTH SERVICES  SPIRITUAL ASSESSMENT Progress Note  [facility] ([place]) 7B     REFERRAL SOURCE: Epic consult    I introduced myself to the pt. The pt became agitated and moved his arm towards me in a quick motion. I asked if he would like a visit and he seemed to shake his head 'no.'    PLAN:  I will try visit when his wife if present.    Fuad Lorenzana M.Div.     Pager [phone]

## 2019-11-24 NOTE — PLAN OF CARE
"SLP: Cancel - Attempted to see pt for dysphagia management. Per RN, pt was becoming more agitated this PM. Pt heard to state \"no!\" in the background on phone call, and RN unsure how well pt would participate. SLP will reschedule per POC.   "

## 2019-11-25 NOTE — PROVIDER NOTIFICATION
Dr Yip notified (face-to-face interaction) that patient was lethargic and unable to wake up to take medications.     Dr. Singh was paged by this writer at 11:18am that pt was still lethargic and not waking up. MD was also informed that pt's wife was concerned of possible stroke.    This RN waiting for return call from MD.    Blayne Huddleston RN

## 2019-11-25 NOTE — PROGRESS NOTES
[facility]  Palliative Care Daily Progress Note       Recommendations & Counseling       DNR/DNI, also does not like the idea of a feeding tube    Offered to complete an updated POLST form, wife declined    Goals are for him to get back to his care facility    Wife would benefit from palliative care social work and/or  for extra support          Assessments          Sanjay Cano is a 74 year old male with Parkinsons dz, dementia, COPD, admitted 11/18 with cough, altered mental status, hypoxia c/w healthcare associated pneumonia (lives in NH), suspected aspiration etiology.  Found to have significant dysphagia (SLP ok'd nectar thick full liquids with spoon and supervision) and hypernatremia complicating this.      Today, the patient was seen for:  Parkinsons dz   Dementia related to PD  Dysphagia  Hypernatremia    Prognosis, Goals, or Advance Care Planning was addressed today with: Yes.    Met with wife today. Wife began discussion, this morning he is very sleepy and has missed a lot of his parkinsons medications. She knows that he cant move or get better if the parkinsons medications are not in his system. She seems to understand there is a corilatoin between his parkinsons medictaions taht can help his movement of all his muscles including his swallowing muscles. She struggles with not knowing if this is just a cold or if it is aspiration. He has never had pneumonia but did have aspiration once. Discussed his current health problems including the cough, and now more acutely in the last couple of days more sleepiness and inability to take his medication. Discussed that she feels that its not time for hospice, or at least she hopes not. Discussed the known preferences that have been documented in the chart such as DNR/DNI. And also wife has indicated that she would not want to put him through a feed tube. Discussed that we also dont want to do things to him  that would cause him to suffer more, and we also must acknowledge that by not doing things like feeding him artificially and if he is not eating then he will not likely live long as one of the risks. Its very clear that she does not feel that she can trust her care team at the LT facility. Discussed palliative care clinic today and discussed that im not sure that it would be worth getting him into clinic at this time it might be more burdensome then helpful. That being said we are happy to see him in clinic if she is interested. Discussed that she is more than welcome to work with her PCP and get a referral if its needed for palliative care. Discussed also that PCP could put in referrals for hospice, and she is clear she doesn't think they are there yet.     Mood, coping, and/or meaning in the context of serious illness were addressed today: Yes.  Summary/Comments: tearful thinking about EOL topics.               Interval History:     Chart review/discussion with unit or clinical team members:   Notes reviewed, ongoing difficulty with coughing.    Per patient or family/caregivers today:  Patient lethargic in bed, non productive cough, attempted to respond to questions but was intelligible. Wife at bedside and supportive.    Key Palliative Symptoms:                 Review of Systems:     Besides above, ROS was reviewed and is unremarkable          Medications:     I have reviewed this patient's medication profile and medications during this hospitalization.    Noted meds:    Mirtazapine 22.5 mg at bedtime  Venlafaxine 37.5 mg daily             Physical Exam:   Vitals were reviewed  Temp: 97.6  F (36.4  C) Temp src: Axillary BP: (!) 146/70 Pulse: 56 Heart Rate: 60 Resp: 18 SpO2: 96 % O2 Device: None (Room air)      Intake/Output Summary (Last 24 hours) at 11/25/2019 1023  Last data filed at 11/25/2019 0959  Gross per 24 hour   Intake 243 ml   Output --   Net 243 ml       Constitutional: lethargic, chronically ill  appearing, no apparent distress  Lungs: No increased work of breathing, wet sounding non productive cough  Abdomen: non-distended  Neurologic: drowsy, makes eye contact intermittently, moaning verbalizations, unintelligible  Neuropsychiatric: MARY ANN given AMS  Skin: No rashes             Data Reviewed:       Reviewed recent labs, comments:   Sodium 144  Potassium 3.6  Creatinine 1.05  GFR 69    LUISA Anand CNS  Palliative Care Consult Team  Pager: [phone]    50 minutes spent. This includes 30 minutes face to face with wife and patient discussing current complex health conditions and prognosis, goals of care, future care planning, and end of life planning. Coordination of care with the palliative SW, and unit SW regarding goals of care.

## 2019-11-25 NOTE — PROGRESS NOTES
Social Work Services Progress Note    Hospital Day: 8  Date of Initial Social Work Evaluation:  Not formally completed  Collaborated with:  Chart review, team rounds, Dr. Yip, [facility] (Ph: [phone], F: [phone], HOWIE [place] [phone])      Data:  Pt is 75 y/o male admitted to [facility] on 11/18/19 for productive cough, encephalopathy, and oxygen requirements concerning for healthcare associated pneumonia. Pt has history of Parkinson's, dementia, & COPD.     Pt is admitted from [facility] (Ph: [phone], F: [phone]) where he resides in LTC. HOWIE received call from HOWIE at LT ([place] [phone]) who informed SW that Pt is on 18-day MA bedhold. [place] asked for SW to update her when Pt is ready for return.    Received update from Dr. Yip that pt is anticipated to be ready for discharge Tuesday with a dysphagia diet.     Intervention:  HOWIE contacted [facility] SW [place]- left vm providing update on anticipated discharge Tuesday, sent updated MAR and notes for review via Epic.     Assessment:  See bedside RN, PT/OT, medical team notes    Plan:    Anticipated Disposition:  Facility:  Return to LTC at [facility]    Barriers to d/c plan:  Medical stability    Follow Up:  HOWIE BARAJAS will continue to follow    JERRY Day, LICSW  7B   [phone] (pager) [phone]  11/25/2019

## 2019-11-25 NOTE — PLAN OF CARE
Discharge Planner SLP   Patient plan for discharge: Pt unable to state  Current status:  Recommend dysphagia diet 1 and nectar-thick liquids with 1:1 feeding assist and supervision, with excellent oral cares 2-3x per day. Recommend pills be crushed and administered in puree consistency or small amount of thickened liquid. Ensure pt is fully upright and alert for all PO, alternating bites/sips. Pt remains an appropriate candidate for comfort eating despite aspiration risk given hx of dysphagia and advanced dementia. SLP to follow for diet PO tolerance, caregiver education.  Barriers to return to prior living situation: Medical condition, aspiration risk, nutritional status, goals of care  Recommendations for discharge: Defer to MD  Rationale for recommendations: Pt with chronic dysphagia; pt will benefit from ongoing ST for dysphagia management and ongoing caregiver education        Entered by: Melinda Peres 11/25/2019 12:11 PM

## 2019-11-25 NOTE — PLAN OF CARE
Temp: 98  F (36.7  C) Temp src: Axillary BP: (!) 151/85   Heart Rate: 60 Resp: 16 SpO2: 94 % O2 Device: None (Room air)       Status:admitted with HCAP  Neuro: intermittently lethargic, hx parkinson, occasionally answers questions appropriately  GI/:  inc of B/B  IV Access: PIV SL  Pain:denies  Diet:DDI nectar thick, poor appetite  Activity: bedrest, turn q2h with Ax2-3  New changes this shift: none, wife at bedside- very anxious about cough that has been ongoing throughout stay and how cough is not getting better.  Wife insisted on pt waking up to take pills crushed, pt coughed after pills given, wife then wanted cough syrup. Attempted to educate wife about how cough syrup could make him cough as well since pills did not go well.  Plan:  continue plan of care

## 2019-11-25 NOTE — PLAN OF CARE
"BP (!) 146/70 (BP Location: Right arm)   Pulse 56   Temp 97.6  F (36.4  C) (Axillary)   Resp 18   Ht 1.88 m (6' 2\")   Wt 81.2 kg (179 lb 0.2 oz)   SpO2 96%   BMI 22.98 kg/m      Neuros: MARY ANN neuros at start of shift; opens eyes to touch/speech, incomprehensible speech. Dx of parkinsons. Wife at bedside  Cardiac: BP   Respiratory: productive cough, oral suction at bedside, O2 96 on RA, Expiratory wheeze  Diet: Pureed, nectar thick liquids; evaluated by Speech - 1:1 feeding; good oral care 2-3 times daily  GI/ Incontinent of urine x2, jacklyn-care provided. 1 small BM - loose  Skin: CDI, Mepilex to coccyx - changed by this RN today 11/25  Lines: L PIV SL  Pain: No non-verbal signs of pain   PRN: No PRNs this shift     *pills crushed and given in pudding & apple sauce     Plan: Continue POC  Problem: Adult Inpatient Plan of Care  Goal: Plan of Care Review  11/25/2019 1305 by ARLEN GOFF  Outcome: No Change  11/25/2019 0149 by Tanesha Rincon RN  Outcome: No Change  Goal: Patient-Specific Goal (Individualization)  11/25/2019 1305 by ARLEN GOFF  Outcome: No Change  11/25/2019 0149 by Tanesha Rincon RN  Outcome: No Change  Goal: Absence of Hospital-Acquired Illness or Injury  11/25/2019 1305 by ARLEN GOFF  Outcome: No Change  11/25/2019 0149 by Tanesha Rincon RN  Outcome: No Change  Goal: Rounds/Family Conference  11/25/2019 1305 by ARLEN GOFF  Outcome: No Change  11/25/2019 0149 by Tanesha Rincon RN  Outcome: No Change     Problem: Infection (Pneumonia)  Goal: Resolution of Infection Signs/Symptoms  11/25/2019 1305 by ARLEN GOFF  Outcome: No Change  11/25/2019 0149 by Tanesha Rincon RN  Outcome: No Change     "

## 2019-11-25 NOTE — PLAN OF CARE
Neuros: MARY ANN neuros, opens eyes to touch/speech, incomprehensible speech. Dx of parkinsons. Wife at bedside     Cardiac: /70     Respiratory: Non-productive cough, oral suction at bedside, O2 96 on RA, Expiratory wheeze     Diet: Pureed, nectar thick liquids      GI/ Incontinent of urine x2, jacklyn-care provided. 1 small BM     Skin: CDI, Mepilex to coccyx      Lines: L PIV SL     Pain: No non-verbal signs of pain     PRN: No PRNs this shift     Plan: Continue POC

## 2019-11-25 NOTE — PLAN OF CARE
OT/7B - OT previously deferred. Please refer to note on 11/22 for further details. Per chart review, plan for return to LTC facility.

## 2019-11-25 NOTE — PLAN OF CARE
7B: DEFER  Discharge Planner PT   Patient plan for discharge: LTC  Current status: New orders received stating possible discharge today. Discussed with nursing. Pt remains at lift dependent baseline with safe discharge plan to return to LTC. Pt not appropriate to IP PT and best severed at LTC. Will complete orders.  Barriers to return to prior living situation: None to LTC  Recommendations for discharge: LTC  Rationale for recommendations: Refer to PT note from 11/21.        Entered by: Deejay Garcia 11/25/2019 1:11 PM

## 2019-11-25 NOTE — SIGNIFICANT EVENT
SPIRITUAL HEALTH SERVICES Significant Event  Confucianist Sacrament of ANOINTING  [facility] ([place]) 7B    PATIENT ANOINTED by Father Fuad Lorenzana 11/25/2019 with the pt's wife present per her request.    PLAN: Spiritual Health Services remains available for patient's ongoing support.    Fuad Lorenzana M.Div.     Pager [phone]

## 2019-11-25 NOTE — PROGRESS NOTES
[facility], [place]    Progress Note - Arik 5 Service        Date of Admission:  11/18/2019    Assessment & Plan   Sanjay Cano is a 74 year old male admitted on 11/18/2019. He has a history of Parkinson's, dementia, & COPD and is admitted for productive cough, encephalopathy, and oxygen requirements concerning for healthcare associated pneumonia vs COPD exacerbation vs aspiration pneumonia    Changes today;  - PT/OT assessment today   - Na 147 this morning, will offer D5W 100 mls/10 hours  - Continue working on oral nutrition and getting his parkinson medication. Diet level pending SLP assessment and recommedations  - Appreciate palliative following with family. Plan is to get him back to LTC facility and family will work with PCP on avoiding further trips to ED if possible. This may require enrolling in Hospice care in the future.   - Discharge planning, tentative 11/26 or 11/27 to nursing vs TCU  --------------------------------------------------------------------------------------------------------    # Respiratory distress, improved  # Concern for health care associated pneumonia  # High risk for aspiration  Presented from his nursing home with 2-week history of productive cough and increased respiratory effort in the ED requiring BiPAP, concern for respiratory infection: Healthcare associated pneumonia versus aspiration pneumonia given his history of Parkinson's, dementia, and prior swallowing concerns on dysphagia diet versus COPD exacerbation.  Noted to have negative procal, CXR with linear left basilar opacities may represent atelectasis versus  Infection. He did not have a witnessed aspiration event. Per wife his coughing is not associated with eating. On exam at admission with laboured breathing, bilateral wheezing and crackles. Was managed with IV antibiotics for suspected hospital acquired infection vs aspiration, duonebs with improvement in respiratory  status.  -continue PTA flovent BID  -continue duonebs 4x/day and albuterol nebs PRN  -S/p vancomycin/zoysn->continue IV ceftriaxone 1g Q24H till able to swallow safely  -Continuous O2 monitoring, supplemental O2 as needed   -HOB > 30 degrees  ---- Family is clear that the patient should not have placement of NG or other feeding tube.     # Hypernatremia  # Hypokalemia  Na is stable last 2 days. This morning Na 147; ~0.8 L free water deficit, will give 100 mls/D5W for next 10 hours  Hypokalemia -  replete per protocol    # Toxic metabolic encephalopathy, improving  Likely due to underlying respiratory infection. CT head at admission with no stroke, hemorrhage or mass. Encephalopathy may also contributed to by inadequate sinemet treatment at this time. He was initially unable to swallow and would have benefited from NGT placement to try and optimise sinemet dosing that would  improve his swallowing/mental status. Mental status improving slowly and now able to swallow thickened liquids  - Correct electrolytes as above  - Delirium precautions    # Dementia  # Parkinson's  - Continue PTA [id number] (dosed 6am, 9am, 12pm, 3pm, 6pm) and donepezil 5mg qAM, 10mg qHS     # GERD  -Continue PTA famotidine 20mg qAM, when able to safely swallow     #Mood  -Continue PTA venlafaxine 37.5mg qAM, 75mg qHS & mirtazapine 22.5mg at bedtime, when able to safely swallow     # HTN  -Continue PTA amlodipine 10mg at bedtime  - prn hydralazine Q6H for SBP >180mmhg     Diet: Dysphagia Diet Level 1 Pureed Nectar Thickened Liquids (pre-thickened or use instant food thickener)    Fluids: no IVF  DVT Prophylaxis: Mechanical measures  Contreras Catheter: not present  Code Status: DNR/DNI      Disposition Plan   Expected discharge: 2 - 3 days, recommended to prior living arrangement once respiratory status has improved and baseline mental status.  Entered: Vidhi Singh MD 11/25/2019, 7:17 AM      _____________________________________________________________________    Interval History    NAPRERNAO  Still has ongoing cough, sometimes noted after feeding. Slept better last night with less cough.  Tolerating dysphagia diet ok, but needs very close supervision and ensure alertness prior to feeding  Tried pseudoephedrine yesterday for nasal drip, with minimal improvement  Appears to be more alert, but still non conversational, wife attributes this to not having his dentures in  Off oxygen   Off IV fluids at present     Data reviewed today: I reviewed all medications, new labs and imaging results over the last 24 hours    Physical exam:  Vital Signs: Temp: 98  F (36.7  C) Temp src: Axillary BP: (!) 146/70 Pulse: 56 Heart Rate: 60 Resp: 18 SpO2: 96 % O2 Device: None (Room air)    Weight: 179 lbs .22 oz    Physical Exam:  General: appears more alert  HEENT: Oral mucosa moist and non-erythematous, PERRLA, EOM intact  CV: RRR, normal S1S2, no murmur, clicks, rubs  Resp: Clear to auscultation bilaterally, no wheezes, rhonchi  Abd: Soft, non-tender, BS+, no masses appreciated  Extremities: Radial and pedal pulses intact and symmetric, no pedal edema  Neuro: sparse speech, but responds to name.

## 2019-11-26 NOTE — PROGRESS NOTES
"Social Work Services Progress Note    Hospital Day: 9  Date of Initial Social Work Evaluation:  Not formally completed   Collaborated with:  Chart review, team rounds, medical team, [facility] (Ph: [phone], F: [phone], HOWIE Mcmanus [phone])       Data:  Pt is 73 y/o male admitted to [facility] on 11/18/19 for productive cough, encephalopathy, and oxygen requirements concerning for healthcare associated pneumonia. Pt has history of Parkinson's, dementia, & COPD.     Pt is admitted from [facility] (Ph: [phone], F: [phone]) where he resides in LTC. HOWIE received call from HOWIE at [facility] ([place] [phone]) who informed SW that Pt is on 18-day MA bedhold. [place] asked for HOWIE to update her when Pt is ready for return.     Received update from Dr. Burkett that she/her team still need to see pt today and discuss plans with pt's wife. HOWIE informed team that if they deem pt stable for discharge and pt's wife is not in agreement with that there is the option of pt's wife appealing the discharge.     Intervention:  HOWIE spoke with [facility] HOWIE Mcmanus and informed her that per bedside nurse Danuta is concerned about pt getting medications on time at the facility and [place] reported that pt does receive his medications on time. The nurse manager Alan and [place] also reported that pt \"didn't have a broken hand\" as Danuta had reported and [place] discussed how Danuta doesn't trust \"us\"/the facility and it is not a lie that Danuta spends 40-50 hours a week at the facility. [place] and Alan reported that the facility has made multiple [facility] or [facility] reports regarding Danuta's complaints.     [place] requested that discharge orders be faxed to [phone].    Tentative stretcher transport arranged via [facility] ([phone]) for 11/27 at 1:30pm.     Assessment:  See bedside RN, PT/OT, medical team notes    Plan:    Anticipated Disposition:  Facility:  Return to LTC at [facility]    Barriers to d/c plan:  Medical stability    " Follow Up:  TBD, SW will continue to follow    JERRY Day, [address]   [phone] (pager) [phone]  11/26/2019

## 2019-11-26 NOTE — PLAN OF CARE
"Time: 2300 - 0730  Status: Healthcare associated pneumonia  Vitals: BP (!) 172/86 (BP Location: Right arm)   Pulse 66   Temp 96.7  F (35.9  C) (Axillary)   Resp 20   Ht 1.88 m (6' 2\")   Wt 81.2 kg (179 lb 0.2 oz)   SpO2 95%   BMI 22.98 kg/m       Activity: Pt remained in bed for the duration of the shift -  Repositioned q2 hours as pt and spouse would allow.  Pain: Pt appeared to be tearful overnight, but no other non-verbal indicators present as evidence of pain.  Neuro: Unable to fully assess. Pt arouses to gentle touch. (L) facial droop present. Incomprehensible speech. Wife at beside.  Cardiac: Hypertensive - within parameters. No additional action taken.  Respiratory: On RA. Weak/productive cough - oral suctioning at bedside.   GI/: Incontinent of urine and bowel overnight x 1.   Diet: DD1 - Pureed, nectar thick liquids  Skin: Mepilex on coccyx - CDI  LDAs: (L) PIV running D5 @ 100mL/hr  New change for this shift: None.  Plan: Possible discharge back to LTC today. Continue with POC.     "

## 2019-11-26 NOTE — DISCHARGE SUMMARY
"[facility], [place]  Discharge Summary - Medicine & Pediatrics       Date of Admission:  11/18/2019  Date of Discharge:  12/1/2019  Discharging Provider: Kaela Burkett MD  [facility]    Discharge Diagnoses    Acute Respiratory Distress (including cough and congestion) in the setting of Probable Aspiration Pneumonia versus HCAP  High risk for recurrent aspiration  Dysphagia  Hypernatremia (resolved)  Hypokalemia (Resolved  Dementia  Parkinson's disease  GERD  Hypertension    Follow-ups Needed After Discharge   - Establish with Hospice Team when back to Elder Care    Discharge Disposition   Discharged to long-term care facility  Condition at discharge: Stable    Hospital Course   Sanjay Cano is a 74 year old male admitted on 11/18/2019. He has a history of severe longstanding Parkinson's disease with dementia, and COPD. He had been living in a long term nursing care facility for about 2 years. He presented to the emergency room on 11/18/2019 with complaints of worsening cough for over two weeks, difficulty in breathing, low grade fevers and was somnolent (though wife notes that presentation was after his normal bedtime). He has limited mobility of his extremities at baseline. On arrival in the emergency room, he was found to be somnolent poorly responsive, and not moving his left upper extremitiy concerning for stroke. A stroke code was called and patient was evaluated by neurology. A CT head and CTA head/neck were completed and findings were not concerning for an acute stroke.  During his evaluation it was determined he had a probable aspiration pneumonia for which he completed treatment while in the hospital.  Following completion of antibiotics Sanjay was back to his recent baseline respiratory status (though some persisting cough) but had worsened deconditioning, intermittent coughing and was still \"not himself\" per his wife Danuta.  Given his multiple admissions and " "functional decline recently, a care conference was held on 11/29/19 with Danuta, their son, Sanjay's sister, and Sanjay's sister in law during which all voiced agreement that Sanjay would want to transition to a comfort focus and his goals would be consistent with hospice.  Sanjay was transitioned to comfort cares in the hospital and coordinated with [facility] with plans to discharge to [facility].  Non-essential medications were discontinued with Danuta's approval but remaining medications should be given on schedule.  He is ready for discharge on 12/1/2019.      Consultations This Hospital Stay   PHARMACY TO DOSE VANCO  PHARMACY TO DOSE VANCO  SWALLOW EVAL SPEECH PATH AT BEDSIDE IP CONSULT  WOUND OSTOMY CONTINENCE NURSE  IP CONSULT  SWALLOW EVAL SPEECH PATH AT BEDSIDE IP CONSULT  RESPIRATORY CARE IP CONSULT  RESPIRATORY CARE IP CONSULT  NEUROLOGY GENERAL ADULT IP CONSULT  MEDICATION HISTORY IP PHARMACY CONSULT  VASCULAR ACCESS CARE ADULT IP CONSULT  VASCULAR ACCESS CARE ADULT IP CONSULT  PHYSICAL THERAPY ADULT IP CONSULT  OCCUPATIONAL THERAPY ADULT IP CONSULT  VASCULAR ACCESS CARE ADULT IP CONSULT  VASCULAR ACCESS CARE ADULT IP CONSULT  PALLIATIVE CARE ADULT IP CONSULT  VASCULAR ACCESS CARE ADULT IP CONSULT  SPIRITUAL HEALTH SERVICES IP CONSULT  PHYSICAL THERAPY ADULT IP CONSULT  OCCUPATIONAL THERAPY ADULT IP CONSULT    Code Status   DNR/DNI       MD Trista [facility]  [facility]  Pager: [phone]  ______________________________________________________________________    Physical Exam    Last vitals (no longer checking given comfort cares)/75 (BP Location: Right arm)   Pulse 72   Temp 99.7  F (37.6  C) (Oral)   Resp 18   Ht 1.88 m (6' 2\")   Wt 81.2 kg (179 lb 0.2 oz)   SpO2 93%   BMI 22.98 kg/m    General: Awake, responsive to voice, in no acute distress  HEENT: Oral mucosa moist and non-erythematous, PERRLA, EOM intact  CV: Regular rate and rhytm  Resp: " "Non-labored on room air  Abd: Soft, non-tender, non-distended  Neuro: Awake, alert, intermittently responds to voice and this writer's commands.    Primary Care Physician   Bharath Buchanan    Discharge Orders      Medication Therapy Management Referral      General info for SNF    Length of Stay Estimate: Long Term Care  Condition at Discharge: Stable  Level of care:skilled   Rehabilitation Potential: Fair  Admission H&P remains valid and up-to-date: Yes  Recent Chemotherapy: N/A  Use Nursing Home Standing Orders: Yes     Mantoux instructions    Give two-step Mantoux (PPD) Per Facility Policy Yes     Activity - Up with assistive device     Reason for your hospital stay    Sanjay was admitted for difficulty breathing (specifically cough and congestion) possibly due to a pneumonia (aspiration or community/HCAP) as well as COPD.  He was treated and following completion of antibiotics, a care conference was completed with Sanjay's family during which he was transitioned to comfort cares and hospice.  He will discharge to [facility] with Hospice enrollment.     Additional Discharge Instructions    Please note that any medications listed as \"Change\" are reflecting added text to our system and are incorrectly marked as a change.    Sanjay must be given all scheduled medications on time, including waking him if necessary to administer.    All tablets must be given crushed with yogurt or pudding followed by thickened juice.     Physical Therapy Adult Consult    Evaluate and treat as clinically indicated.    Reason:  Advanced dementia, deconditioned     Fall precautions     Advance Diet as Tolerated    Follow this diet upon discharge: Dysphagia Diet Level 1 Pureed Nectar Thickened Liquids (pre-thickened or w/ instant food thickener)  All non-liquid medications are to be given crushed in pudding or yogurt.  Ensure pt is fully upright and alert for all PO, alternating bites/sips.     Per her discretion, Danuta may give Sanjay any " food or drink the         Significant Results and Procedures      Most Recent 3 CBC's:  Recent Labs   Lab Test 11/27/19  0733 11/24/19  0701 11/22/19  0609 11/21/19  0711 11/20/19  0602   WBC  --   --  5.0 6.1 6.9   HGB  --   --  12.7* 12.8* 12.1*   MCV  --   --  96 95 96    241 232 196 194     Most Recent 3 BMP's:  Recent Labs   Lab Test 11/27/19  0733 11/26/19 2040 11/26/19  0732    144 143   POTASSIUM 3.6 3.5 3.4   CHLORIDE 111* 111* 111*   CO2 29 28 26   BUN 17 20 19   CR 1.00 0.97 0.89   ANIONGAP 3 5 6   JENNIE 8.4* 8.2* 8.4*   * 108* 116*     Most Recent 2 LFT's:  Recent Labs   Lab Test 11/18/19 2011 05/01/19  1220   AST 11 14   ALT 9 12   ALKPHOS 111 126   BILITOTAL 1.0 0.6       Discharge Medications   Current Discharge Medication List      START taking these medications    Details   acetaminophen (TYLENOL) 160 MG/5ML liquid Take 10.16 mLs (325 mg) by mouth every 6 hours as needed for mild pain or fever    Associated Diagnoses: 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 carbidopa-levodopa (PARCOPA)  MG ODT Take 2 tablets by mouth 2 times daily At 6 AM and 12 PM    Associated Diagnoses: 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)      famotidine (PEPCID) 40 MG/5ML suspension Take 2.5 mLs (20 mg) by mouth daily At 7:30 PM    Associated Diagnoses: Cough      LORazepam (ATIVAN) 0.5 MG tablet Take 0.5 tablets (0.25 mg) by mouth every 3 hours as needed for anxiety (dyspnea)    Associated Diagnoses: Dementia due to Parkinson's disease  without behavioral disturbance (H)      morphine 10 MG/5ML solution Take 1.25 mLs (2.5 mg) by mouth every 2 hours as needed for moderate to severe pain (or dyspnea) Give acetaminophen first and wait at least 30 minutes before giving morphine  Refills: 0    Associated Diagnoses: 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)      ondansetron (ZOFRAN-ODT) 4 MG ODT tab Take 1 tablet (4 mg) by mouth every 6 hours as needed for nausea or vomiting    Associated Diagnoses: 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)      venlafaxine (EFFEXOR) 37.5 MG tablet Take 1 tablet (37.5 mg) by mouth 3 times daily (with meals) At 8 AM, 12 PM, and 6 PM    Associated Diagnoses: 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)       !! - Potential duplicate medications found. Please discuss with provider.      CONTINUE these medications which have CHANGED    Details   bacitracin 500 UNIT/GM OINT Apply topically daily    Associated Diagnoses: Rosacea      bacitracin 500 UNIT/GM ophthalmic ointment Apply to affected area, crusting at corner of eyelids once daily as needed  Qty: 1 g, Refills: 3    Associated Diagnoses: Blepharitis of lower eyelids of both eyes, unspecified type      !! 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 donepezil (ARICEPT) 5 MG tablet Take 1 tablet (5 mg) by mouth every morning At 8 AM  Qty: 30 tablet, Refills: 11    Associated Diagnoses: 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)      ipratropium - albuterol 0.5 mg/2.5 mg/3 mL (DUONEB) 0.5-2.5 (3) MG/3ML neb solution Take 1 vial (3 mLs) by nebulization 4 times daily To be given at 8:30AM, 12:00 PM, 3:30 PM, and 7:00 PM    Associated Diagnoses: COPD exacerbation (H)      mirtazapine (REMERON) 15 MG  tablet Take 1.5 tablets (22.5 mg) by mouth daily Take 1.5 tablets (22.5mg) by mouth at 7:30 PM.    Associated Diagnoses: 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)       !! - Potential duplicate medications found. Please discuss with provider.      CONTINUE these medications which have NOT CHANGED    Details   hydrocortisone (WESTCORT) 0.2 % external cream Apply topically as directed weekly.      order for DME Equipment being ordered: Nebulizer with mask and tubing    For delivery please call Danuta [phone]  Qty: 1 Device, Refills: 0    Associated Diagnoses: Other emphysema (H); Wheezing      spacer (OPTICHAMBER GERRI) holding chamber Use with inhaler  Qty: 1 each, Refills: 0    Comments: Fill with spacer available per insurance/pharmacy         STOP taking these medications       acetaminophen (TYLENOL) 325 MG tablet Comments:   Reason for Stopping:         amLODIPine (NORVASC) 10 MG tablet Comments:   Reason for Stopping:         benzonatate (TESSALON) 100 MG capsule Comments:   Reason for Stopping:         carbidopa-levodopa (SINEMET CR)  MG per CR tablet Comments:   Reason for Stopping:         carbidopa-levodopa (SINEMET)  MG per tablet Comments:   Reason for Stopping:         cholecalciferol (VITAMIN D) 1000 UNIT tablet Comments:   Reason for Stopping:         famotidine (PEPCID) 20 MG tablet Comments:   Reason for Stopping:         fluticasone (FLOVENT HFA) 110 MCG/ACT inhaler Comments:   Reason for Stopping:         guaiFENesin (ROBITUSSIN) 20 mg/mL SOLN solution Comments:   Reason for Stopping:         lactobacillus rhamnosus, GG, (CULTURELL) capsule Comments:   Reason for Stopping:         methylcellulose (CITRUCEL) 500 MG TABS tablet Comments:   Reason for Stopping:         polyethyl glycol-propyl glycol 0.4-0.3 % GEL Comments:   Reason for Stopping:         venlafaxine (EFFEXOR-XR) 37.5 MG 24 hr capsule Comments:   Reason for Stopping:         venlafaxine  (EFFEXOR-XR) 75 MG 24 hr capsule Comments:   Reason for Stopping:             Allergies   Allergies   Allergen Reactions     Azithromycin      Erythromycin Other (See Comments)     Pathological fractures     Levaquin [Levofloxacin]      Fracture prevention     Seasonal Allergies

## 2019-11-26 NOTE — PLAN OF CARE
"BP (!) 149/83 (BP Location: Left arm)   Pulse 66   Temp 97.9  F (36.6  C) (Axillary)   Resp 22   Ht 1.88 m (6' 2\")   Wt 81.2 kg (179 lb 0.2 oz)   SpO2 96%   BMI 22.98 kg/m      Neuro: eyes open spontaneosly, incomprehensible speech, dx of parkinsons, wife at bedside  Cardiac: WDL  Respiratory: WDL, 94% RA, productive cough - oral suctin at bedtime  GI/: incontinent of urien x2, jacklyn-care provided, 1 small BM - loose  Skin: mepilex on coccyx,   Pain: no non-verbal signs of pain  LDA:  L PIV running D5 @ 100mL/hr  Activity: bedfast  Diet/Appetite: pureed, nectar thick liquids  Plan: continue POC, possible discharge tomorrow    "

## 2019-11-26 NOTE — PROGRESS NOTES
[facility]  Palliative Care Social Work Note:    Patient Info:  Sanjay Cano is a 74 year old male with Parkinson's, dementia, & COPD. Admitted due to cough and encephalopathy.     Brief summary of visit: PCSW was asked to meet with Sanjay and Danuta for support & coping. Met with Danuta outside Sanjay's room. She reports that he gets upset when he hears information about his care. Danuta explained how involved she is in Sanjay's care at the [facility] facility ([facility]) as she doesn't trust anyone to care for him well enough. She states that she can't be away and have a good time because then she worries about what Sanjay is having to go thought. She reports that he is still going to a day program and appears to enjoy his time there. She talked though their history of Sanjay's disease and how until about 2 years ago she was his primary caregiver.   Discussed that she knows that Sanjay is nearing the end of his life. She reports that at this time she and Sanjay's sister are thinking that when Sanjay gets this sick again they'll call in the hospice team, instead of coming to the hospital.       Date of Admission: 11/18/2019    Reason for consult: Goals of care  Patient and family support    Sources of information: Family member -wife  Staff -unit SW, palliative care team  Other -chart review    Recommendations & Plan:  -PCSW will continue to be available as needed for support.      These recommendations have been discussed with wife, Unit SW.    Symptoms & Concerns Addressed Today:  Caregiver -Danuta appears to have some caregiver fatigue, but isn't able to trust the medical providers at the [facility] facility to care for him well enough.   End of life -Danuta was able to say that she feels Sanjay is nearing the time for needing hospice  Grief & loss -Danuta's role in life is wrapped up in being Sanjay's caregiver. She isn't sure how she'll cope when he's gone.    Strengths Identified:    -Danuta wants  what's best for her . She is very involved in his care.     Relationships & Support:  Aspects of relationships and support assessed today:    Identified family members: wife, sister    Professional supports: LTC staff    Family coping: poor.    Bereavement Risk concerns: moderate- Danuta's identity is wrapped up in being Sanjay's caregiver    Coping, Mental Health & Adjustment to Illness:   Other -Danuta's adjustment to progression of Sanjay's illness    Goals, Decision Making & Advance Care Planning:   Prognosis, Goals, and/or Advance Care Planning were assessed today: Yes  If yes, brief summary of discussion: Danuta feels that when Sanjay gets this sick again they are likely to request hospice in the facility.   Preferred language: English  Patient's decision making preferences: unable to assess  I have concerns about the patient/family's health literacy today: No  Patient has a completed Health Care Directive: No.   Code status per chart review: DNR/DNI    Key Palliative Symptom Data:  We are not helping to manage these symptoms currently in this patient.      Clinical Social Work Interventions:   Assessment of palliative specific issues    Introduction of Palliative clinical social work interventions  Facilitation of processing of thoughts/feelings  Family communication facilitated  Grief counseling      JERRY Foster, [facility]  Palliative Care Clinical   Pager [phone]    [facility] Inpatient Team Consult Pager [phone] Mon-Fri 8-4:30  After hours Answering Service [phone]

## 2019-11-26 NOTE — PLAN OF CARE
"Activity: A2 for cares. Repositioned q 2-3hrs as tolerated.  Neuros: MARY ANN neuros, periods of alertness and lethargy.   Cardiac: HTN but within parameters. HR in the 60's  Respiratory: Stable on RA, LS coarse/diminished, oral suction at the bedside. Neb treatments scheduled 4x daily.  GI: +BS, +flatus, incontinent of small stool  : Incontinent of urine. Brief in place.  Diet: DD1, patient appears to be tolerating.  Skin: R 5th finger blister intact, mepilex placed on sacrum. No new deficits noted.  Lines: R PIV SL  Labs: Reviewed.  Pain/nausea:  No physiological indicators of pain, when pt is asked if he is in pain he reponds \"No\". No evidence of nausea.  Plan: Continue to monitor and follow POC.      "

## 2019-11-26 NOTE — PROGRESS NOTES
[facility], [place]    Progress Note - Arik 5 Service        Date of Admission:  11/18/2019    Assessment & Plan   Sanjay Cano is a 74 year old male admitted on 11/18/2019. He has a history of Parkinson's, dementia, & COPD and is admitted for productive cough, encephalopathy, and oxygen requirements concerning for healthcare associated pneumonia vs COPD exacerbation vs aspiration pneumonia    Changes today;  - Na 143 this morning, will discontinue D5W today and check BMP this evening and in AM. If patient's sodium high, we will discuss with wife new challenge of patient's inability to self regulate sodium level through oral fluid intake.  - Diet level pending SLP assessment and recommedations  - Appreciate palliative following with family. Plan is to get him back to LTC facility and family will work with PCP on avoiding further trips to ED if possible. This may require enrolling in Hospice care in the future.   - Discharge planning, tentative 11/27 to prior nursing home. --------------------------------------------------------------------------------------------------------    # Respiratory distress, improved  # Concern for health care associated pneumonia  # High risk for aspiration  Presented from his nursing home with 2-week history of productive cough and increased respiratory effort in the ED requiring BiPAP, concern for respiratory infection: Healthcare associated pneumonia versus aspiration pneumonia given his history of Parkinson's, dementia, and prior swallowing concerns on dysphagia diet versus COPD exacerbation.  Noted to have negative procal, CXR with linear left basilar opacities may represent atelectasis versus  Infection. He did not have a witnessed aspiration event. Per wife his coughing is not associated with eating. On exam at admission with laboured breathing, bilateral wheezing and crackles. Was managed with IV antibiotics for suspected hospital acquired  infection vs aspiration, duonebs with improvement in respiratory status.  -continue PTA flovent BID  -continue duonebs 4x/day and albuterol nebs PRN  -S/p vancomycin/zoysn.  - IV antibiotics discontinued 11/24/2019.     ---- Family is clear that the patient should not have placement of NG or other feeding tube.     # Hypernatremia, fluctuating  # Hypokalemia, resolved  Na is stable last 2 days. This morning Na 143 from 147 yesterday post D5W infusion. BMP this evening and in AM.   Hypokalemia -  replete per protocol    # Toxic metabolic encephalopathy, improved  Likely due to underlying respiratory infection. CT head at admission with no stroke, hemorrhage or mass. Encephalopathy may also contributed to by inadequate sinemet treatment at this time. He was initially unable to swallow and would have benefited from NGT placement to try and optimise sinemet dosing that would  improve his swallowing/mental status. Mental status improving slowly and now able to swallow thickened liquids  - Correct electrolytes as above  - Delirium precautions    # Dementia  # Parkinson's  - Continue PTA [id number] (dosed 6am, 9am, 12pm, 3pm, 6pm) and donepezil 5mg qAM, 10mg qHS     # GERD  -Continue PTA famotidine 20mg qAM     #Mood  -Continue PTA venlafaxine 37.5mg qAM, 75mg at bedtime- changed to 37.5 mg TID with meals 11/26/2019  -Continue mirtazapine 22.5mg at bedtime, when able to safely     # HTN  -Continue PTA amlodipine 10mg at bedtime       Diet: Dysphagia Diet Level 1 Pureed Nectar Thickened Liquids (pre-thickened or use instant food thickener)    Fluids: no IVF  DVT Prophylaxis: Mechanical measures  Contreras Catheter: not present  Code Status: DNR/DNI      Disposition Plan   Expected discharge: 2 - 3 days, recommended to prior living arrangement once respiratory status has improved and baseline mental status.  Entered: Vidhi Singh MD 11/26/2019, 12:58 PM      _____________________________________________________________________    Interval History    STANLEY  Still has ongoing cough, sometimes noted after feeding.   Tolerating dysphagia diet ok, but needs very close supervision and ensure alertness prior to feeding  Appears to be more alert, but still non conversational, wife attributes this to not having his dentures in  Had soft bowel movement this AM  Off oxygen   Off IV fluids at present     Data reviewed today: I reviewed all medications, new labs and imaging results over the last 24 hours    Physical exam:  Vital Signs: Temp: 96.4  F (35.8  C) Temp src: Axillary BP: (!) 145/82 Pulse: 66 Heart Rate: 54 Resp: 18 SpO2: 94 % O2 Device: None (Room air)    Weight: 179 lbs .22 oz    Physical Exam:  General: appears more alert  HEENT: Oral mucosa moist and non-erythematous, PERRLA, EOM intact  CV: RRR, normal S1S2, no murmur, clicks, rubs  Resp: Clear to auscultation bilaterally, no wheezes, rhonchi  Abd: Soft, non-tender, BS+, no masses appreciated  Extremities: Radial and pedal pulses intact and symmetric, no pedal edema  Neuro: sparse speech, but responds to name.

## 2019-11-27 NOTE — PROGRESS NOTES
Brief Palliative care note      Discussed case today with SLP. Per SLP, Danuta has ongoing questions about who to call if he gets sick. And what would be appropriate treatment if he were to get a pneumonia.    I have reviewed with Danuta during my last visit, that she feels that she can trust her PCP the most and so would continue to direct questions to that PCP about when Sanjay gets sick the appropriate next steps.     In terms of treatment of aspiration pneumonias. If Danuta is wanting Sanjay to eat for comfort and accepting there is an aspiration risk, would not be in alignment with further treating of aspiration pneumonias and would favor a symptom focused approach. However if Danuta does have the wish for him to get further restorative treatments such as for pneumonia, then would discourage eating for comfort.    Discussed with primary team today. She has had these discussions with the primary team extensively.    -would recommend primary team continue to address and appreciate their excellent care  -we would be happy to see Sanjay in the palliative care clinic if Sanjay and Danuta feel that would be beneficial, mainly seems that goals of care discussions would be the highest need and those can also be ongoing with their trusted PCP    LUISA Anand CNS  Palliative Care Consult Team  Pager: [phone]

## 2019-11-27 NOTE — PLAN OF CARE
"Activity: A2 for cares. Repositioned q 2-3hrs as tolerated. PT/OT/SLP re-evaluated pt today.  Neuros: MARY ANN neuros, periods of alertness and lethargy.   Cardiac: Bradycardic, HR 50s-60s  Respiratory: Stable on RA, LS coarse/diminished, oral suction at the bedside. Neb treatments scheduled 4x daily.  GI: +BS, +flatus, no stool noted  : Incontinent of urine. Brief in place.  Diet: DD1, patient appears to be tolerating.  Skin: R 5th finger blister intact, mepilex placed on sacrum. No new deficits noted.  Lines: R PIV SL  Labs: Reviewed.  Pain/nausea:  No physiological indicators of pain, when pt is asked if he is in pain he reponds \"No\". No evidence of nausea.  New this shift: Pts wife placed dentures in pts mouth for SLP eval.  Plan: Continue to monitor and follow POC. Possible discharge tomorrow.  "

## 2019-11-27 NOTE — PROGRESS NOTES
Social Work Services Progress Note    Hospital Day: 10  Date of Initial Social Work Evaluation:  Not formally completed  Collaborated with:  Chart review, team rounds, Dr. Burkett, pt's wife Danuta, [facility] (Ph: [phone], F: [phone], SW [place] [phone])       Data:  Pt is 73 y/o male admitted to [facility] on 11/18/19 for productive cough, encephalopathy, and oxygen requirements concerning for healthcare associated pneumonia. Pt has history of Parkinson's, dementia, & COPD.     Pt is admitted from [facility] (Ph: [phone], F: [phone]) where he resides in LT. HOWIE received call from HOWIE at [facility] ([place] [phone]) who informed SW that Pt is on 18-day MA bedhold. [place] asked for SW to update her when Pt is ready for return.    Intervention:  HOWIE met with pt's wife Danuta along with Dr. Burkett in a conference room to discuss pt's discharge plan. Danuta presented as very flustered and in a hurry and discussed needing to go home and get some things for her own cares.   Danuta discussed having talked with her sister in law and having concerns that if pt is discharged he will end up right back in the hospital because he is at risk for aspiration. Dr. Burkett spoke to Danuta's concerns and noted that pt is at risk for aspiration at any point whether that be 2 hours after he leaves the hospital or 6 months after.   Dr. Burkett established that the plan is for PT/OT and speech to see pt again today (speech to assess pt with dentures in) and if pt's condition is the same Thursday we will plan for discharge back to [facility] Thursday if they are able to accept, otherwise Friday. Danuta expressed agreement with this plan.     Danuta discussed how she has had concerns in the past regarding administration of pt's meds and that she has contacted the [facility] about this and they have suggested making [facility] report which Danuta said she has done.     Pt's stretcher transport scheduled for today was cancelled  via call to Trang with [facility].     HOWIE was asked by Dr. Burkett to see if the pharmacy for pt's LTC is able to obtain oral dissolving tablets for pt's Parkinsons medications as pt's wife is very worried about this. HOWIE contacted [facility] and spoke with BILLY Fernandse and he reported he will contact the pharmacy and check about this. Cola later informed SW that the pharmarcy can get the  dose in a dissolvable solution but not the  dose. HOWIE informed Dr. Burkett of this.     HOWIE later spoke with HOWIE Mcmanus at [facility] regarding the option of pt returning Thursday and [place] discussed with the DON and facility recommends pt not return Thursday for pt and wife's sake and due to having only holiday staffing available.   [place] noted the following Friday admission contact: [phone], f. [phone].    HOWIE faxed update MAR and notes to [facility] for review.     Tentative stretcher transport arranged for Friday 11/29/19 via [facility] ([phone]) for 1:30pm.     Assessment:  See bedside RN, PT/OT, medical team notes    Plan:    Anticipated Disposition:  Facility:  Return to LTC at [facility]    Barriers to d/c plan:  Medical stability    Follow Up:  HOWIE BARAJAS will continue to follow    JERRY Day, [address]   [phone] (pager) [phone]  11/27/2019

## 2019-11-27 NOTE — PLAN OF CARE
Vitals: Temp: 98.2  F (36.8  C) Temp src: Axillary BP: 116/68 Pulse: 57 Heart Rate: 59 Resp: 16 SpO2: 91 % O2 Device: None (Room air)      Time: 0402-2917  Reason for admission: Productive cough, encephalopathy, and oxygen requirements concerning for healthcare associated pneumoniaHx of Parkinson's, dementia, & COPD.   Activity: A2 for cares. Repositioned q 2-3hrs as tolerated.   Pain: No physiological indicators of pain, no evidence of nausea.    Neuro: MARY ANN neuro's, pt is intermittently alert and lethargic, excessive drooling.    Cardiac: HTN, team aware.   Respiratory:  LS clear and diminished post neb treatment. Pt has frequent congested cough - non-productive.   GI/: Pt is incontinent of urine and bowel. +BS, +BM this shift.    Diet: DD1, patient appears to be tolerating, give meds crushed in nectar thickened water and spoon.   Lines:  R PIV SL.   Incisions/Drains/Skin: R 5th finger blister intact UTV as it is covered, mepilex on sacrum intact.   Lab:  Reviewed. Na 143  New changes this shift: No acute changes.     Continue to monitor and follow POC

## 2019-11-27 NOTE — PLAN OF CARE
PT 7B: Evaluation completed and treatment initiated.   Discharge Planner PT   Patient plan for discharge: Return to LTC   Current status: Dr. Burkett requesting therapy evaluations to help with family education on rehab potential and discharge needs. Pt presents with significant mobility impairments in setting of advanced Parkinson's Disease.  Requires maximal assist x1-2 people for bed mobility.  Needs at least minimal assist at all times to sit EOB.  Attempted to stand today, unable to clear hips from bed more than a few inches with maximal assist x2 people.  He has a very tight left sternocleidomastoid - recommend attentive pillow positioning by nursing staff to achieve cervical midline.  Clarified many questions for Danuta, she was very appreciative.  Recommend pt be lifted up to recliner chair daily onto a geomat cushion to promote airway clearance, promote skin integrity, increase OOB activity.   Barriers to return to prior living situation: Medical status  Recommendations for discharge: Return to LTC with in-house PT/OT  Rationale for recommendations:  Pt appears to be slightly below his baseline, per report of wife.  His rehab potential is fair due to many factors - advanced Parkinson's, pt's variable willingness to participate, cognition.  However, he would benefit from skilled PT/OT at his LTC facility to increase his seated balance and bed mobility, possibly transfer ability, to reduce caregiver burden and increase QOL.         Entered by: Rekha Holden 11/27/2019 2:34 PM

## 2019-11-27 NOTE — PROGRESS NOTES
"CLINICAL NUTRITION SERVICES - REASSESSMENT NOTE     Nutrition Prescription    RECOMMENDATIONS FOR MDs/PROVIDERS TO ORDER:  Given patient/family's nutrition POC to not place FT, would recommend continuing D5 IVF to provide minimal energy provisions and maintenance hydration.     Malnutrition Status:    Based on information available, pt meets criteria for at least severe malnutrition in context of chronic illness. Unable to complete NFPA given patient busy with other cares.     Recommendations already ordered by Registered Dietitian (RD):  Magic Cup (any) TID between meals   Thera-Vit-M or Certavite (15 ml daily)     Future/Additional Recommendations:  1. Continue to monitor patient/family's nutrition GOC and need for further nutrition interventions (e.g. EN).   2. Monitor tolerance to Magic Cup supplementation and need for changes pending patient's preference.      EVALUATION OF THE PROGRESS TOWARD GOALS   Diet: Dysphagia Level 1:  Pureed + Nectar Thickened Liquids   Prior Diet: NPO (11/18 - 11/22). Fulls (11/22).    Intake: 50 - 100% of 2 meals/day per RN flowsheet. Per [facility] review, ordering 2-3 meals/day via kitchen. Meal orders averaging between 200 - 2600 kcals and 3 - 90 g PRO.     Patient/wife busy with other cares upon visit attempt.      NEW FINDINGS   Weight: dry wt of 78.5 kg on 11/20. Wt fluctuating between 78.5 - 83.4 kg during LOS, suspect fluid related, at least in part given weight loss occurred in 5 days. No recent weight since 11/27.     Meds: Culturell (1 capsule daily), Remeron (22.5 mg daily) and Vitamin D3 (1000 units daily)     GI: per I/Os, patient stooling 1-3x/day over the past 3 days.   Per SLP eval on 11/25: \"Recommend dysphagia diet 1 and nectar-thick liquids with 1:1 feeding assist and supervision, with excellent oral cares 2-3x per day.\"     Misc: per MD note on 11/26: \"Family is clear that the patient should not have placement of NG or other feeding tube. Tolerating dysphagia " "diet ok, but needs very close supervision and ensure alertness prior to feeding.\"    Per palliative team note on 11/25: \"And also wife has indicated that she would not want to put him through a feed tube. Discussed that we also dont want to do things to him that would cause him to suffer more, and we also must acknowledge that by not doing things like feeding him artificially and if he is not eating then he will not likely live long as one of the risks.\"     MALNUTRITION  % Intake: </= 50% for >/= 5 days (severe)  % Weight Loss: > 75% in 3 months (severe) - overall trend  Subcutaneous Fat Loss: Unable to assess  Muscle Loss: Unable to assess  Fluid Accumulation/Edema: None noted  Malnutrition Diagnosis: Based on information available, pt meets criteria for at least severe malnutrition in context of chronic illness.     Previous Goals   Diet adv v nutrition support within 2-3 days.   Evaluation: Not met    Previous Nutrition Diagnosis  Inadequate oral intake related to dysphagia as evidenced by pt's wife's report of facility improperly preparing food that does not meet DD2 criteria and weight loss of 8% in ~3 months.   Evaluation: No change    CURRENT NUTRITION DIAGNOSIS  Inadequate oral intake related to swallowing difficulties and altered cognition as evidenced by patient consuming 50 - 100% of 2 meals/day and per [facility] review, meal orders averaging between 200 - 2600 kcals and 3 - 90 g PRO.    INTERVENTIONS  Implementation  Medical food supplement therapy - see above   Multivitamin/mineral supplement therapy - see above     Goals  Patient to consume % of nutritionally adequate meal trays TID, or the equivalent with supplements/snacks.    Monitoring/Evaluation  Progress toward goals will be monitored and evaluated per protocol.      Gladys Prabhakar RD, LD   5A [phone])/7B floor pager [phone]    "

## 2019-11-27 NOTE — PLAN OF CARE
Physical Therapy Discharge Summary    Reason for therapy discharge:    Discharged to long term care facility with in-house PT/OT    Progress towards therapy goal(s). See goals on Care Plan in [facility] electronic health record for goal details.  Goals partially met.  Barriers to achieving goals:   discharge from facility.    Therapy recommendation(s):    Continued therapy is recommended.  Rationale/Recommendations:  Pt appears to be slightly below his baseline, per report of wife.  His rehab potential is fair due to many factors - advanced Parkinson's, pt's variable willingness to participate, cognition.  However, he would benefit from skilled PT/OT at his LTC facility to increase his seated balance and bed mobility, possibly transfer ability, to reduce caregiver burden and increase QOL..

## 2019-11-27 NOTE — PROGRESS NOTES
11/27/19 1415   Quick Adds   Type of Visit Initial PT Evaluation   Living Environment   Lives With facility resident  ([facility])   Living Arrangements extended care facility   Home Accessibility no concerns   Living Environment Comment Wife reports the staff are not consistently competent and that she spends 40-50hrs a week there overseeing his cares.   Self-Care   Usual Activity Tolerance fair   Current Activity Tolerance poor   Equipment Currently Used at Home lift device;shower chair;wheelchair, manual;hospital bed   Activity/Exercise/Self-Care Comment In w/c for mobility but does not have capability to propel himself - is pushed by staff.  Wife reports the chair has tilt-in-space feature.  She reports it does not fit him right, he tends to lean over the arm rests.  He goes to adult care program within the facility 5x/wk for restorative therapy.  Had been standing in a standing frame prior to admission.     Functional Level Prior   Ambulation 4-->completely dependent   Transferring 2-->assistive person   Toileting 4-->completely dependent   Bathing 3-->assistive equipment and person   Communication 2-->difficulty speaking (not related to language barrier)   Swallowing 2-->difficulty swallowing liquids   Fall history within last six months no   Prior Functional Level Comment pt receives assist for all cares at the facility. Wife reports that the staff are hesitant to mobilize the pt beyond use of lift, however wife reports she helps him pivot transfer often with use of bedrails into w/c.  Wife is frustrated that the staff won't mobilize him more.  she reports he can typically scoot at the EOB independently.   General Information   Onset of Illness/Injury or Date of Surgery - Date 11/18/19   Referring Physician Vidhi Singh MD   Patient/Family Goals Statement wife wants pt to have therapy to return to his recent baseline.  she has many concerns.   Pertinent History of Current Problem  (include personal factors and/or comorbidities that impact the POC) Sanjay Cano is a 74 year old male admitted on 11/18/2019. He has a history of Parkinson's, dementia, & COPD and is admitted for productive cough, encephalopathy, and oxygen requirements concerning for healthcare associated pneumonia vs COPD exacerbation vs aspiration pneumonia   Precautions/Limitations fall precautions   Cognitive Status Examination   Orientation person;place   Level of Consciousness alert   Follows Commands and Answers Questions 75% of the time;unable to follow multi-step instructions;speech unintelligible  (soft voice, poor articulation)   Personal Safety and Judgment impaired;at risk behaviors demonstrated   Cognitive Comment Wife donned pt's dentures which improved speech a bit.  Pt is resistant to therapist facilitation at times.  he is on bed alarm   Pain Assessment   Patient Currently in Pain No   Integumentary/Edema   Integumentary/Edema Comments in an air mattress for pressure prevention.  skin is dry throughout.  R 5th finger is wrapped due to subacute soft tissue injury   Posture    Posture Kyphosis;Forward head position   Posture Comments neck is in left rotation at rest and even with overpressure cannot rotate to right past midline.   Range of Motion (ROM)   ROM Comment difficult to assess 2/2 pt resisting movement and not following commands.  neck limited as above.   Strength   Strength Comments difficult to assess 2/2 pt not following commands well and resisting movement.  antigravity all extremities.   Bed Mobility   Bed Mobility Comments max A x1-2 roll, sit<>supine, scoot   Transfer Skills   Transfer Comments unsuccessful stand with maximal assist x2.  Pt also resisting movement   Gait   Gait Comments unable to ambulate   Balance   Balance Comments impaired in sitting, needs assist at all times.  poor righting reactions.   Coordination   Coordination Comments appears to have dyskinesias at times in trunk and  "lower extremities   Muscle Tone   Muscle Tone Comments difficult to discern rigidity from pt resisting testing.     General Therapy Interventions   Planned Therapy Interventions risk factor education;balance training;strengthening;bed mobility training;transfer training;progressive activity/exercise;home program guidelines   Clinical Impression   Criteria for Skilled Therapeutic Intervention yes, treatment indicated   PT Diagnosis impaired functional mobility   Influenced by the following impairments strength, ROM, posture, cognition, speech, airway clearance, coordination   Functional limitations due to impairments bed mobility, transfers, ADL   Clinical Presentation Stable/Uncomplicated   Clinical Presentation Rationale clinical judgment   Clinical Decision Making (Complexity) Low complexity   Therapy Frequency 1x/week   Predicted Duration of Therapy Intervention (days/wks) 1 visit   Anticipated Discharge Disposition Long Term Care Facility  (with in-house PT/OT)   Risk & Benefits of therapy have been explained Yes   Patient, Family & other staff in agreement with plan of care Yes   Clinical Impression Comments Pt appears to be slightly below his baseline, per report of wife.  His rehab potential is fair due to many factors - advanced Parkinson's, pt's variable willingness to participate, cognition.  However, he would benefit from skilled PT/OT at his LTC facility to increase his seated balance and bed mobility, possibly transfer ability, to reduce caregiver burden and increase QOL.   [facility] AM-PAC  \"6 Clicks\" V.2 Basic Mobility Inpatient Short Form   1. Turning from your back to your side while in a flat bed without using bedrails? 2 - A Lot   2. Moving from lying on your back to sitting on the side of a flat bed without using bedrails? 2 - A Lot   3. Moving to and from a bed to a chair (including a wheelchair)? 1 - Total   4. Standing up from a chair using your arms (e.g., wheelchair, or bedside " chair)? 1 - Total   5. To walk in hospital room? 1 - Total   6. Climbing 3-5 steps with a railing? 1 - Total   Basic Mobility Raw Score (Score out of 24.Lower scores equate to lower levels of function) 8   Total Evaluation Time   Total Evaluation Time (Minutes) 8

## 2019-11-27 NOTE — PLAN OF CARE
"Shift: 2300 - 0700  VS: BP (!) 141/79 (BP Location: Right arm)   Pulse 57   Temp 97  F (36.1  C) (Axillary)   Resp 16   Ht 1.88 m (6' 2\")   Wt 81.2 kg (179 lb 0.2 oz)   SpO2 92%   BMI 22.98 kg/m    Neuro: unable to assess neuro status, pt opens eyes to gentle touch, speech garbled/slurred, L facial droop, able to move BUE with force, attempted to hit RN this shift during repositioning  Cardiac: hypertensive, provider aware, SBP parameters > 180  Resp: lung sounds course/congested, SQUIRES observed, weak cough, sating well on RA, oral suctioning at bedside  GI: passing gas, bowel sounds active, no BM this shift  : voiding spontaneously, urinary incontinence  Skin: R 5th finger blister dressing CDI, pt withdraws from discomfort, coccyx intact, pulsate mattress  Pain/Nausea: no physiological indicators of pain; no evidence of nausea  LDA: R PIV SL  Diet: DD1 with nectar thick liquids  Mobility: mechanical lift with 2 assist, repositioned in bed q2-3h as tolerated  Labs: reviewed, Na 143  Plan: possible discharge to LTC facility pending AM labs, ride scheduled for 1330, continue plan of care   "

## 2019-11-27 NOTE — PROGRESS NOTES
[facility], [place]    Progress Note - Marashish 5 Service        Date of Admission:  11/18/2019    Assessment & Plan   Sanjay Cano is a 74 year old male admitted on 11/18/2019. He has a history of Parkinson's, dementia, & COPD and is admitted for productive cough, encephalopathy, and oxygen requirements concerning for healthcare associated pneumonia vs COPD exacerbation vs aspiration pneumonia    Changes today;  - No new changes to care plan  - Discharge planning to prior living arrangement at [facility], likely 11/28/2019  - Wife requesting additional follow up and inputs from PT/OT/SLP prior to discharge  --------------------------------------------------------------------------------------------------------    # Respiratory distress, improved  # Concern for health care associated pneumonia  # High risk for aspiration  Presented from his nursing home with 2-week history of productive cough and increased respiratory effort in the ED requiring BiPAP, concern for respiratory infection: Healthcare associated pneumonia versus aspiration pneumonia given his history of Parkinson's, dementia, and prior swallowing concerns on dysphagia diet versus COPD exacerbation.  Noted to have negative procal, CXR with linear left basilar opacities may represent atelectasis versus  Infection. He did not have a witnessed aspiration event. Per wife his coughing is not associated with eating. On exam at admission with laboured breathing, bilateral wheezing and crackles. Was managed with IV antibiotics for suspected hospital acquired infection vs aspiration, duonebs with improvement in respiratory status.  -continue PTA flovent BID  -continue duonebs 4x/day and albuterol nebs PRN  - S/p vancomycin/zoysn.  - IV antibiotics discontinued 11/24/2019 after completion of 7 days of IV therapy.    ---- Family is clear that the patient should not have placement of NG or other feeding tube.     # Hypernatremia, resolved  #  Hypokalemia, resolved  - monitor    # Toxic metabolic encephalopathy, resolved  Likely due to underlying respiratory infection. CT head at admission with no stroke, hemorrhage or mass. Encephalopathy may also contributed to by inadequate sinemet treatment at this time. He was initially unable to swallow and would have benefited from NGT placement to try and optimise sinemet dosing that would  improve his swallowing/mental status. Mental status improving slowly and now able to swallow thickened liquids  - Delirium precautions    # Dementia  # Parkinson's  - Continue PTA [id number] (dosed 6am, 9am, 12pm, 3pm, 6pm) and donepezil 5mg qAM, 10mg qHS     # GERD  -Continue PTA famotidine 20mg qAM     #Mood  -Continue PTA venlafaxine 37.5mg qAM, 75mg at bedtime- changed to 37.5 mg TID with meals 11/26/2019  -Continue mirtazapine 22.5mg at bedtime, when able to safely     # HTN  -Continue PTA amlodipine 10mg at bedtime       Diet: Dysphagia Diet Level 1 Pureed Nectar Thickened Liquids (pre-thickened or use instant food thickener)  Snacks/Supplements Adult: Magic Cup; Between Meals    Fluids: no IVF  DVT Prophylaxis: Mechanical measures  Contreras Catheter: not present  Code Status: DNR/DNI      Disposition Plan   Expected discharge: 2 - 3 days, recommended to prior living arrangement once respiratory status has improved and baseline mental status.  Entered: Vidhi Singh MD 11/27/2019, 4:45 PM     _____________________________________________________________________    Interval History    NAPRERNAO  Still has ongoing mild cough, sometimes noted after feeding.   Tolerating dysphagia diet ok, but needs very close supervision and ensure alertness prior to feeding  Appears to be more alert, but still non conversational, wife attributes this to not having his dentures in  Had soft bowel movement this AM  Off oxygen   Off IV fluids at present     Data reviewed today: I reviewed all medications, new labs and imaging  results over the last 24 hours    Physical exam:  Vital Signs: Temp: 98  F (36.7  C) Temp src: Axillary BP: (!) 144/70 Pulse: 55 Heart Rate: 58 Resp: 16 SpO2: 95 % O2 Device: None (Room air)    Weight: 179 lbs .22 oz    Physical Exam:  General: appears more alert  HEENT: Oral mucosa moist and non-erythematous, PERRLA, EOM intact  CV: RRR, normal S1S2, no murmur, clicks, rubs  Resp: Clear to auscultation bilaterally, no wheezes, rhonchi  Abd: Soft, non-tender, BS+, no masses appreciated  Extremities: Radial and pedal pulses intact and symmetric, no pedal edema  Neuro: sparse speech, but responds to name.

## 2019-11-27 NOTE — PLAN OF CARE
Discharge Planner OT   Patient plan for discharge: back to LTC facility  Current status: Pt at dependent baseline.  Barriers to return to prior living situation: none  Recommendations for discharge: back to LTC  Rationale for recommendations: Defer OT eval as pt is planned to return to LTC at 1330 today.        Entered by: Chuy Duggan 11/27/2019 12:36 PM

## 2019-11-27 NOTE — PLAN OF CARE
Discharge Planner SLP   Patient plan for discharge: [facility]  Current status: SLP met with pt's wife re: swallow function. Pt's wife continues to have significant concerns re: appropriate diet for pt. Education provided re: dysphagia in end of life in patients with advanced dementia. Extensive education provided re: aspiration risk with any consistency (including secretions) given pt's fluctuating dysphagia and MADELYN. Discussed how some patients, akosua in the pt's condition, express that they want to be comfortable despite the risk for aspiration. Pt's wife asked relevant questions re: would pt receive tx for a future aspiration PNA and who to contact if he were to become sick again; deferred to palliative care.     Recommend dysphagia 1 (puree) diet/nectar-thick liquids with 1:1 assistance for slow hand-feeding. Recommend pills be crushed and administered in puree consistency or small amount of thickened liquid. Ensure pt is fully upright and alert for all PO. Complete oral cares 3x/day. Pt remains an appropriate candidate for comfort eating despite aspiration risk given hx of dysphagia and advanced dementia. No further SLP services indicated at this time.     Barriers to return to prior living situation: Dementia, dysphagia  Recommendations for discharge: Defer to MD  Rationale for recommendations: No additional SLP services indicated at this time       Entered by: Zo You 11/27/2019 4:06 PM     Speech Language Therapy Discharge Summary    Reason for therapy discharge:    No further expectations of functional progress.    Progress towards therapy goal(s). [facility] for goal details.  Goals partially met.  Barriers to achieving goals:   no further expectation of progress, pt at new baseline.    Therapy recommendation(s):    No further therapy is recommended. See above.

## 2019-11-28 NOTE — PLAN OF CARE
Vitals: Temp: 97.9  F (36.6  C) Temp src: Axillary BP: 113/65 Pulse: 55 Heart Rate: 65 Resp: 16 SpO2: 94 % O2 Device: None (Room air)      Time: 7058-3120  Reason for admission: Productive cough, encephalopathy, and oxygen requirements concerning for healthcare associated pneumonia. Hx of Parkinson's, dementia, & COPD.   Activity: A2 for cares. Repositioned q 2-3hrs as tolerated.   Pain: No physiological indicators of pain, no evidence of nausea.    Neuro: MARY ANN neuro's, pt is intermittently alert and lethargic, excessive drooling.    Cardiac: HTN, team aware.   Respiratory:  LS coarse and diminished. Pt has frequent congested cough - non-productive.   GI/: Pt is incontinent of urine and bowel. +BS, No BM this shift.    Diet: DD1, patient appears to be tolerating, give meds crushed in nectar thickened water and spoon.   Lines:  R PIV SL.   Incisions/Drains/Skin: R 5th finger blister intact UTV as it is covered, mepilex on sacrum intact.   Lab:  Reviewed. Na 143  New changes this shift: No acute changes. Lengthly conversation with pt wife about reluctance to go back to LTC facility tomorrow. Listened empathetically listened and comforted wife.      Continue to monitor and follow POC

## 2019-11-28 NOTE — PLAN OF CARE
Vitals:    11/28/19 0100 11/28/19 0240 11/28/19 0847 11/28/19 1247   BP: (!) 157/80 (!) 161/72 131/76    BP Location: Right leg Right arm Right arm    Pulse:       Resp: 16 20 20    Temp: 97.6  F (36.4  C) 97.2  F (36.2  C) 95  F (35  C)    TempSrc: Axillary Oral Axillary    SpO2: 93% 95% 94% 95%   Weight:       Height:       AVSS.    Neuro: Alert and oriented x 4.     GI/: Incontinent.    Diet: Dysphagia diet level-1 with nectar thick liquid diet.    Incisions/Drains: None.    IV Access: No PIV.    Labs: none.    Activity: HOB elevated.    Pain: No c/o pain.     New changes this shift: None.    Plan: Discharge to LTC.

## 2019-11-28 NOTE — PROGRESS NOTES
[facility], [place]    Progress Note - Marashish Grayson Service        Date of Admission:  11/18/2019    Assessment & Plan   Sanjay Cano is a 74 year old male admitted on 11/18/2019. He has a history of Parkinson's, dementia, & COPD and is admitted for productive cough, encephalopathy, and oxygen requirements concerning for healthcare associated pneumonia vs COPD exacerbation vs aspiration pneumonia    Changes today;  - No new changes to care plan  - Discharge planning to prior living arrangement at [facility], likely 11/29/2019  --------------------------------------------------------------------------------------------------------    # Respiratory distress, improved  # Concern for health care associated pneumonia  # High risk for aspiration  Presented from his nursing home with 2-week history of productive cough and increased respiratory effort in the ED requiring BiPAP, concern for respiratory infection: Healthcare associated pneumonia versus aspiration pneumonia given his history of Parkinson's, dementia, and prior swallowing concerns on dysphagia diet versus COPD exacerbation.  Noted to have negative procal, CXR with linear left basilar opacities may represent atelectasis versus  Infection. He did not have a witnessed aspiration event. Per wife his coughing is not associated with eating. On exam at admission with laboured breathing, bilateral wheezing and crackles. Was managed with IV antibiotics for suspected hospital acquired infection vs aspiration, duonebs with improvement in respiratory status.  -continue PTA flovent BID  -continue duonebs 4x/day and albuterol nebs PRN  - S/p vancomycin/zoysn.  - IV antibiotics discontinued 11/24/2019 after completion of 7 days of IV therapy.    ---- Family is clear that the patient should not have placement of NG or other feeding tube.     # Hypernatremia, resolved  # Hypokalemia, resolved  - monitor    # Toxic metabolic encephalopathy, resolved  Likely  due to underlying respiratory infection. CT head at admission with no stroke, hemorrhage or mass. Encephalopathy may also contributed to by inadequate sinemet treatment at this time. He was initially unable to swallow and would have benefited from NGT placement to try and optimise sinemet dosing that would  improve his swallowing/mental status. Mental status improving slowly and now able to swallow thickened liquids  - Delirium precautions    # Dementia  # Parkinson's  - Continue PTA [id number] (dosed 6am, 9am, 12pm, 3pm, 6pm) and donepezil 5mg qAM, 10mg qHS     # GERD  -Continue PTA famotidine 20mg qAM     #Mood  -Continue PTA venlafaxine 37.5mg qAM, 75mg at bedtime- changed to 37.5 mg TID with meals 11/26/2019  -Continue mirtazapine 22.5mg at bedtime, when able to safely     # HTN  -Continue PTA amlodipine 10mg at bedtime       Diet: Dysphagia Diet Level 1 Pureed Nectar Thickened Liquids (pre-thickened or use instant food thickener)  Snacks/Supplements Adult: Magic Cup; Between Meals    Fluids: no IVF  DVT Prophylaxis: Mechanical measures  Contreras Catheter: not present  Code Status: DNR/DNI      Disposition Plan   Expected discharge: 2 - 3 days, recommended to prior living arrangement once respiratory status has improved and baseline mental status.  Entered: Vidhi Singh MD 11/28/2019, 7:56 AM     _____________________________________________________________________    Interval History    STANLEY  Still has ongoing mild cough   Tolerating dysphagia diet ok, but needs very close supervision and ensure alertness prior to feeding  Non conversational, so cant review systems  Vitals stable overnight  Passing good amounts of clear urine  Off oxygen, breathing quietly  Off IV fluids at present     Data reviewed today: I reviewed all medications, new labs and imaging results over the last 24 hours    Physical exam:  Vital Signs: Temp: 97.2  F (36.2  C) Temp src: Oral BP: (!) 161/72 Pulse: 55 Heart  Rate: 60 Resp: 20 SpO2: 95 % O2 Device: None (Room air)    Weight: 179 lbs .22 oz    Physical Exam:  General: appears more alert  HEENT: Oral mucosa moist and non-erythematous, PERRLA, EOM intact  CV: RRR, normal S1S2, no murmur, clicks, rubs  Resp: Clear to auscultation bilaterally, no wheezes, rhonchi  Abd: Soft, non-tender, BS+, no masses appreciated  Extremities: Radial and pedal pulses intact and symmetric, no pedal edema  Neuro: sparse speech, but responds to name.

## 2019-11-28 NOTE — PLAN OF CARE
"Shift: 2300 - 0700  VS: BP (!) 161/72 (BP Location: Right arm)   Pulse 55   Temp 97.2  F (36.2  C) (Oral)   Resp 20   Ht 1.88 m (6' 2\")   Wt 81.2 kg (179 lb 0.2 oz)   SpO2 95%   BMI 22.98 kg/m    Neuro: unable to assess neuro status, pt not opening eyes on command, pt responsive when mouth moistened with oral swabs, L facial droop, able to move BUE but weaker than previous  Cardiac: hypertensive, provider aware, SBP parameters > 180  Resp: lung sounds course/congested, SQUIRES observed, weak cough, excessive secreteions, sating well on RA, oral suctioning at bedside  GI: passing gas, bowel sounds active, no BM this shift  : voiding spontaneously, urinary incontinence  Skin: R 5th finger blister dressing CDI, pt withdraws from pain, coccyx intact, pulsate mattress  Pain/Nausea: no physiological indicators of pain; no evidence of nausea  LDA: loss of IV access this shift, provider indicated no replacement PIV needed at this time  Diet: DD1 with nectar thick liquids  Mobility: mechanical lift with 2 assist, repositioned in bed q2-3h as tolerated  Labs: reviewed, Na 143  Plan: possible discharge to LTC facility Friday, ride scheduled for 1330, continue plan of care   "

## 2019-11-29 NOTE — PROGRESS NOTES
Social Work Services Progress Note    Hospital Day: 12  Date of Initial Social Work Evaluation:  Not formally completed  Collaborated with:  Chart review, team rounds, Dr. Burkett, pt, pt's wife Danuta, [facility] (Beatrice [phone])    Data:  Pt is 73 y/o male admitted to [facility] on 11/18/19 for productive cough, encephalopathy, and oxygen requirements concerning for healthcare associated pneumonia. Pt has history of Parkinson's, dementia, & COPD.     Pt is admitted from [facility] (Ph: [phone], F: [phone]) where he resides in [place]. HOWIE received call from HOWIE at [facility] ([place] [phone]) who informed SW that Pt is on 18-day MA bedhold. [place] asked for SW to update her when Pt is ready for return.    Received update from Dr. Burkett that she met with pt's wife Danuta and extended family and they have opted to sign pt onto hospice services and it is in pt's best interest for this to be arranged/established prior to pt leaving the hospital.     Per Dr. Burkett pt is not his own decision maker.     Intervention:  Stretcher transport for today was cancelled via call to [facility] ([phone]).     HOWIE met with pt briefly in pt's room along with pt's wife Danuta. Pt was sitting somewhat upright in bed and was awake holding Danuta's hand as she sat next to pt. HOWIE attempted to engage in conversation with pt but pt's speech was very garbled and difficult to understand. HOWIE explained to pt that HOWIE is coordinating with Danuta regarding pt's return to [facility]. Danuta reported that pt is not aware of the conversation had with Dr. Burkett this morning and the change in plans so we should not discuss it in front of pt.     HOWIE and Danuta met in a sunroom area to check in and discuss discharge planning. Danuta discussed wanting more support in place when pt returns to [facility] and discussed how pt can't be left and forgotten about at the facility. Danuta again discussed medication errors that have  "happened at the LTC, pt having fallen at the LTC, and pt's having what Danuta describes as a broken hand in the past. LTC HOWIE Mcmanus and BILLY Fernandes have reported that facility has made Vulnerable Adult and Office of Health Facility Complaints reports about these things. Danuta also discussed how she has had meeting with the [facility] about this and they have recommended that Danuta make a Vulnerable Adult report which Danuta reports she did.   Danuta discussed wanting to pursue hospice after meeting with family and Dr. Burkett earlier today and reported that one of the family members told her today that pt has previously said to \"pull the plug\" and that he can't live like this anymore. Danuta acknowledges that family's onions, pt's previous statements, MD's opinions and her own thoughts all line up now and she would like to pursue hospice services for pt. Danuta stated that Dr. Burkett told her she feels pt could pass away at any time. Danuta reported she feels she has a good understanding of the hospice philosophy but this was reviewed with Danuta. Danuta also noted having previously met with  Hospice about a year ago to get information.   HOWIE reviewed that pt's MA would continue to pay towards pt's room and board at LTC and that pt's Medicare has a hospice benefit which would cover all hospice services and any equipment pt would need.   Pt/family was given the Medicare Compare list for Hospice, with associated star ratings to assist with choice for referrals/discharge planning N/A.   Danuta reported wanting to make some phone calls before selecting a hospice agency.   Discharge transportation was discussed along with the potential out of pocket expense.     HOWIE later spoke with Danuta and she has questions such as if pt could continue to work with previous MDs while on hospice and Danuta previously asked if pt would be able to continue going to day programming or go home with Danuta for a bit. HOWIE explained that an " informational meeting with a hospice agency would be most helpful in answering these questions and any other specific questions Danuta may have. Danuta would like an informational meeting with  Hospice.     HOWIE contacted liaison Beatrice with  Hospice- initiated referral and requested informational meeting. Beatrice found pt in [facility] and reported she will have pt's information reviewed and will call back as to when a meeting can happen here. HOWIE explained need for hospice to be established/in place prior to pt discharging and provided some background information regarding pt and pt's wife and questions that wife Danuta has. Beatrice called back reporting that  Hospice nurse Flor will meet with pt and Danuta at the hospital Saturday 11/30/19 at 1pm. Beatrice noted Danuta having previously been given information about hospice and that at that time pt qualified based on aspiration pneumonia but pt needs to be re-reviewed for criteria again.     HOWIE contacted [facility] Nurse Manager, Dena, at [phone]- left vm reporting likely readiness for discharge this weekend and inquiring about ability to accept pt back over the weekend, waiting to hear back. Dena later called and left a message reporting that pt can be accepted back either Saturday or Sunday.     Weekend admissions contact information:  [phone] or [phone], f. [phone]    Assessment:  See bedside RN, PT/OT, medical team notes    Plan:    Anticipated Disposition:  Facility:  Return to LTC at [facility] with hospice- agency TBD    Barriers to d/c plan:  Coordination of new hospice    Follow Up:  HOWIE BARAJAS will continue to follow    JERRY Day, [facility]     [phone] (pager) [phone]  11/29/2019

## 2019-11-29 NOTE — CONSULTS
[facility] consult planned for Saturday 11/30/19 at 1pm with wife Danuta. Care coordinated with HOWIE Joe on 7B.    J[facility]ED [facility] RN  Office: [phone]

## 2019-11-29 NOTE — PROGRESS NOTES
[facility], [place]    Progress Note - Marashish Grayson Service        Date of Admission:  11/18/2019    Assessment & Plan   Sanjay Cano is a 74 year old male admitted on 11/18/2019. He has a history of Parkinson's, dementia, & COPD and is admitted for productive cough, encephalopathy, and oxygen requirements concerning for healthcare associated pneumonia vs COPD exacerbation vs aspiration pneumonia    Changes today;  - No new changes to care plan  - Discharge planning to prior living arrangement at LT  - Family care conference 11/29/2019   - Enrollment in hospice upon discharge  --------------------------------------------------------------------------------------------------------    # Respiratory distress, improved  # Concern for health care associated pneumonia  # High risk for aspiration  Presented from his nursing home with 2-week history of productive cough and increased respiratory effort in the ED requiring BiPAP, concern for respiratory infection: Healthcare associated pneumonia versus aspiration pneumonia given his history of Parkinson's, dementia, and prior swallowing concerns on dysphagia diet versus COPD exacerbation.  Noted to have negative procal, CXR with linear left basilar opacities may represent atelectasis versus infection. He did not have a witnessed aspiration event. Per wife his coughing is not associated with eating. On exam at admission with laboured breathing, bilateral wheezing and crackles. Was managed with IV antibiotics for suspected hospital acquired infection vs aspiration, duonebs with improvement in respiratory status.  -continue PTA flovent BID  -continue duonebs 4x/day and albuterol nebs PRN  - S/p vancomycin/zoysn.  - IV antibiotics discontinued 11/24/2019 after completion of 7 days of IV therapy.    ---- Family is clear that the patient should not have placement of NG or other feeding tube.     # Hypernatremia, resolved  # Hypokalemia, resolved  -  monitor    # Toxic metabolic encephalopathy, resolved  Likely due to underlying respiratory infection. CT head at admission with no stroke, hemorrhage or mass. Encephalopathy may also contributed to by inadequate sinemet treatment at this time. He was initially unable to swallow and would have benefited from NGT placement to try and optimise sinemet dosing that would  improve his swallowing/mental status. Mental status improving slowly and now able to swallow thickened liquids  - Delirium precautions    # Dementia  # Parkinson's  - Continue PTA [id number] (dosed 6am, 9am, 12pm, 3pm, 6pm) and donepezil 5mg qAM, 10mg qHS     # GERD  -Continue PTA famotidine 20mg qAM     #Mood  -Continue PTA venlafaxine 37.5mg qAM, 75mg at bedtime- changed to 37.5 mg TID with meals 11/26/2019  -Continue mirtazapine 22.5mg at bedtime, when able to safely     # HTN  -Continue PTA amlodipine 10mg at bedtime    # Goals of care  Discussion held between Dr. Burkett, patient's wife, son and patient's sister. Per  patient's family agreed to enroll patient into hospice care upon discharge. Will reach out to  to make referrals for hospice.     Diet: Dysphagia Diet Level 1 Pureed Nectar Thickened Liquids (pre-thickened or use instant food thickener)  Advance Diet as Tolerated  Snacks/Supplements Adult: Magic Cup; Between Meals    Fluids: no IVF  DVT Prophylaxis: Mechanical measures  Contreras Catheter: not present  Code Status: DNR/DNI      Disposition Plan   Expected discharge: 2 - 3 days, recommended to prior living arrangement once respiratory status has improved and baseline mental status.  Entered: Vidhi Singh MD 11/29/2019, 3:40 PM     _____________________________________________________________________    Interval History    NAEO  Still has ongoing mild cough   Tolerating dysphagia diet ok, but needs very close supervision and ensure alertness prior to feeding  Non conversational, so cant review  systems  Vitals stable overnight  Passing good amounts of clear urine  Off oxygen, breathing quietly  Off IV fluids at present     Data reviewed today: I reviewed all medications, new labs and imaging results over the last 24 hours    Physical exam:  Vital Signs: Temp: 97.4  F (36.3  C) Temp src: Axillary BP: (!) 177/87   Heart Rate: 75 Resp: 18 SpO2: 99 % O2 Device: None (Room air)    Weight: 179 lbs .22 oz    Physical Exam:  General: appears more alert  HEENT: Oral mucosa moist and non-erythematous, PERRLA, EOM intact  CV: RRR, normal S1S2, no murmur, clicks, rubs  Resp: Clear to auscultation bilaterally, no wheezes, rhonchi  Abd: Soft, non-tender, BS+, no masses appreciated  Extremities: Radial and pedal pulses intact and symmetric, no pedal edema  Neuro: sparse speech, but responds to name.

## 2019-11-29 NOTE — PLAN OF CARE
Time: 1660-8555  Reason for admission: Productive cough, encephalopathy, and oxygen requirements concerning for healthcare associated pneumonia. Hx of Parkinson's, dementia, & COPD.   Activity: A2 for cares. Repositioned q 2-3hrs as tolerated.   Pain: No physiological indicators of pain, no evidence of nausea.    Neuro: MARY ANN neuro's, pt is intermittently alert and lethargic, excessive drooling.    Cardiac: HTN, team aware.   Respiratory:  LS coarse and diminished. Pt has frequent congested cough - non-productive, weak, drools.   GI/: Pt is incontinent of urine and bowel. +BS, No BM this shift.    Diet: DD1, patient appears to be tolerating, give meds crushed in pudding, followed by nectar thickened water.  Encouraged pt to swallow with somewhat good results.  Drools medication out at times.     Lines:  PIV access lost last night - team aware.    Incisions/Drains/Skin: R 5th finger blister intact UTV as it is covered, offered to changed dressing this shift however wife requested to wait til the morning. Mepilex on sacrum intact.   Lab:  No new labs today.   New changes this shift: No acute changes. Pt to discharge today. Dr. Burkett to have conversation with family tmrw at 1000.

## 2019-11-29 NOTE — PLAN OF CARE
Vitals:    11/29/19 0210 11/29/19 0805 11/29/19 0836 11/29/19 1215   BP: (!) 167/91 (!) 161/81  (!) 177/87   BP Location:  Left arm  Left arm   Pulse:       Resp: 18 18 16 18   Temp: 98.8  F (37.1  C) 97.8  F (36.6  C)  97.4  F (36.3  C)   TempSrc: Axillary Axillary  Axillary   SpO2: 98% 97% 94% 99%   Weight:       Height:       AVSS.    Neuro: Sleepy. Wakes up for short times intermittently. Ate 25% of BF with assist. No meds at 1200 or food. Repositioned q 2-3 hrs. Wife at bed side. Care conference this am with wife and family.    GI/: Incontinent of B & B. No BM. Was wet x1.    Diet: Dysphagia diet 1 with thickened liquids.    Incisions/Drains: None.    IV Access: None.    Labs: none.    Activity: Repositioned with 2 assist  Q 2-3 hrs.    Pain: No c/o pain. No signes of discomfort noted.    New changes this shift: Care conference with family. Being admitted to Hospice care.    Plan: Discharge to LTC when Hospice admission is completed. Continue with current POC.

## 2019-11-29 NOTE — PLAN OF CARE
Vitals: Temp: 98  F (36.7  C) Temp src: Axillary BP: (!) 160/87   Heart Rate: 72 Resp: 18 SpO2: 93 % O2 Device: None (Room air)    Time: 1272-0275  Reason for admission: Productive cough, encephalopathy, and oxygen requirements concerning for healthcare associated pneumonia. Hx of Parkinson's, dementia, & COPD.   Activity: A2 for cares. Repositioned q 2-3hrs as tolerated.   Pain: No physiological indicators of pain, no evidence of nausea.    Neuro: MARY ANN neuro's, pt is intermittently alert and lethargic, excessive drooling.    Cardiac: HTN, team aware.   Respiratory:  LS coarse and diminished. Pt has frequent congested cough - non-productive, weak, drools.   GI/: Pt is incontinent of urine and bowel. +BS, No BM this shift.    Diet: DD1, patient appears to be tolerating, give meds crushed in pudding, followed by nectar thickened cranberry.    Lines:  PIV access lost last night - team aware.    Incisions/Drains/Skin: R 5th finger blister intact UTV as it is covered, mepilex on sacrum intact.   Lab:  No new labs today.   New changes this shift: No acute changes. Pt to discharge tomorrow. Dr. Burkett to have conversation with family tmrw at 1000.      Continue to monitor and follow POC

## 2019-11-30 NOTE — PLAN OF CARE
Comfort care.  Pt very sleepy this shift.   No po meds given at 1200 or 1500.  No meals this shift.  Repositioned at 1500 with assist of 2- was incontinent of urine but no BM.  Not waking up for meds or meals.   No signes of discomfort. Currently none verbal.  Last repositioned at 1500.   Continue with current comfort cares.

## 2019-11-30 NOTE — CONSULTS
Writer and martina Carrillo met with pt wife Danuta in conference room to discuss hospice.  Danuta expressed desire for comfort focused care and symptom management vs aggressive care and repeat hospitalizations.  Discussed aricept and Danuta is in agreement of having hospice MD speak to neurologist before changing it. Danuta signed consents and elected RN, MARTINA, spiritual care and hospice aide.  Equipment was ordered and will be delivered tomorrow before 10 30 am.  Writer ordered hi low hospital bed with extension and upper rails, nebulizer, oral suction, broda chair.  Spoke to Daniel nurse at facility and he requested above equipment.  He also requested discharge orders to be sent to facility today or early am so the pharmacy can fill meds prior to pt arrival. Writer told him that hospice will cover some of pt meds. Fax number to facility is [phone].  POLST completed and left on inside of chart for MD signature.  Please sign and send with pt to [facility]. Rosalinda DENISE set up transportation for tomorrow at 10 30 am.  Please call with any questions.  Thank you for this referral   Sandra Severin RN

## 2019-11-30 NOTE — PLAN OF CARE
"BP (!) 153/79 (BP Location: Left arm)   Pulse 55   Temp 98.5  F (36.9  C) (Axillary)   Resp 18   Ht 1.88 m (6' 2\")   Wt 81.2 kg (179 lb 0.2 oz)   SpO2 94%   BMI 22.98 kg/m      Neuro: sleepy, wakes for intermittent times, wife at bedside  Cardiac: HTN - team aware  Respiratory: LS coarse and diminshed, pt has frequent congested   GI/: incontinent of B&B, was wet x1, no BM this shift  Skin: 5th finger blister intact VALERIE, mepilex on coccyx  Pain: MARY ANN, no signs of discomfort noted  LDA: none  Activity: repositioned  Diet/Appetite: dysphagia diet 1  Plan: pt is now comfort cares, [facility] hosp[facility] consult to meet with wife tomorrow at 1pm, hopeful discharge tomorrow or Sunday, continue POC      "

## 2019-11-30 NOTE — PLAN OF CARE
Status: Comfort cares  Activity: Dependent; mechanical lift, repositioning as needed.  Neuros: Lethargic; incomprehensible sounds, MARY ANN orientation. Wakes slowly when spoken to.  Cardiac: HTN, team aware.  Respiratory: Congested/wheezing breath sounds. Oral cares and oral suction done PRN throughout shift.  GI/: +BS, LBM 11/26. Incontinent of urine.  Diet: Mechanical soft/nectar thick liquid diet. Able to swallow crushed PO meds with pudding.  Skin/Incisions: Finger blister intact, VALERIE. Blanchable redness noted on sacrum, mepilex intact.  Lines/Drains: NA.  Pain/Nausea: MARY ANN, no physiologic signs observed.  New Changes: No acute changes this shift.  Plan: Continue to monitor and manage symptoms.

## 2019-11-30 NOTE — PLAN OF CARE
Comfort care.  Pt sleeping mostly. No signes of discomfort noted.  Changed and repositioned with assist of two.  No BM - was incontinent of large uop.  No meds given this shift. No meals due to been sleepy.  Wife meeting with Hospice care and SW at 1300.  Continue with current plan of comfort cares.

## 2019-11-30 NOTE — PROGRESS NOTES
[facility] Progress Note - Hospitalist Service, Arik [address]       Date of Admission:  11/18/2019  Assessment & Plan   Sanjay Cano is a 74 year old male with Parkinson's dementia, COPD and multiple admissions for probable aspiration events and pneumonias admitted on 11/18/2019 with altered mental status and respiratory distress most consistent with recurrent aspiration pneumonia versus pneumonitis, now with treatment completed and goals of care transitioned to comfort cares and hospice given high risk for recurrence.    Changes today;  - Meeting with Hospice agencies  --------------------------------------------------------------------------------------------------------    # Goals of Care  Completed treatment for pneumonia this admission with respiratory status back to baseline but persisting weakness, somnolence, and failure to regain strength.  A care conference with family members was completed on 11/29 and per Danuta (with agreement of all family members present) Sanjay's wishes at this time would be to transition to comfort cares and hospice.  All non-essential medications discontinued following review with Danuta.  - Morphine PRN for pain or air hunger  - Lorazepam PRN for anxiety  - Ondansetron PRN for nausea  - Non-essential medications discontinued  - No vital signs  - No labs  - DNR/DNI    # Dementia  # Parkinson's  - Continue  [id number] (dosed 6am, 9am, 12pm, 3pm, 6pm) and donepezil 5mg qAM, 10mg qHS     # GERD  -Continue PTA famotidine 20mg qAM     #Mood  -Continue changed to 37.5 mg TID with meals 11/26/2019  -Continue mirtazapine 22.5mg at bedtime, when able to safely     # HTN  -discontinued amlodipine    Diet: Advance Diet as Tolerated  Snacks/Supplements Adult: Magic Cup; Between Meals  Dysphagia Diet Level 1 Pureed Nectar Thickened Liquids (pre-thickened or use instant food thickener)    DVT Prophylaxis: None, comfort cares  Contreras  Catheter: not present  Code Status: DNR/DNI      Disposition Plan   Expected discharge: Tomorrow, recommended to prior living arrangement once hospice arranged.  Entered: Kaela Burkett MD 11/30/2019, 2:32 PM       The patient's care was discussed with the Bedside Nurse, Patient's Family and  and [facility].    Kaela Burkett MD  Hospitalist Service, [address]  Pager: [phone]  Please see sticky note for cross cover information  ______________________________________________________________________    Interval History   No acute events overnight.  Comfortable today per wife, no new complaints or concerns.    Data reviewed today: I reviewed all medications, new labs and imaging results over the last 24 hours.    Physical Exam   Vital Signs: Temp: 99.7  F (37.6  C) Temp src: Oral BP: 134/75 Pulse: 72 Heart Rate: 72 Resp: 18 SpO2: 93 % O2 Device: None (Room air)    Weight: 179 lbs .22 oz  Gen: Awake, somnolent, in no acte distress.  Resp: Nonlabored  CV: Cap refill <2 seconds  Neuro: Awake, somnolent, responsive to voice

## 2019-11-30 NOTE — PROGRESS NOTES
Social Work Services Progress Note    Hospital Day: 13  Date of Initial Social Work Evaluation:  Not formally completed  Collaborated with:  Pt's wife, Dr. Burkett,  Hospice, HE transportation, [facility]    Data:  Pt is a 73yo being followed by SW for hospice coordination and discharge planning    Intervention:  Met with pt's wife, Danuta; Dr. Burkett; and  Hospice staff to discuss options post-hospitalization. Through much discussion it was determined that returning to [facility] with  Hospice support is the preferred option. [facility] was also discussed, but it is important to Danuta that pt has the opportunity to go to his day program if he is up for it (which would not be an option at [facility]). She is aware that [facility] may be an option in the future.    Spoke with admissions at [facility] who stated that they are able to accept pt back tomorrow. Also informed them, per  Hospice, that a hospice nurse will be coming Monday morning at 9:30. Stretcher transport is set for 1030 tomorrow, 12/1, through [facility] who said that it would likely be fine that Danuta ride with them to [facility], per her request.    Assessment:  Danuta is understandably upset about the decision to move to comfort care, but is understanding that it is in the best interest of her .    Plan:    Anticipated Disposition:  Facility:  [facility] with  Hospice    Barriers to d/c plan:  none    Follow Up:  SW to follow through discharge    JERRY Hinojosa, [facility]  Weekend Social Work  [facility], [place]    4A, 4C, 4E, 5A, 5B: [facility] [phone]  6A, 6B, 6C, 6D: [facility] [phone]  7A, 7B, 7C, 7D, 5C: [facility] [phone]

## 2019-12-01 NOTE — PLAN OF CARE
Comfort care.  Sleepy at 0800.  Meds for 0800 ready and wife helping with administering.  More awake at 0900 with jacklyn cares and repositioning.  Writer was able to give 0900 meds. Had few sips of cranberry juice. Unable to continue feeding as he fell back to sleep.  Large incontinences of urine. No BM.  Changed and repositioned at 0900 -supine.  Wife talking to primary team.   Discharge planned for 1030.  Continue with comfort care.

## 2019-12-01 NOTE — TELEPHONE ENCOUNTER
Home Care nurse is calling, she is requesting that I contact the on call for Dr. Bharath Buchanan for admission to Home Care orders. Page out via page  at 1:03p to have the on call (Dr. Cho) call me back per [facility] protocol. I will relate the information to contact the Home Care nurse Yolanda Huang at [phone]. Dr. Cho called back at 1:18p and was given the contact information for the Home Care nurse.   Solange Anders RN  [facility]

## 2019-12-01 NOTE — PLAN OF CARE
Status: Comfort cares  Activity: Dependent; mechanical lift, repositioning as needed.  Neuros: Lethargic; incomprehensible sounds, MARY ANN orientation. Wakes slowly when spoken to.  Cardiac: HTN, team aware.  Respiratory: Congested/wheezing breath sounds. Oral cares and oral suction done PRN throughout shift.  GI/: +BS, LBM 11/26. Incontinent of urine x1 this shift.  Diet: Mechanical soft/nectar thick liquid diet. Able to swallow crushed PO meds with pudding.  Skin/Incisions: Finger blister intact, VALERIE. Blanchable redness noted on sacrum, mepilex intact.  Lines/Drains: NA.  Pain/Nausea: MARY ANN, no physiologic signs observed.  New Changes: No acute changes this shift.  Plan: Continue to monitor and manage symptoms, plan to discharge at 1030 this AM.

## 2019-12-01 NOTE — PROGRESS NOTES
Pt lethargic throughout shift, arousing to repeated touch. MARY ANN orientation or CMS. Pt drools, suctioned w/ yonker. Pts wife assisted w/ encouraging intake of essential meds but unable to take zantac. Linens changed and pt repositioned. Continue plan of care

## 2019-12-01 NOTE — PROGRESS NOTES
Gave pt's wife the medications that are due at 1200 and 1500 to take with at discharge so as pt may use if meds are not available by that time at [facility] per request from the Hospice nurse. Pt stable. Sleeping. Discharge time for 1030.

## 2019-12-04 NOTE — TELEPHONE ENCOUNTER
Reason for call:  Other   Patient called regarding (reason for call): call back  Additional comments: Patient spouse called stated that the patient is out of the hospital and back at [facility] assisted living. Patient spouse stated that the facility that the patient is at would like to shorten some of the medication that the patient is currently taking. Patient wife is really worried about this and would like to speak with a nurse regarding this. Patient wife stated if she can receive a call back within the next 2 hours we can reach her at the home line. [phone]. If it is after 2 hours please contact her on the cell phone number. [phone]    Phone number to reach patient:  Cell number on file:    No relevant phone numbers on file.       Best Time:  Patient was wondering if she can receive a call within the next 2 hours on the home phone. [phone]    Can we leave a detailed message on this number?  If we call the cell phone we can not leave a message

## 2019-12-04 NOTE — TELEPHONE ENCOUNTER
BOAZ Quintero    Spoke with Caren patients wife. She said patient is now on hospice and the hospice doctor wants to take Sanjay off of Aricept. Caren states that this is the only medication that has helped him through his dementia/parkinson's disease all these years. She wants him to stay on this because even though he is going on hospice and dying she wants him to remember everyone while he is on hospice. Caren said the hospice doctor will be calling Dr. Buchanan to talk with him.    Caren also said the medication will not be covered through insurance while he is on hospice unless MD states patient should be on medication.    Her cell phone is not working right now for messages, so she said if you want to leave a message for her you can call the home phone at [phone].    Thanks,  Betina Joe RN   [facility] [place] [place]    [place]

## 2019-12-04 NOTE — TELEPHONE ENCOUNTER
Left message to call back. Bharath Buchanan MD     Please contact nursing home RN to change to aricept 5 mg twice daily. If patient tolerates, will taper again in a few days to aricept 5 mg once daily, then 1/2 tab daily, then stop. Please coordinate with hospice MD Veronica Chavis. Order signed, please notify, thanks Bharath

## 2019-12-05 NOTE — PROGRESS NOTES
hospice physician visit  location: skilled nursing facility  reason for visit: assess symptoms, discuss comfort care with pts wife, POA    HPI: The patient is a 74 year old man with advanced Parkinson's disease with related dementia without behaviors, dysphagia, recurrent aspiration pneumonia with 3 hospitalizations int eh past 6 months, hypernatremia and copd.He was diagnosed with Parkinsons 25 years ago, was living at home until 2 years ago and now resides in long term care facility. Patient was admitted to the hospital 11/18/2019 with worsening cough for over two weeks, difficulty in breathing, low grade fevers and altered mental status, diagnosed with aspiration pneumonia, completed a 7 day course of IV antibiotics while in the hospital with improvement in his respiratory status. Patient has severe oropharyngeal dysphagia and is a high risk for aspiration.  Patient had a swallowing test while in the hospital on 11/22/19, recommendation was a dysphagia diet 1, pureed foods with nectar thick liquids. Patient needs to be feed, eating very little, has excessive secretions. He has had 3 hospitalizations for pneumonia, likely aspiration pneumonia, in the past 6 months.   His wife and her best friend join today to discuss their concerns about [facility]. Wife expresses her grief and anger, disappointment that he is declining, her belief that no one else really understands what is going on with her , including his long term doctors. She was told that Aricept is likely not covered by hospice, but she is distraught at the thought that it may be helping his memory and that he could have further losses off medication. She feels that we are not helping him to be more comfortable, citing that his new bed is uncomfortable, his oral suctioning catheter is rigid and uncomfortable, and he has not routinely been out of bed since returning to the facility from the hospital, no longer going to his day program (which he  "really enjoyed in the past). Because of his hypophonia and reluctance to engage with people who don't know him, wife states that people tend to underestimate his intellect and treat him poorly. She also relates that he is angry about his decline at times, wanting to go home (even though he will express he knows he can't go home.  PMH: Htn, GERD. PSH: cataract replacement. FHx: mother with cerbrovascular disease, htn, diabetes. Father with cerebrovascular disease. SHx; has lived at SNF x 2 years, wife very involved in care. [place] of friends to support them.   12 point ROS: patient is nonverbal during my assessment and cannot complete.     Objective: Admission 6 ft, 2 in; 179 pounds; 188 pounds  8/14/19     5 percent weight loss in 4 months  Lying in hospital bed, will open his eyes to voice and touch, soft spoke, says \"OK\" when I ask to examine him  Mouth: drooling, lax lips. Neck: supple, no lad, thyromegally or mass  Chest: CTAB anteriorly with absent BS at both bases. Heart: RRR no m  Ab: scaphoid, soft, NT, BS+  Ext: no edema, intact pulses, warm fingers and toes.  Neuro: responds to voice and light touch, drooling, masked facies. Significant stiffness with cogwheeling in upper extremities. Recent right hand fracture-- no swelling, not palpated.   Assessment: the patient is a 74-year-old man with advanced Parkinson's disease and significant dysphagia with 3 episodes of aspiration pneumonia over the past 6 months.  On admission, hospice staff noted that he may be in early transition but he is improved today, alert and awake, responding to voice and touch, responding to his wife.  His prognosis is very poor, estimated less than 6 months, likely less than 1 to 2 months given his frailty, recurrent aspiration.  Prognosis was discussed with his wife with her permission.  Plan: There are significant stressors for the caregiver.  She has had continued conflict with nursing home staff over the plan of care.  We had " a long discussion about what comfort care means, how we try to individualize this to each patient.  Per wife Danuta, comfort care for Sanjay would mean being able to go to his day program.  To do so he would need to be transferred via a Yosi lift to a Broda chair and taken to the program.  We will have an OT assessment to see if this is possible to do safely.  We discussed that oral suctioning is not typically recommended as it does not improve comfort.  We reviewed eating and drinking for comfort and the likelihood that he will continue to have significant aspiration.  We spent a lot of time talking about Aricept.  Danuta is insistent that it does help Sanjay'smemory.  Because cannot is nonverbal it is impossible for me to tell if this is true.  I will discuss with patient's primary care provider and neurologist.  Informed Danuta that this is not a medication covering hospice.  If his other providers agree that it could be safely tapered and discontinued, then would give Danuta option of pain for medication of pocket versus tapering and stopping medication if he tolerates it.  We will plan a follow-up visit in 2 to 3 weeks to reassess and talk with Danuta about concerns.  Chichi Chavis MD  Associate Medical Director  [facility]

## 2019-12-05 NOTE — TELEPHONE ENCOUNTER
Writer called and spoke to GERARD Marie at patient's home [facility]. Gave RN providers instructions regarding Aricept medication. Edyta Schmidt RN on 12/5/2019 at 8:13 AM

## 2019-12-05 NOTE — TELEPHONE ENCOUNTER
Prescription for  donepezil (ARICEPT) 5 MG tablet  Faxed to [facility] at [phone]

## 2019-12-09 ENCOUNTER — TELEPHONE (OUTPATIENT)
Dept: FAMILY MEDICINE | Facility: CLINIC | Age: 74
End: 2019-12-09

## 2019-12-09 NOTE — TELEPHONE ENCOUNTER
Dr Chanel Tipton called  3am Sunday Sanjay passed, she said he passed without any anguish and seemed to be at peace    Joana Gandhi RN   [facility]

## 2019-12-09 NOTE — TELEPHONE ENCOUNTER
Pt's wife called wanting to speak with a nurse. She would not elaborate what she needed to tell the nurses just said it's very important. Only wants to speak with Joana.

## 2019-12-11 ENCOUNTER — MEDICAL CORRESPONDENCE (OUTPATIENT)
Dept: HEALTH INFORMATION MANAGEMENT | Facility: CLINIC | Age: 74
End: 2019-12-11

## 2019-12-11 NOTE — TELEPHONE ENCOUNTER
Ayaan Tipton- discussed his peaceful death, difficult hospitalization, discharge medication errors and corrections. Recommend appointment after the first of the year; sooner if need be. Bharath Buchanan MD

## 2020-11-14 NOTE — TELEPHONE ENCOUNTER
Discussed with spouse by phone. No cough, but shortness of breath when riding in wheelchair. Order signed, please notify, thanks Bharath     Please notify nursing home  2nd floor nurse [phone].   Thanks Bharath    Humerus/arm

## 2020-12-29 NOTE — TELEPHONE ENCOUNTER
Can cause sedation: recommend stopping afternoon dose, and using in the evening only if agitated. Please notify, thanks Bharath   
Caren called and had some questions regarding seraquill 50 mg that is being given to Sanjay at bedtime. Caren is concerned that he is tired all the time and wondering if this could be a side effect of using this medication over a prolonged period of time. Said that he did not seem to be as tired before. Said that they usually give his medication along with apple sauce and wondering if they might be giving him something else as they do sometimes to help people calm down for the staff. Caren can be reached at [phone].  
Dr. Buchanan    See wife's message regarding pt    Spoke with wife reassured her that the facility can not give a med that is not ordered    She was wondering if he could be diabetic,     I also strongly suggested she get linked up with a Parkinsons support group, I gave her the number for the [place] support group as they had many days of group gatherings listed    Advise as needed    Joana Gandhi RN   [facility]          
Order was faxed to [facility], [phone]    Wife notified  She wasn't sure if they had stopped the afternoon dose after the OV on 8/31/18    Joana Gandhi RN   [facility]        
61

## 2021-05-27 NOTE — TELEPHONE ENCOUNTER
Wife called and said they have appt coming up in May. She is concerned because his stools are very mushy. She didn't know if it was from aricept.  He started taking lactaid. The   Also recommends metamucil. I told her that would be ok to try.  She also had a number of other complaints regarding the nursing home.

## 2021-05-30 NOTE — TELEPHONE ENCOUNTER
Pt's wife called regarding carbidopa-levodopa med. She stated that she just now noticed that it says 1/2 pill can be taken at 2:00am PRN and she is concerned that he has not been getting that 1/2 pill at 2:00am. I did explain to her that PRN means as needed but she is still concerned and doesn't know what to look for to see if that PRN is needed. She wants a call from Dr. Carrington with directions for the med.     [place]:[phone]

## 2021-06-08 NOTE — PROGRESS NOTES
"  Assessment / Impression     1.  Parkinson's disease  2.  Delirium  3.  Forward flexion, rule out camptocormia     Plan:   1.  Physical therapy  2.  I would hold on consideration for infusion pump for treatment of Parkinson's disease until the patient has been optimally rehabbed  3.  Continue present therapies in terms of patient's neuropsychiatric condition    A long conversation held with the patient and spouse.  Total time for evaluation 25 minutes with majority time spent in counseling.  This patient with long-standing Parkinson's disease with recurrent delirium has been having more difficulty with motoric debility since suffering a series of urinary tract infections last year.  At this point in time some consideration has been given to a gastric infusion pump for Sinemet.  I would prefer, however, to focus on rehabilitation before considering such an intervention.      Subjective:     HPI: Sanjay Cano is a 71 y.o. male with above-noted diagnoses who returns for follow-up.  Overall the patient appears to be doing well.  I again note rather significant forward flexion.  The patient is able however to straighten his spine and raise his head and maintain this position for a period of time with cues from this examiner.  The patient reports his situation has been \"a bitch.\"  He seems to deny symptoms of depression at this time but he certainly is struggling with respect to the motoric consequences of his Parkinson's disease.    The wife reports that the patient has responded in the past quite well to physical therapy and something that she refers to as a \"E stim\" device.    The patient today offers no physical complaints he does report some difficulty with short-term memory.          Review of Systems:          As above        Objective:     Vitals:    02/03/17 1403   BP: 133/89   Pulse: 71       No results found for this or any previous visit (from the past 24 hour(s)).    Physical Exam: Patient seated in a " wheelchair.  Forward flexion is noted.  From a cognitive perspective I note that he remains alert and interactive and able to respond to questions appropriately.  No fluctuation in level of consciousness or clear evidence of pascale distractibility noted.  He is able to raise himself from a seated position with mild assistance from this examiner.  Leaning to the right is noted.  With moderate assist I am able to ambulate the patient approximately 200 feet in the clinic.  Some lower extremity weakness is identified.  No symptomatic orthostasis identified.

## 2021-06-08 NOTE — PROGRESS NOTES
Persons accompanying you (the patient) today: Wife: Caren    How have you been doing since we saw you last? Please note any concerns.  No concerns, pt is recovering from a severe UTI.     Please list any surgeries or procedures you have had since we saw you last:  none    Have you had any falls since your last visit? No    Do you have any pain today? No    With whom do you currently reside? (alone, spouse, family, assisted living, nursing home)  Pt live at home with his wife.  If assisted living or nursing home, please note name and location of facility:

## 2021-06-11 NOTE — PROGRESS NOTES
Persons accompanying you (the patient) today: Wife Caren    How have you been doing since we saw you last? Please note any concerns.  Pt wife says he's been very tired, he is having a hard time to keep his head up and is always leaning over to the side, his voice is getting a lot more soft especially at night, pt stated that he is having a hard day and is getting more forgetful.  Pt has been doing very well in his adult  and has been keeping up with current events that he hears on the news. I was not able to get a weight today with patient being in the wheelchair. Pt wife would like to discuss issues with insurance on the prescription of the donepezil.     Please list any surgeries or procedures you have had since we saw you last:  none    Have you had any falls since your last visit? No    Do you have any pain today? No    With whom do you currently reside? (alone, spouse, family, assisted living, nursing home)  Pt lives at home with his wife.

## 2021-06-11 NOTE — PROGRESS NOTES
"  Assessment / Impression     1.  Parkinson's disease  2.  Parkinson's disease dementia  3.  Delirium    Plan:   1.  Given current circumstances, I would consider initiating trial of Rytary  2.  If ineffective, consider adjustment in cholinesterase inhibitor therapy and/or antidepressant therapy  3.  Consider physical therapy within a structured living environment (TCU setting)    A long conversation held with the patient and spouse in attendance.  Given the constellation of findings today, I am most inclined to believe that adjustment in Parkinson's medicines would be most appropriate as an initial trial.  With this in mind I have asked the spouse to contact the patient's neurologist regarding above-noted recommendations.      Subjective:     HPI: Sanjay Cano is a 71 y.o. male with above-noted diagnoses who returns for follow-up.  The patient has been deteriorating over the past month with respect to his Parkinson's disease.  Significant variability exists with wife Eileen noting periods of \"near normalcy\" from time to time.  Today cannot appears forlorn.  I once again noted a significantly forward flexed posture and difficulty in raising the head to make eye contact with this examiner.  He reports motoric challenges possibly associated with a slightly increased degree of depression.  No visual hallucinations noted.  Certainly some confusion has been identified.          Review of Systems:          As noted previously.  The patient continues to have urinary difficulties.  In addition, I did question the spouse with respect to the patient's posture when lying supine.  A degree of camptocormia cannot be entirely excluded presently.        Objective:     Vitals:    06/02/17 1326 06/02/17 1327   BP: (!) 164/95 152/86   Pulse: 68 66       No results found for this or any previous visit (from the past 24 hour(s)).    Physical Exam: As noted above.  From a cognitive perspective the patient has difficulty maintaining " attention during the course of conversation although no fluctuation in level of consciousness is noted.  Affect is pleasant mood congruent at this time.  No visual or auditory hallucinations or delusional ideation.

## 2021-06-17 NOTE — PROGRESS NOTES
Assessment / Impression   1.  Parkinson's disease dementia  2.  Delirium multifactorial in etiology but in part secondary to #1  3.  Depression NOS  4.  Physical debilitation      Plan:   1.   consult to investigate the possibility of in-home care with intensive in-home services  2.  I would like to have a follow-up visit with the patient and spouse and son to discuss the matter of restorative therapy and the appropriate application of treatment trials  3.  Continue present medication therapies for now    A long conversation held with the patient and spouse in attendance.  This patient with a greater than 23 year history of Parkinson's disease has not been seen by this examiner since June 2017.  Institutionalized following his wife's injury, the patient did have significant difficulties with worsening confusion, progressive weight loss, recurring dehydration and need for hospitalization.  He has been wheelchair-bound for at least the last 6 months.  The spouse has been in the past and continues to be a fierce advocate for her  and of the mindset that her  would do much better receiving care at home.  Most problematic at this time is continued confusion, physical debilitation and incoherency of speech/confusion according to the spouse.  As aspects related to nutrition and hydration have largely been resolved.  Given the spouse's perception that environmental influences are most likely etiologically related to his current situation and it would be foolish to not at least explore the option of adjusting environment first before moving on to other therapeutic options.  I explained this to the spouse and to the patient and they are agreeable.    Total time for evaluation one hour with majority time spent in counseling.      Subjective:     HPI: Sanjay Cano is a 72 y.o. male with above-noted diagnoses who returns for follow-up.  Not seen in my clinic since early June 2017, the patient has  had an eventful 18+ months.  Living at home under the care of his spouse, the patient did develop delirium associated with a urinary tract infection.  After assisted devices for providing care were provided in the home, the spouse tripped over 1 of these devices fracturing her right leg and necessitating that the patient be institutionalized.  Institutionalization was followed by worsening confusion, a 31 pound weight loss and recurrent medical problems requiring emergency room visits, observational stays etc.  Many difficulties have been resolved but the patient has been wheelchair-bound for 6 months.  The patient's spouse is convinced that were the patient able to return home, she could care for him and allow him to improve functionally.  I understand that this desire is strongly objective to by the patient's only child, a son, who I have never met.    The patient today presents as a elderly male who appears to be in no acute physical distress.  Seated in a reclining wheelchair, I do note a forward flexed posture.  The patient has difficulty making eye contact with this examiner.  His speech is hypophonic a bit festinating in quality but does demonstrate periods of fairly good coherency.  No fluctuation in level of consciousness noted.  No evidence of significant anxiety noted.  No evidence of psychosis.          Review of Systems:          As above        Objective:   There were no vitals filed for this visit.    No results found for this or any previous visit (from the past 24 hour(s)).    Physical Exam: The patient is neatly groomed casually dressed and seated for evaluation.  He appears to be receiving very good care.  His speech as noted above is hypophonic and a bit festinating in quality but does demonstrate a good level of coherency with only occasional derailment.  No fluctuation in level of consciousness or pascale distractibility noted.   evidence of depression noted.  No evidence of delusional ideation.   No evidence of visual or auditory hallucinations (these are reported by the spouse however) at this time.

## 2021-06-17 NOTE — PROGRESS NOTES
Dr. Carrington requested  Social Work Services to assist Patient and his Spouse in finding out the process of obtaining an elderly waiver to assist with financial cost of caring for the Patient in his home.    This worker contacted [facility] ([facility] of the Patient's home) and was informed that the Elderly Waiver gets initiated by the [facility] that the Patient is currently residing ([place]).  If Patient is eligible for the elderly waiver [facility] Will than forward to [facility] re: [facility] picking up the services.  This information was communicated to the Patient's spouse on 4/20/18.    Dominique Rondon   4/20/18    I have read, discussed, and agree with the documentation as presented by Dominique Rondon, Social Work Intern.    Silvina Oliveros, [facility]  4/20/18

## 2021-06-17 NOTE — PROGRESS NOTES
"Pt's wife called regarding her concerns related to what she considers \"not good care at the NH\" for her  who has Parkinson's disease. Wife doesn't want to have him moved to another NH because it \"may traumatize him.\"   Wife was highly encouraged to request a care conference to discuss the specifics of her concerns and hopefully establish a specific care plan.   Wife stated, that she has already contacted the Ombudsman's office. At some point she would like to take him home but stated that he needs to be more functional as well as, being on EW for home services. Also discussed those \"deal breakers\"   that she should be looking in order to determine if she no longer able to care for him at home if that does occur.   Length of conversation was approx. 1 hr. In length.    Silvina Oliveros [facility]  4/30/18  1035    "

## 2021-06-18 NOTE — PROGRESS NOTES
Data: A lengthy conversation was held with patient's wife Danuta who stated, that she continues to be frustrated with the decision between having her  remain at [facility] versus taking him home.  He did state the ombudsman has make contact with her and made visits to the nursing home to explore further patient's care needs.  Patient's wife stated that she feels a need to spend a considerable amount of time at the nursing home to ensure that her 's care needs are addressed.  Although, the patient's  receives physical therapy at the nursing home patient's wife believes that patient should be ambulating more than he currently is.    Assessment: Wife appeared overwhelmed at this time trying to figure out what is best for her  in regard to staying at the nursing home is returning home.    Recommendation: At this time patient's wife stated, they may be looking at switching to another [employer] product line.  Once this is decided they can then further explore whether or not there is a  who would assist in screening for CADI waiver services in case she decides to take him home rather than remain in the nursing home.  His wife can also ask for nursing home social worker to assist her with this process as well.    Plan: Wife stated that she will be having conversation with [employer] and then contact nursing home social worker with respect to waiver screening.  This will help determine the amount of services waiver would be able to provide in the home and amount of additional help she may if he were to return home.    Silvina Oliveros [facility]   5/29/18  1500

## 2021-06-19 NOTE — PROGRESS NOTES
Patient's wife called on this date stating that she is very frustrated with the services her  is receiving at the long-term care facility he is currently at.  Stated she spends approximately 60 hours a week there ensuring that his care needs are being met as much as possible.  She also reports that she is very stressed, overwhelmed feeling very guilty for having her  at a long-term care facility.  Writer encouraged her to talk to the nursing home social worker with respect to care needs at the facility as well as, exploring the possibility of taking her  home with support services and any needed equipment to ensure safety.  Plan: Be available for support if needed.    [facility]  7[facility]18  1000

## 2021-06-20 NOTE — PROGRESS NOTES
Assessment / Impression   1.  Parkinson's disease dementia  2.  Advanced Parkinson's disease  3.  Delirium multifactorial etiology  4.  Right hand fracture  5.  Recent pneumonia  6.  Recurrent UTIs  7.  Physical debilitation      Plan:   1.  Continue present medication therapies  2.  I am in agreement that Dr. Buchanan, the patient's primary physician, can make adjustments in psychotropic therapies as needed based on the patient's condition within the long-term care facility.  IN the patient's spouse can work together to communicate to Dr. Buchanan recommendations that I might have regarding the patient's psychotropic regimen.  At this point in time I am in favor of continuing present psychotropic therapies during the patient's convalescence from recurrent UTI pneumonia and traumatic injuries so as to limit confusion regarding difficulties the patient may face both cognitively and functionally in the near future.    A long conversation held with the patient's spouse and the patient.  Total time for the evaluation was 30 minutes with the majority of time spent in counseling.  The spouse remains quite frustrated regarding level of care her  is receiving.  I explained to the spouse that the patient's convalescence.  Following acute medical illness or injury will be longer given the nature of his neurodegenerative illness at this stage.  The patient is live with Parkinson's disease more than 23 years and I do see him being out of very late stage of illness at this time.  Given this, it is not surprising that we are facing the current level of challenges.      Subjective:     HPI: Sanjay Cano is a 72 y.o. male with above-noted diagnoses returns for follow-up.  The patient since last seen has been hospitalized for a number urinary tract infections as well as at least one pneumonia.  In addition, the patient suffered a left hand fracture that has limited his ability to feed himself and participating cares.  The  patient's spouse who has been very dutiful caregiver remains quite frustrated regarding deficiencies in the patient's level of care within the long-term care facility.  These concerns are not at all surprising given the spouse's dedication to her  and the amount of care he provided to her when he lived at home.    The patient today appears to be fairly bit debilitated.  With a severely forward flexed posture, the patient does respond to this examiner's voice.  He does appear to recognize this examiner.  He is able to  maintaining coherency during brief directed conversation but with more rambling and incoherent speech with sustained or less directed verbal exchanges.  Some fluctuation in level of consciousness noted.  At this point he denies somatic complaints.  No pain or shortness of breath noted.          Review of Systems:          As above        Objective:     Vitals:    09/07/18 1404   BP: 124/55   Pulse: 70       No results found for this or any previous visit (from the past 24 hour(s)).    Physical Exam: Most remarkable on exam are what appeared to be rather advanced parkinsonian findings.  The patient demonstrates a forward flexed posture, hypokinesia, hypophonia and bradykinesia.  Cognitively, fluctuation in level of consciousness and distractibility noted.  Speech has a festinating quality and at times is rambling and incoherent.  Affect is pleasant and mood congruent.  No evidence of visual or auditory hallucinations.

## 2021-06-20 NOTE — PROGRESS NOTES
Persons accompanying you (the patient) today: Wife    How have you been doing since we saw you last? Please note any concerns.  Pt went to ER due to having pneumonia and fractured his hand during UTI procedure. He also passed out because of the UTI    Please list any recent hospitalizations/surgeries/procedures you've had since we saw you last:  ER for pneumonia.    Have you had any falls since your last visit? No    Do you have any pain today? No

## 2021-07-03 NOTE — ADDENDUM NOTE
Addendum Note by Adela Tilley RN at 6/2/2017 11:59 PM     Author: Adela Tilley RN Service: -- Author Type: Registered Nurse    Filed: 6/12/2017 11:47 AM Date of Service: 6/2/2017 11:59 PM Status: Signed    : Adela Tilley RN (Registered Nurse)    Encounter addended by: Adela Tilley RN on: 6/12/2017 11:47 AM<BR>     Actions taken: Visit diagnoses modified, Charge Capture section accepted

## 2021-07-03 NOTE — ADDENDUM NOTE
Addendum Note by Shannan Jarrell CMA at 9/7/2018 11:59 PM     Author: Shannan Jarrell CMA Service: -- Author Type: Certified Medical Assistant    Filed: 9/10/2018 10:39 AM Date of Service: 9/7/2018 11:59 PM Status: Signed    : Shannan Jarrell CMA (Certified Medical Assistant)    Encounter addended by: Shannan Jarrell CMA on: 9/10/2018 10:39 AM<BR>     Actions taken: Charge Capture section accepted

## 2021-07-03 NOTE — ADDENDUM NOTE
Addendum Note by Shannan Jarrell CMA at 4/13/2018 11:29 AM     Author: Shannan Jarrell CMA Service: -- Author Type: Certified Medical Assistant    Filed: 4/24/2018 10:05 AM Date of Service: 4/13/2018 11:29 AM Status: Signed    : Shannan Jarrell CMA (Certified Medical Assistant)    Encounter addended by: Shannan Jarrell CMA on: 4/24/2018 10:05 AM<BR>     Actions taken: Charge Capture section accepted

## 2021-07-03 NOTE — ADDENDUM NOTE
Addendum Note by Adela Tilley RN at 4/13/2018 11:29 AM     Author: Adela Tilley RN Service: -- Author Type: Registered Nurse    Filed: 4/25/2018  8:34 AM Date of Service: 4/13/2018 11:29 AM Status: Signed    : Adela Tilley RN (Registered Nurse)    Encounter addended by: Adela Tilley RN on: 4/25/2018  8:34 AM<BR>     Actions taken: Visit diagnoses modified, Charge Capture section accepted

## 2021-09-17 NOTE — TELEPHONE ENCOUNTER
Corrina at pt residence will have the staff check the BP as requested by provider    Joana Gandhi RN   [facility]         Yes

## 2021-11-05 NOTE — TELEPHONE ENCOUNTER
[facility] nurse calling to report that patient scratched him right forearm today. Nurse and patient are unaware of how he scratched the area. Noted a 0.5 cm x 0.5cm superficial scratch on right forearm. No bleeding. The area was cleansed and band aide applied.  Solange Anders RN  [facility]    
[Negative] : Heme/Lymph

## 2023-05-17 NOTE — PROGRESS NOTES
"  SUBJECTIVE:   Sanjay Cano is a 72 year old male who presents to clinic today for the following health issues:      ED/UC Followup:    Facility:  [facility]   Date of visit: 08/20/2018  Reason for visit: Pneumonia   Current Status:      Sanjay is present with wife today and she is concerned that Sanjay is not taking his medications as prescribed due to records from medication administration from the assisted living. She reported that she observes patient having productive coughs and was producing a lot of phlegm yesterday after diner, but stated that the nurses has never noticed the patient coughing during the night time or at other times she is not there with Sanjay.     Wife is concerned about the care at the assisted living due to also finding pills on his bedroom floor.      Patient is noted to be currently taking combivent three times daily.    His wife states that since UTI symptoms that has now resolved.     Patient's wife states that a week after starting Seroquel 25mg at 3pm and Seroquel 25mg at bedtime, he began to cough and was later diagnosed with pneumonia. She also states that since splitting the Seroquel dose on August 13 from 50mg to two 25mg doses Sanjay has been tired \"all the time now\".     Nightmares   Patient reports that he experiences nightmares for the past couple months that wake him up from sleep. He is taking Sinemet as directed and wife stated that sinemet is prescribed for \"coherence\".     UTI   Resolving.     Left hand Fracture   Healing left hand fracture. Wife stated that patient has a follow up with orthopedics on September 4th.     Spouse spending 40 hours/week at spouse's nursing home. Hi levels of stress worried about his care plan goals being met. Taking a toll on her.     Problem list and histories reviewed & adjusted, as indicated.  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)     Primary insomnia     Other emphysema (H)     Pulmonary nodules     Thyroid nodule     Family history of thyroid cancer     H/O recurrent urinary tract infection     Essential hypertension with goal blood pressure less than 140/90     Senile purpura (H)     Nocturnal cough     Parkinson's disease (H)     Closed displaced fracture of shaft of third metacarpal bone of left hand, initial encounter     Pneumonia     Past Surgical History:   Procedure Laterality Date     EYE SURGERY       ORTHOPEDIC SURGERY       PHACOEMULSIFICATION CLEAR CORNEA WITH STANDARD INTRAOCULAR LENS IMPLANT  5/21/2014    Procedure: PHACOEMULSIFICATION CLEAR CORNEA WITH STANDARD INTRAOCULAR LENS IMPLANT;  Surgeon: Tomy Hunter MD;  Location: [facility]     PHACOEMULSIFICATION CLEAR CORNEA WITH TORIC INTRAOCULAR LENS IMPLANT  4/30/2014    Procedure: PHACOEMULSIFICATION CLEAR CORNEA WITH TORIC INTRAOCULAR LENS IMPLANT;  Surgeon: Tomy Hunter MD;  Location: [facility]       Social History   Substance Use Topics     Smoking status: Former Smoker     Packs/day: 0.01     Years: 40.00     Types: Cigarettes     Quit date: 7/31/2008     Smokeless tobacco: Never Used      Comment: very passive cigarettes in 6 months     Alcohol use No     Family History   Problem Relation Age of Onset     Cerebrovascular Disease Mother      Diabetes Mother      GASTROINTESTINAL DISEASE Mother      Hypertension Mother      Neurologic Disorder Father      Cerebrovascular Disease Father      Cerebrovascular Disease Maternal Grandfather      Cancer Paternal Grandmother      Other - See Comments Sister      gi - unknown         Current Outpatient Prescriptions   Medication Sig Dispense Refill     amLODIPine (NORVASC) 5 MG tablet Take  1 tablet (5 mg) by mouth At Bedtime 90 tablet 3     aspirin 81 MG tablet Take by mouth twice a week       carbidopa-levodopa (SINEMET CR)  MG per CR tablet Take 1 tablet by mouth At Bedtime 1 tablet 0     carbidopa-levodopa (SINEMET)  MG per tablet Take 1-2 tablets by mouth 5 times daily Takes 2 tablets at 6 AM, 1.5 tablets at 9 AM, 2 tablets at noon, 1.5 tablets at 3 PM, 1.5 tablets at 6 PM. Can take additional 0.5 tablet in the middle of the night if needed. 90 tablet      cholecalciferol (VITAMIN D) 1000 UNIT tablet Take 1 tablet (1,000 Units) by mouth every morning 90 tablet 1     clonazePAM (KLONOPIN) 0.5 MG ODT tab Take 1/2 tablet (0.25 mg) by mouth every other night. 45 tablet 0     COMBIVENT RESPIMAT  MCG/ACT inhaler INHALE 1 PUFF BY MOUTH FOUR TIMES DAILY. NOT TO EXCEED 6 DOSES PER DAY 4 g 4     donepezil (ARICEPT) 10 MG tablet Take 1 tablet (10 mg) by mouth At Bedtime 30 tablet 11     donepezil (ARICEPT) 10 MG tablet Take 5mg  by mouth twice a day (take with the 2.5 mg dose for a total dose of 7.5mg twice daily)  3     donepezil (ARICEPT) 5 MG tablet Take 1 tablet (5 mg) by mouth every morning 30 tablet 11     donepezil (ARICEPT) 5 MG tablet Take 2.5 mg twice a day (take with the 5 mg dose for a total dose of 7.5mg twice daily) 180 tablet 3     FLOVENT  MCG/ACT Inhaler INHALE 2 PUFFS INTO THE LUNGS TWICE DAILY 36 g 1     guaiFENesin (ROBITUSSIN) 20 mg/mL SOLN solution Take 10 mLs by mouth every 4 hours as needed for cough 1200 mL 0     Ipratropium-Albuterol (COMBIVENT RESPIMAT)  MCG/ACT inhaler Inhale 1 puff into the lungs 4 times daily May take 1 to 2 additional doses as needed during the day 1 Inhaler 4     lactobacillus rhamnosus, GG, (CULTURELL) capsule Take 1 capsule by mouth every morning 60 capsule 11     lisinopril (PRINIVIL/ZESTRIL) 5 MG tablet Take 1 tablet (5 mg) by mouth daily 30 tablet 1     methylcellulose (CITRUCEL) 500 MG TABS tablet Take 1 tablet (500 mg) by  "mouth daily as needed 14 each 0     mirtazapine (REMERON) 15 MG tablet TAKE 1.5 TABLETs (22.5 MG) BY MOUTH AT BEDTIME 135 tablet 1     QUEtiapine (SEROQUEL) 50 MG tablet Take 0.5 tablets (25 mg) by mouth 2 times daily Give after day program ~ 3 pm and at bedtime. Discontinue other seroquel orders 180 tablet 0     ranitidine (ZANTAC) 75 MG tablet Take 1 tablet (75 mg) by mouth At Bedtime 30 tablet      senna-docusate (SENOKOT-S;PERICOLACE) 8.6-50 MG per tablet Take 1 tablet by mouth daily       Sulfamethoxazole-Trimethoprim (BACTRIM PO)        venlafaxine (EFFEXOR-XR) 37.5 MG 24 hr capsule Take 37.5 mg by mouth daily       venlafaxine (EFFEXOR-XR) 75 MG 24 hr capsule Take 75 mg by mouth daily       Allergies   Allergen Reactions     Erythromycin Other (See Comments)     Pathological fractures     Levaquin [Levofloxacin]      Fracture prevention     Seasonal Allergies      BP Readings from Last 3 Encounters:   08/31/18 118/76   08/21/18 153/79   07/27/18 121/83    Wt Readings from Last 3 Encounters:   08/20/18 172 lb (78 kg)   06/06/18 172 lb 11.2 oz (78.3 kg)   01/26/18 117 lb (53.1 kg)                  Labs reviewed in [employer]    Reviewed and updated as needed this visit by clinical staff  Tobacco  Allergies       Reviewed and updated as needed this visit by Provider         ROS:  Constitutional, HEENT, cardiovascular, pulmonary, GI, , musculoskeletal, neuro, skin, endocrine and psych systems are negative, except as otherwise noted.    This document serves as a record of the services and decisions personally performed and made by Bharath Buchanan MD. It was created on his behalf by Gil Pemberton, a trained medical scribe. The creation of this document is based on the provider's statements to the medical scribe.  Gil Pemberton August 31, 2018 1:19 PM        OBJECTIVE:     /76 (BP Location: Left arm, Patient Position: Sitting, Cuff Size: Adult Regular)  Pulse 61  Temp 98.1  F (36.7  C) (Oral)  Resp 12  Ht 6' 2\" " (1.88 m)  SpO2 96%  There is no height or weight on file to calculate BMI.  GENERAL: chronically ill, moderate distress difficult to verbally understand and patient was on wheelchair  RESP: lungs upper airways and right lower base  clear to auscultation - no rales, rhonchi or wheezes andLeft lower base with course rales.   CV: regular rate and rhythm, normal S1 S2, no S3 or S4, no murmur, click or rub, no peripheral edema and peripheral pulses strong  ABDOMEN: soft, nontender, no hepatosplenomegaly, no masses and bowel sounds normal. Exam sitting in wheelchair   MS: Left hand fracture splint removed for exam- not tender to percussion.   NEURO: slumpt posture and cogwheel tremor and rigidity noted  PSYCH: affect down, masked facies    Diagnostic Test Results:  none     ASSESSMENT/PLAN:         1. Pneumonia of left lower lobe due to infectious organism (H)  Improving     2. Parkinson's disease (H)  Patient reports that he experiences nightmares for the past couple months that wake him up from sleep. Appears medications may be oversedating      3. Closed displaced fracture of shaft of third metacarpal bone of left hand, initial encounter  Healing, follows orthopedics.     Patient Instructions   Discontinue Seroquel 25mg dose at 3pm, but continue taking Seroquel 25mg at bedtime.   I recommend sitting in a regular chair for 30 minutes once daily.     Patient instructions discussed with patient/spouse     45 minutes were spent with the patient with more than 50% of time spent in counseling and coordination of care regarding above assessment and plan.       The information in this document, created by the medical scribe for me, accurately reflects the services I personally performed and the decisions made by me. I have reviewed and approved this document for accuracy prior to leaving the patient care area.  August 31, 2018 1:29 PM      Bharath Buchanan MD  [facility]

## 2023-07-19 NOTE — TELEPHONE ENCOUNTER
Medication admin. Order faxed to [facility]    Wife notified    Joana Gandhi RN   [facility]         Aklief counseling:  Patient advised to apply a pea-sized amount only at bedtime and wait 30 minutes after washing their face before applying.  If too drying, patient may add a non-comedogenic moisturizer.  The most commonly reported side effects including irritation, redness, scaling, dryness, stinging, burning, itching, and increased risk of sunburn.  The patient verbalized understanding of the proper use and possible adverse effects of retinoids.  All of the patient's questions and concerns were addressed.

## 2023-12-26 NOTE — TELEPHONE ENCOUNTER
The patients wife returned a call and would like a call back whenever someone is available on her cell phone [phone] but she stated that a message cannot be left at that number so if she is unable to be reached there she would like her home phone to be called and a message left there   No

## 2024-02-14 NOTE — TELEPHONE ENCOUNTER
Agree- see previous note. Thanks Bharath    Pt did not attend group when prompted or offered.     Problem: Alteration in Orientation  Goal: Attend and participate in unit activities, including therapeutic, recreational, and educational groups  Description: Interventions:  - Provide therapeutic and educational activities daily, encourage attendance and participation, and document same in the medical record   - Provide appropriate opportunities for reminiscence   - Provide a consistent daily routine   - Encourage family contact/visitation   Outcome: Not Progressing

## 2024-05-13 NOTE — TELEPHONE ENCOUNTER
Called spouse- recommend discussing G-tube if dehydration, aspiration risk mitigation desired. Bharath Buchanan MD   
Dr. Buchanan;  Please see phone message below    Please advise    Thank You!  Ibeth Saba, RN  Triage Nurse    
Reason for call:  Other   Patient called regarding (reason for call): BOAZ   Additional comments: Pt was sent to [facility] via ambulance last night.  Pt is dehydrated and has pneumonia again.  Pt's wife states that [facility] last Sunday found pt on the floor after an apparent fall.  [facility] did not call the doctor to report this fall.  Pt was found with no injury.  Pt's wife forgot to tell Dr. Buchanan about this fall yesterday, 5/1/19, at his appointment.      Phone number to reach patient:  [phone]  Best Time:  anytime    Can we leave a detailed message on this number?  YES  
Spoke with Danuta,     Being Dcd to [facility] at 4  Will be on antibiotic  They have told him he is at risk for aspiration ongoing    Danuta isn't sure how to proceed, her goal is she wants to have him return to eating normally, I asked her if she has talked with Sanjay about his goals and wishes, she doesn't know how to talk with Sanjay about this, she doesn't want to scare him  She doesn't know how to talk with the family    I suggested maybe a hospice referral so she could get some info, explaining to her that he wouldn't have to be admitted to hospice, but they would have more insight on what that kind of care could help them with. Symptom management, help to keep him from going in and out of the hospital    She may not of understood what I was trying to relay to her    She wants a message on home number   
will call MD and reorder lab